# Patient Record
Sex: FEMALE | Race: WHITE | NOT HISPANIC OR LATINO | Employment: OTHER | URBAN - METROPOLITAN AREA
[De-identification: names, ages, dates, MRNs, and addresses within clinical notes are randomized per-mention and may not be internally consistent; named-entity substitution may affect disease eponyms.]

---

## 2017-05-30 ENCOUNTER — TELEPHONE (OUTPATIENT)
Dept: ORTHOPEDICS | Facility: CLINIC | Age: 37
End: 2017-05-30

## 2017-05-30 NOTE — TELEPHONE ENCOUNTER
----- Message from Fanta Bangura sent at 5/30/2017  8:27 AM CDT -----  Contact: patient  Patient calling in regards to scheduling a same day appt today. This is a [facility] ED f/u for an AC shoulder separation. No avail appt until 6/6/17 but he would like to be seen today. Please advise.  Call back   Thanks!

## 2017-05-31 DIAGNOSIS — M25.512 LEFT SHOULDER PAIN, UNSPECIFIED CHRONICITY: Primary | ICD-10-CM

## 2017-06-02 ENCOUNTER — OFFICE VISIT (OUTPATIENT)
Dept: ORTHOPEDICS | Facility: CLINIC | Age: 37
End: 2017-06-02
Payer: MEDICARE

## 2017-06-02 VITALS
HEART RATE: 77 BPM | SYSTOLIC BLOOD PRESSURE: 115 MMHG | DIASTOLIC BLOOD PRESSURE: 68 MMHG | HEIGHT: 72 IN | WEIGHT: 155 LBS | BODY MASS INDEX: 20.99 KG/M2

## 2017-06-02 DIAGNOSIS — S46.912A SHOULDER STRAIN, LEFT, INITIAL ENCOUNTER: Primary | ICD-10-CM

## 2017-06-02 PROCEDURE — 99203 OFFICE O/P NEW LOW 30 MIN: CPT | Mod: S$GLB,,, | Performed by: ORTHOPAEDIC SURGERY

## 2017-06-02 PROCEDURE — 99999 PR PBB SHADOW E&M-EST. PATIENT-LVL III: CPT | Mod: PBBFAC,,, | Performed by: ORTHOPAEDIC SURGERY
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
## 2017-09-25 ENCOUNTER — HOSPITAL ENCOUNTER (EMERGENCY)
Facility: HOSPITAL | Age: 37
Discharge: HOME | End: 2017-09-25
Attending: EMERGENCY MEDICINE
Payer: MEDICARE

## 2017-09-25 VITALS
OXYGEN SATURATION: 99 % | SYSTOLIC BLOOD PRESSURE: 131 MMHG | RESPIRATION RATE: 15 BRPM | WEIGHT: 150 LBS | HEART RATE: 72 BPM | BODY MASS INDEX: 20.34 KG/M2 | DIASTOLIC BLOOD PRESSURE: 83 MMHG | TEMPERATURE: 98 F

## 2017-09-25 DIAGNOSIS — S09.90XA CLOSED HEAD INJURY, INITIAL ENCOUNTER: Primary | ICD-10-CM

## 2017-09-25 PROCEDURE — 99284 EMERGENCY DEPT VISIT MOD MDM: CPT
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
## 2017-09-25 NOTE — DISCHARGE INSTRUCTIONS
Take your routine medications as prescribed.  See your primary care provider in one week.  For worsening symptoms, chest pain, shortness of breath, increased abdominal pain, high grade fever, stroke or stroke like symptoms, immediately go to the nearest Emergency Room or call 911 as soon as possible.

## 2017-09-25 NOTE — ED NOTES
Patient identifiers for Gordon Griffin III checked and correct.  LOC: Patient is awake, alert, and aware of environment with an appropriate affect. Patient is oriented x 3 and speaking appropriately.  APPEARANCE: Patient resting comfortably and in no acute distress. Patient is clean and well groomed, patient's clothing is properly fastened.  SKIN: The skin is warm and dry. Patient has normal skin turgor and moist mucus membrances. Skin is intact; no bruising or breakdown noted.  MUSKULOSKELETAL: Patient is moving all extremities well, no obvious deformities noted. Pulses intact.   RESPIRATORY: Airway is open and patent. Respirations are spontaneous and non-labored with normal effort and rate.  CARDIAC: Patient has a normal rate and rhythm. No peripheral edema noted. Capillary refill < 3 seconds.  ABDOMEN: No distention noted. Bowel sounds active in all 4 quadrants. Soft and non-tender upon palpation.  NEUROLOGICAL: PERRL. Facial expression is symmetrical. Hand grasps are equal bilaterally. Normal sensation in all extremities when touched with finger.  Allergies reported:   Review of patient's allergies indicates:   Allergen Reactions    Niaspan extended-release [niacin] Itching    Extendryl [qdqgcmonekvnlvyg-wl-llmcxlbkvj] Rash    V-cillin k Rash

## 2017-09-26 ENCOUNTER — HOSPITAL ENCOUNTER (EMERGENCY)
Facility: HOSPITAL | Age: 37
Discharge: HOME | End: 2017-09-26
Attending: EMERGENCY MEDICINE
Payer: MEDICARE

## 2017-09-26 VITALS
HEART RATE: 70 BPM | WEIGHT: 150 LBS | DIASTOLIC BLOOD PRESSURE: 72 MMHG | TEMPERATURE: 98 F | BODY MASS INDEX: 20.34 KG/M2 | OXYGEN SATURATION: 97 % | SYSTOLIC BLOOD PRESSURE: 121 MMHG | RESPIRATION RATE: 16 BRPM

## 2017-09-26 DIAGNOSIS — R20.2 PARESTHESIA OF LEFT ARM AND LEG: Primary | ICD-10-CM

## 2017-09-26 DIAGNOSIS — R53.1 LEFT-SIDED WEAKNESS: ICD-10-CM

## 2017-09-26 LAB
ALBUMIN SERPL BCP-MCNC: 3.7 G/DL
ALP SERPL-CCNC: 114 U/L
ALT SERPL W/O P-5'-P-CCNC: 11 U/L
ANION GAP SERPL CALC-SCNC: 8 MMOL/L
AST SERPL-CCNC: 18 U/L
BASOPHILS # BLD AUTO: 0 K/UL
BASOPHILS NFR BLD: 0.7 %
BILIRUB SERPL-MCNC: 0.4 MG/DL
BUN SERPL-MCNC: 11 MG/DL
CALCIUM SERPL-MCNC: 9 MG/DL
CHLORIDE SERPL-SCNC: 106 MMOL/L
CO2 SERPL-SCNC: 28 MMOL/L
CREAT SERPL-MCNC: 0.9 MG/DL
DIFFERENTIAL METHOD: ABNORMAL
EOSINOPHIL # BLD AUTO: 0.2 K/UL
EOSINOPHIL NFR BLD: 2.7 %
ERYTHROCYTE [DISTWIDTH] IN BLOOD BY AUTOMATED COUNT: 12.6 %
EST. GFR  (AFRICAN AMERICAN): >60 ML/MIN/1.73 M^2
EST. GFR  (NON AFRICAN AMERICAN): >60 ML/MIN/1.73 M^2
GLUCOSE SERPL-MCNC: 99 MG/DL
HCT VFR BLD AUTO: 44.5 %
HGB BLD-MCNC: 15 G/DL
INR PPP: 1
LYMPHOCYTES # BLD AUTO: 1.6 K/UL
LYMPHOCYTES NFR BLD: 25 %
MCH RBC QN AUTO: 28.9 PG
MCHC RBC AUTO-ENTMCNC: 33.7 G/DL
MCV RBC AUTO: 86 FL
MONOCYTES # BLD AUTO: 0.5 K/UL
MONOCYTES NFR BLD: 7.7 %
NEUTROPHILS # BLD AUTO: 4 K/UL
NEUTROPHILS NFR BLD: 63.9 %
PLATELET # BLD AUTO: 110 K/UL
PMV BLD AUTO: 9.4 FL
POTASSIUM SERPL-SCNC: 3.9 MMOL/L
PROT SERPL-MCNC: 6.6 G/DL
PROTHROMBIN TIME: 10.6 SEC
RBC # BLD AUTO: 5.2 M/UL
SODIUM SERPL-SCNC: 142 MMOL/L
WBC # BLD AUTO: 6.3 K/UL

## 2017-09-26 PROCEDURE — 85610 PROTHROMBIN TIME: CPT

## 2017-09-26 PROCEDURE — 36415 COLL VENOUS BLD VENIPUNCTURE: CPT

## 2017-09-26 PROCEDURE — 80053 COMPREHEN METABOLIC PANEL: CPT

## 2017-09-26 PROCEDURE — 85025 COMPLETE CBC W/AUTO DIFF WBC: CPT

## 2017-09-26 PROCEDURE — 99285 EMERGENCY DEPT VISIT HI MDM: CPT

## 2017-09-26 PROCEDURE — 93005 ELECTROCARDIOGRAM TRACING: CPT
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
## 2017-10-26 ENCOUNTER — HOSPITAL ENCOUNTER (EMERGENCY)
Facility: HOSPITAL | Age: 37
Discharge: HOME | End: 2017-10-26
Attending: EMERGENCY MEDICINE
Payer: MEDICARE

## 2017-10-26 VITALS
DIASTOLIC BLOOD PRESSURE: 84 MMHG | TEMPERATURE: 98 F | RESPIRATION RATE: 16 BRPM | OXYGEN SATURATION: 97 % | BODY MASS INDEX: 20.32 KG/M2 | SYSTOLIC BLOOD PRESSURE: 125 MMHG | HEIGHT: 72 IN | WEIGHT: 150 LBS | HEART RATE: 74 BPM

## 2017-10-26 DIAGNOSIS — T23.221A PARTIAL THICKNESS BURN OF SINGLE FINGER OF RIGHT HAND EXCLUDING THUMB, INITIAL ENCOUNTER: Primary | ICD-10-CM

## 2017-10-26 DIAGNOSIS — T22.231A PARTIAL THICKNESS BURN OF RIGHT UPPER ARM, INITIAL ENCOUNTER: ICD-10-CM

## 2017-10-26 PROCEDURE — 99283 EMERGENCY DEPT VISIT LOW MDM: CPT

## 2017-10-26 PROCEDURE — 25000003 PHARM REV CODE 250: Performed by: PHYSICIAN ASSISTANT

## 2017-10-26 RX ORDER — MUPIROCIN 20 MG/G
1 OINTMENT TOPICAL
Status: COMPLETED | OUTPATIENT
Start: 2017-10-26 | End: 2017-10-26

## 2017-10-26 RX ORDER — MUPIROCIN 20 MG/G
OINTMENT TOPICAL
Status: DISCONTINUED
Start: 2017-10-26 | End: 2017-10-26 | Stop reason: HOSPADM

## 2017-10-26 RX ORDER — MUPIROCIN 20 MG/G
OINTMENT TOPICAL 3 TIMES DAILY
Qty: 30 G | Refills: 0 | Status: SHIPPED | OUTPATIENT
Start: 2017-10-26 | End: 2017-11-05

## 2017-10-26 RX ADMIN — MUPIROCIN 22 G: 20 OINTMENT TOPICAL at 08:10
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
## 2017-11-20 ENCOUNTER — HOSPITAL ENCOUNTER (EMERGENCY)
Facility: HOSPITAL | Age: 37
Discharge: HOME | End: 2017-11-20
Attending: EMERGENCY MEDICINE
Payer: MEDICARE

## 2017-11-20 VITALS
HEIGHT: 72 IN | HEART RATE: 78 BPM | BODY MASS INDEX: 20.32 KG/M2 | DIASTOLIC BLOOD PRESSURE: 65 MMHG | SYSTOLIC BLOOD PRESSURE: 122 MMHG | TEMPERATURE: 98 F | RESPIRATION RATE: 16 BRPM | WEIGHT: 150 LBS | OXYGEN SATURATION: 98 %

## 2017-11-20 DIAGNOSIS — R51.9 ACUTE NONINTRACTABLE HEADACHE, UNSPECIFIED HEADACHE TYPE: Primary | ICD-10-CM

## 2017-11-20 PROCEDURE — 25000003 PHARM REV CODE 250: Performed by: PHYSICIAN ASSISTANT

## 2017-11-20 PROCEDURE — 96374 THER/PROPH/DIAG INJ IV PUSH: CPT

## 2017-11-20 PROCEDURE — 99284 EMERGENCY DEPT VISIT MOD MDM: CPT | Mod: 25

## 2017-11-20 PROCEDURE — 96375 TX/PRO/DX INJ NEW DRUG ADDON: CPT

## 2017-11-20 PROCEDURE — 63600175 PHARM REV CODE 636 W HCPCS: Performed by: PHYSICIAN ASSISTANT

## 2017-11-20 RX ORDER — PROCHLORPERAZINE EDISYLATE 5 MG/ML
10 INJECTION INTRAMUSCULAR; INTRAVENOUS
Status: COMPLETED | OUTPATIENT
Start: 2017-11-20 | End: 2017-11-20

## 2017-11-20 RX ORDER — DIPHENHYDRAMINE HYDROCHLORIDE 50 MG/ML
25 INJECTION INTRAMUSCULAR; INTRAVENOUS
Status: COMPLETED | OUTPATIENT
Start: 2017-11-20 | End: 2017-11-20

## 2017-11-20 RX ORDER — BUTORPHANOL TARTRATE 10 MG/ML
1 SPRAY NASAL EVERY 4 HOURS PRN
COMMUNITY
End: 2023-11-06

## 2017-11-20 RX ORDER — KETOROLAC TROMETHAMINE 30 MG/ML
10 INJECTION, SOLUTION INTRAMUSCULAR; INTRAVENOUS
Status: COMPLETED | OUTPATIENT
Start: 2017-11-20 | End: 2017-11-20

## 2017-11-20 RX ADMIN — DIPHENHYDRAMINE HYDROCHLORIDE 25 MG: 50 INJECTION, SOLUTION INTRAMUSCULAR; INTRAVENOUS at 10:11

## 2017-11-20 RX ADMIN — SODIUM CHLORIDE 1000 ML: 0.9 INJECTION, SOLUTION INTRAVENOUS at 10:11

## 2017-11-20 RX ADMIN — KETOROLAC TROMETHAMINE 10 MG: 30 INJECTION, SOLUTION INTRAMUSCULAR at 10:11

## 2017-11-20 RX ADMIN — PROCHLORPERAZINE EDISYLATE 10 MG: 5 INJECTION INTRAMUSCULAR; INTRAVENOUS at 10:11

## 2017-11-20 NOTE — ED PROVIDER NOTES
Encounter Date: 11/20/2017       History     Chief Complaint   Patient presents with    Headache     since last pm, not relieved with stadol     This is an urgent evaluation of a 37 year old male who presents with headache for one day. Patient has PMH significant for anxiety, migraine headaches and stroke. He reports intermittent headaches over the last few weeks that progressively worsened yesterday. He states he took his stadol this AM around 6AM with no improvement. He report associated blurred vision, nausea, photophobia and ponophobia all consistent with his previous migraine headaches. He denied weakness, fever, neck stiffness or head trauma.      The history is provided by the patient and the spouse.     Review of patient's allergies indicates:   Allergen Reactions    Niaspan extended-release [niacin] Itching    Extendryl [ndqwknlnymdnxjuz-fh-osuwsilmdc] Rash    V-cillin k Rash     Past Medical History:   Diagnosis Date    Anxiety     Chest pain 1/20/2016 12/30/2015: Began experinece chest pain.    Depression     Migraine headache     Stroke pt. states he had a cva at 3 months old     Past Surgical History:   Procedure Laterality Date    MANDIBLE SURGERY      reconstruction     Family History   Problem Relation Age of Onset    Heart disease Father     Heart disease Paternal Uncle      Social History   Substance Use Topics    Smoking status: Never Smoker    Smokeless tobacco: Never Used    Alcohol use No     Review of Systems   Constitutional: Negative for activity change, appetite change, chills and fever.   HENT: Negative for congestion, rhinorrhea and sore throat.    Eyes: Positive for photophobia.   Respiratory: Negative for cough, chest tightness and shortness of breath.    Cardiovascular: Negative for chest pain.   Gastrointestinal: Positive for nausea. Negative for abdominal pain, diarrhea and vomiting.   Genitourinary: Negative for dysuria and frequency.   Musculoskeletal: Negative  for back pain, neck pain and neck stiffness.   Skin: Negative for rash.   Neurological: Positive for headaches. Negative for dizziness, syncope, speech difficulty, weakness and numbness.       Physical Exam     Initial Vitals [11/20/17 0925]   BP Pulse Resp Temp SpO2   130/82 80 12 97.9 °F (36.6 °C) 98 %      MAP       98         Physical Exam    Constitutional: Vital signs are normal. He appears well-developed and well-nourished. He is cooperative.  Non-toxic appearance. He does not have a sickly appearance.   HENT:   Head: Normocephalic and atraumatic.   Right Ear: External ear normal.   Left Ear: External ear normal.   Nose: Nose normal.   Mouth/Throat: Oropharynx is clear and moist.   Eyes: Conjunctivae and lids are normal. Pupils are equal, round, and reactive to light.   Neck: Normal range of motion and full passive range of motion without pain. Neck supple.   Cardiovascular: Normal rate and regular rhythm.   Pulmonary/Chest: Breath sounds normal. He has no wheezes. He has no rales.   Abdominal: Soft. Normal appearance. There is no tenderness. There is no rigidity, no rebound and no guarding.   Neurological: He is alert and oriented to person, place, and time. He has normal strength.   Cranial nerves II-XII intact   Skin: Skin is warm, dry and intact. No rash noted.         ED Course   Procedures  Labs Reviewed - No data to display          Medical Decision Making:   History:   I obtained history from: someone other than patient.  Old Medical Records: I decided to obtain old medical records.              Attending Attestation:     Physician Attestation Statement for NP/PA:   I have conducted a face to face encounter with this patient in addition to the NP/PA, due to NP/PA Request    Other NP/PA Attestation Additions:      Medical Decision Making: Gordon Griffin III is a 37 y.o. male presenting with headache consistent with migraine.  Neck is supple.  Normal mental status.  5/5 strength and sensation.  CN III  through XII intact.  I have very low suspicion for alternative causes of headache such as intracranial hemorrhage, ischemic process, meningitis, idiopathic intracranial hypertension, or cavernous venous sinus thrombosis.  The patient has a non-focal neurological examination with history not consistent with emergent causes of headache warranting present therapeutic intervention.  I do not think further brain imaging or lumbar puncture are indicated at this time.  Follow up with headache specialist.  Return precautions reviewed.                 ED Course      Clinical Impression:   The encounter diagnosis was Acute nonintractable headache, unspecified headache type.                           Flor Varner PA-C  11/20/17 1446

## 2017-11-20 NOTE — DISCHARGE INSTRUCTIONS
Continue your medications as per your neurologist.  Call and schedule an appointment with him.  For worsening symptoms, chest pain, shortness of breath, increased abdominal pain, high grade fever, stroke or stroke like symptoms, immediately go to the nearest Emergency Room or call 911 as soon as possible.

## 2017-11-26 ENCOUNTER — HOSPITAL ENCOUNTER (EMERGENCY)
Facility: HOSPITAL | Age: 37
Discharge: HOME | End: 2017-11-26
Attending: EMERGENCY MEDICINE
Payer: MEDICARE

## 2017-11-26 VITALS
TEMPERATURE: 98 F | HEIGHT: 72 IN | SYSTOLIC BLOOD PRESSURE: 124 MMHG | OXYGEN SATURATION: 99 % | RESPIRATION RATE: 18 BRPM | BODY MASS INDEX: 21.67 KG/M2 | WEIGHT: 160 LBS | HEART RATE: 78 BPM | DIASTOLIC BLOOD PRESSURE: 72 MMHG

## 2017-11-26 DIAGNOSIS — G43.909 MIGRAINE WITHOUT STATUS MIGRAINOSUS, NOT INTRACTABLE, UNSPECIFIED MIGRAINE TYPE: Primary | ICD-10-CM

## 2017-11-26 PROCEDURE — 63600175 PHARM REV CODE 636 W HCPCS: Performed by: EMERGENCY MEDICINE

## 2017-11-26 PROCEDURE — 96375 TX/PRO/DX INJ NEW DRUG ADDON: CPT

## 2017-11-26 PROCEDURE — 96374 THER/PROPH/DIAG INJ IV PUSH: CPT

## 2017-11-26 PROCEDURE — 99284 EMERGENCY DEPT VISIT MOD MDM: CPT | Mod: 25

## 2017-11-26 RX ORDER — DEXAMETHASONE SODIUM PHOSPHATE 4 MG/ML
8 INJECTION, SOLUTION INTRA-ARTICULAR; INTRALESIONAL; INTRAMUSCULAR; INTRAVENOUS; SOFT TISSUE
Status: COMPLETED | OUTPATIENT
Start: 2017-11-26 | End: 2017-11-26

## 2017-11-26 RX ORDER — METOCLOPRAMIDE HYDROCHLORIDE 5 MG/ML
10 INJECTION INTRAMUSCULAR; INTRAVENOUS
Status: COMPLETED | OUTPATIENT
Start: 2017-11-26 | End: 2017-11-26

## 2017-11-26 RX ORDER — KETOROLAC TROMETHAMINE 30 MG/ML
30 INJECTION, SOLUTION INTRAMUSCULAR; INTRAVENOUS
Status: COMPLETED | OUTPATIENT
Start: 2017-11-26 | End: 2017-11-26

## 2017-11-26 RX ADMIN — METOCLOPRAMIDE 10 MG: 5 INJECTION, SOLUTION INTRAMUSCULAR; INTRAVENOUS at 10:11

## 2017-11-26 RX ADMIN — DEXAMETHASONE SODIUM PHOSPHATE 8 MG: 4 INJECTION, SOLUTION INTRAMUSCULAR; INTRAVENOUS at 10:11

## 2017-11-26 RX ADMIN — KETOROLAC TROMETHAMINE 30 MG: 30 INJECTION, SOLUTION INTRAMUSCULAR at 10:11

## 2017-11-27 NOTE — ED PROVIDER NOTES
Encounter Date: 11/26/2017    SCRIBE #1 NOTE: I, Ana Hoyos, am scribing for, and in the presence of, Dr. Wahl .       History     Chief Complaint   Patient presents with    Headache     x 3 hours        11/26/2017 9:54 PM     Chief complaint: JOSTIN Griffin III is a 37 y.o. male with a hx of Migraines, CVA, Anxiety, Depression and CP who presents to the ED with complaints of a worsening frontal HA associated with photophobia and x3 hours. He states the HA are similar sx. He took Stadol with temporary relief this afternoon. He denies fever, numbness and neck stiffness. Allergens include Niaspan, Extendryl, and V-cillin K.       The history is provided by the patient.     Review of patient's allergies indicates:   Allergen Reactions    Niaspan extended-release [niacin] Itching    Extendryl [agtqbhwivshnmqfa-kv-lympscayhl] Rash    V-cillin k Rash     Past Medical History:   Diagnosis Date    Anxiety     Chest pain 1/20/2016 12/30/2015: Began experinece chest pain.    Depression     Migraine headache     Stroke pt. states he had a cva at 3 months old     Past Surgical History:   Procedure Laterality Date    MANDIBLE SURGERY      reconstruction     Family History   Problem Relation Age of Onset    Heart disease Father     Heart disease Paternal Uncle      Social History   Substance Use Topics    Smoking status: Never Smoker    Smokeless tobacco: Never Used    Alcohol use No     Review of Systems   Constitutional: Negative for activity change, appetite change, chills, fatigue and fever.   HENT: Negative for congestion.    Eyes: Positive for photophobia. Negative for redness and visual disturbance.   Respiratory: Negative for apnea and shortness of breath.    Cardiovascular: Negative for chest pain and palpitations.   Gastrointestinal: Negative for abdominal distention and abdominal pain.   Genitourinary: Negative for difficulty urinating.   Musculoskeletal: Negative for neck pain.   Skin:  Negative for pallor and rash.   Neurological: Positive for headaches.   Hematological: Does not bruise/bleed easily.   Psychiatric/Behavioral: Negative for agitation.       Physical Exam     Initial Vitals [11/26/17 2134]   BP Pulse Resp Temp SpO2   127/80 79 16 98.7 °F (37.1 °C) 97 %      MAP       95.67         Physical Exam    Nursing note and vitals reviewed.  Constitutional: He appears well-developed and well-nourished. He is not diaphoretic. No distress.   HENT:   Head: Normocephalic and atraumatic.   Mouth/Throat: Oropharynx is clear and moist.   Eyes: Conjunctivae and EOM are normal. Pupils are equal, round, and reactive to light.   Neck: Neck supple.   Cardiovascular: Normal rate, regular rhythm, normal heart sounds and intact distal pulses. Exam reveals no gallop and no friction rub.    No murmur heard.  Pulmonary/Chest: Breath sounds normal. He has no wheezes. He has no rhonchi. He has no rales.   Abdominal: Soft. He exhibits no distension. There is no tenderness.   Musculoskeletal: Normal range of motion.   Neurological: He is alert and oriented to person, place, and time.   Skin: No rash noted. No erythema.   Psychiatric: He has a normal mood and affect. His behavior is normal. Judgment and thought content normal.         ED Course   Procedures  Labs Reviewed - No data to display          Medical Decision Making:   ED Management:  Gordon Griffin III is a 37 y.o. male who presents with  a frontal headache similar to prior migraine headaches.  He has no fever or neck stiffness with infectious etiology unlikely.  There are no focal neurologic deficits to mandate neuro imaging.  Headache is completely alleviated with ketorolac.       APC / Resident Notes:   I, Dr. Anand Wahl III, personally performed the services described in this documentation. All medical record entries made by the scribe were at my direction and in my presence.  I have reviewed the chart and agree that the record reflects my  personal performance and is accurate and complete. Anand Wahl III, MD.  1:39 AM 11/27/2017       Scribe Attestation:   Scribe #1: I performed the above scribed service and the documentation accurately describes the services I performed. I attest to the accuracy of the note.              ED Course      Clinical Impression:   No diagnosis found.                           Anand Wahl III, MD  11/27/17 0140

## 2018-01-19 ENCOUNTER — TELEPHONE (OUTPATIENT)
Dept: FAMILY MEDICINE | Facility: CLINIC | Age: 38
End: 2018-01-19

## 2018-01-19 NOTE — TELEPHONE ENCOUNTER
----- Message from Sada Valencia LPN sent at 1/19/2018 10:22 AM CST -----  Good Morning,  Please call patient to schedule an appt for next week for neck pain. Thanks.

## 2018-02-01 ENCOUNTER — OFFICE VISIT (OUTPATIENT)
Dept: INTERNAL MEDICINE | Facility: CLINIC | Age: 38
End: 2018-02-01
Payer: MEDICARE

## 2018-02-01 VITALS
OXYGEN SATURATION: 97 % | HEART RATE: 87 BPM | HEIGHT: 72 IN | BODY MASS INDEX: 21.72 KG/M2 | DIASTOLIC BLOOD PRESSURE: 72 MMHG | RESPIRATION RATE: 18 BRPM | SYSTOLIC BLOOD PRESSURE: 118 MMHG | TEMPERATURE: 98 F | WEIGHT: 160.38 LBS

## 2018-02-01 DIAGNOSIS — G89.29 CHRONIC MIDLINE LOW BACK PAIN WITHOUT SCIATICA: ICD-10-CM

## 2018-02-01 DIAGNOSIS — M54.2 CERVICALGIA: Primary | ICD-10-CM

## 2018-02-01 DIAGNOSIS — M54.50 CHRONIC MIDLINE LOW BACK PAIN WITHOUT SCIATICA: ICD-10-CM

## 2018-02-01 PROCEDURE — 99204 OFFICE O/P NEW MOD 45 MIN: CPT | Mod: ,,, | Performed by: PHYSICAL MEDICINE & REHABILITATION

## 2018-02-01 PROCEDURE — 3008F BODY MASS INDEX DOCD: CPT | Mod: ,,, | Performed by: PHYSICAL MEDICINE & REHABILITATION

## 2018-02-03 ENCOUNTER — HOSPITAL ENCOUNTER (EMERGENCY)
Facility: HOSPITAL | Age: 38
Discharge: HOME | End: 2018-02-03
Attending: EMERGENCY MEDICINE
Payer: MEDICARE

## 2018-02-03 ENCOUNTER — NURSE TRIAGE (OUTPATIENT)
Dept: ADMINISTRATIVE | Facility: CLINIC | Age: 38
End: 2018-02-03

## 2018-02-03 VITALS
HEIGHT: 72 IN | TEMPERATURE: 98 F | SYSTOLIC BLOOD PRESSURE: 129 MMHG | BODY MASS INDEX: 21.67 KG/M2 | WEIGHT: 160 LBS | HEART RATE: 102 BPM | OXYGEN SATURATION: 97 % | DIASTOLIC BLOOD PRESSURE: 77 MMHG | RESPIRATION RATE: 16 BRPM

## 2018-02-03 DIAGNOSIS — K64.4 EXTERNAL HEMORRHOID: Primary | ICD-10-CM

## 2018-02-03 PROCEDURE — 99283 EMERGENCY DEPT VISIT LOW MDM: CPT

## 2018-02-03 RX ORDER — HYDROCORTISONE 25 MG/G
CREAM TOPICAL 2 TIMES DAILY
Qty: 1 TUBE | Refills: 1 | Status: SHIPPED | OUTPATIENT
Start: 2018-02-03 | End: 2018-02-04 | Stop reason: ALTCHOICE

## 2018-02-03 NOTE — ED PROVIDER NOTES
Encounter Date: 2/3/2018       History     Chief Complaint   Patient presents with    Hemorrhoids     37 role male with a past medical history of anxiety, who presents to emergency department for evaluation of external hemorrhoids.  They are bleeding that is slightly painful.  He is just getting over the fluid had some mild diarrhea.  He called the on-call nurse referred him to the emergency room and I'm unsure why.  He has no fevers or abdominal pain.          Review of patient's allergies indicates:   Allergen Reactions    Niaspan extended-release [niacin] Itching    Extendryl [yplydoraegvztbnm-vn-pfgwpbfzyw] Rash    V-cillin k Rash     Past Medical History:   Diagnosis Date    Anxiety     Chest pain 1/20/2016 12/30/2015: Began experinece chest pain.    Depression     Migraine headache     Stroke pt. states he had a cva at 3 months old     Past Surgical History:   Procedure Laterality Date    MANDIBLE SURGERY      reconstruction     Family History   Problem Relation Age of Onset    Heart disease Father     Heart disease Paternal Uncle      Social History   Substance Use Topics    Smoking status: Never Smoker    Smokeless tobacco: Never Used    Alcohol use No     Review of Systems   Constitutional: Negative for fever.   Gastrointestinal: Positive for rectal pain. Negative for abdominal pain, constipation, diarrhea, nausea and vomiting.   Genitourinary: Negative for difficulty urinating, dysuria, penile pain and testicular pain.       Physical Exam     Initial Vitals [02/03/18 0604]   BP Pulse Resp Temp SpO2   129/77 102 16 98 °F (36.7 °C) 97 %      MAP       94.33         Physical Exam    Nursing note and vitals reviewed.  Constitutional: He appears well-developed and well-nourished. No distress.   Pulmonary/Chest: No respiratory distress.   Genitourinary:   Genitourinary Comments: 2 small external hemorrhoids that are not thrombosed   Neurological: He is alert and oriented to person, place, and  time.         ED Course   Procedures  Labs Reviewed - No data to display          Medical Decision Making:   Patient has external hemorrhoids that do not need to be lanced at this time.  He can try Proctofoam and Anusol HC.  He is stable for discharge.                   ED Course      Clinical Impression:   The encounter diagnosis was External hemorrhoid.                           Jose Ho MD  02/03/18 0680

## 2018-02-03 NOTE — ED NOTES
Patient identifiers for Gordon Griffin III checked and correct.  LOC:  Patient is awake, alert, and aware of environment with an appropriate affect. Patient is oriented x 3 and speaking appropriately.  APPEARANCE:  Patient resting comfortably and in no acute distress. Patient is clean and well groomed, patient's clothing is properly fastened.  SKIN:  The skin is warm and dry. Patient has normal skin turgor and moist mucus membranes. Skin is intact; no bruising or breakdown noted.  MUSCULOSKELETAL:  Patient is moving all extremities well, no obvious deformities noted. Pulses intact.   RESPIRATORY:  Airway is open and patent. Respirations are spontaneous and non-labored with normal effort and rate.  CARDIAC:  Patient has a normal rate and rhythm. No peripheral edema noted. Capillary refill < 3 seconds.  ABDOMEN:  No distention noted.  Soft and non-tender upon palpation.  NEUROLOGICAL:  PERRL. Facial expression is symmetrical. Hand grasps are equal bilaterally. Normal sensation in all extremities when touched with finger.  Allergies reported:    Review of patient's allergies indicates:   Allergen Reactions    Niaspan extended-release [niacin] Itching    Extendryl [bgvijxeoctqffiam-fm-vcriptvpcp] Rash    V-cillin k Rash     OTHER NOTES:  Pt endorses rectal pain and thinks he has hemorrhoids

## 2018-02-03 NOTE — TELEPHONE ENCOUNTER
"    Reason for Disposition   MODERATE-SEVERE rectal pain (i.e., interferes with school, work, or sleep)    Answer Assessment - Initial Assessment Questions  1. SYMPTOM:  "What's the main symptom you're concerned about?" (e.g., pain, itching, swelling, rash)      Pain   2. ONSET: "When did the ________  start?"      yesterday  3. RECTAL PAIN: "Do you have any pain around your rectum?" "How bad is the pain?"  (Scale 1-10; or mild, moderate, severe)   - MILD (1-3): doesn't interfere with normal activities    - MODERATE (4-7): interferes with normal activities or awakens from sleep, limping    - SEVERE (8-10): excruciating pain, unable to have a bowel movement       6-7  4. RECTAL ITCHING: "Do you have any itching in this area?" "How bad is the itching?"  (Scale 1-10; or mild, moderate, severe)   - MILD - doesn't interfere with normal activities    - MODERATE-SEVERE: interferes with normal activities or awakens from sleep      No itching and minor bleeding  5. CONSTIPATION: "Do you have constipation?" If so, "How bad is it?"      No BM in 3 day just getting over flu  6. CAUSE: "What do you think is causing the anus symptoms?"      hemorrhoids  7. OTHER SYMPTOMS: "Do you have any other symptoms?"  (e.g., rectal bleeding, abdominal pain, vomiting, fever)      Scant amout of bleeding, mild abdominal  8. PREGNANCY: "Is there any chance you are pregnant?" "When was your last menstrual period?"      no    Protocols used: ST RECTAL SYMPTOMS-A-AH      "

## 2018-02-04 ENCOUNTER — HOSPITAL ENCOUNTER (EMERGENCY)
Facility: HOSPITAL | Age: 38
Discharge: HOME | End: 2018-02-04
Attending: EMERGENCY MEDICINE
Payer: MEDICARE

## 2018-02-04 VITALS
OXYGEN SATURATION: 97 % | RESPIRATION RATE: 16 BRPM | DIASTOLIC BLOOD PRESSURE: 77 MMHG | BODY MASS INDEX: 21.7 KG/M2 | SYSTOLIC BLOOD PRESSURE: 131 MMHG | TEMPERATURE: 98 F | HEART RATE: 98 BPM | WEIGHT: 160 LBS

## 2018-02-04 DIAGNOSIS — K64.9 BLEEDING HEMORRHOIDS: Primary | ICD-10-CM

## 2018-02-04 PROCEDURE — 99283 EMERGENCY DEPT VISIT LOW MDM: CPT

## 2018-02-04 RX ORDER — HYDROCORTISONE 25 MG/G
CREAM TOPICAL 2 TIMES DAILY
Qty: 30 G | Refills: 0 | Status: SHIPPED | OUTPATIENT
Start: 2018-02-04 | End: 2019-03-01

## 2018-02-06 ENCOUNTER — OFFICE VISIT (OUTPATIENT)
Dept: SURGERY | Facility: CLINIC | Age: 38
End: 2018-02-06
Payer: MEDICARE

## 2018-02-06 VITALS
HEART RATE: 100 BPM | SYSTOLIC BLOOD PRESSURE: 124 MMHG | WEIGHT: 159.38 LBS | DIASTOLIC BLOOD PRESSURE: 76 MMHG | BODY MASS INDEX: 21.59 KG/M2 | HEIGHT: 72 IN

## 2018-02-06 DIAGNOSIS — K64.5 THROMBOSED EXTERNAL HEMORRHOID: Primary | ICD-10-CM

## 2018-02-06 PROCEDURE — 3008F BODY MASS INDEX DOCD: CPT | Mod: S$GLB,,, | Performed by: NURSE PRACTITIONER

## 2018-02-06 PROCEDURE — 99999 PR PBB SHADOW E&M-EST. PATIENT-LVL IV: CPT | Mod: PBBFAC,,, | Performed by: NURSE PRACTITIONER

## 2018-02-06 PROCEDURE — 99213 OFFICE O/P EST LOW 20 MIN: CPT | Mod: S$GLB,,, | Performed by: NURSE PRACTITIONER

## 2018-02-06 RX ORDER — METOPROLOL SUCCINATE 25 MG/1
TABLET, EXTENDED RELEASE ORAL
Refills: 5 | COMMUNITY
Start: 2017-12-02 | End: 2018-02-06

## 2018-02-06 RX ORDER — METOPROLOL SUCCINATE 25 MG/1
TABLET, EXTENDED RELEASE ORAL
Refills: 5 | COMMUNITY
Start: 2017-12-26 | End: 2019-04-08

## 2018-02-06 RX ORDER — VERAPAMIL HYDROCHLORIDE 40 MG/1
TABLET ORAL
Refills: 5 | COMMUNITY
Start: 2017-12-26 | End: 2018-03-31

## 2018-02-06 RX ORDER — DICLOFENAC POTASSIUM 50 MG/1
POWDER, FOR SOLUTION ORAL
COMMUNITY
Start: 2015-02-03 | End: 2019-03-01

## 2018-02-06 RX ORDER — ATORVASTATIN CALCIUM 80 MG/1
TABLET, FILM COATED ORAL
Refills: 3 | COMMUNITY
Start: 2017-12-06 | End: 2020-12-29 | Stop reason: SDUPTHER

## 2018-02-06 RX ORDER — DIPHENOXYLATE HYDROCHLORIDE AND ATROPINE SULFATE 2.5; .025 MG/1; MG/1
TABLET ORAL
Refills: 0 | COMMUNITY
Start: 2018-01-31 | End: 2019-03-01

## 2018-02-06 RX ORDER — PROMETHAZINE HYDROCHLORIDE 12.5 MG/1
TABLET ORAL
Refills: 4 | COMMUNITY
Start: 2018-01-25 | End: 2019-11-15

## 2018-02-06 RX ORDER — FLUTICASONE PROPIONATE 50 MCG
SPRAY, SUSPENSION (ML) NASAL
Refills: 5 | COMMUNITY
Start: 2018-01-25

## 2018-02-06 RX ORDER — ONDANSETRON 4 MG/1
TABLET, ORALLY DISINTEGRATING ORAL
Refills: 0 | COMMUNITY
Start: 2018-01-31 | End: 2018-02-08 | Stop reason: SDUPTHER

## 2018-02-06 RX ORDER — TIZANIDINE 4 MG/1
TABLET ORAL
Refills: 0 | COMMUNITY
Start: 2018-01-29 | End: 2019-03-01

## 2018-02-06 NOTE — PROGRESS NOTES
Subjective:       Patient ID: Gordon Griffin III is a 37 y.o. male who presents to crs with complaitns of hemorrhoids, went to ER 2/3 and 2/4 with feeling a lump around his rectum, today he says the pain is much better, has been able to sleep at night, has not seen any blood, having soft bm dialy  No hx of colon or rectal surgery  No family hx of CRC     Chief Complaint: Hemorrhoids    HPI  Review of Systems   Constitutional: Negative for activity change, appetite change, chills, fatigue and fever.   Respiratory: Negative for cough, chest tightness, shortness of breath and wheezing.    Cardiovascular: Negative for chest pain and leg swelling.   Gastrointestinal: Positive for rectal pain. Negative for abdominal distention, abdominal pain, anal bleeding, blood in stool, constipation, diarrhea, nausea and vomiting.   Genitourinary: Negative for difficulty urinating, enuresis, flank pain, frequency, genital sores and hematuria.   Musculoskeletal: Negative for back pain, gait problem and joint swelling.   Skin: Negative.  Negative for color change.   Neurological: Negative for dizziness, syncope and numbness.   Psychiatric/Behavioral: Negative for agitation, confusion, decreased concentration, dysphoric mood and hallucinations. The patient is not nervous/anxious and is not hyperactive.        Objective:      Physical Exam   Constitutional: He is oriented to person, place, and time. He appears well-developed and well-nourished.   Cardiovascular: Normal rate, regular rhythm and normal heart sounds.    Pulmonary/Chest: Effort normal and breath sounds normal. No respiratory distress. He has no wheezes. He has no rales.   Abdominal: Soft. He exhibits no distension and no mass. There is no tenderness. There is no guarding.   Genitourinary:   Genitourinary Comments: Rectal:  Soft, slightly tender partially resolved thromobsed external hemorhoid in left lateral position   Musculoskeletal: Normal range of motion.    Neurological: He is alert and oriented to person, place, and time.   Skin: Skin is warm and dry.   Psychiatric: He has a normal mood and affect. His behavior is normal. Judgment and thought content normal.   Nursing note and vitals reviewed.      Assessment:         thromobsed ext hemorrhoids  Plan:       Keep stools soft  Soak warm water 2-3 days  High fiber diet  Have take stool softeners if needed  Call office for any increase in rectal pain

## 2018-02-07 ENCOUNTER — HOSPITAL ENCOUNTER (EMERGENCY)
Facility: HOSPITAL | Age: 38
Discharge: HOME | End: 2018-02-08
Attending: EMERGENCY MEDICINE
Payer: MEDICARE

## 2018-02-07 DIAGNOSIS — M54.12 CERVICAL RADICULOPATHY: ICD-10-CM

## 2018-02-07 DIAGNOSIS — F07.81 POST CONCUSSIVE SYNDROME: ICD-10-CM

## 2018-02-07 DIAGNOSIS — S16.1XXA CERVICAL STRAIN, ACUTE, INITIAL ENCOUNTER: Primary | ICD-10-CM

## 2018-02-07 PROCEDURE — 96372 THER/PROPH/DIAG INJ SC/IM: CPT

## 2018-02-07 PROCEDURE — 99283 EMERGENCY DEPT VISIT LOW MDM: CPT | Mod: 25

## 2018-02-08 VITALS
BODY MASS INDEX: 21.67 KG/M2 | HEIGHT: 72 IN | HEART RATE: 72 BPM | WEIGHT: 160 LBS | OXYGEN SATURATION: 99 % | DIASTOLIC BLOOD PRESSURE: 77 MMHG | RESPIRATION RATE: 16 BRPM | TEMPERATURE: 99 F | SYSTOLIC BLOOD PRESSURE: 121 MMHG

## 2018-02-08 PROCEDURE — 63600175 PHARM REV CODE 636 W HCPCS: Performed by: PHYSICIAN ASSISTANT

## 2018-02-08 RX ORDER — IBUPROFEN 600 MG/1
600 TABLET ORAL EVERY 6 HOURS PRN
Qty: 20 TABLET | Refills: 0 | Status: SHIPPED | OUTPATIENT
Start: 2018-02-08 | End: 2019-11-15

## 2018-02-08 RX ORDER — ONDANSETRON 4 MG/1
4 TABLET, ORALLY DISINTEGRATING ORAL EVERY 8 HOURS PRN
Qty: 20 TABLET | Refills: 0 | Status: SHIPPED | OUTPATIENT
Start: 2018-02-08 | End: 2019-11-15

## 2018-02-08 RX ORDER — KETOROLAC TROMETHAMINE 30 MG/ML
30 INJECTION, SOLUTION INTRAMUSCULAR; INTRAVENOUS
Status: COMPLETED | OUTPATIENT
Start: 2018-02-08 | End: 2018-02-08

## 2018-02-08 RX ORDER — ORPHENADRINE CITRATE 30 MG/ML
60 INJECTION INTRAMUSCULAR; INTRAVENOUS
Status: COMPLETED | OUTPATIENT
Start: 2018-02-08 | End: 2018-02-08

## 2018-02-08 RX ORDER — CYCLOBENZAPRINE HCL 10 MG
10 TABLET ORAL 3 TIMES DAILY PRN
Qty: 12 TABLET | Refills: 0 | Status: SHIPPED | OUTPATIENT
Start: 2018-02-08 | End: 2018-02-12

## 2018-02-08 RX ADMIN — KETOROLAC TROMETHAMINE 30 MG: 30 INJECTION, SOLUTION INTRAMUSCULAR at 12:02

## 2018-02-08 RX ADMIN — ORPHENADRINE CITRATE 60 MG: 30 INJECTION INTRAMUSCULAR; INTRAVENOUS at 12:02

## 2018-02-08 NOTE — ED NOTES
Pt presents with c/o neck pain and pins and needles sensation to right arm/right leg after falling head first out of bed approximately 3 feet-no LOC.

## 2018-03-15 ENCOUNTER — OFFICE VISIT (OUTPATIENT)
Dept: INTERNAL MEDICINE | Facility: CLINIC | Age: 38
End: 2018-03-15
Payer: MEDICARE

## 2018-03-15 ENCOUNTER — OFFICE VISIT (OUTPATIENT)
Dept: CARDIOLOGY | Facility: CLINIC | Age: 38
End: 2018-03-15
Payer: MEDICARE

## 2018-03-15 VITALS
WEIGHT: 155.38 LBS | HEART RATE: 64 BPM | TEMPERATURE: 98 F | BODY MASS INDEX: 21.05 KG/M2 | DIASTOLIC BLOOD PRESSURE: 84 MMHG | SYSTOLIC BLOOD PRESSURE: 126 MMHG | OXYGEN SATURATION: 96 % | RESPIRATION RATE: 16 BRPM | HEIGHT: 72 IN

## 2018-03-15 VITALS
BODY MASS INDEX: 20.04 KG/M2 | HEART RATE: 70 BPM | SYSTOLIC BLOOD PRESSURE: 120 MMHG | WEIGHT: 147.94 LBS | OXYGEN SATURATION: 94 % | HEIGHT: 72 IN | DIASTOLIC BLOOD PRESSURE: 80 MMHG

## 2018-03-15 DIAGNOSIS — Z82.49 FAMILY HISTORY OF ISCHEMIC HEART DISEASE: Primary | ICD-10-CM

## 2018-03-15 DIAGNOSIS — M54.50 CHRONIC MIDLINE LOW BACK PAIN WITHOUT SCIATICA: ICD-10-CM

## 2018-03-15 DIAGNOSIS — M54.2 CERVICALGIA: Primary | ICD-10-CM

## 2018-03-15 DIAGNOSIS — I20.1 CORONARY VASOSPASM: ICD-10-CM

## 2018-03-15 DIAGNOSIS — Z76.89 ESTABLISHING CARE WITH NEW DOCTOR, ENCOUNTER FOR: ICD-10-CM

## 2018-03-15 DIAGNOSIS — G89.29 CHRONIC MIDLINE LOW BACK PAIN WITHOUT SCIATICA: ICD-10-CM

## 2018-03-15 PROCEDURE — 99213 OFFICE O/P EST LOW 20 MIN: CPT | Mod: ,,, | Performed by: PHYSICAL MEDICINE & REHABILITATION

## 2018-03-15 PROCEDURE — 93000 ELECTROCARDIOGRAM COMPLETE: CPT | Mod: S$GLB,,, | Performed by: INTERNAL MEDICINE

## 2018-03-15 PROCEDURE — 99204 OFFICE O/P NEW MOD 45 MIN: CPT | Mod: S$GLB,,, | Performed by: INTERNAL MEDICINE

## 2018-03-15 PROCEDURE — 99999 PR PBB SHADOW E&M-EST. PATIENT-LVL III: CPT | Mod: PBBFAC,,, | Performed by: INTERNAL MEDICINE

## 2018-03-15 RX ORDER — NITROGLYCERIN 0.4 MG/1
0.4 TABLET SUBLINGUAL EVERY 5 MIN PRN
Qty: 90 TABLET | Refills: 3 | Status: SHIPPED | OUTPATIENT
Start: 2018-03-15 | End: 2021-12-03 | Stop reason: SDUPTHER

## 2018-03-15 NOTE — PROGRESS NOTES
[facility]    CC: Establish care  Chief Complaint   Patient presents with    Establish Care       Patient ID: Gordon Griffin III is a 37 y.o. male with a past medical history of non-obstructive coronary artery disease, chest pain     HPI  Patient had an angiogram few years back with a 40% LAD disease.  He has been followed up medically since then.  He denies any exertional chest pain, shortness of breath, orthopnea, PND, palpitation, syncope or presyncope.  He is very active.  He is a retired .  Has a strong family history of coronary artery disease.  His grandparents had MI age 50s.    Past Medical History:   Diagnosis Date    Anxiety     Chest pain 1/20/2016 12/30/2015: Began experinece chest pain.    Depression     Migraine headache     Stroke pt. states he had a cva at 3 months old     Past Surgical History:   Procedure Laterality Date    MANDIBLE SURGERY      reconstruction     Social History     Social History    Marital status:      Spouse name: N/A    Number of children: N/A    Years of education: N/A     Occupational History    Not on file.     Social History Main Topics    Smoking status: Never Smoker    Smokeless tobacco: Never Used    Alcohol use No    Drug use: No    Sexual activity: Not on file     Other Topics Concern    Not on file     Social History Narrative    No narrative on file     Family History   Problem Relation Age of Onset    Heart disease Father     Heart disease Paternal Uncle        Review of patient's allergies indicates:   Allergen Reactions    Mustard Itching, Nausea And Vomiting, Shortness Of Breath and Swelling    Mushroom Itching, Nausea And Vomiting and Swelling    Niaspan extended-release [niacin] Itching    Olive extract Itching, Nausea And Vomiting and Swelling    Extendryl [ccgbcmvgvzrmmveg-lq-fttqbnyzth] Rash    V-cillin k Rash       Medication List with Changes/Refills   New Medications    NITROGLYCERIN  (NITROSTAT) 0.4 MG SL TABLET    Place 1 tablet (0.4 mg total) under the tongue every 5 (five) minutes as needed for Chest pain.   Current Medications    ASPIRIN (ECOTRIN) 81 MG EC TABLET    Take 1 tablet (81 mg total) by mouth once daily.    ATORVASTATIN (LIPITOR) 80 MG TABLET    TK 1 T PO QD    BUTALBITAL-ACETAMINOPHEN-CAFFEINE -40 MG (FIORICET, ESGIC) -40 MG PER TABLET    Take 1 tablet by mouth every 4 (four) hours as needed.      BUTORPHANOL (STADOL) 10 MG/ML NASAL SPRAY    1 spray by Nasal route every 4 (four) hours as needed for Pain.    DIAZEPAM (VALIUM ORAL)    Take 2 mg by mouth.     DICLOFENAC POTASSIUM (CAMBIA) 50 MG PWPK        DIPHENOXYLATE-ATROPINE 2.5-0.025 MG (LOMOTIL) 2.5-0.025 MG PER TABLET    TK 1 TO 2 TS PO TID PRF DIARRHEA    FLUCELVAX QUAD 6935-4041, PF, 60 MCG (15 MCG X 4)/0.5 ML SYRG VACCINE    ADM 0.5ML IM UTD    FLUTICASONE (FLONASE) 50 MCG/ACTUATION NASAL SPRAY    SPRAY TWICE IEN QD    HYDROCORTISONE (ANUSOL-HC) 2.5 % RECTAL CREAM    Place rectally 2 (two) times daily.    HYDROCORTISONE-PRAMOXINE (PROCTOFOAM-HS) RECTAL FOAM    Place 1 applicator rectally 2 (two) times daily.    IBUPROFEN (ADVIL,MOTRIN) 600 MG TABLET    Take 1 tablet (600 mg total) by mouth every 6 (six) hours as needed for Pain.    METOPROLOL SUCCINATE (TOPROL-XL) 25 MG 24 HR TABLET    TK 1 T PO D    NITROGLYCERIN (NITROSTAT) 0.4 MG SL TABLET    Place 1 tablet (0.4 mg total) under the tongue every 5 (five) minutes as needed for Chest pain.    ONDANSETRON (ZOFRAN) 4 MG TABLET    Take 1 tablet (4 mg total) by mouth every 6 (six) hours as needed for Nausea.    ONDANSETRON (ZOFRAN-ODT) 4 MG TBDL    Take 1 tablet (4 mg total) by mouth every 8 (eight) hours as needed (nausea and vomiting).    PROMETHAZINE (PHENERGAN) 12.5 MG TAB    TK 1 TO 2 TS PO Q 6 H PRF NAUSEA OR VOM    PROMETHAZINE (PHENERGAN) 25 MG TABLET    Take 1 tablet (25 mg total) by mouth every 6 (six) hours as needed for Nausea.    TENS UNIT ELECTRODES  PADS    1 packet by Misc.(Non-Drug; Combo Route) route as needed.    TENS UNITS (TENS 502) MICHAEL    Prn pain    VERAPAMIL (CALAN) 40 MG TAB    TK 1 T PO BID    VERAPAMIL (CALAN-SR) 120 MG CR TABLET    Take 120 mg by mouth once daily.     VIRTUSSIN AC  MG/5 ML SYRUP    TK 5 ML PO QID PRN       Review of Systems  Constitution: Denies chills, fever, and sweats.  HENT: Denies headaches or blurry vision.  Cardiovascular: Denies chest pain or irregular heart beat.  Respiratory: Denies cough or shortness of breath.  Gastrointestinal: Denies abdominal pain, nausea, or vomiting.  Musculoskeletal: Denies muscle cramps.  Neurological: Denies dizziness or focal weakness.  Psychiatric/Behavioral: Normal mental status.  Hematologic/Lymphatic: Denies bleeding problem or easy bruising/bleeding.  Skin: Denies rash or suspicious lesions    Physical Examination  /80 (BP Location: Left arm)   Pulse 70   Ht 6' (1.829 m)   Wt 67.1 kg (147 lb 14.9 oz)   SpO2 (!) 94%   BMI 20.06 kg/m²     Constitutional: No acute distress, conversant  HEENT: Sclera anicteric, Pupils equal, round and reactive to light, extraocular motions intact, Oropharynx clear  Neck: No JVD, no carotid bruits  Cardiovascular: regular rate and rhythm, no murmur, rubs or gallops, normal S1/S2  Pulmonary: Clear to auscultation bilaterally  Abdominal: Abdomen soft, nontender, nondistended, positive bowel sounds  Extremities: No lower extremity edema,   Pulses:  Carotid pulses are 2+ on the right side, and 2+ on the left side.  Radial pulses are 2+ on the right side, and 2+ on the left side.      Skin: No ecchymosis, erythema, or ulcers  Psych: Alert and oriented x 3, appropriate affect  Neuro: CNII-XII intact, no focal deficits    Labs:  Most Recent Data  CBC:   Lab Results   Component Value Date    WBC 6.30 09/26/2017    HGB 15.0 09/26/2017    HCT 44.5 09/26/2017     (L) 09/26/2017    MCV 86 09/26/2017    RDW 12.6 09/26/2017     BMP:   Lab Results    Component Value Date     09/26/2017    K 3.9 09/26/2017     09/26/2017    CO2 28 09/26/2017    BUN 11 09/26/2017    CREATININE 0.9 09/26/2017    GLU 99 09/26/2017    CALCIUM 9.0 09/26/2017    MG 2.5 05/18/2016     LFTS;   Lab Results   Component Value Date    PROT 6.6 09/26/2017    ALBUMIN 3.7 09/26/2017    BILITOT 0.4 09/26/2017    AST 18 09/26/2017    ALKPHOS 114 09/26/2017    ALT 11 09/26/2017     COAGS:   Lab Results   Component Value Date    INR 1.0 09/26/2017     FLP:   Lab Results   Component Value Date    CHOL 169 02/08/2016    HDL 31 (L) 02/08/2016    LDLCALC 125.4 02/08/2016    TRIG 63 02/08/2016    CHOLHDL 18.3 (L) 02/08/2016     CARDIAC:   Lab Results   Component Value Date    TROPONINI <0.006 08/28/2016    BNP <10 08/28/2016       Imaging:    EKG: Sinus rhythm.  No significant ST-T wave changes.        Stress Testing: December 2017.  No evidence of ischemia on myocardial perfusion imaging      I have personally reviewed these images and echo data    Assessment/Plan:  Gordon was seen today for establish care.    Diagnoses and all orders for this visit:    Family history of ischemic heart disease    Coronary vasospasm    Other orders  -     nitroGLYCERIN (NITROSTAT) 0.4 MG SL tablet; Place 1 tablet (0.4 mg total) under the tongue every 5 (five) minutes as needed for Chest pain.         Currently the treatment strategy would be primary prevention including aspirin, statin and a beta blocker.  Recommendations regarding healthy lifestyle, diet and exercise given to patient    Follow-up in about 7 months (around 10/15/2018).          Total duration of face to face visit time 30 minutes.  Total time spent counseling greater than fifty percent of total visit time.  Counseling included discussion regarding imaging findings, diagnosis, possibilities, treatment options, risks and benefits.  The patient had many questions regarding the options and long-term effect which were all answered to my best  ability.      Tejas Willett MD,MRCP,RPVI,FACC,FSCAI.  Interventional Cardiology   Phone [phone]

## 2018-03-15 NOTE — PROGRESS NOTES
Subjective:       Patient ID: Gordon Griffin III is a 37 y.o. male.    Chief Complaint: Neck Pain (PT has  helped - has 1 session tomorrow, several left. dry needling very beneficial)    He is here to follow-up on his neck and low back pain. Is been going to physical therapy at [facility]. He has been very satisfied with the outcome from therapy. He figures sees about 60% better in terms of neck and low back pain. He finds the dry needling especially useful. Also the use of TENS. He has no new or progressive problems. He did fall about 3 feet out of his bed recently and was evaluated in the emergency room were no acute findings were noted. He seems at his baseline right now      Review of Systems   All other systems reviewed and are negative.      Objective:      Physical Exam   Constitutional: He is oriented to person, place, and time. He appears well-developed and well-nourished.   Neurological: He is alert and oriented to person, place, and time.   Awake and in no acute distress  No point tenderness or palpable masses about the cervical or lumbar spine  Deep tendon reflexes are +1 in both upper extremities and +1 at both knees  Strength is normal in both upper and lower extremities       Assessment:       1. Cervicalgia    2. Chronic midline low back pain without sciatica        Plan:         improved with physical therapy. He should finish out the course of physical therapy and then transition into a home exercise program. I cautioned him that it is up to him to maintain the gains that he has made. I wrote a prescription for TENS. Follow up here as needed

## 2018-03-15 NOTE — PROGRESS NOTES
[facility]    CC:   Chief Complaint   Patient presents with    [facility]       Patient ID: Gordon Griffin III is a 37 y.o. male with a past medical history of ***.  Pertinent history events include:  ***    HPI  Today {MR/MRS/MS/DR/PARENTSOF:01538} presents with ***    Past Medical History:   Diagnosis Date    Anxiety     Chest pain 1/20/2016 12/30/2015: Began experinece chest pain.    Depression     Migraine headache     Stroke pt. states he had a cva at 3 months old     Past Surgical History:   Procedure Laterality Date    MANDIBLE SURGERY      reconstruction     Social History     Social History    Marital status:      Spouse name: N/A    Number of children: N/A    Years of education: N/A     Occupational History    Not on file.     Social History Main Topics    Smoking status: Never Smoker    Smokeless tobacco: Never Used    Alcohol use No    Drug use: No    Sexual activity: Not on file     Other Topics Concern    Not on file     Social History Narrative    No narrative on file     Family History   Problem Relation Age of Onset    Heart disease Father     Heart disease Paternal Uncle        Review of patient's allergies indicates:   Allergen Reactions    Mustard Itching, Nausea And Vomiting, Shortness Of Breath and Swelling    Mushroom Itching, Nausea And Vomiting and Swelling    Niaspan extended-release [niacin] Itching    Olive extract Itching, Nausea And Vomiting and Swelling    Extendryl [uolbwgveyzxhjftu-cd-meanexcxoh] Rash    V-cillin k Rash       Medication List with Changes/Refills   Current Medications    ASPIRIN (ECOTRIN) 81 MG EC TABLET    Take 1 tablet (81 mg total) by mouth once daily.    ATORVASTATIN (LIPITOR) 80 MG TABLET    TK 1 T PO QD    BUTALBITAL-ACETAMINOPHEN-CAFFEINE -40 MG (FIORICET, ESGIC) -40 MG PER TABLET    Take 1 tablet by mouth every 4 (four) hours as needed.      BUTORPHANOL (STADOL) 10 MG/ML NASAL SPRAY    1  spray by Nasal route every 4 (four) hours as needed for Pain.    DIAZEPAM (VALIUM ORAL)    Take 2 mg by mouth.     DICLOFENAC POTASSIUM (CAMBIA) 50 MG PWPK        DIPHENOXYLATE-ATROPINE 2.5-0.025 MG (LOMOTIL) 2.5-0.025 MG PER TABLET    TK 1 TO 2 TS PO TID PRF DIARRHEA    FLUCELVAX QUAD 3018-1860, PF, 60 MCG (15 MCG X 4)/0.5 ML SYRG VACCINE    ADM 0.5ML IM UTD    FLUTICASONE (FLONASE) 50 MCG/ACTUATION NASAL SPRAY    SPRAY TWICE IEN QD    HYDROCORTISONE (ANUSOL-HC) 2.5 % RECTAL CREAM    Place rectally 2 (two) times daily.    HYDROCORTISONE-PRAMOXINE (PROCTOFOAM-HS) RECTAL FOAM    Place 1 applicator rectally 2 (two) times daily.    IBUPROFEN (ADVIL,MOTRIN) 600 MG TABLET    Take 1 tablet (600 mg total) by mouth every 6 (six) hours as needed for Pain.    METOPROLOL SUCCINATE (TOPROL-XL) 25 MG 24 HR TABLET    TK 1 T PO D    NITROGLYCERIN (NITROSTAT) 0.4 MG SL TABLET    Place 1 tablet (0.4 mg total) under the tongue every 5 (five) minutes as needed for Chest pain.    ONDANSETRON (ZOFRAN) 4 MG TABLET    Take 1 tablet (4 mg total) by mouth every 6 (six) hours as needed for Nausea.    ONDANSETRON (ZOFRAN-ODT) 4 MG TBDL    Take 1 tablet (4 mg total) by mouth every 8 (eight) hours as needed (nausea and vomiting).    PROMETHAZINE (PHENERGAN) 12.5 MG TAB    TK 1 TO 2 TS PO Q 6 H PRF NAUSEA OR VOM    PROMETHAZINE (PHENERGAN) 25 MG TABLET    Take 1 tablet (25 mg total) by mouth every 6 (six) hours as needed for Nausea.    TENS UNIT ELECTRODES PADS    1 packet by Misc.(Non-Drug; Combo Route) route as needed.    TENS UNITS (TENS 502) MICHAEL    Prn pain    VERAPAMIL (CALAN) 40 MG TAB    TK 1 T PO BID    VERAPAMIL (CALAN-SR) 120 MG CR TABLET    Take 120 mg by mouth once daily.     VIRTUSSIN AC  MG/5 ML SYRUP    TK 5 ML PO QID PRN       Review of Systems  Constitution: Denies chills, fever, and sweats.  HENT: Denies headaches or blurry vision.  Cardiovascular: Denies chest pain or irregular heart beat.  Respiratory: Denies cough or  shortness of breath.  Gastrointestinal: Denies abdominal pain, nausea, or vomiting.  Musculoskeletal: Denies muscle cramps.  Neurological: Denies dizziness or focal weakness.  Psychiatric/Behavioral: Normal mental status.  Hematologic/Lymphatic: Denies bleeding problem or easy bruising/bleeding.  Skin: Denies rash or suspicious lesions    Physical Examination  /80 (BP Location: Left arm)   Pulse 70   Ht 6' (1.829 m)   Wt 67.1 kg (147 lb 14.9 oz)   SpO2 (!) 94%   BMI 20.06 kg/m²     Constitutional: No acute distress, conversant  HEENT: Sclera anicteric, Pupils equal, round and reactive to light, extraocular motions intact, Oropharynx clear  Neck: No JVD, no carotid bruits  Cardiovascular: regular rate and rhythm, no murmur, rubs or gallops, normal S1/S2  Pulmonary: Clear to auscultation bilaterally  Abdominal: Abdomen soft, nontender, nondistended, positive bowel sounds  Extremities: No lower extremity edema,   Pulses:  Carotid pulses are 2+ on the right side, and 2+ on the left side.  Radial pulses are 2+ on the right side, and 2+ on the left side.   Femoral pulses are 2+ on the right side, and 2+ on the left side.  Popliteal pulses are 2+ on the right side, and 2+ on the left side.   Dorsalis pedis pulses are 2+ on the right side, and 2+ on the left side.   Posterior tibial pulses are 2+ on the right side, and 2+ on the left side.    Skin: No ecchymosis, erythema, or ulcers  Psych: Alert and oriented x 3, appropriate affect  Neuro: CNII-XII intact, no focal deficits    Labs:  Most Recent Data  CBC:   Lab Results   Component Value Date    WBC 6.30 09/26/2017    HGB 15.0 09/26/2017    HCT 44.5 09/26/2017     (L) 09/26/2017    MCV 86 09/26/2017    RDW 12.6 09/26/2017     BMP:   Lab Results   Component Value Date     09/26/2017    K 3.9 09/26/2017     09/26/2017    CO2 28 09/26/2017    BUN 11 09/26/2017    CREATININE 0.9 09/26/2017    GLU 99 09/26/2017    CALCIUM 9.0 09/26/2017    MG 2.5  05/18/2016     LFTS;   Lab Results   Component Value Date    PROT 6.6 09/26/2017    ALBUMIN 3.7 09/26/2017    BILITOT 0.4 09/26/2017    AST 18 09/26/2017    ALKPHOS 114 09/26/2017    ALT 11 09/26/2017     COAGS:   Lab Results   Component Value Date    INR 1.0 09/26/2017     FLP:   Lab Results   Component Value Date    CHOL 169 02/08/2016    HDL 31 (L) 02/08/2016    LDLCALC 125.4 02/08/2016    TRIG 63 02/08/2016    CHOLHDL 18.3 (L) 02/08/2016     CARDIAC:   Lab Results   Component Value Date    TROPONINI <0.006 08/28/2016    BNP <10 08/28/2016       Imaging:    EKG:     Echo:    Stress Testing:    ***  I have personally reviewed these images and echo data    Assessment/Plan:  There are no diagnoses linked to this encounter.    No Follow-up on file.      ***     Total duration of face to face visit time {[facility] VISIT MINUTES:22629}.  Total time spent counseling greater than fifty percent of total visit time.  Counseling included discussion regarding imaging findings, diagnosis, possibilities, treatment options, risks and benefits.  The patient had many questions regarding the options and long-term effect which were all answered to my best ability.      Tejas Willett MD,MRCP,RPVI,FACC,FSCAI.  Interventional Cardiology   Phone [phone]

## 2018-03-16 DIAGNOSIS — Z76.89 ESTABLISHING CARE WITH NEW DOCTOR, ENCOUNTER FOR: Primary | ICD-10-CM

## 2018-03-18 ENCOUNTER — HOSPITAL ENCOUNTER (EMERGENCY)
Facility: HOSPITAL | Age: 38
Discharge: HOME | End: 2018-03-18
Payer: MEDICARE

## 2018-03-18 VITALS
HEART RATE: 87 BPM | OXYGEN SATURATION: 98 % | SYSTOLIC BLOOD PRESSURE: 129 MMHG | BODY MASS INDEX: 21.67 KG/M2 | DIASTOLIC BLOOD PRESSURE: 71 MMHG | WEIGHT: 160 LBS | RESPIRATION RATE: 18 BRPM | TEMPERATURE: 98 F | HEIGHT: 72 IN

## 2018-03-18 DIAGNOSIS — R51.9 NONINTRACTABLE HEADACHE, UNSPECIFIED CHRONICITY PATTERN, UNSPECIFIED HEADACHE TYPE: Primary | ICD-10-CM

## 2018-03-18 PROCEDURE — 96375 TX/PRO/DX INJ NEW DRUG ADDON: CPT

## 2018-03-18 PROCEDURE — 96374 THER/PROPH/DIAG INJ IV PUSH: CPT

## 2018-03-18 PROCEDURE — 25000003 PHARM REV CODE 250: Performed by: PHYSICIAN ASSISTANT

## 2018-03-18 PROCEDURE — 63600175 PHARM REV CODE 636 W HCPCS: Performed by: PHYSICIAN ASSISTANT

## 2018-03-18 PROCEDURE — 99283 EMERGENCY DEPT VISIT LOW MDM: CPT | Mod: 25

## 2018-03-18 RX ORDER — DIPHENHYDRAMINE HYDROCHLORIDE 50 MG/ML
25 INJECTION INTRAMUSCULAR; INTRAVENOUS
Status: COMPLETED | OUTPATIENT
Start: 2018-03-18 | End: 2018-03-18

## 2018-03-18 RX ORDER — METOCLOPRAMIDE HYDROCHLORIDE 5 MG/ML
10 INJECTION INTRAMUSCULAR; INTRAVENOUS
Status: COMPLETED | OUTPATIENT
Start: 2018-03-18 | End: 2018-03-18

## 2018-03-18 RX ORDER — KETOROLAC TROMETHAMINE 30 MG/ML
30 INJECTION, SOLUTION INTRAMUSCULAR; INTRAVENOUS
Status: COMPLETED | OUTPATIENT
Start: 2018-03-18 | End: 2018-03-18

## 2018-03-18 RX ADMIN — METOCLOPRAMIDE 10 MG: 5 INJECTION, SOLUTION INTRAMUSCULAR; INTRAVENOUS at 11:03

## 2018-03-18 RX ADMIN — KETOROLAC TROMETHAMINE 30 MG: 30 INJECTION, SOLUTION INTRAMUSCULAR at 11:03

## 2018-03-18 RX ADMIN — SODIUM CHLORIDE 1000 ML: 0.9 INJECTION, SOLUTION INTRAVENOUS at 11:03

## 2018-03-18 RX ADMIN — DIPHENHYDRAMINE HYDROCHLORIDE 25 MG: 50 INJECTION, SOLUTION INTRAMUSCULAR; INTRAVENOUS at 11:03

## 2018-03-18 NOTE — ED PROVIDER NOTES
Encounter Date: 3/18/2018    SCRIBE #1 NOTE: Jose ABDULLAHI, am scribing for, and in the presence of, Zainab Oliva PA-C.       History     Chief Complaint   Patient presents with    Headache     x 3 days        03/18/2018 11:08 AM     Chief complaint: Headache       Gordon Griffin III is a 37 y.o. male with a PMHx of migraines and anxiety who presents to the ED for a migraine with an onset of 1 day. Patient reports that he has chronic migraines and this is a typical acute migraine. He relays that the migraine is a sharp pain that is more to the superior skull. Patient reports that he has photophobia and misphonia. He relays that it has been constant since last night. Patient reports that he typically takes stadol, but ran out of his prescription and unable to purchase medicine secondary to insurance coverage. He does see a neurologist at [facility] for his [facility]raines. He denies any sensory and motor deficits, sinus congestion, and fever.         The history is provided by the patient.     Review of patient's allergies indicates:   Allergen Reactions    Mustard Itching, Nausea And Vomiting, Shortness Of Breath and Swelling    Mushroom Itching, Nausea And Vomiting and Swelling    Niaspan extended-release [niacin] Itching    Olive extract Itching, Nausea And Vomiting and Swelling    Extendryl [eayaoltisdgoqpgu-xk-eeytardjfj] Rash    V-cillin k Rash     Past Medical History:   Diagnosis Date    Anxiety     Chest pain 1/20/2016 12/30/2015: Began experinece chest pain.    Depression     Migraine headache     Stroke pt. states he had a cva at 3 months old     Past Surgical History:   Procedure Laterality Date    MANDIBLE SURGERY      reconstruction     Family History   Problem Relation Age of Onset    Heart disease Father     Heart disease Paternal Uncle      Social History   Substance Use Topics    Smoking status: Never Smoker    Smokeless tobacco: Never Used    Alcohol use No     Review of  Systems   Constitutional: Negative for chills and fever.   HENT: Negative for facial swelling and trouble swallowing.    Eyes: Negative for discharge.   Respiratory: Negative for cough, chest tightness, shortness of breath and wheezing.    Cardiovascular: Negative for chest pain and palpitations.   Gastrointestinal: Negative for abdominal pain, diarrhea, nausea and vomiting.   Genitourinary: Negative for dysuria and hematuria.   Musculoskeletal: Negative for arthralgias, back pain, joint swelling, myalgias, neck pain and neck stiffness.   Skin: Negative for color change, pallor, rash and wound.   Neurological: Positive for headaches. Negative for dizziness, syncope, weakness, light-headedness and numbness.        +photophobia and misphonia    Hematological: Does not bruise/bleed easily.   Psychiatric/Behavioral: The patient is not nervous/anxious.    All other systems reviewed and are negative.      Physical Exam     Initial Vitals [03/18/18 1056]   BP Pulse Resp Temp SpO2   132/86 92 20 97.9 °F (36.6 °C) 98 %      MAP       101.33         Physical Exam    Nursing note and vitals reviewed.  Constitutional: He appears well-developed and well-nourished. He is not diaphoretic. No distress.   HENT:   Head: Normocephalic and atraumatic.   Right Ear: External ear normal.   Left Ear: External ear normal.   Nose: Nose normal.   Mouth/Throat: Oropharynx is clear and moist.   Eyes: Conjunctivae and EOM are normal. Pupils are equal, round, and reactive to light.   Neck: Normal range of motion. Neck supple.   Cardiovascular: Normal rate, regular rhythm, normal heart sounds and intact distal pulses. Exam reveals no gallop and no friction rub.    No murmur heard.  Pulmonary/Chest: Breath sounds normal. No respiratory distress. He has no wheezes. He has no rhonchi. He has no rales.   Abdominal: Soft. He exhibits no distension and no mass. There is no tenderness.   Musculoskeletal: Normal range of motion. He exhibits no edema or  tenderness.   Lymphadenopathy:     He has no cervical adenopathy.   Neurological: He is alert and oriented to person, place, and time. He has normal strength. No cranial nerve deficit or sensory deficit.   No focal neurological deficits noted.  Cranial nerves III-XII grossly intact.  Equal, rapid alternating movements noted to bilateral upper and lower extremities.      Skin: Skin is warm and dry. No rash and no abscess noted. No erythema.   Psychiatric: He has a normal mood and affect.         ED Course   Procedures  Labs Reviewed - No data to display          Medical Decision Making:   History:   Old Medical Records: I decided to obtain old medical records.  Differential Diagnosis:   Migraine  Acute intracranial process  Meningitis  CVA         APC / Resident Notes:   No focal neurological deficits noted on exam.  His symptoms are likely related to his chronic migraine headaches.  He is feeling much better after IV fluids, Toradol, Reglan and Benadryl here in the ED.  We do not feel any further imaging or testing is necessary at this time.  We feel comfortable discharging him home to follow-up with his neurologist and primary care provider.  He voices understanding and is agreeable to the plan.  He is given specific return precautions.       Scribe Attestation:   Scribe #1: I performed the above scribed service and the documentation accurately describes the services I performed. I attest to the accuracy of the note.    Attending Attestation:     Physician Attestation Statement for NP/PA:   I discussed this assessment and plan of this patient with the NP/PA, but I did not personally examine the patient. The face to face encounter was performed by the NP/PA.          I, Zainab Oliva PA-C, personally performed the services described in this documentation. All medical record entries made by the scribe were at my direction and in my presence.  I have reviewed the chart and agree that the record reflects my  personal performance and is accurate and complete. Zainab Oliva PA-C.  10:51 PM 03/18/2018             Clinical Impression:   There were no encounter diagnoses.  1. Nonintractable headache, unspecified chronicity pattern, unspecified headache type        Disposition:   Disposition: Discharged  Condition: Stable                        Zainab Oliva PA-C  03/18/18 2251       Felix Mehta MD  03/21/18 0876

## 2018-03-31 ENCOUNTER — HOSPITAL ENCOUNTER (EMERGENCY)
Facility: HOSPITAL | Age: 38
Discharge: HOME | End: 2018-03-31
Attending: EMERGENCY MEDICINE
Payer: MEDICARE

## 2018-03-31 VITALS
WEIGHT: 160 LBS | HEART RATE: 78 BPM | TEMPERATURE: 97 F | RESPIRATION RATE: 18 BRPM | BODY MASS INDEX: 21.67 KG/M2 | SYSTOLIC BLOOD PRESSURE: 142 MMHG | OXYGEN SATURATION: 97 % | DIASTOLIC BLOOD PRESSURE: 81 MMHG | HEIGHT: 72 IN

## 2018-03-31 DIAGNOSIS — G43.009 NONINTRACTABLE MIGRAINE, UNSPECIFIED MIGRAINE TYPE: Primary | ICD-10-CM

## 2018-03-31 PROCEDURE — 99284 EMERGENCY DEPT VISIT MOD MDM: CPT | Mod: 25

## 2018-03-31 PROCEDURE — 25000003 PHARM REV CODE 250: Performed by: EMERGENCY MEDICINE

## 2018-03-31 PROCEDURE — 63600175 PHARM REV CODE 636 W HCPCS: Performed by: EMERGENCY MEDICINE

## 2018-03-31 PROCEDURE — 96375 TX/PRO/DX INJ NEW DRUG ADDON: CPT

## 2018-03-31 PROCEDURE — 96365 THER/PROPH/DIAG IV INF INIT: CPT

## 2018-03-31 RX ORDER — KETOROLAC TROMETHAMINE 30 MG/ML
10 INJECTION, SOLUTION INTRAMUSCULAR; INTRAVENOUS
Status: COMPLETED | OUTPATIENT
Start: 2018-03-31 | End: 2018-03-31

## 2018-03-31 RX ORDER — PROCHLORPERAZINE EDISYLATE 5 MG/ML
10 INJECTION INTRAMUSCULAR; INTRAVENOUS
Status: COMPLETED | OUTPATIENT
Start: 2018-03-31 | End: 2018-03-31

## 2018-03-31 RX ORDER — MAGNESIUM SULFATE/D5W 1 G/50 ML
1 INTRAVENOUS SOLUTION, PIGGYBACK (ML) INTRAVENOUS
Status: COMPLETED | OUTPATIENT
Start: 2018-03-31 | End: 2018-03-31

## 2018-03-31 RX ORDER — DIPHENHYDRAMINE HYDROCHLORIDE 50 MG/ML
25 INJECTION INTRAMUSCULAR; INTRAVENOUS
Status: COMPLETED | OUTPATIENT
Start: 2018-03-31 | End: 2018-03-31

## 2018-03-31 RX ADMIN — Medication 1 G: at 07:03

## 2018-03-31 RX ADMIN — PROCHLORPERAZINE EDISYLATE 10 MG: 5 INJECTION INTRAMUSCULAR; INTRAVENOUS at 07:03

## 2018-03-31 RX ADMIN — DIPHENHYDRAMINE HYDROCHLORIDE 25 MG: 50 INJECTION INTRAMUSCULAR; INTRAVENOUS at 07:03

## 2018-03-31 RX ADMIN — SODIUM CHLORIDE 1000 ML: 0.9 INJECTION, SOLUTION INTRAVENOUS at 07:03

## 2018-03-31 RX ADMIN — KETOROLAC TROMETHAMINE 10 MG: 30 INJECTION, SOLUTION INTRAMUSCULAR at 07:03

## 2018-04-01 NOTE — ED PROVIDER NOTES
"Encounter Date: 3/31/2018    SCRIBE #1 NOTE: ITrice, am scribing for, and in the presence of, Dr. Garcia.       History     Chief Complaint   Patient presents with    Headache     for three days with history of migraine headaches       03/31/2018 7:05 PM     Chief complaint: Headache      Gordon Griffin III is a 37 y.o. male with PMHx of migraine headache, chest pain, and stroke who presents to the ED with complaints of a headache that started x3 days ago. Associating symptoms includes photophobia and sensitivity to noise. He also complains of bilateral arm pain that started x1.5 hours ago. He describes pain as "stabbing". He reports taking Stadol with little to no improvements to pain. The patient endorses having intermittent headaches for the past few weeks. He denies new chest pain, abdominal pain, and N/V. The patient has no other medical concerns at this moment. SHx includes mandible surgery.       The history is provided by the patient.     Review of patient's allergies indicates:   Allergen Reactions    Mustard Itching, Nausea And Vomiting, Shortness Of Breath and Swelling    Mushroom Itching, Nausea And Vomiting and Swelling    Niaspan extended-release [niacin] Itching    Olive extract Itching, Nausea And Vomiting and Swelling    Extendryl [[id number]] Rash    V-cillin k Rash     Past Medical History:   Diagnosis Date    Anxiety     Chest pain 1/20/2016 12/30/2015: Began experinece chest pain.    Depression     Migraine headache     Stroke pt. states he had a cva at 3 months old     Past Surgical History:   Procedure Laterality Date    MANDIBLE SURGERY      reconstruction     Family History   Problem Relation Age of Onset    Heart disease Father     Heart disease Paternal Uncle      Social History   Substance Use Topics    Smoking status: Never Smoker    Smokeless tobacco: Never Used    Alcohol use No     Review of Systems   Constitutional: Negative " for activity change, chills and fever.   HENT: Negative for congestion.    Eyes: Positive for photophobia.   Respiratory: Negative for cough and shortness of breath.    Cardiovascular: Negative for chest pain.   Gastrointestinal: Negative for abdominal pain, nausea and vomiting.   Musculoskeletal: Positive for arthralgias (BUE). Negative for neck pain and neck stiffness.   Skin: Negative for color change, pallor, rash and wound.   Neurological: Positive for headaches.   Hematological: Does not bruise/bleed easily.   Psychiatric/Behavioral: Negative for agitation.       Physical Exam     Initial Vitals [03/31/18 1845]   BP Pulse Resp Temp SpO2   (!) 142/81 78 18 97.2 °F (36.2 °C) 97 %      MAP       101.33         Physical Exam    Nursing note and vitals reviewed.  Constitutional: He appears well-developed and well-nourished.   Appears uncomfortable.   HENT:   Head: Normocephalic and atraumatic.   Eyes: Conjunctivae are normal.   Neck: Normal range of motion. Neck supple.   Cardiovascular: Normal rate, regular rhythm and normal heart sounds. Exam reveals no gallop and no friction rub.    No murmur heard.  Pulmonary/Chest: Breath sounds normal. No respiratory distress. He has no wheezes. He has no rhonchi. He has no rales.   Abdominal: Soft.   Musculoskeletal: Normal range of motion.   Neurological: He is alert and oriented to person, place, and time.   Skin: Skin is warm and dry.   Psychiatric: He has a normal mood and affect.         ED Course   Procedures  Labs Reviewed - No data to display          Medical Decision Making:   History:   Old Medical Records: I decided to obtain old medical records.  Initial Assessment:   37-year-old male presented with a chief complaint of a headache.  Differential Diagnosis:   Initial differential diagnosis includes but is not limited to: migraine headache, cluster headache, and tension headache.   ED Management:  The patient was urgently evaluated in the emergency Department, his  evaluation was significant for a young male with a normal neurologic exam.  This is likely one of the patient's chronic migraine headaches.  The patient was aggressively treated with IV fluids, IV Compazine, IV Benadryl, IV Toradol, and IV magnesium.  The patient reports significant improvement in his headache after treatment.  He is stable for discharge to home.  There is no need for further workup at this time.  He is to continue his home medications as previously prescribed and to follow-up with his PCP for further care.            Scribe Attestation:   Scribe #1: I performed the above scribed service and the documentation accurately describes the services I performed. I attest to the accuracy of the note.           I, Dr. Rahul Garcia, personally performed the services described in this documentation. All medical record entries made by the scribe were at my direction and in my presence.  I have reviewed the chart and agree that the record reflects my personal performance and is accurate and complete. Rahul Garcia MD.  8:52 PM 03/31/2018       Clinical Impression:   The encounter diagnosis was Nonintractable migraine, unspecified migraine type.    Disposition:   Disposition: Discharged  Condition: Stable                        Rahul Garcia MD  03/31/18 2053

## 2018-04-04 ENCOUNTER — TELEPHONE (OUTPATIENT)
Dept: PHYSICAL MEDICINE AND REHAB | Facility: CLINIC | Age: 38
End: 2018-04-04

## 2018-04-04 NOTE — TELEPHONE ENCOUNTER
Spoke with Cindy. She is faxing PN to be signed and faxed back on this pt. I let her know I would have it done today as he will be out for the next couple weeks.

## 2018-04-04 NOTE — TELEPHONE ENCOUNTER
----- Message from Shelia Santiago sent at 4/4/2018 11:31 AM CDT -----  Contact: [facility]  [facility] concerning the pt and would like a call back....[phone] ext [phone]

## 2018-05-17 ENCOUNTER — TELEPHONE (OUTPATIENT)
Dept: PHYSICAL MEDICINE AND REHAB | Facility: CLINIC | Age: 38
End: 2018-05-17

## 2018-05-17 NOTE — TELEPHONE ENCOUNTER
----- Message from Eda Lerma sent at 5/17/2018  1:18 PM CDT -----  Contact: Self [phone]  Please call [employer] to complete the prior auth for the Tens Unit.  Also he lost the prescription for the Tens unit and supplies.  Please issue a duplicate. Please call him. Thank you!
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
## 2018-09-13 ENCOUNTER — OFFICE VISIT (OUTPATIENT)
Dept: URGENT CARE | Facility: CLINIC | Age: 38
End: 2018-09-13
Payer: MEDICARE

## 2018-09-13 VITALS
TEMPERATURE: 97 F | RESPIRATION RATE: 20 BRPM | OXYGEN SATURATION: 97 % | BODY MASS INDEX: 21.67 KG/M2 | SYSTOLIC BLOOD PRESSURE: 118 MMHG | HEIGHT: 72 IN | HEART RATE: 91 BPM | WEIGHT: 160 LBS | DIASTOLIC BLOOD PRESSURE: 77 MMHG

## 2018-09-13 DIAGNOSIS — J06.9 VIRAL URI WITH COUGH: Primary | ICD-10-CM

## 2018-09-13 LAB
CTP QC/QA: YES
CTP QC/QA: YES
FLUAV AG NPH QL: NEGATIVE
FLUBV AG NPH QL: NEGATIVE
S PYO RRNA THROAT QL PROBE: NEGATIVE

## 2018-09-13 PROCEDURE — 99214 OFFICE O/P EST MOD 30 MIN: CPT | Mod: S$GLB,,, | Performed by: FAMILY MEDICINE

## 2018-09-13 PROCEDURE — 87880 STREP A ASSAY W/OPTIC: CPT | Mod: QW,S$GLB,, | Performed by: FAMILY MEDICINE

## 2018-09-13 PROCEDURE — 87804 INFLUENZA ASSAY W/OPTIC: CPT | Mod: 59,QW,S$GLB, | Performed by: FAMILY MEDICINE

## 2018-09-13 RX ORDER — BUTALBITAL, ASPIRIN, AND CAFFEINE 325; 50; 40 MG/1; MG/1; MG/1
1 CAPSULE ORAL EVERY 4 HOURS PRN
COMMUNITY
End: 2019-03-01 | Stop reason: SDUPTHER

## 2018-09-13 RX ORDER — PROMETHAZINE HYDROCHLORIDE AND DEXTROMETHORPHAN HYDROBROMIDE 6.25; 15 MG/5ML; MG/5ML
5 SYRUP ORAL
Qty: 300 ML | Refills: 0 | Status: SHIPPED | OUTPATIENT
Start: 2018-09-13 | End: 2019-11-15

## 2018-09-13 RX ORDER — KETOROLAC TROMETHAMINE 15.75 MG/1
15.75 SPRAY, METERED NASAL EVERY 6 HOURS
COMMUNITY
End: 2019-11-15

## 2018-09-13 RX ORDER — NAPROXEN 500 MG/1
500 TABLET ORAL 2 TIMES DAILY
COMMUNITY
End: 2019-11-15

## 2018-09-13 RX ORDER — BACLOFEN 20 MG/1
1 TABLET ORAL 3 TIMES DAILY PRN
COMMUNITY

## 2018-09-13 NOTE — PROGRESS NOTES
Subjective:       Patient ID: Gordon Griffin III is a 38 y.o. male.    Vitals:  height is 6' (1.829 m) and weight is 72.6 kg (160 lb). His oral temperature is 97.4 °F (36.3 °C). His blood pressure is 118/77 and his pulse is 91. His respiration is 20 and oxygen saturation is 97%.     Chief Complaint: Sinus Problem    This is a 38 y.o. male who presents today with a chief complaint of a sinus problem and a sore throat he's had for the past two days. He's been taking flonase to help with his symptoms. No fever, cough worse at night.       Sinus Problem   This is a new problem. The current episode started in the past 7 days. The problem has been gradually worsening since onset. There has been no fever. His pain is at a severity of 3/10. The pain is mild. Associated symptoms include chills, congestion, coughing, ear pain, headaches and a sore throat. Pertinent negatives include no hoarse voice or shortness of breath. Past treatments include saline sprays. The treatment provided mild relief.     Review of Systems   Constitution: Positive for chills and malaise/fatigue. Negative for fever.   HENT: Positive for congestion, ear pain and sore throat. Negative for hoarse voice.    Eyes: Negative for discharge and redness.   Cardiovascular: Negative for chest pain, dyspnea on exertion and leg swelling.   Respiratory: Positive for cough and sputum production. Negative for shortness of breath and wheezing.    Musculoskeletal: Negative for myalgias.   Gastrointestinal: Positive for nausea. Negative for abdominal pain and vomiting.   Neurological: Positive for headaches.       Objective:      Physical Exam   Constitutional: He is oriented to person, place, and time. He appears well-developed and well-nourished. He is cooperative.  Non-toxic appearance. He does not appear ill. No distress.   HENT:   Head: Normocephalic and atraumatic.   Right Ear: Hearing, tympanic membrane, external ear and ear canal normal. Tympanic  membrane is not erythematous. No middle ear effusion.   Left Ear: Hearing, tympanic membrane, external ear and ear canal normal. Tympanic membrane is not erythematous.  No middle ear effusion.   Nose: Mucosal edema present. No rhinorrhea or nasal deformity. No epistaxis. Right sinus exhibits no maxillary sinus tenderness and no frontal sinus tenderness. Left sinus exhibits no maxillary sinus tenderness and no frontal sinus tenderness.   Mouth/Throat: Uvula is midline and mucous membranes are normal. No trismus in the jaw. Normal dentition. No uvula swelling. Posterior oropharyngeal erythema present. No oropharyngeal exudate. No tonsillar exudate.   Eyes: Conjunctivae and lids are normal. No scleral icterus.   Sclera clear bilat   Neck: Trachea normal, full passive range of motion without pain and phonation normal. Neck supple.   Cardiovascular: Normal rate, regular rhythm, normal heart sounds, intact distal pulses and normal pulses.   Pulmonary/Chest: Effort normal and breath sounds normal. No respiratory distress. He has no wheezes.   Abdominal: Soft. Normal appearance and bowel sounds are normal. He exhibits no distension. There is no tenderness.   Musculoskeletal: Normal range of motion. He exhibits no edema or deformity.   Lymphadenopathy:     He has no cervical adenopathy.   Neurological: He is alert and oriented to person, place, and time. He exhibits normal muscle tone. Coordination normal.   Skin: Skin is warm, dry and intact. He is not diaphoretic. No pallor.   Psychiatric: He has a normal mood and affect. His speech is normal and behavior is normal. Judgment and thought content normal. Cognition and memory are normal.   Nursing note and vitals reviewed.      Results for orders placed or performed in visit on 09/13/18   POCT Influenza A/B   Result Value Ref Range    Rapid Influenza A Ag Negative Negative    Rapid Influenza B Ag Negative Negative     Acceptable Yes    POCT rapid strep A    Result Value Ref Range    Rapid Strep A Screen Negative Negative     Acceptable Yes        Assessment:       1. Viral URI with cough        Plan:         Viral URI with cough  -     POCT Influenza A/B  -     POCT rapid strep A  -     promethazine-dextromethorphan (PROMETHAZINE-DM) 6.25-15 mg/5 mL Syrp; Take 5 mLs by mouth every 4 to 6 hours as needed. 5 mL every 4 to 6 hours; maximum: 30 mL in 24 hours  Dispense: 300 mL; Refill: 0          Patient Instructions   PLEASE READ YOUR DISCHARGE INSTRUCTIONS ENTIRELY AS IT CONTAINS IMPORTANT INFORMATION.      Please drink plenty of fluids.    Please get plenty of rest.    Please return here or go to the Emergency Department for any concerns or worsening of condition.    Please take an over the counter antihistamine medication (allegra/Claritin/Zyrtec) of your choice as directed.    Try an over the counter decongestant like Mucinex D or Sudafed.     If you do have Hypertension or palpitations, it is safe to take Coricidin HBP for relief of sinus symptoms.    If not allergic, please take over the counter Tylenol (Acetaminophen) and/or Motrin (Ibuprofen) as directed for control of pain and/or fever.  Please follow up with your primary care doctor or specialist as needed.    Sore throat recommendations: Warm fluids, warm salt water gargles, throat lozenges, tea, honey, soup, rest, hydration.    Use over the counter flonase: one spray each nostril twice daily OR two sprays each nostril once daily.     If you  smoke, please stop smoking.    Do not drive while taking the cough syrup - best to take it at night before going to sleep. However, you can take it during the day (every 4-6 hours) if you do not have to drive or operate machinery. This medication will make you drowsy. Try taking half a dose first to see how it affects you.       Please return or see your primary care doctor if you develop new or worsening symptoms.     You must understand that you have  received an Urgent Care treatment only and that you may be released before all of your medical problems are known or treated.        Viral Upper Respiratory Illness (Adult)  You have a viral upper respiratory illness (URI), which is another term for the common cold. This illness is contagious during the first few days. It is spread through the air by coughing and sneezing. It may also be spread by direct contact (touching the sick person and then touching your own eyes, nose, or mouth). Frequent handwashing will decrease risk of spread. Most viral illnesses go away within 7 to 10 days with rest and simple home remedies. Sometimes the illness may last for several weeks. Antibiotics will not kill a virus, and they are generally not prescribed for this condition.    Home care  · If symptoms are severe, rest at home for the first 2 to 3 days. When you resume activity, don't let yourself get too tired.  · Avoid being exposed to cigarette smoke (yours or others).  · You may use acetaminophen or ibuprofen to control pain and fever, unless another medicine was prescribed. (Note: If you have chronic liver or kidney disease, have ever had a stomach ulcer or gastrointestinal bleeding, or are taking blood-thinning medicines, talk with your healthcare provider before using these medicines.) Aspirin should never be given to anyone under 18 years of age who is ill with a viral infection or fever. It may cause severe liver or brain damage.  · Your appetite may be poor, so a light diet is fine. Avoid dehydration by drinking 6 to 8 glasses of fluids per day (water, soft drinks, juices, tea, or soup). Extra fluids will help loosen secretions in the nose and lungs.  · Over-the-counter cold medicines will not shorten the length of time youre sick, but they may be helpful for the following symptoms: cough, sore throat, and nasal and sinus congestion. (Note: Do not use decongestants if you have high blood pressure.)  Follow-up  care  Follow up with your healthcare provider, or as advised.  When to seek medical advice  Call your healthcare provider right away if any of these occur:  · Cough with lots of colored sputum (mucus)  · Severe headache; face, neck, or ear pain  · Difficulty swallowing due to throat pain  · Fever of 100.4°F (38°C)  Call 911, or get immediate medical care  Call emergency services right away if any of these occur:  · Chest pain, shortness of breath, wheezing, or difficulty breathing  · Coughing up blood  · Inability to swallow due to throat pain  Date Last Reviewed: 9/13/2015  © 8917-7833 [employer]. [address] [place] [address], [place] [address]. All rights reserved. This information is not intended as a substitute for professional medical care. Always follow your healthcare professional's instructions.

## 2018-09-13 NOTE — PATIENT INSTRUCTIONS
PLEASE READ YOUR DISCHARGE INSTRUCTIONS ENTIRELY AS IT CONTAINS IMPORTANT INFORMATION.      Please drink plenty of fluids.    Please get plenty of rest.    Please return here or go to the Emergency Department for any concerns or worsening of condition.    Please take an over the counter antihistamine medication (allegra/Claritin/Zyrtec) of your choice as directed.    Try an over the counter decongestant like Mucinex D or Sudafed.     If you do have Hypertension or palpitations, it is safe to take Coricidin HBP for relief of sinus symptoms.    If not allergic, please take over the counter Tylenol (Acetaminophen) and/or Motrin (Ibuprofen) as directed for control of pain and/or fever.  Please follow up with your primary care doctor or specialist as needed.    Sore throat recommendations: Warm fluids, warm salt water gargles, throat lozenges, tea, honey, soup, rest, hydration.    Use over the counter flonase: one spray each nostril twice daily OR two sprays each nostril once daily.     If you  smoke, please stop smoking.    Do not drive while taking the cough syrup - best to take it at night before going to sleep. However, you can take it during the day (every 4-6 hours) if you do not have to drive or operate machinery. This medication will make you drowsy. Try taking half a dose first to see how it affects you.       Please return or see your primary care doctor if you develop new or worsening symptoms.     You must understand that you have received an Urgent Care treatment only and that you may be released before all of your medical problems are known or treated.        Viral Upper Respiratory Illness (Adult)  You have a viral upper respiratory illness (URI), which is another term for the common cold. This illness is contagious during the first few days. It is spread through the air by coughing and sneezing. It may also be spread by direct contact (touching the sick person and then touching your own eyes, nose, or  mouth). Frequent handwashing will decrease risk of spread. Most viral illnesses go away within 7 to 10 days with rest and simple home remedies. Sometimes the illness may last for several weeks. Antibiotics will not kill a virus, and they are generally not prescribed for this condition.    Home care  · If symptoms are severe, rest at home for the first 2 to 3 days. When you resume activity, don't let yourself get too tired.  · Avoid being exposed to cigarette smoke (yours or others).  · You may use acetaminophen or ibuprofen to control pain and fever, unless another medicine was prescribed. (Note: If you have chronic liver or kidney disease, have ever had a stomach ulcer or gastrointestinal bleeding, or are taking blood-thinning medicines, talk with your healthcare provider before using these medicines.) Aspirin should never be given to anyone under 18 years of age who is ill with a viral infection or fever. It may cause severe liver or brain damage.  · Your appetite may be poor, so a light diet is fine. Avoid dehydration by drinking 6 to 8 glasses of fluids per day (water, soft drinks, juices, tea, or soup). Extra fluids will help loosen secretions in the nose and lungs.  · Over-the-counter cold medicines will not shorten the length of time youre sick, but they may be helpful for the following symptoms: cough, sore throat, and nasal and sinus congestion. (Note: Do not use decongestants if you have high blood pressure.)  Follow-up care  Follow up with your healthcare provider, or as advised.  When to seek medical advice  Call your healthcare provider right away if any of these occur:  · Cough with lots of colored sputum (mucus)  · Severe headache; face, neck, or ear pain  · Difficulty swallowing due to throat pain  · Fever of 100.4°F (38°C)  Call 911, or get immediate medical care  Call emergency services right away if any of these occur:  · Chest pain, shortness of breath, wheezing, or difficulty  breathing  · Coughing up blood  · Inability to swallow due to throat pain  Date Last Reviewed: 9/13/2015  © 8501-4635 [employer], "[employer]. [address] [place] [address], [place] [address]. All rights reserved. This information is not intended as a substitute for professional medical care. Always follow your healthcare professional's instructions.

## 2018-10-03 ENCOUNTER — HOSPITAL ENCOUNTER (EMERGENCY)
Facility: HOSPITAL | Age: 38
Discharge: HOME | End: 2018-10-04
Attending: EMERGENCY MEDICINE
Payer: MEDICARE

## 2018-10-03 VITALS
OXYGEN SATURATION: 97 % | RESPIRATION RATE: 18 BRPM | SYSTOLIC BLOOD PRESSURE: 108 MMHG | TEMPERATURE: 98 F | HEART RATE: 72 BPM | DIASTOLIC BLOOD PRESSURE: 69 MMHG

## 2018-10-03 DIAGNOSIS — R25.2 SPASM: Primary | ICD-10-CM

## 2018-10-03 PROCEDURE — 99284 EMERGENCY DEPT VISIT MOD MDM: CPT | Mod: ,,, | Performed by: EMERGENCY MEDICINE

## 2018-10-03 PROCEDURE — 99283 EMERGENCY DEPT VISIT LOW MDM: CPT

## 2018-10-03 PROCEDURE — 25000003 PHARM REV CODE 250: Performed by: EMERGENCY MEDICINE

## 2018-10-03 RX ORDER — BACLOFEN 10 MG/1
20 TABLET ORAL
Status: COMPLETED | OUTPATIENT
Start: 2018-10-03 | End: 2018-10-03

## 2018-10-03 RX ORDER — DIAZEPAM 5 MG/1
5 TABLET ORAL
Status: COMPLETED | OUTPATIENT
Start: 2018-10-03 | End: 2018-10-03

## 2018-10-03 RX ADMIN — BACLOFEN 20 MG: 10 TABLET ORAL at 11:10

## 2018-10-03 RX ADMIN — DIAZEPAM 5 MG: 5 TABLET ORAL at 10:10
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
## 2018-10-04 NOTE — PROVIDER PROGRESS NOTES - EMERGENCY DEPT.
Encounter Date: 10/3/2018    ED Physician Progress Notes        spasms resolved with medications given in ED.  Follow up as scheduled with neurosurgery.

## 2018-10-04 NOTE — ED NOTES
Patient identifiers verified and correct for Gordon Griffin.  C/C: Muscle Spasms/ Jerking movements  APPEARANCE: awake and alert in NAD.  SKIN: warm, dry and intact. No breakdown or bruising.  MUSCULOSKELETAL: Patient moving all extremities spontaneously, no obvious swelling or deformities noted. Ambulates independently with cane. +Muscle Spasms  RESPIRATORY: Denies shortness of breath. Respirations unlabored.   CARDIAC: Denies CP, 2+ distal pulses; no peripheral edema  ABDOMEN: S/ND/NT, Denies nausea  : voids spontaneously, denies difficulty

## 2018-10-04 NOTE — ED TRIAGE NOTES
Patient presents to the ED c/o muscle spasms x 2 months that worsened over the last few days. Patient takes Baclofen and anxiety medication to help with the pain at home. Patient denies any other symptoms.

## 2018-10-27 ENCOUNTER — HOSPITAL ENCOUNTER (EMERGENCY)
Facility: HOSPITAL | Age: 38
Discharge: HOME | End: 2018-10-27
Attending: EMERGENCY MEDICINE
Payer: MEDICARE

## 2018-10-27 VITALS
OXYGEN SATURATION: 98 % | HEIGHT: 72 IN | SYSTOLIC BLOOD PRESSURE: 124 MMHG | BODY MASS INDEX: 21.68 KG/M2 | RESPIRATION RATE: 18 BRPM | HEART RATE: 83 BPM | TEMPERATURE: 98 F | WEIGHT: 160.06 LBS | DIASTOLIC BLOOD PRESSURE: 72 MMHG

## 2018-10-27 DIAGNOSIS — M62.838 MUSCLE SPASM: Primary | ICD-10-CM

## 2018-10-27 LAB
ALBUMIN SERPL BCP-MCNC: 4.4 G/DL
ALP SERPL-CCNC: 103 U/L
ALT SERPL W/O P-5'-P-CCNC: 21 U/L
ANION GAP SERPL CALC-SCNC: 8 MMOL/L
AST SERPL-CCNC: 18 U/L
BASOPHILS # BLD AUTO: 0.06 K/UL
BASOPHILS NFR BLD: 0.7 %
BILIRUB SERPL-MCNC: 0.5 MG/DL
BUN SERPL-MCNC: 20 MG/DL
CALCIUM SERPL-MCNC: 10.2 MG/DL
CHLORIDE SERPL-SCNC: 107 MMOL/L
CK SERPL-CCNC: 61 U/L
CO2 SERPL-SCNC: 26 MMOL/L
CREAT SERPL-MCNC: 1 MG/DL
DIFFERENTIAL METHOD: ABNORMAL
EOSINOPHIL # BLD AUTO: 0.2 K/UL
EOSINOPHIL NFR BLD: 2.5 %
ERYTHROCYTE [DISTWIDTH] IN BLOOD BY AUTOMATED COUNT: 12.2 %
EST. GFR  (AFRICAN AMERICAN): >60 ML/MIN/1.73 M^2
EST. GFR  (NON AFRICAN AMERICAN): >60 ML/MIN/1.73 M^2
GLUCOSE SERPL-MCNC: 93 MG/DL
HCT VFR BLD AUTO: 43.4 %
HGB BLD-MCNC: 14.6 G/DL
IMM GRANULOCYTES # BLD AUTO: 0.06 K/UL
IMM GRANULOCYTES NFR BLD AUTO: 0.7 %
LYMPHOCYTES # BLD AUTO: 2.3 K/UL
LYMPHOCYTES NFR BLD: 28 %
MCH RBC QN AUTO: 28.6 PG
MCHC RBC AUTO-ENTMCNC: 33.6 G/DL
MCV RBC AUTO: 85 FL
MONOCYTES # BLD AUTO: 0.8 K/UL
MONOCYTES NFR BLD: 9.6 %
NEUTROPHILS # BLD AUTO: 4.8 K/UL
NEUTROPHILS NFR BLD: 58.5 %
NRBC BLD-RTO: 0 /100 WBC
PLATELET # BLD AUTO: 133 K/UL
PMV BLD AUTO: 11.4 FL
POTASSIUM SERPL-SCNC: 4.1 MMOL/L
PROT SERPL-MCNC: 7.1 G/DL
RBC # BLD AUTO: 5.1 M/UL
SODIUM SERPL-SCNC: 141 MMOL/L
WBC # BLD AUTO: 8.14 K/UL

## 2018-10-27 PROCEDURE — 96360 HYDRATION IV INFUSION INIT: CPT

## 2018-10-27 PROCEDURE — 99284 EMERGENCY DEPT VISIT MOD MDM: CPT | Mod: 25

## 2018-10-27 PROCEDURE — 85025 COMPLETE CBC W/AUTO DIFF WBC: CPT

## 2018-10-27 PROCEDURE — 82550 ASSAY OF CK (CPK): CPT

## 2018-10-27 PROCEDURE — 99284 EMERGENCY DEPT VISIT MOD MDM: CPT | Mod: ,,, | Performed by: PHYSICIAN ASSISTANT

## 2018-10-27 PROCEDURE — 25000003 PHARM REV CODE 250: Performed by: PHYSICIAN ASSISTANT

## 2018-10-27 PROCEDURE — 80053 COMPREHEN METABOLIC PANEL: CPT

## 2018-10-27 RX ORDER — DIPHENHYDRAMINE HCL 25 MG
25 CAPSULE ORAL
Status: COMPLETED | OUTPATIENT
Start: 2018-10-27 | End: 2018-10-27

## 2018-10-27 RX ADMIN — SODIUM CHLORIDE 1000 ML: 0.9 INJECTION, SOLUTION INTRAVENOUS at 09:10

## 2018-10-27 RX ADMIN — DIPHENHYDRAMINE HYDROCHLORIDE 25 MG: 25 CAPSULE ORAL at 09:10
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
## 2018-10-28 NOTE — DISCHARGE INSTRUCTIONS
Please hydrate with water and continue taking your prescribed Baclofen and Valium as directed for ongoing management of your muscle spasms. Please return to the ED for new, worsening, or concerning symptoms.     Our goal in the emergency department is to always give you outstanding care and exceptional service. You may receive a survey by mail or e-mail in the next week regarding your experience in our ED. We would greatly appreciate your completing and returning the survey. Your feedback provides us with a way to recognize our staff who give very good care and it helps us learn how to improve when your experience was below our aspiration of excellence.
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
## 2018-11-12 ENCOUNTER — TELEPHONE (OUTPATIENT)
Dept: NEUROSURGERY | Facility: CLINIC | Age: 38
End: 2018-11-12
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
## 2019-02-25 ENCOUNTER — TELEPHONE (OUTPATIENT)
Dept: SPINE | Facility: CLINIC | Age: 39
End: 2019-02-25

## 2019-02-25 NOTE — TELEPHONE ENCOUNTER
Pt claims Horace Cheung from scheduling called him directly and scheduled his appt. Dr. Pemberton,  Verified with Horace Cheung that this is not the case and he verified that he only schedules pts based on inbound calls.  Pt claims he is no longer in litigation.  Pt is scheduled to see BYRON Richards and will bring outside imaging.  Explained I would hold a slot for the pt.  V/U.
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
## 2019-03-01 ENCOUNTER — OFFICE VISIT (OUTPATIENT)
Dept: NEUROSURGERY | Facility: CLINIC | Age: 39
End: 2019-03-01
Payer: MEDICARE

## 2019-03-01 VITALS
SYSTOLIC BLOOD PRESSURE: 124 MMHG | HEART RATE: 61 BPM | WEIGHT: 152.31 LBS | BODY MASS INDEX: 20.66 KG/M2 | DIASTOLIC BLOOD PRESSURE: 80 MMHG

## 2019-03-01 DIAGNOSIS — Z87.898 HISTORY OF PROGRESSIVE WEAKNESS: ICD-10-CM

## 2019-03-01 DIAGNOSIS — G89.4 CHRONIC PAIN SYNDROME: ICD-10-CM

## 2019-03-01 DIAGNOSIS — R29.6 FREQUENT FALLS: ICD-10-CM

## 2019-03-01 DIAGNOSIS — R26.0 ATAXIC GAIT: ICD-10-CM

## 2019-03-01 PROCEDURE — 99204 PR OFFICE/OUTPT VISIT, NEW, LEVL IV, 45-59 MIN: ICD-10-PCS | Mod: S$GLB,,, | Performed by: PHYSICIAN ASSISTANT

## 2019-03-01 PROCEDURE — 3008F BODY MASS INDEX DOCD: CPT | Mod: CPTII,S$GLB,, | Performed by: PHYSICIAN ASSISTANT

## 2019-03-01 PROCEDURE — 99999 PR PBB SHADOW E&M-EST. PATIENT-LVL III: ICD-10-PCS | Mod: PBBFAC,,, | Performed by: PHYSICIAN ASSISTANT

## 2019-03-01 PROCEDURE — 99204 OFFICE O/P NEW MOD 45 MIN: CPT | Mod: S$GLB,,, | Performed by: PHYSICIAN ASSISTANT

## 2019-03-01 PROCEDURE — 3008F PR BODY MASS INDEX (BMI) DOCUMENTED: ICD-10-PCS | Mod: CPTII,S$GLB,, | Performed by: PHYSICIAN ASSISTANT

## 2019-03-01 PROCEDURE — 99999 PR PBB SHADOW E&M-EST. PATIENT-LVL III: CPT | Mod: PBBFAC,,, | Performed by: PHYSICIAN ASSISTANT

## 2019-03-01 RX ORDER — LEVETIRACETAM 1000 MG/1
TABLET ORAL
Refills: 5 | COMMUNITY
Start: 2019-02-12 | End: 2019-10-01 | Stop reason: SDUPTHER

## 2019-03-04 PROBLEM — Z87.898 HISTORY OF PROGRESSIVE WEAKNESS: Status: ACTIVE | Noted: 2019-03-04

## 2019-03-04 PROBLEM — R26.0 ATAXIC GAIT: Status: ACTIVE | Noted: 2019-03-04

## 2019-03-04 PROBLEM — G89.4 CHRONIC PAIN SYNDROME: Status: ACTIVE | Noted: 2019-03-04

## 2019-03-04 PROBLEM — R29.6 FREQUENT FALLS: Status: ACTIVE | Noted: 2019-03-04

## 2019-04-08 ENCOUNTER — HOSPITAL ENCOUNTER (EMERGENCY)
Facility: HOSPITAL | Age: 39
Discharge: HOME | End: 2019-04-08
Attending: EMERGENCY MEDICINE
Payer: MEDICARE

## 2019-04-08 VITALS
TEMPERATURE: 98 F | SYSTOLIC BLOOD PRESSURE: 125 MMHG | RESPIRATION RATE: 14 BRPM | DIASTOLIC BLOOD PRESSURE: 62 MMHG | BODY MASS INDEX: 21.67 KG/M2 | HEART RATE: 72 BPM | WEIGHT: 160 LBS | HEIGHT: 72 IN | OXYGEN SATURATION: 98 %

## 2019-04-08 DIAGNOSIS — M62.838 MUSCLE SPASM: ICD-10-CM

## 2019-04-08 DIAGNOSIS — G43.809 OTHER MIGRAINE WITHOUT STATUS MIGRAINOSUS, NOT INTRACTABLE: Primary | ICD-10-CM

## 2019-04-08 PROCEDURE — 99284 PR EMERGENCY DEPT VISIT,LEVEL IV: ICD-10-PCS | Mod: ,,, | Performed by: PHYSICIAN ASSISTANT

## 2019-04-08 PROCEDURE — 25000003 PHARM REV CODE 250: Performed by: PHYSICIAN ASSISTANT

## 2019-04-08 PROCEDURE — 99284 EMERGENCY DEPT VISIT MOD MDM: CPT | Mod: 25

## 2019-04-08 PROCEDURE — 96361 HYDRATE IV INFUSION ADD-ON: CPT

## 2019-04-08 PROCEDURE — 96374 THER/PROPH/DIAG INJ IV PUSH: CPT

## 2019-04-08 PROCEDURE — 96375 TX/PRO/DX INJ NEW DRUG ADDON: CPT

## 2019-04-08 PROCEDURE — 99284 EMERGENCY DEPT VISIT MOD MDM: CPT | Mod: ,,, | Performed by: PHYSICIAN ASSISTANT

## 2019-04-08 PROCEDURE — 63600175 PHARM REV CODE 636 W HCPCS: Performed by: PHYSICIAN ASSISTANT

## 2019-04-08 RX ORDER — PROCHLORPERAZINE EDISYLATE 5 MG/ML
10 INJECTION INTRAMUSCULAR; INTRAVENOUS
Status: COMPLETED | OUTPATIENT
Start: 2019-04-08 | End: 2019-04-08

## 2019-04-08 RX ORDER — PROCHLORPERAZINE MALEATE 10 MG
10 TABLET ORAL 3 TIMES DAILY
COMMUNITY

## 2019-04-08 RX ORDER — KETOROLAC TROMETHAMINE 30 MG/ML
15 INJECTION, SOLUTION INTRAMUSCULAR; INTRAVENOUS
Status: COMPLETED | OUTPATIENT
Start: 2019-04-08 | End: 2019-04-08

## 2019-04-08 RX ADMIN — PROCHLORPERAZINE EDISYLATE 10 MG: 5 INJECTION INTRAMUSCULAR; INTRAVENOUS at 08:04

## 2019-04-08 RX ADMIN — KETOROLAC TROMETHAMINE 15 MG: 30 INJECTION, SOLUTION INTRAMUSCULAR at 08:04

## 2019-04-08 RX ADMIN — SODIUM CHLORIDE 1000 ML: 0.9 INJECTION, SOLUTION INTRAVENOUS at 08:04

## 2019-04-09 NOTE — ED TRIAGE NOTES
Patient reports 7/10 migraine x24 hours. Patient took compazine, tordol at home with no relief. Pt is sensitive to light and noise. Reports n/v today. Denies dizziness. Reports frequent migraines. Patient states he is at the end of his botox cycle.
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
## 2019-04-09 NOTE — DISCHARGE INSTRUCTIONS
Follow-up with your Neurologist for ongoing management of your headache    If new, worsening, or concerning symptoms develop return to the Emergency Room     Our goal in the emergency department is to always give you outstanding care and exceptional service. You may receive a survey by mail or e-mail in the next week regarding your experience in our ED. We would greatly appreciate your completing and returning the survey. Your feedback provides us with a way to recognize our staff who give very good care and it helps us learn how to improve when your experience was below our aspiration of excellence.

## 2019-10-01 PROBLEM — G25.9 FUNCTIONAL MOVEMENT DISORDER: Status: ACTIVE | Noted: 2019-10-01

## 2019-11-15 PROBLEM — E78.6 LIPOPROTEIN DEFICIENCY: Status: ACTIVE | Noted: 2018-02-07

## 2019-11-15 PROBLEM — E78.00 PURE HYPERCHOLESTEROLEMIA: Status: ACTIVE | Noted: 2019-11-15

## 2019-11-15 PROBLEM — M54.2 NECK PAIN: Status: ACTIVE | Noted: 2019-11-15

## 2019-11-15 PROBLEM — G43.009 MIGRAINE WITHOUT AURA AND WITHOUT STATUS MIGRAINOSUS, NOT INTRACTABLE: Status: ACTIVE | Noted: 2018-02-07

## 2019-11-15 PROBLEM — I25.10 CORONARY ATHEROSCLEROSIS DUE TO CALCIFIED CORONARY LESION: Status: ACTIVE | Noted: 2019-11-15

## 2019-11-15 PROBLEM — I25.84 CORONARY ATHEROSCLEROSIS DUE TO CALCIFIED CORONARY LESION: Status: ACTIVE | Noted: 2019-11-15

## 2019-11-15 PROBLEM — M47.892 OTHER SPONDYLOSIS, CERVICAL REGION: Status: ACTIVE | Noted: 2019-11-15

## 2019-11-15 PROBLEM — R26.9 ABNORMALITY OF GAIT AND MOBILITY: Status: ACTIVE | Noted: 2019-03-04

## 2019-11-15 PROBLEM — Z78.9 MALE-TO-FEMALE TRANSGENDER PERSON: Status: ACTIVE | Noted: 2019-11-15

## 2019-11-15 PROBLEM — M50.30 DEGENERATIVE DISC DISEASE, CERVICAL: Status: ACTIVE | Noted: 2018-02-07

## 2020-02-12 ENCOUNTER — HOSPITAL ENCOUNTER (EMERGENCY)
Facility: HOSPITAL | Age: 40
Discharge: HOME | End: 2020-02-13
Attending: EMERGENCY MEDICINE
Payer: MEDICARE

## 2020-02-12 DIAGNOSIS — E87.6 HYPOKALEMIA: ICD-10-CM

## 2020-02-12 DIAGNOSIS — G25.2 INTENTION TREMOR: ICD-10-CM

## 2020-02-12 DIAGNOSIS — M62.838 MUSCLE SPASM: Primary | ICD-10-CM

## 2020-02-12 PROCEDURE — 93005 ELECTROCARDIOGRAM TRACING: CPT

## 2020-02-12 PROCEDURE — 99285 PR EMERGENCY DEPT VISIT,LEVEL V: ICD-10-PCS | Mod: ,,, | Performed by: EMERGENCY MEDICINE

## 2020-02-12 PROCEDURE — 93010 ELECTROCARDIOGRAM REPORT: CPT | Mod: ,,, | Performed by: INTERNAL MEDICINE

## 2020-02-12 PROCEDURE — 93010 EKG 12-LEAD: ICD-10-PCS | Mod: ,,, | Performed by: INTERNAL MEDICINE

## 2020-02-12 PROCEDURE — 99285 EMERGENCY DEPT VISIT HI MDM: CPT | Mod: ,,, | Performed by: EMERGENCY MEDICINE

## 2020-02-12 PROCEDURE — 99285 EMERGENCY DEPT VISIT HI MDM: CPT | Mod: 25

## 2020-02-12 PROCEDURE — 85025 COMPLETE CBC W/AUTO DIFF WBC: CPT

## 2020-02-12 PROCEDURE — 80053 COMPREHEN METABOLIC PANEL: CPT

## 2020-02-12 PROCEDURE — 83735 ASSAY OF MAGNESIUM: CPT

## 2020-02-13 VITALS
WEIGHT: 160 LBS | HEART RATE: 83 BPM | TEMPERATURE: 98 F | SYSTOLIC BLOOD PRESSURE: 128 MMHG | HEIGHT: 72 IN | DIASTOLIC BLOOD PRESSURE: 76 MMHG | RESPIRATION RATE: 18 BRPM | OXYGEN SATURATION: 99 % | BODY MASS INDEX: 21.67 KG/M2

## 2020-02-13 DIAGNOSIS — G25.3 MYOCLONUS: Primary | ICD-10-CM

## 2020-02-13 LAB
ALBUMIN SERPL BCP-MCNC: 4.6 G/DL (ref 3.5–5.2)
ALP SERPL-CCNC: 127 U/L (ref 55–135)
ALT SERPL W/O P-5'-P-CCNC: 28 U/L (ref 10–44)
AMPHET+METHAMPHET UR QL: NEGATIVE
ANION GAP SERPL CALC-SCNC: 12 MMOL/L (ref 8–16)
AST SERPL-CCNC: 24 U/L (ref 10–40)
BARBITURATES UR QL SCN>200 NG/ML: NORMAL
BASOPHILS # BLD AUTO: 0.06 K/UL (ref 0–0.2)
BASOPHILS NFR BLD: 0.7 % (ref 0–1.9)
BENZODIAZ UR QL SCN>200 NG/ML: NORMAL
BILIRUB SERPL-MCNC: 0.4 MG/DL (ref 0.1–1)
BILIRUB UR QL STRIP: NEGATIVE
BUN SERPL-MCNC: 17 MG/DL (ref 6–20)
BZE UR QL SCN: NEGATIVE
CALCIUM SERPL-MCNC: 9.1 MG/DL (ref 8.7–10.5)
CANNABINOIDS UR QL SCN: NEGATIVE
CHLORIDE SERPL-SCNC: 102 MMOL/L (ref 95–110)
CLARITY UR REFRACT.AUTO: CLEAR
CO2 SERPL-SCNC: 25 MMOL/L (ref 23–29)
COLOR UR AUTO: YELLOW
CREAT SERPL-MCNC: 1.1 MG/DL (ref 0.5–1.4)
CREAT UR-MCNC: 184 MG/DL (ref 23–375)
DIFFERENTIAL METHOD: ABNORMAL
EOSINOPHIL # BLD AUTO: 0.2 K/UL (ref 0–0.5)
EOSINOPHIL NFR BLD: 2.6 % (ref 0–8)
ERYTHROCYTE [DISTWIDTH] IN BLOOD BY AUTOMATED COUNT: 12.1 % (ref 11.5–14.5)
EST. GFR  (AFRICAN AMERICAN): >60 ML/MIN/1.73 M^2
EST. GFR  (NON AFRICAN AMERICAN): >60 ML/MIN/1.73 M^2
ETHANOL UR-MCNC: <10 MG/DL
GLUCOSE SERPL-MCNC: 103 MG/DL (ref 70–110)
GLUCOSE UR QL STRIP: NEGATIVE
HCT VFR BLD AUTO: 44.5 % (ref 40–54)
HGB BLD-MCNC: 15.5 G/DL (ref 14–18)
HGB UR QL STRIP: NEGATIVE
IMM GRANULOCYTES # BLD AUTO: 0.03 K/UL (ref 0–0.04)
IMM GRANULOCYTES NFR BLD AUTO: 0.3 % (ref 0–0.5)
KETONES UR QL STRIP: ABNORMAL
LEUKOCYTE ESTERASE UR QL STRIP: NEGATIVE
LYMPHOCYTES # BLD AUTO: 2.1 K/UL (ref 1–4.8)
LYMPHOCYTES NFR BLD: 24 % (ref 18–48)
MAGNESIUM SERPL-MCNC: 2 MG/DL (ref 1.6–2.6)
MCH RBC QN AUTO: 29.2 PG (ref 27–31)
MCHC RBC AUTO-ENTMCNC: 34.8 G/DL (ref 32–36)
MCV RBC AUTO: 84 FL (ref 82–98)
METHADONE UR QL SCN>300 NG/ML: NEGATIVE
MONOCYTES # BLD AUTO: 1.3 K/UL (ref 0.3–1)
MONOCYTES NFR BLD: 14.4 % (ref 4–15)
NEUTROPHILS # BLD AUTO: 5.1 K/UL (ref 1.8–7.7)
NEUTROPHILS NFR BLD: 58 % (ref 38–73)
NITRITE UR QL STRIP: NEGATIVE
NRBC BLD-RTO: 0 /100 WBC
OPIATES UR QL SCN: NEGATIVE
PCP UR QL SCN>25 NG/ML: NEGATIVE
PH UR STRIP: 5 [PH] (ref 5–8)
PLATELET # BLD AUTO: 87 K/UL (ref 150–350)
PMV BLD AUTO: 11 FL (ref 9.2–12.9)
POTASSIUM SERPL-SCNC: 3.1 MMOL/L (ref 3.5–5.1)
PROT SERPL-MCNC: 7.8 G/DL (ref 6–8.4)
PROT UR QL STRIP: NEGATIVE
RBC # BLD AUTO: 5.31 M/UL (ref 4.6–6.2)
SODIUM SERPL-SCNC: 139 MMOL/L (ref 136–145)
SP GR UR STRIP: 1.02 (ref 1–1.03)
TOXICOLOGY INFORMATION: NORMAL
URN SPEC COLLECT METH UR: ABNORMAL
WBC # BLD AUTO: 8.72 K/UL (ref 3.9–12.7)

## 2020-02-13 PROCEDURE — 25000003 PHARM REV CODE 250: Performed by: STUDENT IN AN ORGANIZED HEALTH CARE EDUCATION/TRAINING PROGRAM

## 2020-02-13 PROCEDURE — 80307 DRUG TEST PRSMV CHEM ANLYZR: CPT

## 2020-02-13 PROCEDURE — 81003 URINALYSIS AUTO W/O SCOPE: CPT | Mod: 59

## 2020-02-13 RX ORDER — POTASSIUM CHLORIDE 20 MEQ/1
40 TABLET, EXTENDED RELEASE ORAL ONCE
Status: COMPLETED | OUTPATIENT
Start: 2020-02-13 | End: 2020-02-13

## 2020-02-13 RX ADMIN — POTASSIUM CHLORIDE 40 MEQ: 1500 TABLET, EXTENDED RELEASE ORAL at 01:02
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
## 2020-02-14 ENCOUNTER — HOSPITAL ENCOUNTER (OUTPATIENT)
Dept: NEUROLOGY | Facility: HOSPITAL | Age: 40
Discharge: HOME | End: 2020-02-14
Attending: STUDENT IN AN ORGANIZED HEALTH CARE EDUCATION/TRAINING PROGRAM
Payer: MEDICARE

## 2020-02-14 DIAGNOSIS — G25.3 MYOCLONUS: ICD-10-CM

## 2020-02-14 PROCEDURE — 95819 EEG AWAKE AND ASLEEP: CPT | Mod: 26,,, | Performed by: PSYCHIATRY & NEUROLOGY

## 2020-02-14 PROCEDURE — 95819 PR EEG,W/AWAKE & ASLEEP RECORD: ICD-10-PCS | Mod: 26,,, | Performed by: PSYCHIATRY & NEUROLOGY

## 2020-02-14 PROCEDURE — 95816 EEG AWAKE AND DROWSY: CPT

## 2020-02-20 DIAGNOSIS — G25.3 MYOCLONUS: Primary | ICD-10-CM

## 2020-03-05 NOTE — PROCEDURES
ELECTROENCEPHALOGRAM REPORT    DATE OF SERVICE: 02/14/20      METHODOLOGY   Electroencephalographic (EEG) recording is with electrodes placed according to the International 10-20 placement system.  Thirty two (32) channels of digital signal (sampling rate of 512/sec) including T1 and T2 was simultaneously recorded from the scalp and may include  EKG, EMG, and/or eye monitors.  Recording band pass was 0.1 to 512 hz.  Digital video recording of the patient is simultaneously recorded with the EEG.  The patient is instructed report clinical symptoms which may occur during the recording session.  EEG and video recording is stored and archived in digital format. Activation procedures which include photic stimulation, hyperventilation and instructing patients to perform simple task are done in selected patients.    The EEG is displayed on a monitor screen and can be reviewed using different montages.  Computer assisted analysis is employed to detect spike and electrographic seizure activity.   The entire record is submitted for computer analysis.  The entire recording is visually reviewed and the times identified by computer analysis as being spikes or seizures are reviewed again.  Compresses spectral analysis (CSA) is also performed on the activity recorded from each individual channel.  This is displayed as a power display of frequencies from 0 to 30 Hz over time.   The CSA is reviewed looking for asymmetries in power between homologous areas of the scalp and then compared with the original EEG recording.     [employer] software was also utilized in the review of this study.  This software suite analyzes the EEG recording in multiple domains.  Coherence and rhythmicity is computed to identify EEG sections which may contain organized seizures.  Each channel undergoes analysis to detect presence of spike and sharp waves which have special and morphological characteristic of epileptic activity.  The routine EEG recording is  converted from spacial into frequency domain.  This is then displayed comparing homologous areas to identify areas of significant asymmetry.  Algorithm to identify non-cortically generated artifact is used to separate eye movement, EMG and other artifact from the EEG    EEG FINDINGS  Physiological states present  Awake  Posterior Dominant Rhythm   10.5 Hz symmetrical in posterior head areas   Low volt beta present diffusely   Hemispheric symmetry - Yes  Drowsy  Diffuse theta/beta mixture   Hemispheric symmetry - YES  Sleep    Sleep spindles and V-Waves and K-Complexes - present   Hemispheric symmetry - Yes    Focal findings - None  Spikes/Sharp waves - None    Activation Procedures   Hyperventilation - no change in cortical rhythms   Photic Stimulation     - occipital driving - YES    - Pathological discharges produced - NO    One muscle spasm noted during HV with only EMG artifact noted on EEG          IMPRESSION:  Normal awake and asleep EEG          Joey Kincaid MD

## 2020-05-06 ENCOUNTER — TELEPHONE (OUTPATIENT)
Dept: CARDIOLOGY | Facility: CLINIC | Age: 40
End: 2020-05-06

## 2020-05-06 ENCOUNTER — HOSPITAL ENCOUNTER (EMERGENCY)
Facility: OTHER | Age: 40
Discharge: HOME | End: 2020-05-06
Attending: EMERGENCY MEDICINE
Payer: MEDICARE

## 2020-05-06 VITALS
TEMPERATURE: 98 F | HEIGHT: 72 IN | BODY MASS INDEX: 21.67 KG/M2 | RESPIRATION RATE: 18 BRPM | OXYGEN SATURATION: 99 % | WEIGHT: 160 LBS | SYSTOLIC BLOOD PRESSURE: 135 MMHG | HEART RATE: 74 BPM | DIASTOLIC BLOOD PRESSURE: 79 MMHG

## 2020-05-06 DIAGNOSIS — G43.909 MIGRAINE WITHOUT STATUS MIGRAINOSUS, NOT INTRACTABLE, UNSPECIFIED MIGRAINE TYPE: Primary | ICD-10-CM

## 2020-05-06 PROCEDURE — 25000003 PHARM REV CODE 250: Performed by: EMERGENCY MEDICINE

## 2020-05-06 PROCEDURE — 99284 EMERGENCY DEPT VISIT MOD MDM: CPT | Mod: 25

## 2020-05-06 PROCEDURE — 96372 THER/PROPH/DIAG INJ SC/IM: CPT

## 2020-05-06 PROCEDURE — 63600175 PHARM REV CODE 636 W HCPCS: Performed by: EMERGENCY MEDICINE

## 2020-05-06 RX ORDER — PROCHLORPERAZINE EDISYLATE 5 MG/ML
10 INJECTION INTRAMUSCULAR; INTRAVENOUS
Status: COMPLETED | OUTPATIENT
Start: 2020-05-06 | End: 2020-05-06

## 2020-05-06 RX ORDER — BUTALBITAL, ACETAMINOPHEN AND CAFFEINE 50; 325; 40 MG/1; MG/1; MG/1
1 TABLET ORAL
Status: COMPLETED | OUTPATIENT
Start: 2020-05-06 | End: 2020-05-06

## 2020-05-06 RX ORDER — DIPHENHYDRAMINE HYDROCHLORIDE 50 MG/ML
25 INJECTION INTRAMUSCULAR; INTRAVENOUS
Status: COMPLETED | OUTPATIENT
Start: 2020-05-06 | End: 2020-05-06

## 2020-05-06 RX ORDER — KETOROLAC TROMETHAMINE 30 MG/ML
30 INJECTION, SOLUTION INTRAMUSCULAR; INTRAVENOUS
Status: COMPLETED | OUTPATIENT
Start: 2020-05-06 | End: 2020-05-06

## 2020-05-06 RX ORDER — KETOROLAC TROMETHAMINE 30 MG/ML
15 INJECTION, SOLUTION INTRAMUSCULAR; INTRAVENOUS
Status: DISCONTINUED | OUTPATIENT
Start: 2020-05-06 | End: 2020-05-06

## 2020-05-06 RX ADMIN — DIPHENHYDRAMINE HYDROCHLORIDE 25 MG: 50 INJECTION, SOLUTION INTRAMUSCULAR; INTRAVENOUS at 09:05

## 2020-05-06 RX ADMIN — BUTALBITAL, ACETAMINOPHEN, AND CAFFEINE 1 TABLET: 50; 325; 40 TABLET ORAL at 09:05

## 2020-05-06 RX ADMIN — KETOROLAC TROMETHAMINE 30 MG: 30 INJECTION, SOLUTION INTRAMUSCULAR at 09:05

## 2020-05-06 RX ADMIN — PROCHLORPERAZINE EDISYLATE 10 MG: 5 INJECTION INTRAMUSCULAR; INTRAVENOUS at 09:05

## 2020-05-06 NOTE — TELEPHONE ENCOUNTER
Spoke to patient in regards to refill request. Patient informed me that he has another cardiologist. Patient informed pharmacy.

## 2020-05-07 NOTE — ED PROVIDER NOTES
Encounter Date: 5/6/2020    SCRIBE #1 NOTE: I, Tuyet Magallanes, am scribing for, and in the presence of, Dr. Lino.       History     Chief Complaint   Patient presents with    Headache     Migraine times three days with neck and back musle spams.  no n/v photophobia       Time seen by provider: 8:57 PM    This is a 39 y.o. male with hx of migraine headaches who presents with complaint of migraine headaches with a 3 day gradual onset. His headaches are typical in character and quality to his usual episodes without any acute changes. Pt requests medication intramuscularly as IV triggers his myoclonus. No head trauma. Pt denies N/V/D, cough, SOB, congestion, or runny nose. He denies any sick contacts or recent travel outside the country.    The history is provided by the patient.     Review of patient's allergies indicates:   Allergen Reactions    Mustard Itching, Nausea And Vomiting, Shortness Of Breath and Swelling    Mushroom Itching, Nausea And Vomiting and Swelling    Niaspan extended-release [niacin] Itching    Olive extract Itching, Nausea And Vomiting and Swelling    Oyster extract     Extendryl [wrawqwcabmucfqqz-sh-ichgrvzovy] Rash    V-cillin k Rash     Past Medical History:   Diagnosis Date    Anxiety     Cancer     Chest pain 1/20/2016 12/30/2015: Began experinece chest pain.    Depression     Functional movement disorder 10/1/2019    Migraine headache     Movement disorder     Myoclonic jerkings, massive     Stroke pt. states he had a cva at 3 months old     Past Surgical History:   Procedure Laterality Date    ANGIOGRAM, CORONARY, WITH LEFT HEART CATHETERIZATION      MANDIBLE SURGERY      reconstruction    variceol repair       Family History   Problem Relation Age of Onset    Heart disease Father     Heart disease Paternal Uncle     Heart disease Mother     Myasthenia gravis Mother      Social History     Tobacco Use    Smoking status: Never Smoker    Smokeless tobacco:  Never Used   Substance Use Topics    Alcohol use: No    Drug use: No     Review of Systems   Constitutional: Negative for chills and fever.   HENT: Negative for congestion.    Eyes: Negative for photophobia and redness.   Respiratory: Negative for cough and shortness of breath.    Cardiovascular: Negative for chest pain.   Gastrointestinal: Negative for abdominal pain, nausea and vomiting.   Genitourinary: Negative for decreased urine volume.   Musculoskeletal: Negative for back pain.   Skin: Negative for rash.   Neurological: Positive for headaches. Negative for weakness and light-headedness.   Psychiatric/Behavioral: Negative for confusion.       Physical Exam     Initial Vitals [05/06/20 2022]   BP Pulse Resp Temp SpO2   132/88 72 16 98.3 °F (36.8 °C) 98 %      MAP       --         Physical Exam    Nursing note and vitals reviewed.  Constitutional: He appears well-developed and well-nourished. He is not diaphoretic. No distress.   HENT:   Head: Normocephalic and atraumatic.   Mouth/Throat: Oropharynx is clear and moist.   Moist mucus membranes. TMs clear and intact bilaterally.    Eyes: Conjunctivae and EOM are normal. Pupils are equal, round, and reactive to light.   Conjunctivae pink, clear, and intact.  No photophobia.   Neck: Normal range of motion. Neck supple.   No cervical lymphadenopathy. No neck stiffness.   Cardiovascular: Normal rate, regular rhythm, S1 normal, S2 normal and normal heart sounds. Exam reveals no gallop and no friction rub.    No murmur heard.  Pulmonary/Chest: Breath sounds normal. No respiratory distress. He has no wheezes. He has no rhonchi. He has no rales.   Lungs clear to auscultation bilaterally.    Abdominal: Soft. Bowel sounds are normal. There is no tenderness. There is no rebound and no guarding.   No audible bruits.    Musculoskeletal: Normal range of motion. He exhibits no edema or tenderness.   No lower extremity edema.    Lymphadenopathy:     He has no cervical  adenopathy.   Neurological: He is alert and oriented to person, place, and time.   Cranial nerves II-XII intact. Strength 5/5 throughout. Reflexes 2+ throughout. Good finger to nose task ability. Negative pronator drift. No meningeal signs. No papilledema. PERRLA, EOMI.   Skin: Skin is warm and dry. Capillary refill takes less than 2 seconds. No rash noted. No pallor.   Warm and dry. No skin tenting, rashes, or lesions.     Psychiatric: He has a normal mood and affect. His behavior is normal. Judgment and thought content normal.         ED Course   Procedures  Labs Reviewed - No data to display       Imaging Results    None          Medical Decision Making:   History:   Old Medical Records: I decided to obtain old medical records.            Scribe Attestation:   Scribe #1: I performed the above scribed service and the documentation accurately describes the services I performed. I attest to the accuracy of the note.    Attending Attestation:           Physician Attestation for Scribe:  Physician Attestation Statement for Scribe #1: I, Dr. Lino, reviewed documentation, as scribed by Tuyet Magallanes in my presence, and it is both accurate and complete.         Attending ED Notes:     Emergent evaluation a 39-year-old male with past medical history of migraine headaches presents the ED with a chief complaint of a migraine headache typical of his usual migraine headaches in both character and quality without any acute changes of his migraine headaches.  Patient is afebrile, nontoxic appearing with stable vital signs.  Patient is neurovascularly intact without any focal neurologic deficits. Cranial nerves II-XII intact. Strength 5/5 throughout. Sensation intact to light touch throughout. Good finger to nose task ability. Negative pronator drift. Two point discrimination is intact throughout. Reflexes 2+ throughout. No papilledema.  No meningeal signs. PERRLA. EOMI.    The patient is extensively counseled on his  diagnosis and treatment, discharged in good condition and directed to follow up with his PCP in the next 24-48 hours.                           Clinical Impression:     1. Migraine without status migrainosus, not intractable, unspecified migraine type                ED Disposition Condition    Discharge Good        ED Prescriptions     None        Follow-up Information     Follow up With Specialties Details Why Contact Info    Magdalena Cohn MD Pediatrics In 2 days  [address]  [facility]  [facility] [id number] [phone]                                       Aman Martins MD  05/10/20 0784

## 2020-05-07 NOTE — ED TRIAGE NOTES
Pt reports to ED with c/o intermittent HA x3 days and full body muscle spasms r/t myoclonus. Pt reports taking Fioricet, Toradol, butorphanol, compazine, caffeine pill within the last 3 days without relief. Pt reports photophobia that comes and goes. Pt denies N,V, neck stiffness.

## 2020-05-27 ENCOUNTER — HOSPITAL ENCOUNTER (EMERGENCY)
Facility: OTHER | Age: 40
Discharge: HOME | End: 2020-05-27
Attending: EMERGENCY MEDICINE
Payer: MEDICARE

## 2020-05-27 VITALS
OXYGEN SATURATION: 99 % | WEIGHT: 165 LBS | SYSTOLIC BLOOD PRESSURE: 140 MMHG | DIASTOLIC BLOOD PRESSURE: 82 MMHG | BODY MASS INDEX: 22.35 KG/M2 | TEMPERATURE: 98 F | RESPIRATION RATE: 15 BRPM | HEART RATE: 76 BPM | HEIGHT: 72 IN

## 2020-05-27 DIAGNOSIS — G43.809 OTHER MIGRAINE WITHOUT STATUS MIGRAINOSUS, NOT INTRACTABLE: Primary | ICD-10-CM

## 2020-05-27 PROCEDURE — 99284 EMERGENCY DEPT VISIT MOD MDM: CPT | Mod: 25

## 2020-05-27 PROCEDURE — 63600175 PHARM REV CODE 636 W HCPCS: Performed by: PHYSICIAN ASSISTANT

## 2020-05-27 PROCEDURE — 96372 THER/PROPH/DIAG INJ SC/IM: CPT

## 2020-05-27 RX ORDER — DIPHENHYDRAMINE HYDROCHLORIDE 50 MG/ML
25 INJECTION INTRAMUSCULAR; INTRAVENOUS
Status: COMPLETED | OUTPATIENT
Start: 2020-05-27 | End: 2020-05-27

## 2020-05-27 RX ORDER — KETOROLAC TROMETHAMINE 30 MG/ML
30 INJECTION, SOLUTION INTRAMUSCULAR; INTRAVENOUS
Status: COMPLETED | OUTPATIENT
Start: 2020-05-27 | End: 2020-05-27

## 2020-05-27 RX ORDER — PROCHLORPERAZINE EDISYLATE 5 MG/ML
10 INJECTION INTRAMUSCULAR; INTRAVENOUS
Status: COMPLETED | OUTPATIENT
Start: 2020-05-27 | End: 2020-05-27

## 2020-05-27 RX ADMIN — KETOROLAC TROMETHAMINE 30 MG: 30 INJECTION, SOLUTION INTRAMUSCULAR; INTRAVENOUS at 11:05

## 2020-05-27 RX ADMIN — DIPHENHYDRAMINE HYDROCHLORIDE 25 MG: 50 INJECTION, SOLUTION INTRAMUSCULAR; INTRAVENOUS at 11:05

## 2020-05-27 RX ADMIN — PROCHLORPERAZINE EDISYLATE 10 MG: 5 INJECTION INTRAMUSCULAR; INTRAVENOUS at 11:05

## 2020-05-27 NOTE — ED TRIAGE NOTES
Pt reports a migraine headache for the past three days. He states he has taken all of his prescribed meds with no relief.

## 2020-05-27 NOTE — ED PROVIDER NOTES
Encounter Date: 5/27/2020       History     Chief Complaint   Patient presents with    Migraine     Migraine for the past three days despite taking multiple prescribed migraine meds     A 39-year-old male migraines, history of myoclonus, and anxiety who presents to emergency department with a migraine.  Patient reports onset of a frontal migraine 3 days ago.  He states he has had migraines for approximately 10 years.  He states he is on multiple medication regimen and has not had any relief over the past 3 days.  He states the pain is located in the frontal region of his head.  She denies photophobia, sonophobia, nausea, or vomiting.  He currently denies visual disturbance, states he has had transient blurry vision at times.  This is extent of patient's complaints today.    The history is provided by the patient.     Review of patient's allergies indicates:   Allergen Reactions    Mustard Itching, Nausea And Vomiting, Shortness Of Breath and Swelling    Mushroom Itching, Nausea And Vomiting and Swelling    Niaspan extended-release [niacin] Itching    Olive extract Itching, Nausea And Vomiting and Swelling    Oyster extract     Extendryl [oaneifsimoblhkir-kb-kxvskvoetm] Rash    V-cillin k Rash     Past Medical History:   Diagnosis Date    Anxiety     Cancer     Chest pain 1/20/2016 12/30/2015: Began experinece chest pain.    Depression     Functional movement disorder 10/1/2019    Migraine headache     Movement disorder     Myoclonic jerkings, massive     Stroke pt. states he had a cva at 3 months old     Past Surgical History:   Procedure Laterality Date    ANGIOGRAM, CORONARY, WITH LEFT HEART CATHETERIZATION      MANDIBLE SURGERY      reconstruction    variceol repair       Family History   Problem Relation Age of Onset    Heart disease Father     Heart disease Paternal Uncle     Heart disease Mother     Myasthenia gravis Mother      Social History     Tobacco Use    Smoking status:  Never Smoker    Smokeless tobacco: Never Used   Substance Use Topics    Alcohol use: No    Drug use: No     Review of Systems   Constitutional: Negative for chills and fever.   HENT: Negative for congestion.    Eyes: Negative for photophobia.   Respiratory: Negative for shortness of breath.    Cardiovascular: Negative for chest pain.   Gastrointestinal: Negative for abdominal pain, diarrhea, nausea and vomiting.   Musculoskeletal: Negative for arthralgias.   Skin: Negative for rash.   Allergic/Immunologic: Negative for immunocompromised state.   Neurological: Positive for headaches. Negative for dizziness, weakness and numbness.       Physical Exam     Initial Vitals [05/27/20 1040]   BP Pulse Resp Temp SpO2   (!) 143/85 92 18 98.3 °F (36.8 °C) 98 %      MAP       --         Physical Exam    Vitals reviewed.  Constitutional: He is cooperative. No distress.   HENT:   Head: Normocephalic and atraumatic.   Eyes: Conjunctivae and EOM are normal. Pupils are equal, round, and reactive to light.   Horizontal nystagmus   Neck: Normal range of motion. Neck supple.   Cardiovascular: Normal rate, regular rhythm and intact distal pulses.   Pulmonary/Chest: Breath sounds normal. No respiratory distress. He has no wheezes. He has no rales.   Musculoskeletal: Normal range of motion. He exhibits no edema.   Neurological: GCS eye subscore is 4. GCS verbal subscore is 5. GCS motor subscore is 6.   AAOx4. CN II-XII were intact. Good finger-to-nose task ability.  Negative pronator drift. Strength 5/5 in all extremities.    Skin: Skin is warm and dry. No rash noted.   Psychiatric: He has a normal mood and affect. His behavior is normal. Judgment and thought content normal.         ED Course   Procedures  Labs Reviewed - No data to display       Imaging Results    None          Medical Decision Making:   Initial Assessment:   Urgent evaluation of a 39 y.o. male presenting to the emergency department complaining of of an intractable  migraine x3 days. Patient is afebrile, nontoxic appearing and hemodynamically stable.  There is no focal weakness, numbness, meningismus, or other focal neurologic deficit. There is no history of trauma, fevers, elevated blood pressure to suggest meningitis, subarachnoid hemorrhage, TIA, stroke, mass, or hypertensive urgency. I do not feel a CT of the brain or blood work are necessary at this time.     ED Management:  The patient's headache is similar to those in the past and symptoms improved with interventions here in the ED.  Patient has neurology to follow up with.  He had no other complaints today and was stable at discharge.                                 Clinical Impression:     1. Other migraine without status migrainosus, not intractable                               Nav Domínguez PA-C  05/27/20 1200

## 2020-07-22 ENCOUNTER — OFFICE VISIT (OUTPATIENT)
Dept: PAIN MEDICINE | Facility: CLINIC | Age: 40
End: 2020-07-22
Payer: MEDICARE

## 2020-07-22 DIAGNOSIS — R41.840 ATTENTION OR CONCENTRATION DEFICIT: ICD-10-CM

## 2020-07-22 DIAGNOSIS — G25.9 FUNCTIONAL MOVEMENT DISORDER: ICD-10-CM

## 2020-07-22 DIAGNOSIS — G89.4 CHRONIC PAIN DISORDER: Primary | ICD-10-CM

## 2020-07-22 DIAGNOSIS — G89.4 CHRONIC PAIN SYNDROME: ICD-10-CM

## 2020-07-22 PROCEDURE — 99213 PR OFFICE/OUTPT VISIT, EST, LEVL III, 20-29 MIN: ICD-10-PCS | Mod: 95,,, | Performed by: ANESTHESIOLOGY

## 2020-07-22 PROCEDURE — 99213 OFFICE O/P EST LOW 20 MIN: CPT | Mod: 95,,, | Performed by: ANESTHESIOLOGY

## 2020-07-23 ENCOUNTER — TELEPHONE (OUTPATIENT)
Dept: PAIN MEDICINE | Facility: OTHER | Age: 40
End: 2020-07-23
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
## 2020-07-30 ENCOUNTER — TELEPHONE (OUTPATIENT)
Dept: NEUROLOGY | Facility: CLINIC | Age: 40
End: 2020-07-30

## 2020-07-30 NOTE — TELEPHONE ENCOUNTER
----- Message from Sary Guerrero MA sent at 7/29/2020  5:02 PM CDT -----  Contact: [employer] ( [facility] ) @[phone]    ----- Message -----  From: Marvin Harrell  Sent: 7/29/2020   3:29 PM CDT  To: [facility] Staff    Caller calling to schedule a NP appt to consult on movement disorder, (consent shoulder jerk, feet curling, numbness and tingle of of feet and legs ) pls call

## 2020-08-03 ENCOUNTER — TELEPHONE (OUTPATIENT)
Dept: PAIN MEDICINE | Facility: OTHER | Age: 40
End: 2020-08-03

## 2020-08-18 ENCOUNTER — INITIAL CONSULT (OUTPATIENT)
Dept: PAIN MEDICINE | Facility: OTHER | Age: 40
End: 2020-08-18
Attending: ANESTHESIOLOGY
Payer: MEDICARE

## 2020-08-18 DIAGNOSIS — G89.4 CHRONIC PAIN DISORDER: ICD-10-CM

## 2020-08-18 DIAGNOSIS — Z74.09 IMPAIRED FUNCTIONAL MOBILITY, BALANCE, GAIT, AND ENDURANCE: ICD-10-CM

## 2020-08-18 DIAGNOSIS — F32.A DEPRESSION, UNSPECIFIED DEPRESSION TYPE: ICD-10-CM

## 2020-08-18 DIAGNOSIS — R29.6 FREQUENT FALLS: ICD-10-CM

## 2020-08-18 DIAGNOSIS — G25.9 FUNCTIONAL MOVEMENT DISORDER: Primary | ICD-10-CM

## 2020-08-18 DIAGNOSIS — G43.009 MIGRAINE WITHOUT AURA AND WITHOUT STATUS MIGRAINOSUS, NOT INTRACTABLE: ICD-10-CM

## 2020-08-18 DIAGNOSIS — Z78.9 IMPAIRED INSTRUMENTAL ACTIVITIES OF DAILY LIVING (IADL): ICD-10-CM

## 2020-08-18 DIAGNOSIS — G25.3 MYOCLONUS: ICD-10-CM

## 2020-08-18 DIAGNOSIS — Z86.73 HISTORY OF CVA (CEREBROVASCULAR ACCIDENT): ICD-10-CM

## 2020-08-18 DIAGNOSIS — Z78.9 DECREASED ACTIVITIES OF DAILY LIVING (ADL): ICD-10-CM

## 2020-08-18 DIAGNOSIS — R52 PAIN AGGRAVATED BY ACTIVITIES OF DAILY LIVING: ICD-10-CM

## 2020-08-18 PROCEDURE — 99215 PR OFFICE/OUTPT VISIT, EST, LEVL V, 40-54 MIN: ICD-10-PCS | Mod: ,,, | Performed by: NURSE PRACTITIONER

## 2020-08-18 PROCEDURE — 99215 OFFICE O/P EST HI 40 MIN: CPT | Mod: ,,, | Performed by: NURSE PRACTITIONER

## 2020-08-18 NOTE — LETTER
August 18, 2020      Larry Brasher MD  [address]  [address]  [facility] [address]           [id number]  [address], [address]  [facility] [address]  Phone: [phone]  Fax: [phone]          Patient: Gordon Griffin III   MR Number: [id number]   YOB: 1980   Date of Visit: 8/18/2020       Dear Dr. Larry Brasher:    Thank you for referring Gordon Griffin to me for evaluation. Attached you will find relevant portions of my assessment and plan of care.    If you have questions, please do not hesitate to call me. I look forward to following Gordon Griffin along with you.    Sincerely,    Magdalena Ruiz, CHARLES    Enclosure  CC:  No Recipients    If you would like to receive this communication electronically, please contact [email or web address] or [phone] to request more information on [employer] Link access.    For providers and/or their staff who would like to refer a patient to [facility], please contact us through our one-stop-shop provider referral line, [facility], at [phone].    If you feel you have received this communication in error or would no longer like to receive these types of communications, please e-mail [email or web address]
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
## 2020-09-04 ENCOUNTER — CLINICAL SUPPORT (OUTPATIENT)
Dept: REHABILITATION | Facility: OTHER | Age: 40
End: 2020-09-04
Payer: MEDICARE

## 2020-09-04 DIAGNOSIS — Z78.9 IMPAIRED INSTRUMENTAL ACTIVITIES OF DAILY LIVING (IADL): ICD-10-CM

## 2020-09-04 DIAGNOSIS — Z78.9 ALTERATION IN INSTRUMENTAL ACTIVITIES OF DAILY LIVING (IADL): ICD-10-CM

## 2020-09-04 DIAGNOSIS — G25.9 FUNCTIONAL MOVEMENT DISORDER: ICD-10-CM

## 2020-09-04 DIAGNOSIS — Z74.09 IMPAIRED FUNCTIONAL MOBILITY AND ACTIVITY TOLERANCE: ICD-10-CM

## 2020-09-04 DIAGNOSIS — Z78.9 DECREASED INDEPENDENCE WITH ACTIVITIES OF DAILY LIVING: ICD-10-CM

## 2020-09-04 DIAGNOSIS — G89.4 CHRONIC PAIN DISORDER: ICD-10-CM

## 2020-09-04 PROCEDURE — 97530 THERAPEUTIC ACTIVITIES: CPT

## 2020-09-04 PROCEDURE — 97166 OT EVAL MOD COMPLEX 45 MIN: CPT
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
## 2020-09-08 ENCOUNTER — HOSPITAL ENCOUNTER (EMERGENCY)
Facility: OTHER | Age: 40
Discharge: HOME | End: 2020-09-08
Attending: EMERGENCY MEDICINE
Payer: COMMERCIAL

## 2020-09-08 VITALS
SYSTOLIC BLOOD PRESSURE: 130 MMHG | HEART RATE: 84 BPM | HEIGHT: 72 IN | DIASTOLIC BLOOD PRESSURE: 82 MMHG | WEIGHT: 160 LBS | OXYGEN SATURATION: 96 % | RESPIRATION RATE: 18 BRPM | BODY MASS INDEX: 21.67 KG/M2 | TEMPERATURE: 98 F

## 2020-09-08 DIAGNOSIS — G43.809 OTHER MIGRAINE WITHOUT STATUS MIGRAINOSUS, NOT INTRACTABLE: Primary | ICD-10-CM

## 2020-09-08 PROCEDURE — 96372 THER/PROPH/DIAG INJ SC/IM: CPT | Mod: 59

## 2020-09-08 PROCEDURE — 96375 TX/PRO/DX INJ NEW DRUG ADDON: CPT

## 2020-09-08 PROCEDURE — 63600175 PHARM REV CODE 636 W HCPCS: Performed by: EMERGENCY MEDICINE

## 2020-09-08 PROCEDURE — 99284 EMERGENCY DEPT VISIT MOD MDM: CPT | Mod: 25

## 2020-09-08 PROCEDURE — 96374 THER/PROPH/DIAG INJ IV PUSH: CPT

## 2020-09-08 RX ORDER — PROCHLORPERAZINE EDISYLATE 5 MG/ML
10 INJECTION INTRAMUSCULAR; INTRAVENOUS
Status: COMPLETED | OUTPATIENT
Start: 2020-09-08 | End: 2020-09-08

## 2020-09-08 RX ORDER — DIPHENHYDRAMINE HYDROCHLORIDE 50 MG/ML
25 INJECTION INTRAMUSCULAR; INTRAVENOUS
Status: COMPLETED | OUTPATIENT
Start: 2020-09-08 | End: 2020-09-08

## 2020-09-08 RX ORDER — KETOROLAC TROMETHAMINE 30 MG/ML
30 INJECTION, SOLUTION INTRAMUSCULAR; INTRAVENOUS
Status: COMPLETED | OUTPATIENT
Start: 2020-09-08 | End: 2020-09-08

## 2020-09-08 RX ADMIN — KETOROLAC TROMETHAMINE 30 MG: 30 INJECTION, SOLUTION INTRAMUSCULAR at 11:09

## 2020-09-08 RX ADMIN — DIPHENHYDRAMINE HYDROCHLORIDE 25 MG: 50 INJECTION, SOLUTION INTRAMUSCULAR; INTRAVENOUS at 11:09

## 2020-09-08 RX ADMIN — PROCHLORPERAZINE EDISYLATE 10 MG: 5 INJECTION INTRAMUSCULAR; INTRAVENOUS at 11:09

## 2020-09-08 NOTE — ED PROVIDER NOTES
Encounter Date: 9/8/2020    SCRIBE #1 NOTE: Ilene ABDULLAHI, am scribing for, and in the presence of, Dr. Vences.       History     Chief Complaint   Patient presents with    Migraine     Reports migraine x 3 days, hx of migraines, not relieved by prescribed meds     Time seen by provider: 11:20 AM    This is a 40 y.o. patient with a history of migraine headaches and myoclonus who presents with complaint of persistent migraine with associated photophobia and nausea for the past 3 days.  The patient states that this migraine is typical of previous migraines, though reports no relief with Fioricet, Excedrin, Compazine, and Toradol which have worked in the past. Last dose of medication was approximately 11 hours ago.  The patient has found relief with IM medications in the hospital during previous episodes.  The patient recently paused hormone therapy due to myoclonus. Migraines had been occurring prior to diagnosis with myoclonus 2 years ago.  They deny additional complaints at this time.    The history is provided by the patient.     Review of patient's allergies indicates:   Allergen Reactions    Mustard Itching, Nausea And Vomiting, Shortness Of Breath and Swelling    Mushroom Itching, Nausea And Vomiting and Swelling    Niaspan extended-release [niacin] Itching    Olive extract Itching, Nausea And Vomiting and Swelling    Oyster extract     Extendryl [[id number]-swyrisbuke] Rash    V-cillin k Rash     Past Medical History:   Diagnosis Date    Anxiety     Cancer     Chest pain 1/20/2016 12/30/2015: Began experinece chest pain.    Depression     Functional movement disorder 10/1/2019    Migraine headache     Movement disorder     Myoclonic jerkings, massive     Stroke pt. states he had a cva at 3 months old     Past Surgical History:   Procedure Laterality Date    ANGIOGRAM, CORONARY, WITH LEFT HEART CATHETERIZATION      MANDIBLE SURGERY      reconstruction    variceol repair        Family History   Problem Relation Age of Onset    Heart disease Father     Heart disease Paternal Uncle     Heart disease Mother     Myasthenia gravis Mother      Social History     Tobacco Use    Smoking status: Never Smoker    Smokeless tobacco: Never Used   Substance Use Topics    Alcohol use: No    Drug use: No     Review of Systems   Constitutional: Negative for chills and fever.   HENT: Negative for congestion, sore throat and trouble swallowing.    Eyes: Positive for photophobia.   Respiratory: Negative for cough and shortness of breath.    Cardiovascular: Negative for chest pain, palpitations and leg swelling.   Gastrointestinal: Positive for nausea. Negative for abdominal pain, diarrhea and vomiting.   Neurological: Positive for headaches. Negative for speech difficulty and light-headedness.   Psychiatric/Behavioral: Negative for confusion.   All other systems reviewed and are negative.      Physical Exam     Initial Vitals [09/08/20 1051]   BP Pulse Resp Temp SpO2   122/85 80 18 98 °F (36.7 °C) 97 %      MAP       --         Physical Exam    Nursing note and vitals reviewed.  Constitutional: He appears well-developed and well-nourished. He is not diaphoretic. No distress.   HENT:   Head: Normocephalic and atraumatic.   Right Ear: External ear normal.   Left Ear: External ear normal.   Nose: Nose normal.   Eyes: Conjunctivae and EOM are normal. Pupils are equal, round, and reactive to light.   Neck: Normal range of motion. Neck supple. No tracheal deviation present. No JVD present.   Cardiovascular: Normal rate, regular rhythm, normal heart sounds and intact distal pulses. Exam reveals no gallop and no friction rub.    No murmur heard.  Pulmonary/Chest: Breath sounds normal. No respiratory distress. He has no wheezes. He has no rhonchi. He has no rales. He exhibits no tenderness.   Musculoskeletal: Normal range of motion. No tenderness or edema.   Neurological: He is alert and oriented to  person, place, and time. He has normal strength. He displays normal reflexes. No cranial nerve deficit or sensory deficit.   Skin: Skin is warm and dry. Capillary refill takes less than 2 seconds. No rash noted. No erythema.   Psychiatric: He has a normal mood and affect. Thought content normal.         ED Course   Procedures  Labs Reviewed - No data to display          Medical Decision Making:   History:   Old Medical Records: I decided to obtain old medical records.  Differential Diagnosis:   Migraine, tension headache, sinus headache, cluster headache, trigeminal neuralgia, ICH, TIA, seizure, meningitis, cerebral venous sinus thrombosis, spontaneous cerebrospinal fluid leak, Idiopathic intracranial hypertension, Giant cell arteritis, Arteriovenous malformation, Brain tumor, Chronic meningitis, Chronic subdural hematoma, Dissection of carotid or vertebral artery, Dural arteriovenous fistula, Leptomeningeal metastasis, Post-meningitis headache, Post-traumatic headache, Sphenoid sinusitis, Subarachnoid hemorrhage    ED Management:  After taking into careful account the patient's historical factors, physical exam findings, empirical and objective data obtained from ED workup, the patient appears to be low risk for an emergent medical condition. I feel it is safe and appropriate at this time for the patient to be discharged for follow up and re-evaluation as detailed in the discharge instructions. The patient improved with treatment in the ED and the patient/guardian is comfortable going home. I have discussed the specifics of the workup with the patient/guardian and the patient/guardian has verbalized understanding of the details of the workup, the diagnosis, the treatment plan, and the need for outpatient follow-up.  Although the patient has no emergent etiology today this does not preclude the development of an emergent condition after discharge.  I educated the patient/guardian on the warning signs and symptoms  for which they must seek immediate medical attention. I have advised the patient/guardian that they can return to the ED and/or activate EMS at any time with worsening of their symptoms, change of their symptoms, or with any other medical complaints.  Patient's/guardian understands the ED visit today was primarily to address immediate concerns and to rule out emergent causes of the symptoms. They also understand that these symptoms may require further workup and evaluation as an outpatient. I emphasized the importance of followup.  All questions addressed and patient/guardian were given discharge instructions and followup information.               Scribe Attestation:   Scribe #1: I performed the above scribed service and the documentation accurately describes the services I performed. I attest to the accuracy of the note.    Attending Attestation:           Physician Attestation for Scribe:  Physician Attestation Statement for Scribe #1: I, Dr. Vences, reviewed documentation, as scribed by Ilene Tracy in my presence, and it is both accurate and complete.                 ED Course as of Sep 09 0707   Tue Sep 08, 2020   1303 Headache improved, requesting discharge    [MA]      ED Course User Index  [MA] Giovani Vences MD                Clinical Impression:     1. Other migraine without status migrainosus, not intractable                ED Disposition Condition    Discharge Stable        ED Prescriptions     None        Follow-up Information     Follow up With Specialties Details Why Contact Info    Magdalena Cohn MD Pediatrics Schedule an appointment as soon as possible for a visit in 3 days For follow-up and re-evaluation [address] [place] [address]  [facility]  [facility] [id number] [phone]      [facility]  As needed, for any new or worsening symptoms [address] [facility] [address] [phone]                                      Giovani Vences MD  09/09/20 0707

## 2020-09-08 NOTE — ED NOTES
Patient presents to the ED with c/o migraine that started x 3 days ago.  Patient has a hx of migraines and takes medications to help, but nothing has helped.  Patient states that he has been having some N, denies V.  Patient has no neuro deficits at this time.  Patient is AOx4, respirations even, unlabored.  Patient's  equal bilaterally, gait steady, pupils PERRLA.

## 2020-09-08 NOTE — ED NOTES
Patient resting in bed, states that pain has decreased and is feeling better.  Patient's respirations even, unlabored.  Skin p/w/d.

## 2020-09-18 ENCOUNTER — TELEPHONE (OUTPATIENT)
Dept: NEUROLOGY | Facility: CLINIC | Age: 40
End: 2020-09-18

## 2020-09-18 NOTE — TELEPHONE ENCOUNTER
----- Message from Hao Nicholson sent at 9/18/2020  8:59 AM CDT -----  Contact: pt @ [phone]  Pt states he'll be 10-15 mins late for appt that starts at 9:20 AM

## 2020-09-23 DIAGNOSIS — G89.4 CHRONIC PAIN DISORDER: ICD-10-CM

## 2020-09-23 DIAGNOSIS — Z74.09 IMPAIRED FUNCTIONAL MOBILITY, BALANCE, GAIT, AND ENDURANCE: Primary | ICD-10-CM

## 2020-09-27 ENCOUNTER — PATIENT MESSAGE (OUTPATIENT)
Dept: RESEARCH | Facility: OTHER | Age: 40
End: 2020-09-27

## 2020-10-01 ENCOUNTER — LAB VISIT (OUTPATIENT)
Dept: LAB | Facility: HOSPITAL | Age: 40
End: 2020-10-01
Attending: PSYCHIATRY & NEUROLOGY
Payer: MEDICARE

## 2020-10-01 DIAGNOSIS — E55.9 AVITAMINOSIS D: ICD-10-CM

## 2020-10-01 DIAGNOSIS — G44.85 STABBING HEADACHE: ICD-10-CM

## 2020-10-01 DIAGNOSIS — R42 DIZZINESS AND GIDDINESS: ICD-10-CM

## 2020-10-01 DIAGNOSIS — F90.9 ATTENTION DEFICIT HYPERACTIVITY DISORDER: ICD-10-CM

## 2020-10-01 DIAGNOSIS — R40.4 TRANSIENT ALTERATION OF AWARENESS: ICD-10-CM

## 2020-10-01 DIAGNOSIS — R25.9 ABNORMAL INVOLUNTARY MOVEMENT: Primary | ICD-10-CM

## 2020-10-01 DIAGNOSIS — E53.9 VITAMIN B-COMPLEX DEFICIENCY: ICD-10-CM

## 2020-10-01 DIAGNOSIS — Z87.820 HISTORY OF TRAUMATIC BRAIN INJURY: ICD-10-CM

## 2020-10-01 DIAGNOSIS — G25.3 MYOCLONUS: ICD-10-CM

## 2020-10-01 DIAGNOSIS — G40.209 COMPLEX PARTIAL SEIZURES WITH CONSCIOUSNESS IMPAIRED: ICD-10-CM

## 2020-10-01 DIAGNOSIS — F41.8 DEPRESSION WITH ANXIETY: ICD-10-CM

## 2020-10-01 DIAGNOSIS — R41.82 ALTERED MENTAL STATUS: ICD-10-CM

## 2020-10-01 DIAGNOSIS — M54.2 CERVICALGIA: ICD-10-CM

## 2020-10-01 DIAGNOSIS — F81.9 PROBLEMS WITH LEARNING: ICD-10-CM

## 2020-10-01 DIAGNOSIS — Z86.79 PERSONAL HISTORY OF UNSPECIFIED CIRCULATORY DISEASE: ICD-10-CM

## 2020-10-01 DIAGNOSIS — Z87.898 PERSONAL HISTORY OF OTHER LYMPHATIC AND HEMATOPOIETIC NEOPLASM: ICD-10-CM

## 2020-10-01 DIAGNOSIS — F07.81 POSTCONCUSSION SYNDROME: ICD-10-CM

## 2020-10-01 DIAGNOSIS — G43.711 CHRONIC MIGRAINE WITHOUT AURA, WITH INTRACTABLE MIGRAINE, SO STATED, WITH STATUS MIGRAINOSUS: ICD-10-CM

## 2020-10-01 DIAGNOSIS — G47.9 REPETITIVE INTRUSIONS OF SLEEP: ICD-10-CM

## 2020-10-01 LAB
25(OH)D3+25(OH)D2 SERPL-MCNC: 42 NG/ML (ref 30–96)
ALBUMIN SERPL BCP-MCNC: 4.6 G/DL (ref 3.5–5.2)
ALP SERPL-CCNC: 111 U/L (ref 55–135)
ALT SERPL W/O P-5'-P-CCNC: 17 U/L (ref 10–44)
ANION GAP SERPL CALC-SCNC: 9 MMOL/L (ref 8–16)
AST SERPL-CCNC: 16 U/L (ref 10–40)
BASOPHILS # BLD AUTO: 0.08 K/UL (ref 0–0.2)
BASOPHILS NFR BLD: 1.2 % (ref 0–1.9)
BILIRUB SERPL-MCNC: 0.5 MG/DL (ref 0.1–1)
BUN SERPL-MCNC: 14 MG/DL (ref 6–20)
CALCIUM SERPL-MCNC: 9.4 MG/DL (ref 8.7–10.5)
CHLORIDE SERPL-SCNC: 102 MMOL/L (ref 95–110)
CO2 SERPL-SCNC: 28 MMOL/L (ref 23–29)
CREAT SERPL-MCNC: 1 MG/DL (ref 0.5–1.4)
DIFFERENTIAL METHOD: ABNORMAL
EOSINOPHIL # BLD AUTO: 0.2 K/UL (ref 0–0.5)
EOSINOPHIL NFR BLD: 2.9 % (ref 0–8)
ERYTHROCYTE [DISTWIDTH] IN BLOOD BY AUTOMATED COUNT: 12.8 % (ref 11.5–14.5)
EST. GFR  (AFRICAN AMERICAN): >60 ML/MIN/1.73 M^2
EST. GFR  (NON AFRICAN AMERICAN): >60 ML/MIN/1.73 M^2
FOLATE SERPL-MCNC: 11.4 NG/ML (ref 4–24)
GLUCOSE SERPL-MCNC: 101 MG/DL (ref 70–110)
HCT VFR BLD AUTO: 47.6 % (ref 40–54)
HGB BLD-MCNC: 16.2 G/DL (ref 14–18)
IMM GRANULOCYTES # BLD AUTO: 0.05 K/UL (ref 0–0.04)
IMM GRANULOCYTES NFR BLD AUTO: 0.8 % (ref 0–0.5)
LYMPHOCYTES # BLD AUTO: 1.8 K/UL (ref 1–4.8)
LYMPHOCYTES NFR BLD: 27 % (ref 18–48)
MCH RBC QN AUTO: 28.4 PG (ref 27–31)
MCHC RBC AUTO-ENTMCNC: 34 G/DL (ref 32–36)
MCV RBC AUTO: 84 FL (ref 82–98)
MONOCYTES # BLD AUTO: 0.6 K/UL (ref 0.3–1)
MONOCYTES NFR BLD: 8.8 % (ref 4–15)
NEUTROPHILS # BLD AUTO: 3.9 K/UL (ref 1.8–7.7)
NEUTROPHILS NFR BLD: 59.3 % (ref 38–73)
NRBC BLD-RTO: 0 /100 WBC
PLATELET # BLD AUTO: 102 K/UL (ref 150–350)
PMV BLD AUTO: 11.1 FL (ref 9.2–12.9)
POTASSIUM SERPL-SCNC: 3.4 MMOL/L (ref 3.5–5.1)
PROT SERPL-MCNC: 7.8 G/DL (ref 6–8.4)
RBC # BLD AUTO: 5.7 M/UL (ref 4.6–6.2)
SODIUM SERPL-SCNC: 139 MMOL/L (ref 136–145)
T4 FREE SERPL-MCNC: 1.1 NG/DL (ref 0.71–1.51)
TSH SERPL DL<=0.005 MIU/L-ACNC: 1.28 UIU/ML (ref 0.4–4)
VIT B12 SERPL-MCNC: 1301 PG/ML (ref 210–950)
WBC # BLD AUTO: 6.59 K/UL (ref 3.9–12.7)

## 2020-10-01 PROCEDURE — 86803 HEPATITIS C AB TEST: CPT

## 2020-10-01 PROCEDURE — 84443 ASSAY THYROID STIM HORMONE: CPT

## 2020-10-01 PROCEDURE — 83516 IMMUNOASSAY NONANTIBODY: CPT | Mod: 59

## 2020-10-01 PROCEDURE — 84446 ASSAY OF VITAMIN E: CPT

## 2020-10-01 PROCEDURE — 80053 COMPREHEN METABOLIC PANEL: CPT

## 2020-10-01 PROCEDURE — 86255 FLUORESCENT ANTIBODY SCREEN: CPT | Mod: 59

## 2020-10-01 PROCEDURE — 82607 VITAMIN B-12: CPT

## 2020-10-01 PROCEDURE — 84439 ASSAY OF FREE THYROXINE: CPT

## 2020-10-01 PROCEDURE — 85598 HEXAGNAL PHOSPH PLTLT NEUTRL: CPT

## 2020-10-01 PROCEDURE — 86038 ANTINUCLEAR ANTIBODIES: CPT

## 2020-10-01 PROCEDURE — 85613 RUSSELL VIPER VENOM DILUTED: CPT

## 2020-10-01 PROCEDURE — 82746 ASSAY OF FOLIC ACID SERUM: CPT

## 2020-10-01 PROCEDURE — 82306 VITAMIN D 25 HYDROXY: CPT

## 2020-10-01 PROCEDURE — 85025 COMPLETE CBC W/AUTO DIFF WBC: CPT

## 2020-10-01 PROCEDURE — 86147 CARDIOLIPIN ANTIBODY EA IG: CPT

## 2020-10-02 LAB
ANA SER QL IF: NORMAL
HCV AB SERPL QL IA: NEGATIVE

## 2020-10-05 LAB
CARDIOLIPIN IGG SER IA-ACNC: <9.4 GPL (ref 0–14.99)
CARDIOLIPIN IGM SER IA-ACNC: 15 MPL (ref 0–12.49)
GLIADIN PEPTIDE IGA SER-ACNC: 7 UNITS
GLIADIN PEPTIDE IGG SER-ACNC: 4 UNITS
IGA SERPL-MCNC: 115 MG/DL (ref 70–400)
TTG IGA SER-ACNC: 4 UNITS
TTG IGG SER-ACNC: 3 UNITS

## 2020-10-06 LAB — A-TOCOPHEROL VIT E SERPL-MCNC: 1150 UG/DL (ref 500–1800)

## 2020-10-08 LAB
AMPA-R AB CBA, SERUM: NEGATIVE
AMPHIPHYSIN AB TITR SER: NEGATIVE TITER
CASPR2-IGG CBA: NEGATIVE
CV2 IGG TITR SER: NEGATIVE TITER
DPPIS AB IFA, SERUM: NEGATIVE
GABA-B-R AB CBA, SERUM: NEGATIVE
GAD65 AB SER-SCNC: 0 NMOL/L
GLIAL NUC TYPE 1 AB TITR SER: NEGATIVE TITER
HU1 AB TITR SER: NEGATIVE TITER
HU2 AB TITR SER IF: NEGATIVE TITER
HU3 AB TITR SER: NEGATIVE TITER
IGLON5 IFA, S: NEGATIVE
IMMUNOLOGIST REVIEW: NORMAL
NACHR AB SER-SCNC: 0 NMOL/L
NIF IFA, S: NEGATIVE
NMDA-R AB CBA, SERUM: NEGATIVE
PAVAL REFLEX TEST ADDED: NORMAL
PCA-1 AB TITR SER: NEGATIVE TITER
PCA-2 AB TITR SER: NEGATIVE TITER
PCA-TR AB TITR SER: NEGATIVE TITER
VGCC-N BIND AB SER-SCNC: 0 NMOL/L
VGCC-P/Q BIND AB SER-SCNC: 0 NMOL/L

## 2020-10-09 ENCOUNTER — HOSPITAL ENCOUNTER (OUTPATIENT)
Dept: RADIOLOGY | Facility: HOSPITAL | Age: 40
Discharge: HOME | End: 2020-10-09
Attending: PHYSICIAN ASSISTANT
Payer: MEDICARE

## 2020-10-09 ENCOUNTER — OFFICE VISIT (OUTPATIENT)
Dept: ORTHOPEDICS | Facility: CLINIC | Age: 40
End: 2020-10-09
Payer: MEDICARE

## 2020-10-09 VITALS — WEIGHT: 162.06 LBS | HEIGHT: 72 IN | BODY MASS INDEX: 21.95 KG/M2

## 2020-10-09 DIAGNOSIS — M79.671 RIGHT FOOT PAIN: ICD-10-CM

## 2020-10-09 DIAGNOSIS — M79.604 RIGHT LEG PAIN: ICD-10-CM

## 2020-10-09 DIAGNOSIS — M79.604 RIGHT LEG PAIN: Primary | ICD-10-CM

## 2020-10-09 DIAGNOSIS — M25.571 PAIN IN JOINT INVOLVING RIGHT ANKLE AND FOOT: ICD-10-CM

## 2020-10-09 LAB — APTT HEX PL PPP: NEGATIVE S

## 2020-10-09 PROCEDURE — 73590 XR TIBIA FIBULA 2 VIEW RIGHT: ICD-10-PCS | Mod: 26,RT,, | Performed by: RADIOLOGY

## 2020-10-09 PROCEDURE — 73630 XR FOOT COMPLETE 3 VIEW RIGHT: ICD-10-PCS | Mod: 26,RT,, | Performed by: RADIOLOGY

## 2020-10-09 PROCEDURE — 73630 X-RAY EXAM OF FOOT: CPT | Mod: 26,RT,, | Performed by: RADIOLOGY

## 2020-10-09 PROCEDURE — 99999 PR PBB SHADOW E&M-EST. PATIENT-LVL IV: CPT | Mod: PBBFAC,,, | Performed by: PHYSICIAN ASSISTANT

## 2020-10-09 PROCEDURE — 3008F PR BODY MASS INDEX (BMI) DOCUMENTED: ICD-10-PCS | Mod: CPTII,S$GLB,, | Performed by: PHYSICIAN ASSISTANT

## 2020-10-09 PROCEDURE — 99214 PR OFFICE/OUTPT VISIT, EST, LEVL IV, 30-39 MIN: ICD-10-PCS | Mod: S$GLB,,, | Performed by: PHYSICIAN ASSISTANT

## 2020-10-09 PROCEDURE — 99214 OFFICE O/P EST MOD 30 MIN: CPT | Mod: S$GLB,,, | Performed by: PHYSICIAN ASSISTANT

## 2020-10-09 PROCEDURE — 73630 X-RAY EXAM OF FOOT: CPT | Mod: TC,RT

## 2020-10-09 PROCEDURE — 73590 X-RAY EXAM OF LOWER LEG: CPT | Mod: 26,RT,, | Performed by: RADIOLOGY

## 2020-10-09 PROCEDURE — 3008F BODY MASS INDEX DOCD: CPT | Mod: CPTII,S$GLB,, | Performed by: PHYSICIAN ASSISTANT

## 2020-10-09 PROCEDURE — 99999 PR PBB SHADOW E&M-EST. PATIENT-LVL IV: ICD-10-PCS | Mod: PBBFAC,,, | Performed by: PHYSICIAN ASSISTANT

## 2020-10-09 PROCEDURE — 73590 X-RAY EXAM OF LOWER LEG: CPT | Mod: TC,RT
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
## 2020-10-13 ENCOUNTER — HOSPITAL ENCOUNTER (OUTPATIENT)
Dept: RADIOLOGY | Facility: HOSPITAL | Age: 40
Discharge: HOME | End: 2020-10-13
Attending: PHYSICIAN ASSISTANT
Payer: MEDICARE

## 2020-10-13 DIAGNOSIS — M25.571 PAIN IN JOINT INVOLVING RIGHT ANKLE AND FOOT: ICD-10-CM

## 2020-10-13 PROCEDURE — 73721 MRI JNT OF LWR EXTRE W/O DYE: CPT | Mod: TC,RT

## 2020-10-13 PROCEDURE — 73721 MRI ANKLE WITHOUT CONTRAST RIGHT: ICD-10-PCS | Mod: 26,RT,, | Performed by: RADIOLOGY

## 2020-10-13 PROCEDURE — 73721 MRI JNT OF LWR EXTRE W/O DYE: CPT | Mod: 26,RT,, | Performed by: RADIOLOGY

## 2020-10-14 ENCOUNTER — TELEPHONE (OUTPATIENT)
Dept: ORTHOPEDICS | Facility: CLINIC | Age: 40
End: 2020-10-14

## 2020-10-14 LAB — LA PPP-IMP: NORMAL

## 2020-10-14 NOTE — TELEPHONE ENCOUNTER
Called patient to discuss MRI findings.  Will continue conservative treatment as discussed at previous clinic visit.  Recommend rehab and continued follow up with neurology.

## 2020-11-02 ENCOUNTER — TELEPHONE (OUTPATIENT)
Dept: NEUROLOGY | Facility: CLINIC | Age: 40
End: 2020-11-02

## 2020-11-02 NOTE — TELEPHONE ENCOUNTER
"Spoke with Galina Sharri, he was informed there are some available appts on 11/3/2020 Tuesday morning. Mr. Harrell states "he has a lot going on tomorrow, will not be able to make the appt, keep for scheduled day".    "

## 2020-11-03 ENCOUNTER — CLINICAL SUPPORT (OUTPATIENT)
Dept: REHABILITATION | Facility: OTHER | Age: 40
End: 2020-11-03
Payer: MEDICARE

## 2020-11-03 DIAGNOSIS — Z74.09 IMPAIRED FUNCTIONAL MOBILITY, BALANCE, GAIT, AND ENDURANCE: ICD-10-CM

## 2020-11-03 DIAGNOSIS — G89.4 CHRONIC PAIN DISORDER: ICD-10-CM

## 2020-11-03 PROCEDURE — 97110 THERAPEUTIC EXERCISES: CPT

## 2020-11-03 PROCEDURE — 97163 PT EVAL HIGH COMPLEX 45 MIN: CPT
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
## 2020-11-04 ENCOUNTER — OFFICE VISIT (OUTPATIENT)
Dept: FAMILY MEDICINE | Facility: CLINIC | Age: 40
End: 2020-11-04
Payer: MEDICARE

## 2020-11-04 VITALS
DIASTOLIC BLOOD PRESSURE: 84 MMHG | OXYGEN SATURATION: 98 % | SYSTOLIC BLOOD PRESSURE: 118 MMHG | BODY MASS INDEX: 22.52 KG/M2 | HEIGHT: 72 IN | HEART RATE: 77 BPM | TEMPERATURE: 98 F | WEIGHT: 166.25 LBS

## 2020-11-04 DIAGNOSIS — E78.00 PURE HYPERCHOLESTEROLEMIA: ICD-10-CM

## 2020-11-04 DIAGNOSIS — I25.10 CORONARY ARTERY DISEASE INVOLVING NATIVE CORONARY ARTERY OF NATIVE HEART WITHOUT ANGINA PECTORIS: ICD-10-CM

## 2020-11-04 DIAGNOSIS — G89.4 CHRONIC PAIN SYNDROME: ICD-10-CM

## 2020-11-04 DIAGNOSIS — G43.809 OTHER MIGRAINE, NOT INTRACTABLE, WITHOUT STATUS MIGRAINOSUS: ICD-10-CM

## 2020-11-04 DIAGNOSIS — Z00.00 ROUTINE HEALTH MAINTENANCE: Primary | ICD-10-CM

## 2020-11-04 PROCEDURE — 99204 OFFICE O/P NEW MOD 45 MIN: CPT | Mod: S$GLB,,, | Performed by: INTERNAL MEDICINE

## 2020-11-04 PROCEDURE — 3008F PR BODY MASS INDEX (BMI) DOCUMENTED: ICD-10-PCS | Mod: CPTII,S$GLB,, | Performed by: INTERNAL MEDICINE

## 2020-11-04 PROCEDURE — 3008F BODY MASS INDEX DOCD: CPT | Mod: CPTII,S$GLB,, | Performed by: INTERNAL MEDICINE

## 2020-11-04 PROCEDURE — 99999 PR PBB SHADOW E&M-EST. PATIENT-LVL IV: ICD-10-PCS | Mod: PBBFAC,,, | Performed by: INTERNAL MEDICINE

## 2020-11-04 PROCEDURE — 99999 PR PBB SHADOW E&M-EST. PATIENT-LVL IV: CPT | Mod: PBBFAC,,, | Performed by: INTERNAL MEDICINE

## 2020-11-04 PROCEDURE — 99204 PR OFFICE/OUTPT VISIT, NEW, LEVL IV, 45-59 MIN: ICD-10-PCS | Mod: S$GLB,,, | Performed by: INTERNAL MEDICINE

## 2020-11-04 RX ORDER — METOCLOPRAMIDE 10 MG/1
10 TABLET ORAL
COMMUNITY

## 2020-11-04 RX ORDER — HYDROXYZINE PAMOATE 50 MG/1
50-100 CAPSULE ORAL NIGHTLY PRN
COMMUNITY

## 2020-11-04 RX ORDER — KETOROLAC TROMETHAMINE 10 MG/1
TABLET, FILM COATED ORAL
COMMUNITY
Start: 2012-12-16

## 2020-11-04 RX ORDER — METHOCARBAMOL 500 MG/1
TABLET, FILM COATED ORAL
COMMUNITY
End: 2023-04-06

## 2020-11-04 RX ORDER — VERAPAMIL HYDROCHLORIDE 240 MG/1
240 CAPSULE, EXTENDED RELEASE ORAL DAILY
COMMUNITY
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
## 2020-11-06 ENCOUNTER — HOSPITAL ENCOUNTER (EMERGENCY)
Facility: OTHER | Age: 40
Discharge: HOME | End: 2020-11-06
Attending: EMERGENCY MEDICINE
Payer: MEDICARE

## 2020-11-06 VITALS
OXYGEN SATURATION: 98 % | RESPIRATION RATE: 18 BRPM | WEIGHT: 160 LBS | BODY MASS INDEX: 21.67 KG/M2 | HEIGHT: 72 IN | DIASTOLIC BLOOD PRESSURE: 77 MMHG | TEMPERATURE: 98 F | SYSTOLIC BLOOD PRESSURE: 123 MMHG | HEART RATE: 102 BPM

## 2020-11-06 DIAGNOSIS — G43.901 MIGRAINE WITH STATUS MIGRAINOSUS, NOT INTRACTABLE, UNSPECIFIED MIGRAINE TYPE: Primary | ICD-10-CM

## 2020-11-06 PROCEDURE — 99284 EMERGENCY DEPT VISIT MOD MDM: CPT | Mod: 25

## 2020-11-06 PROCEDURE — 96372 THER/PROPH/DIAG INJ SC/IM: CPT

## 2020-11-06 PROCEDURE — 63600175 PHARM REV CODE 636 W HCPCS: Performed by: EMERGENCY MEDICINE

## 2020-11-06 RX ORDER — KETOROLAC TROMETHAMINE 30 MG/ML
15 INJECTION, SOLUTION INTRAMUSCULAR; INTRAVENOUS
Status: COMPLETED | OUTPATIENT
Start: 2020-11-06 | End: 2020-11-06

## 2020-11-06 RX ORDER — PROCHLORPERAZINE EDISYLATE 5 MG/ML
10 INJECTION INTRAMUSCULAR; INTRAVENOUS
Status: COMPLETED | OUTPATIENT
Start: 2020-11-06 | End: 2020-11-06

## 2020-11-06 RX ORDER — DIPHENHYDRAMINE HYDROCHLORIDE 50 MG/ML
50 INJECTION INTRAMUSCULAR; INTRAVENOUS
Status: COMPLETED | OUTPATIENT
Start: 2020-11-06 | End: 2020-11-06

## 2020-11-06 RX ADMIN — KETOROLAC TROMETHAMINE 15 MG: 30 INJECTION, SOLUTION INTRAMUSCULAR; INTRAVENOUS at 02:11

## 2020-11-06 RX ADMIN — PROCHLORPERAZINE EDISYLATE 10 MG: 5 INJECTION INTRAMUSCULAR; INTRAVENOUS at 02:11

## 2020-11-06 RX ADMIN — DIPHENHYDRAMINE HYDROCHLORIDE 50 MG: 50 INJECTION, SOLUTION INTRAMUSCULAR; INTRAVENOUS at 02:11

## 2020-11-06 NOTE — ED PROVIDER NOTES
Encounter Date: 11/6/2020    SCRIBE #1 NOTE: I, Ilene Tracy, am scribing for, and in the presence of, Dr. Rosenthal.       History     Chief Complaint   Patient presents with    Migraine     x 3 days.  hx of migraines.  Patient denies n/v at this time     Time seen by provider: 2:07 PM    This is a 40 y.o. individual with a history of migraines and myoclonus who presents due to headache for the past 3 days. They state that this headache is characteristic of previous migraine headaches. They deny any relief with home medications including Fioricet and butorphanol. Their last migraine was several months ago. They deny additional complaints at this time.    The history is provided by the patient.     Review of patient's allergies indicates:   Allergen Reactions    Mustard Itching, Nausea And Vomiting, Shortness Of Breath and Swelling    Mushroom Itching, Nausea And Vomiting and Swelling    Niaspan extended-release [niacin] Itching    Olive extract Itching, Nausea And Vomiting and Swelling    Oyster extract     Extendryl [[id number]-qn-[id number]] Rash    V-cillin k Rash     Past Medical History:   Diagnosis Date    Anxiety     Cancer     Chest pain 1/20/2016 12/30/2015: Began experinece chest pain.    Depression     Functional movement disorder 10/1/2019    Migraine headache     Movement disorder     Myoclonic jerkings, massive     Stroke pt. states he had a cva at 4 months old     Past Surgical History:   Procedure Laterality Date    ANGIOGRAM, CORONARY, WITH LEFT HEART CATHETERIZATION      MANDIBLE SURGERY      reconstruction    variceol repair       Family History   Problem Relation Age of Onset    Heart disease Father     Hypertension Father     Hyperlipidemia Father     Heart disease Paternal Uncle     Heart disease Mother     Myasthenia gravis Mother      Social History     Tobacco Use    Smoking status: Never Smoker    Smokeless tobacco: Never Used   Substance Use Topics     Alcohol use: No    Drug use: No     Review of Systems   Constitutional: Negative for activity change, appetite change, chills, diaphoresis and fever.   HENT: Negative for congestion, sore throat and trouble swallowing.    Eyes: Negative for photophobia and visual disturbance.   Respiratory: Negative for cough, chest tightness and shortness of breath.    Cardiovascular: Negative for chest pain.   Gastrointestinal: Negative for abdominal pain, nausea and vomiting.   Musculoskeletal: Negative for back pain and neck pain.   Skin: Negative for pallor.   Neurological: Positive for headaches. Negative for weakness.   Psychiatric/Behavioral: Negative for confusion.       Physical Exam     Initial Vitals [11/06/20 1352]   BP Pulse Resp Temp SpO2   116/69 (!) 111 20 98.2 °F (36.8 °C) 97 %      MAP       --         Physical Exam    Constitutional: He appears well-developed. He is cooperative.   Non-toxic appearing.   HENT:   Head: Atraumatic.   Eyes: Conjunctivae and lids are normal.   Neck: Normal range of motion and phonation normal.   Cardiovascular: Tachycardia present.    Musculoskeletal: Normal range of motion.   Neurological: He is alert.   No meningismus.   Skin: No rash noted.   Psychiatric: He has a normal mood and affect. His speech is normal and behavior is normal.         ED Course   Procedures  Labs Reviewed - No data to display          Medical Decision Making:   History:   Old Medical Records: I decided to obtain old medical records.  Initial Assessment:   Urgent evaluation of 40-year-old gentleman here with left-sided frontal headache for the last 3 days.  Symptoms similar to prior migraines unrelieved with home medications.  Here on examination patient is nontoxic-appearing, no meningismus, no focal neuro deficits.  Do not suspect meningitis nor serious intracranial pathology at this time.  Will plan to administer Benadryl, Compazine, Toradol and reassess.  ED Management:  Patient feeling much improved  after observation, medications, desiring to go home, strict follow-up discussed with return precautions noted.            Scribe Attestation:   Scribe #1: I performed the above scribed service and the documentation accurately describes the services I performed. I attest to the accuracy of the note.    Attending Attestation:           Physician Attestation for Scribe:  Physician Attestation Statement for Scribe #1: I, Dr. Rosenthal, reviewed documentation, as scribed by Ilene Tracy in my presence, and it is both accurate and complete.                           Clinical Impression:     1. Migraine with status migrainosus, not intractable, unspecified migraine type        Disposition:   Disposition: Discharged  Condition: Fair     ED Disposition Condition    Discharge Stable        ED Prescriptions     None        Follow-up Information     Follow up With Specialties Details Why Contact Info    Magdalena Cohn MD Pediatrics Schedule an appointment as soon as possible for a visit   [address] [place] [address] [id number]  [facility]  [facility] [id number]  [phone]                                         Aline Rosenthal MD  11/06/20 1539

## 2020-11-06 NOTE — ED NOTES
Chief Complaint   Patient presents with    Migraine     x 3 days.  hx of migraines.  Patient denies n/v at this time     Patient presents to the ED with c/o migraine that patient states has been going on for 3 days.  Patient states hx of migraines.  Denies N/V, states took zofran earlier today which improved nausea.  Patient AOx4, respirations even, unlabored.  Skin p/w/d.

## 2020-11-19 ENCOUNTER — TELEPHONE (OUTPATIENT)
Dept: PAIN MEDICINE | Facility: OTHER | Age: 40
End: 2020-11-19

## 2020-11-19 DIAGNOSIS — G89.4 CHRONIC PAIN DISORDER: Primary | ICD-10-CM

## 2020-11-19 DIAGNOSIS — Z78.9 DECREASED ACTIVITIES OF DAILY LIVING (ADL): ICD-10-CM

## 2020-11-19 DIAGNOSIS — Z74.09 IMPAIRED FUNCTIONAL MOBILITY, BALANCE, GAIT, AND ENDURANCE: ICD-10-CM

## 2020-11-19 DIAGNOSIS — F32.A DEPRESSION, UNSPECIFIED DEPRESSION TYPE: ICD-10-CM

## 2020-11-19 DIAGNOSIS — G25.9 FUNCTIONAL MOVEMENT DISORDER: ICD-10-CM

## 2020-11-19 DIAGNOSIS — Z01.812 PRE-PROCEDURE LAB EXAM: Primary | ICD-10-CM

## 2020-11-25 ENCOUNTER — TELEPHONE (OUTPATIENT)
Dept: PAIN MEDICINE | Facility: OTHER | Age: 40
End: 2020-11-25

## 2020-11-27 ENCOUNTER — LAB VISIT (OUTPATIENT)
Dept: SPORTS MEDICINE | Facility: CLINIC | Age: 40
End: 2020-11-27
Payer: MEDICARE

## 2020-11-27 DIAGNOSIS — Z01.812 PRE-PROCEDURE LAB EXAM: ICD-10-CM

## 2020-11-27 PROCEDURE — U0003 INFECTIOUS AGENT DETECTION BY NUCLEIC ACID (DNA OR RNA); SEVERE ACUTE RESPIRATORY SYNDROME CORONAVIRUS 2 (SARS-COV-2) (CORONAVIRUS DISEASE [COVID-19]), AMPLIFIED PROBE TECHNIQUE, MAKING USE OF HIGH THROUGHPUT TECHNOLOGIES AS DESCRIBED BY CMS-2020-01-R: HCPCS

## 2020-11-28 LAB — SARS-COV-2 RNA RESP QL NAA+PROBE: NOT DETECTED

## 2020-11-30 ENCOUNTER — OFFICE VISIT (OUTPATIENT)
Dept: PSYCHIATRY | Facility: OTHER | Age: 40
End: 2020-11-30
Payer: MEDICARE

## 2020-11-30 ENCOUNTER — CLINICAL SUPPORT (OUTPATIENT)
Dept: REHABILITATION | Facility: OTHER | Age: 40
End: 2020-11-30
Payer: MEDICARE

## 2020-11-30 DIAGNOSIS — Z78.9 ALTERATION IN INSTRUMENTAL ACTIVITIES OF DAILY LIVING (IADL): ICD-10-CM

## 2020-11-30 DIAGNOSIS — Z78.9 DECREASED ACTIVITIES OF DAILY LIVING (ADL): ICD-10-CM

## 2020-11-30 DIAGNOSIS — G89.4 CHRONIC PAIN SYNDROME: ICD-10-CM

## 2020-11-30 DIAGNOSIS — F32.A DEPRESSION, UNSPECIFIED DEPRESSION TYPE: ICD-10-CM

## 2020-11-30 DIAGNOSIS — G89.4 CHRONIC PAIN DISORDER: ICD-10-CM

## 2020-11-30 DIAGNOSIS — Z74.09 IMPAIRED FUNCTIONAL MOBILITY AND ACTIVITY TOLERANCE: ICD-10-CM

## 2020-11-30 DIAGNOSIS — Z78.9 DECREASED INDEPENDENCE WITH ACTIVITIES OF DAILY LIVING: ICD-10-CM

## 2020-11-30 DIAGNOSIS — Z74.09 IMPAIRED FUNCTIONAL MOBILITY, BALANCE, GAIT, AND ENDURANCE: ICD-10-CM

## 2020-11-30 PROCEDURE — 90791 PSYCH DIAGNOSTIC EVALUATION: CPT | Mod: ,,, | Performed by: SOCIAL WORKER

## 2020-11-30 PROCEDURE — 90791 PR PSYCHIATRIC DIAGNOSTIC EVALUATION: ICD-10-PCS | Mod: ,,, | Performed by: SOCIAL WORKER

## 2020-11-30 PROCEDURE — 97110 THERAPEUTIC EXERCISES: CPT

## 2020-11-30 PROCEDURE — 97530 THERAPEUTIC ACTIVITIES: CPT

## 2020-11-30 NOTE — PLAN OF CARE
"Functional Restoration Program  Occupational Therapy   Daily Note  FRP Day 1     Name: Gordon Griffin III  MRN:[id number]  Date: 11/30/2020    Diagnosis:   Encounter Diagnoses   Name Primary?    Chronic pain disorder     Decreased activities of daily living (ADL)     Decreased independence with activities of daily living     Alteration in instrumental activities of daily living (IADL)     Impaired functional mobility and activity tolerance        Date of service: 11/30/2020  Start time: 1:00 PM  End time: 2:00 PM   Total time: 60 min     Subjective     Subjective: He reports "Today is just a hard day, but im here".         Pain report: 4   Mr. Griffin noted "my foot is clawing up like it does and the spasms are bad today".   Location: R side body     Personal Functional Three Month Goals: Performance and satisfaction noted below.    Objective     Mr. Griffin participated in dynamic functional therapeutic activities to improve functional performance for 60 min.     Pt completed bucket carry with increased weight to 5 # BUE x 5 mins and alternating UE x 5 mins, rated hard (5/10). Pt encouraged to slow walking rate to focus on heel to tow technique.    Pt completed BUE dynamic reaching tasks in various functional ranges, with continued focus on hip rotation/weight shifting/positioning of pelvis, x 2 #, 10 reps, rated as really hard (7/10) exertion.     Pt completed functional lifting, floor to waist, x 8# with exertion rated as hard (5/10), focused education on maintaining wide base of support, bending knees and hinging at hips, properly engaging glutes and being mindful of neutral spine.     Pt completed maximal lift x15 #, rated as hard (5/10), continued education focused on neutral spine positioning and pushing through heels for safety and activation of correct muscles.     Pt participated in functional lift waist to shoulder, x 6# with a rating of hard (5/10). Pt educated on standing posture, core engagement, " "weight shifting and stepping to place weight.    Pt completed sustained seated mindfulness activity, x10 min. Pt was educated on how to complete diaphragmatic breathing with proper technique. Pt required max cues and mirroring to complete properly. Pt rated sort of hard (4/10), with focus on weight shifting as needed and postural alignment.    Discussed established goals. Reviewed and revised goals based on patient feedback. Pt oriented/educated on functional lifting/endurance exercise program with plan for progression toward goals. Pt educated on frequent functional lifts, verbalized and demonstrated understanding. Pt demonstrated understanding of functional tasks associated on program. Pt educated on the importance of paying attention to body with functional activities, considerations for use of log and use of charo exertion scale. Pt educated on proper body mechanics and safety with max lift and carrying tasks     No environmental, cultural, spiritual, developmental or education needs expressed or noted.      Assessment     Mr. Griffin presents with increased fatigue and increased c/o R side body spasms and noting that her ankle "keeps giving out". Pt with good response to all education and feedback. Pt with good goals and with good tolerance to functional lifting tasks. Pt required increased time to complete all functional activities secondary to high distractibility and need for rest breaks. Pt with good understanding of program expectations and schedule and with good response to breathing/mindfulness activity. Pt with good tolerance to treatment this date and with good overall motivation.     Pt is unable to fully participate in community tasks, recreational, and home-based activities because of decreased functional strength, decreased, decreased endurance, limited positional tolerance, fear of re-injury, and fear of increased pain. Mr. Griffin will benefit from participating in a conditioning program designed to " increase functional strength, flexibility, and endurance in order to meet functional goals.     FRP Goals: While working towards the long-term (3 month) functional goals listed above, Mr. Griffin will accomplish the following short term goals during the 3-week intensive rehabilitation program. Mr. Griffin will:      1. Develop a viable return to work plan.   2. Demonstrate physical capacities consistent with a light-medium work demand level.   3. Demonstrate increased functional strength by lifting 20 lbs floor to waist and 20 lbs  waist to shoulder in order to meet demand of work/home routine.   4. Increase her positional tolerance to the level needed for work and home activities.   5. Implement self-care strategies during the program and at home to control pain.   6.   Pt will demonstrate use of mindfulness, stress management, and coping  strategies for improved participation in daily routine.    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 More comprehensive physical  testing integrated with clinical findings would be required to derived an accurate work capacity.       Plan   Plan of care certification 11/30/2020 to 1/29/2021    Outpatient occupational therapy, 3 x/wk, for 8 weeks to include:       1. Functional conditioning daily to increase physical capacity and allow Mr. Griffin to meet demands of vocation and home.   2. Individual counseling to develop return to work/daily routine plan and better understand the return to work process and/or daily routine restructure.   3. Daily Stretching, aerobic conditioning and walking to increase functional tolerance and allow Mr. Griffin to meet demands of work and home.   4. Functional activities to increase ability to move fluidly and quickly and tolerate unguarded movements.   5. Re-education regarding proper postural, body mechanics, and coping strategies for healthy roles and routine for managing daily stressors.    Any other treatment deemed necessary for patient to achieve personally driven goals to promote positive health and wellness and daily roles and routines    Alaina Martinez OT, AMALIA, 11/30/2020   Occupational Therapy

## 2020-11-30 NOTE — PROGRESS NOTES
Functional Restoration Program  Physical Therapy   Daily Therapy Note  FRP Day 1    Name: Gordon Griffin III  MRN:[id number]  Date: 11/30/2020    Therapy Diagnosis:   Encounter Diagnoses   Name Primary?    Chronic pain syndrome     Impaired functional mobility, balance, gait, and endurance      Physician: Magdalena Ruiz NP  Evaluation Date: 11/30/2020  Authorization Period Expiration: 5/27/2021  Plan of Care Expiration: 1/31/2021  Visit # / Visits authorized: 2/ 17    [facility] daily activities may consist of:   Biometrics   Group stretching   Individualized PT w/ resisted strengthening    Individualized OT w/ functional lifting & training   Group cognitive behavioral therapy   Informative lecture   Mindfulness    Independent Therapy  Time in: 2:00p  Time out: 3:00p     Billable minutes: 60      Subjective: Mr. Griffin reports that she often takes on a lot of responsibility at work & does not pace herself well. She expresses a lot of concerns about spasticity & difficulty w/ her ankle/foot.      Current 3/10  Location(s): low back & R ankle     Objective:   Mr. Griffin participated in the following activities:    Individualized Treatment:     Dylon received therapeutic exercises to develop strength, endurance, ROM, flexibility, posture and core stabilization for 60 minutes including:    Full body stretch w/ 3-10 sec hold technique &/or 3-5 repetition technique on all of the following:   Cervical flx/ext   Cervical sidebending   Cervical rotation   Cervical protraction/retraction   Shld rolls   Shld depression/elevation   Scapular retraction/protraction/scaption   Posterior shld stretch (cross arm)   Shld ER stretch (chicken wing)   Elbow flx/ext   Forearm supination/pronation   Wrist flx/ext   Open/close hands/fingers   Seated trunk rotations   Seated trunk/thoracic ext/flx   Seated marches & knee to chest   Seated knee flx/ext   Seated hip ER/piriformis stretch   Seated hamstring  stretch   Seated toe raise to heel raise    Seated ankle circles each way      Peripheral muscle strengthening which included 1-2 sets of 10-20 repetitions at a slow, controlled 5-7 second per rep pace focused on strengthening supporting musculature for improved body mechanics & functional mobility.  Patient & therapist focused on proper form during treatment to ensure optimal strengthening of each targeted muscle group. Patients are instructed to keep sets/reps with proper load to stay at moderate on the Deny exertion scale.       Exercise Weight (lbs) Sets Repetitions Deny Exertion Scale Rating   Leg Extension        Hamstring Curls       Chest Press 10 1 10 5   Pec Fly 12.5 1 10 5   Lat Pull Down 7.5 1 10 5   Mid Row 15 1 10 5   Bicep Dumbell Curls       Standing Tricep Extension 7.5 1 10 5   Single Leg Press 15 1 12 5       Billable units: Therapeutic Exercise 4 units       Assessment: Dylon w/ good participation in therapy tolerating exercises well, but needed nearly constant redirection to rehabilitation process. She often would speak in tangents not paying attention to proper form & pacing. PT gave moderate to heavy cuing for patient in each repetition to slow to preferred 3-1-3 pace for each rep. She also mentioned many issues w/ continued exercising & pacing at the end of FRP. She will need very heavy reinforcement in behaviors in order to make proper functional gains w/ her chronic pain.    GOALS: Pt is in agreement with the following goals:     Program goals: 8 weeks  At the end of therapy patient will:      -  Improve 2 Minute Walk Test (MWT) to 375 feet and 6 MWT to 1125 feet or greater to improve gait speed and mobility.  Progress towards goal:  Goal Initiated on 11/3/2020      -  Demonstrate independence with Home Exercise Program (HEP) to include: full body stretching, spinal stabilization & core stability to improve patient's general mobility, AROM and decrease reported pain.  Progress towards  goal: Goal Initiated on 11/3/2020      -   Perform single leg stance 15 seconds or greater bilaterally to improve safety and balance in ADLs.  Progress towards goal: Goal Initiated on 11/3/2020      -  Demonstrate proper self-correction in sitting & standing postures in order to decrease postural stress/strain to better manage pain.  Progress towards goal: Goal Initiated on 11/3/2020      -  Demonstrate Timed Up & Go (with appropriate assistive device, if necessary) of 10 seconds or less to decrease fall risk and improve functional mobility.  Progress towards goal: Goal Initiated on 11/3/2020      -  Demonstrate 5 time sit to stand test without upper extremity assist to 10 sec or less to improve general mobility and decrease fall risk.  Progress towards goal: Goal Initiated on 11/3/2020      -  Demonstrate independence and adherence to resistive training at self-selected facility to maintain and progress FRP strength grains.  Progress towards goal: Goal Initiated on 11/3/2020      -  Demonstrate proper diaphragmatic breathing in supine & seated positions to facilitate better pain control and promote decreased anxiety.  Progress towards goal: Goal Initiated on 11/3/2020      - Demonstrate proper knee flx & no hip circumduction in gait pattern on R LE to normalize gait pattern, improve gait efficiency/endurance to better tolerate work duties. Progress towards goal: Goal initiated 11/3/2020    Plan:     Continue PT per POC     Travis Reyes, PT, DPT

## 2020-11-30 NOTE — PROGRESS NOTES
"Functional Restoration Program  Occupational Therapy   Daily Note  FRP Day 1   NOTE: This is a duplicate copy utilized for reassessment purposes & continuity of care.  See pt's plan of care for co-signed copy.    Name: Gordon Griffin III  MRN:[id number]  Date: 11/30/2020    Diagnosis:   Encounter Diagnoses   Name Primary?    Chronic pain disorder     Decreased activities of daily living (ADL)     Decreased independence with activities of daily living     Alteration in instrumental activities of daily living (IADL)     Impaired functional mobility and activity tolerance        Date of service: 11/30/2020  Start time: 1:00 PM  End time: 2:00 PM   Total time: 60 min     Subjective     Subjective: He reports "Today is just a hard day, but im here".         Pain report: 4   Mr. Griffin noted "my foot is clawing up like it does and the spasms are bad today".   Location: R side body     Personal Functional Three Month Goals: Performance and satisfaction noted below.    Objective     Mr. Griffin participated in dynamic functional therapeutic activities to improve functional performance for 60 min.     Pt completed bucket carry with increased weight to 5 # BUE x 5 mins and alternating UE x 5 mins, rated hard (5/10). Pt encouraged to slow walking rate to focus on heel to tow technique.    Pt completed BUE dynamic reaching tasks in various functional ranges, with continued focus on hip rotation/weight shifting/positioning of pelvis, x 2 #, 10 reps, rated as really hard (7/10) exertion.     Pt completed functional lifting, floor to waist, x 8# with exertion rated as hard (5/10), focused education on maintaining wide base of support, bending knees and hinging at hips, properly engaging glutes and being mindful of neutral spine.     Pt completed maximal lift x15 #, rated as hard (5/10), continued education focused on neutral spine positioning and pushing through heels for safety and activation of correct muscles.     Pt " "participated in functional lift waist to shoulder, x 6# with a rating of hard (5/10). Pt educated on standing posture, core engagement, weight shifting and stepping to place weight.    Pt completed sustained seated mindfulness activity, x10 min. Pt was educated on how to complete diaphragmatic breathing with proper technique. Pt required max cues and mirroring to complete properly. Pt rated sort of hard (4/10), with focus on weight shifting as needed and postural alignment.    Discussed established goals. Reviewed and revised goals based on patient feedback. Pt oriented/educated on functional lifting/endurance exercise program with plan for progression toward goals. Pt educated on frequent functional lifts, verbalized and demonstrated understanding. Pt demonstrated understanding of functional tasks associated on program. Pt educated on the importance of paying attention to body with functional activities, considerations for use of log and use of charo exertion scale. Pt educated on proper body mechanics and safety with max lift and carrying tasks     No environmental, cultural, spiritual, developmental or education needs expressed or noted.      Assessment     Mr. Griffin presents with increased fatigue and increased c/o R side body spasms and noting that her ankle "keeps giving out". Pt with good response to all education and feedback. Pt with good goals and with good tolerance to functional lifting tasks. Pt required increased time to complete all functional activities secondary to high distractibility and need for rest breaks. Pt with good understanding of program expectations and schedule and with good response to breathing/mindfulness activity. Pt with good tolerance to treatment this date and with good overall motivation.     Pt is unable to fully participate in community tasks, recreational, and home-based activities because of decreased functional strength, decreased, decreased endurance, limited positional " tolerance, fear of re-injury, and fear of increased pain. Mr. Griffin will benefit from participating in a conditioning program designed to increase functional strength, flexibility, and endurance in order to meet functional goals.     FRP Goals: While working towards the long-term (3 month) functional goals listed above, Mr. Griffin will accomplish the following short term goals during the 3-week intensive rehabilitation program. Mr. Griffin will:      1. Develop a viable return to work plan.   2. Demonstrate physical capacities consistent with a light-medium work demand level.   3. Demonstrate increased functional strength by lifting 20 lbs floor to waist and 20 lbs  waist to shoulder in order to meet demand of work/home routine.   4. Increase her positional tolerance to the level needed for work and home activities.   5. Implement self-care strategies during the program and at home to control pain.   6.   Pt will demonstrate use of mindfulness, stress management, and coping  strategies for improved participation in daily routine.    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 More comprehensive physical  testing integrated with clinical findings would be required to derived an accurate work capacity.        Plan   Plan of care certification 11/30/2020 to 1/29/2021    Outpatient occupational therapy, 3 x/wk, for 8 weeks to include:      1. Functional conditioning daily to increase physical capacity and allow Mr. Griffin to meet demands of vocation and home.   2. Individual counseling to develop return to work/daily routine plan and better understand the return to work process and/or daily routine restructure.   3. Daily Stretching, aerobic conditioning and walking to increase functional tolerance and allow Mr. Griffin to meet demands of work and home.   4. Functional activities to increase ability to move fluidly and quickly and tolerate unguarded movements.   5. Re-education regarding proper postural, body mechanics, and coping strategies for healthy roles and routine for managing daily stressors.    Any other treatment deemed necessary for patient to achieve personally driven goals to promote positive health and wellness and daily roles and routines    Alaina Martinez OT, AMALIA, 11/30/2020   Occupational Therapy

## 2020-12-02 ENCOUNTER — CLINICAL SUPPORT (OUTPATIENT)
Dept: REHABILITATION | Facility: OTHER | Age: 40
End: 2020-12-02
Payer: MEDICARE

## 2020-12-02 ENCOUNTER — OFFICE VISIT (OUTPATIENT)
Dept: PSYCHIATRY | Facility: OTHER | Age: 40
End: 2020-12-02
Payer: MEDICARE

## 2020-12-02 DIAGNOSIS — Z78.9 DECREASED INDEPENDENCE WITH ACTIVITIES OF DAILY LIVING: ICD-10-CM

## 2020-12-02 DIAGNOSIS — Z74.09 IMPAIRED FUNCTIONAL MOBILITY, BALANCE, GAIT, AND ENDURANCE: ICD-10-CM

## 2020-12-02 DIAGNOSIS — G89.4 CHRONIC PAIN SYNDROME: Primary | ICD-10-CM

## 2020-12-02 DIAGNOSIS — Z78.9 ALTERATION IN INSTRUMENTAL ACTIVITIES OF DAILY LIVING (IADL): ICD-10-CM

## 2020-12-02 DIAGNOSIS — F33.1 MDD (MAJOR DEPRESSIVE DISORDER), RECURRENT EPISODE, MODERATE: Primary | ICD-10-CM

## 2020-12-02 PROCEDURE — 90853 PR GROUP PSYCHOTHERAPY: ICD-10-PCS | Mod: ,,, | Performed by: SOCIAL WORKER

## 2020-12-02 PROCEDURE — 97530 THERAPEUTIC ACTIVITIES: CPT

## 2020-12-02 PROCEDURE — 90853 GROUP PSYCHOTHERAPY: CPT | Mod: ,,, | Performed by: SOCIAL WORKER

## 2020-12-02 PROCEDURE — 97112 NEUROMUSCULAR REEDUCATION: CPT

## 2020-12-02 PROCEDURE — 97110 THERAPEUTIC EXERCISES: CPT
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
## 2020-12-02 NOTE — PROGRESS NOTES
"Functional Restoration Program  Occupational Therapy   Daily Therapy Note  FRP Day 2    Name: Gordon Griffin III  MRN:[id number]  Date: 12/2/2020    Diagnosis:   Encounter Diagnoses   Name Primary?    Decreased independence with activities of daily living     Alteration in instrumental activities of daily living (IADL)     Impaired functional mobility, balance, gait, and endurance        FRP daily activities may consist of:   Biometrics   Group stretching   Individualized PT w/ resisted strengthening    Individualized OT w/ functional lifting & training   Group cognitive behavioral therapy   Informative lecture   Mindfulness    Individualized treatment:  Start time: 2:40 PM  End time: 4:00 PM  Total Billable time: 75 min    Subjective: He reports "I just found out a friend passed away from Covid-19 and im really upset and having spasms".         Pain report: 6   Location: B low back    Objective: Refer to [facility] protocol for details and explanation of each activity.   Mr. Griffin participated in the following dynamic functional therapeutic activities:    Individualized Treatment: Increased resistance levels of functional conditioning exercises according to personal recovery goals. Activities include: Dynamic reaching, [id number] lifting, sustained standing activity, maximal lifting, 2-handed carry, sustained seated tasks, push and pull, waist-to-shoulder lift, box/container carry, endurance training. Daily functional conditioning progress is documented on a flow sheet which is available upon request.    Pt participated in functional lifting/endurance activities in order to increase pt's participation with vocational, selfcare ADLs, and leisure activities in order to improve quality of life.   Functional Activities:     Pt completed bucket carry with increased weight to 4 # BUE x 5 mins and alternating UE x 5 mins, rated moderate (3/10). Pt encouraged to slow walking rate to focus on heel to tow " technique.    Pt completed BUE dynamic reaching tasks in various functional ranges, with continued focus on hip rotation/weight shifting/positioning of pelvis, x 2# x 5 reps and xbodyweight x5 reps, rated as hard (5/10) exertion.    Pt completed functional lifting, floor to waist, x 8 # with exertion rated as hard (5/10), focused education on slow and controlled movement, bending knees and hinging hip, coordinating movement with breath and being mindful of neutral spine. Pt required max verbal cues to perform with proper tempo and form. Pt required increased time to complete with max redirection.      Pt completed maximal lift x15 #, rated as moderate (3/10), continued education focused on bent knees, wide base of support, neutral spine positioning and pushing through heels for safety and activation of correct muscles.     Pt participated in functional lift waist to shoulder, x 6 # with a rating of sort of hard (4/10). Pt educated on core engagement to minimize back extension, posture and keeping weight close to center of gravity.    Pt completed standing task x10 mins at table top to complete fine motor /pinch strengthening activity with green therapuddy. Pt given  HEP and demonstrated understanding of each exercise [id number] on each hand. Focused education on posture, weight shifting and knees bent, rated hard (5/10).    Pt completed sustained seated and supine mindfulness activity. Pt educated on proper technique for diaphragmatic breathing. Pt required max verbal cues and demonstration to perform from seated position and was able to perform supine with min verbal cues x10 breaths from each position. Pt rated sort of hard (4/10), with focus on weight shifting as needed and postural alignment.    Pt participated in endurance activity on stationary bike at L2 for 10min, rated as hard (5/10), continued education re: controlled pursed lip breathing and pacing.          Length of Treatment: Mr. Griffin  participated in program activities from 11:00 am through 4:00 pm today.     No environmental, cultural, spiritual, developmental or education needs expressed or noted.    Assessment:     Mr. Griffin presents with increased stress and spasms secondary to finding out bad news re: a friend passing. Pt educated on how stress can impact pain experience. She voiced understanding. Pt with good overall tolerance to therapy this date but required increased time to complete activities with continued high distractibility and requires max redirection. She had good tolerance with functional lifting tasks but requires max verbal cues for pacing and techniques. Pt with overall good response to feedback and education and continues to progress towards personal goals.     Pt is unable to fully participate in work, recreational activities, and home-based activities because of decreased functional endurance, limited positional tolerance, fear of re-injury, and fear of increased pain. She will continue to benefit from participating in a conditioning program designed to increase functional strength, flexibility, and endurance in order to meet functional goals.     FRP Goals: While working towards the long-term (3 month) functional goals listed above, Mr. Griffin will accomplish the following short term goals during the 3-week intensive rehabilitation program. Mr. Griffin will:      1. Develop a viable return to work plan.   2. Demonstrate physical capacities consistent with a light-medium work demand level.   3. Demonstrate increased functional strength by lifting 20lbs floor to waist  and 20lbs waist to shoulder in order to meet demand of work/home routine.   4. Increase her positional tolerance to the level needed for work and home activities.   5.  Implement self-care strategies during the program and at home to control pain.   6.  Pt will demonstrate use of mindfulness, stress management, and coping  strategies for improved  participation in daily routine.      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 More comprehensive physical  testing integrated with clinical findings would be required to derived an accurate work capacity.     Plan:     Continue per POC.    Alaina Martinez OT, AMALIA, 12/2/2020   Occupational Therapy         ambulate

## 2020-12-04 ENCOUNTER — CLINICAL SUPPORT (OUTPATIENT)
Dept: REHABILITATION | Facility: OTHER | Age: 40
End: 2020-12-04
Payer: MEDICARE

## 2020-12-04 ENCOUNTER — OFFICE VISIT (OUTPATIENT)
Dept: PSYCHIATRY | Facility: OTHER | Age: 40
End: 2020-12-04
Payer: MEDICARE

## 2020-12-04 DIAGNOSIS — G89.4 CHRONIC PAIN DISORDER: ICD-10-CM

## 2020-12-04 DIAGNOSIS — G89.4 CHRONIC PAIN SYNDROME: ICD-10-CM

## 2020-12-04 DIAGNOSIS — F32.A DEPRESSION, UNSPECIFIED DEPRESSION TYPE: Primary | ICD-10-CM

## 2020-12-04 DIAGNOSIS — Z78.9 DECREASED INDEPENDENCE WITH ACTIVITIES OF DAILY LIVING: ICD-10-CM

## 2020-12-04 DIAGNOSIS — Z74.09 IMPAIRED FUNCTIONAL MOBILITY, BALANCE, GAIT, AND ENDURANCE: ICD-10-CM

## 2020-12-04 DIAGNOSIS — Z78.9 ALTERATION IN INSTRUMENTAL ACTIVITIES OF DAILY LIVING (IADL): ICD-10-CM

## 2020-12-04 PROCEDURE — 90853 GROUP PSYCHOTHERAPY: CPT | Mod: ,,, | Performed by: SOCIAL WORKER

## 2020-12-04 PROCEDURE — 97110 THERAPEUTIC EXERCISES: CPT

## 2020-12-04 PROCEDURE — 97530 THERAPEUTIC ACTIVITIES: CPT

## 2020-12-04 PROCEDURE — 90853 PR GROUP PSYCHOTHERAPY: ICD-10-PCS | Mod: ,,, | Performed by: SOCIAL WORKER
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
## 2020-12-04 NOTE — PATIENT INSTRUCTIONS
CERVICAL FLEXION    Tilt your head downwards, then return back to looking straight ahead.                      CERVICAL EXTENSION    Tilt your head upwards, then return back to looking straight ahead.                  CERVICAL SIDE BEND    Tilt your head towards the side, then return back to looking straight ahead. Repeat to opposite side        CERVICAL ROTATION    Turn your head towards the side, then return back to looking straight ahead. Repeat to opposite side.            RETRACTION / CHIN TUCK    Slowly draw your head back so that your ears line up with your shoulders. Hold for  5-10 secs.                                  SHOULDER ROLLS    Roll your shoulders forward    Roll your shoulders backward  Practice doing one shoulder at a time                    SHRUGS    Raise your shoulders upward towards your ears    Practice shrugging both shoulders at same time  Practice shrugging one shoulder at a time                SCAPULAR RETRACTIONS  Draw your shoulder blades back and down  Practice doing both at same time  Practice one shoulder blade at a time              BILATERAL SCAPTION    With thumbs pointing up toward the ceiling, raise your arms up in a V angle (45 deg)  Do not shrug your shoulder upwards                                                                                     CHICKEN WING      Place both hands behind your head.     Leaving your hands behind your head, bring your elbows together.      You can hold for 30 sec for a stretch or just perform 10-20 times           INTERNAL ROTATION STRETCH    Hold a towel in both hands. Use the upper hand (by head) to pull the towel up in order to feel a stretch in the shoulder that is holding the towel behind your back    You can perform without a towel by gently reaching up behind your back       Hold for 30 sec for 1-3 reps or gently just move the towel back and forth 10-20 times      POSTERIOR SHOULDER STRETCH      Gently pull on elbow with the  other hand until a stretch is felt in the shoulder    To increase the stretch, straighten the elbow so your arm is straight    Hold 30 sec for 1-3 reps    Repeat opposite side              ELBOW FLEXION EXTENSION    Bend your elbow upwards towards your chest  Then lower your arm to a straight position                    PRONATION AND SUPINATION Turn palm up  Turn palm over  Can do with elbow bent or straight                    WRIST FLEXION and EXTENSION Bend wrist backwards like going to give   someone a high five  Bend wrist down like showing off a ring  Can do with elbow bent or straight        CLOSE / OPEN HAND    Make a tight fist     Open your hand, spreading your fingers as wide as possible                                                                                                  SEATED TRUNK ROTATION  Sit in chair with feet flat on ground.    Twist trunk to the right side. You can increase the stretch by grabbing onto side of chair and gently pulling.  Hold 10-30 secs. Repeat 1-5 each side.                THORACIC EXTENSION OVER CHAIR Sit on a chair and have your mid-back  touching the top of the chair.    Keeping your arms across your chest, extend your back over the top of the chair.    You can increase the stretch by placing your hands behind your head and stretching over chair.  Hold 10-30 sec. Repeat 1-5 times.                  SEATED THORACIC FLEXION    Take a breath in and as you blow it out, gently bend forward.    Increase the stretch by letting your neck completely relax.  Hold 10 seconds. Repeat 1- 10 times.                    SEATED MARCHING    Lift your knee towards your chest then lower down  Repeat with opposite leg                SEATED KNEE EXTENSION    Straighten one leg out and tighten front thigh muscle. Lower leg.  Repeat with opposite leg                    SEATED PIRIFORMIS STRETCH    While seated, cross one leg over the other and hold for 30 seconds    For an increased stretch  lean forward and hold  Perform 1-3 times each leg          SEATED HAMSTRING STRETCH    While seated, rest your heel on the floor with your knee straight and gently lean forward until a stretch is felt behind your knee/thigh  Hold 30 seconds  Repeat 1-3 times on each leg                                                                                                              WALKING PROGRAM  Walking is one of the easiest forms of exercise.  All you need is a good pair of shoes, comfortable clothing, and desire.   Walking on a regular basis has been shown to have many healthy benefits:    Decrease in pain   Improved mood & overall sense of well-being: decrease in depression & anxiety   Improved sleep   Decrease stress levels. Decreased cortisol levels.   Improved blood pressure and heart strength   Improved fitness and musculature strength  GETTING STARTED:    Goal is to walk for at least 10 mins every day of the week  Tips:  Watch your posture.  Walk tall.  Pretend you are being pulled up by a rope attached to the crown of your head.  Your shoulders should be down, back and relaxed. Feel the natural swing of your arms in opposition to your leg swing.  Land on your heel and roll off on your toes.   Be sure to drink plenty of water before, during, and after walking.   Incorporate a warm up and cool down into your routine.  Start your walk at a slow warm up pace, then walk desired length of time.  End your walk with slower cool down.  Any stretches that your therapist has recommended for you may be done before and after your walk to prevent injury.  PROGRESSING:  GOAL: 30 minutes, 5 days a week   1. Increase your walking time by 2-3 minutes. Do this for several days until it gets easy. Then increase your time again until you have reached your maximum desired time.     2. Once you have achieved your time goal, you can further progress your program by increasing the speed at which you walk.    Make it a Habit:    Things to help with motivation to continue your walking program:    Get a walking partner   Listen to favorite music   SET GOALS & reward yourself for achieving them   Make it fun & remember all of the health benefits of simply walking   Record your progress  WEEK MON TUES WED THUR FRI SAT SUN   Week 1  Date:______  Goal:______          Week 2  Date:______  Goal:______          Week 3  Date:______  Goal:______          Week 4  Date:______  Goal:______          Week 5  Date:______  Goal:______          Week 6  Date:______  Goal:______          Week 7  Date:______  Goal:______          Week 8  Date:______  Goal:______          Week 9  Date:______  Goal:______          Week 10  Date:______  Goal:______          Week 11  Date:______  Goal:______

## 2020-12-04 NOTE — PROGRESS NOTES
[facility]  Physical Therapy   Daily Therapy Note  FRP Day 3    Name: Gordon Griffin III  MRN:[id number]  Date: 12/4/2020    Therapy Diagnosis:   Encounter Diagnoses   Name Primary?    Impaired functional mobility, balance, gait, and endurance     Chronic pain syndrome      Physician: Magdalena Ruiz NP  Evaluation Date: 12/4/2020  Authorization Period Expiration: 5/27/2021  Plan of Care Expiration: 1/31/2021  Visit # / Visits authorized: 4/ 17    FR daily activities may consist of:   Biometrics   Group stretching   Individualized PT w/ resisted strengthening    Individualized OT w/ functional lifting & training   Group cognitive behavioral therapy   Informative lecture   Mindfulness    Independent Therapy  Time in: 9:40a  Time out: 10:55a     Billable minutes: 75      Subjective: Mr. Griffin reports no soreness after the last session, but a little more pain today. 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      Current 5/10  Location(s): low back & [facility]    Objective:   Mr. Griffin participated in the following activities:    Individualized Treatment:     Dylon received therapeutic exercises to develop strength, endurance, ROM, flexibility, posture and core stabilization for 75 minutes including:      Peripheral muscle strengthening which included 1-2 sets of 10-20 repetitions at a slow, controlled 5-7 second per rep pace focused on strengthening supporting musculature for improved body mechanics & functional mobility.  Patient & therapist focused on proper form during treatment to ensure optimal strengthening of each targeted muscle group. Patients are instructed to keep sets/reps with proper load to stay at moderate on the Deny exertion scale.       Exercise Weight (lbs) Sets Repetitions Deny Exertion Scale Rating   Leg Extension  15 1 20 4   Hamstring Curls 17.5 1 20 4   Chest Press 17.5 1 20 4   Pec Fly 17.5 1 20 4  "  Lat Pull Down 12.5 1 20 4   Mid Row 17.5 1 20 4   Bicep Dumbell Curls 5 1 20 3   Standing Tricep Extension 10 1 20 4   Single Leg Press 20 1 20 4     · Fitter board easy tilts in each direction 2x20 w/  support  · Single leg stance on foam w/ palmar support 3x20 sec B  · Seated cat-cow 2x15  · Seated alt UE/LE flx (dying bug) 2x15  · UE compression to alternating thighs 2" hold for abdominal activation 2x10 B  · Seated Tball rotations in UE w/ postural cues for core 2x20  · Alternating UE/LE abduction 2" hold 2x10 B  · HEP instruction on home stretch & walking program (see pt instructions 12/04/2020)    Billable units: Therapeutic Exercise 5 units       Assessment: Dylon w/ good participation in therapy tolerating exercises well, but still needs nearly constant redirection to rehabilitation process. Having her count to herself in each exercise helped to redirect to task at hand & improved to proper pacing. In balance work, she demonstrated great ankle stability despite reports of inversion "spasms" indicating this may be pseudo-conversion presentation. PT will continue to reinforce pt's abilities & progress when possible.    GOALS: Pt is in agreement with the following goals:     Program goals: 8 weeks  At the end of therapy patient will:      -  Improve 2 Minute Walk Test (MWT) to 375 feet and 6 MWT to 1125 feet or greater to improve gait speed and mobility.  Progress towards goal:  Goal Initiated on 11/3/2020      -  Demonstrate independence with Home Exercise Program (HEP) to include: full body stretching, spinal stabilization & core stability to improve patient's general mobility, AROM and decrease reported pain.  Progress towards goal: Goal Initiated on 11/3/2020      -   Perform single leg stance 15 seconds or greater bilaterally to improve safety and balance in ADLs.  Progress towards goal: Goal Initiated on 11/3/2020      -  Demonstrate proper self-correction in sitting & standing postures in order to " decrease postural stress/strain to better manage pain.  Progress towards goal: Goal Initiated on 11/3/2020      -  Demonstrate Timed Up & Go (with appropriate assistive device, if necessary) of 10 seconds or less to decrease fall risk and improve functional mobility.  Progress towards goal: Goal Initiated on 11/3/2020      -  Demonstrate 5 time sit to stand test without upper extremity assist to 10 sec or less to improve general mobility and decrease fall risk.  Progress towards goal: Goal Initiated on 11/3/2020      -  Demonstrate independence and adherence to resistive training at self-selected facility to maintain and progress FRP strength grains.  Progress towards goal: Goal Initiated on 11/3/2020      -  Demonstrate proper diaphragmatic breathing in supine & seated positions to facilitate better pain control and promote decreased anxiety.  Progress towards goal: Goal Initiated on 11/3/2020      - Demonstrate proper knee flx & no hip circumduction in gait pattern on R LE to normalize gait pattern, improve gait efficiency/endurance to better tolerate work duties. Progress towards goal: Goal initiated 11/3/2020    Plan:     Continue PT per POC     Travis Reyes, PT, DPT

## 2020-12-07 ENCOUNTER — CLINICAL SUPPORT (OUTPATIENT)
Dept: REHABILITATION | Facility: OTHER | Age: 40
End: 2020-12-07
Payer: MEDICARE

## 2020-12-07 ENCOUNTER — PATIENT MESSAGE (OUTPATIENT)
Dept: FAMILY MEDICINE | Facility: CLINIC | Age: 40
End: 2020-12-07

## 2020-12-07 ENCOUNTER — OFFICE VISIT (OUTPATIENT)
Dept: PSYCHIATRY | Facility: OTHER | Age: 40
End: 2020-12-07
Payer: MEDICARE

## 2020-12-07 DIAGNOSIS — Z78.9 DECREASED INDEPENDENCE WITH ACTIVITIES OF DAILY LIVING: ICD-10-CM

## 2020-12-07 DIAGNOSIS — F32.A DEPRESSION, UNSPECIFIED DEPRESSION TYPE: Primary | ICD-10-CM

## 2020-12-07 DIAGNOSIS — Z74.09 IMPAIRED FUNCTIONAL MOBILITY, BALANCE, GAIT, AND ENDURANCE: ICD-10-CM

## 2020-12-07 DIAGNOSIS — G89.4 CHRONIC PAIN SYNDROME: ICD-10-CM

## 2020-12-07 DIAGNOSIS — F33.1 MDD (MAJOR DEPRESSIVE DISORDER), RECURRENT EPISODE, MODERATE: ICD-10-CM

## 2020-12-07 DIAGNOSIS — Z78.9 ALTERATION IN INSTRUMENTAL ACTIVITIES OF DAILY LIVING (IADL): ICD-10-CM

## 2020-12-07 PROCEDURE — 90853 GROUP PSYCHOTHERAPY: CPT | Mod: ,,, | Performed by: SOCIAL WORKER

## 2020-12-07 PROCEDURE — 97110 THERAPEUTIC EXERCISES: CPT

## 2020-12-07 PROCEDURE — 90853 PR GROUP PSYCHOTHERAPY: ICD-10-PCS | Mod: ,,, | Performed by: SOCIAL WORKER

## 2020-12-07 PROCEDURE — 97530 THERAPEUTIC ACTIVITIES: CPT

## 2020-12-07 NOTE — PROGRESS NOTES
Group Psychotherapy    Site: [facility]    Clinical status of patient: Outpatient    12/2/2020    Length of service:[id number]    Referred by: [facility]     Number of patients in attendance: 4    Target symptoms: Chronic Pain Management     Patient's response to intervention:  The patient's response to intervention is active listening.    Progress toward goals and other mental status changes:  The patient's progress toward goals is Day 1.    Plan: group psychotherapy    Topics Covered: Pt attended CBT for Chronic Pain as part of her participation in Functional [facility]oration. Topics discussed today included a review of CBT, the chronic pain cycle and the stages of change. All questions answered, pt also put tabs in their binders to organize them. Will f/u in 2 days.

## 2020-12-07 NOTE — PROGRESS NOTES
[facility] Program  Physical Therapy   Daily Therapy Note  FRP Day 4    Name: Gordon Griffin III  MRN:[id number]  Date: 12/7/2020    Therapy Diagnosis:   Encounter Diagnoses   Name Primary?    Impaired functional mobility, balance, gait, and endurance     Chronic pain syndrome      Physician: Magdalena Ruiz NP  Evaluation Date: 12/7/2020  Authorization Period Expiration: 5/27/2021  Plan of Care Expiration: 1/31/2021  Visit # / Visits authorized: 5/ 17    FR daily activities may consist of:   Biometrics   Group stretching   Individualized PT w/ resisted strengthening    Individualized OT w/ functional lifting & training   Group cognitive behavioral therapy   Informative lecture   Mindfulness    Independent Therapy  Time in: 1:10p  Time out: 10:55a     Billable minutes: 75      Subjective: Dylon reports no soreness after her last session. She did her stretch routine at home & expressed that she may need to continue w/ resisted training in her home as she does not drive. So she would want to learn how to recreate these exercises w/ hand weights.      Current 4/10  Location(s): low back & [facility]    Objective:   Mr. Griffin participated in the following activities:    Individualized Treatment:     Dylon received therapeutic exercises to develop strength, endurance, ROM, flexibility, posture and core stabilization for 75 minutes including:      Peripheral muscle strengthening which included 1-2 sets of 10-20 repetitions at a slow, controlled 5-7 second per rep pace focused on strengthening supporting musculature for improved body mechanics & functional mobility.  Patient & therapist focused on proper form during treatment to ensure optimal strengthening of each targeted muscle group. Patients are instructed to keep sets/reps with proper load to stay at moderate on the Deny exertion scale.       Exercise Weight (lbs) Sets Repetitions Deny Exertion Scale Rating   Leg Extension  15 1 20 4  "  Hamstring Curls 20 1 20 3   Chest Press 17.5 1 20 4   Pec Fly 17.5 1 20 4   Lat Pull Down       Mid Row       Bicep Dumbell Curls 6 1 20 4   Standing Tricep Extension 12.5 1 20 3   Single Leg Press         · Fitter board medium tilts in each direction 2x20 w/  support  · Single leg stance on foam w/ palmar support 3x20 sec B  · Seated cat-cow 2x15  · Seated alt UE/LE flx (dying bug) 2x15  · UE compression to Tball for abdominal activation w/ pursed lip breathing 2x10 B  · Seated Tball rotations in UE w/ postural cues for core 2x20  · Alternating UE/LE abduction 2" hold 2x10 B    Billable units: Therapeutic Exercise 5 units       Assessment: Dylon still needs nearly constant redirection to rehabilitation process. She had some difficulty in seated cat-cow w/ jerking movements & some difficulty in coordinating motion. W/ PT cuing to slow down & move from the hips, she was able to improve. She progressed some exercises today w/out problems.    GOALS: Pt is in agreement with the following goals:     Program goals: 8 weeks  At the end of therapy patient will:      -  Improve 2 Minute Walk Test (MWT) to 375 feet and 6 MWT to 1125 feet or greater to improve gait speed and mobility.  Progress towards goal:  Goal Initiated on 11/3/2020      -  Demonstrate independence with Home Exercise Program (HEP) to include: full body stretching, spinal stabilization & core stability to improve patient's general mobility, AROM and decrease reported pain.  Progress towards goal: Goal Initiated on 11/3/2020      -   Perform single leg stance 15 seconds or greater bilaterally to improve safety and balance in ADLs.  Progress towards goal: Goal Initiated on 11/3/2020      -  Demonstrate proper self-correction in sitting & standing postures in order to decrease postural stress/strain to better manage pain.  Progress towards goal: Goal Initiated on 11/3/2020      -  Demonstrate Timed Up & Go (with appropriate assistive device, if necessary) " of 10 seconds or less to decrease fall risk and improve functional mobility.  Progress towards goal: Goal Initiated on 11/3/2020      -  Demonstrate 5 time sit to stand test without upper extremity assist to 10 sec or less to improve general mobility and decrease fall risk.  Progress towards goal: Goal Initiated on 11/3/2020      -  Demonstrate independence and adherence to resistive training at self-selected facility to maintain and progress FRP strength grains.  Progress towards goal: Goal Initiated on 11/3/2020      -  Demonstrate proper diaphragmatic breathing in supine & seated positions to facilitate better pain control and promote decreased anxiety.  Progress towards goal: Goal Initiated on 11/3/2020      - Demonstrate proper knee flx & no hip circumduction in gait pattern on R LE to normalize gait pattern, improve gait efficiency/endurance to better tolerate work duties. Progress towards goal: Goal initiated 11/3/2020    Plan:     Continue PT per POC     Travis Reyes, PT, DPT

## 2020-12-07 NOTE — PROGRESS NOTES
"Functional Restoration Program  Occupational Therapy   Daily Therapy Note  FRP Day 4    Name: Gordon Griffin III  MRN:[id number]  Date: 12/7/2020    Diagnosis:   Encounter Diagnoses   Name Primary?    Decreased independence with activities of daily living     Alteration in instrumental activities of daily living (IADL)     Impaired functional mobility, balance, gait, and endurance        [facility] daily activities may consist of:   Biometrics   Group stretching   Individualized PT w/ resisted strengthening    Individualized OT w/ functional lifting & training   Group cognitive behavioral therapy   Informative lecture   Mindfulness    Individualized treatment:  Start time: 2:35 PM  End time: 3:50 PM  Total Billable time: 75 min    Subjective: He reports "It has been migraine city for me today"        Pain report: 4  Location: R shoulder    Objective: Refer to [facility] protocol for details and explanation of each activity.   Mr. Griffin participated in the following dynamic functional therapeutic activities:    Individualized Treatment: Increased resistance levels of functional conditioning exercises according to personal recovery goals. Activities include: Dynamic reaching, [id number] lifting, sustained standing activity, maximal lifting, 2-handed carry, sustained seated tasks, push and pull, waist-to-shoulder lift, box/container carry, endurance training. Daily functional conditioning progress is documented on a flow sheet which is available upon request.    Pt participated in functional lifting/endurance activities in order to increase pt's participation with vocational, selfcare ADLs, and leisure activities in order to improve quality of life.     Functional Activities:     Pt brought in womens dress shoes to practice walking in for upcoming wedding that she wants to be able to tolerate them in. Pt completed bucket carry in dress shoes with increased weight to 6# BUE x 5 mins and alternating UE x 5 mins, " rated sort of hard (4/10). Pt encouraged to be mindful of posture, core activation, maintaining slight bend in elbows, and slow walking rate to focus on heel to tow technique.     Pt completed BUE dynamic reaching tasks in various functional ranges, with continued focus on hip rotation/weight shifting/positioning of pelvis, x 3# x 10 reps, with goal to independently pace to stay within 3-5 rating on charo exertion scale, rated as sort of hard (4/10) exertion.     Pt completed functional lifting, floor to waist, x 9 # with exertion rated as sort of hard (4/10), focused education on wide base of support, slow and controlled movement, bending knees and hinging hip, coordinating movement with breath and being mindful of neutral spine. Pt required min verbal cues to perform with proper tempo and form.    Pt completed maximal lift x18#, rated as sort of hard (4/10), continued education focused on wide base of support, toes tracking with knees, bent knees and pushing through heels for safety and activation of correct muscles.     Pt participated in functional lift waist to shoulder, x 7# with a rating of sort of hard (4/10). Pt educated on core engagement to minimize back extension, posture and keeping weight close to center of gravity. Pt required min verbal cues to keep weight close to body, stepping closer towards surface before lifting waist<>shoulder.     Pt completed standing task x10 mins. 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 Problem solved with pt to adjust harness for proper fit and educated pt on where to place harness in order to properly hold weight.     Pt completed seated activity at table top to complete fine motor/dexterity activity to practice in-hand manipulation, tip/pinch strengthening, and finger spreading. Pt performed each exercise [id number] on each hand. Focused education on posture, weight shifting and knees bent, rated  moderate (3/10).      Pt participated in endurance activity on treadmill  x10min at 2.0mph at 8% incline, rated as hard (5/10), continued education re: controlled pursed lip breathing and pt encouraged to pace independently using charo exertion scale to stay within 3-5 range (moderate-hard) in order to avoid boom/bust cycle.          Length of Treatment: Mr. Griffin participated in program activities from 11 am through 4 pm today.     No environmental, cultural, spiritual, developmental or education needs expressed or noted.    Assessment:     Mr. Griffin presents with good tolerance to treatment today. She continues with high distractibility and required max redirection to maintain attention to task at hand. Pt with improved tolerance to todays session but with c/o migraine and symptoms of PTSD. Pt also with continued perseveration re: work duties and and ex wife and family. She had good tolerance to load increases with functional lifting tasks but continues to require min-max verbal cues for pacing and proper body mechanics. Pt with overall good response to feedback and education and continues to progress towards personal goals.     Pt is unable to fully participate in work, recreational activities, and home-based activities because of decreased functional endurance, limited positional tolerance, fear of re-injury, and fear of increased pain. She will continue to benefit from participating in a conditioning program designed to increase functional strength, flexibility, and endurance in order to meet functional goals.     FRP Goals: While working towards the long-term (3 month) functional goals listed above, Mr. Griffin will accomplish the following short term goals during the 3-week intensive rehabilitation program. Mr. Griffin will:      1. Develop a viable return to work plan.   2. Demonstrate physical capacities consistent with a light-medium work demand level.   3. Demonstrate increased functional strength by  lifting 20lbs floor to waist  and 20lbs waist to shoulder in order to meet demand of work/home routine.   4. Increase her positional tolerance to the level needed for work and home activities.   5.  Implement self-care strategies during the program and at home to control pain.   6.  Pt will demonstrate use of mindfulness, stress management, and coping  strategies for improved participation in daily routine.      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 More comprehensive physical  testing integrated with clinical findings would be required to derived an accurate work capacity.     Plan:     Continue per POC.    Alaina Martinez, OT, IRISHR, 12/7/2020   Occupational Therapy

## 2020-12-07 NOTE — PROGRESS NOTES
Group Psychotherapy    [place] [facility] [place] [place] [facility]    Clinical status of patient: Outpatient    12/7/2020    Length of service:[id number]    Referred by: Functional Restoration Program    Number of patients in attendance: 5    Target symptoms: Chronic Pain Management     Patient's response to intervention:  The patient's response to intervention is active listening.    Progress toward goals and other mental status changes:  The patient's progress toward goals is limited.    Plan: group psychotherapy    Topics Covered: Pt attended CBT for Chronic Pain as part of her participation in Functional Restoration. Topics discussed today included a discussion on goal setting, SMART goals, and making goals sustainable. We also discussed weekend homework, will f/u in 2 days.

## 2020-12-07 NOTE — PROGRESS NOTES
Group Psychotherapy    Site: [facility]    Clinical status of patient: Outpatient    12/4/2020    Length of service:[id number]    Referred by: Functional Restoration Program     Number of patients in attendance: 4    Target symptoms: Chronic Pain Management     Patient's response to intervention:  The patient's response to intervention is active listening.    Progress toward goals and other mental status changes:  The patient's progress toward goals is limited.    Plan: group psychotherapy    Topics Covered: Pt attended CBT for Chronic Pain as part of her participation in Functional Restoration. Topics discussed today included a discussion on sleep and the importance of sleep with chronic pain. Pts discussed sleep hygiene and discussed homework for the weekend to track their sleep. Will f/u on Monday.
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
## 2020-12-09 ENCOUNTER — OFFICE VISIT (OUTPATIENT)
Dept: PSYCHIATRY | Facility: OTHER | Age: 40
End: 2020-12-09
Attending: INTERNAL MEDICINE
Payer: MEDICARE

## 2020-12-09 ENCOUNTER — CLINICAL SUPPORT (OUTPATIENT)
Dept: REHABILITATION | Facility: OTHER | Age: 40
End: 2020-12-09
Payer: MEDICARE

## 2020-12-09 ENCOUNTER — LAB VISIT (OUTPATIENT)
Dept: LAB | Facility: OTHER | Age: 40
End: 2020-12-09
Attending: INTERNAL MEDICINE
Payer: MEDICARE

## 2020-12-09 DIAGNOSIS — G89.4 CHRONIC PAIN SYNDROME: Primary | ICD-10-CM

## 2020-12-09 DIAGNOSIS — Z78.9 DECREASED INDEPENDENCE WITH ACTIVITIES OF DAILY LIVING: ICD-10-CM

## 2020-12-09 DIAGNOSIS — Z78.9 ALTERATION IN INSTRUMENTAL ACTIVITIES OF DAILY LIVING (IADL): ICD-10-CM

## 2020-12-09 DIAGNOSIS — Z74.09 IMPAIRED FUNCTIONAL MOBILITY, BALANCE, GAIT, AND ENDURANCE: ICD-10-CM

## 2020-12-09 DIAGNOSIS — F33.1 MDD (MAJOR DEPRESSIVE DISORDER), RECURRENT EPISODE, MODERATE: Primary | ICD-10-CM

## 2020-12-09 DIAGNOSIS — I25.10 CORONARY ARTERY DISEASE INVOLVING NATIVE CORONARY ARTERY OF NATIVE HEART WITHOUT ANGINA PECTORIS: ICD-10-CM

## 2020-12-09 DIAGNOSIS — E78.00 PURE HYPERCHOLESTEROLEMIA: ICD-10-CM

## 2020-12-09 LAB
CHOLEST SERPL-MCNC: 161 MG/DL (ref 120–199)
CHOLEST/HDLC SERPL: 7.7 {RATIO} (ref 2–5)
HDLC SERPL-MCNC: 21 MG/DL (ref 40–75)
HDLC SERPL: 13 % (ref 20–50)
LDLC SERPL CALC-MCNC: ABNORMAL MG/DL (ref 63–159)
NONHDLC SERPL-MCNC: 140 MG/DL
TRIGL SERPL-MCNC: 595 MG/DL (ref 30–150)

## 2020-12-09 PROCEDURE — 36415 COLL VENOUS BLD VENIPUNCTURE: CPT

## 2020-12-09 PROCEDURE — 97110 THERAPEUTIC EXERCISES: CPT

## 2020-12-09 PROCEDURE — 90853 GROUP PSYCHOTHERAPY: CPT | Mod: ,,, | Performed by: SOCIAL WORKER

## 2020-12-09 PROCEDURE — 80061 LIPID PANEL: CPT

## 2020-12-09 PROCEDURE — 90853 PR GROUP PSYCHOTHERAPY: ICD-10-PCS | Mod: ,,, | Performed by: SOCIAL WORKER

## 2020-12-09 PROCEDURE — 97530 THERAPEUTIC ACTIVITIES: CPT

## 2020-12-09 NOTE — PROGRESS NOTES
Group Psychotherapy    Site: [facility]    Clinical status of patient: Outpatient    12/9/2020    Length of service:[id number]    Referred by: Functional Restoration Program     Number of patients in attendance: 3    Target symptoms: Chronic Pain Management     Patient's response to intervention:  The patient's response to intervention is active listening.    Progress toward goals and other mental status changes:  The patient's progress toward goals is limited.    Plan: group psychotherapy    Topics Covered: Pt attended CBT for Chronic Pain as part of her participation in Functional Restoration. Topics discussed today included a discussion on boundary setting and maintaining boundaries, as pts often speak of their boundaries being dependent upon the actions of other people. We also discussed the autonomic nervous system and practiced some breathing techniques. Will f/u on Friday.

## 2020-12-09 NOTE — PROGRESS NOTES
"Functional Restoration Program  Physical Therapy   Daily Therapy Note  FRP Day 5    Name: Gordon Griffin III  MRN:[id number]  Date: 12/9/2020    Therapy Diagnosis:   Encounter Diagnoses   Name Primary?    Chronic pain syndrome Yes    Impaired functional mobility, balance, gait, and endurance      Physician: Magdalena Ruiz NP  Evaluation Date: 12/9/2020  Authorization Period Expiration: 5/27/2021  Plan of Care Expiration: 1/31/2021  Visit # / Visits authorized: 6 / 17    FR daily activities may consist of:   Biometrics   Group stretching   Individualized PT w/ resisted strengthening    Individualized OT w/ functional lifting & training   Group cognitive behavioral therapy   Informative lecture   Mindfulness    Independent Therapy  Time in:  2:40 p  Time out: 3:55 p     Billable minutes: 75 min      Subjective: Dlyon reports dec spasms and stiffness today.  Pt elect to wear tennis shoes during PT.    Pt tolerate last session well /c no c/o of soreness.    Pt reports leaving today session /c inc migraine pain but no inc LBP. However, pt leave expressing concerns that workout today may elicit inc spasms.  Pt also report need to "change costume" to Gordon when visiting her father tonight and feeling inc stress.  Pt reports fear that discussion of achievements in PT session (SLS) will be challenged at home ("prove it") and leads pt to dec motivation to complete task at home.         Current 4/10    Location(s): low back & R shld    Objective:   Mr. Griffin participated in the following activities:    Individualized Treatment:     Dylon received therapeutic exercises to develop strength, endurance, ROM, flexibility, posture and core stabilization for 75 minutes including:    Peripheral muscle strengthening which included 1-2 sets of 10-20 repetitions at a slow, controlled 5-7 second per rep pace focused on strengthening supporting musculature for improved body mechanics & functional mobility.  Patient & " therapist focused on proper form during treatment to ensure optimal strengthening of each targeted muscle group. Patients are instructed to keep sets/reps with proper load to stay at moderate on the Deny exertion scale.       Exercise Weight (lbs) Sets Repetitions Deny Exertion Scale Rating   Leg Extension  20 1 20 4   Hamstring Curls 25 1 20 4   Chest Press 20 1 20 4   Pec Fly 20 1 20 3   Lat Pull Down 15 1 20 4   Mid Row 20 1 20 4   Bicep Dumbell Curls 7 1 20 5   Standing Tricep Extension 15 1 11 5   Single Leg Press 25 1 20 4       Fitter board - middle setting  20 tilts   Fwd/Bwd     Trial 1 - R hand to step on, palmar support -> 2 finger support    Trial 2 - no hand to step on,  2 finger support, no hand to step off    Lat     Trial 1 - R hand to step on, 2 finger support    Trial 2 - no hand to step on, no hand support dec control, no hand to step off     Airex Pad:     DLS, norm DOROTHY, arms crossed, 30 sec bouts at each position   EO   EC   EC /c head nods - dec amplitude velocity (< 45 bpm), inc ankle strategy    EC /c head turns - dec amplitude velocity (< 45 bpm), inc ankle strategy   DLS, NBOS, arms crossed     EO   EC-  Inc ankle strategy, min hip strategy    EO /c head nods - dec amplitude velocity inc ankle, hip strategy    EO /c head turns - dec amplitude velocity, 1 self correct balance to R at 15 sec, completed 30 sec /s further LOB     On Ground   SLS, EO    L - 2 finger support, trial 1 8 sec, trial 2 10 sec   R - no hand support trial 1 30 sec, trial 2 /c 2 finger support 10 sec         Seated on blue Tball   Rolls  Fwd/bwd x20    Lat x20    CW x20    CCW  x20        Billable units: Therapeutic Exercise 5 units       Assessment: Dylon report of no soreness/inc LBP sx response post tx session (acute and, per last visit, delayed) indicates appropriateness of cont progressions /c exercise resistance and intensity.  This visit, pt demo inc strength /c BATCA exercises achieving inc reps and weight  tolerance.  Pt cont to demo dec pelvic and lumbar mobility, therefore, Tball exercises performed for inc mobility challenge.    Pt reports to PT today in tennis shoes indicating inc comfort out of boot style shoes and inc confidence /c R ankle stability.  To promote cont progression in R ankle functionality, extensive time spent /c ankle mobility, stabilization (and concurrently proprioception) exercises.  Pt demo inc control /c fitter board tilts progressing to no hand support for set up/step off and during ex.  Pt demo dec stability on Airex pad /c head motion, however, pt noted migraine sx during session leading to dec head motion and inc interruption to static stance.  Also, during SLS R ankle initially demo inc stability vs L ankle needing no hand support for stance. However, trial 2 performed sig dec time indicating likely dec endurance.  Pt advised to perform stance exercises at home for inc strength. However, concurrent to performing stance exercises pt subject matter change to discussing relationship /c father and pt's tone noticeably altered.  The influence of pt outside stressors affecting exercise/activity tolerance becomes further evident in pt subjective notation of effort planned during PT sessions vs effort planned at home.        GOALS: Pt is in agreement with the following goals:     Program goals: 8 weeks  At the end of therapy patient will:      -  Improve 2 Minute Walk Test (MWT) to 375 feet and 6 MWT to 1125 feet or greater to improve gait speed and mobility.  Progress towards goal:  Goal Initiated on 11/3/2020      -  Demonstrate independence with Home Exercise Program (HEP) to include: full body stretching, spinal stabilization & core stability to improve patient's general mobility, AROM and decrease reported pain.  Progress towards goal: Goal Initiated on 11/3/2020      -   Perform single leg stance 15 seconds or greater bilaterally to improve safety and balance in ADLs.  Progress towards  goal: Goal Initiated on 11/3/2020      -  Demonstrate proper self-correction in sitting & standing postures in order to decrease postural stress/strain to better manage pain.  Progress towards goal: Goal Initiated on 11/3/2020      -  Demonstrate Timed Up & Go (with appropriate assistive device, if necessary) of 10 seconds or less to decrease fall risk and improve functional mobility.  Progress towards goal: Goal Initiated on 11/3/2020      -  Demonstrate 5 time sit to stand test without upper extremity assist to 10 sec or less to improve general mobility and decrease fall risk.  Progress towards goal: Goal Initiated on 11/3/2020      -  Demonstrate independence and adherence to resistive training at self-selected facility to maintain and progress FRP strength grains.  Progress towards goal: Goal Initiated on 11/3/2020      -  Demonstrate proper diaphragmatic breathing in supine & seated positions to facilitate better pain control and promote decreased anxiety.  Progress towards goal: Goal Initiated on 11/3/2020      - Demonstrate proper knee flx & no hip circumduction in gait pattern on R LE to normalize gait pattern, improve gait efficiency/endurance to better tolerate work duties. Progress towards goal: Goal initiated 11/3/2020    Plan:     Continue PT per POC     Rj Montana, PT, DPT

## 2020-12-09 NOTE — PROGRESS NOTES
"Functional Restoration Program  Occupational Therapy   Daily Therapy Note  P Day 5    Name: Gordon Griffin III  MRN:[id number]  Date: 12/9/2020    Diagnosis:   Encounter Diagnoses   Name Primary?    Decreased independence with activities of daily living     Alteration in instrumental activities of daily living (IADL)     Impaired functional mobility, balance, gait, and endurance        [facility] daily activities may consist of:   Biometrics   Group stretching   Individualized PT w/ resisted strengthening    Individualized OT w/ functional lifting & training   Group cognitive behavioral therapy   Informative lecture   Mindfulness    Individualized treatment:  Start time: 1:10 PM  End time: 2:30 PM  Total Billable time: 75 min    Subjective: He reports "I feel pre-migraine stage right now. Seeing halos, the whole thing. But jake taken all the meds and I drank water and ate so, i'm okay"        Pain report: 5  Location: "low back and C7"    Objective: Refer to [facility] protocol for details and explanation of each activity.   Mr. Griffin participated in the following dynamic functional therapeutic activities:    Individualized Treatment: Increased resistance levels of functional conditioning exercises according to personal recovery goals. Activities include: Dynamic reaching, [id number] lifting, sustained standing activity, maximal lifting, 2-handed carry, sustained seated tasks, push and pull, waist-to-shoulder lift, box/container carry, endurance training. Daily functional conditioning progress is documented on a flow sheet which is available upon request.    Pt participated in functional lifting/endurance activities in order to increase pt's participation with vocational, selfcare ADLs, and leisure activities in order to improve quality of life.     Functional Activities:     Full body stretch w/ 3-10 sec hold technique &/or 3-5 repetition technique on all of the following:   Cervical flx/ext   Cervical " sidebending   Cervical rotation   Cervical protraction/retraction   Shld rolls   Shld depression/elevation   Scapular retraction/protraction/scaption   Posterior shld stretch (cross arm)   Shld ER stretch (chicken wing)   Elbow flx/ext   Forearm supination/pronation   Wrist flx/ext   Open/close hands/fingers   Seated trunk rotations   Seated trunk/thoracic ext/flx   Seated marches & knee to chest   Seated knee flx/ext   Seated hip ER/piriformis stretch   Seated hamstring stretch   Seated toe raise to heel raise    Seated ankle circles each way    Pt completed BUE dynamic reaching tasks in various functional ranges, with continued focus on hip rotation/weight shifting/positioning of pelvis, x 3# x 12 reps, with goal to independently pace to stay within 3-5 rating on charo exertion scale, rated as sort of hard (4/10) exertion.     Pt participated, box carry for 10 mins x10# with focused education on posture, core activation, bent elbows, heel to toe walking, rated as hard (5/10). Pt completed tasks in womens flat dress shoes.     Pt completed functional lifting, floor to waist, x 10 # with exertion rated as moderate (3/10), focused education on wide base of support, slow and controlled movement, bending knees and hinging hip, coordinating movement with breath and being mindful of neutral spine. Pt required min verbal cues to perform with proper tempo and form.    Pt completed maximal lift x20#, rated as sort of hard (4/10), continued education focused on wide base of support, toes tracking with knees, bent knees and pushing through heels for safety and activation of correct muscles.     Pt participated in functional lift waist to shoulder, x 8# with a rating of sort of hard (4/10). Pt educated on how to coordinate movement with breathe and be mindful of core engagement to minimize back extension, posture and keeping weight close to center of gravity. Pt required min verbal cues to keep weight close  to body, stepping closer towards surface before lifting waist<>shoulder.     Pt completed seated yoga/mindfulness activity. Pt completed 10 diaphragmatic breaths. Pt with continued difficulty to complete independently and continues to require max verbal cues to complete with proper technique. Pt then completed guided seated yoga flow routine. Pt given seated sun salutation to add to HEP.     Pt completed sustained standing activity at table top to complete fine motor/dexterity activity to practice in-hand manipulation, tip/pinch strengthening, and finger spreading. Pt performed each exercise [id number] on each hand and demonstrated independence with FM HEP. Focused education on standing posture, weight shifting as needed and keeping a slight bend in the knees, rated sort of hard (4/10).      Pt participated in endurance activity on stationary bike at L2 x5 min and L3 x5 min, rated as hard (5/10), continued education re: controlled pursed lip breathing and pt encouraged to pace independently using charo exertion scale to stay within 3-5 range (moderate-hard) in order to avoid boom/bust cycle. Pt carried conversation throughout activity to challenge breath control and pacing.          Length of Treatment: Mr. Griffin participated in program activities from 11 am through 4 pm today.     No environmental, cultural, spiritual, developmental or education needs expressed or noted.    Assessment:     Mr. Griffin presents with good tolerance to treatment today. She continues with high distractibility and required max redirection to maintain attention to task at hand. Pt with improved tolerance to todays session but with continued c/o migraine symptoms. She had good tolerance to load increases with functional lifting tasks and with improved independence with body mechanics only required min verbal cues. Pt with good tolerance to FM HEP and noted compliance on non-FRP days. Pt with overall good response to feedback and education and  continues to progress towards personal goals.     Pt is unable to fully participate in work, recreational activities, and home-based activities because of decreased functional endurance, limited positional tolerance, fear of re-injury, and fear of increased pain. She will continue to benefit from participating in a conditioning program designed to increase functional strength, flexibility, and endurance in order to meet functional goals.     FRP Goals: While working towards the long-term (3 month) functional goals listed above, Mr. Griffin will accomplish the following short term goals during the 3-week intensive rehabilitation program. Mr. Griffin will:      1. Develop a viable return to work plan.   2. Demonstrate physical capacities consistent with a light-medium work demand level.   3. Demonstrate increased functional strength by lifting 20lbs floor to waist  and 20lbs waist to shoulder in order to meet demand of work/home routine.   4. Increase her positional tolerance to the level needed for work and home activities.   5.  Implement self-care strategies during the program and at home to control pain.   6.  Pt will demonstrate use of mindfulness, stress management, and coping  strategies for improved participation in daily routine.      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 More comprehensive physical  testing integrated with clinical findings would be required to derived an accurate work capacity.     Plan:     Continue per POC.    Alaina Martinez OT, LOTR, 12/9/2020   Occupational Therapy

## 2020-12-11 ENCOUNTER — OFFICE VISIT (OUTPATIENT)
Dept: PSYCHIATRY | Facility: OTHER | Age: 40
End: 2020-12-11
Payer: MEDICARE

## 2020-12-11 ENCOUNTER — CLINICAL SUPPORT (OUTPATIENT)
Dept: REHABILITATION | Facility: OTHER | Age: 40
End: 2020-12-11
Payer: MEDICARE

## 2020-12-11 DIAGNOSIS — Z78.9 DECREASED INDEPENDENCE WITH ACTIVITIES OF DAILY LIVING: ICD-10-CM

## 2020-12-11 DIAGNOSIS — F33.1 MDD (MAJOR DEPRESSIVE DISORDER), RECURRENT EPISODE, MODERATE: Primary | ICD-10-CM

## 2020-12-11 DIAGNOSIS — Z78.9 ALTERATION IN INSTRUMENTAL ACTIVITIES OF DAILY LIVING (IADL): ICD-10-CM

## 2020-12-11 DIAGNOSIS — Z74.09 IMPAIRED FUNCTIONAL MOBILITY, BALANCE, GAIT, AND ENDURANCE: ICD-10-CM

## 2020-12-11 DIAGNOSIS — G89.4 CHRONIC PAIN DISORDER: ICD-10-CM

## 2020-12-11 DIAGNOSIS — G89.4 CHRONIC PAIN SYNDROME: ICD-10-CM

## 2020-12-11 PROCEDURE — 90853 PR GROUP PSYCHOTHERAPY: ICD-10-PCS | Mod: ,,, | Performed by: SOCIAL WORKER

## 2020-12-11 PROCEDURE — 97110 THERAPEUTIC EXERCISES: CPT

## 2020-12-11 PROCEDURE — 90853 GROUP PSYCHOTHERAPY: CPT | Mod: ,,, | Performed by: SOCIAL WORKER

## 2020-12-11 PROCEDURE — 97530 THERAPEUTIC ACTIVITIES: CPT

## 2020-12-11 NOTE — PROGRESS NOTES
"Functional Restoration Program  Occupational Therapy   Daily Therapy Note  P Day 6    Name: Gordon Griffin III  MRN:[id number]  Date: 12/11/2020    Diagnosis:   Encounter Diagnoses   Name Primary?    Decreased independence with activities of daily living     Alteration in instrumental activities of daily living (IADL)     Impaired functional mobility, balance, gait, and endurance        [facility] daily activities may consist of:   Biometrics   Group stretching   Individualized PT w/ resisted strengthening    Individualized OT w/ functional lifting & training   Group cognitive behavioral therapy   Informative lecture   Mindfulness    Individualized treatment:  Start time: 2:35 PM  End time: 3:50 PM  Total Billable time: 75 min    Subjective: He reports "I've been having spasms in my lower back since the past two days and nothing's helped, not the heating pad or the muscle relaxers."      Pain report: 7  Location: low back     Objective: Refer to [facility] protocol for details and explanation of each activity.   Mr. Griffin participated in the following dynamic functional therapeutic activities:    Individualized Treatment: Increased resistance levels of functional conditioning exercises according to personal recovery goals. Activities include: Dynamic reaching, [id number] lifting, sustained standing activity, maximal lifting, 2-handed carry, sustained seated tasks, push and pull, waist-to-shoulder lift, box/container carry, endurance training. Daily functional conditioning progress is documented on a flow sheet which is available upon request.    Pt participated in functional lifting/endurance activities in order to increase pt's participation with vocational, selfcare ADLs, and leisure activities in order to improve quality of life.     Functional Activities:     Full body stretch w/ 3-10 sec hold technique &/or 3-5 repetition technique on all of the following:   Cervical flx/ext   Cervical " sidebending   Cervical rotation   Cervical protraction/retraction   Shld rolls   Shld depression/elevation   Scapular retraction/protraction/scaption   Posterior shld stretch (cross arm)   Shld ER stretch (chicken wing)   Elbow flx/ext   Forearm supination/pronation   Wrist flx/ext   Open/close hands/fingers   Seated trunk rotations   Seated trunk/thoracic ext/flx   Seated marches & knee to chest   Seated knee flx/ext   Seated hip ER/piriformis stretch   Seated hamstring stretch   Seated toe raise to heel raise    Seated ankle circles each way    Pt completed BUE dynamic reaching tasks in various functional ranges, with continued focus on hip rotation/weight shifting/positioning of pelvis, x 4# x 12 reps, with goal to independently pace to stay within 3-5 rating on charo exertion scale, rated as hard (5/10) exertion.     Pt participated in box carry for 10 mins x10# with focused education on posture, core activation, bent elbows, heel to toe walking, rated as sort of hard (4/10). Pt completed tasks in women's flat dress shoes.     Pt completed bucket carry with increased weight to 7 lbs BUE x 4 mins and alternating UE x 4 mins, rated hard (5/10). Pt encouraged to slow walking rate to focus on heel to tow technique. Pt completed tasks in women's flat dress shoes.     Pt completed functional lifting, floor to waist, x 12 # with exertion rated as sort of hard (4/10), focused education on wide base of support, slow and controlled movement, bending knees and hinging hip, coordinating movement with breath and being mindful of neutral spine. Pt required mod verbal cues to perform with proper tempo and form.    Pt completed maximal lift x22#, rated as sort of hard (4/10), continued education focused on wide base of support, toes tracking with knees, bent knees and pushing through heels for safety and activation of correct muscles.     Pt participated in functional lift waist to shoulder, x 9# with a rating  "of sort of hard (4/10). Pt educated on how to coordinate movement with breathe and be mindful of core engagement to minimize back extension, posture and keeping weight close to center of gravity. Pt required min verbal cues to keep weight close to body, stepping closer towards surface before lifting waist<>shoulder.     Pt completed sustained standing activity at table top to complete /pinch strengthening bilaterally: 10 grasps x 3 second holds each hand, rolls for finger extension, and tripod pinch (3 logs). Focused education on standing posture, weight shifting as needed and keeping a slight bend in the knees, rated sort of hard (4/10).      Pt educated on benefits of mindfulness and completed a guided progressive muscle relaxation to decrease stress, muscle tension, and to improve mind/body connection. Pt rated easy (2/10) exertion. Pt noted post-activity "I feel more relaxed."    Pt participated in endurance activity on stationary bike at L2 x 10 min, rated as moderate (3/10), continued education re: controlled pursed lip breathing and pt encouraged to pace independently using charo exertion scale to stay within 3-5 range (moderate-hard) in order to avoid boom/bust cycle.          Length of Treatment: Mr. Griffin participated in program activities from 11 am through 4 pm today.     No environmental, cultural, spiritual, developmental or education needs expressed or noted.    Assessment:     Mr. Griffin presents with good tolerance to treatment today. She had improved attention to task and required less redirection, enabling her to complete 9/10 planned therapeutic activities today, much improved from previous sessions. She had good response to mindfulness body scan today and noted improved relaxation post-activity. Pt continues to require min-mod verbal cues and demonstrations to perform functional lifts with proper body mechanics. Pt's main limitation continues to be perseveration on pain symptoms in right " foot and shoulder. Overall, pt continues to progress towards personal goals.     Pt is unable to fully participate in work, recreational activities, and home-based activities because of decreased functional endurance, limited positional tolerance, fear of re-injury, and fear of increased pain. She will continue to benefit from participating in a conditioning program designed to increase functional strength, flexibility, and endurance in order to meet functional goals.     FRP Goals: While working towards the long-term (3 month) functional goals listed above, Mr. Griffin will accomplish the following short term goals during the 3-week intensive rehabilitation program. Mr. Griffin will:      1. Develop a viable return to work plan.   2. Demonstrate physical capacities consistent with a light-medium work demand level.   3. Demonstrate increased functional strength by lifting 20lbs floor to waist  and 20lbs waist to shoulder in order to meet demand of work/home routine.   4. Increase her positional tolerance to the level needed for work and home activities.   5.  Implement self-care strategies during the program and at home to control pain.   6.  Pt will demonstrate use of mindfulness, stress management, and coping  strategies for improved participation in daily routine.      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 More comprehensive physical  testing integrated with clinical findings would be required to derived an accurate work capacity.     Plan:     Continue per POC.    Raiza Esquivel OT, LOTR, 12/11/2020   Occupational Therapy

## 2020-12-11 NOTE — PROGRESS NOTES
Functional Restoration Program  Physical Therapy   Daily Therapy Note  FRP Day 6    Name: Gordon Griffin III  MRN:[id number]  Date: 12/11/2020    Therapy Diagnosis:   Encounter Diagnoses   Name Primary?    Impaired functional mobility, balance, gait, and endurance     Chronic pain syndrome      Physician: Magdalena Ruiz NP  Evaluation Date: 12/11/2020  Authorization Period Expiration: 5/27/2021  Plan of Care Expiration: 1/31/2021  Visit # / Visits authorized: 6/ 17    [facility] daily activities may consist of:   Biometrics   Group stretching   Individualized PT w/ resisted strengthening    Individualized OT w/ functional lifting & training   Group cognitive behavioral therapy   Informative lecture   Mindfulness    Independent Therapy  Time in: 1:10p  Time out: 2:25p     Billable minutes: 75      Subjective: Dylon reports she is having spasms in her low back today, but she will do her best.      Current 7/10  Location(s): low back     Objective:   Mr. Griffin participated in the following activities:    Individualized Treatment:     Dylon received therapeutic exercises to develop strength, endurance, ROM, flexibility, posture and core stabilization for 65 minutes including:    Full body stretch w/ 3-10 sec hold technique &/or 3-5 repetition technique on all of the following:   Cervical flx/ext   Cervical sidebending   Cervical rotation   Cervical protraction/retraction   Shld rolls   Shld depression/elevation   Scapular retraction/protraction/scaption   Posterior shld stretch (cross arm)   Shld ER stretch (chicken wing)   Elbow flx/ext   Forearm supination/pronation   Wrist flx/ext   Open/close hands/fingers   Seated trunk rotations   Seated trunk/thoracic ext/flx   Seated marches & knee to chest   Seated knee flx/ext   Seated hip ER/piriformis stretch   Seated hamstring stretch   Seated toe raise to heel raise    Seated ankle circles each way      Peripheral muscle strengthening which  "included 1-2 sets of 10-20 repetitions at a slow, controlled 5-7 second per rep pace focused on strengthening supporting musculature for improved body mechanics & functional mobility.  Patient & therapist focused on proper form during treatment to ensure optimal strengthening of each targeted muscle group. Patients are instructed to keep sets/reps with proper load to stay at moderate on the Deny exertion scale.       Exercise Weight (lbs) Sets Repetitions Deny Exertion Scale Rating   Leg Extension  20 1 20 4   Hamstring Curls 30 1 20 4   Chest Press       Pec Fly       Lat Pull Down 17.5 1 20 4   Mid Row 22.5 1 20 4   Bicep Dumbell Curls       Standing Tricep Extension 12.5 1 20 4   Single Leg Press 25 1 20 4     · Fitter board medium tilts in each direction 2x20 w/  support  · Single leg stance on foam w/ palmar support 3x20 sec B  · Bridge w/ glut squeeze & lat activation 2x10  · Supine LTR w/ legs on TBall 2x10  · Supine SLR w/ TBall compression into opposite LE 2x10    Patient participated in dynamic functional therapeutic activities to improve functional performance for 10  minutes, including:    Diaphragmatic breathing: (in for 4", hold 4", out 6") guided by therapist for proper abdominal excursion & decreased use of accessory muscles of breathing.  · Supine 15 reps  · Hook lying 2x10 reps  · Seated 2x15        Billable units: Therapeutic Exercise 4 units, therapeutic activities 1 unit       Assessment: Dylon w/ better attention to exercises w/ PT redirecting less often, but rather employed the use of diaphragmatic breathing to keep attention & also to help calm her system. She demonstrated good abdominal excursion in each position. She progressed many of her exercises well & did not exacerbate her symptoms.    GOALS: Pt is in agreement with the following goals:     Program goals: 8 weeks  At the end of therapy patient will:      -  Improve 2 Minute Walk Test (MWT) to 375 feet and 6 MWT to 1125 feet or " greater to improve gait speed and mobility.  Progress towards goal:  Goal Initiated on 11/3/2020      -  Demonstrate independence with Home Exercise Program (HEP) to include: full body stretching, spinal stabilization & core stability to improve patient's general mobility, AROM and decrease reported pain.  Progress towards goal: Goal Initiated on 11/3/2020      -   Perform single leg stance 15 seconds or greater bilaterally to improve safety and balance in ADLs.  Progress towards goal: Goal Initiated on 11/3/2020      -  Demonstrate proper self-correction in sitting & standing postures in order to decrease postural stress/strain to better manage pain.  Progress towards goal: Goal Initiated on 11/3/2020      -  Demonstrate Timed Up & Go (with appropriate assistive device, if necessary) of 10 seconds or less to decrease fall risk and improve functional mobility.  Progress towards goal: Goal Initiated on 11/3/2020      -  Demonstrate 5 time sit to stand test without upper extremity assist to 10 sec or less to improve general mobility and decrease fall risk.  Progress towards goal: Goal Initiated on 11/3/2020      -  Demonstrate independence and adherence to resistive training at self-selected facility to maintain and progress FRP strength grains.  Progress towards goal: Goal Initiated on 11/3/2020      -  Demonstrate proper diaphragmatic breathing in supine & seated positions to facilitate better pain control and promote decreased anxiety.  Progress towards goal: Goal Initiated on 11/3/2020      - Demonstrate proper knee flx & no hip circumduction in gait pattern on R LE to normalize gait pattern, improve gait efficiency/endurance to better tolerate work duties. Progress towards goal: Goal initiated 11/3/2020    Plan:     Continue PT per POC     Travis Reyes, PT, DPT

## 2020-12-13 ENCOUNTER — TELEPHONE (OUTPATIENT)
Dept: FAMILY MEDICINE | Facility: CLINIC | Age: 40
End: 2020-12-13

## 2020-12-13 ENCOUNTER — CLINICAL SUPPORT (OUTPATIENT)
Dept: URGENT CARE | Facility: CLINIC | Age: 40
End: 2020-12-13
Payer: MEDICARE

## 2020-12-13 DIAGNOSIS — Z03.818 ENCOUNTER FOR OBSERVATION FOR SUSPECTED EXPOSURE TO OTHER BIOLOGICAL AGENTS RULED OUT: Primary | ICD-10-CM

## 2020-12-13 DIAGNOSIS — E78.1 HYPERTRIGLYCERIDEMIA: Primary | ICD-10-CM

## 2020-12-13 LAB
CTP QC/QA: YES
SARS-COV-2 RDRP RESP QL NAA+PROBE: NEGATIVE

## 2020-12-13 PROCEDURE — U0002: ICD-10-PCS | Mod: QW,S$GLB,, | Performed by: FAMILY MEDICINE

## 2020-12-13 PROCEDURE — U0002 COVID-19 LAB TEST NON-CDC: HCPCS | Mod: QW,S$GLB,, | Performed by: FAMILY MEDICINE

## 2020-12-13 NOTE — PATIENT INSTRUCTIONS
CDC Testing and Quarantine Guidelines for patients with exposure to a known-positive COVID-19 person:    A close exposure is defined as anyone who has had an exposure (masked or unmasked) to a known COVID -19 positive person within 6 ft for longer than 15 minutes. If your exposure meets this definition you are required by CDC guidelines to quarantine for at least 7-10 days from time of exposure. The CDC states that a test can be performed for an asymptomatic patient (someone who does not have any symptoms) after a close exposure, and that a test should be done if you develop symptoms after a close exposure as described above.  Specifically, you can test at day 5 or later if asymptomatic, in order to get released from quarantine on day 7 or later.  If you develop symptoms sooner, you should test when your symptoms start.    If you meet the definition of a close exposure, it will not matter whether you are experiencing symptoms- a quarantine for at least 7-10 days after a close exposure is required by CDC guidelines.  Please note, if you decide to test as an asymptomatic during your quarantine and you are positive, you will be restarting your quarantine and moving from a possible 10 day quarantine (if you do not test), to a 11 day or greater quarantine.    The CDC also suggests people still monitor for symptoms for a full 14 days and remember that the shorter quarantine options do not replace initial CDC guidance.  The CDC continues to recommend quarantining for 14 days as the best way to reduce risk for spreading COVID-19 - however, this is only a recommendation.  If your exposure does not meet the above definition, you can return to your normal daily activities to include social distancing, wearing a mask and frequent handwashing.       NEGATIVE COVID TEST     You have tested negative for COVID-19 today.  If you did not have a close exposure (as defined below) you can return to your normal daily activities to  "include social distancing, wearing a mask and frequent handwashing.    A "close exposure" is defined as anyone who has had an exposure (masked or unmasked) to a known COVID -19 positive person within 6 ft for longer than 15 minutes. If your exposure meets this definition, you are required by CDC guidelines to quarantine for at least 7-10 days from time of exposure.    The CDC states that a test can be performed for an asymptomatic patient (someone who does not have any symptoms) after a close exposure, and that a test should be done if you develop symptoms after a close exposure as described above.    Specifically, you can test at day 5 or later if asymptomatic in order to get released from quarantine on day 7 or later.  If you develop symptoms sooner, you should test when your symptoms start.  If you developed symptoms since the exposure, and your test was negative today and less than 5 days from your exposure, you still have to quarantine for 7-10 days from the date of the exposure.  The 7-10 day quarantine begins from the day you were exposed, not the day of your test.  For example, if your exposure was on a Monday, and you waited until Friday of the same week to get tested and it was negative, your 7-10 day quarantine begins from that Monday, not the Friday you tested negative.    Please note, if you decide to test as an asymptomatic during your quarantine and you are positive, you will be restarting your quarantine and moving from a possible 10 day quarantine (if you do not test), to a 11 day or greater quarantine.                   "

## 2020-12-14 ENCOUNTER — PATIENT MESSAGE (OUTPATIENT)
Dept: FAMILY MEDICINE | Facility: CLINIC | Age: 40
End: 2020-12-14

## 2020-12-14 ENCOUNTER — CLINICAL SUPPORT (OUTPATIENT)
Dept: REHABILITATION | Facility: OTHER | Age: 40
End: 2020-12-14
Payer: MEDICARE

## 2020-12-14 ENCOUNTER — OFFICE VISIT (OUTPATIENT)
Dept: PSYCHIATRY | Facility: OTHER | Age: 40
End: 2020-12-14
Payer: MEDICARE

## 2020-12-14 DIAGNOSIS — F33.1 MDD (MAJOR DEPRESSIVE DISORDER), RECURRENT EPISODE, MODERATE: Primary | ICD-10-CM

## 2020-12-14 DIAGNOSIS — G89.4 CHRONIC PAIN SYNDROME: ICD-10-CM

## 2020-12-14 DIAGNOSIS — Z74.09 IMPAIRED FUNCTIONAL MOBILITY, BALANCE, GAIT, AND ENDURANCE: ICD-10-CM

## 2020-12-14 DIAGNOSIS — G89.4 CHRONIC PAIN DISORDER: ICD-10-CM

## 2020-12-14 DIAGNOSIS — Z78.9 ALTERATION IN INSTRUMENTAL ACTIVITIES OF DAILY LIVING (IADL): ICD-10-CM

## 2020-12-14 DIAGNOSIS — Z78.9 DECREASED INDEPENDENCE WITH ACTIVITIES OF DAILY LIVING: ICD-10-CM

## 2020-12-14 PROCEDURE — 90853 PR GROUP PSYCHOTHERAPY: ICD-10-PCS | Mod: ,,, | Performed by: SOCIAL WORKER

## 2020-12-14 PROCEDURE — 97530 THERAPEUTIC ACTIVITIES: CPT

## 2020-12-14 PROCEDURE — 97110 THERAPEUTIC EXERCISES: CPT

## 2020-12-14 PROCEDURE — 90853 GROUP PSYCHOTHERAPY: CPT | Mod: ,,, | Performed by: SOCIAL WORKER

## 2020-12-14 NOTE — PROGRESS NOTES
"Functional Restoration Program  Physical Therapy   Daily Therapy Note  FRP Day 7    Name: Gordon Griffin III  MRN:[id number]  Date: 12/14/2020    Therapy Diagnosis:   Encounter Diagnoses   Name Primary?    Impaired functional mobility, balance, gait, and endurance     Chronic pain syndrome      Physician: Magdalena Ruiz NP  Evaluation Date: 12/14/2020  Authorization Period Expiration: 5/27/2021  Plan of Care Expiration: 1/31/2021  Visit # / Visits authorized: 7/ 17    [facility] daily activities may consist of:   Biometrics   Group stretching   Individualized PT w/ resisted strengthening    Individualized OT w/ functional lifting & training   Group cognitive behavioral therapy   Informative lecture   Mindfulness    Independent Therapy  Time in: 9:40 a  Time out: 10:55a     Billable minutes: 75      Subjective: Dylon reports she had "micro spasms" in her back this weekend & did not do too much. She thinks she had an appropriate level of soreness after her last visit in her low back & feels she is improving. Due to transportation issues, she wants to learn how to workout at home w/ weights in case she cannot get to the gym.      Current 5/10  Location(s): low back     Objective:   Mr. Griffin participated in the following activities:    Individualized Treatment:     Dylon received therapeutic exercises to develop strength, endurance, ROM, flexibility, posture and core stabilization for 75 minutes including:      Peripheral muscle strengthening which included 1-2 sets of 10-20 repetitions at a slow, controlled 5-7 second per rep pace focused on strengthening supporting musculature for improved body mechanics & functional mobility.  Patient & therapist focused on proper form during treatment to ensure optimal strengthening of each targeted muscle group. Patients are instructed to keep sets/reps with proper load to stay at moderate on the Deny exertion scale.       Exercise Weight (lbs) Sets Repetitions Deny " "Exertion Scale Rating   Leg Extension        Hamstring Curls       Chest Press       Pec Fly       Lat Pull Down 17.5 1 20 4   Mid Row 22.5 1 20 4   Bicep Dumbell Curls       Standing Tricep Extension       Single Leg Press              Hammer curls 6 1 20 4   Bent over tricep kick back 4 1 20 4   Bent over row 5 1 20 4   Standing lateral shld raise 2 1 20 4   Bent over reverse fly 1 1 20 3   Air squat w/ pillow on chair for cuing n/a 1 20 5   Forward lunge partial n/a 1 10 5   Supine pec fly 3 1 20 4   Supine bridge w/ belt n/a 1 15 4   Supine chest press 6 1 20 4     NOTE: Items above in bold not part of normal FRP programming. These exercises added for patient to mimic home resisted exercises for COVID-19 precautions.    · Fitter board hard tilts in each direction 2x20 w/  support  · Supine LTR w/ legs on TBall 2x10  · Bridge on Tball 2x10 (partial)  · Supine SLR w/ TBall compression into opposite LE 2x10 1lb B LE        Billable units: Therapeutic Exercise 5 units       Assessment: Dylon castanon maira improved participation & needed less redirection today. She continues to perseverate on job duties & difficulties, but is more open to solutions suggested by therapists. She continues to progress on some resistances, but she may not fully understand the Deny exertion scale. She seems to be developing more insight into her abilities, but will need a great deal of reinforcement. In gait, she demonstrates no inversion of the ankle despite max reports of constant "spasms" pushing her into inversion. When distracted, her foot mechanics seem normal. PT to continue to assess.    GOALS: Pt is in agreement with the following goals:     Program goals: 8 weeks  At the end of therapy patient will:      -  Improve 2 Minute Walk Test (MWT) to 375 feet and 6 MWT to 1125 feet or greater to improve gait speed and mobility.  Progress towards goal:  Goal Initiated on 11/3/2020      -  Demonstrate independence with Home Exercise " Program (HEP) to include: full body stretching, spinal stabilization & core stability to improve patient's general mobility, AROM and decrease reported pain.  Progress towards goal: Goal Initiated on 11/3/2020      -   Perform single leg stance 15 seconds or greater bilaterally to improve safety and balance in ADLs.  Progress towards goal: Goal Initiated on 11/3/2020      -  Demonstrate proper self-correction in sitting & standing postures in order to decrease postural stress/strain to better manage pain.  Progress towards goal: Goal Initiated on 11/3/2020      -  Demonstrate Timed Up & Go (with appropriate assistive device, if necessary) of 10 seconds or less to decrease fall risk and improve functional mobility.  Progress towards goal: Goal Initiated on 11/3/2020      -  Demonstrate 5 time sit to stand test without upper extremity assist to 10 sec or less to improve general mobility and decrease fall risk.  Progress towards goal: Goal Initiated on 11/3/2020      -  Demonstrate independence and adherence to resistive training at self-selected facility to maintain and progress FRP strength grains.  Progress towards goal: Goal Initiated on 11/3/2020      -  Demonstrate proper diaphragmatic breathing in supine & seated positions to facilitate better pain control and promote decreased anxiety.  Progress towards goal: Goal met 12/11/2020      - Demonstrate proper knee flx & no hip circumduction in gait pattern on R LE to normalize gait pattern, improve gait efficiency/endurance to better tolerate work duties. Progress towards goal: Goal initiated 11/3/2020    Plan:     Continue PT per POC     Travis Reyes, PT, DPT

## 2020-12-14 NOTE — TELEPHONE ENCOUNTER
Labs with significantly elevated triglycerides.  Previous labs with normal values.  Office will call for repeat labs in 6 weeks after low-fat with decrease in sweets and refined carbohydrates.  Needs follow-up appointment with PCP or me if PCP not available.

## 2020-12-14 NOTE — PROGRESS NOTES
"Functional Restoration Program  Occupational Therapy   Daily Therapy Note  [facility] Day 7    Name: Gordon Griffin III  MRN:[id number]  Date: 12/14/2020    Diagnosis:   Encounter Diagnoses   Name Primary?    Decreased independence with activities of daily living     Alteration in instrumental activities of daily living (IADL)     Impaired functional mobility, balance, gait, and endurance        [facility] daily activities may consist of:   Biometrics   Group stretching   Individualized PT w/ resisted strengthening    Individualized OT w/ functional lifting & training   Group cognitive behavioral therapy   Informative lecture   Mindfulness    Individualized treatment:  Start time: 8:10 AM  End time: 9:25 AM  Total Billable time: 75 min    Subjective: He reports "I was having micro-spasms over the weekend that made me sore but i'm okay today"       Pain report: 5  Location: low back     Objective: Refer to [facility] protocol for details and explanation of each activity.   Mr. Griffin participated in the following dynamic functional therapeutic activities:    Individualized Treatment: Increased resistance levels of functional conditioning exercises according to personal recovery goals. Activities include: Dynamic reaching, [id number] lifting, sustained standing activity, maximal lifting, 2-handed carry, sustained seated tasks, push and pull, waist-to-shoulder lift, box/container carry, endurance training. Daily functional conditioning progress is documented on a flow sheet which is available upon request.    Pt participated in functional lifting/endurance activities in order to increase pt's participation with vocational, selfcare ADLs, and leisure activities in order to improve quality of life.     Functional Activities:     Full body stretch w/ 3-10 sec hold technique &/or 3-5 repetition technique on all of the following:   Cervical flx/ext   Cervical sidebending   Cervical rotation   Cervical " protraction/retraction   Shld rolls   Shld depression/elevation   Scapular retraction/protraction/scaption   Posterior shld stretch (cross arm)   Shld ER stretch (chicken wing)   Elbow flx/ext   Forearm supination/pronation   Wrist flx/ext   Open/close hands/fingers   Seated trunk rotations   Seated trunk/thoracic ext/flx   Seated marches & knee to chest   Seated knee flx/ext   Seated hip ER/piriformis stretch   Seated hamstring stretch   Seated toe raise to heel raise    Seated ankle circles each way    Pt completed BUE dynamic reaching tasks in various functional ranges, with continued focus on hip rotation/weight shifting/positioning of pelvis, x 4# x 12 reps, with goal to independently pace to stay within 3-5 rating on charo exertion scale, rated as hard (5/10) exertion.     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 Pt rated as sort of hard (4/10). Pt encouraged to slow walking rate to focus on heel to tow technique. Added focus on standing posture, core engagement, maintaining slight bend in elbows and keeping neutral neck position.     Pt completed functional lifting, floor to waist, x 13 # with exertion rated as sort of hard (4/10), focused education on wide base of support, slow and controlled movement, bending knees and hinging hip, coordinating movement with breath and being mindful of neutral spine. Added focus on strength > momentum and pushing through heels to engage glutes.     Pt completed maximal lift x23# [id number] rated as sort of hard (4/10), continued education focused on wide base of support, maintaining neutral spine and pushing through heels for safety and activation of correct muscles. Pt encouraged to hold end ranges of movement for 2 seconds each to challenge dynamic balance and muscular endurance.     Pt participated in  functional lift waist to shoulder, x 10# with a rating of sort of hard (4/10). Continued education re: how to coordinate movement with breathe and be mindful of core engagement to minimize back extension, posture and keeping weight close to center of gravity. Pt required min verbal cues for breathing.      Pt completed sustained standing activity at table top to complete /pinch strengthening bilaterally: 10 grasps x 3 second holds each hand, rolls for finger extension, MCP flexion, finger adduction with resistance, finger spreading, and tripod pinch. Focused education on standing posture, proper ergonomics for standing desk work, weight shifting as needed and keeping a slight bend in the knees, rated sort of hard (4/10). Pt educated proper ergonomics for seated and standing desk work. Pt voiced understanding.     Pt participated in endurance activity on stationary bike at L2 x 5 min and L3 x 5 min, rated as hard (5/10), to progress towards pt personal goal of increased functional endurance and community mobility. Continued education re: controlled pursed lip breathing and pt encouraged to pace independently using charo exertion scale to stay within 3-5 range (moderate-hard) in order to avoid boom/bust cycle. 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          Length of Treatment: Mr. Griffin participated in program activities from 8 am through 1 pm today.     No environmental, cultural, spiritual, developmental or education needs expressed or noted.    Assessment:     Mr. Griffin presents with good tolerance to treatment today. She had improved attention to task but required increased time to complete activities secondary to fatigue and increased need for redirection. Pt able to complete 7/10 functional activities but with improved independence with body mechanics and  pacing. Pt's main limitation continues to be perseveration on pain symptoms and muscle spasms as well as continued need for min-max redirection to task. Overall, pt continues to progress towards personal goals.     Pt is unable to fully participate in work, recreational activities, and home-based activities because of decreased functional endurance, limited positional tolerance, fear of re-injury, and fear of increased pain. She will continue to benefit from participating in a conditioning program designed to increase functional strength, flexibility, and endurance in order to meet functional goals.     FRP Goals: While working towards the long-term (3 month) functional goals listed above, Mr. Griffin will accomplish the following short term goals during the 3-week intensive rehabilitation program. Mr. Griffin will:      1. Develop a viable return to work plan.   2. Demonstrate physical capacities consistent with a light-medium work demand level.   3. Demonstrate increased functional strength by lifting 20lbs floor to waist  and 20lbs waist to shoulder in order to meet demand of work/home routine.   4. Increase her positional tolerance to the level needed for work and home activities.   5.  Implement self-care strategies during the program and at home to control pain.   6.  Pt will demonstrate use of mindfulness, stress management, and coping  strategies for improved participation in daily routine.      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 More comprehensive physical  testing integrated with clinical findings would be required to derived an accurate work capacity.     Plan:     Continue per POC.    Alaina Martinez OT, LOTR, 12/14/2020   Occupational Therapy

## 2020-12-16 ENCOUNTER — DOCUMENTATION ONLY (OUTPATIENT)
Dept: REHABILITATION | Facility: OTHER | Age: 40
End: 2020-12-16

## 2020-12-18 ENCOUNTER — CLINICAL SUPPORT (OUTPATIENT)
Dept: REHABILITATION | Facility: OTHER | Age: 40
End: 2020-12-18
Payer: MEDICARE

## 2020-12-18 ENCOUNTER — OFFICE VISIT (OUTPATIENT)
Dept: PSYCHIATRY | Facility: OTHER | Age: 40
End: 2020-12-18
Payer: MEDICARE

## 2020-12-18 DIAGNOSIS — Z74.09 IMPAIRED FUNCTIONAL MOBILITY, BALANCE, GAIT, AND ENDURANCE: ICD-10-CM

## 2020-12-18 DIAGNOSIS — Z78.9 ALTERATION IN INSTRUMENTAL ACTIVITIES OF DAILY LIVING (IADL): ICD-10-CM

## 2020-12-18 DIAGNOSIS — G89.4 CHRONIC PAIN SYNDROME: ICD-10-CM

## 2020-12-18 DIAGNOSIS — F33.1 MDD (MAJOR DEPRESSIVE DISORDER), RECURRENT EPISODE, MODERATE: Primary | ICD-10-CM

## 2020-12-18 DIAGNOSIS — Z78.9 DECREASED INDEPENDENCE WITH ACTIVITIES OF DAILY LIVING: ICD-10-CM

## 2020-12-18 DIAGNOSIS — G89.4 CHRONIC PAIN DISORDER: ICD-10-CM

## 2020-12-18 PROCEDURE — 90853 PR GROUP PSYCHOTHERAPY: ICD-10-PCS | Mod: ,,, | Performed by: SOCIAL WORKER

## 2020-12-18 PROCEDURE — 97530 THERAPEUTIC ACTIVITIES: CPT

## 2020-12-18 PROCEDURE — 90853 GROUP PSYCHOTHERAPY: CPT | Mod: ,,, | Performed by: SOCIAL WORKER

## 2020-12-18 PROCEDURE — 97110 THERAPEUTIC EXERCISES: CPT

## 2020-12-18 NOTE — PROGRESS NOTES
"Functional Restoration Program  Occupational Therapy   Daily Therapy Note  [facility] Day 9    Name: Gordon Griffin III  MRN:[id number]  Date: 12/18/2020    Diagnosis:   Encounter Diagnoses   Name Primary?    Decreased independence with activities of daily living     Alteration in instrumental activities of daily living (IADL)     Impaired functional mobility, balance, gait, and endurance        [facility] daily activities may consist of:   Biometrics   Group stretching   Individualized PT w/ resisted strengthening    Individualized OT w/ functional lifting & training   Group cognitive behavioral therapy   Informative lecture   Mindfulness    Individualized treatment:  Start time: 8:15 AM  End time: 9:30 AM  Total Billable time: 75 min    Subjective: He reports "I tripped on my cat yesterday and twisted my ankle, but I have very little pain in my ankle."       Pain report: 7  Location: low back     Post session: 5/10 low back     Objective: Refer to [facility] protocol for details and explanation of each activity.   Mr. Griffin participated in the following dynamic functional therapeutic activities:    Individualized Treatment: Increased resistance levels of functional conditioning exercises according to personal recovery goals. Activities include: Dynamic reaching, [id number] lifting, sustained standing activity, maximal lifting, 2-handed carry, sustained seated tasks, push and pull, waist-to-shoulder lift, box/container carry, endurance training. Daily functional conditioning progress is documented on a flow sheet which is available upon request.    Pt participated in functional lifting/endurance activities in order to increase pt's participation with vocational, selfcare ADLs, and leisure activities in order to improve quality of life.     Functional Activities:     Full body stretch w/ 3-10 sec hold technique &/or 3-5 repetition technique on all of the following:   Cervical flx/ext   Cervical " sidebending   Cervical rotation   Cervical protraction/retraction   Shld rolls   Shld depression/elevation   Scapular retraction/protraction/scaption   Posterior shld stretch (cross arm)   Shld ER stretch (chicken wing)   Elbow flx/ext   Forearm supination/pronation   Wrist flx/ext   Open/close hands/fingers   Seated trunk rotations   Seated trunk/thoracic ext/flx   Seated marches & knee to chest   Seated knee flx/ext   Seated hip ER/piriformis stretch   Seated hamstring stretch   Seated toe raise to heel raise    Seated ankle circles each way    Pt completed BUE dynamic reaching tasks in various functional ranges, with continued focus on hip rotation/weight shifting/positioning of pelvis, x 5# x 10 reps, with goal to independently pace to stay within 3-5 rating on charo exertion scale, rated as sort of hard (4/10) exertion.     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 Pt rated as moderate (3/10). Pt encouraged to slow walking rate to focus on heel to tow technique. Added focus on standing posture, core engagement, maintaining slight bend in elbows and keeping neutral neck position.     Pt completed functional lifting, floor to waist, x 15# with exertion rated as sort of hard (4/10), focused education on wide base of support, slow and controlled movement, bending knees and hinging hip, coordinating movement with breath and being mindful of neutral spine. Added focus on strength > momentum and pushing through heels to engage glutes.     Pt completed maximal lift x25# x 20 reps rated as hard (5/10), continued education focused on wide base of support, maintaining neutral spine and pushing through heels for safety and activation of correct muscles. Pt encouraged to hold end ranges of movement for 2 seconds each to challenge dynamic balance and muscular  "endurance. Pt required mod-max verbal cues and demonstrations for correct form, especially keeping feet parallel and pushing through heels. Pt encouraged to use mirror for visual feedback of performance with body mechanics.     Pt participated in functional lift waist to shoulder, x 11# with a rating of sort of hard (4/10). Continued education re: how to coordinate movement with breathe and be mindful of core engagement to minimize back extension, posture and keeping weight close to center of gravity. Pt required min verbal cues for breathing and stepping to place crate instead of reaching with use of arms/back.     Pt completed sustained standing activity at table top x 10 mins to complete /pinch strengthening with blue theraputty, bilaterally: 10 grasps x 3 second holds each hand, rolls for finger extension, MCP flexion, finger adduction with resistance, finger spreading, and tripod pinch. Focused education on standing posture, proper ergonomics for standing desk work, weight shifting as needed and keeping a slight bend in the knees, rated sort of hard (4/10).     Pt participated in endurance activity on stationary bike at L3 x 5 min and L2 x 5 min, rated as hard (5/10), to progress towards pt personal goal of increased functional endurance and community mobility. Continued education re: controlled pursed lip breathing and pt encouraged to pace independently using charo exertion scale to stay within 3-5 range (moderate-hard) in order to avoid boom/bust cycle.          Length of Treatment: Mr. Griffin participated in program activities from 8 am through 1 pm today.     No environmental, cultural, spiritual, developmental or education needs expressed or noted.    Assessment:     Mr. Griffin returns for FRP Day 9 after missing last session due to "migraines, vomiting, and too much zofran." She presents with fair-good tolerance to treatment today, though with high distractibility, requiring mod-max verbal cues for " redirection and to maintain attention to task. All planned therapeutic activities not achieved secondary to this. She also continues to require mod-max verbal cues for body mechanics with floor to waist lifts, but only min cues with overhead lifting. Still, pt had good tolerance to weight increases in all functional lifting/carrying tasks, maintaining good Deny RPE with all activities, and ending session with less pain than on arrival. Pt's main limitation continues to be perseveration on pain symptoms and muscle spasms as well as continued need for redirection to task. Overall, pt continues to progress towards personal goals.     Pt is unable to fully participate in work, recreational activities, and home-based activities because of decreased functional endurance, limited positional tolerance, fear of re-injury, and fear of increased pain. She will continue to benefit from participating in a conditioning program designed to increase functional strength, flexibility, and endurance in order to meet functional goals.     FRP Goals: While working towards the long-term (3 month) functional goals listed above, Mr. Griffin will accomplish the following short term goals during the 3-week intensive rehabilitation program. Mr. Griffin will:      1. Develop a viable return to work plan.   2. Demonstrate physical capacities consistent with a light-medium work demand level.   3. Demonstrate increased functional strength by lifting 20lbs floor to waist  and 20lbs waist to shoulder in order to meet demand of work/home routine.   4. Increase her positional tolerance to the level needed for work and home activities.   5.  Implement self-care strategies during the program and at home to control pain.   6.  Pt will demonstrate use of mindfulness, stress management, and coping  strategies for improved participation in daily routine.      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 More comprehensive physical  testing integrated with clinical findings would be required to derived an accurate work capacity.     Plan:     Continue per POC.    Raiza Esquivel OT, LOTR, 12/18/2020   Occupational Therapy

## 2020-12-18 NOTE — PROGRESS NOTES
Functional Restoration Program  Physical Therapy   Daily Therapy Note  FRP Day 9    Name: Gordon Griffin III  MRN:[id number]  Date: 12/18/2020    Therapy Diagnosis:   Encounter Diagnoses   Name Primary?    Impaired functional mobility, balance, gait, and endurance     Chronic pain syndrome      Physician: Magdalena Ruiz NP   Authorization Period Expiration: 5/27/2021  Plan of Care Expiration: 1/31/2021  Visit # / Visits authorized: 8/ 17    [facility] daily activities may consist of:   Biometrics   Group stretching   Individualized PT w/ resisted strengthening    Individualized OT w/ functional lifting & training   Group cognitive behavioral therapy   Informative lecture   Mindfulness    Independent Therapy  Time in: 9:40 a  Time out: 10:55a     Billable minutes: 75      Subjective: Dylon reports she missed Wednesday due to being up all night w/ migraines & vomiting. She would have come to the program, but she took Zofran & was too sleepy.      Current 7/10  Location(s): low back     Objective:   Mr. Griffin participated in the following activities:    Individualized Treatment:     Dylon received therapeutic exercises to develop strength, endurance, ROM, flexibility, posture and core stabilization for 75 minutes including:      Peripheral muscle strengthening which included 1-2 sets of 10-20 repetitions at a slow, controlled 5-7 second per rep pace focused on strengthening supporting musculature for improved body mechanics & functional mobility.  Patient & therapist focused on proper form during treatment to ensure optimal strengthening of each targeted muscle group. Patients are instructed to keep sets/reps with proper load to stay at moderate on the Deny exertion scale.       Exercise Weight (lbs) Sets Repetitions Deny Exertion Scale Rating   Leg Extension        Hamstring Curls       Chest Press 20 1 20 3   Pec Fly 20 1 20 4   Lat Pull Down 17.5 1 20 3   Mid Row 25 1 15 3   Bicep Bar Curls 10 1 15 4    Standing Tricep Extension 10 1 15 4   Single Leg Press 25 1 20 4          Hammer curls       Bent over tricep kick back       Bent over row       Standing lateral shld raise       Bent over reverse fly       Air squat w/ pillow on chair for cuing       Forward lunge partial       Supine pec fly       Supine bridge w/ belt       Supine chest press         NOTE: Items above in bold not part of normal FRP programming. These exercises added for patient to mimic home resisted exercises for COVID-19 precautions.    · Fitter board hard tilts in each direction 2x20 w/ palmar support  · Supine LTR w/ legs on TBall 2x10  · Bridge on Tball 2x10 (partial)  · Supine SLR w/ TBall compression into opposite LE 2x15 1lb B LE  · Seated cat-cow 2x15  · Seated alt UE/LE flx 2x15 (dying bug)        Billable units: Therapeutic Exercise 5 units       Assessment: Dlyon w/ some increased tangential dialogue & increased inability to focus on exercises. PT attempted to distract pt as often as possible from off-topic conversations, but she needed near constant redirection. Her reports of pain using functional scale are not commiserate w/ her activity levels in therapy sessions. This may indicate that she still is not aware of her abilities. Her reports of spasms in there foot & shld are also inconsistent, which may be some conversion-like disorder. PT will continue to reinforce the need for activity after the program & encourage pt to see her progress as motivation.    GOALS: Pt is in agreement with the following goals:     Program goals: 8 weeks  At the end of therapy patient will:      -  Improve 2 Minute Walk Test (MWT) to 375 feet and 6 MWT to 1125 feet or greater to improve gait speed and mobility.  Progress towards goal:  Goal Initiated on 11/3/2020      -  Demonstrate independence with Home Exercise Program (HEP) to include: full body stretching, spinal stabilization & core stability to improve patient's general mobility, AROM and  decrease reported pain.  Progress towards goal: Goal Initiated on 11/3/2020      -   Perform single leg stance 15 seconds or greater bilaterally to improve safety and balance in ADLs.  Progress towards goal: Goal Initiated on 11/3/2020      -  Demonstrate proper self-correction in sitting & standing postures in order to decrease postural stress/strain to better manage pain.  Progress towards goal: Goal Initiated on 11/3/2020      -  Demonstrate Timed Up & Go (with appropriate assistive device, if necessary) of 10 seconds or less to decrease fall risk and improve functional mobility.  Progress towards goal: Goal Initiated on 11/3/2020      -  Demonstrate 5 time sit to stand test without upper extremity assist to 10 sec or less to improve general mobility and decrease fall risk.  Progress towards goal: Goal Initiated on 11/3/2020      -  Demonstrate independence and adherence to resistive training at self-selected facility to maintain and progress FRP strength grains.  Progress towards goal: Goal Initiated on 11/3/2020      -  Demonstrate proper diaphragmatic breathing in supine & seated positions to facilitate better pain control and promote decreased anxiety.  Progress towards goal: Goal met 12/11/2020      - Demonstrate proper knee flx & no hip circumduction in gait pattern on R LE to normalize gait pattern, improve gait efficiency/endurance to better tolerate work duties. Progress towards goal: Goal initiated 11/3/2020    Plan:     Continue PT per POC     Travis Reyes, PT, DPT

## 2020-12-21 ENCOUNTER — CLINICAL SUPPORT (OUTPATIENT)
Dept: REHABILITATION | Facility: OTHER | Age: 40
End: 2020-12-21
Payer: MEDICARE

## 2020-12-21 ENCOUNTER — OFFICE VISIT (OUTPATIENT)
Dept: PSYCHIATRY | Facility: OTHER | Age: 40
End: 2020-12-21
Payer: MEDICARE

## 2020-12-21 DIAGNOSIS — Z78.9 DECREASED INDEPENDENCE WITH ACTIVITIES OF DAILY LIVING: ICD-10-CM

## 2020-12-21 DIAGNOSIS — G89.4 CHRONIC PAIN DISORDER: ICD-10-CM

## 2020-12-21 DIAGNOSIS — F33.1 MDD (MAJOR DEPRESSIVE DISORDER), RECURRENT EPISODE, MODERATE: Primary | ICD-10-CM

## 2020-12-21 DIAGNOSIS — Z74.09 IMPAIRED FUNCTIONAL MOBILITY, BALANCE, GAIT, AND ENDURANCE: ICD-10-CM

## 2020-12-21 DIAGNOSIS — Z78.9 ALTERATION IN INSTRUMENTAL ACTIVITIES OF DAILY LIVING (IADL): ICD-10-CM

## 2020-12-21 DIAGNOSIS — G89.4 CHRONIC PAIN SYNDROME: ICD-10-CM

## 2020-12-21 PROCEDURE — 97110 THERAPEUTIC EXERCISES: CPT

## 2020-12-21 PROCEDURE — 90853 GROUP PSYCHOTHERAPY: CPT | Mod: ,,, | Performed by: SOCIAL WORKER

## 2020-12-21 PROCEDURE — 90853 PR GROUP PSYCHOTHERAPY: ICD-10-PCS | Mod: ,,, | Performed by: SOCIAL WORKER

## 2020-12-21 PROCEDURE — 97530 THERAPEUTIC ACTIVITIES: CPT

## 2020-12-21 NOTE — PROGRESS NOTES
NOTE: This is a duplicate copy utilized for reassessment purposes & continuity of care.  See pt's plan of care for co-signed copy.    [facility]  Functional Restoration Program  Physical Therapy Progress Report    Name: Gordon Griffin [facility]  Clinic Number: [id number]    Therapy Diagnosis:   Encounter Diagnoses   Name Primary?    Impaired functional mobility, balance, gait, and endurance     Chronic pain syndrome      Physician: Magdalena Ruiz, NP    Physician Orders: PT Eval and Treat   Medical Diagnosis from Referral:   G89.4 (ICD-10-CM) - Chronic pain disorder   Z74.09 (ICD-10-CM) - Impaired functional mobility, balance, gait, and endurance     Date: 12/21/2020  Plan of Care Expiration: 2/12/2021  Time In: 2:35p  Time Out: 3:50p  Total Billable Time: 75 minutes    Precautions: Standard and Fall    Subjective   Patient reports that she can tell that she has improved in her functioning since starting the program. Her spasms are less when she is walking & her balance is better. She plans to continue w/ resisted training at a gym when FRP finishes. Although her pain still comes on pretty strong, she feels like she has more strategies to manage it. She is happy w/ her progress.    Pts FRP goals: improve work performance (better climbing, crawling & lifting as a full-fledged )    Pain:    Current 7/10, worst 7/10, best 3/10   Location(s): entire spine & neck, R shld, R foot  Description: Aching and Dull  Functional exacerbations: Walking and Lifting  Easing Factors: pain medication, lying down, hot bath and rest      Objective       Range of Motion - Cervical   AROM AROM AROM    Evaluation [place] FRP  1 Month Follow Up   Flexion 32 ° 34° °   Extension 20 ° 44° °   Side bending Right 17 ° 27° °   Side bending Left 25 ° 30° °   Rotation Right 47 ° 70° °   Rotation Left 49 ° 57° °     Range of Motion - Lumbar   ROM Loss   Flexion moderate loss   Extension within functional  limits   Side bending Right minimal loss   Side bending Left within functional limits   Rotation Right minimal loss   Rotation Left moderate loss     Strength Testing   Evaluation [place] FRP  1 Month Follow Up   Motion Tested         Lower Extremity R L R L R L   Hip Flexion 4+/5 4+/5 5/5 4+/5     Hip Extension 4-/5 4-/5 4/5 4/5     Hip Abduction 4/5 4+/5 4+/5 4+/5     Hip IR 4/5 4-/5 4/5 4/5     Hip ER 4/5 4-/5 4+/5 4/5     Knee Extension 4+/5 4/5 5/5 4+/5     Knee Flexion 4/5 4-/5 4+/5 4/5     Ankle PF 4+/5 5/5 4+/5 5/5     Ankle DF 4+/5 4+/5 4+/5 4+/5     Ankle inversion 4/5 4/5 4/5 4+/5     Ankle eversion 4+/5 4/5 4/5 4+/5       Functional Testing     Evaluation [place] FRP  1 Month Follow Up   Timed Up and Go 17.7 sec 9.4 sec sec   5 Time Sit to Stand w/out UE 14.6 sec 7.2 sec sec   Single Limb Stance R LE 5.7 sec >30 sec sec   Single Limb Stance L LE 8.1 sec > 30 sec sec   2 Minute Walk Test 266 feet 401 feet feet   6 Minute Walk Test  1285 feet feet   Self Selected Walking Speed 0.68 m/sec 1.09 m/sec m/sec     Minimum standards/norms:    TUG:  < 13.5 seconds/ Males 7.3 sec, Females 8.1 sec  SLS:  26-29 sec  Ages 20-69  Self Selected Walking Speed:  .4-.8m/sec  Limited community ambulator                                                   .8- 1.2  Community ambulator                                                    1.2 m/sec and above  Able to safely cross streets      TREATMENT     Dylon received therapeutic exercises to develop strength, ROM and flexibility for 75 minutes including:    · PT reassessment (see above)  · LTR w/ Tball 2x10  · Bridge on TBall 2x10  · Supine straight leg raise w/ opp LE TBall compression 12 B  · Single leg press 30 lb 20 B  · Bicep bar curls 10 lb 20  · Tricep push down 10 lb 20  · Seated mindfulness for 10 minutes guided w/ proper breathing & relaxation techniques    Assessment   Dylon w/ the need for a great deal of redirection in all of her PT sessions, perseverating on work  duties & stories often. She has improved w/ being more mentally present & her objective measures have improved in nearly all categories. When she has reports of neck or foot spasms, it appears to be a conscious attention towards them & can sometimes look contrived or exaggerated. With redirection or distraction techniques, the spasms appear to resolve rather quickly. In the 6 minute walk test, she wore tennis shoes (rather than her rigid boots from before) & demonstrated no ankle deviations or major gait deviations. She may not have full insight into her abilities & PT will continue to encourage her to see her successes & abilities. Overall she has made good progress towards goals. Her chronic issues & behavioral obstacles may make it difficult for the pt to continue progressing at the end of the program.    Based on the above history and physical examination an active physical therapy program within [facility] is recommended.  Pt will benefit from skilled outpatient physical therapy to address the deficits listed below in the chart, provide pt/family education and to maximize pt's level of independence in the home and community environment.     Plan of care discussed with patient: Yes  Pt's spiritual, cultural and educational needs considered and patient is agreeable to the plan of care and goals as stated below:     Pt prognosis is Fair.   Anticipated Barriers for therapy: lack of diagnoses for spasms; several concussions; chronic nature of injury    GOALS: Pt is in agreement with the following goals:    Program goals: 8 weeks  At the end of therapy patient will:      -  Improve 2 Minute Walk Test (MWT) to 375 feet and 6 MWT to 1125 feet or greater to improve gait speed and mobility.  Progress towards goal:  Goal Met on 12/21/2020      -  Demonstrate independence with Home Exercise Program (HEP) to include: full body stretching, spinal stabilization & core stability to improve patient's general mobility, AROM and  decrease reported pain.  Progress towards goal: Progressing      -   Perform single leg stance 15 seconds or greater bilaterally to improve safety and balance in ADLs.  Progress towards goal: Goal Met on 12/21/2020      -  Demonstrate proper self-correction in sitting & standing postures in order to decrease postural stress/strain to better manage pain.  Progress towards goal: Progressing      -  Demonstrate Timed Up & Go (with appropriate assistive device, if necessary) of 10 seconds or less to decrease fall risk and improve functional mobility.  Progress towards goal: Goal Met on 12/21/2020      -  Demonstrate 5 time sit to stand test without upper extremity assist to 10 sec or less to improve general mobility and decrease fall risk.  Progress towards goal: Goal Met on 12/21/2020      -  Demonstrate independence and adherence to resistive training at self-selected facility to maintain and progress FRP strength grains.  Progress towards goal: Progressing      -  Demonstrate proper diaphragmatic breathing in supine & seated positions to facilitate better pain control and promote decreased anxiety.  Progress towards goal: Progressing      - Demonstrate proper knee flx & no hip circumduction in gait pattern on R LE to normalize gait pattern, improve gait efficiency/endurance to better tolerate work duties. Progress towards goal: Goal met 12/21/2020    Plan   Plan of care Certification: 12/21/2020 to 2/12/2021.    Outpatient Physical Therapy 3 times weekly for 2 weeks to include the following interventions: Electrical Stimulation per PT, Gait Training, Manual Therapy, Moist Heat/ Ice, Neuromuscular Re-ed, Patient Education, Therapeutic Activites and Therapeutic Exercise.     Following this, pt to go on hold for minimum of 3 weeks to attempt independence w/ HEP & PT activities, then return for reassessment.      Travis Reyes, PT, DPT

## 2020-12-21 NOTE — PLAN OF CARE
"Functional Restoration Program  Occupational Therapy   Daily Therapy Note  FRP Day 10 Reassessment    Name: Gordon Griffin III  MRN:[id number]  Date: 2020    Diagnosis:   Encounter Diagnoses   Name Primary?    Decreased independence with activities of daily living     Alteration in instrumental activities of daily living (IADL)     Impaired functional mobility, balance, gait, and endurance        FRP daily activities may consist of:   Biometrics   Group stretching   Individualized PT w/ resisted strengthening    Individualized OT w/ functional lifting & training   Group cognitive behavioral therapy   Informative lecture   Mindfulness    Individualized treatment:  Start time: 1:10 PM  End time: 2:25 PM  Total Billable time: 75 min    Subjective: He reports "I am doing great. I was able to do stuff over the weekend that I haven't been able to do in 2 years - carry an airpack for 2 hours, running with weighted tool belt. I feel like my balance and ability to lift have been the biggest things for me. My team has even made comments recently about how good I'm doing".         Pain report: 6  Location: low back     Social and ADL History:  Activities of Daily Living Checklist:   Key to Score:   0: Unable to do the activity  1: Can do the activity with great difficulty  2: Can do the activity with little difficulty   3: No problem with activity  N/A: This is not an activity I do  H/T: Have not tried yet     Personal Care:  GAP/Reassess Homemaking:   GAP/Reassess   Dressing Upper Body:  2 / 2 Meal preparation: 2 / 1   Dressing Lower Body:  2 / 2 Kitchen Cleanup: 2 / 1   Bathin / 2 Childcare:  na   Hair Care:  2 / 2 Grocery shoppin / 3   Sleep:  2 / 2 Vacuuming/moppin / 2       Emptying trash/ garbage ba / 3   General Mobility:    Bed making changin / 2   Sittin / 3 Laundry  2 / 2   Bendin / 2       Getting in/out of bed:  2 / 3 Home Maintenance:     Standin / 3 Mowing, " raking:  na   Walkin / 3 Gardening:  na   Twistin / 3 Home Repairs:  na   Liftin / 2 Painting:  na             Getting around:          Getting in and out of car:  2  3       Buckling up you seat belt:  2  3       Opening heavy doors:         Climbing/descending stairs:                      Objective: Refer to FRP protocol for details and explanation of each activity.   Mr. Griffin participated in the following dynamic functional therapeutic activities:    Individualized Treatment: Increased resistance levels of functional conditioning exercises according to personal recovery goals. Activities include: Dynamic reaching, [id number] lifting, sustained standing activity, maximal lifting, 2-handed carry, sustained seated tasks, push and pull, waist-to-shoulder lift, box/container carry, endurance training. Daily functional conditioning progress is documented on a flow sheet which is available upon request.    Pt participated in functional lifting/endurance activities in order to increase pt's participation with vocational, selfcare ADLs, and leisure activities in order to improve quality of life.     Functional Activities:       Evaluation / Reassessment   5 times sit-stand  (adults 18-65 y/o) 31.54 seconds / 7.22 seconds  >12 sec= fall risk for general elderly  >16 sec= fall risk for Parkinson's disease  >10 sec= balance/vestibular dysfunction (<61 y/o)  >14.2 sec= balance/vestibular dysfunction (>61 y/o)  >12 sec= fall risk for CVA      Endurance Testing: Modified Ramp Treadmill Test    GAP Re-assessment Follow-up   Minutes Completed  3 mins 30 secs  5 min 15 sec     % Grades  10 %  11 %     Speed (mph)  1.7 mph  2.3 mph     METS 4  6     HR 85 bpm  98 bpm     Deny Rating Perceived Exertion Scale   3  4     Reason for stop point: Pt reported posture changes, fatigue and SOB     Functional Strength Test:  Pile Testing: Lifting  (Pounds/Heart rate)   First Day of   FRP (#/BP)  DAY 10  Reassessment   Follow-up   Appointment    Repetitive Floor to Waist     12#/75bpm  20#/88bpm         Repetitive Waist to Shoulder    10#/71bpm   20#/87bpm             1- Time Maximum     20#/73bpm  35#/92bpm           Carry-2 Handed (40ft)     15#/73bpm  25#/90bpm          Work demand Level     Sedentary-Light  Medium         Reason for Stop point: Increased time required for completing repetitions. During physical testing, participation level consistent    Full body stretch w/ 3-10 sec hold technique &/or 3-5 repetition technique on all of the following:   Cervical flx/ext   Cervical sidebending   Cervical rotation   Cervical protraction/retraction   Shld rolls   Shld depression/elevation   Scapular retraction/protraction/scaption   Posterior shld stretch (cross arm)   Shld ER stretch (chicken wing)   Elbow flx/ext   Forearm supination/pronation   Wrist flx/ext   Open/close hands/fingers   Seated trunk rotations   Seated trunk/thoracic ext/flx   Seated marches & knee to chest   Seated knee flx/ext   Seated hip ER/piriformis stretch   Seated hamstring stretch   Seated toe raise to heel raise    Seated ankle circles each way    Pt completed BUE dynamic reaching tasks in various functional ranges, with continued focus on hip rotation/weight shifting/positioning of pelvis, x 5# x 12 reps, with goal to independently pace to stay within 3-5 rating on charo exertion scale, rated as hard (5 /10) exertion.     Pt educated on [employer]'s lifting technique to simulate laundry and cleaning tasks with education focused on safety and stability with standing. Pt educated on the importance of cross-over method, RUE/LLE for stability and vis versa for movement within lift. Pt able to demonstrate concept of lift x 6-10 trials on each leg using 2-5# db. Pt rated as hard (5/10) exertion.     Pt completed sustained standing activity at table top x 10 mins to complete /pinch strengthening with blue theraputty,  bilaterally: 10 grasps x 3 second holds each hand, rolls for finger extension, MCP flexion, finger adduction with resistance, finger spreading, and tripod pinch. Focused education on standing posture, proper ergonomics for standing desk work, weight shifting as needed and keeping a slight bend in the knees, rated moderate (3/10).     Pt educated on and participated in seated yoga flow x10 min with focus on stability, strength, balance, sitting posture, alignment, and coordinating breath with movement. Pt educated on both physical and mental benefits of yoga.     Pt participated in seated mindfulness/breathing activity to complete x10 guided  diaphragmatic breaths and x5 reps independently. Pt with improvements in independence with technique. Pt noted that wants to continue to practice use of breathing techniques for stress management, especially to deal with work stress/demands.     Length of Treatment: Mr. Griffin participated in program activities from 11 am through 4 pm today.     No environmental, cultural, spiritual, developmental or education needs expressed or noted.    Assessment:     Mr. Griffin presents with good tolerance to treatment today, though with continued high distractibility, requiring mod-max verbal cues for redirection and to maintain attention to task. Pt with great improvements in all objective measures. Pt progressed 3 physical demand levels, and 2 MET levels. Pt with improvements in all aspects of PILE test and able to tolerance weight increases by at least 5# in each functional lift. Pt no longer in fall risk category re: 5x sit to stand. Pt with good perceived functional improvements and improved self-efficacy. Pt openly discussed that her friends and family have noticed functional gains and improved activity tolerance. Pt's main limitation continues to be perseveration on pain symptoms and muscle spasms as well as continued need for redirection to task. Overall, pt continues to progress  towards personal goals.     Pt is unable to fully participate in work, recreational activities, and home-based activities because of decreased functional endurance, limited positional tolerance, fear of re-injury, and fear of increased pain. She will continue to benefit from participating in a conditioning program designed to increase functional strength, flexibility, and endurance in order to meet functional goals.     FRP Goals: While working towards the long-term (3 month) functional goals listed above, Mr. Griffin will accomplish the following short term goals during the 3-week intensive rehabilitation program. Mr. Griffin will:      1. Develop a viable return to work plan. progressing  2. Demonstrate physical capacities consistent with a light-medium work demand level. MET 12/21/2020   3. Demonstrate increased functional strength by lifting 20lbs floor to waist  and 20lbs waist to shoulder in order to meet demand of work/home routine. MET 12/21/2020  4. Increase her positional tolerance to the level needed for work and home activities. progressing  5.  Implement self-care strategies during the program and at home to control pain. progressing  6.  Pt will demonstrate use of mindfulness, stress management, and coping  strategies for improved participation in daily routine.  progressing    UPDATED goals 12/21/2020:  1. Demonstrate physical capacities consistent with a medium-heavy work demand level  2. 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 More comprehensive physical  testing integrated with clinical findings would be required to derived an accurate work capacity.       Plan: UPDATED    Plan of care certification: 12/21/2020 to 2/22/2021     Outpatient occupational therapy, 3 x/wk for 2 additional weeks, per pt tolerance, to include the following interventions: Patient Education, Self- Care/Home-management strategies, Therapeutic Activities and Therapeutic Exercise. Pt to be placed on hold until 2 month follow up to practice independence with functional tasks.     Alaina Martinez, OT, [facility], 12/21/2020   Occupational Therapy

## 2020-12-21 NOTE — PROGRESS NOTES
"Functional Restoration Program  Occupational Therapy   Daily Therapy Note  FRP Day 10 Reassessment  NOTE: This is a duplicate copy utilized for reassessment purposes & continuity of care.  See pt's plan of care for co-signed copy.    Name: Gordon Griffin III  MRN:[id number]  Date: 2020    Diagnosis:   Encounter Diagnoses   Name Primary?    Decreased independence with activities of daily living     Alteration in instrumental activities of daily living (IADL)     Impaired functional mobility, balance, gait, and endurance        FRP daily activities may consist of:   Biometrics   Group stretching   Individualized PT w/ resisted strengthening    Individualized OT w/ functional lifting & training   Group cognitive behavioral therapy   Informative lecture   Mindfulness    Individualized treatment:  Start time: 1:10 PM  End time: 2:25 PM  Total Billable time: 75 min    Subjective: He reports "I am doing great. I was able to do stuff over the weekend that I haven't been able to do in 2 years - carry an airpack for 2 hours, running with weighted tool belt. I feel like my balance and ability to lift have been the biggest things for me. My team has even made comments recently about how good I'm doing".         Pain report: 6  Location: low back     Social and ADL History:  Activities of Daily Living Checklist:   Key to Score:   0: Unable to do the activity  1: Can do the activity with great difficulty  2: Can do the activity with little difficulty   3: No problem with activity  N/A: This is not an activity I do  H/T: Have not tried yet     Personal Care:  GAP/Reassess Homemaking:   GAP/Reassess   Dressing Upper Body:  2 / 2 Meal preparation: 2    Dressing Lower Body:  2 / 2 Kitchen Cleanup:    Bathin / 2 Childcare:  na   Hair Care:   Grocery shoppin / 3   Sleep:   Vacuuming/moppin / 2       Emptying trash/ garbage ba / 3   General Mobility:    Bed making changin / 2 "   Sittin / 3 Laundry  2 / 2   Bendin / 2       Getting in/out of bed:  2 / 3 Home Maintenance:     Standin / 3 Mowing, raking:  na   Walkin  3 Gardening:  na   Twistin / 3 Home Repairs:  na   Liftin  2 Painting:  na             Getting around:          Getting in and out of car:  2 / 3       Buckling up you seat belt:  2  3       Opening heavy doors:         Climbing/descending stairs:                      Objective: Refer to FRP protocol for details and explanation of each activity.   Mr. Griffin participated in the following dynamic functional therapeutic activities:    Individualized Treatment: Increased resistance levels of functional conditioning exercises according to personal recovery goals. Activities include: Dynamic reaching, xtzqn-[id number] lifting, sustained standing activity, maximal lifting, 2-handed carry, sustained seated tasks, push and pull, waist-to-shoulder lift, box/container carry, endurance training. Daily functional conditioning progress is documented on a flow sheet which is available upon request.    Pt participated in functional lifting/endurance activities in order to increase pt's participation with vocational, selfcare ADLs, and leisure activities in order to improve quality of life.     Functional Activities:       Evaluation / Reassessment   5 times sit-stand  (adults 18-63 y/o) 31.54 seconds / 7.22 seconds  >12 sec= fall risk for general elderly  >16 sec= fall risk for Parkinson's disease  >10 sec= balance/vestibular dysfunction (<61 y/o)  >14.2 sec= balance/vestibular dysfunction (>61 y/o)  >12 sec= fall risk for CVA      Endurance Testing: Modified Ramp Treadmill Test    GAP Re-assessment Follow-up   Minutes Completed  3 mins 30 secs  5 min 15 sec     % Grades  10 %  11 %     Speed (mph)  1.7 mph  2.3 mph     METS 4  6     HR 85 bpm  98 bpm     Deny Rating Perceived Exertion Scale   3  4     Reason for stop point: Pt reported posture  changes, fatigue and SOB     Functional Strength Test:  Pile Testing: Lifting  (Pounds/Heart rate)   First Day of   FRP (#/BP)  DAY 10 Reassessment   Follow-up   Appointment    Repetitive Floor to Waist     12#/75bpm  20#/88bpm         Repetitive Waist to Shoulder    10#/71bpm   20#/87bpm             1- Time Maximum     20#/73bpm  35#/92bpm           Carry-2 Handed (40ft)     15#/73bpm  25#/90bpm          Work demand Level     Sedentary-Light  Medium         Reason for Stop point: Increased time required for completing repetitions. During physical testing, participation level consistent    Full body stretch w/ 3-10 sec hold technique &/or 3-5 repetition technique on all of the following:   Cervical flx/ext   Cervical sidebending   Cervical rotation   Cervical protraction/retraction   Shld rolls   Shld depression/elevation   Scapular retraction/protraction/scaption   Posterior shld stretch (cross arm)   Shld ER stretch (chicken wing)   Elbow flx/ext   Forearm supination/pronation   Wrist flx/ext   Open/close hands/fingers   Seated trunk rotations   Seated trunk/thoracic ext/flx   Seated marches & knee to chest   Seated knee flx/ext   Seated hip ER/piriformis stretch   Seated hamstring stretch   Seated toe raise to heel raise    Seated ankle circles each way    Pt completed BUE dynamic reaching tasks in various functional ranges, with continued focus on hip rotation/weight shifting/positioning of pelvis, x 5# x 12 reps, with goal to independently pace to stay within 3-5 rating on charo exertion scale, rated as hard (5 /10) exertion.     Pt educated on [employer]'s lifting technique to simulate laundry and cleaning tasks with education focused on safety and stability with standing. Pt educated on the importance of cross-over method, RUE/LLE for stability and vis versa for movement within lift. Pt able to demonstrate concept of lift x 6-10 trials on each leg using 2-5# db. Pt rated as hard (5/10)  exertion.     Pt completed sustained standing activity at table top x 10 mins to complete /pinch strengthening with blue theraputty, bilaterally: 10 grasps x 3 second holds each hand, rolls for finger extension, MCP flexion, finger adduction with resistance, finger spreading, and tripod pinch. Focused education on standing posture, proper ergonomics for standing desk work, weight shifting as needed and keeping a slight bend in the knees, rated moderate (3/10).     Pt educated on and participated in seated yoga flow x10 min with focus on stability, strength, balance, sitting posture, alignment, and coordinating breath with movement. Pt educated on both physical and mental benefits of yoga.     Pt participated in seated mindfulness/breathing activity to complete x10 guided  diaphragmatic breaths and x5 reps independently. Pt with improvements in independence with technique. Pt noted that wants to continue to practice use of breathing techniques for stress management, especially to deal with work stress/demands.     Length of Treatment: Mr. Griffin participated in program activities from 11 am through 4 pm today.     No environmental, cultural, spiritual, developmental or education needs expressed or noted.    Assessment:     Mr. Griffin presents with good tolerance to treatment today, though with continued high distractibility, requiring mod-max verbal cues for redirection and to maintain attention to task. Pt with great improvements in all objective measures. Pt progressed 3 physical demand levels, and 2 MET levels. Pt with improvements in all aspects of PILE test and able to tolerance weight increases by at least 5# in each functional lift. Pt no longer in fall risk category re: 5x sit to stand. Pt with good perceived functional improvements and improved self-efficacy. Pt openly discussed that her friends and family have noticed functional gains and improved activity tolerance. Pt's main limitation continues to be  perseveration on pain symptoms and muscle spasms as well as continued need for redirection to task. Overall, pt continues to progress towards personal goals.     Pt is unable to fully participate in work, recreational activities, and home-based activities because of decreased functional endurance, limited positional tolerance, fear of re-injury, and fear of increased pain. She will continue to benefit from participating in a conditioning program designed to increase functional strength, flexibility, and endurance in order to meet functional goals.     FRP Goals: While working towards the long-term (3 month) functional goals listed above, Mr. Griffin will accomplish the following short term goals during the 3-week intensive rehabilitation program. Mr. Griffin will:      1. Develop a viable return to work plan. progressing  2. Demonstrate physical capacities consistent with a light-medium work demand level. MET 12/21/2020   3. Demonstrate increased functional strength by lifting 20lbs floor to waist  and 20lbs waist to shoulder in order to meet demand of work/home routine. MET 12/21/2020  4. Increase her positional tolerance to the level needed for work and home activities. progressing  5.  Implement self-care strategies during the program and at home to control pain. progressing  6.  Pt will demonstrate use of mindfulness, stress management, and coping  strategies for improved participation in daily routine.  progressing    UPDATED goals 12/21/2020:  1. Demonstrate physical capacities consistent with a medium-heavy work demand level  2. 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 More comprehensive physical  testing integrated with clinical findings would be required to derived an accurate work capacity.       Plan: UPDATED    Plan of care certification: 12/21/2020 to 2/22/2021     Outpatient occupational therapy, 3 x/wk for 2 additional weeks, per pt tolerance, to include the following interventions: Patient Education, Self- Care/Home-management strategies, Therapeutic Activities and Therapeutic Exercise. Pt to be placed on hold until 2 month follow up to practice independence with functional tasks.     Alaina Martinez, OT, [facility], 12/21/2020   Occupational Therapy

## 2020-12-22 ENCOUNTER — CLINICAL SUPPORT (OUTPATIENT)
Dept: REHABILITATION | Facility: OTHER | Age: 40
End: 2020-12-22
Payer: MEDICARE

## 2020-12-22 DIAGNOSIS — Z78.9 ALTERATION IN INSTRUMENTAL ACTIVITIES OF DAILY LIVING (IADL): ICD-10-CM

## 2020-12-22 DIAGNOSIS — G89.4 CHRONIC PAIN SYNDROME: ICD-10-CM

## 2020-12-22 DIAGNOSIS — Z74.09 IMPAIRED FUNCTIONAL MOBILITY, BALANCE, GAIT, AND ENDURANCE: ICD-10-CM

## 2020-12-22 DIAGNOSIS — Z78.9 DECREASED INDEPENDENCE WITH ACTIVITIES OF DAILY LIVING: ICD-10-CM

## 2020-12-22 PROCEDURE — 97530 THERAPEUTIC ACTIVITIES: CPT

## 2020-12-22 PROCEDURE — 97110 THERAPEUTIC EXERCISES: CPT

## 2020-12-22 NOTE — PROGRESS NOTES
Functional Restoration Program  Physical Therapy   Daily Therapy Note  P Day 11    Name: Gordon Griffin III  MRN:[id number]  Date: 12/22/2020    Therapy Diagnosis:   Encounter Diagnoses   Name Primary?    Impaired functional mobility, balance, gait, and endurance     Chronic pain syndrome      Physician: Magdalena Ruiz NP   Authorization Period Expiration: 5/27/2021  Plan of Care Expiration: 2/12/2021  Visit # / Visits authorized: 11/ 17    [facility] daily activities may consist of:   Biometrics   Group stretching   Individualized PT w/ resisted strengthening    Individualized OT w/ functional lifting & training   Group cognitive behavioral therapy   Informative lecture   Mindfulness    Independent Therapy  Time in: 1:10p  Time out: 1:25p     Billable minutes: 75      Subjective: Dylon reports her back is still causing a lot of spasms, but she will do her best. She is sore from the reassessment, but her pain level is close to the same as yesterday.      Current 6/10  Location(s): low back     Objective:   Mr. Griffin participated in the following activities:    Individualized Treatment:     Dylon received therapeutic exercises to develop strength, endurance, ROM, flexibility, posture and core stabilization for 75 minutes including:    Full body stretch w/ 3-10 sec hold technique &/or 3-5 repetition technique on all of the following:   Cervical flx/ext   Cervical sidebending   Cervical rotation   Cervical protraction/retraction   Shld rolls   Shld depression/elevation   Scapular retraction/protraction/scaption   Posterior shld stretch (cross arm)   Shld ER stretch (chicken wing)   Elbow flx/ext   Forearm supination/pronation   Wrist flx/ext   Open/close hands/fingers   Seated trunk rotations   Seated trunk/thoracic ext/flx   Seated marches & knee to chest   Seated knee flx/ext   Seated hip ER/piriformis stretch   Seated hamstring stretch   Seated toe raise to heel raise    Seated ankle  circles each way      Peripheral muscle strengthening which included 1-2 sets of 10-20 repetitions at a slow, controlled 5-7 second per rep pace focused on strengthening supporting musculature for improved body mechanics & functional mobility.  Patient & therapist focused on proper form during treatment to ensure optimal strengthening of each targeted muscle group. Patients are instructed to keep sets/reps with proper load to stay at moderate on the Deny exertion scale.       Exercise Weight (lbs) Sets Repetitions Deny Exertion Scale Rating   Leg Extension  15 1 20 5   Hamstring Curls 25 1 20 4   Chest Press 22.5 1 20 5   Pec Fly       Lat Pull Down 20 1 20 4   Mid Row 25 1 20 3   Bicep Bar Curls       Standing Tricep Extension       Single Leg Press 35 1 20 3          Hammer curls       Bent over tricep kick back       Bent over row       Standing lateral shld raise       Bent over reverse fly       Air squat w/ pillow on chair for cuing       Forward lunge partial       Supine pec fly       Supine bridge w/ belt       Supine chest press         NOTE: Items above in bold not part of normal FRP programming. These exercises added for patient to mimic home resisted exercises for COVID-19 precautions.    · Fitter board easy tilts in each direction 2x20 w/out support  · Supine LTR w/ legs on TBall 2x10  · Bridge on Tball 2x10 (partial)  · Supine SLR w/ TBall compression into opposite LE 2x12 B LE  · Seated cat-cow 2x15  · Seated alt UE/LE flx 2x15 (dying bug)        Billable units: Therapeutic Exercise 5 units       Assessment: Dylon w/ reports of high pain, but was able to progress in some exercises today. She will need reinforcement on allowing herself to decrease the resistance in her exercises, rather than avoid activity all together. Despite daily instruction, she also still has trouble identifying exercises on he machines indicating PT will need to educate in a different manner. Pt instructed to use the pictures on  the poster & reported that was helpful. She still has trouble w/ spinal mobility in a fluid motion.    GOALS: Pt is in agreement with the following goals:     Program goals: 8 weeks  At the end of therapy patient will:      -  Improve 2 Minute Walk Test (MWT) to 375 feet and 6 MWT to 1125 feet or greater to improve gait speed and mobility.  Progress towards goal:  Goal Met on 12/21/2020      -  Demonstrate independence with Home Exercise Program (HEP) to include: full body stretching, spinal stabilization & core stability to improve patient's general mobility, AROM and decrease reported pain.  Progress towards goal: Progressing      -   Perform single leg stance 15 seconds or greater bilaterally to improve safety and balance in ADLs.  Progress towards goal: Goal Met on 12/21/2020      -  Demonstrate proper self-correction in sitting & standing postures in order to decrease postural stress/strain to better manage pain.  Progress towards goal: Progressing      -  Demonstrate Timed Up & Go (with appropriate assistive device, if necessary) of 10 seconds or less to decrease fall risk and improve functional mobility.  Progress towards goal: Goal Met on 12/21/2020      -  Demonstrate 5 time sit to stand test without upper extremity assist to 10 sec or less to improve general mobility and decrease fall risk.  Progress towards goal: Goal Met on 12/21/2020      -  Demonstrate independence and adherence to resistive training at self-selected facility to maintain and progress FRP strength grains.  Progress towards goal: Progressing      -  Demonstrate proper diaphragmatic breathing in supine & seated positions to facilitate better pain control and promote decreased anxiety.  Progress towards goal: Progressing      - Demonstrate proper knee flx & no hip circumduction in gait pattern on R LE to normalize gait pattern, improve gait efficiency/endurance to better tolerate work duties. Progress towards goal: Goal met  12/21/2020  Plan:     Continue PT per POC     Travis Reyes, PT, DPT

## 2020-12-22 NOTE — PLAN OF CARE
NOTE: This is a duplicate copy utilized for reassessment purposes & continuity of care.  See pt's plan of care for co-signed copy.    [facility]  Functional Restoration Program  Physical Therapy Progress Report    Name: Gordon Griffin [facility]  Clinic Number: [id number]    Therapy Diagnosis:   Encounter Diagnoses   Name Primary?    Impaired functional mobility, balance, gait, and endurance     Chronic pain syndrome      Physician: Magdalena Ruiz, NP    Physician Orders: PT Eval and Treat   Medical Diagnosis from Referral:   G89.4 (ICD-10-CM) - Chronic pain disorder   Z74.09 (ICD-10-CM) - Impaired functional mobility, balance, gait, and endurance     Date: 12/21/2020  Plan of Care Expiration: 2/12/2021  Time In: 2:35p  Time Out: 3:50p  Total Billable Time: 75 minutes    Precautions: Standard and Fall    Subjective   Patient reports that she can tell that she has improved in her functioning since starting the program. Her spasms are less when she is walking & her balance is better. She plans to continue w/ resisted training at a gym when FRP finishes. Although her pain still comes on pretty strong, she feels like she has more strategies to manage it. She is happy w/ her progress.    Pts FRP goals: improve work performance (better climbing, crawling & lifting as a full-fledged )    Pain:    Current 7/10, worst 7/10, best 3/10   Location(s): entire spine & neck, R shld, R foot  Description: Aching and Dull  Functional exacerbations: Walking and Lifting  Easing Factors: pain medication, lying down, hot bath and rest      Objective       Range of Motion - Cervical   AROM AROM AROM    Evaluation [facility] FRP  1 Month Follow Up   Flexion 32 ° 34° °   Extension 20 ° 44° °   Side bending Right 17 ° 27° °   Side bending Left 25 ° 30° °   Rotation Right 47 ° 70° °   Rotation Left 49 ° 57° °     Range of Motion - Lumbar   ROM Loss   Flexion moderate loss   Extension within functional  limits   Side bending Right minimal loss   Side bending Left within functional limits   Rotation Right minimal loss   Rotation Left moderate loss     Strength Testing   Evaluation [facility]  1 Month Follow Up   Motion Tested         Lower Extremity R L R L R L   Hip Flexion 4+/5 4+/5 5/5 4+/5     Hip Extension 4-/5 4-/5 4/5 4/5     Hip Abduction 4/5 4+/5 4+/5 4+/5     Hip IR 4/5 4-/5 4/5 4/5     Hip ER 4/5 4-/5 4+/5 4/5     Knee Extension 4+/5 4/5 5/5 4+/5     Knee Flexion 4/5 4-/5 4+/5 4/5     Ankle PF 4+/5 5/5 4+/5 5/5     Ankle DF 4+/5 4+/5 4+/5 4+/5     Ankle inversion 4/5 4/5 4/5 4+/5     Ankle eversion 4+/5 4/5 4/5 4+/5       Functional Testing     Evaluation [facility] FRP  1 Month Follow Up   Timed Up and Go 17.7 sec 9.4 sec sec   5 Time Sit to Stand w/out UE 14.6 sec 7.2 sec sec   Single Limb Stance R LE 5.7 sec >30 sec sec   Single Limb Stance L LE 8.1 sec > 30 sec sec   2 Minute Walk Test 266 feet 401 feet feet   6 Minute Walk Test  1285 feet feet   Self Selected Walking Speed 0.68 m/sec 1.09 m/sec m/sec     Minimum standards/norms:    TUG:  < 13.5 seconds/ Males 7.3 sec, Females 8.1 sec  SLS:  26-29 sec  Ages 20-69  Self Selected Walking Speed:  .4-.8m/sec  Limited community ambulator                                                   .8- 1.2  Community ambulator                                                    1.2 m/sec and above  Able to safely cross streets      TREATMENT     Dylon received therapeutic exercises to develop strength, ROM and flexibility for 75 minutes including:    · PT reassessment (see above)  · LTR w/ Tball 2x10  · Bridge on TBall 2x10  · Supine straight leg raise w/ opp LE TBall compression 12 B  · Single leg press 30 lb 20 B  · Bicep bar curls 10 lb 20  · Tricep push down 10 lb 20  · Seated mindfulness for 10 minutes guided w/ proper breathing & relaxation techniques    Assessment   Dylon w/ the need for a great deal of redirection in all of her PT sessions, perseverating on work  duties & stories often. She has improved w/ being more mentally present & her objective measures have improved in nearly all categories. When she has reports of neck or foot spasms, it appears to be a conscious attention towards them & can sometimes look contrived or exaggerated. With redirection or distraction techniques, the spasms appear to resolve rather quickly. In the 6 minute walk test, she wore tennis shoes (rather than her rigid boots from before) & demonstrated no ankle deviations or major gait deviations. She may not have full insight into her abilities & PT will continue to encourage her to see her successes & abilities. Overall she has made good progress towards goals. Her chronic issues & behavioral obstacles may make it difficult for the pt to continue progressing at the end of the program.    Based on the above history and physical examination an active physical therapy program within [facility] is recommended.  Pt will benefit from skilled outpatient physical therapy to address the deficits listed below in the chart, provide pt/family education and to maximize pt's level of independence in the home and community environment.     Plan of care discussed with patient: Yes  Pt's spiritual, cultural and educational needs considered and patient is agreeable to the plan of care and goals as stated below:     Pt prognosis is Fair.   Anticipated Barriers for therapy: lack of diagnoses for spasms; several concussions; chronic nature of injury    GOALS: Pt is in agreement with the following goals:    Program goals: 8 weeks  At the end of therapy patient will:      -  Improve 2 Minute Walk Test (MWT) to 375 feet and 6 MWT to 1125 feet or greater to improve gait speed and mobility.  Progress towards goal:  Goal Met on 12/21/2020      -  Demonstrate independence with Home Exercise Program (HEP) to include: full body stretching, spinal stabilization & core stability to improve patient's general mobility, AROM and  decrease reported pain.  Progress towards goal: Progressing      -   Perform single leg stance 15 seconds or greater bilaterally to improve safety and balance in ADLs.  Progress towards goal: Goal Met on 12/21/2020      -  Demonstrate proper self-correction in sitting & standing postures in order to decrease postural stress/strain to better manage pain.  Progress towards goal: Progressing      -  Demonstrate Timed Up & Go (with appropriate assistive device, if necessary) of 10 seconds or less to decrease fall risk and improve functional mobility.  Progress towards goal: Goal Met on 12/21/2020      -  Demonstrate 5 time sit to stand test without upper extremity assist to 10 sec or less to improve general mobility and decrease fall risk.  Progress towards goal: Goal Met on 12/21/2020      -  Demonstrate independence and adherence to resistive training at self-selected facility to maintain and progress FRP strength grains.  Progress towards goal: Progressing      -  Demonstrate proper diaphragmatic breathing in supine & seated positions to facilitate better pain control and promote decreased anxiety.  Progress towards goal: Progressing      - Demonstrate proper knee flx & no hip circumduction in gait pattern on R LE to normalize gait pattern, improve gait efficiency/endurance to better tolerate work duties. Progress towards goal: Goal met 12/21/2020    Plan   Plan of care Certification: 12/21/2020 to 2/12/2021.    Outpatient Physical Therapy 3 times weekly for 2 weeks to include the following interventions: Electrical Stimulation per PT, Gait Training, Manual Therapy, Moist Heat/ Ice, Neuromuscular Re-ed, Patient Education, Therapeutic Activites and Therapeutic Exercise.     Following this, pt to go on hold for minimum of 3 weeks to attempt independence w/ HEP & PT activities, then return for reassessment.      Travis Reyes, PT, DPT         stated

## 2020-12-23 ENCOUNTER — OFFICE VISIT (OUTPATIENT)
Dept: PSYCHIATRY | Facility: OTHER | Age: 40
End: 2020-12-23
Payer: MEDICARE

## 2020-12-23 ENCOUNTER — HOSPITAL ENCOUNTER (EMERGENCY)
Facility: OTHER | Age: 40
Discharge: HOME | End: 2020-12-23
Attending: EMERGENCY MEDICINE
Payer: MEDICARE

## 2020-12-23 ENCOUNTER — CLINICAL SUPPORT (OUTPATIENT)
Dept: REHABILITATION | Facility: OTHER | Age: 40
End: 2020-12-23
Payer: MEDICARE

## 2020-12-23 VITALS
WEIGHT: 160 LBS | TEMPERATURE: 98 F | OXYGEN SATURATION: 98 % | HEART RATE: 71 BPM | RESPIRATION RATE: 18 BRPM | HEIGHT: 72 IN | DIASTOLIC BLOOD PRESSURE: 66 MMHG | SYSTOLIC BLOOD PRESSURE: 112 MMHG | BODY MASS INDEX: 21.67 KG/M2

## 2020-12-23 DIAGNOSIS — G89.4 CHRONIC PAIN DISORDER: ICD-10-CM

## 2020-12-23 DIAGNOSIS — Z78.9 DECREASED INDEPENDENCE WITH ACTIVITIES OF DAILY LIVING: ICD-10-CM

## 2020-12-23 DIAGNOSIS — F33.1 MDD (MAJOR DEPRESSIVE DISORDER), RECURRENT EPISODE, MODERATE: Primary | ICD-10-CM

## 2020-12-23 DIAGNOSIS — Z78.9 ALTERATION IN INSTRUMENTAL ACTIVITIES OF DAILY LIVING (IADL): ICD-10-CM

## 2020-12-23 DIAGNOSIS — G89.4 CHRONIC PAIN SYNDROME: Primary | ICD-10-CM

## 2020-12-23 DIAGNOSIS — Z74.09 IMPAIRED FUNCTIONAL MOBILITY, BALANCE, GAIT, AND ENDURANCE: ICD-10-CM

## 2020-12-23 DIAGNOSIS — R51.9 FRONTAL HEADACHE: Primary | ICD-10-CM

## 2020-12-23 PROCEDURE — 96372 THER/PROPH/DIAG INJ SC/IM: CPT

## 2020-12-23 PROCEDURE — 97110 THERAPEUTIC EXERCISES: CPT

## 2020-12-23 PROCEDURE — 63600175 PHARM REV CODE 636 W HCPCS: Performed by: PHYSICIAN ASSISTANT

## 2020-12-23 PROCEDURE — 90853 PR GROUP PSYCHOTHERAPY: ICD-10-PCS | Mod: ,,, | Performed by: SOCIAL WORKER

## 2020-12-23 PROCEDURE — 97530 THERAPEUTIC ACTIVITIES: CPT

## 2020-12-23 PROCEDURE — 97112 NEUROMUSCULAR REEDUCATION: CPT

## 2020-12-23 PROCEDURE — 99284 EMERGENCY DEPT VISIT MOD MDM: CPT | Mod: 25

## 2020-12-23 PROCEDURE — 90853 GROUP PSYCHOTHERAPY: CPT | Mod: ,,, | Performed by: SOCIAL WORKER

## 2020-12-23 RX ORDER — KETOROLAC TROMETHAMINE 30 MG/ML
15 INJECTION, SOLUTION INTRAMUSCULAR; INTRAVENOUS
Status: COMPLETED | OUTPATIENT
Start: 2020-12-23 | End: 2020-12-23

## 2020-12-23 RX ORDER — DIPHENHYDRAMINE HYDROCHLORIDE 50 MG/ML
25 INJECTION INTRAMUSCULAR; INTRAVENOUS
Status: COMPLETED | OUTPATIENT
Start: 2020-12-23 | End: 2020-12-23

## 2020-12-23 RX ORDER — PROCHLORPERAZINE EDISYLATE 5 MG/ML
10 INJECTION INTRAMUSCULAR; INTRAVENOUS
Status: COMPLETED | OUTPATIENT
Start: 2020-12-23 | End: 2020-12-23

## 2020-12-23 RX ADMIN — DIPHENHYDRAMINE HYDROCHLORIDE 25 MG: 50 INJECTION, SOLUTION INTRAMUSCULAR; INTRAVENOUS at 11:12

## 2020-12-23 RX ADMIN — KETOROLAC TROMETHAMINE 15 MG: 30 INJECTION, SOLUTION INTRAMUSCULAR at 11:12

## 2020-12-23 RX ADMIN — PROCHLORPERAZINE EDISYLATE 10 MG: 5 INJECTION INTRAMUSCULAR; INTRAVENOUS at 11:12

## 2020-12-23 NOTE — ED PROVIDER NOTES
Encounter Date: 12/23/2020       History     Chief Complaint   Patient presents with    Migraine     Pt reports constant frontal migraine x 4 days. Has tried fiuorcet, stadol, compazine, toradol at home with no relief. denies any other symptoms.      Patient is a 40-year-old male with migraines and mild clonus who presents to the emergency department with a headache.  Patient reports a frontal headache for the past 4 days.  He states he has taken all of his home meds including Compazine, Fioricet, Toradol.  He states he ran out of sotalol and will be able to refill at this weekend.  He states he usually has relief with intramuscular medications here in the ED. He states he had photophobia but this has improved.  He denies nausea or vomiting.  She denies radiation of pain into his neck or neck stiffness.  He denies fever.  This feels similar to his prior migraines.    The history is provided by the patient.     Review of patient's allergies indicates:   Allergen Reactions    Mustard Itching, Nausea And Vomiting, Shortness Of Breath and Swelling    Mushroom Itching, Nausea And Vomiting and Swelling    Niaspan extended-release [niacin] Itching    Olive extract Itching, Nausea And Vomiting and Swelling    Oyster extract     Extendryl [[id number]] Rash    V-cillin k Rash     Past Medical History:   Diagnosis Date    Anxiety     Cancer     Chest pain 1/20/2016 12/30/2015: Began experinece chest pain.    Depression     Functional movement disorder 10/1/2019    Migraine headache     Movement disorder     Myoclonic jerkings, massive     Stroke pt. states he had a cva at 3 months old     Past Surgical History:   Procedure Laterality Date    ANGIOGRAM, CORONARY, WITH LEFT HEART CATHETERIZATION      MANDIBLE SURGERY      reconstruction    variceol repair       Family History   Problem Relation Age of Onset    Heart disease Father     Hypertension Father     Hyperlipidemia Father      Heart disease Paternal Uncle     Heart disease Mother     Myasthenia gravis Mother      Social History     Tobacco Use    Smoking status: Never Smoker    Smokeless tobacco: Never Used   Substance Use Topics    Alcohol use: No    Drug use: No     Review of Systems   Constitutional: Negative for fever.   HENT: Negative for congestion.    Eyes: Negative for photophobia and visual disturbance.   Respiratory: Negative for shortness of breath.    Cardiovascular: Negative for chest pain.   Gastrointestinal: Negative for abdominal pain, diarrhea, nausea and vomiting.   Musculoskeletal: Negative for neck pain and neck stiffness.   Skin: Negative for rash.   Allergic/Immunologic: Negative for immunocompromised state.   Neurological: Positive for headaches. Negative for dizziness, weakness and numbness.       Physical Exam     Initial Vitals [12/23/20 1049]   BP Pulse Resp Temp SpO2   112/71 77 16 98.1 °F (36.7 °C) 98 %      MAP       --         Physical Exam    Constitutional: Vital signs are normal. He is cooperative.  Non-toxic appearance. He does not have a sickly appearance. No distress.   HENT:   Head: Normocephalic and atraumatic.   Eyes: Conjunctivae and EOM are normal. Pupils are equal, round, and reactive to light.   Neck: Normal range of motion. Neck supple.   Cardiovascular: Normal rate, regular rhythm and intact distal pulses.   Pulmonary/Chest: Breath sounds normal. No respiratory distress. He has no wheezes. He has no rales.   Abdominal: Soft. Bowel sounds are normal. There is no abdominal tenderness.   Musculoskeletal: Normal range of motion.   Neurological: He is alert and oriented to person, place, and time. He has normal strength. No cranial nerve deficit. GCS eye subscore is 4. GCS verbal subscore is 5. GCS motor subscore is 6.   No meningismus   Skin: Skin is warm and dry. No rash noted.         ED Course   Procedures  Labs Reviewed - No data to display       Imaging Results    None         "  Medical Decision Making:   Initial Assessment:   Urgent evaluation of a 40 y.o. male with presenting to the emergency department complaining of a frontal "migraine". Patient is afebrile, nontoxic appearing and hemodynamically stable.  There is no focal weakness, numbness, meningismus, or other focal neurologic deficit. There is no history of trauma, fevers, elevated blood pressure to suggest meningitis, subarachnoid hemorrhage, TIA, stroke, mass, or hypertensive urgency. I do not feel a CT of the brain or blood work are necessary at this time.  Patient states intramuscular medicine works best for him given his history of myoclonus.  Will provide patient with IM Benadryl, Compazine, and Toradol.  He is encouraged follow-up with his regular doctor return to the ER for new worsening symptoms.  Patient had no other complaints today and was stable at discharge.                             Clinical Impression:       ICD-10-CM ICD-9-CM   1. Frontal headache  R51.9 784.0                          ED Disposition Condition    Discharge Stable        ED Prescriptions     None        Follow-up Information     Follow up With Specialties Details Why Contact Info    [facility]  If symptoms worsen [address]  [facility] [id number] [phone]                                       Nav Domínguez PA-C  12/23/20 1228    "

## 2020-12-23 NOTE — PROGRESS NOTES
"Functional Restoration Program  Occupational Therapy   Daily Therapy Note  [facility] Day 12     Name: Gordon Griffin III  MRN:[id number]  Date: 12/23/2020    Diagnosis:   Encounter Diagnoses   Name Primary?    Decreased independence with activities of daily living     Alteration in instrumental activities of daily living (IADL)     Impaired functional mobility, balance, gait, and endurance        [facility] daily activities may consist of:   Biometrics   Group stretching   Individualized PT w/ resisted strengthening    Individualized OT w/ functional lifting & training   Group cognitive behavioral therapy   Informative lecture   Mindfulness    Individualized treatment:  Start time: 1:15 PM  End time: 2:30 PM  Total Billable time: 75 min    Subjective: He reports "I feel a lot better after that shot for my migraine".         Pain report: 6  Location: low back     Objective: Refer to [facility] protocol for details and explanation of each activity.   Mr. Griffin participated in the following dynamic functional therapeutic activities:    Individualized Treatment: Increased resistance levels of functional conditioning exercises according to personal recovery goals. Activities include: Dynamic reaching, cxtle-ix-hdoud lifting, sustained standing activity, maximal lifting, 2-handed carry, sustained seated tasks, push and pull, waist-to-shoulder lift, box/container carry, endurance training. Daily functional conditioning progress is documented on a flow sheet which is available upon request.    Pt participated in functional lifting/endurance activities in order to increase pt's participation with vocational, selfcare ADLs, and leisure activities in order to improve quality of life.     Functional Activities:     Pt completed BUE dynamic reaching tasks in various functional ranges, with continued focus on hip rotation/weight shifting/positioning of pelvis, x 5# x 15 reps, with goal to independently pace to stay within 3-5 rating on " charo exertion scale, rated as hard (5 /10) exertion.      Pt completed functional lifting, floor to waist, x 18# with exertion rated as sort of hard (4/10), focused education on slow and controlled movement and using strength > momentum. Pt with improved independence with body mechanics.     Pt completed maximal lift x28#, [id number] rated as sort of hard (4/10), continued education focused on neutral spine positioning and pushing through heels for safety and activation of correct muscles.     Pt participated in functional lift waist to shoulder, x 13 # with a rating of moderate (3/10). Pt educated on slow and controlled movements, coordinating movement with breath, core engagement and maintaining neutral spine position in standing.    Pt completed sustained standing activity at table top x 10 mins to complete /pinch strengthening with blue theraputty, bilaterally: 10 grasps x 3 second holds each hand, rolls for finger extension, MCP flexion, finger adduction with resistance, finger spreading, and tripod pinch. Focused education on standing posture, proper ergonomics for standing desk work, weight shifting as needed and keeping a slight bend in the knees, rated really hard (6/10). Pt discussed continued fear of using knives for cooking. Discussed ways to make cooking tasks more safe (using rubbery cutting boards, slow and controlled movements, and deep breaths to stay relaxed).     Pt completed bucket carry with increased weight to 10 # BUE x 5 mins and alternating UE x 5 mins, rated hard (5/10). Pt encouraged to slow walking rate to focus on heel to tow technique, keeping a slight bend in the elbows and maintaining good standing posture throughout activity.     Pt participated in seated mindfulness/breathing activity to complete x10 diaphragmatic breaths independently. Pt with improvements in independence with technique.     Pt participated in endurance activity on treadmill at 8% incline and 1.8-2.0mph with goal  of being mindful of heel to toe walking and pacing as needed. Pt rated as sort of hard (4/10), continued education re: controlled pursed lip breathing and pacing. Pt held conversation throughout activity to challenge breath control and overall endurance.     Length of Treatment: Mr. Griffin participated in program activities from 11 am through 4 pm today.     No environmental, cultural, spiritual, developmental or education needs expressed or noted.    Assessment:     Mr. Griffin presents with good tolerance to treatment today, though with continued high distractibility, requiring mod-max verbal cues for redirection and to maintain attention to task. Pt with good tolerance to all weight increases in functional lifting tasks and with improved independence with proper mechanics. Pt with good independence with /pinch HEP and good tolerance to blue TP. She discussed self perceived improvements with fine motor control in ADL tasks but continued fear of using knives 2/2 safety concerns. Pt continues to require increased time to complete functional activities with continued fatigue and need for extended rest breaks but overall continues to progress towards personal goals.     Pt is unable to fully participate in work, recreational activities, and home-based activities because of decreased functional endurance, limited positional tolerance, fear of re-injury, and fear of increased pain. She will continue to benefit from participating in a conditioning program designed to increase functional strength, flexibility, and endurance in order to meet functional goals.     FRP Goals: While working towards the long-term (3 month) functional goals listed above, Mr. Griffin will accomplish the following short term goals during the 3-week intensive rehabilitation program. Mr. Griffin will:      1. Develop a viable return to work plan. progressing  2. Demonstrate physical capacities consistent with a light-medium work demand level. MET  12/21/2020   3. Demonstrate increased functional strength by lifting 20lbs floor to waist  and 20lbs waist to shoulder in order to meet demand of work/home routine. MET 12/21/2020  4. Increase her positional tolerance to the level needed for work and home activities. progressing  5.  Implement self-care strategies during the program and at home to control pain. progressing  6.  Pt will demonstrate use of mindfulness, stress management, and coping  strategies for improved participation in daily routine.  progressing    UPDATED goals 12/21/2020:  1. Demonstrate physical capacities consistent with a medium-heavy work demand level  2. 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 More comprehensive physical  testing integrated with clinical findings would be required to derived an accurate work capacity.       Plan:     Continue per POC    Alaina Martinez OT, AMALIA, 12/23/2020   Occupational Therapy

## 2020-12-23 NOTE — PROGRESS NOTES
Functional Restoration Program  Physical Therapy   Daily Therapy Note  P Day 12    Name: Gordon Griffin III  MRN:[id number]  Date: 12/23/2020    Therapy Diagnosis:   Encounter Diagnoses   Name Primary?    Chronic pain syndrome Yes    Impaired functional mobility, balance, gait, and endurance      Physician: Magdalena Ruiz NP   Authorization Period Expiration: 5/27/2021  Plan of Care Expiration: 2/12/2021  Visit # / Visits authorized: 12 / 17    [facility] daily activities may consist of:   Biometrics   Group stretching   Individualized PT w/ resisted strengthening    Individualized OT w/ functional lifting & training   Group cognitive behavioral therapy   Informative lecture   Mindfulness    Independent Therapy  Time in: 2:35 pm  Time out: 3:50 pm     Billable minutes: 75      Subjective: Dylon reports disturbance over night including needing to help  his step-mother from floor and leading to inc back pain this AM.  Pt notes visiting ER this afternoon for injection related to migraine sx.   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   Pt expects she will have to rely on hot packs and muscle relaxers to address LBP in evenings during class.     Pt reports tolerating last visit well including reporting no inc soreness or spasms and leaving /c dec subjective pain report.           Current 6/10  Location(s): low back, thoracic region     Objective:   Mr. Griffin participated in the following activities:    Individualized Treatment:     Dylon received therapeutic exercises to develop strength, endurance, ROM, flexibility, posture and core stabilization for 60 minutes including:        Peripheral muscle strengthening which included 1-2 sets of 10-20 repetitions at a slow, controlled 5-7 second per rep pace focused on strengthening supporting musculature for improved body mechanics & functional mobility.  Patient & therapist focused on proper form during  "treatment to ensure optimal strengthening of each targeted muscle group. Patients are instructed to keep sets/reps with proper load to stay at moderate on the Deny exertion scale.       Exercise Weight (lbs) Sets Repetitions Deny Exertion Scale Rating   Leg Extension  20 1 20 4   Hamstring Curls 27.5 1 20 4   Chest Press 25 1 20 4   Pec Fly 25 1 20 4   Lat Pull Down 22.5 1 20 4   Mid Row 27.5 1 20 4   Bicep Bar Curls       Standing Tricep Extension       Single Leg Press 40 1 20 4          Hammer curls       Bent over tricep kick back       Bent over row       Standing lateral shld raise       Bent over reverse fly       Air squat w/ pillow on chair for cuing       Forward lunge partial       Supine pec fly       Supine bridge w/ belt       Supine chest press         NOTE: Items above in bold not part of normal FRP programming. These exercises added for patient to mimic home resisted exercises for COVID-19 precautions.    Supine:    LTR x 10    HS stretch /c strap x3 20 sec hold     HS length deficit (degrees)   L= -45 R = -35   Bridge 10x 2  Prone:   On elbows 2 min   Press ups x 20 cue for hand placement    (/p Neuro re-ed) for sx relief press ups x 10   Quad:    Cat/cow x8 d/c tactile cue for post pelvic tilt, cue for coordinated head and spine motion    2/2 pt reported "muscle failure" in R forearm,   Seated:   Cat/cow x 15      Dylon received neuromuscular reeducation exercises to develop balance, proprioception, functional strength and activity endurance during SLS, stair climbing for 15 minutes including:    Blue Airex pad   DLS  EO 30 s    EO /c min perturbations random -> max perturbations random 2 min progression   SLS  EO 30 s     Free foot hanging off pad 30 s x 2 each foot    Free foot rolling blue bolster fwd/bwd 30 s x 2 each foot     Free foot rolling blue bolster lat 30 s x 2 each foot  Stairs    Ascend/descend 10 steps x 6 mod pace  No hands on rail, no SPC, step through pattern         Billable " units: Therapeutic Exercise 4 units  Neuromuscular Re-ed 1 unit        Assessment:   Pt cont to inc strength measures on resisted exercise.  Today pt also demo sig inc R ankle proprioception, balance and activity tolerance when performing SLS and stair climb in tennis shoes and no RLE spasms.  In supine, pt demo HS length deficit possibly limiting hip/pelvis and lumbar mobility and leading to PT advise pt include HS stretches in HEP.  Pt verbalize understanding and report she already does so and owns an appropriate strap for stretching assistance.  Although pt show dec coordination /c cat/cow in quadruped, pt able to complete multiple reps and demo inc lumbar ROM especially into extension.    [facility], pt assured PT that the level of physical activity required has been met by the training class standards and that she is comfortable participating in the class.  Pt verbalize functionality and activity tolerance gained in FRP serves to inc level of comfort attending training.  However, pt stated reliance on muscle relaxers and cont reliance on migraine meds being administered on a regular hourly schedule casts doubt on possibility of safe pt participation in training, but, without full knowledge of HAZMAT training policies, PT chose to encourage pt participation in outside physical activity as long as pt report she is properly prepared.            GOALS: Pt is in agreement with the following goals:     Program goals: 8 weeks  At the end of therapy patient will:      -  Improve 2 Minute Walk Test (MWT) to 375 feet and 6 MWT to 1125 feet or greater to improve gait speed and mobility.  Progress towards goal:  Goal Met on 12/21/2020      -  Demonstrate independence with Home Exercise Program (HEP) to include: full body stretching, spinal stabilization & core stability to improve patient's general mobility, AROM and decrease reported pain.  Progress towards goal: Progressing      -    Perform single leg stance 15 seconds or greater bilaterally to improve safety and balance in ADLs.  Progress towards goal: Goal Met on 12/21/2020      -  Demonstrate proper self-correction in sitting & standing postures in order to decrease postural stress/strain to better manage pain.  Progress towards goal: Progressing      -  Demonstrate Timed Up & Go (with appropriate assistive device, if necessary) of 10 seconds or less to decrease fall risk and improve functional mobility.  Progress towards goal: Goal Met on 12/21/2020      -  Demonstrate 5 time sit to stand test without upper extremity assist to 10 sec or less to improve general mobility and decrease fall risk.  Progress towards goal: Goal Met on 12/21/2020      -  Demonstrate independence and adherence to resistive training at self-selected facility to maintain and progress FRP strength grains.  Progress towards goal: Progressing      -  Demonstrate proper diaphragmatic breathing in supine & seated positions to facilitate better pain control and promote decreased anxiety.  Progress towards goal: Progressing      - Demonstrate proper knee flx & no hip circumduction in gait pattern on R LE to normalize gait pattern, improve gait efficiency/endurance to better tolerate work duties. Progress towards goal: Goal met 12/21/2020  Plan:     Continue PT per RACQUEL [place], PT, DPT

## 2020-12-23 NOTE — ED TRIAGE NOTES
Pt presents to the ED for c/o a migraine. PMH includes severe migraines and is on many maintenance  medications for them. Pt reports they are currently out of stadol. Pt states they have monoclonus and anything IV works against them. Pt reports r/t this they prefer IM medications. NADN. AAOx4.

## 2020-12-28 ENCOUNTER — CLINICAL SUPPORT (OUTPATIENT)
Dept: REHABILITATION | Facility: OTHER | Age: 40
End: 2020-12-28
Payer: MEDICARE

## 2020-12-28 ENCOUNTER — OFFICE VISIT (OUTPATIENT)
Dept: PSYCHIATRY | Facility: OTHER | Age: 40
End: 2020-12-28
Payer: MEDICARE

## 2020-12-28 DIAGNOSIS — Z78.9 DECREASED INDEPENDENCE WITH ACTIVITIES OF DAILY LIVING: ICD-10-CM

## 2020-12-28 DIAGNOSIS — Z74.09 IMPAIRED FUNCTIONAL MOBILITY, BALANCE, GAIT, AND ENDURANCE: ICD-10-CM

## 2020-12-28 DIAGNOSIS — G89.4 CHRONIC PAIN SYNDROME: ICD-10-CM

## 2020-12-28 DIAGNOSIS — G89.4 CHRONIC PAIN DISORDER: ICD-10-CM

## 2020-12-28 DIAGNOSIS — F33.1 MDD (MAJOR DEPRESSIVE DISORDER), RECURRENT EPISODE, MODERATE: Primary | ICD-10-CM

## 2020-12-28 DIAGNOSIS — Z78.9 ALTERATION IN INSTRUMENTAL ACTIVITIES OF DAILY LIVING (IADL): ICD-10-CM

## 2020-12-28 PROCEDURE — 90853 PR GROUP PSYCHOTHERAPY: ICD-10-PCS | Mod: ,,, | Performed by: SOCIAL WORKER

## 2020-12-28 PROCEDURE — 90853 GROUP PSYCHOTHERAPY: CPT | Mod: ,,, | Performed by: SOCIAL WORKER

## 2020-12-28 PROCEDURE — 97530 THERAPEUTIC ACTIVITIES: CPT

## 2020-12-28 PROCEDURE — 97110 THERAPEUTIC EXERCISES: CPT

## 2020-12-28 NOTE — PROGRESS NOTES
"Functional Restoration Program  Occupational Therapy   Daily Therapy Note  [facility] Day 13     Name: Gordon Griffin III  MRN:[id number]  Date: 12/28/2020    Diagnosis:   Encounter Diagnoses   Name Primary?    Decreased independence with activities of daily living     Alteration in instrumental activities of daily living (IADL)     Impaired functional mobility, balance, gait, and endurance        [facility] daily activities may consist of:   Biometrics   Group stretching   Individualized PT w/ resisted strengthening    Individualized OT w/ functional lifting & training   Group cognitive behavioral therapy   Informative lecture   Mindfulness    Individualized treatment:  Start time: 9:35 AM  End time: 10:55 AM  Total Billable time: 75 min    Subjective: He reports "im spasming bad today. Its both toes even and that rarely happens".         Pain report: 7  Location: low back     Objective: Refer to [facility] protocol for details and explanation of each activity.   Mr. Griffin participated in the following dynamic functional therapeutic activities:    Individualized Treatment: Increased resistance levels of functional conditioning exercises according to personal recovery goals. Activities include: Dynamic reaching, [id number] lifting, sustained standing activity, maximal lifting, 2-handed carry, sustained seated tasks, push and pull, waist-to-shoulder lift, box/container carry, endurance training. Daily functional conditioning progress is documented on a flow sheet which is available upon request.    Pt participated in functional lifting/endurance activities in order to increase pt's participation with vocational, selfcare ADLs, and leisure activities in order to improve quality of life.     Functional Activities:     Pt completed BUE dynamic reaching tasks in various functional ranges, with continued focus on hip rotation/weight shifting/positioning of pelvis, x 5# x 3x5 reps, with goal to independently pace to stay " within 3-5 rating on charo exertion scale, rated as hard (5 /10) exertion.      Pt completed functional lifting, floor to waist, x 20# with exertion rated as sort of hard (4/10), focused education on slow and controlled movement and using strength > momentum. Pt with improved independence with body mechanics.     Pt completed maximal lift x30#, [id number] rated as really hard (6/10), continued education focused on neutral spine positioning and pushing through heels for safety and activation of correct muscles.     Pt participated in functional lift waist to shoulder, x 15 # with a rating of hard (5/10). Pt educated on slow and controlled movements, coordinating movement with breath, core engagement and maintaining neutral spine position in standing.    Pt completed sustained seated activity at table top x 10 mins to complete /pinch strengthening with blue theraputty, bilaterally: 10 grasps x 3 second holds each hand, rolls for finger extension, MCP flexion, finger adduction with resistance, finger spreading, and tripod pinch. Focused education on seated posture, proper ergonomics for desk work, and weight shifting as needed, rated moderate (3/10). Pt discussed continued fear of using knives for cooking. Discussed ways to make cooking tasks more safe (using rubbery cutting boards, slow and controlled movements, and deep breaths to stay relaxed).     Pt participated in seated mindfulness/breathing activity to complete 2x10 diaphragmatic breaths independently. Pt required mod verbal cues to complete with proper mechanics but with improvements in independence with technique.     Pt participated in endurance activity on stationary bike x10min xL4 with goal of being mindful to push into pedals and pull knees to chest. Pt rated as sort of hard (4/10), continued education re: controlled pursed lip breathing and pacing. Pt held conversation throughout activity to challenge breath control and overall endurance.     Pt  "participated in mindfulness activity with focus on seated posture, improved body awareness, stress reduction/management and diaphragmatic breathing. Pt encouraged to maintain neutral cervical spine position.     Length of Treatment: Mr. Griffin participated in program activities from 8 am through 1 pm today.     No environmental, cultural, spiritual, developmental or education needs expressed or noted.    Assessment:     Mr. Griffin presents with increased c/o soreness, pain and "spasms". Pt with fair-good tolerance to treatment session today. Pt required increased time to complete functional activities secondary to muscle "spasms" and increased fatigue and frustration. Pt with good tolerance to all weight increases in functional lifting techniques. Pt voiced understanding of benefit of using breathing techniques to decrease stress in body when experiencing muscle "spasms". Pt required mod-max verbal cues to use breathing techniques each time spasms occurred but with good mechanics once reminded. Pt with good continued independence with /pinch HEP and good tolerance to blue TP. Pt continues to require max verbal cues to stay on task but overall continues to progress towards personal goals.     Pt is unable to fully participate in work, recreational activities, and home-based activities because of decreased functional endurance, limited positional tolerance, fear of re-injury, and fear of increased pain. She will continue to benefit from participating in a conditioning program designed to increase functional strength, flexibility, and endurance in order to meet functional goals.     FRP Goals: While working towards the long-term (3 month) functional goals listed above, Mr. Griffin will accomplish the following short term goals during the 3-week intensive rehabilitation program. Mr. Griffin will:      1. Develop a viable return to work plan. progressing  2. Demonstrate physical capacities consistent with a " light-medium work demand level. MET 12/21/2020   3. Demonstrate increased functional strength by lifting 20lbs floor to waist  and 20lbs waist to shoulder in order to meet demand of work/home routine. MET 12/21/2020  4. Increase her positional tolerance to the level needed for work and home activities. progressing  5.  Implement self-care strategies during the program and at home to control pain. progressing  6.  Pt will demonstrate use of mindfulness, stress management, and coping  strategies for improved participation in daily routine.  progressing    UPDATED goals 12/21/2020:  1. Demonstrate physical capacities consistent with a medium-heavy work demand level  2. 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 More comprehensive physical  testing integrated with clinical findings would be required to derived an accurate work capacity.       Plan:     Continue per POC    Alaina Martinez OT, AMALIA, 12/28/2020   Occupational Therapy

## 2020-12-28 NOTE — PROGRESS NOTES
"Functional Restoration Program  Physical Therapy   Daily Therapy Note  [facility] Day 13    Name: Gordon Griffin III  MRN:[id number]  Date: 12/28/2020    Therapy Diagnosis:   Encounter Diagnoses   Name Primary?    Impaired functional mobility, balance, gait, and endurance     Chronic pain syndrome      Physician: Magdalena Ruiz NP   Authorization Period Expiration: 5/27/2021  Plan of Care Expiration: 2/12/2021  Visit # / Visits authorized: 12/ 17    [facility] daily activities may consist of:   Biometrics   Group stretching   Individualized PT w/ resisted strengthening    Individualized OT w/ functional lifting & training   Group cognitive behavioral therapy   Informative lecture   Mindfulness    Independent Therapy  Time in: 8:10a  Time out: 9:25a     Billable minutes: 75      Subjective: Dylon reports she had a fall this weekend & her "spasms" are really acting up a bit. She also reports a lot of difficulty in making a schedule to workout, but says that she will try to perform strength training & cardio a total of 4 days a week. "I have already had a 6 pack of Dr Pepper this morning."      Current 7/10  Location(s): low back     Objective:   Mr. Griffin participated in the following activities:    Individualized Treatment:     Dylon received therapeutic exercises to develop strength, endurance, ROM, flexibility, posture and core stabilization for 75 minutes including:    Full body stretch w/ 3-10 sec hold technique &/or 3-5 repetition technique on all of the following:   Cervical flx/ext   Cervical sidebending   Cervical rotation   Cervical protraction/retraction   Shld rolls   Shld depression/elevation   Scapular retraction/protraction/scaption   Posterior shld stretch (cross arm)   Shld ER stretch (chicken wing)   Elbow flx/ext   Forearm supination/pronation   Wrist flx/ext   Open/close hands/fingers   Seated trunk rotations   Seated trunk/thoracic ext/flx   Seated marches & knee to " chest   Seated knee flx/ext   Seated hip ER/piriformis stretch   Seated hamstring stretch   Seated toe raise to heel raise    Seated ankle circles each way      Peripheral muscle strengthening which included 1-2 sets of 10-20 repetitions at a slow, controlled 5-7 second per rep pace focused on strengthening supporting musculature for improved body mechanics & functional mobility.  Patient & therapist focused on proper form during treatment to ensure optimal strengthening of each targeted muscle group. Patients are instructed to keep sets/reps with proper load to stay at moderate on the Deny exertion scale.       Exercise Weight (lbs) Sets Repetitions Deny Exertion Scale Rating   Leg Extension        Hamstring Curls       Chest Press       Pec Fly       Lat Pull Down       Mid Row       Bicep Bar Curls       Standing Tricep Extension       Single Leg Press              Hammer curls 6 1 10 7   Bent over tricep kick back (seated) 3 1 10 6   Bent over row       Standing lateral shld raise 2 1 10 6   Bent over reverse fly       Air squat w/ pillow on chair for cuing n/a 2 12 5   Forward lunge partial       Supine pec fly 3 2 10 3   Supine bridge w/ belt       Supine chest press 4 1 10 5     NOTE: Items above in bold not part of normal FRP programming. These exercises added for patient to mimic home resisted exercises for COVID-19 precautions.    · Fitter board hard tilts in each direction 2x20 w/ minimal fingertip support support  · Supine LTR w/ legs on TBall 2x10  · Bridge on Tball 2x10 (partial)        Billable units: Therapeutic Exercise 5 units     Assessment: Dylon w/ some difficulty to redirect to PT interventions today w/ her perseverating on work attire & structure, despite its irrelevance to exercise. PT attempted to have to schedule times for workouts to assist in modifying behavioral issues of activity/exercise avoidance. She was resistant to exact schedules & times citing multiple reasons & despite max  encouragement, could only give a verbal agreement that it will be done. PT attempted to educate pt on importance of scheduling to create health habits. She also continues to display a diet that is high in inflammatory factors.     GOALS: Pt is in agreement with the following goals:     Program goals: 8 weeks  At the end of therapy patient will:      -  Improve 2 Minute Walk Test (MWT) to 375 feet and 6 MWT to 1125 feet or greater to improve gait speed and mobility.  Progress towards goal:  Goal Met on 12/21/2020      -  Demonstrate independence with Home Exercise Program (HEP) to include: full body stretching, spinal stabilization & core stability to improve patient's general mobility, AROM and decrease reported pain.  Progress towards goal: Progressing      -   Perform single leg stance 15 seconds or greater bilaterally to improve safety and balance in ADLs.  Progress towards goal: Goal Met on 12/21/2020      -  Demonstrate proper self-correction in sitting & standing postures in order to decrease postural stress/strain to better manage pain.  Progress towards goal: Progressing      -  Demonstrate Timed Up & Go (with appropriate assistive device, if necessary) of 10 seconds or less to decrease fall risk and improve functional mobility.  Progress towards goal: Goal Met on 12/21/2020      -  Demonstrate 5 time sit to stand test without upper extremity assist to 10 sec or less to improve general mobility and decrease fall risk.  Progress towards goal: Goal Met on 12/21/2020      -  Demonstrate independence and adherence to resistive training at self-selected facility to maintain and progress FRP strength grains.  Progress towards goal: Progressing      -  Demonstrate proper diaphragmatic breathing in supine & seated positions to facilitate better pain control and promote decreased anxiety.  Progress towards goal: Progressing      - Demonstrate proper knee flx & no hip circumduction in gait pattern on R LE to normalize  gait pattern, improve gait efficiency/endurance to better tolerate work duties. Progress towards goal: Goal met 12/21/2020  Plan:     Continue PT per POC     Travis Reyes, PT, DPT

## 2020-12-29 ENCOUNTER — OFFICE VISIT (OUTPATIENT)
Dept: PSYCHIATRY | Facility: OTHER | Age: 40
End: 2020-12-29
Payer: MEDICARE

## 2020-12-29 ENCOUNTER — PATIENT MESSAGE (OUTPATIENT)
Dept: FAMILY MEDICINE | Facility: CLINIC | Age: 40
End: 2020-12-29

## 2020-12-29 ENCOUNTER — CLINICAL SUPPORT (OUTPATIENT)
Dept: REHABILITATION | Facility: OTHER | Age: 40
End: 2020-12-29
Payer: MEDICARE

## 2020-12-29 ENCOUNTER — PATIENT MESSAGE (OUTPATIENT)
Dept: PAIN MEDICINE | Facility: OTHER | Age: 40
End: 2020-12-29

## 2020-12-29 DIAGNOSIS — G89.4 CHRONIC PAIN DISORDER: ICD-10-CM

## 2020-12-29 DIAGNOSIS — F33.1 MDD (MAJOR DEPRESSIVE DISORDER), RECURRENT EPISODE, MODERATE: Primary | ICD-10-CM

## 2020-12-29 DIAGNOSIS — G89.4 CHRONIC PAIN SYNDROME: ICD-10-CM

## 2020-12-29 DIAGNOSIS — Z78.9 ALTERATION IN INSTRUMENTAL ACTIVITIES OF DAILY LIVING (IADL): ICD-10-CM

## 2020-12-29 DIAGNOSIS — Z74.09 IMPAIRED FUNCTIONAL MOBILITY, BALANCE, GAIT, AND ENDURANCE: ICD-10-CM

## 2020-12-29 DIAGNOSIS — Z78.9 DECREASED INDEPENDENCE WITH ACTIVITIES OF DAILY LIVING: ICD-10-CM

## 2020-12-29 PROCEDURE — 97530 THERAPEUTIC ACTIVITIES: CPT

## 2020-12-29 PROCEDURE — 90791 PSYCH DIAGNOSTIC EVALUATION: CPT | Mod: ,,, | Performed by: SOCIAL WORKER

## 2020-12-29 PROCEDURE — 97110 THERAPEUTIC EXERCISES: CPT

## 2020-12-29 PROCEDURE — 90791 PR PSYCHIATRIC DIAGNOSTIC EVALUATION: ICD-10-PCS | Mod: ,,, | Performed by: SOCIAL WORKER

## 2020-12-29 NOTE — PROGRESS NOTES
Group Psychotherapy    Site: [facility]    Clinical status of patient: Outpatient    12/28/2020    Length of service:[id number]    Referred by: Functional Restoration Program     Number of patients in attendance: 3    Target symptoms: Chronic Pain Management     Patient's response to intervention:  The patient's response to intervention is active listening.    Progress toward goals and other mental status changes:  The patient's progress toward goals is limited.    Plan: group psychotherapy    Topics Covered: Pt attended CBT for Chronic Pain as part of her participation in Functional Restoration. Topics discussed today included a review of program concepts and whether they are cognitive or behavioral skills. Pt discussed struggling with sugar intake, discussed ways to manage that, pt set a goal of cutting down on sugar by the end of January.

## 2020-12-29 NOTE — PATIENT INSTRUCTIONS
Biceps:      Triceps:        Chest:        Back/Rhomboids/Trapezius:          Shoulders:      Legs/hips:

## 2020-12-29 NOTE — PROGRESS NOTES
Group Psychotherapy     Site[place] [facility] [place]     Clinical status of patient: Outpatient     12/18/2020     Length of service:[id number]     Referred by: Functional Restoration Program      Number of patients in attendance: 4     Target symptoms: Chronic Pain Management      Patient's response to intervention:  The patient's response to intervention is self-disclosure.     Progress toward goals and other mental status changes:  The patient's progress toward goals is limited.     Plan: group psychotherapy     Topics Covered: Pt attended CBT for Chronic Pain as part of her participation in Functional Restoration. Topics discussed today included a review of Dr. Yannick Torrez's 2nd TREY talk and a discussion on vulnerability and shame as a way to manage emotions, specifically her depression. Pts also attended a 1 hour nutrition lecture on Nutrition 101 and eating for chronic pain. Will f/u on Monday.

## 2020-12-29 NOTE — PROGRESS NOTES
"Functional Restoration Program  Occupational Therapy   Daily Therapy Note  [facility] Day 14     Name: Gordon Griffin III  MRN:[id number]  Date: 12/29/2020    Diagnosis:   Encounter Diagnoses   Name Primary?    Decreased independence with activities of daily living     Alteration in instrumental activities of daily living (IADL)     Impaired functional mobility, balance, gait, and endurance        [facility] daily activities may consist of:   Biometrics   Group stretching   Individualized PT w/ resisted strengthening    Individualized OT w/ functional lifting & training   Group cognitive behavioral therapy   Informative lecture   Mindfulness    Individualized treatment:  Start time: 2:35 PM  End time: 3:50 PM  Total Billable time: 75 min    Subjective: He reports "I feel like the lights are on but no ones home".         Pain report: 7  Location: low back     Objective: Refer to [facility] protocol for details and explanation of each activity.   Mr. Griffin participated in the following dynamic functional therapeutic activities:    Individualized Treatment: Increased resistance levels of functional conditioning exercises according to personal recovery goals. Activities include: Dynamic reaching, [id number] lifting, sustained standing activity, maximal lifting, 2-handed carry, sustained seated tasks, push and pull, waist-to-shoulder lift, box/container carry, endurance training. Daily functional conditioning progress is documented on a flow sheet which is available upon request.    Pt participated in functional lifting/endurance activities in order to increase pt's participation with vocational, selfcare ADLs, and leisure activities in order to improve quality of life.     Functional Activities:     Pt completed BUE dynamic reaching tasks in various functional ranges, with continued focus on hip rotation/weight shifting/positioning of pelvis, x 5# x10 reps, with goal to independently pace to stay within 3-5 rating on charo " "exertion scale, rated as hard (5 /10) exertion.      Pt completed functional lifting, floor to waist, x 22# with exertion rated as hard (5/10), focused education on slow and controlled movement and using strength > momentum. Pt with improved independence with body mechanics.     Pt completed maximal lift x30#, [id number] rated as really hard (6/10), continued education focused on neutral spine positioning and pushing through heels for safety and activation of correct muscles.     Pt participated in functional lift waist to shoulder, x 15# with a rating of hard (5/10). Pt educated on slow and controlled movements, coordinating movement with breath, core engagement and maintaining neutral spine position in standing.    Pt completed sustained standing activity at table top x 15 mins to complete /pinch strengthening with blue theraputty, bilaterally: 10 grasps x 3 second holds each hand, rolls for finger extension, MCP flexion, finger adduction with resistance, finger spreading, and tripod pinch. Focused education on standing posture, proper ergonomics for desk work, and weight shifting as needed, rated sort of hard (4/10).     Pt participated in seated mindfulness/breathing activity to complete 2x10 diaphragmatic breaths independently. Pt required mod verbal cues to complete with proper mechanics but with improvements in independence with technique. Pt required max verbal cues to stay on task.     Pt educated on and participated in seated yoga flow x10 min with focus on stability, strength, balance, sitting posture, alignment, and coordinating breath with movement. Continued education on importance of coordinating movement with breath and overall breath control to regain control of muscles during muscle "spasms". Pt with increased muscle "spasms" today and noted "I just feel funny overall, I feel flighty inside and heavy outside". Pt rated activity as moderate (3/10) exertion.     Pt participated in endurance " "activity on stationary bike x5min xL4 and x5min at L5 with goal of being mindful to push into pedals and pull knees to chest. Pt rated as really really hard (7/10), continued education re: controlled pursed lip breathing and pacing. Pt held conversation throughout activity to challenge breath control and overall endurance. Pt re-educated on charo exertion scale and how to properly pace and pull back before surpassing 5 (hard) on the charo exertion scale. Pt re-educated that the goal is to stay within 3-5 on scale. Pt voiced understanding.     Length of Treatment: Mr. Griffin participated in program activities from 11 am through 4 pm today.     No environmental, cultural, spiritual, developmental or education needs expressed or noted.    Assessment:     Mr. Griffin presents with increased c/o "feeling out of it". She presents with increased "spasms" today but with improved understanding of how intentional diaphragmatic breathing can aid in relaxing muscle spasms as well as symptoms of stress when experiencing spasms. Pt with fair-good tolerance to treatment session today. Pt continues to require increased time to complete functional activities secondary to muscle "spasms" and increased fatigue. Pt with good response to yoga/mindfulness activity. Pt with good continued independence with /pinch HEP and good tolerance to blue TP. Pt continues to require max verbal cues to stay on task but overall continues to progress towards personal goals.     Pt is unable to fully participate in work, recreational activities, and home-based activities because of decreased functional endurance, limited positional tolerance, fear of re-injury, and fear of increased pain. She will continue to benefit from participating in a conditioning program designed to increase functional strength, flexibility, and endurance in order to meet functional goals.     FRP Goals: While working towards the long-term (3 month) functional goals listed above, " Mr. Griffin will accomplish the following short term goals during the 3-week intensive rehabilitation program. Mr. Griffin will:      1. Develop a viable return to work plan. progressing  2. Demonstrate physical capacities consistent with a light-medium work demand level. MET 12/21/2020   3. Demonstrate increased functional strength by lifting 20lbs floor to waist  and 20lbs waist to shoulder in order to meet demand of work/home routine. MET 12/21/2020  4. Increase her positional tolerance to the level needed for work and home activities. progressing  5.  Implement self-care strategies during the program and at home to control pain. progressing  6.  Pt will demonstrate use of mindfulness, stress management, and coping  strategies for improved participation in daily routine.  progressing    UPDATED goals 12/21/2020:  1. Demonstrate physical capacities consistent with a medium-heavy work demand level  2. 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 More comprehensive  physical  testing integrated with clinical findings would be required to derived an accurate work capacity.       Plan:     Continue per POC    Alaina Martinez OT, AMALIA, 12/29/2020   Occupational Therapy

## 2020-12-29 NOTE — PROGRESS NOTES
Group Psychotherapy    Site: [facility]    Clinical status of patient: Outpatient    12/11/2020    Length of service:[id number]    Referred by: Functional Restoration Program     Number of patients in attendance: 4    Target symptoms: Chronic Pain Management     Patient's response to intervention:  The patient's response to intervention is frequent questions.    Progress toward goals and other mental status changes:  The patient's progress toward goals is limited.    Plan: group psychotherapy    Topics Covered: Pt attended CBT for Chronic Pain as part of her participation in Functional Restoration. Topics discussed today included the concept of self care, pts filled out self care wheel and discussed self care tips in the context of chronic pain as a way to manage depression. Will f/u in 2 days.

## 2020-12-29 NOTE — PROGRESS NOTES
Group Psychotherapy    Site: [facility]    Clinical status of patient: Outpatient    12/29/2020    Length of service:[id number]    Referred by: Functional Restoration Program     Number of patients in attendance: 2    Target symptoms: Chronic Pain Management     Patient's response to intervention:  The patient's response to intervention is self-disclosure.    Progress toward goals and other mental status changes:  The patient's progress toward goals is limited.    Plan: group psychotherapy    Topics Covered: Pt attended CBT for Chronic Pain as part of her participation in Functional Restoration. Topics discussed today included a review of what they have learned, pts also filled out day 16 measures. Pt with frequent disclosures. Will f/u in 1 day for graduation.  Measures below:  Dylon Griffin Cohort 42   Day 1 Day 16 % change   ELLIE Depression-15  Anxiety-9  Stress-18 Depression- 7  Anxiety- 13  Stress- 14 54% improvement  Slight increase  23% improvement   VAS  Pain today-6  Pain in the past week-6 Pain today- 4  Pain in the past week- 8 25% improvement  Slight increase   Pain Catastrophizing Scale 41 26 37% decrease    Sleep Quality Instrument  16  % improvement    Pain Disability Index 35 27 23% decrease   Fear Avoidance Beliefs Fear of Physical pain -16  Fear of Pain caused by work/chores- 35  Total fear of pain- 68 Fear of Physical pain- 21  Fear of Pain caused by work/chores- 29  Total fear of pain- 62 Slight increase  18% decrease  9% decrease overall   Trell Pain questionnaire 70 48 31% decrease   Low Back Disability  44% 25% % decrease   ACE score 5 N/A

## 2020-12-29 NOTE — PROGRESS NOTES
"Functional Restoration Program  Physical Therapy   Daily Therapy Note  FRP Day 14    Name: Gordon Griffin III  MRN:[id number]  Date: 12/29/2020    Therapy Diagnosis:   Encounter Diagnoses   Name Primary?    Impaired functional mobility, balance, gait, and endurance     Chronic pain syndrome      Physician: Magdalena Ruiz NP   Authorization Period Expiration: 5/27/2021  Plan of Care Expiration: 2/12/2021  Visit # / Visits authorized: 14/ 17    [facility] daily activities may consist of:   Biometrics   Group stretching   Individualized PT w/ resisted strengthening    Individualized OT w/ functional lifting & training   Group cognitive behavioral therapy   Informative lecture   Mindfulness    Independent Therapy  Time in: 1:15p  Time out: 2:30p     Billable minutes: 75      Subjective: Dylon reports that today is another bad day w/ lots of "spasms" & fatigue. She plans to still workout 4x/week (mix of 4 days cardio & 3 days strength) even on her bad days.       Current 6/10  Location(s): low back     Objective:   Mr. Griffin participated in the following activities:    Individualized Treatment:     Dylon received therapeutic exercises to develop strength, endurance, ROM, flexibility, posture and core stabilization for 75 minutes including:    Full body stretch w/ 3-10 sec hold technique &/or 3-5 repetition technique on all of the following:   Cervical flx/ext   Cervical sidebending   Cervical rotation   Cervical protraction/retraction   Shld rolls   Shld depression/elevation   Scapular retraction/protraction/scaption   Posterior shld stretch (cross arm)   Shld ER stretch (chicken wing)   Elbow flx/ext   Forearm supination/pronation   Wrist flx/ext   Open/close hands/fingers   Seated trunk rotations   Seated trunk/thoracic ext/flx   Seated marches & knee to chest   Seated knee flx/ext   Seated hip ER/piriformis stretch   Seated hamstring stretch   Seated toe raise to heel raise    Seated " ankle circles each way      Peripheral muscle strengthening which included 1-2 sets of 10-20 repetitions at a slow, controlled 5-7 second per rep pace focused on strengthening supporting musculature for improved body mechanics & functional mobility.  Patient & therapist focused on proper form during treatment to ensure optimal strengthening of each targeted muscle group. Patients are instructed to keep sets/reps with proper load to stay at moderate on the Deny exertion scale.       Exercise Weight (lbs) Sets Repetitions Deny Exertion Scale Rating   Leg Extension        Hamstring Curls       Chest Press 25 1 12 6   Pec Fly 25 1 10 6   Lat Pull Down       Mid Row       Bicep Bar Curls       Standing Tricep Extension       Single Leg Press 35 1 20 6          Bicep curls 5 1 20 6   Supine tricep extensions 3 1 10 5   Bent over row       Standing lateral shld raise       Bent over reverse fly 0 1 15 6   Sit to stand n/a 1 10 4   Supine pec fly       Supine bridge w/ belt       Supine chest press         NOTE: Items above in bold not part of normal FRP programming. These exercises added for patient to mimic home resisted exercises for COVID-19 precautions.    · Fitter board hard tilts in each direction 2x20 w/ minimal fingertip support support  · Standing lumbar extension stretch 3x5 sec  · Standing quad stretch 2x10 sec B  · HEP review (see pt instructions 12/29/2020)    Billable units: Therapeutic Exercise 5 units     Assessment: Dylon w/ continued difficulty to redirect to PT interventions today w/ her perseverating on various topics. She also reported high Deny exertion ratings for exercise that may not have corresponded to the actual difficulty. While she is present for each day of therapy, she does not seem to grasp her own abilities. Harping often on negative feelings, she can have some trouble in finding the ability to be in the present moment to rate her activities. She also continues to display a diet nearly  solely based on extremely high sugar content. Her progress will be limited by her resistance to behavioral changes, but PT to continue to encourage in hopes she can develop exercise habits to avoid the physical vicious [place] of the pain cycle. She verbalized good understanding of her HEP.    GOALS: Pt is in agreement with the following goals:     Program goals: 8 weeks  At the end of therapy patient will:      -  Improve 2 Minute Walk Test (MWT) to 375 feet and 6 MWT to 1125 feet or greater to improve gait speed and mobility.  Progress towards goal:  Goal Met on 12/21/2020      -  Demonstrate independence with Home Exercise Program (HEP) to include: full body stretching, spinal stabilization & core stability to improve patient's general mobility, AROM and decrease reported pain.  Progress towards goal: Progressing      -   Perform single leg stance 15 seconds or greater bilaterally to improve safety and balance in ADLs.  Progress towards goal: Goal Met on 12/21/2020      -  Demonstrate proper self-correction in sitting & standing postures in order to decrease postural stress/strain to better manage pain.  Progress towards goal: Progressing      -  Demonstrate Timed Up & Go (with appropriate assistive device, if necessary) of 10 seconds or less to decrease fall risk and improve functional mobility.  Progress towards goal: Goal Met on 12/21/2020      -  Demonstrate 5 time sit to stand test without upper extremity assist to 10 sec or less to improve general mobility and decrease fall risk.  Progress towards goal: Goal Met on 12/21/2020      -  Demonstrate independence and adherence to resistive training at self-selected facility to maintain and progress FRP strength grains.  Progress towards goal: Progressing      -  Demonstrate proper diaphragmatic breathing in supine & seated positions to facilitate better pain control and promote decreased anxiety.  Progress towards goal: Progressing      - Demonstrate proper knee  flx & no hip circumduction in gait pattern on R LE to normalize gait pattern, improve gait efficiency/endurance to better tolerate work duties. Progress towards goal: Goal met 12/21/2020  Plan:     Continue PT per POC     Travis Reyes, PT, DPT

## 2020-12-29 NOTE — PROGRESS NOTES
Group Psychotherapy    Site: [facility]    Clinical status of patient: Outpatient    12/23/2020    Length of service:[id number]    Referred by: Functional [facility]oration Program     Number of patients in attendance: 3    Target symptoms: Chronic Pain Management     Patient's response to intervention:  The patient's response to intervention is active listening.    Progress toward goals and other mental status changes:  The patient's progress toward goals is limited.    Plan: group psychotherapy    Topics Covered: Pt attended CBT for Chronic Pain as part of her participation in Functional Restoration. Topics discussed today included the topic of scheduling, figuring out when pts will be able to work out/go to the gym, cook for themselves, schedule sleep, etc. Will f/u next week.

## 2020-12-29 NOTE — PROGRESS NOTES
Group Psychotherapy    Site: [facility]    Clinical status of patient: Outpatient    12/14/2020    Length of service:[id number]    Referred by: Functional Restoration Program     Number of patients in attendance: 4    Target symptoms: Chronic Pain Management     Patient's response to intervention:  The patient's response to intervention is self-disclosure.    Progress toward goals and other mental status changes:  The patient's progress toward goals is limited.    Plan: group psychotherapy    Topics Covered: Pt attended CBT for Chronic Pain as part of her participation in Functional Restoration. Topics discussed today included a review of the weekend and what techniques from the program pts were able to use to modify their behaviors and activities. Pts discussed the role that chronic pain plays in them doing what they want to do, their depression. Discussed vulnerability of having to change the way we do things in response to pain, but that this is adaptive. Will f/u in 2 days.

## 2020-12-29 NOTE — PROGRESS NOTES
Group Psychotherapy    Site: [facility]    Clinical status of patient: Outpatient    12/21/2020    Length of service:[id number]    Referred by: Functional Restoration Program     Number of patients in attendance: 3    Target symptoms: Chronic Pain Management     Patient's response to intervention:  The patient's response to intervention is active listening.    Progress toward goals and other mental status changes:  The patient's progress toward goals is limited.    Plan: group psychotherapy    Topics Covered: Pt attended CBT for Chronic Pain as part of her participation in Functional Restoration. Topics discussed today included a review of self care and the self care wheel. Pts also attended a nutrition lecture on inflammation and added sugars. Will f/u.

## 2020-12-30 ENCOUNTER — OFFICE VISIT (OUTPATIENT)
Dept: PAIN MEDICINE | Facility: OTHER | Age: 40
End: 2020-12-30
Payer: MEDICARE

## 2020-12-30 ENCOUNTER — CLINICAL SUPPORT (OUTPATIENT)
Dept: REHABILITATION | Facility: OTHER | Age: 40
End: 2020-12-30
Payer: MEDICARE

## 2020-12-30 ENCOUNTER — PATIENT MESSAGE (OUTPATIENT)
Dept: FAMILY MEDICINE | Facility: CLINIC | Age: 40
End: 2020-12-30

## 2020-12-30 DIAGNOSIS — Z74.09 IMPAIRED FUNCTIONAL MOBILITY, BALANCE, GAIT, AND ENDURANCE: ICD-10-CM

## 2020-12-30 DIAGNOSIS — Z78.9 DECREASED INDEPENDENCE WITH ACTIVITIES OF DAILY LIVING: ICD-10-CM

## 2020-12-30 DIAGNOSIS — Z78.9 ALTERATION IN INSTRUMENTAL ACTIVITIES OF DAILY LIVING (IADL): ICD-10-CM

## 2020-12-30 DIAGNOSIS — G89.4 CHRONIC PAIN DISORDER: ICD-10-CM

## 2020-12-30 DIAGNOSIS — G89.4 CHRONIC PAIN DISORDER: Primary | ICD-10-CM

## 2020-12-30 DIAGNOSIS — G89.4 CHRONIC PAIN SYNDROME: Primary | ICD-10-CM

## 2020-12-30 PROCEDURE — 97530 THERAPEUTIC ACTIVITIES: CPT

## 2020-12-30 PROCEDURE — 97110 THERAPEUTIC EXERCISES: CPT

## 2020-12-30 PROCEDURE — 99214 OFFICE O/P EST MOD 30 MIN: CPT | Mod: 95,,, | Performed by: ANESTHESIOLOGY

## 2020-12-30 PROCEDURE — 99214 PR OFFICE/OUTPT VISIT, EST, LEVL IV, 30-39 MIN: ICD-10-PCS | Mod: 95,,, | Performed by: ANESTHESIOLOGY

## 2020-12-30 RX ORDER — ATORVASTATIN CALCIUM 80 MG/1
TABLET, FILM COATED ORAL
Qty: 30 TABLET | Refills: 0 | Status: SHIPPED | OUTPATIENT
Start: 2020-12-30 | End: 2021-01-29

## 2020-12-30 NOTE — PROGRESS NOTES
"Functional Restoration Program  Occupational Therapy   Daily Therapy Note  [facility] Day 15     Name: Gordon Griffin III  MRN:[id number]  Date: 12/30/2020    Diagnosis:   Encounter Diagnoses   Name Primary?    Decreased independence with activities of daily living     Alteration in instrumental activities of daily living (IADL)     Impaired functional mobility, balance, gait, and endurance        [facility] daily activities may consist of:   Biometrics   Group stretching   Individualized PT w/ resisted strengthening    Individualized OT w/ functional lifting & training   Group cognitive behavioral therapy   Informative lecture   Mindfulness    Individualized treatment:  Start time: 1:45 PM  End time: 2:30 PM  Total Billable time: 45 min    Subjective: He reports "Im having a down day today and I feel a migraine coming on, but I got up and dressed because it was the last day of the program".         Pain report: 7  Location: low back     Objective: Refer to [facility] protocol for details and explanation of each activity.   Mr. Griffin participated in the following dynamic functional therapeutic activities:    Individualized Treatment: Increased resistance levels of functional conditioning exercises according to personal recovery goals. Activities include: Dynamic reaching, [id number] lifting, sustained standing activity, maximal lifting, 2-handed carry, sustained seated tasks, push and pull, waist-to-shoulder lift, box/container carry, endurance training. Daily functional conditioning progress is documented on a flow sheet which is available upon request.    Pt participated in functional lifting/endurance activities in order to increase pt's participation with vocational, selfcare ADLs, and leisure activities in order to improve quality of life.     Functional Activities:     Pt completed sustained standing activity at table top x 20 mins to complete  FM/dexterity and scheduling activity to create hand written schedule " "that incorporates aspects of FRP that she wants to sustain, maintain and create habits around at home. Pt with a lot of resistance to creating a written specific schedule and had various reasons why scheduling time to stretch, walk, exercise and meditate is difficult for her to accomplish, so instead of creating a set schedule she wrote down a list of goals and habits that she wanted to implement for the new year. Goals included things like exercising 3x/week, stretching daily, decreasing caffinated drinks throughout the day, creating better sleep hygiene routine/habits. Pt noted improvements in FM control and  strength as seen in ADL tasks around the house. Throughout activity pt required max verbal cues to stay on task and verbal reminders for posture, weight shifting as needed, and proper ergonomics. Pt rated activity as sort of hard (4/10).     Pt participated in seated mindfulness/breathing activity to complete 2x10 diaphragmatic breaths independently. Pt required mod verbal cues to complete with proper mechanics but with improvements in independence with technique. Pt required max verbal cues to stay on task.     Pt participated in endurance activity on stationary bike x 10 min at L4 with goal of being mindful of pacing appropriately. Pt rated as hard (5/10), continued education re: controlled pursed lip breathing and charo exertion scale. Pt held conversation throughout activity to challenge breath control and overall endurance. Pt with improved use of charo exertion scale and how to properly pace and pull back before surpassing 5 (hard) on the charo exertion scale.     No environmental, cultural, spiritual, developmental or education needs expressed or noted.    Assessment:     Mr. Griffin presents with increased c/o symptoms of depression secondary to losing a friend to COVID-19. Pt with increased c/o fatigue and c/o "a migraine coming on". Pt still willing to participate in session but required max verbal " cues to stay on task. Pt with a lot of resistance to creating a plan to maintain/sustain physical progress made while in FRP. Discussed with pt that it is appropriate to be causiously optimistic but to not quit before she tries. Pt voiced understanding. Pt consumed a coke and a powerade throughout session after discussion about how sugar intake impacts inflammation. Pt required increased to complete functional activities secondary to high distractibility, increased c/o fatigue and resistance to future planning. Overall continues to progress towards personal goals.     Pt is unable to fully participate in work, recreational activities, and home-based activities because of decreased functional endurance, limited positional tolerance, fear of re-injury, and fear of increased pain. She will continue to benefit from participating in a conditioning program designed to increase functional strength, flexibility, and endurance in order to meet functional goals.     FR Goals: While working towards the long-term (3 month) functional goals listed above, Mr. Griffin will accomplish the following short term goals during the 3-week intensive rehabilitation program. Mr. Griffin will:      1. Develop a viable return to work plan. progressing  2. Demonstrate physical capacities consistent with a light-medium work demand level. MET 12/21/2020   3. Demonstrate increased functional strength by lifting 20lbs floor to waist  and 20lbs waist to shoulder in order to meet demand of work/home routine. MET 12/21/2020  4. Increase her positional tolerance to the level needed for work and home activities. progressing  5.  Implement self-care strategies during the program and at home to control pain. progressing  6.  Pt will demonstrate use of mindfulness, stress management, and coping  strategies for improved participation in daily routine.  progressing    UPDATED goals 12/21/2020:  1. Demonstrate physical capacities consistent with a  medium-heavy work demand level  2. 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 More comprehensive physical  testing integrated with clinical findings would be required to derived an accurate work capacity.       Plan:     Continue per POC    Alaina Martinez OT, AMALIA, 12/30/2020   Occupational Therapy

## 2020-12-30 NOTE — PROGRESS NOTES
"Functional Restoration Program  Physical Therapy   Daily Therapy Note  FRP Day 15    Name: Gordon Griffin III  MRN:[id number]  Date: 12/30/2020    Therapy Diagnosis:   Encounter Diagnoses   Name Primary?    Chronic pain disorder     Impaired functional mobility, balance, gait, and endurance     Chronic pain syndrome Yes     Physician: Magdalena Ruiz NP   Authorization Period Expiration: 5/27/2021  Plan of Care Expiration: 2/12/2021  Visit # / Visits authorized: 15 / 17    [facility] daily activities may consist of:   Biometrics   Group stretching   Individualized PT w/ resisted strengthening    Individualized OT w/ functional lifting & training   Group cognitive behavioral therapy   Informative lecture   Mindfulness    Independent Therapy  Time in: 1:00 p  Time out: 1:45 p     Billable minutes: 45      Subjective: Dylon reports sig migraine HA today and plans to go home after tx, take meds and go straight to bed.  She reports "her brain is shut off" and "trying not to do anything that moves my head".    Pt reports inc comfort /c inc wearing time of tennis shoes vs boots including walking 1 mile.  Pt reports plan to return to wearing boots on a daily basis.      Pt reports dec muscle relaxer intake since starting FRP.  However, pt report inc migraine med use and believes this is due to temperature/barometric pressure changes.   Pt reports 2 weeks since having spasms but since fall over weekend the spasms have come back.  Pt reports not using cane for past 2 weeks.          Current 6/10  Location(s): low back   Migraine     Objective:   Mr. Griffin participated in the following activities:    Individualized Treatment:     Dylon received therapeutic exercises to develop strength, endurance, ROM, flexibility, posture and core stabilization for 45 minutes including:    Full body stretch w/ 3-10 sec hold technique &/or 3-5 repetition technique on all of the following:     Shld rolls   Shld " depression/elevation   Scapular retraction/protraction/scaption   Posterior shld stretch (cross arm)   Shld ER stretch (chicken wing)   Seated trunk rotations   Seated trunk/thoracic ext/flx   Seated marches & knee to chest   Seated knee flx/ext   Seated hip ER/piriformis stretch   Seated hamstring stretch   Seated toe raise to heel raise    Seated ankle circles each way    Not Performed (2/2 to migraine):   Cervical flx/ext   Cervical sidebending   Cervical rotation   Cervical protraction/retraction   Elbow flx/ext   Forearm supination/pronation   Wrist flx/ext   Open/close hands/fingers      Peripheral muscle strengthening which included 1-2 sets of 10-20 repetitions at a slow, controlled 5-7 second per rep pace focused on strengthening supporting musculature for improved body mechanics & functional mobility.  Patient & therapist focused on proper form during treatment to ensure optimal strengthening of each targeted muscle group. Patients are instructed to keep sets/reps with proper load to stay at moderate on the Deny exertion scale.       Exercise Weight (lbs) Sets Repetitions Deny Exertion Scale Rating   Leg Extension        Hamstring Curls       Chest Press       Pec Fly       Lat Pull Down       Mid Row       Bicep Bar Curls       Standing Tricep Extension       Single Leg Press              Bicep curls       Supine tricep extensions       Bent over row       Standing lateral shld raise       Bent over reverse fly       Sit to stand       Supine pec fly       Supine bridge w/ belt       Supine chest press         NOTE: Items above in bold not part of normal FRP programming. These exercises added for patient to mimic home resisted exercises for COVID-19 precautions.    Seated:   SOC x 10 dec smooth transitions  Supine:    LTR x10    DKTC /c orange Tball x 10    HS stretch /c strap x3 20 sec hold each side   Butterfly groin stretch x3 20 sec hold   Piriformis stretch x3 20 sec hold each side  "      Billable units: Therapeutic Exercise 3 units     Assessment: Considering pt report inc functional mobility and strength as evidenced by return to amb for distance and inc amb /c shoes (vs boots), PT performed today for general motor control and lumbar mobility. Pt able to perform techniques /c inc ROM.  However, Dylon cont to need cue for exercise pacing and redirection when often stopping exercises to talk about upcoming training indicating dec ability to "be present" in the exercise leading to dec ex efficiency and effectiveness.  Also, at one point, pt states she is physically able to complete her upcoming training indicating inc confidence in abilities and plans to exercise up to 4x/wk.  However, at next moment, pt returns to self limiting verbiage especially highlighting her "spasms".  Pt to draw up workout schedule /c OT.     Pt to return in 1 month for reassessment.  Pt progress likely limited by her resistance to behavioral changes including diet, but PT to continue encouragement of inc independent exercise in hopes she can develop exercise habits to avoid the physical vicious [place] of the pain cycle.       GOALS: Pt is in agreement with the following goals:     Program goals: 8 weeks  At the end of therapy patient will:      -  Improve 2 Minute Walk Test (MWT) to 375 feet and 6 MWT to 1125 feet or greater to improve gait speed and mobility.  Progress towards goal:  Goal Met on 12/21/2020      -  Demonstrate independence with Home Exercise Program (HEP) to include: full body stretching, spinal stabilization & core stability to improve patient's general mobility, AROM and decrease reported pain.  Progress towards goal: Progressing      -   Perform single leg stance 15 seconds or greater bilaterally to improve safety and balance in ADLs.  Progress towards goal: Goal Met on 12/21/2020      -  Demonstrate proper self-correction in sitting & standing postures in order to decrease postural stress/strain to " better manage pain.  Progress towards goal: Progressing      -  Demonstrate Timed Up & Go (with appropriate assistive device, if necessary) of 10 seconds or less to decrease fall risk and improve functional mobility.  Progress towards goal: Goal Met on 12/21/2020      -  Demonstrate 5 time sit to stand test without upper extremity assist to 10 sec or less to improve general mobility and decrease fall risk.  Progress towards goal: Goal Met on 12/21/2020      -  Demonstrate independence and adherence to resistive training at self-selected facility to maintain and progress FRP strength grains.  Progress towards goal: Progressing      -  Demonstrate proper diaphragmatic breathing in supine & seated positions to facilitate better pain control and promote decreased anxiety.  Progress towards goal: Progressing      - Demonstrate proper knee flx & no hip circumduction in gait pattern on R LE to normalize gait pattern, improve gait efficiency/endurance to better tolerate work duties. Progress towards goal: Goal met 12/21/2020    Plan:     Continue PT per RACQUEL [place], PT, DPT

## 2021-01-06 ENCOUNTER — HOSPITAL ENCOUNTER (EMERGENCY)
Facility: OTHER | Age: 41
Discharge: HOME | End: 2021-01-06
Attending: EMERGENCY MEDICINE
Payer: MEDICARE

## 2021-01-06 VITALS
DIASTOLIC BLOOD PRESSURE: 75 MMHG | RESPIRATION RATE: 19 BRPM | HEART RATE: 85 BPM | OXYGEN SATURATION: 97 % | SYSTOLIC BLOOD PRESSURE: 130 MMHG | TEMPERATURE: 98 F | HEIGHT: 72 IN | BODY MASS INDEX: 21.67 KG/M2 | WEIGHT: 160 LBS

## 2021-01-06 DIAGNOSIS — R51.9 ACUTE NONINTRACTABLE HEADACHE, UNSPECIFIED HEADACHE TYPE: Primary | ICD-10-CM

## 2021-01-06 PROCEDURE — 63600175 PHARM REV CODE 636 W HCPCS: Performed by: PHYSICIAN ASSISTANT

## 2021-01-06 PROCEDURE — 96372 THER/PROPH/DIAG INJ SC/IM: CPT

## 2021-01-06 PROCEDURE — 99284 EMERGENCY DEPT VISIT MOD MDM: CPT | Mod: 25

## 2021-01-06 RX ORDER — DIPHENHYDRAMINE HYDROCHLORIDE 50 MG/ML
25 INJECTION INTRAMUSCULAR; INTRAVENOUS
Status: COMPLETED | OUTPATIENT
Start: 2021-01-06 | End: 2021-01-06

## 2021-01-06 RX ORDER — PROCHLORPERAZINE EDISYLATE 5 MG/ML
10 INJECTION INTRAMUSCULAR; INTRAVENOUS
Status: COMPLETED | OUTPATIENT
Start: 2021-01-06 | End: 2021-01-06

## 2021-01-06 RX ORDER — KETOROLAC TROMETHAMINE 30 MG/ML
15 INJECTION, SOLUTION INTRAMUSCULAR; INTRAVENOUS
Status: COMPLETED | OUTPATIENT
Start: 2021-01-06 | End: 2021-01-06

## 2021-01-06 RX ADMIN — PROCHLORPERAZINE EDISYLATE 10 MG: 5 INJECTION INTRAMUSCULAR; INTRAVENOUS at 12:01

## 2021-01-06 RX ADMIN — KETOROLAC TROMETHAMINE 15 MG: 30 INJECTION, SOLUTION INTRAMUSCULAR at 12:01

## 2021-01-06 RX ADMIN — DIPHENHYDRAMINE HYDROCHLORIDE 25 MG: 50 INJECTION, SOLUTION INTRAMUSCULAR; INTRAVENOUS at 12:01

## 2021-02-02 ENCOUNTER — CLINICAL SUPPORT (OUTPATIENT)
Dept: REHABILITATION | Facility: OTHER | Age: 41
End: 2021-02-02
Payer: MEDICARE

## 2021-02-02 DIAGNOSIS — G89.4 CHRONIC PAIN SYNDROME: ICD-10-CM

## 2021-02-02 DIAGNOSIS — Z74.09 IMPAIRED FUNCTIONAL MOBILITY, BALANCE, GAIT, AND ENDURANCE: ICD-10-CM

## 2021-02-02 PROCEDURE — 97110 THERAPEUTIC EXERCISES: CPT

## 2021-02-10 ENCOUNTER — OFFICE VISIT (OUTPATIENT)
Dept: SPINE | Facility: CLINIC | Age: 41
End: 2021-02-10
Attending: ANESTHESIOLOGY
Payer: MEDICARE

## 2021-02-10 ENCOUNTER — HOSPITAL ENCOUNTER (OUTPATIENT)
Dept: RADIOLOGY | Facility: OTHER | Age: 41
Discharge: HOME | End: 2021-02-10
Attending: ANESTHESIOLOGY
Payer: MEDICARE

## 2021-02-10 VITALS
WEIGHT: 160.06 LBS | SYSTOLIC BLOOD PRESSURE: 107 MMHG | TEMPERATURE: 97 F | DIASTOLIC BLOOD PRESSURE: 63 MMHG | HEIGHT: 72 IN | HEART RATE: 70 BPM | BODY MASS INDEX: 21.68 KG/M2

## 2021-02-10 DIAGNOSIS — G89.4 CHRONIC PAIN DISORDER: Primary | ICD-10-CM

## 2021-02-10 DIAGNOSIS — M51.36 DDD (DEGENERATIVE DISC DISEASE), LUMBAR: ICD-10-CM

## 2021-02-10 DIAGNOSIS — M54.9 DORSALGIA, UNSPECIFIED: ICD-10-CM

## 2021-02-10 PROCEDURE — 99999 PR PBB SHADOW E&M-EST. PATIENT-LVL V: ICD-10-PCS | Mod: PBBFAC,,, | Performed by: ANESTHESIOLOGY

## 2021-02-10 PROCEDURE — 3008F PR BODY MASS INDEX (BMI) DOCUMENTED: ICD-10-PCS | Mod: CPTII,S$GLB,, | Performed by: ANESTHESIOLOGY

## 2021-02-10 PROCEDURE — 1125F AMNT PAIN NOTED PAIN PRSNT: CPT | Mod: S$GLB,,, | Performed by: ANESTHESIOLOGY

## 2021-02-10 PROCEDURE — 99999 PR PBB SHADOW E&M-EST. PATIENT-LVL V: CPT | Mod: PBBFAC,,, | Performed by: ANESTHESIOLOGY

## 2021-02-10 PROCEDURE — 72110 X-RAY EXAM L-2 SPINE 4/>VWS: CPT | Mod: TC,FY

## 2021-02-10 PROCEDURE — 99214 OFFICE O/P EST MOD 30 MIN: CPT | Mod: S$GLB,,, | Performed by: ANESTHESIOLOGY

## 2021-02-10 PROCEDURE — 99214 PR OFFICE/OUTPT VISIT, EST, LEVL IV, 30-39 MIN: ICD-10-PCS | Mod: S$GLB,,, | Performed by: ANESTHESIOLOGY

## 2021-02-10 PROCEDURE — 72110 XR LUMBAR SPINE 5 VIEW WITH FLEX AND EXT: ICD-10-PCS | Mod: 26,,, | Performed by: RADIOLOGY

## 2021-02-10 PROCEDURE — 72110 X-RAY EXAM L-2 SPINE 4/>VWS: CPT | Mod: 26,,, | Performed by: RADIOLOGY

## 2021-02-10 PROCEDURE — 1125F PR PAIN SEVERITY QUANTIFIED, PAIN PRESENT: ICD-10-PCS | Mod: S$GLB,,, | Performed by: ANESTHESIOLOGY

## 2021-02-10 PROCEDURE — 3008F BODY MASS INDEX DOCD: CPT | Mod: CPTII,S$GLB,, | Performed by: ANESTHESIOLOGY

## 2021-02-10 RX ORDER — LEVETIRACETAM 1000 MG/1
1000 TABLET ORAL 2 TIMES DAILY
COMMUNITY
Start: 2021-01-13

## 2021-02-10 RX ORDER — ESCITALOPRAM OXALATE 20 MG/1
20 TABLET ORAL DAILY
COMMUNITY
Start: 2021-01-11 | End: 2023-11-06

## 2021-02-10 RX ORDER — GABAPENTIN 300 MG/1
300 CAPSULE ORAL 3 TIMES DAILY
Qty: 90 CAPSULE | Refills: 2 | Status: SHIPPED | OUTPATIENT
Start: 2021-02-10 | End: 2021-05-10 | Stop reason: SDUPTHER

## 2021-02-10 RX ORDER — ESTRADIOL 0.1 MG/D
PATCH TRANSDERMAL
COMMUNITY
Start: 2021-02-01 | End: 2022-08-04

## 2021-02-10 RX ORDER — CYCLOBENZAPRINE HCL 10 MG
TABLET ORAL
COMMUNITY
Start: 2020-12-03

## 2021-02-10 RX ORDER — PROPRANOLOL HYDROCHLORIDE 60 MG/1
60 CAPSULE, EXTENDED RELEASE ORAL NIGHTLY
COMMUNITY
Start: 2021-02-05

## 2021-02-10 RX ORDER — DIAZEPAM 2 MG/1
2 TABLET ORAL 2 TIMES DAILY PRN
COMMUNITY
Start: 2021-01-11

## 2021-02-19 ENCOUNTER — LAB VISIT (OUTPATIENT)
Dept: LAB | Facility: HOSPITAL | Age: 41
End: 2021-02-19
Attending: INTERNAL MEDICINE
Payer: MEDICARE

## 2021-02-19 DIAGNOSIS — E78.1 HYPERTRIGLYCERIDEMIA: ICD-10-CM

## 2021-02-19 LAB
CHOLEST SERPL-MCNC: 279 MG/DL (ref 120–199)
CHOLEST/HDLC SERPL: 8 {RATIO} (ref 2–5)
HDLC SERPL-MCNC: 35 MG/DL (ref 40–75)
HDLC SERPL: 12.5 % (ref 20–50)
LDLC SERPL CALC-MCNC: 218.6 MG/DL (ref 63–159)
NONHDLC SERPL-MCNC: 244 MG/DL
TRIGL SERPL-MCNC: 127 MG/DL (ref 30–150)

## 2021-02-19 PROCEDURE — 36415 COLL VENOUS BLD VENIPUNCTURE: CPT

## 2021-02-19 PROCEDURE — 80061 LIPID PANEL: CPT

## 2021-02-26 ENCOUNTER — OFFICE VISIT (OUTPATIENT)
Dept: FAMILY MEDICINE | Facility: CLINIC | Age: 41
End: 2021-02-26
Payer: MEDICARE

## 2021-02-26 VITALS
SYSTOLIC BLOOD PRESSURE: 118 MMHG | HEART RATE: 91 BPM | HEIGHT: 72 IN | BODY MASS INDEX: 22.52 KG/M2 | OXYGEN SATURATION: 98 % | WEIGHT: 166.25 LBS | DIASTOLIC BLOOD PRESSURE: 72 MMHG | TEMPERATURE: 98 F

## 2021-02-26 DIAGNOSIS — G89.4 CHRONIC PAIN SYNDROME: ICD-10-CM

## 2021-02-26 DIAGNOSIS — E78.00 PURE HYPERCHOLESTEROLEMIA: Primary | ICD-10-CM

## 2021-02-26 DIAGNOSIS — J01.10 SUBACUTE FRONTAL SINUSITIS: ICD-10-CM

## 2021-02-26 DIAGNOSIS — I25.10 CORONARY ARTERY DISEASE INVOLVING NATIVE CORONARY ARTERY OF NATIVE HEART WITHOUT ANGINA PECTORIS: ICD-10-CM

## 2021-02-26 PROCEDURE — 99214 PR OFFICE/OUTPT VISIT, EST, LEVL IV, 30-39 MIN: ICD-10-PCS | Mod: S$GLB,,, | Performed by: INTERNAL MEDICINE

## 2021-02-26 PROCEDURE — 3008F BODY MASS INDEX DOCD: CPT | Mod: CPTII,S$GLB,, | Performed by: INTERNAL MEDICINE

## 2021-02-26 PROCEDURE — 1126F PR PAIN SEVERITY QUANTIFIED, NO PAIN PRESENT: ICD-10-PCS | Mod: S$GLB,,, | Performed by: INTERNAL MEDICINE

## 2021-02-26 PROCEDURE — 99999 PR PBB SHADOW E&M-EST. PATIENT-LVL V: CPT | Mod: PBBFAC,,, | Performed by: INTERNAL MEDICINE

## 2021-02-26 PROCEDURE — 99214 OFFICE O/P EST MOD 30 MIN: CPT | Mod: S$GLB,,, | Performed by: INTERNAL MEDICINE

## 2021-02-26 PROCEDURE — 3008F PR BODY MASS INDEX (BMI) DOCUMENTED: ICD-10-PCS | Mod: CPTII,S$GLB,, | Performed by: INTERNAL MEDICINE

## 2021-02-26 PROCEDURE — 99999 PR PBB SHADOW E&M-EST. PATIENT-LVL V: ICD-10-PCS | Mod: PBBFAC,,, | Performed by: INTERNAL MEDICINE

## 2021-02-26 PROCEDURE — 1126F AMNT PAIN NOTED NONE PRSNT: CPT | Mod: S$GLB,,, | Performed by: INTERNAL MEDICINE

## 2021-02-26 RX ORDER — NAPROXEN SODIUM 220 MG/1
81 TABLET, FILM COATED ORAL DAILY
COMMUNITY

## 2021-02-26 RX ORDER — AZELASTINE 1 MG/ML
SPRAY, METERED NASAL
COMMUNITY
Start: 2021-02-14

## 2021-02-26 RX ORDER — ROSUVASTATIN CALCIUM 40 MG/1
40 TABLET, COATED ORAL NIGHTLY
Qty: 90 TABLET | Refills: 3 | Status: SHIPPED | OUTPATIENT
Start: 2021-02-26 | End: 2022-02-26

## 2021-02-26 RX ORDER — PREDNISONE 20 MG/1
TABLET ORAL
COMMUNITY
Start: 2021-02-22 | End: 2021-12-05 | Stop reason: ALTCHOICE

## 2021-03-03 ENCOUNTER — TELEPHONE (OUTPATIENT)
Dept: PAIN MEDICINE | Facility: CLINIC | Age: 41
End: 2021-03-03

## 2021-03-04 ENCOUNTER — OFFICE VISIT (OUTPATIENT)
Dept: PAIN MEDICINE | Facility: CLINIC | Age: 41
End: 2021-03-04
Payer: MEDICARE

## 2021-03-04 VITALS
SYSTOLIC BLOOD PRESSURE: 140 MMHG | DIASTOLIC BLOOD PRESSURE: 87 MMHG | RESPIRATION RATE: 18 BRPM | BODY MASS INDEX: 22.81 KG/M2 | TEMPERATURE: 98 F | WEIGHT: 168.44 LBS | HEIGHT: 72 IN | HEART RATE: 76 BPM | OXYGEN SATURATION: 100 %

## 2021-03-04 DIAGNOSIS — M47.816 LUMBAR SPONDYLOSIS: ICD-10-CM

## 2021-03-04 DIAGNOSIS — M51.36 DDD (DEGENERATIVE DISC DISEASE), LUMBAR: ICD-10-CM

## 2021-03-04 DIAGNOSIS — M62.838 MUSCLE SPASM: ICD-10-CM

## 2021-03-04 DIAGNOSIS — M54.16 LUMBAR RADICULOPATHY: ICD-10-CM

## 2021-03-04 DIAGNOSIS — Z86.73 HISTORY OF CVA (CEREBROVASCULAR ACCIDENT): ICD-10-CM

## 2021-03-04 DIAGNOSIS — G89.4 CHRONIC PAIN DISORDER: Primary | ICD-10-CM

## 2021-03-04 PROCEDURE — 99213 OFFICE O/P EST LOW 20 MIN: CPT | Mod: S$GLB,,, | Performed by: NURSE PRACTITIONER

## 2021-03-04 PROCEDURE — 1125F AMNT PAIN NOTED PAIN PRSNT: CPT | Mod: S$GLB,,, | Performed by: NURSE PRACTITIONER

## 2021-03-04 PROCEDURE — 3008F BODY MASS INDEX DOCD: CPT | Mod: CPTII,S$GLB,, | Performed by: NURSE PRACTITIONER

## 2021-03-04 PROCEDURE — 99999 PR PBB SHADOW E&M-EST. PATIENT-LVL IV: CPT | Mod: PBBFAC,,, | Performed by: NURSE PRACTITIONER

## 2021-03-04 PROCEDURE — 99999 PR PBB SHADOW E&M-EST. PATIENT-LVL IV: ICD-10-PCS | Mod: PBBFAC,,, | Performed by: NURSE PRACTITIONER

## 2021-03-04 PROCEDURE — 3008F PR BODY MASS INDEX (BMI) DOCUMENTED: ICD-10-PCS | Mod: CPTII,S$GLB,, | Performed by: NURSE PRACTITIONER

## 2021-03-04 PROCEDURE — 99213 PR OFFICE/OUTPT VISIT, EST, LEVL III, 20-29 MIN: ICD-10-PCS | Mod: S$GLB,,, | Performed by: NURSE PRACTITIONER

## 2021-03-04 PROCEDURE — 1125F PR PAIN SEVERITY QUANTIFIED, PAIN PRESENT: ICD-10-PCS | Mod: S$GLB,,, | Performed by: NURSE PRACTITIONER

## 2021-03-04 RX ORDER — ONDANSETRON 8 MG/1
8 TABLET, ORALLY DISINTEGRATING ORAL 2 TIMES DAILY
COMMUNITY

## 2021-03-04 RX ORDER — METHYLPREDNISOLONE 4 MG/1
TABLET ORAL
Qty: 1 PACKAGE | Refills: 0 | Status: SHIPPED | OUTPATIENT
Start: 2021-03-04 | End: 2021-03-25

## 2021-03-05 ENCOUNTER — CLINICAL SUPPORT (OUTPATIENT)
Dept: REHABILITATION | Facility: OTHER | Age: 41
End: 2021-03-05
Attending: UROLOGY
Payer: MEDICARE

## 2021-03-05 DIAGNOSIS — Z74.09 IMPAIRED FUNCTIONAL MOBILITY, BALANCE, GAIT, AND ENDURANCE: ICD-10-CM

## 2021-03-05 DIAGNOSIS — Z78.9 ALTERATION IN INSTRUMENTAL ACTIVITIES OF DAILY LIVING (IADL): ICD-10-CM

## 2021-03-05 DIAGNOSIS — Z78.9 DECREASED INDEPENDENCE WITH ACTIVITIES OF DAILY LIVING: ICD-10-CM

## 2021-03-05 PROCEDURE — 97530 THERAPEUTIC ACTIVITIES: CPT

## 2021-03-09 ENCOUNTER — HOSPITAL ENCOUNTER (OUTPATIENT)
Dept: RADIOLOGY | Facility: OTHER | Age: 41
Discharge: HOME | End: 2021-03-09
Attending: NURSE PRACTITIONER
Payer: MEDICARE

## 2021-03-09 DIAGNOSIS — M54.16 LUMBAR RADICULOPATHY: ICD-10-CM

## 2021-03-09 PROCEDURE — 72148 MRI LUMBAR SPINE W/O DYE: CPT | Mod: 26,,, | Performed by: RADIOLOGY

## 2021-03-09 PROCEDURE — 72148 MRI LUMBAR SPINE WITHOUT CONTRAST: ICD-10-PCS | Mod: 26,,, | Performed by: RADIOLOGY

## 2021-03-09 PROCEDURE — 72148 MRI LUMBAR SPINE W/O DYE: CPT | Mod: TC

## 2021-03-11 ENCOUNTER — TELEPHONE (OUTPATIENT)
Dept: PAIN MEDICINE | Facility: CLINIC | Age: 41
End: 2021-03-11

## 2021-03-12 ENCOUNTER — OFFICE VISIT (OUTPATIENT)
Dept: PAIN MEDICINE | Facility: CLINIC | Age: 41
End: 2021-03-12
Payer: MEDICARE

## 2021-03-12 VITALS
TEMPERATURE: 98 F | RESPIRATION RATE: 18 BRPM | WEIGHT: 168 LBS | BODY MASS INDEX: 22.75 KG/M2 | HEART RATE: 125 BPM | SYSTOLIC BLOOD PRESSURE: 137 MMHG | DIASTOLIC BLOOD PRESSURE: 86 MMHG | HEIGHT: 72 IN

## 2021-03-12 DIAGNOSIS — M62.838 MUSCLE SPASM: ICD-10-CM

## 2021-03-12 DIAGNOSIS — M51.36 DDD (DEGENERATIVE DISC DISEASE), LUMBAR: ICD-10-CM

## 2021-03-12 DIAGNOSIS — M47.816 LUMBAR SPONDYLOSIS: ICD-10-CM

## 2021-03-12 DIAGNOSIS — G89.4 CHRONIC PAIN DISORDER: ICD-10-CM

## 2021-03-12 DIAGNOSIS — Z86.73 HISTORY OF CVA (CEREBROVASCULAR ACCIDENT): ICD-10-CM

## 2021-03-12 DIAGNOSIS — M54.16 LUMBAR RADICULOPATHY: Primary | ICD-10-CM

## 2021-03-12 PROCEDURE — 99213 OFFICE O/P EST LOW 20 MIN: CPT | Mod: S$GLB,,, | Performed by: NURSE PRACTITIONER

## 2021-03-12 PROCEDURE — 1125F AMNT PAIN NOTED PAIN PRSNT: CPT | Mod: S$GLB,,, | Performed by: NURSE PRACTITIONER

## 2021-03-12 PROCEDURE — 99999 PR PBB SHADOW E&M-EST. PATIENT-LVL III: ICD-10-PCS | Mod: PBBFAC,,, | Performed by: NURSE PRACTITIONER

## 2021-03-12 PROCEDURE — 1125F PR PAIN SEVERITY QUANTIFIED, PAIN PRESENT: ICD-10-PCS | Mod: S$GLB,,, | Performed by: NURSE PRACTITIONER

## 2021-03-12 PROCEDURE — 3008F PR BODY MASS INDEX (BMI) DOCUMENTED: ICD-10-PCS | Mod: CPTII,S$GLB,, | Performed by: NURSE PRACTITIONER

## 2021-03-12 PROCEDURE — 99213 PR OFFICE/OUTPT VISIT, EST, LEVL III, 20-29 MIN: ICD-10-PCS | Mod: S$GLB,,, | Performed by: NURSE PRACTITIONER

## 2021-03-12 PROCEDURE — 99999 PR PBB SHADOW E&M-EST. PATIENT-LVL III: CPT | Mod: PBBFAC,,, | Performed by: NURSE PRACTITIONER

## 2021-03-12 PROCEDURE — 3008F BODY MASS INDEX DOCD: CPT | Mod: CPTII,S$GLB,, | Performed by: NURSE PRACTITIONER

## 2021-03-19 ENCOUNTER — TELEPHONE (OUTPATIENT)
Dept: PAIN MEDICINE | Facility: CLINIC | Age: 41
End: 2021-03-19

## 2021-03-19 ENCOUNTER — PATIENT MESSAGE (OUTPATIENT)
Dept: PAIN MEDICINE | Facility: CLINIC | Age: 41
End: 2021-03-19

## 2021-03-26 ENCOUNTER — HOSPITAL ENCOUNTER (OUTPATIENT)
Facility: OTHER | Age: 41
Discharge: HOME | End: 2021-03-26
Attending: ANESTHESIOLOGY | Admitting: ANESTHESIOLOGY
Payer: MEDICARE

## 2021-03-26 VITALS
HEIGHT: 72 IN | OXYGEN SATURATION: 94 % | SYSTOLIC BLOOD PRESSURE: 115 MMHG | WEIGHT: 170 LBS | HEART RATE: 79 BPM | RESPIRATION RATE: 16 BRPM | TEMPERATURE: 98 F | BODY MASS INDEX: 23.03 KG/M2 | DIASTOLIC BLOOD PRESSURE: 68 MMHG

## 2021-03-26 DIAGNOSIS — M54.16 LUMBAR RADICULOPATHY: Primary | ICD-10-CM

## 2021-03-26 DIAGNOSIS — G89.29 CHRONIC PAIN: ICD-10-CM

## 2021-03-26 PROCEDURE — 25000003 PHARM REV CODE 250: Performed by: ANESTHESIOLOGY

## 2021-03-26 PROCEDURE — 62323 PR INJ LUMBAR/SACRAL, W/IMAGING GUIDANCE: ICD-10-PCS | Mod: ,,, | Performed by: ANESTHESIOLOGY

## 2021-03-26 PROCEDURE — 62323 NJX INTERLAMINAR LMBR/SAC: CPT | Mod: ,,, | Performed by: ANESTHESIOLOGY

## 2021-03-26 PROCEDURE — 25500020 PHARM REV CODE 255: Performed by: ANESTHESIOLOGY

## 2021-03-26 PROCEDURE — 62323 NJX INTERLAMINAR LMBR/SAC: CPT | Performed by: ANESTHESIOLOGY

## 2021-03-26 PROCEDURE — 63600175 PHARM REV CODE 636 W HCPCS: Performed by: ANESTHESIOLOGY

## 2021-03-26 RX ORDER — MIDAZOLAM HYDROCHLORIDE 1 MG/ML
INJECTION INTRAMUSCULAR; INTRAVENOUS
Status: DISCONTINUED | OUTPATIENT
Start: 2021-03-26 | End: 2021-03-26 | Stop reason: HOSPADM

## 2021-03-26 RX ORDER — LIDOCAINE HYDROCHLORIDE 10 MG/ML
INJECTION INFILTRATION; PERINEURAL
Status: DISCONTINUED | OUTPATIENT
Start: 2021-03-26 | End: 2021-03-26 | Stop reason: HOSPADM

## 2021-03-26 RX ORDER — FENTANYL CITRATE 50 UG/ML
INJECTION, SOLUTION INTRAMUSCULAR; INTRAVENOUS
Status: DISCONTINUED | OUTPATIENT
Start: 2021-03-26 | End: 2021-03-26 | Stop reason: HOSPADM

## 2021-03-26 RX ORDER — SODIUM CHLORIDE 9 MG/ML
INJECTION, SOLUTION INTRAVENOUS CONTINUOUS
Status: DISCONTINUED | OUTPATIENT
Start: 2021-03-26 | End: 2021-03-26 | Stop reason: HOSPADM

## 2021-03-26 RX ORDER — LIDOCAINE HYDROCHLORIDE 5 MG/ML
INJECTION, SOLUTION INFILTRATION; INTRAVENOUS
Status: DISCONTINUED | OUTPATIENT
Start: 2021-03-26 | End: 2021-03-26 | Stop reason: HOSPADM

## 2021-03-26 RX ORDER — DEXAMETHASONE SODIUM PHOSPHATE 4 MG/ML
INJECTION, SOLUTION INTRA-ARTICULAR; INTRALESIONAL; INTRAMUSCULAR; INTRAVENOUS; SOFT TISSUE
Status: DISCONTINUED | OUTPATIENT
Start: 2021-03-26 | End: 2021-03-26 | Stop reason: HOSPADM

## 2021-03-26 RX ADMIN — SODIUM CHLORIDE: 0.9 INJECTION, SOLUTION INTRAVENOUS at 08:03

## 2021-04-01 ENCOUNTER — PATIENT MESSAGE (OUTPATIENT)
Dept: PAIN MEDICINE | Facility: CLINIC | Age: 41
End: 2021-04-01

## 2021-04-06 ENCOUNTER — TELEPHONE (OUTPATIENT)
Dept: UROLOGY | Facility: CLINIC | Age: 41
End: 2021-04-06

## 2021-04-07 ENCOUNTER — OFFICE VISIT (OUTPATIENT)
Dept: UROGYNECOLOGY | Facility: CLINIC | Age: 41
End: 2021-04-07
Payer: MEDICARE

## 2021-04-07 VITALS
SYSTOLIC BLOOD PRESSURE: 118 MMHG | HEIGHT: 72 IN | WEIGHT: 154.31 LBS | DIASTOLIC BLOOD PRESSURE: 67 MMHG | HEART RATE: 72 BPM | BODY MASS INDEX: 20.9 KG/M2

## 2021-04-07 DIAGNOSIS — R39.13 INTERMITTENT URINARY STREAM: ICD-10-CM

## 2021-04-07 DIAGNOSIS — R35.0 INCREASED FREQUENCY OF URINATION: Primary | ICD-10-CM

## 2021-04-07 PROCEDURE — 1125F PR PAIN SEVERITY QUANTIFIED, PAIN PRESENT: ICD-10-PCS | Mod: S$GLB,,, | Performed by: OBSTETRICS & GYNECOLOGY

## 2021-04-07 PROCEDURE — 99204 PR OFFICE/OUTPT VISIT, NEW, LEVL IV, 45-59 MIN: ICD-10-PCS | Mod: S$GLB,,, | Performed by: OBSTETRICS & GYNECOLOGY

## 2021-04-07 PROCEDURE — 87086 URINE CULTURE/COLONY COUNT: CPT | Performed by: OBSTETRICS & GYNECOLOGY

## 2021-04-07 PROCEDURE — 3008F BODY MASS INDEX DOCD: CPT | Mod: CPTII,S$GLB,, | Performed by: OBSTETRICS & GYNECOLOGY

## 2021-04-07 PROCEDURE — 99999 PR PBB SHADOW E&M-EST. PATIENT-LVL III: CPT | Mod: PBBFAC,,, | Performed by: OBSTETRICS & GYNECOLOGY

## 2021-04-07 PROCEDURE — 3008F PR BODY MASS INDEX (BMI) DOCUMENTED: ICD-10-PCS | Mod: CPTII,S$GLB,, | Performed by: OBSTETRICS & GYNECOLOGY

## 2021-04-07 PROCEDURE — 51798 PR MEAS,POST-VOID RES,US,NON-IMAGING: ICD-10-PCS | Mod: S$GLB,,, | Performed by: OBSTETRICS & GYNECOLOGY

## 2021-04-07 PROCEDURE — 1125F AMNT PAIN NOTED PAIN PRSNT: CPT | Mod: S$GLB,,, | Performed by: OBSTETRICS & GYNECOLOGY

## 2021-04-07 PROCEDURE — 99204 OFFICE O/P NEW MOD 45 MIN: CPT | Mod: S$GLB,,, | Performed by: OBSTETRICS & GYNECOLOGY

## 2021-04-07 PROCEDURE — 99999 PR PBB SHADOW E&M-EST. PATIENT-LVL III: ICD-10-PCS | Mod: PBBFAC,,, | Performed by: OBSTETRICS & GYNECOLOGY

## 2021-04-07 PROCEDURE — 51798 US URINE CAPACITY MEASURE: CPT | Mod: S$GLB,,, | Performed by: OBSTETRICS & GYNECOLOGY

## 2021-04-08 LAB — BACTERIA UR CULT: NO GROWTH

## 2021-04-09 ENCOUNTER — TELEPHONE (OUTPATIENT)
Dept: UROGYNECOLOGY | Facility: CLINIC | Age: 41
End: 2021-04-09

## 2021-04-12 ENCOUNTER — TELEPHONE (OUTPATIENT)
Dept: PAIN MEDICINE | Facility: CLINIC | Age: 41
End: 2021-04-12

## 2021-04-13 ENCOUNTER — OFFICE VISIT (OUTPATIENT)
Dept: PAIN MEDICINE | Facility: CLINIC | Age: 41
End: 2021-04-13
Payer: MEDICARE

## 2021-04-13 VITALS
WEIGHT: 154.31 LBS | SYSTOLIC BLOOD PRESSURE: 117 MMHG | HEIGHT: 72 IN | BODY MASS INDEX: 20.9 KG/M2 | HEART RATE: 77 BPM | DIASTOLIC BLOOD PRESSURE: 72 MMHG

## 2021-04-13 DIAGNOSIS — Z86.73 HISTORY OF CVA (CEREBROVASCULAR ACCIDENT): ICD-10-CM

## 2021-04-13 DIAGNOSIS — G89.4 CHRONIC PAIN DISORDER: ICD-10-CM

## 2021-04-13 DIAGNOSIS — M51.36 DDD (DEGENERATIVE DISC DISEASE), LUMBAR: ICD-10-CM

## 2021-04-13 DIAGNOSIS — M47.816 LUMBAR SPONDYLOSIS: ICD-10-CM

## 2021-04-13 DIAGNOSIS — M54.16 LUMBAR RADICULOPATHY: Primary | ICD-10-CM

## 2021-04-13 DIAGNOSIS — M62.838 MUSCLE SPASM: ICD-10-CM

## 2021-04-13 PROCEDURE — 1125F AMNT PAIN NOTED PAIN PRSNT: CPT | Mod: S$GLB,,, | Performed by: NURSE PRACTITIONER

## 2021-04-13 PROCEDURE — 3008F BODY MASS INDEX DOCD: CPT | Mod: CPTII,S$GLB,, | Performed by: NURSE PRACTITIONER

## 2021-04-13 PROCEDURE — 99999 PR PBB SHADOW E&M-EST. PATIENT-LVL III: ICD-10-PCS | Mod: PBBFAC,,, | Performed by: NURSE PRACTITIONER

## 2021-04-13 PROCEDURE — 99999 PR PBB SHADOW E&M-EST. PATIENT-LVL III: CPT | Mod: PBBFAC,,, | Performed by: NURSE PRACTITIONER

## 2021-04-13 PROCEDURE — 99213 PR OFFICE/OUTPT VISIT, EST, LEVL III, 20-29 MIN: ICD-10-PCS | Mod: S$GLB,,, | Performed by: NURSE PRACTITIONER

## 2021-04-13 PROCEDURE — 99213 OFFICE O/P EST LOW 20 MIN: CPT | Mod: S$GLB,,, | Performed by: NURSE PRACTITIONER

## 2021-04-13 PROCEDURE — 3008F PR BODY MASS INDEX (BMI) DOCUMENTED: ICD-10-PCS | Mod: CPTII,S$GLB,, | Performed by: NURSE PRACTITIONER

## 2021-04-13 PROCEDURE — 1125F PR PAIN SEVERITY QUANTIFIED, PAIN PRESENT: ICD-10-PCS | Mod: S$GLB,,, | Performed by: NURSE PRACTITIONER

## 2021-05-06 ENCOUNTER — PATIENT MESSAGE (OUTPATIENT)
Dept: PAIN MEDICINE | Facility: CLINIC | Age: 41
End: 2021-05-06

## 2021-05-07 ENCOUNTER — TELEPHONE (OUTPATIENT)
Dept: ADMINISTRATIVE | Facility: OTHER | Age: 41
End: 2021-05-07

## 2021-05-10 ENCOUNTER — PATIENT MESSAGE (OUTPATIENT)
Dept: PAIN MEDICINE | Facility: CLINIC | Age: 41
End: 2021-05-10

## 2021-05-10 ENCOUNTER — TELEPHONE (OUTPATIENT)
Dept: ADMINISTRATIVE | Facility: OTHER | Age: 41
End: 2021-05-10

## 2021-05-10 DIAGNOSIS — M54.16 LUMBAR RADICULOPATHY: Primary | ICD-10-CM

## 2021-05-10 RX ORDER — GABAPENTIN 300 MG/1
300 CAPSULE ORAL 3 TIMES DAILY
Qty: 90 CAPSULE | Refills: 2 | Status: SHIPPED | OUTPATIENT
Start: 2021-05-10 | End: 2021-06-18 | Stop reason: SDUPTHER

## 2021-05-27 ENCOUNTER — OFFICE VISIT (OUTPATIENT)
Dept: NEUROLOGY | Facility: CLINIC | Age: 41
End: 2021-05-27
Payer: MEDICARE

## 2021-05-27 DIAGNOSIS — F95.9 TIC: Primary | ICD-10-CM

## 2021-05-27 PROCEDURE — 99204 PR OFFICE/OUTPT VISIT, NEW, LEVL IV, 45-59 MIN: ICD-10-PCS | Mod: 95,,, | Performed by: PSYCHIATRY & NEUROLOGY

## 2021-05-27 PROCEDURE — 99204 OFFICE O/P NEW MOD 45 MIN: CPT | Mod: 95,,, | Performed by: PSYCHIATRY & NEUROLOGY

## 2021-05-27 RX ORDER — CLONIDINE HYDROCHLORIDE 0.1 MG/1
0.1 TABLET ORAL 2 TIMES DAILY
Qty: 60 TABLET | Refills: 3 | Status: SHIPPED | OUTPATIENT
Start: 2021-05-27 | End: 2021-10-05

## 2021-06-03 ENCOUNTER — PATIENT MESSAGE (OUTPATIENT)
Dept: PAIN MEDICINE | Facility: OTHER | Age: 41
End: 2021-06-03

## 2021-06-04 ENCOUNTER — HOSPITAL ENCOUNTER (OUTPATIENT)
Facility: OTHER | Age: 41
Discharge: HOME | End: 2021-06-04
Attending: ANESTHESIOLOGY | Admitting: ANESTHESIOLOGY
Payer: MEDICARE

## 2021-06-04 VITALS
HEART RATE: 70 BPM | BODY MASS INDEX: 21.67 KG/M2 | SYSTOLIC BLOOD PRESSURE: 115 MMHG | RESPIRATION RATE: 14 BRPM | WEIGHT: 160 LBS | TEMPERATURE: 98 F | HEIGHT: 72 IN | OXYGEN SATURATION: 100 % | DIASTOLIC BLOOD PRESSURE: 62 MMHG

## 2021-06-04 DIAGNOSIS — M54.16 LUMBAR RADICULOPATHY: Primary | ICD-10-CM

## 2021-06-04 DIAGNOSIS — G89.29 CHRONIC PAIN: ICD-10-CM

## 2021-06-04 PROCEDURE — 25000003 PHARM REV CODE 250: Performed by: ANESTHESIOLOGY

## 2021-06-04 PROCEDURE — 62323 NJX INTERLAMINAR LMBR/SAC: CPT | Performed by: ANESTHESIOLOGY

## 2021-06-04 PROCEDURE — 62323 PR INJ LUMBAR/SACRAL, W/IMAGING GUIDANCE: ICD-10-PCS | Mod: ,,, | Performed by: ANESTHESIOLOGY

## 2021-06-04 PROCEDURE — 63600175 PHARM REV CODE 636 W HCPCS: Performed by: ANESTHESIOLOGY

## 2021-06-04 PROCEDURE — 25500020 PHARM REV CODE 255: Performed by: ANESTHESIOLOGY

## 2021-06-04 PROCEDURE — 62323 NJX INTERLAMINAR LMBR/SAC: CPT | Mod: ,,, | Performed by: ANESTHESIOLOGY

## 2021-06-04 PROCEDURE — A4216 STERILE WATER/SALINE, 10 ML: HCPCS | Performed by: ANESTHESIOLOGY

## 2021-06-04 RX ORDER — SODIUM CHLORIDE 9 MG/ML
INJECTION, SOLUTION INTRAMUSCULAR; INTRAVENOUS; SUBCUTANEOUS
Status: DISCONTINUED | OUTPATIENT
Start: 2021-06-04 | End: 2021-06-04 | Stop reason: HOSPADM

## 2021-06-04 RX ORDER — SODIUM CHLORIDE 9 MG/ML
INJECTION, SOLUTION INTRAVENOUS CONTINUOUS
Status: DISCONTINUED | OUTPATIENT
Start: 2021-06-04 | End: 2021-06-04 | Stop reason: HOSPADM

## 2021-06-04 RX ORDER — DEXAMETHASONE SODIUM PHOSPHATE 4 MG/ML
INJECTION, SOLUTION INTRA-ARTICULAR; INTRALESIONAL; INTRAMUSCULAR; INTRAVENOUS; SOFT TISSUE
Status: DISCONTINUED | OUTPATIENT
Start: 2021-06-04 | End: 2021-06-04 | Stop reason: HOSPADM

## 2021-06-04 RX ORDER — LIDOCAINE HYDROCHLORIDE 5 MG/ML
INJECTION, SOLUTION INFILTRATION; INTRAVENOUS
Status: DISCONTINUED | OUTPATIENT
Start: 2021-06-04 | End: 2021-06-04 | Stop reason: HOSPADM

## 2021-06-04 RX ORDER — MIDAZOLAM HYDROCHLORIDE 1 MG/ML
INJECTION INTRAMUSCULAR; INTRAVENOUS
Status: DISCONTINUED | OUTPATIENT
Start: 2021-06-04 | End: 2021-06-04 | Stop reason: HOSPADM

## 2021-06-04 RX ORDER — FENTANYL CITRATE 50 UG/ML
INJECTION, SOLUTION INTRAMUSCULAR; INTRAVENOUS
Status: DISCONTINUED | OUTPATIENT
Start: 2021-06-04 | End: 2021-06-04 | Stop reason: HOSPADM

## 2021-06-04 RX ORDER — LIDOCAINE HYDROCHLORIDE 10 MG/ML
INJECTION INFILTRATION; PERINEURAL
Status: DISCONTINUED | OUTPATIENT
Start: 2021-06-04 | End: 2021-06-04 | Stop reason: HOSPADM

## 2021-06-04 RX ADMIN — SODIUM CHLORIDE: 0.9 INJECTION, SOLUTION INTRAVENOUS at 09:06

## 2021-06-17 ENCOUNTER — PATIENT MESSAGE (OUTPATIENT)
Dept: PAIN MEDICINE | Facility: CLINIC | Age: 41
End: 2021-06-17

## 2021-06-18 ENCOUNTER — OFFICE VISIT (OUTPATIENT)
Dept: PAIN MEDICINE | Facility: CLINIC | Age: 41
End: 2021-06-18
Payer: MEDICARE

## 2021-06-18 DIAGNOSIS — G89.4 CHRONIC PAIN DISORDER: Primary | ICD-10-CM

## 2021-06-18 DIAGNOSIS — M47.816 LUMBAR SPONDYLOSIS: ICD-10-CM

## 2021-06-18 DIAGNOSIS — M54.16 LUMBAR RADICULOPATHY: ICD-10-CM

## 2021-06-18 DIAGNOSIS — M51.36 DDD (DEGENERATIVE DISC DISEASE), LUMBAR: ICD-10-CM

## 2021-06-18 PROCEDURE — 99213 PR OFFICE/OUTPT VISIT, EST, LEVL III, 20-29 MIN: ICD-10-PCS | Mod: 95,,, | Performed by: NURSE PRACTITIONER

## 2021-06-18 PROCEDURE — 99213 OFFICE O/P EST LOW 20 MIN: CPT | Mod: 95,,, | Performed by: NURSE PRACTITIONER

## 2021-06-18 RX ORDER — GABAPENTIN 300 MG/1
300 CAPSULE ORAL 3 TIMES DAILY
Qty: 90 CAPSULE | Refills: 2 | Status: SHIPPED | OUTPATIENT
Start: 2021-06-18 | End: 2021-11-12

## 2021-07-22 ENCOUNTER — PATIENT MESSAGE (OUTPATIENT)
Dept: PAIN MEDICINE | Facility: OTHER | Age: 41
End: 2021-07-22

## 2021-07-22 ENCOUNTER — OFFICE VISIT (OUTPATIENT)
Dept: PAIN MEDICINE | Facility: OTHER | Age: 41
End: 2021-07-22
Payer: MEDICARE

## 2021-07-22 DIAGNOSIS — G89.4 CHRONIC PAIN DISORDER: Primary | ICD-10-CM

## 2021-07-22 PROCEDURE — 99215 PR OFFICE/OUTPT VISIT, EST, LEVL V, 40-54 MIN: ICD-10-PCS | Mod: ,,, | Performed by: NURSE PRACTITIONER

## 2021-07-22 PROCEDURE — 99215 OFFICE O/P EST HI 40 MIN: CPT | Mod: ,,, | Performed by: NURSE PRACTITIONER

## 2021-09-13 ENCOUNTER — CLINICAL SUPPORT (OUTPATIENT)
Dept: URGENT CARE | Facility: CLINIC | Age: 41
End: 2021-09-13
Payer: MEDICARE

## 2021-09-13 DIAGNOSIS — U07.1 COVID-19: Primary | ICD-10-CM

## 2021-09-13 LAB
CTP QC/QA: YES
SARS-COV-2 RDRP RESP QL NAA+PROBE: NEGATIVE

## 2021-09-13 PROCEDURE — 99211 PR OFFICE/OUTPT VISIT, EST, LEVL I: ICD-10-PCS | Mod: S$GLB,,, | Performed by: FAMILY MEDICINE

## 2021-09-13 PROCEDURE — U0002: ICD-10-PCS | Mod: QW,S$GLB,, | Performed by: FAMILY MEDICINE

## 2021-09-13 PROCEDURE — U0002 COVID-19 LAB TEST NON-CDC: HCPCS | Mod: QW,S$GLB,, | Performed by: FAMILY MEDICINE

## 2021-09-13 PROCEDURE — 99211 OFF/OP EST MAY X REQ PHY/QHP: CPT | Mod: S$GLB,,, | Performed by: FAMILY MEDICINE

## 2021-09-22 PROBLEM — Z74.09 IMPAIRED FUNCTIONAL MOBILITY, BALANCE, GAIT, AND ENDURANCE: Status: RESOLVED | Noted: 2020-09-04 | Resolved: 2021-09-22

## 2021-09-22 PROBLEM — Z78.9 ALTERATION IN INSTRUMENTAL ACTIVITIES OF DAILY LIVING (IADL): Status: RESOLVED | Noted: 2020-09-04 | Resolved: 2021-09-22

## 2021-09-22 PROBLEM — Z78.9 DECREASED INDEPENDENCE WITH ACTIVITIES OF DAILY LIVING: Status: RESOLVED | Noted: 2020-09-04 | Resolved: 2021-09-22

## 2021-10-07 ENCOUNTER — PATIENT MESSAGE (OUTPATIENT)
Dept: BEHAVIORAL HEALTH | Facility: CLINIC | Age: 41
End: 2021-10-07

## 2021-10-20 ENCOUNTER — TELEPHONE (OUTPATIENT)
Dept: NEUROLOGY | Facility: CLINIC | Age: 41
End: 2021-10-20

## 2021-10-20 ENCOUNTER — OFFICE VISIT (OUTPATIENT)
Dept: PAIN MEDICINE | Facility: CLINIC | Age: 41
End: 2021-10-20
Payer: MEDICARE

## 2021-10-20 VITALS
TEMPERATURE: 98 F | WEIGHT: 164.88 LBS | RESPIRATION RATE: 19 BRPM | HEIGHT: 72 IN | SYSTOLIC BLOOD PRESSURE: 110 MMHG | DIASTOLIC BLOOD PRESSURE: 67 MMHG | HEART RATE: 69 BPM | BODY MASS INDEX: 22.33 KG/M2

## 2021-10-20 DIAGNOSIS — M51.36 DDD (DEGENERATIVE DISC DISEASE), LUMBAR: ICD-10-CM

## 2021-10-20 DIAGNOSIS — G89.4 CHRONIC PAIN DISORDER: Primary | ICD-10-CM

## 2021-10-20 DIAGNOSIS — Z86.73 HISTORY OF CVA (CEREBROVASCULAR ACCIDENT): ICD-10-CM

## 2021-10-20 DIAGNOSIS — M47.816 LUMBAR SPONDYLOSIS: ICD-10-CM

## 2021-10-20 DIAGNOSIS — M54.16 LUMBAR RADICULOPATHY: ICD-10-CM

## 2021-10-20 PROCEDURE — 3074F PR MOST RECENT SYSTOLIC BLOOD PRESSURE < 130 MM HG: ICD-10-PCS | Mod: CPTII,S$GLB,, | Performed by: NURSE PRACTITIONER

## 2021-10-20 PROCEDURE — 3078F DIAST BP <80 MM HG: CPT | Mod: CPTII,S$GLB,, | Performed by: NURSE PRACTITIONER

## 2021-10-20 PROCEDURE — 3008F BODY MASS INDEX DOCD: CPT | Mod: CPTII,S$GLB,, | Performed by: NURSE PRACTITIONER

## 2021-10-20 PROCEDURE — 3074F SYST BP LT 130 MM HG: CPT | Mod: CPTII,S$GLB,, | Performed by: NURSE PRACTITIONER

## 2021-10-20 PROCEDURE — 99999 PR PBB SHADOW E&M-EST. PATIENT-LVL V: CPT | Mod: PBBFAC,,, | Performed by: NURSE PRACTITIONER

## 2021-10-20 PROCEDURE — 1159F PR MEDICATION LIST DOCUMENTED IN MEDICAL RECORD: ICD-10-PCS | Mod: CPTII,S$GLB,, | Performed by: NURSE PRACTITIONER

## 2021-10-20 PROCEDURE — 99213 PR OFFICE/OUTPT VISIT, EST, LEVL III, 20-29 MIN: ICD-10-PCS | Mod: S$GLB,,, | Performed by: NURSE PRACTITIONER

## 2021-10-20 PROCEDURE — 99999 PR PBB SHADOW E&M-EST. PATIENT-LVL V: ICD-10-PCS | Mod: PBBFAC,,, | Performed by: NURSE PRACTITIONER

## 2021-10-20 PROCEDURE — 1160F PR REVIEW ALL MEDS BY PRESCRIBER/CLIN PHARMACIST DOCUMENTED: ICD-10-PCS | Mod: CPTII,S$GLB,, | Performed by: NURSE PRACTITIONER

## 2021-10-20 PROCEDURE — 3078F PR MOST RECENT DIASTOLIC BLOOD PRESSURE < 80 MM HG: ICD-10-PCS | Mod: CPTII,S$GLB,, | Performed by: NURSE PRACTITIONER

## 2021-10-20 PROCEDURE — 3008F PR BODY MASS INDEX (BMI) DOCUMENTED: ICD-10-PCS | Mod: CPTII,S$GLB,, | Performed by: NURSE PRACTITIONER

## 2021-10-20 PROCEDURE — 99213 OFFICE O/P EST LOW 20 MIN: CPT | Mod: S$GLB,,, | Performed by: NURSE PRACTITIONER

## 2021-10-20 PROCEDURE — 1160F RVW MEDS BY RX/DR IN RCRD: CPT | Mod: CPTII,S$GLB,, | Performed by: NURSE PRACTITIONER

## 2021-10-20 PROCEDURE — 1159F MED LIST DOCD IN RCRD: CPT | Mod: CPTII,S$GLB,, | Performed by: NURSE PRACTITIONER

## 2021-10-20 RX ORDER — NALOXONE HYDROCHLORIDE 4 MG/.1ML
SPRAY NASAL
Qty: 1 EACH | Refills: 3 | Status: SHIPPED | OUTPATIENT
Start: 2021-10-20 | End: 2022-03-02 | Stop reason: SDUPTHER

## 2021-10-20 RX ORDER — TRAZODONE HYDROCHLORIDE 100 MG/1
100 TABLET ORAL NIGHTLY
COMMUNITY
Start: 2021-09-08 | End: 2023-11-06

## 2021-10-20 RX ORDER — ESTRADIOL 2 MG/1
TABLET ORAL
COMMUNITY
Start: 2021-09-10 | End: 2022-08-04

## 2021-10-20 RX ORDER — MOXIFLOXACIN HYDROCHLORIDE 400 MG/1
TABLET ORAL
COMMUNITY
Start: 2021-08-24 | End: 2022-08-04

## 2021-10-20 RX ORDER — TRAZODONE HYDROCHLORIDE 50 MG/1
50 TABLET ORAL NIGHTLY
COMMUNITY
Start: 2021-09-07 | End: 2023-11-06

## 2021-10-22 ENCOUNTER — TELEPHONE (OUTPATIENT)
Dept: PAIN MEDICINE | Facility: OTHER | Age: 41
End: 2021-10-22

## 2021-10-25 ENCOUNTER — PATIENT MESSAGE (OUTPATIENT)
Dept: PAIN MEDICINE | Facility: OTHER | Age: 41
End: 2021-10-25
Payer: MEDICARE

## 2021-10-29 ENCOUNTER — HOSPITAL ENCOUNTER (OUTPATIENT)
Facility: OTHER | Age: 41
Discharge: HOME | End: 2021-10-29
Attending: ANESTHESIOLOGY | Admitting: ANESTHESIOLOGY
Payer: MEDICARE

## 2021-10-29 VITALS
BODY MASS INDEX: 22.62 KG/M2 | SYSTOLIC BLOOD PRESSURE: 124 MMHG | OXYGEN SATURATION: 98 % | RESPIRATION RATE: 16 BRPM | HEART RATE: 80 BPM | DIASTOLIC BLOOD PRESSURE: 80 MMHG | HEIGHT: 72 IN | TEMPERATURE: 98 F | WEIGHT: 167 LBS

## 2021-10-29 DIAGNOSIS — M54.16 LUMBAR RADICULOPATHY: ICD-10-CM

## 2021-10-29 DIAGNOSIS — G89.29 CHRONIC PAIN: ICD-10-CM

## 2021-10-29 DIAGNOSIS — M51.36 DDD (DEGENERATIVE DISC DISEASE), LUMBAR: Primary | ICD-10-CM

## 2021-10-29 PROCEDURE — 62323 NJX INTERLAMINAR LMBR/SAC: CPT | Performed by: ANESTHESIOLOGY

## 2021-10-29 PROCEDURE — 62323 NJX INTERLAMINAR LMBR/SAC: CPT | Mod: ,,, | Performed by: ANESTHESIOLOGY

## 2021-10-29 PROCEDURE — A4216 STERILE WATER/SALINE, 10 ML: HCPCS | Performed by: ANESTHESIOLOGY

## 2021-10-29 PROCEDURE — 63600175 PHARM REV CODE 636 W HCPCS: Performed by: ANESTHESIOLOGY

## 2021-10-29 PROCEDURE — 25500020 PHARM REV CODE 255: Performed by: ANESTHESIOLOGY

## 2021-10-29 PROCEDURE — 62323 PR INJ LUMBAR/SACRAL, W/IMAGING GUIDANCE: ICD-10-PCS | Mod: ,,, | Performed by: ANESTHESIOLOGY

## 2021-10-29 PROCEDURE — 25000003 PHARM REV CODE 250: Performed by: ANESTHESIOLOGY

## 2021-10-29 RX ORDER — MIDAZOLAM HYDROCHLORIDE 1 MG/ML
INJECTION INTRAMUSCULAR; INTRAVENOUS
Status: DISCONTINUED | OUTPATIENT
Start: 2021-10-29 | End: 2021-10-29 | Stop reason: HOSPADM

## 2021-10-29 RX ORDER — SODIUM CHLORIDE 9 MG/ML
INJECTION, SOLUTION INTRAMUSCULAR; INTRAVENOUS; SUBCUTANEOUS
Status: DISCONTINUED | OUTPATIENT
Start: 2021-10-29 | End: 2021-10-29 | Stop reason: HOSPADM

## 2021-10-29 RX ORDER — LIDOCAINE HYDROCHLORIDE 10 MG/ML
INJECTION, SOLUTION EPIDURAL; INFILTRATION; INTRACAUDAL; PERINEURAL
Status: DISCONTINUED | OUTPATIENT
Start: 2021-10-29 | End: 2021-10-29 | Stop reason: HOSPADM

## 2021-10-29 RX ORDER — SODIUM CHLORIDE 9 MG/ML
INJECTION, SOLUTION INTRAVENOUS CONTINUOUS
Status: DISCONTINUED | OUTPATIENT
Start: 2021-10-29 | End: 2021-10-29 | Stop reason: HOSPADM

## 2021-10-29 RX ORDER — LIDOCAINE HYDROCHLORIDE 20 MG/ML
INJECTION, SOLUTION INFILTRATION; PERINEURAL
Status: DISCONTINUED | OUTPATIENT
Start: 2021-10-29 | End: 2021-10-29 | Stop reason: HOSPADM

## 2021-10-29 RX ORDER — FENTANYL CITRATE 50 UG/ML
INJECTION, SOLUTION INTRAMUSCULAR; INTRAVENOUS
Status: DISCONTINUED | OUTPATIENT
Start: 2021-10-29 | End: 2021-10-29 | Stop reason: HOSPADM

## 2021-11-08 ENCOUNTER — OFFICE VISIT (OUTPATIENT)
Dept: NEUROLOGY | Facility: CLINIC | Age: 41
End: 2021-11-08
Payer: MEDICARE

## 2021-11-08 VITALS
HEART RATE: 77 BPM | WEIGHT: 165 LBS | BODY MASS INDEX: 22.35 KG/M2 | DIASTOLIC BLOOD PRESSURE: 68 MMHG | HEIGHT: 72 IN | SYSTOLIC BLOOD PRESSURE: 115 MMHG

## 2021-11-08 DIAGNOSIS — G25.9 FUNCTIONAL MOVEMENT DISORDER: ICD-10-CM

## 2021-11-08 DIAGNOSIS — F95.9 TIC DISORDER: Chronic | ICD-10-CM

## 2021-11-08 DIAGNOSIS — G24.9 DYSTONIA: Primary | ICD-10-CM

## 2021-11-08 PROCEDURE — 3008F BODY MASS INDEX DOCD: CPT | Mod: CPTII,S$GLB,, | Performed by: PSYCHIATRY & NEUROLOGY

## 2021-11-08 PROCEDURE — 3074F SYST BP LT 130 MM HG: CPT | Mod: CPTII,S$GLB,, | Performed by: PSYCHIATRY & NEUROLOGY

## 2021-11-08 PROCEDURE — 3078F PR MOST RECENT DIASTOLIC BLOOD PRESSURE < 80 MM HG: ICD-10-PCS | Mod: CPTII,S$GLB,, | Performed by: PSYCHIATRY & NEUROLOGY

## 2021-11-08 PROCEDURE — 3008F PR BODY MASS INDEX (BMI) DOCUMENTED: ICD-10-PCS | Mod: CPTII,S$GLB,, | Performed by: PSYCHIATRY & NEUROLOGY

## 2021-11-08 PROCEDURE — 99214 OFFICE O/P EST MOD 30 MIN: CPT | Mod: S$GLB,,, | Performed by: PSYCHIATRY & NEUROLOGY

## 2021-11-08 PROCEDURE — 99214 PR OFFICE/OUTPT VISIT, EST, LEVL IV, 30-39 MIN: ICD-10-PCS | Mod: S$GLB,,, | Performed by: PSYCHIATRY & NEUROLOGY

## 2021-11-08 PROCEDURE — 99999 PR PBB SHADOW E&M-EST. PATIENT-LVL III: CPT | Mod: PBBFAC,,, | Performed by: PSYCHIATRY & NEUROLOGY

## 2021-11-08 PROCEDURE — 99999 PR PBB SHADOW E&M-EST. PATIENT-LVL III: ICD-10-PCS | Mod: PBBFAC,,, | Performed by: PSYCHIATRY & NEUROLOGY

## 2021-11-08 PROCEDURE — 3078F DIAST BP <80 MM HG: CPT | Mod: CPTII,S$GLB,, | Performed by: PSYCHIATRY & NEUROLOGY

## 2021-11-08 PROCEDURE — 3074F PR MOST RECENT SYSTOLIC BLOOD PRESSURE < 130 MM HG: ICD-10-PCS | Mod: CPTII,S$GLB,, | Performed by: PSYCHIATRY & NEUROLOGY

## 2021-11-08 RX ORDER — RIMEGEPANT SULFATE 75 MG/75MG
75 TABLET, ORALLY DISINTEGRATING ORAL
COMMUNITY
Start: 2021-10-27

## 2021-11-12 ENCOUNTER — PATIENT MESSAGE (OUTPATIENT)
Dept: PAIN MEDICINE | Facility: CLINIC | Age: 41
End: 2021-11-12
Payer: MEDICARE

## 2021-12-03 ENCOUNTER — TELEPHONE (OUTPATIENT)
Dept: SLEEP MEDICINE | Facility: CLINIC | Age: 41
End: 2021-12-03
Payer: MEDICARE

## 2021-12-03 ENCOUNTER — OFFICE VISIT (OUTPATIENT)
Dept: CARDIOLOGY | Facility: CLINIC | Age: 41
End: 2021-12-03
Payer: MEDICARE

## 2021-12-03 VITALS
HEART RATE: 78 BPM | BODY MASS INDEX: 21.38 KG/M2 | SYSTOLIC BLOOD PRESSURE: 108 MMHG | WEIGHT: 157.88 LBS | HEIGHT: 72 IN | DIASTOLIC BLOOD PRESSURE: 69 MMHG

## 2021-12-03 DIAGNOSIS — I25.110 ATHEROSCLEROSIS OF NATIVE CORONARY ARTERY OF NATIVE HEART WITH UNSTABLE ANGINA PECTORIS: Primary | ICD-10-CM

## 2021-12-03 PROCEDURE — 99205 OFFICE O/P NEW HI 60 MIN: CPT | Mod: GC,S$GLB,, | Performed by: STUDENT IN AN ORGANIZED HEALTH CARE EDUCATION/TRAINING PROGRAM

## 2021-12-03 PROCEDURE — 99999 PR PBB SHADOW E&M-EST. PATIENT-LVL V: ICD-10-PCS | Mod: PBBFAC,GC,, | Performed by: STUDENT IN AN ORGANIZED HEALTH CARE EDUCATION/TRAINING PROGRAM

## 2021-12-03 PROCEDURE — 99205 PR OFFICE/OUTPT VISIT, NEW, LEVL V, 60-74 MIN: ICD-10-PCS | Mod: GC,S$GLB,, | Performed by: STUDENT IN AN ORGANIZED HEALTH CARE EDUCATION/TRAINING PROGRAM

## 2021-12-03 PROCEDURE — 99999 PR PBB SHADOW E&M-EST. PATIENT-LVL V: CPT | Mod: PBBFAC,GC,, | Performed by: STUDENT IN AN ORGANIZED HEALTH CARE EDUCATION/TRAINING PROGRAM

## 2021-12-03 RX ORDER — EZETIMIBE 10 MG/1
10 TABLET ORAL DAILY
Qty: 90 TABLET | Refills: 3 | Status: SHIPPED | OUTPATIENT
Start: 2021-12-03 | End: 2022-04-04

## 2021-12-03 RX ORDER — NITROGLYCERIN 0.4 MG/1
0.4 TABLET SUBLINGUAL EVERY 5 MIN PRN
Qty: 90 TABLET | Refills: 3 | Status: SHIPPED | OUTPATIENT
Start: 2021-12-03 | End: 2022-05-27 | Stop reason: SDUPTHER

## 2021-12-05 ENCOUNTER — OFFICE VISIT (OUTPATIENT)
Dept: URGENT CARE | Facility: CLINIC | Age: 41
End: 2021-12-05
Payer: MEDICARE

## 2021-12-05 VITALS
TEMPERATURE: 98 F | BODY MASS INDEX: 21.67 KG/M2 | SYSTOLIC BLOOD PRESSURE: 119 MMHG | HEART RATE: 91 BPM | HEIGHT: 72 IN | OXYGEN SATURATION: 98 % | RESPIRATION RATE: 16 BRPM | WEIGHT: 160 LBS | DIASTOLIC BLOOD PRESSURE: 77 MMHG

## 2021-12-05 DIAGNOSIS — J01.90 ACUTE BACTERIAL SINUSITIS: Primary | ICD-10-CM

## 2021-12-05 DIAGNOSIS — B96.89 ACUTE BACTERIAL SINUSITIS: Primary | ICD-10-CM

## 2021-12-05 DIAGNOSIS — J34.9 SINUS PROBLEM: ICD-10-CM

## 2021-12-05 LAB
CTP QC/QA: YES
CTP QC/QA: YES
POC MOLECULAR INFLUENZA A AGN: NEGATIVE
POC MOLECULAR INFLUENZA B AGN: NEGATIVE
SARS-COV-2 RDRP RESP QL NAA+PROBE: NEGATIVE

## 2021-12-05 PROCEDURE — 99213 PR OFFICE/OUTPT VISIT, EST, LEVL III, 20-29 MIN: ICD-10-PCS | Mod: S$GLB,,, | Performed by: NURSE PRACTITIONER

## 2021-12-05 PROCEDURE — U0002: ICD-10-PCS | Mod: QW,S$GLB,, | Performed by: NURSE PRACTITIONER

## 2021-12-05 PROCEDURE — 87502 INFLUENZA DNA AMP PROBE: CPT | Mod: QW,S$GLB,, | Performed by: NURSE PRACTITIONER

## 2021-12-05 PROCEDURE — 99213 OFFICE O/P EST LOW 20 MIN: CPT | Mod: S$GLB,,, | Performed by: NURSE PRACTITIONER

## 2021-12-05 PROCEDURE — U0002 COVID-19 LAB TEST NON-CDC: HCPCS | Mod: QW,S$GLB,, | Performed by: NURSE PRACTITIONER

## 2021-12-05 PROCEDURE — 87502 POCT INFLUENZA A/B MOLECULAR: ICD-10-PCS | Mod: QW,S$GLB,, | Performed by: NURSE PRACTITIONER

## 2021-12-05 RX ORDER — PREDNISONE 20 MG/1
20 TABLET ORAL DAILY
Qty: 3 TABLET | Refills: 0 | Status: SHIPPED | OUTPATIENT
Start: 2021-12-05 | End: 2021-12-08

## 2021-12-05 RX ORDER — NITROGLYCERIN 20 MG/1
PATCH TRANSDERMAL
Status: ON HOLD | COMMUNITY
End: 2022-07-07 | Stop reason: HOSPADM

## 2021-12-05 RX ORDER — DOXYCYCLINE 100 MG/1
100 CAPSULE ORAL 2 TIMES DAILY
Qty: 10 CAPSULE | Refills: 0 | Status: SHIPPED | OUTPATIENT
Start: 2021-12-05 | End: 2021-12-10

## 2021-12-05 RX ORDER — ROSUVASTATIN CALCIUM 20 MG/1
TABLET, COATED ORAL
COMMUNITY
Start: 2021-08-11 | End: 2022-05-27 | Stop reason: SDUPTHER

## 2021-12-17 ENCOUNTER — TELEPHONE (OUTPATIENT)
Dept: PAIN MEDICINE | Facility: CLINIC | Age: 41
End: 2021-12-17
Payer: MEDICARE

## 2021-12-20 ENCOUNTER — OFFICE VISIT (OUTPATIENT)
Dept: PAIN MEDICINE | Facility: CLINIC | Age: 41
End: 2021-12-20
Payer: MEDICARE

## 2021-12-20 VITALS
OXYGEN SATURATION: 100 % | HEIGHT: 72 IN | HEART RATE: 74 BPM | SYSTOLIC BLOOD PRESSURE: 119 MMHG | DIASTOLIC BLOOD PRESSURE: 91 MMHG | TEMPERATURE: 98 F | BODY MASS INDEX: 21.83 KG/M2 | WEIGHT: 161.19 LBS

## 2021-12-20 DIAGNOSIS — M54.12 CERVICAL RADICULOPATHY: Primary | ICD-10-CM

## 2021-12-20 DIAGNOSIS — M47.812 CERVICAL SPONDYLOSIS: ICD-10-CM

## 2021-12-20 DIAGNOSIS — M50.30 DDD (DEGENERATIVE DISC DISEASE), CERVICAL: ICD-10-CM

## 2021-12-20 DIAGNOSIS — M47.816 LUMBAR SPONDYLOSIS: ICD-10-CM

## 2021-12-20 DIAGNOSIS — G89.4 CHRONIC PAIN DISORDER: ICD-10-CM

## 2021-12-20 DIAGNOSIS — M54.16 LUMBAR RADICULOPATHY: ICD-10-CM

## 2021-12-20 DIAGNOSIS — M51.36 DDD (DEGENERATIVE DISC DISEASE), LUMBAR: ICD-10-CM

## 2021-12-20 PROCEDURE — 99999 PR PBB SHADOW E&M-EST. PATIENT-LVL V: ICD-10-PCS | Mod: PBBFAC,,, | Performed by: NURSE PRACTITIONER

## 2021-12-20 PROCEDURE — 99999 PR PBB SHADOW E&M-EST. PATIENT-LVL V: CPT | Mod: PBBFAC,,, | Performed by: NURSE PRACTITIONER

## 2021-12-20 PROCEDURE — 99213 OFFICE O/P EST LOW 20 MIN: CPT | Mod: S$GLB,,, | Performed by: NURSE PRACTITIONER

## 2021-12-20 PROCEDURE — 99213 PR OFFICE/OUTPT VISIT, EST, LEVL III, 20-29 MIN: ICD-10-PCS | Mod: S$GLB,,, | Performed by: NURSE PRACTITIONER

## 2021-12-25 ENCOUNTER — HOSPITAL ENCOUNTER (EMERGENCY)
Facility: HOSPITAL | Age: 41
Discharge: HOME | End: 2021-12-25
Attending: EMERGENCY MEDICINE
Payer: MEDICARE

## 2021-12-25 VITALS
SYSTOLIC BLOOD PRESSURE: 108 MMHG | WEIGHT: 167 LBS | OXYGEN SATURATION: 98 % | BODY MASS INDEX: 22.65 KG/M2 | DIASTOLIC BLOOD PRESSURE: 58 MMHG | RESPIRATION RATE: 18 BRPM | HEART RATE: 71 BPM | TEMPERATURE: 98 F

## 2021-12-25 DIAGNOSIS — G25.3 MYOCLONUS: Primary | ICD-10-CM

## 2021-12-25 LAB
BUN SERPL-MCNC: 24 MG/DL (ref 6–30)
CHLORIDE SERPL-SCNC: 106 MMOL/L (ref 95–110)
CREAT SERPL-MCNC: 0.9 MG/DL (ref 0.5–1.4)
GLUCOSE SERPL-MCNC: 92 MG/DL (ref 70–110)
HCT VFR BLD CALC: 44 %PCV (ref 36–54)
POC IONIZED CALCIUM: 1.22 MMOL/L (ref 1.06–1.42)
POC TCO2 (MEASURED): 28 MMOL/L (ref 23–29)
POTASSIUM BLD-SCNC: 5.3 MMOL/L (ref 3.5–5.1)
SAMPLE: ABNORMAL
SODIUM BLD-SCNC: 141 MMOL/L (ref 136–145)

## 2021-12-25 PROCEDURE — 99284 EMERGENCY DEPT VISIT MOD MDM: CPT | Mod: 25

## 2021-12-25 PROCEDURE — 63600175 PHARM REV CODE 636 W HCPCS: Performed by: EMERGENCY MEDICINE

## 2021-12-25 PROCEDURE — 96374 THER/PROPH/DIAG INJ IV PUSH: CPT

## 2021-12-25 PROCEDURE — 99284 EMERGENCY DEPT VISIT MOD MDM: CPT | Mod: ,,, | Performed by: EMERGENCY MEDICINE

## 2021-12-25 PROCEDURE — 99284 PR EMERGENCY DEPT VISIT,LEVEL IV: ICD-10-PCS | Mod: ,,, | Performed by: EMERGENCY MEDICINE

## 2021-12-25 PROCEDURE — 80047 BASIC METABLC PNL IONIZED CA: CPT

## 2021-12-25 PROCEDURE — 25000003 PHARM REV CODE 250: Performed by: EMERGENCY MEDICINE

## 2021-12-25 PROCEDURE — 96361 HYDRATE IV INFUSION ADD-ON: CPT

## 2021-12-25 RX ORDER — LORAZEPAM 2 MG/ML
2 INJECTION INTRAMUSCULAR
Status: COMPLETED | OUTPATIENT
Start: 2021-12-25 | End: 2021-12-25

## 2021-12-25 RX ADMIN — LORAZEPAM 2 MG: 2 INJECTION INTRAMUSCULAR; INTRAVENOUS at 01:12

## 2021-12-25 RX ADMIN — SODIUM CHLORIDE 500 ML: 0.9 INJECTION, SOLUTION INTRAVENOUS at 01:12

## 2021-12-25 NOTE — DISCHARGE INSTRUCTIONS
Take her medications as prescribed.  Return to the emergency department for any worsening symptoms.

## 2021-12-25 NOTE — ED TRIAGE NOTES
"The patient is 40 yo who presents to the ED for multiple complaints. Pt states "I have a hx of myoclonus and noticed that my right eye has been closing off and on since yesterday and my feet are curling". Patient endorses "6 falls" since yesterday. Denies hitting their head or passing out. Mom is at bedside. Has hx of migraine headaches.  "

## 2021-12-25 NOTE — ED NOTES
I-STAT Chem-8+ Results:   Value Reference Range   Sodium 141 136-145 mmol/L   Potassium  5.3 3.5-5.1 mmol/L   Chloride 106  mmol/L   Ionized Calcium 1.22 1.06-1.42 mmol/L   CO2 (measured) 28 23-29 mmol/L   Glucose 92  mg/dL   BUN 24 6-30 mg/dL   Creatinine 0.9 0.5-1.4 mg/dL   Hematocrit 44 36-54%

## 2021-12-25 NOTE — ED PROVIDER NOTES
Encounter Date: 12/25/2021       History     Chief Complaint   Patient presents with    Weakness     Started having pain in neck since last night, also complains of right sided weakness that caused him to fall earlier. Mother states patients eye randomly closes, did not observe this in triage      42 yo with pmhx migraines, depression, anxiety, ?CVA as infant, myoclonus, functional movement disorder, CAD presents with a chief complaint of spasm episodes.  Patient reports that since yesterday evening, he has had episodes of more severe myoclonus.  They are intermittent and not more frequent, but more severe.  He is uncertain what his triggers are for myoclonus.  He reports spasms in his bilateral feet that require him to walk on his ankles with his feet inverted.  He has had 5-6 tripping episodes since walking on his ankles.  He has not ever hit his head or passed out.  He has only fallen completely over once and it was onto his forearms this morning in the grass.  Patient has tried aggressive home medication regimen for the symptoms which has not helped.  That includes Reglan, Benadryl, clonidine, methocarbamol, baclofen, fioricet, stadol, and multiple other medications.  Do severity came to the emergency department.  He also has episodes of his right eye lid closing, but no visual complaints.  Patient follows with Maribel Beasley at [facility] but is working to be seen by Ashley at [facility].  No fevers, chills, recent illness.  No recent infection symptoms other than chronic sinusitis.        Review of patient's allergies indicates:   Allergen Reactions    Mustard Itching, Nausea And Vomiting, Shortness Of Breath and Swelling    Mushroom Itching, Nausea And Vomiting and Swelling    Niaspan extended-release [niacin] Itching    Nystatin Hives     Other reaction(s): hives    Olive extract Itching, Nausea And Vomiting and Swelling    Oyster extract     Extendryl [[id number]] Rash     DO-jenelle Lockett     Past Medical History:   Diagnosis Date    Anxiety     Cancer     Chest pain 1/20/2016 12/30/2015: Began experinece chest pain.    Depression     Functional movement disorder 10/1/2019    Migraine headache     Movement disorder     Myoclonic jerkings, massive     Stroke pt. states he had a cva at 3 months old     Past Surgical History:   Procedure Laterality Date    ANGIOGRAM, CORONARY, WITH LEFT HEART CATHETERIZATION      EPIDURAL STEROID INJECTION N/A 3/26/2021    Procedure: INJECTION, STEROID, EPIDURAL L4/5;  Surgeon: Larry Brasher MD;  Location: [facility];  Service: Pain Management;  Laterality: N/A;    EPIDURAL STEROID INJECTION N/A 6/4/2021    Procedure: INJECTION, STEROID, EPIDURAL, L4-L5 IL need consent;  Surgeon: Larry Brasher MD;  Location: [facility];  Service: Pain Management;  Laterality: N/A;    EPIDURAL STEROID INJECTION N/A 10/29/2021    Procedure: INJECTION, STEROID, EPIDURAL, L4-L5IL NEED CONSENT;  Surgeon: Larry Brasher MD;  Location: [facility];  Service: Pain Management;  Laterality: N/A;    MANDIBLE SURGERY      reconstruction    variceol repair       Family History   Problem Relation Age of Onset    Heart disease Father     Hypertension Father     Hyperlipidemia Father     Heart disease Paternal Uncle     Heart disease Mother     Myasthenia gravis Mother      Social History     Tobacco Use    Smoking status: Never Smoker    Smokeless tobacco: Never Used   Substance Use Topics    Alcohol use: No    Drug use: No     Review of Systems   Constitutional: Negative for fever.   HENT: Positive for congestion. Negative for sore throat.    Respiratory: Negative for shortness of breath.    Cardiovascular: Negative for chest pain.   Gastrointestinal: Negative for nausea.   Genitourinary: Negative for dysuria.   Musculoskeletal: Negative for back pain.   Skin: Negative for rash.   Neurological: Negative for seizures, weakness, numbness and  headaches.        + spasms   Hematological: Does not bruise/bleed easily.   Psychiatric/Behavioral: Negative for confusion.       Physical Exam     Initial Vitals [12/25/21 1252]   BP Pulse Resp Temp SpO2   120/67 73 18 98 °F (36.7 °C) 97 %      MAP       --         Physical Exam    Nursing note and vitals reviewed.  Constitutional: He appears well-developed and well-nourished. He is not diaphoretic. No distress.   HENT:   Head: Normocephalic and atraumatic.   Eyes: EOM are normal. Right eye exhibits no discharge. Left eye exhibits no discharge. No scleral icterus.   Intermittently right eyelid is closed but then able to open it, no ptosis   Neck: Neck supple.   Cardiovascular: Normal rate, regular rhythm and normal heart sounds.   Pulmonary/Chest: Breath sounds normal. No respiratory distress. He has no wheezes.   Abdominal: Abdomen is soft. He exhibits no distension. There is no abdominal tenderness.   Musculoskeletal:      Cervical back: Neck supple.      Comments: Bilateral feet rigid in extension but able to move knees     Neurological: He is alert and oriented to person, place, and time.   R trapezium in intermittent spasm, but in between good ROM   Skin: Skin is warm.         ED Course   Procedures  Labs Reviewed   ISTAT PROCEDURE - Abnormal; Notable for the following components:       Result Value    POC Potassium 5.3 (*)     All other components within normal limits          Imaging Results    None          Medications   lorazepam injection 2 mg (2 mg Intravenous Given 12/25/21 1342)   sodium chloride 0.9% bolus 500 mL (0 mLs Intravenous Stopped 12/25/21 1457)     Medical Decision Making:   History:   Old Medical Records: I decided to obtain old medical records.  Initial Assessment:   40 yo with pmhx migraines, depression, anxiety, ?CVA as infant, myoclonus, functional movement disorder, CAD presents with a chief complaint of spasm episodes.  Differential Diagnosis:   Myoclonus, electrolyte  abnormalities  Clinical Tests:   Lab Tests: Ordered  ED Management:  Will administer 1 L IV fluids and 2 mg of lorazepam.  Will obtain Chem 8.    Reassessment:  Chem 8 with mild hyperkalemia 5.3 which should improve with IV fluids.  On repeat evaluation, they feel improved under comfortable discharge.  Advised follow-up with Neurology as planned.  Return precautions provided.                      Clinical Impression:   Final diagnoses:  [G25.3] Myoclonus (Primary)          ED Disposition Condition    Discharge Stable        ED Prescriptions     None        Follow-up Information     Follow up With Specialties Details Why Contact Info Additional Information    [facility]re[facility] - Neurology [facility] Schedule an appointment as soon as possible for a visit   [address] [facility] [phone] [facility] [place] - Main Building, 7th Floor Please park in [facility] and take Clinic elevator    [facility] - Emergency Dept Emergency Medicine  As needed, If symptoms worsen [address] [facility] [phone]            Gian Lazcano MD  12/25/21 3182

## 2022-01-03 ENCOUNTER — HOSPITAL ENCOUNTER (OUTPATIENT)
Dept: RADIOLOGY | Facility: OTHER | Age: 42
Discharge: HOME | End: 2022-01-03
Attending: NURSE PRACTITIONER
Payer: MEDICARE

## 2022-01-03 DIAGNOSIS — M54.12 CERVICAL RADICULOPATHY: ICD-10-CM

## 2022-01-03 PROCEDURE — 72141 MRI NECK SPINE W/O DYE: CPT | Mod: TC

## 2022-01-03 PROCEDURE — 72141 MRI NECK SPINE W/O DYE: CPT | Mod: 26,,, | Performed by: INTERNAL MEDICINE

## 2022-01-03 PROCEDURE — 72052 X-RAY EXAM NECK SPINE 6/>VWS: CPT | Mod: TC,FY

## 2022-01-03 PROCEDURE — 72052 X-RAY EXAM NECK SPINE 6/>VWS: CPT | Mod: 26,,, | Performed by: RADIOLOGY

## 2022-01-03 PROCEDURE — 72141 MRI CERVICAL SPINE WITHOUT CONTRAST: ICD-10-PCS | Mod: 26,,, | Performed by: INTERNAL MEDICINE

## 2022-01-03 PROCEDURE — 72052 XR CERVICAL SPINE 5 VIEW WITH FLEX AND EXT: ICD-10-PCS | Mod: 26,,, | Performed by: RADIOLOGY

## 2022-01-04 ENCOUNTER — TELEPHONE (OUTPATIENT)
Dept: PAIN MEDICINE | Facility: CLINIC | Age: 42
End: 2022-01-04
Payer: MEDICARE

## 2022-01-04 DIAGNOSIS — K11.9 LESION OF PAROTID GLAND: ICD-10-CM

## 2022-01-04 DIAGNOSIS — M50.30 DDD (DEGENERATIVE DISC DISEASE), CERVICAL: ICD-10-CM

## 2022-01-04 DIAGNOSIS — G89.4 CHRONIC PAIN DISORDER: Primary | ICD-10-CM

## 2022-01-04 DIAGNOSIS — M54.12 CERVICAL RADICULOPATHY: ICD-10-CM

## 2022-01-04 DIAGNOSIS — M47.812 CERVICAL SPONDYLOSIS: ICD-10-CM

## 2022-01-04 NOTE — TELEPHONE ENCOUNTER
Spoke to patient. Patient sees a private ENT. Patient has already made an appointment with that ENT. Also sent message to Endocrine office to schedule appointment with patient for Lesion of Parotid Gland.

## 2022-01-04 NOTE — TELEPHONE ENCOUNTER
Phoned patient and discussed MRI results. Regarding parotid lesion, will schedule the patient for endocrine consult. She verbalized understanding. She contnues to report neck pain with radicular symptoms. Will schedule for cervical KYUNG. Orders placed today.

## 2022-01-05 ENCOUNTER — TELEPHONE (OUTPATIENT)
Dept: PAIN MEDICINE | Facility: OTHER | Age: 42
End: 2022-01-05
Payer: MEDICARE

## 2022-01-05 ENCOUNTER — PATIENT MESSAGE (OUTPATIENT)
Dept: ORTHOPEDICS | Facility: CLINIC | Age: 42
End: 2022-01-05
Payer: MEDICARE

## 2022-01-05 ENCOUNTER — TELEPHONE (OUTPATIENT)
Dept: ORTHOPEDICS | Facility: CLINIC | Age: 42
End: 2022-01-05
Payer: MEDICARE

## 2022-01-06 ENCOUNTER — PATIENT MESSAGE (OUTPATIENT)
Dept: PAIN MEDICINE | Facility: OTHER | Age: 42
End: 2022-01-06
Payer: MEDICARE

## 2022-01-06 DIAGNOSIS — M54.12 CERVICAL RADICULOPATHY: Primary | ICD-10-CM

## 2022-01-07 ENCOUNTER — TELEPHONE (OUTPATIENT)
Dept: PAIN MEDICINE | Facility: OTHER | Age: 42
End: 2022-01-07
Payer: MEDICARE

## 2022-01-10 ENCOUNTER — OFFICE VISIT (OUTPATIENT)
Dept: PAIN MEDICINE | Facility: OTHER | Age: 42
End: 2022-01-10
Payer: MEDICARE

## 2022-01-10 ENCOUNTER — PATIENT MESSAGE (OUTPATIENT)
Dept: PAIN MEDICINE | Facility: OTHER | Age: 42
End: 2022-01-10
Payer: MEDICARE

## 2022-01-10 DIAGNOSIS — G89.4 CHRONIC PAIN DISORDER: Primary | ICD-10-CM

## 2022-01-10 PROCEDURE — 1160F RVW MEDS BY RX/DR IN RCRD: CPT | Mod: CPTII,,, | Performed by: NURSE PRACTITIONER

## 2022-01-10 PROCEDURE — 99215 PR OFFICE/OUTPT VISIT, EST, LEVL V, 40-54 MIN: ICD-10-PCS | Mod: ,,, | Performed by: NURSE PRACTITIONER

## 2022-01-10 PROCEDURE — 1159F MED LIST DOCD IN RCRD: CPT | Mod: CPTII,,, | Performed by: NURSE PRACTITIONER

## 2022-01-10 PROCEDURE — 99215 OFFICE O/P EST HI 40 MIN: CPT | Mod: ,,, | Performed by: NURSE PRACTITIONER

## 2022-01-10 PROCEDURE — 1159F PR MEDICATION LIST DOCUMENTED IN MEDICAL RECORD: ICD-10-PCS | Mod: CPTII,,, | Performed by: NURSE PRACTITIONER

## 2022-01-10 PROCEDURE — 1160F PR REVIEW ALL MEDS BY PRESCRIBER/CLIN PHARMACIST DOCUMENTED: ICD-10-PCS | Mod: CPTII,,, | Performed by: NURSE PRACTITIONER
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
## 2022-01-16 ENCOUNTER — OFFICE VISIT (OUTPATIENT)
Dept: URGENT CARE | Facility: CLINIC | Age: 42
End: 2022-01-16
Payer: MEDICARE

## 2022-01-16 VITALS
OXYGEN SATURATION: 96 % | TEMPERATURE: 98 F | RESPIRATION RATE: 16 BRPM | HEART RATE: 64 BPM | DIASTOLIC BLOOD PRESSURE: 70 MMHG | WEIGHT: 167 LBS | HEIGHT: 72 IN | SYSTOLIC BLOOD PRESSURE: 109 MMHG | BODY MASS INDEX: 22.62 KG/M2

## 2022-01-16 DIAGNOSIS — B34.9 VIRAL SYNDROME: Primary | ICD-10-CM

## 2022-01-16 DIAGNOSIS — J02.9 SORE THROAT: ICD-10-CM

## 2022-01-16 LAB
CTP QC/QA: YES
SARS-COV-2 RDRP RESP QL NAA+PROBE: NEGATIVE

## 2022-01-16 PROCEDURE — 99213 PR OFFICE/OUTPT VISIT, EST, LEVL III, 20-29 MIN: ICD-10-PCS | Mod: S$GLB,CS,, | Performed by: PHYSICIAN ASSISTANT

## 2022-01-16 PROCEDURE — 3074F PR MOST RECENT SYSTOLIC BLOOD PRESSURE < 130 MM HG: ICD-10-PCS | Mod: CPTII,S$GLB,, | Performed by: PHYSICIAN ASSISTANT

## 2022-01-16 PROCEDURE — 3078F DIAST BP <80 MM HG: CPT | Mod: CPTII,S$GLB,, | Performed by: PHYSICIAN ASSISTANT

## 2022-01-16 PROCEDURE — 99213 OFFICE O/P EST LOW 20 MIN: CPT | Mod: S$GLB,CS,, | Performed by: PHYSICIAN ASSISTANT

## 2022-01-16 PROCEDURE — 3008F PR BODY MASS INDEX (BMI) DOCUMENTED: ICD-10-PCS | Mod: CPTII,S$GLB,, | Performed by: PHYSICIAN ASSISTANT

## 2022-01-16 PROCEDURE — U0002 COVID-19 LAB TEST NON-CDC: HCPCS | Mod: QW,S$GLB,, | Performed by: PHYSICIAN ASSISTANT

## 2022-01-16 PROCEDURE — 3078F PR MOST RECENT DIASTOLIC BLOOD PRESSURE < 80 MM HG: ICD-10-PCS | Mod: CPTII,S$GLB,, | Performed by: PHYSICIAN ASSISTANT

## 2022-01-16 PROCEDURE — U0002: ICD-10-PCS | Mod: QW,S$GLB,, | Performed by: PHYSICIAN ASSISTANT

## 2022-01-16 PROCEDURE — 1160F PR REVIEW ALL MEDS BY PRESCRIBER/CLIN PHARMACIST DOCUMENTED: ICD-10-PCS | Mod: CPTII,S$GLB,, | Performed by: PHYSICIAN ASSISTANT

## 2022-01-16 PROCEDURE — 3008F BODY MASS INDEX DOCD: CPT | Mod: CPTII,S$GLB,, | Performed by: PHYSICIAN ASSISTANT

## 2022-01-16 PROCEDURE — 1160F RVW MEDS BY RX/DR IN RCRD: CPT | Mod: CPTII,S$GLB,, | Performed by: PHYSICIAN ASSISTANT

## 2022-01-16 PROCEDURE — 1159F PR MEDICATION LIST DOCUMENTED IN MEDICAL RECORD: ICD-10-PCS | Mod: CPTII,S$GLB,, | Performed by: PHYSICIAN ASSISTANT

## 2022-01-16 PROCEDURE — 3074F SYST BP LT 130 MM HG: CPT | Mod: CPTII,S$GLB,, | Performed by: PHYSICIAN ASSISTANT

## 2022-01-16 PROCEDURE — 1159F MED LIST DOCD IN RCRD: CPT | Mod: CPTII,S$GLB,, | Performed by: PHYSICIAN ASSISTANT

## 2022-01-16 NOTE — PROGRESS NOTES
Subjective:       Patient ID: Gordon Griffin III is a 41 y.o. male.    Vitals:  height is 6' (1.829 m) and weight is 75.8 kg (167 lb). His temperature is 98.4 °F (36.9 °C). His blood pressure is 109/70 and his pulse is 64. His respiration is 16 and oxygen saturation is 96%.     Chief Complaint: URI    COVID exposure.    Patient provider note starts here:  Patient presents with complaints of URI symptoms for the past 2-3 days and known close exposure to COVID 19. Reports rhinorrhea, post nasal drip, sore throat, loss of taste and smell. He has been vaccinated against COVID 19. No relief with OTC medications. Denies chest pain or SOB. Had a fever a few days ago but nothing since.     URI   This is a new problem. The current episode started in the past 7 days. The problem has been unchanged. There has been no fever. Associated symptoms include congestion, coughing, ear pain, nausea, rhinorrhea and a sore throat. Pertinent negatives include no abdominal pain, chest pain, diarrhea, headaches, neck pain, rash, sneezing, vomiting or wheezing.       Constitution: Positive for chills and sweating. Negative for fatigue and fever.   HENT: Positive for ear pain, congestion, postnasal drip and sore throat. Negative for ear discharge.    Neck: Negative for neck pain and neck stiffness.   Cardiovascular: Negative for chest pain.   Respiratory: Positive for cough. Negative for chest tightness, sputum production, shortness of breath and wheezing.    Gastrointestinal: Positive for nausea. Negative for abdominal pain, vomiting and diarrhea.   Musculoskeletal: Negative for pain and muscle ache.   Skin: Negative for rash and wound.   Allergic/Immunologic: Negative for itching and sneezing.   Neurological: Negative for headaches, numbness and tingling.       Objective:      Physical Exam   Constitutional: He is oriented to person, place, and time. He appears well-developed and well-nourished. He is cooperative.  Non-toxic  appearance. He does not have a sickly appearance. He does not appear ill. No distress.   HENT:   Head: Normocephalic and atraumatic.   Ears:   Right Ear: Hearing, tympanic membrane, external ear and ear canal normal.   Left Ear: Hearing, tympanic membrane, external ear and ear canal normal.   Nose: Congestion present. No mucosal edema, rhinorrhea or nasal deformity. No epistaxis. Right sinus exhibits no maxillary sinus tenderness and no frontal sinus tenderness. Left sinus exhibits no maxillary sinus tenderness and no frontal sinus tenderness.   Mouth/Throat: Uvula is midline, oropharynx is clear and moist and mucous membranes are normal. No trismus in the jaw. Normal dentition. No uvula swelling. No oropharyngeal exudate, posterior oropharyngeal edema or posterior oropharyngeal erythema.   Eyes: Conjunctivae and lids are normal. No scleral icterus.   Neck: Trachea normal and phonation normal. Neck supple. No edema present. No erythema present. No neck rigidity present.   Cardiovascular: Normal rate, regular rhythm, normal heart sounds, intact distal pulses and normal pulses.   Pulmonary/Chest: Effort normal and breath sounds normal. No respiratory distress. He has no decreased breath sounds. He has no wheezes. He has no rhonchi.   Abdominal: Normal appearance.   Musculoskeletal: Normal range of motion.         General: No deformity or edema. Normal range of motion.   Neurological: He is alert and oriented to person, place, and time. He exhibits normal muscle tone. Coordination normal.   Skin: Skin is warm, dry, intact, not diaphoretic and not pale.   Psychiatric: He has a normal mood and affect. His speech is normal and behavior is normal. Judgment and thought content normal. Cognition and memory  Nursing note and vitals reviewed.        Assessment:       1. Viral syndrome    2. Sore throat        Results for orders placed or performed in visit on 01/16/22   POCT COVID-19 Rapid Screening   Result Value Ref Range     POC Rapid COVID Negative Negative     Acceptable Yes        Plan:         Viral syndrome    Sore throat  -     POCT COVID-19 Rapid Screening           Medical Decision Making:   History:   Old Medical Records: I decided to obtain old medical records.  Old Records Summarized: records from clinic visits.  Differential Diagnosis:   Differential diagnosis includes but is not limited to: viral vs bacterial URI, pharyngitis, otitis, COVID 19, influenza, pneumonia.    Clinical Tests:   Lab Tests: Ordered and Reviewed       <> Summary of Lab: COVID-  Urgent Care Management:  Patient's COVID risk score: 2  Patient presents with complaints of URI like symptoms for the past 2-3 days. Close contact with known COVID+ person. On exam, he is afebrile and nontoxic appearing. Lungs CTAB, vital signs stable. COVID negative. He was advised symptomatic treatment and to follow-up with PCP. He verbalized understanding and agreed with plan.

## 2022-01-16 NOTE — PATIENT INSTRUCTIONS
"Please follow up with your Primary care provider within 2-5 days if your signs and symptoms have not resolved or worsen.  The usual course of cold symptoms are 10-14 days.      If your condition worsens or fails to improve we recommend that you receive another evaluation at the emergency room immediately or contact your primary medical clinic to discuss your concerns.      You must understand that you have received an Urgent Care treatment only and that you may be released before all of your medical problems are known or treated.   You, the patient, will arrange for follow up care as instructed.      Tylenol or Ibuprofen can also be used as directed for pain/fever unless you have an allergy to them or medical condition such as stomach ulcers, kidney or liver disease or blood thinners etc for which you should not be taking these type of medications.      Take over the counter cough medication as directed as needed for cough.  You should avoid medications with pseudoephedrine or phenylephrine (any medication with "D") if you have high blood pressure as this can cause an elevation in your blood pressure. Instead consider Corcidin HBP as needed to prevent an elevated blood pressure.      Natural remedies of symptoms (as needed) include humidification, saline nasal sprays, and/or steamy showers.  Increase fluids, warm tea with honey, cough drops as needed.  You may also use salt water gargles for sore throat.     IF you received a oral steroid today - As discussed, this can elevate your blood pressure, elevate your blood sugar, water weight gain, nervous energy, redness to the face and dimpling of the skin at the injection site.          Patient Education       Viral Syndrome Discharge Instructions   About this topic   Viral syndrome is an illness with signs like you would get with a cold or the flu. A tiny germ called a virus causes this infection. Most people get better in 1 to 2 weeks without treatment. This illness " spreads easily from person to person.     What care is needed at home?   · Ask your doctor what you need to do when you go home. Make sure you ask questions if you do not understand what the doctor says. This way you will know what you need to do.  · Drink lots of water, juice, or broth to replace fluids lost in runny nose and fever. Suck on ice chips or popsicles to ease throat pain.  · Get lots of rest and sleep. Sleep helps your body get back the energy it needs.  · Stay away from others until you are feeling better.  What follow-up care is needed?   Your doctor may ask you to make visits to the office to check on your progress. Be sure to keep these visits.  What drugs may be needed?   The doctor may order drugs to:  · Help with pain  · Lower fever  · Help with pain from a sore throat  · Help a runny or stuffy nose  · Ease or stop coughing  Will physical activity be limited?   You need to rest while you are getting better. This means you may need to limit your activity until you feel well.  What changes to diet are needed?   Eat foods that will not upset your stomach like chicken soup, bananas, rice, apples, or toast.  What problems could happen?   · Lung problems like pneumonia and bronchitis  · Too much fluid loss  · Infection  What can be done to prevent this health problem?   · If you are sick, cover your mouth and nose with tissue when you cough or sneeze. You can also cough into your elbow. Throw away tissues in the trash and wash your hands after touching used tissues.  · Wash your hands often with soap and water for at least 20 seconds, especially after coughing or sneezing. Alcohol-based hand sanitizers also work to kill the virus.  · Do not get too close (kissing, hugging) to people who are sick.  · Stay away from crowded places.  · Do not share towels or hankies with anyone who is sick. Clean commonly handled things like door handles, remotes, toys, and phones. Wipe them with a disinfectant.  · Take  vitamin C to help build up your body's ability to fight disease.  · Get a flu shot each year.  When do I need to call the doctor?   · Signs of infection. These include a fever of 100.4°F (38°C) or higher, chills, very bad sore throat, ear or sinus pain, cough, more sputum or change in color of sputum, and mouth sores.  · Trouble breathing  · Very bad throwing up or throwing up that does not stop  · Health problem is not better or you are feeling worse  Teach Back: Helping You Understand   The Teach Back Method helps you understand the information we are giving you. After you talk with the staff, tell them in your own words what you learned. This helps to make sure the staff has described each thing clearly. It also helps to explain things that may have been confusing. Before going home, make sure you can do these:  · I can tell you about my condition.  · I can tell you what may help ease my sore throat.  · I can tell you what I can do to help avoid passing the infection to others.  · I can tell you what I will do if I have trouble breathing, very bad throwing up, or throwing up that does not stop.  Last Reviewed Date   2020-08-24  Consumer Information Use and Disclaimer   This information is not specific medical advice and does not replace information you receive from your health care provider. This is only a brief summary of general information. It does NOT include all information about conditions, illnesses, injuries, tests, procedures, treatments, therapies, discharge instructions or life-style choices that may apply to you. You must talk with your health care provider for complete information about your health and treatment options. This information should not be used to decide whether or not to accept your health care providers advice, instructions or recommendations. Only your health care provider has the knowledge and training to provide advice that is right for you.  Copyright   Copyright © 2021 [employer] and its affiliates and/or licensors. All rights reserved.

## 2022-01-24 ENCOUNTER — OFFICE VISIT (OUTPATIENT)
Dept: SURGERY | Facility: CLINIC | Age: 42
End: 2022-01-24
Payer: MEDICARE

## 2022-01-24 DIAGNOSIS — K11.9 LESION OF PAROTID GLAND: ICD-10-CM

## 2022-01-24 PROCEDURE — 1159F PR MEDICATION LIST DOCUMENTED IN MEDICAL RECORD: ICD-10-PCS | Mod: CPTII,,, | Performed by: STUDENT IN AN ORGANIZED HEALTH CARE EDUCATION/TRAINING PROGRAM

## 2022-01-24 PROCEDURE — 99024 PR POST-OP FOLLOW-UP VISIT: ICD-10-PCS | Mod: ,,, | Performed by: STUDENT IN AN ORGANIZED HEALTH CARE EDUCATION/TRAINING PROGRAM

## 2022-01-24 PROCEDURE — 1160F RVW MEDS BY RX/DR IN RCRD: CPT | Mod: CPTII,,, | Performed by: STUDENT IN AN ORGANIZED HEALTH CARE EDUCATION/TRAINING PROGRAM

## 2022-01-24 PROCEDURE — 1160F PR REVIEW ALL MEDS BY PRESCRIBER/CLIN PHARMACIST DOCUMENTED: ICD-10-PCS | Mod: CPTII,,, | Performed by: STUDENT IN AN ORGANIZED HEALTH CARE EDUCATION/TRAINING PROGRAM

## 2022-01-24 PROCEDURE — 1159F MED LIST DOCD IN RCRD: CPT | Mod: CPTII,,, | Performed by: STUDENT IN AN ORGANIZED HEALTH CARE EDUCATION/TRAINING PROGRAM

## 2022-01-24 PROCEDURE — 99024 POSTOP FOLLOW-UP VISIT: CPT | Mod: ,,, | Performed by: STUDENT IN AN ORGANIZED HEALTH CARE EDUCATION/TRAINING PROGRAM

## 2022-01-27 ENCOUNTER — HOSPITAL ENCOUNTER (OUTPATIENT)
Facility: OTHER | Age: 42
Discharge: HOME | End: 2022-01-27
Attending: ANESTHESIOLOGY | Admitting: ANESTHESIOLOGY
Payer: MEDICARE

## 2022-01-27 VITALS
TEMPERATURE: 99 F | RESPIRATION RATE: 16 BRPM | HEART RATE: 68 BPM | SYSTOLIC BLOOD PRESSURE: 112 MMHG | OXYGEN SATURATION: 98 % | DIASTOLIC BLOOD PRESSURE: 68 MMHG

## 2022-01-27 DIAGNOSIS — M50.30 DEGENERATIVE DISC DISEASE, CERVICAL: Primary | ICD-10-CM

## 2022-01-27 DIAGNOSIS — G89.29 CHRONIC PAIN: ICD-10-CM

## 2022-01-27 PROCEDURE — 62321 PR INJ CERV/THORAC, W/GUIDANCE: ICD-10-PCS | Mod: ,,, | Performed by: ANESTHESIOLOGY

## 2022-01-27 PROCEDURE — 62321 NJX INTERLAMINAR CRV/THRC: CPT | Performed by: ANESTHESIOLOGY

## 2022-01-27 PROCEDURE — 25000003 PHARM REV CODE 250: Performed by: ANESTHESIOLOGY

## 2022-01-27 PROCEDURE — 63600175 PHARM REV CODE 636 W HCPCS: Performed by: ANESTHESIOLOGY

## 2022-01-27 PROCEDURE — A4216 STERILE WATER/SALINE, 10 ML: HCPCS | Performed by: ANESTHESIOLOGY

## 2022-01-27 PROCEDURE — 62321 NJX INTERLAMINAR CRV/THRC: CPT | Mod: ,,, | Performed by: ANESTHESIOLOGY

## 2022-01-27 PROCEDURE — 25500020 PHARM REV CODE 255: Performed by: ANESTHESIOLOGY

## 2022-01-27 RX ORDER — LIDOCAINE HYDROCHLORIDE 10 MG/ML
INJECTION, SOLUTION EPIDURAL; INFILTRATION; INTRACAUDAL; PERINEURAL
Status: DISCONTINUED | OUTPATIENT
Start: 2022-01-27 | End: 2022-01-27 | Stop reason: HOSPADM

## 2022-01-27 RX ORDER — LIDOCAINE HYDROCHLORIDE 20 MG/ML
INJECTION, SOLUTION INFILTRATION; PERINEURAL
Status: DISCONTINUED | OUTPATIENT
Start: 2022-01-27 | End: 2022-01-27 | Stop reason: HOSPADM

## 2022-01-27 RX ORDER — SODIUM CHLORIDE 9 MG/ML
INJECTION, SOLUTION INTRAMUSCULAR; INTRAVENOUS; SUBCUTANEOUS
Status: DISCONTINUED | OUTPATIENT
Start: 2022-01-27 | End: 2022-01-27 | Stop reason: HOSPADM

## 2022-01-27 RX ORDER — MIDAZOLAM HYDROCHLORIDE 1 MG/ML
INJECTION INTRAMUSCULAR; INTRAVENOUS
Status: DISCONTINUED | OUTPATIENT
Start: 2022-01-27 | End: 2022-01-27 | Stop reason: HOSPADM

## 2022-01-27 RX ORDER — ALPRAZOLAM 0.5 MG/1
0.5 TABLET ORAL
Status: DISCONTINUED | OUTPATIENT
Start: 2022-01-27 | End: 2022-01-27 | Stop reason: HOSPADM

## 2022-01-27 RX ORDER — DEXAMETHASONE SODIUM PHOSPHATE 10 MG/ML
INJECTION INTRAMUSCULAR; INTRAVENOUS
Status: DISCONTINUED | OUTPATIENT
Start: 2022-01-27 | End: 2022-01-27 | Stop reason: HOSPADM

## 2022-01-27 NOTE — DISCHARGE INSTRUCTIONS
Thank you for allowing us to care for you today. You may receive a survey about the care we provided. Your feedback is valuable and helps us provide excellent care throughout the community.     Home Care Instructions for Pain Management:    1. DIET:   You may resume your normal diet today.   2. BATHING:   You may shower with luke warm water. No tub baths or anything that will soak injection sites under water for the next 24 hours.  3. DRESSING:   You may remove your bandage today.   4. ACTIVITY LEVEL:   You may resume your normal activities 24 hrs after your procedure. Nothing strenuous today.  5. MEDICATIONS:   You may resume your normal medications today. To restart blood thinners, ask your doctor.  6. DRIVING    If you have received any sedatives by mouth today, you may not drive for 12 hours.    If you have received any sedation through your IV, you may not drive for 24 hrs.   7. SPECIAL INSTRUCTIONS:   No heat to the injection site for 24 hrs including, hot bath or shower, heating pad, moist heat, or hot tubs.    Use ice pack to injection site for any pain or discomfort.  Apply ice packs for 20 minute intervals as needed.    IF you have diabetes, be sure to monitor your blood sugar more closely. IF your injection contained steroids your blood sugar levels may become higher than normal.    If you are still having pain upon discharge:  Your pain may improve over the next 48 hours. The anesthetic (numbing medication) works immediately to 48 hours. IF your injection contained a steroid (anti-inflammatory medication), it takes approximately 3 days to start feeling relief and 7-10 days to see your greatest results from the medication. It is possible you may need subsequent injections. This would be discussed at your follow up appointment with pain management or your referring doctor.    Please call the PAIN MANAGEMENT office at [phone] or ON CALL pager at [phone] if you experienced any:   -Weakness or  loss of sensation  -Fever > 101.5  -Pain uncontrolled with oral medications   -Persistent nausea, vomiting, or diarrhea  -Redness or drainage from the injection sites, or any other worrisome concerns.   If physician on call was not reached or could not communicate with our office for any reason please go to the nearest emergency department.  Patient Education       Moderate Sedation in Adults Discharge Instructions   About this topic   Moderate sedation is also known as conscious sedation. It changes your state of being awake or consciousness. With this sedation, you may feel slight pain or pressure during a procedure. The drugs help you to relax and may even allow you to sleep. It will be easy to wake you and you may talk and answer questions while under sedation. Most likely, you will not remember what happens while under this sedation.  What care is needed at home?   · Ask your doctor what you need to do when you go home. Make sure you understand everything the doctor says. This way you will know what you need to do.  · You will not be allowed to drive right away after the procedure. Ask a family member or a friend to drive you home.  · Do not operate heavy or dangerous machinery for at least 24 hours.  · Do not make major decisions or sign important papers for at least 24 hours. You may not be thinking clearly.  · Avoid beer, wine, or mixed drinks (alcohol) for at least 24 hours.  · You are at a higher risk of falling for at least 24 hours after moderate sedation.  ? Take extra care when you get up.  ? Do not change positions quickly.  ? Do not rush when you need to go to the bathroom or to answer the phone.  ? Ask for help if you feel unsteady when you try to walk.  ? Wear shoes with non-slip soles and low heels.  What follow-up care is needed?   Your doctor may ask you to make visits to the office to check on your progress. Be sure to keep these visits. Your doctor may also refer you to other doctors or tell  you that you need more tests or care.  What drugs may be needed?   The doctor may order drugs to:  · Help with pain  · Treat an upset stomach or throwing up  Will physical activity be limited?   Rest for the day of the procedure. Avoid strenuous activities like heavy lifting and hard exercise. Talk to your doctor about whether you need to limit lifting or exercise after your procedure.  What changes to diet are needed?   Start with a light diet when you are fully awake. This includes things that are easy to swallow like soups, pudding, jello, toast, and eggs. Slowly progress to your normal diet.  What problems could happen?   · Low blood pressure  · Breathing problems  · Upset stomach or throwing up  · Dizziness  When do I need to call the doctor?   · Feel dizzy, weak, or tired  · Faint  · Very bad headache  · Upset stomach or throwing up  · To follow up for more tests or care  Teach Back: Helping You Understand   The Teach Back Method helps you understand the information we are giving you. After you talk with the staff, tell them in your own words what you learned. This helps to make sure the staff has described each thing clearly. It also helps to explain things that may have been confusing. Before going home, make sure you can do these:  · I can tell you about my procedure.  · I can tell you if I need more tests or care.  · I can tell you what is good for me to eat and drink the next day.  · I can tell you what I would do if I feel dizzy, weak, or tired.  Last Reviewed Date   2020-03-02  Consumer Information Use and Disclaimer   This information is not specific medical advice and does not replace information you receive from your health care provider. This is only a brief summary of general information. It does NOT include all information about conditions, illnesses, injuries, tests, procedures, treatments, therapies, discharge instructions or life-style choices that may apply to you. You must talk with your  health care provider for complete information about your health and treatment options. This information should not be used to decide whether or not to accept your health care providers advice, instructions or recommendations. Only your health care provider has the knowledge and training to provide advice that is right for you.  Copyright   Copyright © 2021 [employer] and its affiliates and/or licensors. All rights reserved.

## 2022-01-27 NOTE — OP NOTE
Cervical Interlaminar Epidural Steroid Injection under Fluoroscopic Guidance    The procedure, risks, benefits, and options were discussed with the patient. There are no contraindications to the procedure. The patent expressed understanding and agreed to the procedure. Informed written consent was obtained prior to the start of the procedure and can be found in the patient's chart.     PATIENT NAME: Gordon Griffin III   MRN: [id number]     DATE OF PROCEDURE: 01/27/2022    PROCEDURE: Cervical Interlaminar Epidural Steroid Injection C7/T1 under Fluoroscopic Guidance    PRE-OP DIAGNOSIS: Cervical radiculopathy [M54.12]    POST-OP DIAGNOSIS: Same    PHYSICIAN: Larry Brasher MD     ASSISTANTS: None    MEDICATIONS INJECTED: Preservative-free Decadron 10mg with 1cc of Lidocaine 1% MPF and preservative free normal saline    LOCAL ANESTHETIC INJECTED: Xylocaine 2%     SEDATION: None    ESTIMATED BLOOD LOSS: None    COMPLICATIONS: None    TECHNIQUE: Time-out was performed to identify the patient and procedure to be performed. With the patient laying in a prone position, the surgical area was prepped and draped in the usual sterile fashion using ChloraPrep and a fenestrated drape. The level was determined under fluoroscopy guidance. Skin anesthesia was achieved by injecting Lidocaine 2% over the injection site.  The interlaminar space was then approached with a 20 gauge, 3.5 inch Tuohy needle that was introduced under fluoroscopic guidance with AP, lateral and/or contralateral oblique imaging. Once the Ligamentum flavum was encountered loss of resistance to saline was used to enter the epidural space. With positive loss of resistance and negative aspiration for CSF or Blood, contrast dye Omnipaque (300mg/ml) was injected to confirm placement and there was no vascular runoff. Then 3 mL of the medication mixture listed above was then injected slowly. Displacement of the radio opaque contrast after injection of the medication  confirmed that the medication went into the area of the epidural space. The needles were removed, and bleeding was nil. A sterile dressing was applied. No specimens collected. The patient tolerated the procedure well.       The patient was monitored after the procedure in the recovery area. They were given post-procedure and discharge instructions to follow at home. The patient was discharged in a stable condition.    Larry Brasher MD

## 2022-01-27 NOTE — H&P
HPI  Patient presents for KYUNG C7/T1. Denies blood thinners.         Past Medical History:   Diagnosis Date    Anxiety     Cancer     Chest pain 1/20/2016 12/30/2015: Began experinece chest pain.    Depression     Functional movement disorder 10/1/2019    Migraine headache     Movement disorder     Myoclonic jerkings, massive     Stroke pt. states he had a cva at 3 months old     Past Surgical History:   Procedure Laterality Date    ANGIOGRAM, CORONARY, WITH LEFT HEART CATHETERIZATION      EPIDURAL STEROID INJECTION N/A 3/26/2021    Procedure: INJECTION, STEROID, EPIDURAL L4/5;  Surgeon: Larry Brasher MD;  Location: [facility];  Service: Pain Management;  Laterality: N/A;    EPIDURAL STEROID INJECTION N/A 6/4/2021    Procedure: INJECTION, STEROID, EPIDURAL, L4-L5 IL need consent;  Surgeon: Larry Brasher MD;  Location: [facility] PAIN [facility]GT;  Service: Pain Management;  Laterality: N/A;    EPIDURAL STEROID INJECTION N/A 10/29/2021    Procedure: INJECTION, STEROID, EPIDURAL, L4-L5IL NEED CONSENT;  Surgeon: Larry Brasher MD;  Location: [facility] PAIN [facility]GT;  Service: Pain Management;  Laterality: N/A;    MANDIBLE SURGERY      reconstruction    variceol repair       Review of patient's allergies indicates:   Allergen Reactions    Mustard Itching, Nausea And Vomiting, Shortness Of Breath and Swelling    Mushroom Itching, Nausea And Vomiting and Swelling    Niaspan extended-release [niacin] Itching    Nystatin Hives     Other reaction(s): hives    Olive extract Itching, Nausea And Vomiting and Swelling    Oyster extract     Extendryl [[id number]] Rash    V-cillin k Rash        PMHx, PSHx, Allergies, Medications reviewed in epic      ROS negative except pain complaints in HPI    OBJECTIVE:    Temp 98.5 °F (36.9 °C)     PHYSICAL EXAMINATION:    GENERAL: Well appearing, in no acute distress, alert and oriented x3.  PSYCH:  Mood and affect appropriate.  SKIN: Skin color, texture,  turgor normal, no rashes or lesions.  CV: RRR with palpation of the radial artery.  PULM: No evidence of respiratory difficulty, symmetric chest rise. Clear to auscultation.  NEURO: Cranial nerves grossly intact.    Plan:    Proceed with procedure as planned    Matt Mast  01/27/2022

## 2022-01-27 NOTE — DISCHARGE SUMMARY
Discharge Note  Short Stay      SUMMARY     Admit Date: 1/27/2022    Attending Physician: Larry Brasher      Discharge Physician: Larry Brasher      Discharge Date: 1/27/2022 1:29 PM    Procedure(s) (LRB):  Injection, Steroid, Epidural C7/T1 (N/A)    Final Diagnosis: Cervical radiculopathy [M54.12]    Disposition: Home or self care    Patient Instructions:   Current Discharge Medication List      CONTINUE these medications which have NOT CHANGED    Details   aspirin 81 MG Chew Take 81 mg by mouth once daily.      azelastine (ASTELIN) 137 mcg (0.1 %) nasal spray USE 1 TO 2 SPRAYS IN EACH NOSTRIL TWICE DAILY FOR CONGESTION      baclofen (LIORESAL) 20 MG tablet Take 1 tablet by mouth 3 (three) times daily as needed.       butalbital-acetaminophen-caffeine -40 mg (FIORICET, ESGIC) -40 mg per tablet Take 1 tablet by mouth every 4 (four) hours as needed.      butorphanol (STADOL) 10 mg/mL nasal spray 1 spray by Nasal route every 4 (four) hours as needed for Pain.      cloNIDine (CATAPRES) 0.1 MG tablet TAKE 1 TABLET(0.1 MG) BY MOUTH TWICE DAILY  Qty: 60 tablet, Refills: 3    Comments: ZERO refills remain on this prescription. Your patient is requesting advance approval of refills for this medication to PREVENT ANY MISSED DOSES  Associated Diagnoses: Tic      cyclobenzaprine (FLEXERIL) 10 MG tablet TK 1 T PO Q 8 H PRF PAIN      diazePAM (VALIUM) 2 MG tablet Take 2 mg by mouth 2 (two) times daily as needed.      erenumab-aooe (AIMOVIG AUTOINJECTOR SUBQ) Inject into the skin.      EScitalopram oxalate (LEXAPRO) 20 MG tablet Take 20 mg by mouth once daily.      estradioL (ESTRACE) 2 MG tablet       estradiol 0.1 mg/24 hr td ptwk 0.1 mg/24 hr PTWK       ezetimibe (ZETIA) 10 mg tablet Take 1 tablet (10 mg total) by mouth once daily.  Qty: 90 tablet, Refills: 3      FLUCELVAX QUAD [id number], PF, 60 mcg (15 mcg x 4)/0.5 mL Syrg vaccine ADM 0.5ML IM UTD  Refills: 0      fluticasone (FLONASE) 50 mcg/actuation nasal  spray SPRAY TWICE IEN QD  Refills: 5      gabapentin (NEURONTIN) 300 MG capsule TAKE 1 CAPSULE BY MOUTH THREE TIMES DAILY  Qty: 90 capsule, Refills: 2    Associated Diagnoses: Lumbar radiculopathy      hydrOXYzine pamoate (VISTARIL) 50 MG Cap hydroxyzine pamoate 50 mg capsule   TK 1 TO 2 CS PO HS PRN      ketorolac (TORADOL) 10 mg tablet ketorolac 10 mg tablet      levETIRAcetam (KEPPRA) 1000 MG tablet Take 1,000 mg by mouth 2 (two) times daily.      methocarbamoL (ROBAXIN) 500 MG Tab methocarbamol 500 mg tablet      metoclopramide HCl (REGLAN) 10 MG tablet metoclopramide 10 mg tablet      moxifloxacin (AVELOX) 400 mg tablet       NARCAN 4 mg/actuation Spry SPRAY 0.1ML IN 1 NOSTRIL MAY REPEAT DOSE EVERY 2-3 MINUTES AS NEEDED ALTERNATING NOSTRILS EACH DOSE  Qty: 1 each, Refills: 3      nitroGLYCERIN (NITROSTAT) 0.4 MG SL tablet Place 1 tablet (0.4 mg total) under the tongue every 5 (five) minutes as needed for Chest pain.  Qty: 90 tablet, Refills: 3      nitroGLYCERIN 0.1 mg/hr TD PT24 (NITRODUR) 0.1 mg/hr PT24 Nitroglycerin 0.4 mg Sublingual Tab      NURTEC 75 mg odt Take by mouth.      ondansetron (ZOFRAN) 4 MG tablet Take 1 tablet (4 mg total) by mouth every 6 (six) hours as needed for Nausea.  Qty: 12 tablet, Refills: 0      ondansetron (ZOFRAN-ODT) 8 MG TbDL Take 8 mg by mouth 2 (two) times daily.      prochlorperazine (COMPAZINE) 10 MG tablet Take 10 mg by mouth 3 (three) times daily.      propranoloL (INDERAL LA) 60 MG 24 hr capsule Take 60 mg by mouth every evening.      !! rosuvastatin (CRESTOR) 20 MG tablet 1 capsule      !! rosuvastatin (CRESTOR) 40 MG Tab Take 1 tablet (40 mg total) by mouth every evening.  Qty: 90 tablet, Refills: 3    Associated Diagnoses: Pure hypercholesterolemia      spironolactone (ALDACTONE) 25 MG tablet Take 1 tablet (25 mg total) by mouth once daily.  Qty: 30 tablet, Refills: 3      !! traZODone (DESYREL) 100 MG tablet       !! traZODone (DESYREL) 50 MG tablet       verapamiL  (VERELAN) 240 MG C24P verapamil  mg 24 hr capsule,extended release   TK ONE C PO  ONCE D       !! - Potential duplicate medications found. Please discuss with provider.              Discharge Diagnosis: Cervical radiculopathy [M54.12]  Condition on Discharge: Stable with no complications to procedure   Diet on Discharge: Same as before.  Activity: as per instruction sheet.  Discharge to: Home with a responsible adult.  Follow up: 2-4 weeks

## 2022-02-07 DIAGNOSIS — M54.16 LUMBAR RADICULOPATHY: ICD-10-CM

## 2022-02-07 RX ORDER — GABAPENTIN 300 MG/1
CAPSULE ORAL
Qty: 90 CAPSULE | Refills: 2 | Status: SHIPPED | OUTPATIENT
Start: 2022-02-07 | End: 2022-03-02 | Stop reason: SDUPTHER

## 2022-02-07 NOTE — TELEPHONE ENCOUNTER
This message is for patient in regards to his/her appointment 02/08/22 at 10:40a       [facility] Policy: For the safety of all patients and staff members.     Patient Visitor policy: Due to social distancing and limited seating staff are requesting patient to arrive to their schedule appointments on time. During this visit we're asking all patients to only have one visitor over the age of 18yrs old to accompany to be seen by Maribel Bright np. If patient do not required assistance with their visit, we're asking all visitors to remain outside the waiting area.    Upon arriving to your schedule appointment; patients are required to wear a face mask, if patient do not have a face mask one will be provided entering the facility. We ask patients to contact clinical staff at this number [phone] to notify staff that they have arrived or they may do so by utilizing the Mobile checked in [place](if patient have patient portal; clinical staff will send a message through there letting them know it's okay to proceed to their visit). If you have any questions or concerns please contact [phone]

## 2022-02-08 ENCOUNTER — TELEPHONE (OUTPATIENT)
Dept: PAIN MEDICINE | Facility: OTHER | Age: 42
End: 2022-02-08
Payer: MEDICARE

## 2022-02-08 ENCOUNTER — OFFICE VISIT (OUTPATIENT)
Dept: PAIN MEDICINE | Facility: CLINIC | Age: 42
End: 2022-02-08
Payer: MEDICARE

## 2022-02-08 DIAGNOSIS — M50.30 DDD (DEGENERATIVE DISC DISEASE), CERVICAL: ICD-10-CM

## 2022-02-08 DIAGNOSIS — M54.16 LUMBAR RADICULOPATHY: Primary | ICD-10-CM

## 2022-02-08 DIAGNOSIS — M54.12 CERVICAL RADICULOPATHY: ICD-10-CM

## 2022-02-08 DIAGNOSIS — Z86.73 HISTORY OF CVA (CEREBROVASCULAR ACCIDENT): ICD-10-CM

## 2022-02-08 DIAGNOSIS — M47.816 LUMBAR SPONDYLOSIS: ICD-10-CM

## 2022-02-08 DIAGNOSIS — M47.812 CERVICAL SPONDYLOSIS: ICD-10-CM

## 2022-02-08 DIAGNOSIS — G89.4 CHRONIC PAIN DISORDER: ICD-10-CM

## 2022-02-08 DIAGNOSIS — M51.36 DDD (DEGENERATIVE DISC DISEASE), LUMBAR: ICD-10-CM

## 2022-02-08 PROCEDURE — 99213 OFFICE O/P EST LOW 20 MIN: CPT | Mod: 95,,, | Performed by: NURSE PRACTITIONER

## 2022-02-08 PROCEDURE — 1160F PR REVIEW ALL MEDS BY PRESCRIBER/CLIN PHARMACIST DOCUMENTED: ICD-10-PCS | Mod: CPTII,95,, | Performed by: NURSE PRACTITIONER

## 2022-02-08 PROCEDURE — 99213 PR OFFICE/OUTPT VISIT, EST, LEVL III, 20-29 MIN: ICD-10-PCS | Mod: 95,,, | Performed by: NURSE PRACTITIONER

## 2022-02-08 PROCEDURE — 1160F RVW MEDS BY RX/DR IN RCRD: CPT | Mod: CPTII,95,, | Performed by: NURSE PRACTITIONER

## 2022-02-08 PROCEDURE — 1159F PR MEDICATION LIST DOCUMENTED IN MEDICAL RECORD: ICD-10-PCS | Mod: CPTII,95,, | Performed by: NURSE PRACTITIONER

## 2022-02-08 PROCEDURE — 1159F MED LIST DOCD IN RCRD: CPT | Mod: CPTII,95,, | Performed by: NURSE PRACTITIONER
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
## 2022-02-10 ENCOUNTER — HOSPITAL ENCOUNTER (OUTPATIENT)
Facility: OTHER | Age: 42
Discharge: HOME | End: 2022-02-10
Attending: ANESTHESIOLOGY | Admitting: ANESTHESIOLOGY
Payer: MEDICARE

## 2022-02-10 VITALS
SYSTOLIC BLOOD PRESSURE: 102 MMHG | HEART RATE: 61 BPM | OXYGEN SATURATION: 95 % | HEIGHT: 72 IN | BODY MASS INDEX: 22.62 KG/M2 | DIASTOLIC BLOOD PRESSURE: 68 MMHG | WEIGHT: 167 LBS | RESPIRATION RATE: 14 BRPM | TEMPERATURE: 97 F

## 2022-02-10 DIAGNOSIS — G89.29 CHRONIC PAIN: ICD-10-CM

## 2022-02-10 DIAGNOSIS — M51.36 DDD (DEGENERATIVE DISC DISEASE), LUMBAR: Primary | ICD-10-CM

## 2022-02-10 DIAGNOSIS — M54.16 LUMBAR RADICULOPATHY: ICD-10-CM

## 2022-02-10 PROCEDURE — 25000003 PHARM REV CODE 250: Performed by: STUDENT IN AN ORGANIZED HEALTH CARE EDUCATION/TRAINING PROGRAM

## 2022-02-10 PROCEDURE — 62323 PR INJ LUMBAR/SACRAL, W/IMAGING GUIDANCE: ICD-10-PCS | Mod: ,,, | Performed by: ANESTHESIOLOGY

## 2022-02-10 PROCEDURE — 25000003 PHARM REV CODE 250: Performed by: ANESTHESIOLOGY

## 2022-02-10 PROCEDURE — 62323 NJX INTERLAMINAR LMBR/SAC: CPT | Mod: ,,, | Performed by: ANESTHESIOLOGY

## 2022-02-10 PROCEDURE — 63600175 PHARM REV CODE 636 W HCPCS: Performed by: ANESTHESIOLOGY

## 2022-02-10 PROCEDURE — 25500020 PHARM REV CODE 255: Performed by: ANESTHESIOLOGY

## 2022-02-10 PROCEDURE — 62323 NJX INTERLAMINAR LMBR/SAC: CPT | Performed by: ANESTHESIOLOGY

## 2022-02-10 RX ORDER — DEXAMETHASONE SODIUM PHOSPHATE 10 MG/ML
INJECTION INTRAMUSCULAR; INTRAVENOUS
Status: DISCONTINUED | OUTPATIENT
Start: 2022-02-10 | End: 2022-02-10 | Stop reason: HOSPADM

## 2022-02-10 RX ORDER — SODIUM CHLORIDE 9 MG/ML
INJECTION, SOLUTION INTRAVENOUS CONTINUOUS
Status: DISCONTINUED | OUTPATIENT
Start: 2022-02-10 | End: 2022-02-10 | Stop reason: HOSPADM

## 2022-02-10 RX ORDER — FENTANYL CITRATE 50 UG/ML
INJECTION, SOLUTION INTRAMUSCULAR; INTRAVENOUS
Status: DISCONTINUED | OUTPATIENT
Start: 2022-02-10 | End: 2022-02-10 | Stop reason: HOSPADM

## 2022-02-10 RX ORDER — LIDOCAINE HYDROCHLORIDE 20 MG/ML
INJECTION, SOLUTION INFILTRATION; PERINEURAL
Status: DISCONTINUED | OUTPATIENT
Start: 2022-02-10 | End: 2022-02-10 | Stop reason: HOSPADM

## 2022-02-10 RX ORDER — LIDOCAINE HYDROCHLORIDE 10 MG/ML
INJECTION, SOLUTION EPIDURAL; INFILTRATION; INTRACAUDAL; PERINEURAL
Status: DISCONTINUED | OUTPATIENT
Start: 2022-02-10 | End: 2022-02-10 | Stop reason: HOSPADM

## 2022-02-10 RX ORDER — MIDAZOLAM HYDROCHLORIDE 1 MG/ML
INJECTION INTRAMUSCULAR; INTRAVENOUS
Status: DISCONTINUED | OUTPATIENT
Start: 2022-02-10 | End: 2022-02-10 | Stop reason: HOSPADM

## 2022-02-10 NOTE — DISCHARGE SUMMARY
Discharge Note  Short Stay      SUMMARY     Admit Date: 2/10/2022    Attending Physician: Larry Brasher      Discharge Physician: Larry Brasher      Discharge Date: 2/10/2022 1:40 PM    Procedure(s) (LRB):  Injection, Steroid, Epidural L4/5 (N/A)    Final Diagnosis: Lumbar radiculopathy [M54.16]    Disposition: Home or self care    Patient Instructions:   Current Discharge Medication List      CONTINUE these medications which have NOT CHANGED    Details   aspirin 81 MG Chew Take 81 mg by mouth once daily.      azelastine (ASTELIN) 137 mcg (0.1 %) nasal spray USE 1 TO 2 SPRAYS IN EACH NOSTRIL TWICE DAILY FOR CONGESTION      baclofen (LIORESAL) 20 MG tablet Take 1 tablet by mouth 3 (three) times daily as needed.       butalbital-acetaminophen-caffeine -40 mg (FIORICET, ESGIC) -40 mg per tablet Take 1 tablet by mouth every 4 (four) hours as needed.      butorphanol (STADOL) 10 mg/mL nasal spray 1 spray by Nasal route every 4 (four) hours as needed for Pain.      cloNIDine (CATAPRES) 0.1 MG tablet TAKE 1 TABLET(0.1 MG) BY MOUTH TWICE DAILY  Qty: 60 tablet, Refills: 3    Comments: ZERO refills remain on this prescription. Your patient is requesting advance approval of refills for this medication to PREVENT ANY MISSED DOSES  Associated Diagnoses: Tic      cyclobenzaprine (FLEXERIL) 10 MG tablet TK 1 T PO Q 8 H PRF PAIN      diazePAM (VALIUM) 2 MG tablet Take 2 mg by mouth 2 (two) times daily as needed.      erenumab-aooe (AIMOVIG AUTOINJECTOR SUBQ) Inject into the skin.      EScitalopram oxalate (LEXAPRO) 20 MG tablet Take 20 mg by mouth once daily.      estradioL (ESTRACE) 2 MG tablet       estradiol 0.1 mg/24 hr td ptwk 0.1 mg/24 hr PTWK       ezetimibe (ZETIA) 10 mg tablet Take 1 tablet (10 mg total) by mouth once daily.  Qty: 90 tablet, Refills: 3      FLUCELVAX QUAD 4500-4929, PF, 60 mcg (15 mcg x 4)/0.5 mL Syrg vaccine ADM 0.5ML IM UTD  Refills: 0      fluticasone (FLONASE) 50 mcg/actuation nasal  spray SPRAY TWICE IEN QD  Refills: 5      gabapentin (NEURONTIN) 300 MG capsule TAKE 1 CAPSULE BY MOUTH THREE TIMES DAILY  Qty: 90 capsule, Refills: 2    Associated Diagnoses: Lumbar radiculopathy      hydrOXYzine pamoate (VISTARIL) 50 MG Cap hydroxyzine pamoate 50 mg capsule   TK 1 TO 2 CS PO HS PRN      ketorolac (TORADOL) 10 mg tablet ketorolac 10 mg tablet      levETIRAcetam (KEPPRA) 1000 MG tablet Take 1,000 mg by mouth 2 (two) times daily.      methocarbamoL (ROBAXIN) 500 MG Tab methocarbamol 500 mg tablet      metoclopramide HCl (REGLAN) 10 MG tablet metoclopramide 10 mg tablet      moxifloxacin (AVELOX) 400 mg tablet       NARCAN 4 mg/actuation Spry SPRAY 0.1ML IN 1 NOSTRIL MAY REPEAT DOSE EVERY 2-3 MINUTES AS NEEDED ALTERNATING NOSTRILS EACH DOSE  Qty: 1 each, Refills: 3      nitroGLYCERIN (NITROSTAT) 0.4 MG SL tablet Place 1 tablet (0.4 mg total) under the tongue every 5 (five) minutes as needed for Chest pain.  Qty: 90 tablet, Refills: 3      nitroGLYCERIN 0.1 mg/hr TD PT24 (NITRODUR) 0.1 mg/hr PT24 Nitroglycerin 0.4 mg Sublingual Tab      NURTEC 75 mg odt Take by mouth.      ondansetron (ZOFRAN) 4 MG tablet Take 1 tablet (4 mg total) by mouth every 6 (six) hours as needed for Nausea.  Qty: 12 tablet, Refills: 0      ondansetron (ZOFRAN-ODT) 8 MG TbDL Take 8 mg by mouth 2 (two) times daily.      prochlorperazine (COMPAZINE) 10 MG tablet Take 10 mg by mouth 3 (three) times daily.      propranoloL (INDERAL LA) 60 MG 24 hr capsule Take 60 mg by mouth every evening.      !! rosuvastatin (CRESTOR) 20 MG tablet 1 capsule      !! rosuvastatin (CRESTOR) 40 MG Tab Take 1 tablet (40 mg total) by mouth every evening.  Qty: 90 tablet, Refills: 3    Associated Diagnoses: Pure hypercholesterolemia      spironolactone (ALDACTONE) 25 MG tablet Take 1 tablet (25 mg total) by mouth once daily.  Qty: 30 tablet, Refills: 3      !! traZODone (DESYREL) 100 MG tablet       !! traZODone (DESYREL) 50 MG tablet       verapamiL  (VERELAN) 240 MG C24P verapamil  mg 24 hr capsule,extended release   TK ONE C PO  ONCE D       !! - Potential duplicate medications found. Please discuss with provider.              Discharge Diagnosis: Lumbar radiculopathy [M54.16]  Condition on Discharge: Stable with no complications to procedure   Diet on Discharge: Same as before.  Activity: as per instruction sheet.  Discharge to: Home with a responsible adult.  Follow up: 2-4 weeks

## 2022-02-10 NOTE — OP NOTE
Lumbar Interlaminar Epidural Steroid Injection under Fluoroscopic Guidance    The procedure, risks, benefits, and options were discussed with the patient. There are no contraindications to the procedure. The patent expressed understanding and agreed to the procedure. Informed written consent was obtained prior to the start of the procedure and can be found in the patient's chart.    PATIENT NAME: Ms. Dylon Griffin   MRN: [id number]     DATE OF PROCEDURE: 02/10/2022    PROCEDURE: Lumbar Interlaminar Epidural Steroid Injection L4/L5 under Fluoroscopic Guidance    PRE-OP DIAGNOSIS: Lumbar radiculopathy [M54.16]    POST-OP DIAGNOSIS: Same    PHYSICIAN: Larry Brasher MD    ASSISTANTS: Dr. Robles     MEDICATIONS INJECTED: Preservative-free Decadron 10mg with 4cc of Lidocaine 1% MPF and preservative free normal saline    LOCAL ANESTHETIC INJECTED: Xylocaine 2%     SEDATION:   Versed 1mg and Fentanyl 25mcg                                                                                                                                                                                     Conscious sedation ordered by M.D. Patient re-evaluation prior to administration of conscious sedation. No changes noted in patient's status from initial evaluation. The patient's vital signs were monitored by RN and patient remained hemodynamically stable throughout the procedure.    Event Time In   Sedation Start 1334   Sedation End 1339       ESTIMATED BLOOD LOSS: None    COMPLICATIONS: None    TECHNIQUE: Time-out was performed to identify the patient and procedure to be performed. With the patient laying in a prone position, the surgical area was prepped and draped in the usual sterile fashion using ChloraPrep and a fenestrated drape. The level was determined under fluoroscopy guidance. Skin anesthesia was achieved by injecting Lidocaine 2% over the injection site. The interlaminar space was then approached with a 20 gauge,  3.5 inch Tuohy  needle that was introduced under fluoroscopic guidance in the AP, lateral and/or contralateral oblique imaging. Once the Ligamentum flavum was encountered loss of resistance to saline was used to enter the epidural space. With positive loss of resistance and negative aspiration for CSF or Blood, contrast dye Omnipaque (300mg/ml) was injected to confirm placement and there was no vascular runoff. 5 mL of the medication mixture listed above was injected slowly. Displacement of the radio opaque contrast after injection of the medication confirmed that the medication went into the area of the epidural space. The needles were removed and bleeding was nil. A sterile dressing was applied. No specimens collected. The patient tolerated the procedure well.       The patient was monitored after the procedure in the recovery area. They were given post-procedure and discharge instructions to follow at home. The patient was discharged in a stable condition.    I reviewed and edited the fellow's note. I conducted my own interview and physical examination. I agree with the findings. I was present and supervising all critical portions of the procedure.    Larry Brasher MD

## 2022-02-10 NOTE — DISCHARGE INSTRUCTIONS
Patient Education       Moderate and Deep Sedation in Adults   About this topic   Sedation changes a persons level of consciousness or being awake. Doctors give moderate or deep sedation to help you become more comfortable when they need to do a procedure. The staff watch you closely when you are given sedation. With sedation, you may not remember the procedure when it is over. The level of your sedation may be moderate or deep.  With moderate or conscious sedation, you:  · Will be relaxed and calm.  · May seem to sleep.  · May feel only slight pain or no pain at all.  · May talk and answer questions.  · Breathe on your own.  With deep sedation, you:  · Will be relaxed and calm.  · May seem to sleep.  · May feel only slight pain or no pain at all.  · Are not able talk or answer questions.  · May need help keeping your airway open or breathing.  Sedation is given by someone with special training. This is someone like a:  · Certified registered nurse anesthetist (CRNA)  · Anesthesiologist  · Doctor  · Dentist  · Oral surgeon  Why is this procedure done?   Sedation is most often given for procedures such as:  · Minor surgeries or procedures, like taking a tissue sample or fixing a broken bone  · Colonoscopy  · Vasectomy  · Dental surgery, like an implant or taking out an impacted tooth  · Endoscopy  · Bronchoscopy  · Plastic cosmetic surgery  · Endotracheal procedure  What happens before the procedure?   · Your doctor will take your history and do an exam. Tell the doctor if you have any drug allergy. Talk to the doctor about:  ? All the drugs you are taking. Be sure to include all prescription, over-the-counter, vitamins, and herbal supplements. Bring a list of drugs you take with you.  ? Any bleeding problems. Be sure to tell your doctor if you are taking any drugs that may cause bleeding. Some of these are warfarin, rivaroxaban, apixaban, ticagrelor, clopidogrel, ibuprofen, naproxen, or aspirin. Certain vitamins  and herbs, such as garlic and fish oil, may also add to the risk for bleeding. You may need to stop these drugs as well. Talk to your doctor about them.  ? Any previous problems with sedation or anesthesia.  · Talk with the doctor about when you last ate or drank anything.  · You will not be allowed to drive after the procedure. Plan another way to get home.  What happens after the procedure?   You may feel these side effects:  · Headache  · Upset stomach or throwing up  · Hangover feeling  · Short period of no memory or cloudy memory  · Bad memories  · Confusion or hallucinations  What care is needed at home?   · Do not make major decisions or sign important papers for at least 24 hours. You may not be thinking clearly.  · Do not drive or operate machinery for 24 hours or until OK'd by your doctor.  What problems could happen?   · Low blood pressure  · Breathing problems  · Heart problems  · Allergic reactions  Last Reviewed Date   2021-05-06  Consumer Information Use and Disclaimer   This information is not specific medical advice and does not replace information you receive from your health care provider. This is only a brief summary of general information. It does NOT include all information about conditions, illnesses, injuries, tests, procedures, treatments, therapies, discharge instructions or life-style choices that may apply to you. You must talk with your health care provider for complete information about your health and treatment options. This information should not be used to decide whether or not to accept your health care providers advice, instructions or recommendations. Only your health care provider has the knowledge and training to provide advice that is right for you.  Copyright   Copyright © 2021 [employer] and its affiliates and/or licensors. All rights reserved.     Thank you for allowing us to care for you today. You may receive a survey about the care we provided. Your feedback is  valuable and helps us provide excellent care throughout the community.     Home Care Instructions for Pain Management:    1. DIET:   You may resume your normal diet today.   2. BATHING:   You may shower with luke warm water. No tub baths or anything that will soak injection sites under water for the next 24 hours.  3. DRESSING:   You may remove your bandage today.   4. ACTIVITY LEVEL:   You may resume your normal activities 24 hrs after your procedure. Nothing strenuous today.  5. MEDICATIONS:   You may resume your normal medications today. To restart blood thinners, ask your doctor.  6. DRIVING    If you have received any sedatives by mouth today, you may not drive for 12 hours.    If you have received any sedation through your IV, you may not drive for 24 hrs.   7. SPECIAL INSTRUCTIONS:   No heat to the injection site for 24 hrs including, hot bath or shower, heating pad, moist heat, or hot tubs.    Use ice pack to injection site for any pain or discomfort.  Apply ice packs for 20 minute intervals as needed.    IF you have diabetes, be sure to monitor your blood sugar more closely. IF your injection contained steroids your blood sugar levels may become higher than normal.    If you are still having pain upon discharge:  Your pain may improve over the next 48 hours. The anesthetic (numbing medication) works immediately to 48 hours. IF your injection contained a steroid (anti-inflammatory medication), it takes approximately 3 days to start feeling relief and 7-10 days to see your greatest results from the medication. It is possible you may need subsequent injections. This would be discussed at your follow up appointment with pain management or your referring doctor.    Please call the PAIN MANAGEMENT office at [phone] or ON CALL pager at [phone] if you experienced any:   -Weakness or loss of sensation  -Fever > 101.5  -Pain uncontrolled with oral medications   -Persistent nausea, vomiting, or  diarrhea  -Redness or drainage from the injection sites, or any other worrisome concerns.   If physician on call was not reached or could not communicate with our office for any reason please go to the nearest emergency department.

## 2022-02-16 ENCOUNTER — OFFICE VISIT (OUTPATIENT)
Dept: UROLOGY | Facility: CLINIC | Age: 42
End: 2022-02-16
Payer: MEDICARE

## 2022-02-16 VITALS
BODY MASS INDEX: 21.77 KG/M2 | HEART RATE: 75 BPM | DIASTOLIC BLOOD PRESSURE: 84 MMHG | HEIGHT: 72 IN | WEIGHT: 160.69 LBS | SYSTOLIC BLOOD PRESSURE: 129 MMHG

## 2022-02-16 DIAGNOSIS — N32.81 OAB (OVERACTIVE BLADDER): Primary | ICD-10-CM

## 2022-02-16 PROCEDURE — 99999 PR PBB SHADOW E&M-EST. PATIENT-LVL IV: ICD-10-PCS | Mod: PBBFAC,,, | Performed by: UROLOGY

## 2022-02-16 PROCEDURE — 3074F SYST BP LT 130 MM HG: CPT | Mod: CPTII,S$GLB,, | Performed by: UROLOGY

## 2022-02-16 PROCEDURE — 3079F DIAST BP 80-89 MM HG: CPT | Mod: CPTII,S$GLB,, | Performed by: UROLOGY

## 2022-02-16 PROCEDURE — 3079F PR MOST RECENT DIASTOLIC BLOOD PRESSURE 80-89 MM HG: ICD-10-PCS | Mod: CPTII,S$GLB,, | Performed by: UROLOGY

## 2022-02-16 PROCEDURE — 3008F PR BODY MASS INDEX (BMI) DOCUMENTED: ICD-10-PCS | Mod: CPTII,S$GLB,, | Performed by: UROLOGY

## 2022-02-16 PROCEDURE — 99214 PR OFFICE/OUTPT VISIT, EST, LEVL IV, 30-39 MIN: ICD-10-PCS | Mod: S$GLB,,, | Performed by: UROLOGY

## 2022-02-16 PROCEDURE — 1159F MED LIST DOCD IN RCRD: CPT | Mod: CPTII,S$GLB,, | Performed by: UROLOGY

## 2022-02-16 PROCEDURE — 1159F PR MEDICATION LIST DOCUMENTED IN MEDICAL RECORD: ICD-10-PCS | Mod: CPTII,S$GLB,, | Performed by: UROLOGY

## 2022-02-16 PROCEDURE — 99214 OFFICE O/P EST MOD 30 MIN: CPT | Mod: S$GLB,,, | Performed by: UROLOGY

## 2022-02-16 PROCEDURE — 3008F BODY MASS INDEX DOCD: CPT | Mod: CPTII,S$GLB,, | Performed by: UROLOGY

## 2022-02-16 PROCEDURE — 3074F PR MOST RECENT SYSTOLIC BLOOD PRESSURE < 130 MM HG: ICD-10-PCS | Mod: CPTII,S$GLB,, | Performed by: UROLOGY

## 2022-02-16 PROCEDURE — 99999 PR PBB SHADOW E&M-EST. PATIENT-LVL IV: CPT | Mod: PBBFAC,,, | Performed by: UROLOGY

## 2022-02-16 RX ORDER — OXYBUTYNIN CHLORIDE 10 MG/1
10 TABLET, EXTENDED RELEASE ORAL DAILY
Qty: 30 TABLET | Refills: 11 | Status: SHIPPED | OUTPATIENT
Start: 2022-02-16 | End: 2022-08-03 | Stop reason: SDUPTHER

## 2022-02-16 RX ORDER — TAMSULOSIN HYDROCHLORIDE 0.4 MG/1
0.4 CAPSULE ORAL DAILY
Qty: 30 CAPSULE | Refills: 11 | Status: SHIPPED | OUTPATIENT
Start: 2022-02-16 | End: 2022-08-03 | Stop reason: SDUPTHER

## 2022-02-16 NOTE — PROGRESS NOTES
[facility]      New Overactive Bladder (OAB) Note    2/16/2022    Referred by:  Self, Aaareferral    HPI: Dylon Griffin is a very pleasant 41 y.o. female who is a new patient to our department referred for evaluation of urinary frequency of 8-10 months duration. She reports bother is associated with urinary daytime frequency (10-12x daily), with nocturia (3-4x per night) and with urgency that does not result in urinary incontinence. She reports no stress urinary incontinence associated with exertion. She does not require daily pad use.  She has not made changes to fluid intake.  She reports urinary frequency is day and night but more predominant during the day.     She denies symptoms of irritative voiding including dysuria. She denies symptoms of obstructive voiding including decreased stream, hesitancy, intermittency, post void dribbling and sense of incomplete emptying. Bladder scan PVR was 31 mL. Her history includes no notation of urolithiasis, hematuria, prior pelvic surgery, previous prolapse or incontinence procedures. She does have epidural injections, including L4-L5 for lumbar radiculopathy. She has not noted much correlation between these symptoms and the bladder symptoms. Back pain had improved with the injections. She denies all other prior pelvic surgeries or neurologic diagnoses.  She denies a history of constipation. She denies a history suggestive of glaucoma.  She denies a history of hypertension.     Previous incontinence therapies:   No Previous Therapies    A review of 10+ systems was conducted with pertinent positive and negative findings documented in HPI with all other systems reviewed and negative.    Past medical, family, surgical and social history reviewed as documented in chart with pertinent positive medical, family, surgical and social history detailed in HPI.    Exam Findings:    Const: no acute distress, conversant and alert  Eyes: anicteric, extraocular muscles  intact  ENMT: normocephalic, Nl oral membranes  Cardio: no cyanosis, nl cap refill  Pulm: no tachypnea; no resp distress  Abd: soft, no tenderness  Musc: no laceration, no tenderness  Neuro: alert; oriented x 3  Skin: warm, dry; no petichiae  Psych: no anxiety; normal speech     Assessment/Plan:    Overactive Bladder with Urgency Incontinence (new, addt'l workup): Her history is suggestive of Overactive Bladder (OAB) without Urgency Urinary Incontinence (UUI). There is some possibility of outlet obstruction, though with androgen deprivation, my suspicion for the prostate is low. I will concomitantly place her on tamsulosin to see if there is an effect. But, have also started Ditropan XL 10 mg.     She will also maintain a 3-day voiding diary and patient education information was provided about OAB and OAB medications.

## 2022-02-22 ENCOUNTER — OFFICE VISIT (OUTPATIENT)
Dept: NEUROLOGY | Facility: CLINIC | Age: 42
End: 2022-02-22
Payer: MEDICARE

## 2022-02-22 VITALS
HEART RATE: 76 BPM | SYSTOLIC BLOOD PRESSURE: 141 MMHG | DIASTOLIC BLOOD PRESSURE: 95 MMHG | HEIGHT: 72 IN | BODY MASS INDEX: 21.8 KG/M2

## 2022-02-22 DIAGNOSIS — G25.9 FUNCTIONAL MOVEMENT DISORDER: ICD-10-CM

## 2022-02-22 DIAGNOSIS — G24.9 DYSTONIA: Primary | ICD-10-CM

## 2022-02-22 DIAGNOSIS — Z71.89 COUNSELING REGARDING GOALS OF CARE: ICD-10-CM

## 2022-02-22 PROCEDURE — 99215 PR OFFICE/OUTPT VISIT, EST, LEVL V, 40-54 MIN: ICD-10-PCS | Mod: S$GLB,,, | Performed by: PSYCHIATRY & NEUROLOGY

## 2022-02-22 PROCEDURE — 99999 PR PBB SHADOW E&M-EST. PATIENT-LVL V: CPT | Mod: PBBFAC,,, | Performed by: PSYCHIATRY & NEUROLOGY

## 2022-02-22 PROCEDURE — 3008F PR BODY MASS INDEX (BMI) DOCUMENTED: ICD-10-PCS | Mod: CPTII,S$GLB,, | Performed by: PSYCHIATRY & NEUROLOGY

## 2022-02-22 PROCEDURE — 1159F MED LIST DOCD IN RCRD: CPT | Mod: CPTII,S$GLB,, | Performed by: PSYCHIATRY & NEUROLOGY

## 2022-02-22 PROCEDURE — 3008F BODY MASS INDEX DOCD: CPT | Mod: CPTII,S$GLB,, | Performed by: PSYCHIATRY & NEUROLOGY

## 2022-02-22 PROCEDURE — 1160F RVW MEDS BY RX/DR IN RCRD: CPT | Mod: CPTII,S$GLB,, | Performed by: PSYCHIATRY & NEUROLOGY

## 2022-02-22 PROCEDURE — 99215 OFFICE O/P EST HI 40 MIN: CPT | Mod: S$GLB,,, | Performed by: PSYCHIATRY & NEUROLOGY

## 2022-02-22 PROCEDURE — 3077F PR MOST RECENT SYSTOLIC BLOOD PRESSURE >= 140 MM HG: ICD-10-PCS | Mod: CPTII,S$GLB,, | Performed by: PSYCHIATRY & NEUROLOGY

## 2022-02-22 PROCEDURE — 3077F SYST BP >= 140 MM HG: CPT | Mod: CPTII,S$GLB,, | Performed by: PSYCHIATRY & NEUROLOGY

## 2022-02-22 PROCEDURE — 3080F PR MOST RECENT DIASTOLIC BLOOD PRESSURE >= 90 MM HG: ICD-10-PCS | Mod: CPTII,S$GLB,, | Performed by: PSYCHIATRY & NEUROLOGY

## 2022-02-22 PROCEDURE — 99215 OFFICE O/P EST HI 40 MIN: CPT | Mod: PBBFAC | Performed by: PSYCHIATRY & NEUROLOGY

## 2022-02-22 PROCEDURE — 99999 PR PBB SHADOW E&M-EST. PATIENT-LVL V: ICD-10-PCS | Mod: PBBFAC,,, | Performed by: PSYCHIATRY & NEUROLOGY

## 2022-02-22 PROCEDURE — 1160F PR REVIEW ALL MEDS BY PRESCRIBER/CLIN PHARMACIST DOCUMENTED: ICD-10-PCS | Mod: CPTII,S$GLB,, | Performed by: PSYCHIATRY & NEUROLOGY

## 2022-02-22 PROCEDURE — 3080F DIAST BP >= 90 MM HG: CPT | Mod: CPTII,S$GLB,, | Performed by: PSYCHIATRY & NEUROLOGY

## 2022-02-22 PROCEDURE — 1159F PR MEDICATION LIST DOCUMENTED IN MEDICAL RECORD: ICD-10-PCS | Mod: CPTII,S$GLB,, | Performed by: PSYCHIATRY & NEUROLOGY
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
## 2022-02-24 ENCOUNTER — PATIENT MESSAGE (OUTPATIENT)
Dept: PAIN MEDICINE | Facility: CLINIC | Age: 42
End: 2022-02-24
Payer: MEDICARE

## 2022-03-02 ENCOUNTER — OFFICE VISIT (OUTPATIENT)
Dept: SPINE | Facility: CLINIC | Age: 42
End: 2022-03-02
Payer: MEDICARE

## 2022-03-02 DIAGNOSIS — M54.12 CERVICAL RADICULOPATHY: ICD-10-CM

## 2022-03-02 DIAGNOSIS — M47.816 LUMBAR SPONDYLOSIS: ICD-10-CM

## 2022-03-02 DIAGNOSIS — M50.30 DDD (DEGENERATIVE DISC DISEASE), CERVICAL: ICD-10-CM

## 2022-03-02 DIAGNOSIS — M47.812 CERVICAL SPONDYLOSIS: ICD-10-CM

## 2022-03-02 DIAGNOSIS — G89.4 CHRONIC PAIN DISORDER: Primary | ICD-10-CM

## 2022-03-02 DIAGNOSIS — M51.36 DDD (DEGENERATIVE DISC DISEASE), LUMBAR: ICD-10-CM

## 2022-03-02 DIAGNOSIS — M54.16 LUMBAR RADICULOPATHY: ICD-10-CM

## 2022-03-02 PROCEDURE — 1160F PR REVIEW ALL MEDS BY PRESCRIBER/CLIN PHARMACIST DOCUMENTED: ICD-10-PCS | Mod: CPTII,95,, | Performed by: NURSE PRACTITIONER

## 2022-03-02 PROCEDURE — 1159F MED LIST DOCD IN RCRD: CPT | Mod: CPTII,95,, | Performed by: NURSE PRACTITIONER

## 2022-03-02 PROCEDURE — 1159F PR MEDICATION LIST DOCUMENTED IN MEDICAL RECORD: ICD-10-PCS | Mod: CPTII,95,, | Performed by: NURSE PRACTITIONER

## 2022-03-02 PROCEDURE — 99213 OFFICE O/P EST LOW 20 MIN: CPT | Mod: 95,,, | Performed by: NURSE PRACTITIONER

## 2022-03-02 PROCEDURE — 1160F RVW MEDS BY RX/DR IN RCRD: CPT | Mod: CPTII,95,, | Performed by: NURSE PRACTITIONER

## 2022-03-02 PROCEDURE — 99213 PR OFFICE/OUTPT VISIT, EST, LEVL III, 20-29 MIN: ICD-10-PCS | Mod: 95,,, | Performed by: NURSE PRACTITIONER

## 2022-03-02 RX ORDER — NALOXONE HYDROCHLORIDE 4 MG/.1ML
SPRAY NASAL
Qty: 1 EACH | Refills: 3 | Status: SHIPPED | OUTPATIENT
Start: 2022-03-02 | End: 2024-02-21 | Stop reason: SDUPTHER

## 2022-03-02 RX ORDER — GABAPENTIN 300 MG/1
300 CAPSULE ORAL 3 TIMES DAILY
Qty: 90 CAPSULE | Refills: 2 | Status: SHIPPED | OUTPATIENT
Start: 2022-03-02 | End: 2023-02-03 | Stop reason: SDUPTHER
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
## 2022-04-01 ENCOUNTER — TELEPHONE (OUTPATIENT)
Dept: PAIN MEDICINE | Facility: CLINIC | Age: 42
End: 2022-04-01
Payer: MEDICARE

## 2022-04-01 NOTE — TELEPHONE ENCOUNTER
This message is for patient in regards to his/her appointment 04/04/22 at 2:20 p      [facility] Policy: For the safety of all patients and staff members.     Patient Visitor policy: During this visit we're asking all patients to only have one visitor over the age of 18yrs old to accompany to be seen by Maribel Bright NP. If patient do not required assistance with their visit, we're asking all visitors to remain outside the waiting area.    Upon arriving to your schedule appointment; patients are required to wear a face mask, if patient do not have a face mask one will be provided entering the facility. If you have any questions or concerns please contact [phone]    Staff called alta CASTELLANOS

## 2022-04-04 ENCOUNTER — OFFICE VISIT (OUTPATIENT)
Dept: PAIN MEDICINE | Facility: CLINIC | Age: 42
End: 2022-04-04
Payer: MEDICARE

## 2022-04-04 VITALS
SYSTOLIC BLOOD PRESSURE: 122 MMHG | RESPIRATION RATE: 18 BRPM | OXYGEN SATURATION: 98 % | HEIGHT: 72 IN | TEMPERATURE: 97 F | WEIGHT: 163 LBS | HEART RATE: 70 BPM | BODY MASS INDEX: 22.08 KG/M2 | DIASTOLIC BLOOD PRESSURE: 79 MMHG

## 2022-04-04 DIAGNOSIS — M54.12 CERVICAL RADICULOPATHY: ICD-10-CM

## 2022-04-04 DIAGNOSIS — G89.4 CHRONIC PAIN DISORDER: ICD-10-CM

## 2022-04-04 DIAGNOSIS — M51.36 DDD (DEGENERATIVE DISC DISEASE), LUMBAR: ICD-10-CM

## 2022-04-04 DIAGNOSIS — M50.30 DDD (DEGENERATIVE DISC DISEASE), CERVICAL: ICD-10-CM

## 2022-04-04 DIAGNOSIS — M54.16 LUMBAR RADICULOPATHY: ICD-10-CM

## 2022-04-04 DIAGNOSIS — M47.816 LUMBAR SPONDYLOSIS: Primary | ICD-10-CM

## 2022-04-04 DIAGNOSIS — M47.812 CERVICAL SPONDYLOSIS: ICD-10-CM

## 2022-04-04 PROCEDURE — 3078F PR MOST RECENT DIASTOLIC BLOOD PRESSURE < 80 MM HG: ICD-10-PCS | Mod: CPTII,S$GLB,, | Performed by: NURSE PRACTITIONER

## 2022-04-04 PROCEDURE — 99999 PR PBB SHADOW E&M-EST. PATIENT-LVL V: CPT | Mod: PBBFAC,,, | Performed by: NURSE PRACTITIONER

## 2022-04-04 PROCEDURE — 3008F PR BODY MASS INDEX (BMI) DOCUMENTED: ICD-10-PCS | Mod: CPTII,S$GLB,, | Performed by: NURSE PRACTITIONER

## 2022-04-04 PROCEDURE — 1160F RVW MEDS BY RX/DR IN RCRD: CPT | Mod: CPTII,S$GLB,, | Performed by: NURSE PRACTITIONER

## 2022-04-04 PROCEDURE — 1159F MED LIST DOCD IN RCRD: CPT | Mod: CPTII,S$GLB,, | Performed by: NURSE PRACTITIONER

## 2022-04-04 PROCEDURE — 3074F SYST BP LT 130 MM HG: CPT | Mod: CPTII,S$GLB,, | Performed by: NURSE PRACTITIONER

## 2022-04-04 PROCEDURE — 99213 OFFICE O/P EST LOW 20 MIN: CPT | Mod: S$GLB,,, | Performed by: NURSE PRACTITIONER

## 2022-04-04 PROCEDURE — 99284 EMERGENCY DEPT VISIT MOD MDM: CPT | Mod: ,,, | Performed by: EMERGENCY MEDICINE

## 2022-04-04 PROCEDURE — 3008F BODY MASS INDEX DOCD: CPT | Mod: CPTII,S$GLB,, | Performed by: NURSE PRACTITIONER

## 2022-04-04 PROCEDURE — 3078F DIAST BP <80 MM HG: CPT | Mod: CPTII,S$GLB,, | Performed by: NURSE PRACTITIONER

## 2022-04-04 PROCEDURE — 3074F PR MOST RECENT SYSTOLIC BLOOD PRESSURE < 130 MM HG: ICD-10-PCS | Mod: CPTII,S$GLB,, | Performed by: NURSE PRACTITIONER

## 2022-04-04 PROCEDURE — 99213 PR OFFICE/OUTPT VISIT, EST, LEVL III, 20-29 MIN: ICD-10-PCS | Mod: S$GLB,,, | Performed by: NURSE PRACTITIONER

## 2022-04-04 PROCEDURE — 99999 PR PBB SHADOW E&M-EST. PATIENT-LVL V: ICD-10-PCS | Mod: PBBFAC,,, | Performed by: NURSE PRACTITIONER

## 2022-04-04 PROCEDURE — 1160F PR REVIEW ALL MEDS BY PRESCRIBER/CLIN PHARMACIST DOCUMENTED: ICD-10-PCS | Mod: CPTII,S$GLB,, | Performed by: NURSE PRACTITIONER

## 2022-04-04 PROCEDURE — 99284 PR EMERGENCY DEPT VISIT,LEVEL IV: ICD-10-PCS | Mod: ,,, | Performed by: EMERGENCY MEDICINE

## 2022-04-04 PROCEDURE — 99284 EMERGENCY DEPT VISIT MOD MDM: CPT | Mod: 25

## 2022-04-04 PROCEDURE — 1159F PR MEDICATION LIST DOCUMENTED IN MEDICAL RECORD: ICD-10-PCS | Mod: CPTII,S$GLB,, | Performed by: NURSE PRACTITIONER
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
## 2022-04-05 ENCOUNTER — HOSPITAL ENCOUNTER (EMERGENCY)
Facility: HOSPITAL | Age: 42
Discharge: HOME | End: 2022-04-05
Attending: EMERGENCY MEDICINE
Payer: MEDICARE

## 2022-04-05 VITALS
BODY MASS INDEX: 23.03 KG/M2 | DIASTOLIC BLOOD PRESSURE: 80 MMHG | HEART RATE: 76 BPM | RESPIRATION RATE: 18 BRPM | OXYGEN SATURATION: 97 % | SYSTOLIC BLOOD PRESSURE: 131 MMHG | TEMPERATURE: 98 F | WEIGHT: 170 LBS | HEIGHT: 72 IN

## 2022-04-05 DIAGNOSIS — N50.811 RIGHT TESTICULAR PAIN: ICD-10-CM

## 2022-04-05 LAB
BILIRUB UR QL STRIP: NEGATIVE
CLARITY UR REFRACT.AUTO: CLEAR
COLOR UR AUTO: YELLOW
GLUCOSE UR QL STRIP: NEGATIVE
HGB UR QL STRIP: NEGATIVE
KETONES UR QL STRIP: NEGATIVE
LEUKOCYTE ESTERASE UR QL STRIP: NEGATIVE
NITRITE UR QL STRIP: NEGATIVE
PH UR STRIP: 5 [PH] (ref 5–8)
PROT UR QL STRIP: NEGATIVE
SP GR UR STRIP: 1.01 (ref 1–1.03)
URN SPEC COLLECT METH UR: NORMAL

## 2022-04-05 PROCEDURE — 81003 URINALYSIS AUTO W/O SCOPE: CPT | Performed by: EMERGENCY MEDICINE

## 2022-04-05 RX ORDER — DOXYCYCLINE 100 MG/1
100 CAPSULE ORAL 2 TIMES DAILY
Qty: 20 CAPSULE | Refills: 0 | Status: SHIPPED | OUTPATIENT
Start: 2022-04-05 | End: 2022-04-15

## 2022-04-05 NOTE — DISCHARGE INSTRUCTIONS
You presented to the ER with pain in your testicle. Your ultrasound and urine studies were normal. This could be caused by a bacterial infection, so take the full course of antibiotics. It could also be inflammatory in nature, so taking anti-inflammatory medications like ibuprofen or naproxen may be helpful, as well as ice, and tighter fitting underwear.

## 2022-04-05 NOTE — ED PROVIDER NOTES
"Encounter Date: 4/4/2022       History     Chief Complaint   Patient presents with    Abdominal Pain     C/o lower abd pain and testicular pain beginning "a few hours" PTA. Pt denies changes to urination, denies discharge.     HPI     This is a 41y trans woman who presents with R testicular pain, that began a few hours prior to arrival, associated with lower abd pain, no flank pain. Feels like prior epididymitis, which she had about 20 years ago. Did do some yard work today, with some heavy lifting, but no noted bulging or acute onset. Slight dysuria. No new sexual partners or h/o STI. No urethral discharge.   Review of patient's allergies indicates:   Allergen Reactions    Mustard Itching, Nausea And Vomiting, Shortness Of Breath and Swelling    Lipitor [atorvastatin] Itching    Mushroom Itching, Nausea And Vomiting and Swelling    Niaspan extended-release [niacin] Itching    Nystatin Hives     Other reaction(s): hives    Olive extract Itching, Nausea And Vomiting and Swelling    Oyster extract     Extendryl [uzendmvngvsfshms-zh-mfhjgzjjgd] Rash    V-cillin k Rash     Past Medical History:   Diagnosis Date    Anxiety     Cancer     Chest pain 1/20/2016 12/30/2015: Began experinece chest pain.    Depression     Functional movement disorder 10/1/2019    Migraine headache     Movement disorder     Myoclonic jerkings, massive     Stroke pt. states he had a cva at 3 months old     Past Surgical History:   Procedure Laterality Date    ANGIOGRAM, CORONARY, WITH LEFT HEART CATHETERIZATION      EPIDURAL STEROID INJECTION N/A 3/26/2021    Procedure: INJECTION, STEROID, EPIDURAL L4/5;  Surgeon: Larry Brasher MD;  Location: [facility] PAIN [facility]GT;  Service: Pain Management;  Laterality: N/A;    EPIDURAL STEROID INJECTION N/A 6/4/2021    Procedure: INJECTION, STEROID, EPIDURAL, L4-L5 IL need consent;  Surgeon: Larry Brasher MD;  Location: [facility] PAIN [facility]GT;  Service: Pain Management;  Laterality: N/A;    " EPIDURAL STEROID INJECTION N/A 10/29/2021    Procedure: INJECTION, STEROID, EPIDURAL, L4-L5IL NEED CONSENT;  Surgeon: Larry Brasher MD;  Location: [facility] PAIN [facility]GT;  Service: Pain Management;  Laterality: N/A;    EPIDURAL STEROID INJECTION N/A 1/27/2022    Procedure: Injection, Steroid, Epidural C7/T1;  Surgeon: Larry Brasher MD;  Location: [facility]IN [facility];  Service: Pain Management;  Laterality: N/A;    EPIDURAL STEROID INJECTION N/A 2/10/2022    Procedure: Injection, Steroid, Epidural L4/5;  Surgeon: Larry Brasher MD;  Location: [facility] PAIN [facility]GT;  Service: Pain Management;  Laterality: N/A;    MANDIBLE SURGERY      reconstruction    variceol repair       Family History   Problem Relation Age of Onset    Heart disease Father     Hypertension Father     Hyperlipidemia Father     Heart disease Paternal Uncle     Heart disease Mother     Myasthenia gravis Mother      Social History     Tobacco Use    Smoking status: Never Smoker    Smokeless tobacco: Never Used   Substance Use Topics    Alcohol use: No    Drug use: No     Review of Systems   Constitutional: Negative for chills, diaphoresis, fatigue and fever.   HENT: Negative for congestion, rhinorrhea and sore throat.    Eyes: Negative for visual disturbance.   Respiratory: Negative for cough, chest tightness and shortness of breath.    Cardiovascular: Negative for chest pain.   Gastrointestinal: Negative for abdominal pain, blood in stool, constipation, diarrhea and vomiting.   Genitourinary: Negative for dysuria, hematuria and urgency.        Testicular pain     Musculoskeletal: Negative for back pain.   Skin: Negative for rash.   Neurological: Negative for seizures and syncope.   Hematological: Does not bruise/bleed easily.   Psychiatric/Behavioral: Negative for agitation and hallucinations.       Physical Exam     Initial Vitals [04/04/22 2338]   BP Pulse Resp Temp SpO2   130/72 96 18 98 °F (36.7 °C) 95 %      MAP       --         Physical  Exam  Gen: AxOx4, NAD, appears stated age, well appearing  Eye: EOMI, no scleral icterus, no periorbital edema or ecchymosis  Head: Normocephalic, atraumatic, no lesions, scalp grossly normal  ENT: neck supple, no stridor, no masses, no abnormal phonation or drooling  CVS: warm and well perfused, cap refill <2 seconds, no extremity pallor  PULM: normal work of breathing, no stridor, equal chest rise, no peripheral cyanosis  ABD: scaphoid, nondistended  : normal penis and shaft, no lesions or drainage, scrotum nonswollen with no skin changes. No inguinal lymphadenopathy, there is right-sided posterior testicular tenderness to palpation, along the epididymis, as well as diffuse testicular tenderness to palpation without enlargement.  No abnormal lie.  There is no inguinal lymphadenopathy.  No hernias palpated.  Ext: no rash, no deformities, moving all joints with normal ROM  Neuro: NUNO, gait intact, face grossly symmetric  Psych: well groomed, mood and affect congruent, makes good eye contact, cooperative    ED Course   Procedures  Labs Reviewed   URINALYSIS, REFLEX TO URINE CULTURE    Narrative:     Specimen Source->Urine          Imaging Results          US Scrotum And Testicles (Final result)  Result time 04/05/22 02:34:21    Final result by Cong Reyes MD (04/05/22 02:34:21)                 Impression:      No acute sonographic abnormality identified in either testicle.    Bilateral epididymal head cysts, left side larger than right.      Electronically signed by: Cong Reyes MD  Date:    04/05/2022  Time:    02:34             Narrative:    EXAMINATION:  US SCROTUM AND TESTICLES    CLINICAL HISTORY:  Right testicular pain.    TECHNIQUE:  Sonography of the scrotum and testes.    COMPARISON:  Ultrasound, 09/06/2009.    FINDINGS:  Right Testicle:    *Size: 5.0 x 2.2 x 2.5 cm  *Appearance: Normal.  *Flow: Normal arterial and venous flow  *Epididymis: Epididymal head cyst measuring 0.5 cm in size.   Otherwise, unremarkable.  *Hydrocele: None.  *Varicocele: None.  Left Testicle:    *Size: 4.4 x 2.7 x 2.9 cm  *Appearance: Normal.  *Flow: Normal arterial and venous flow  *Epididymis: Anechoic cyst in the epididymal head region measures 1.4 x 1.2 x 1.1 cm.  No intralesional vascularity.  Epididymis is otherwise unremarkable.  *Hydrocele: None.  *Varicocele: None.  Other findings: Hhip-py-oqjr color Doppler imaging through both testicles demonstrates normal and symmetric vascularity.                                 Medications - No data to display  Medical Decision Making:   History:   Old Medical Records: I decided to obtain old medical records.  Old Records Summarized: records from clinic visits.  Initial Assessment:   This is a 41-year-old woman who presents with acute onset right-sided testicular pain, with tenderness on exam over the epididymis and right testicle.  I have overall low suspicion for torsion, given normal lie, but we will obtain an ultrasound to evaluate for this versus epididymitis.  Plan for UA to evaluate for infectious process.  Clinical Tests:   Lab Tests: Ordered and Reviewed  Radiological Study: Ordered and Reviewed  ED Management:  UA reassuring.  Ultrasound of the right testicle shows bilateral cysts but otherwise no acute findings.  I think likely this will improve with elevation and anti-inflammatories, but I have also prescribed doxycycline to cover for bacterial process.  Patient is aware of treatment plan and will return if situation worsens.                      Clinical Impression:   Final diagnoses:  [N50.811] Right testicular pain          ED Disposition Condition    Discharge Stable        ED Prescriptions     Medication Sig Dispense Start Date End Date Auth. Provider    doxycycline (VIBRAMYCIN) 100 MG Cap Take 1 capsule (100 mg total) by mouth 2 (two) times daily. for 10 days 20 capsule 4/5/2022 4/15/2022 Tameka Davenport MD        Follow-up Information     Follow up With  Specialties Details Why Contact Info    Magdalena Cohn MD Pediatrics Schedule an appointment as soon as possible for a visit   [address] [facility]  [address]  [facility]  [facility] [address] [phone]      [facility] - Emergency Dept Emergency Medicine  If symptoms worsen [address]  [facility] [address] [phone]           Tameka Davenport MD  04/05/22 0557

## 2022-04-05 NOTE — ED NOTES
Bed: INT 02  Expected date: 4/5/22  Expected time: 12:30 AM  Means of arrival:   Comments:  housekeeping

## 2022-04-06 ENCOUNTER — TELEPHONE (OUTPATIENT)
Dept: NEUROLOGY | Facility: CLINIC | Age: 42
End: 2022-04-06
Payer: MEDICARE

## 2022-04-06 NOTE — TELEPHONE ENCOUNTER
Called and spoke with Dylon and scheduled her for a follow up appointment. Appointment scheduled and placed on the wait list. Appointment letter placed in the mail.

## 2022-04-06 NOTE — TELEPHONE ENCOUNTER
----- Message from Miryam Torrez sent at 4/6/2022 10:23 AM CDT -----  Patient Call Back    Who Called: PT     What is the request in detail: Pt calling to speak with someone regarding the scheduling of an appointment. The pt stated that they only want to see Dr. Urias and would like the first available.pls advise    Can the clinic reply by [facility]?    Best Call Back Number: [phone]

## 2022-04-07 ENCOUNTER — PATIENT MESSAGE (OUTPATIENT)
Dept: UROLOGY | Facility: CLINIC | Age: 42
End: 2022-04-07
Payer: MEDICARE

## 2022-04-07 ENCOUNTER — TELEPHONE (OUTPATIENT)
Dept: UROLOGY | Facility: CLINIC | Age: 42
End: 2022-04-07
Payer: MEDICARE

## 2022-04-07 NOTE — TELEPHONE ENCOUNTER
----- Message from Naomi Ludwig sent at 4/7/2022  1:13 PM CDT -----  Contact: pt  Type: Needs Medical Advice    Who Called:  PT  Best Call Back Number: [phone]    Additional Information: Requesting a call back regarding wanted to schedule an appt with doctor to discuss Orchiectomy and if this doctor sees for that or where would pt need to go   Please Advise ---Thank you

## 2022-04-07 NOTE — TELEPHONE ENCOUNTER
[facility] along with [facility] psyche eval and will fax it to  to schedule a consult for orchiectomy.

## 2022-04-10 ENCOUNTER — HOSPITAL ENCOUNTER (EMERGENCY)
Facility: HOSPITAL | Age: 42
Discharge: HOME | End: 2022-04-10
Attending: EMERGENCY MEDICINE
Payer: MEDICARE

## 2022-04-10 VITALS
DIASTOLIC BLOOD PRESSURE: 68 MMHG | OXYGEN SATURATION: 98 % | WEIGHT: 170 LBS | BODY MASS INDEX: 23.03 KG/M2 | SYSTOLIC BLOOD PRESSURE: 114 MMHG | RESPIRATION RATE: 16 BRPM | HEIGHT: 72 IN | TEMPERATURE: 98 F | HEART RATE: 74 BPM

## 2022-04-10 DIAGNOSIS — G25.3 MYOCLONIC JERKING: ICD-10-CM

## 2022-04-10 DIAGNOSIS — G43.809 OTHER MIGRAINE WITHOUT STATUS MIGRAINOSUS, NOT INTRACTABLE: Primary | ICD-10-CM

## 2022-04-10 PROCEDURE — 25000003 PHARM REV CODE 250: Performed by: EMERGENCY MEDICINE

## 2022-04-10 PROCEDURE — 99284 PR EMERGENCY DEPT VISIT,LEVEL IV: ICD-10-PCS | Mod: ,,, | Performed by: EMERGENCY MEDICINE

## 2022-04-10 PROCEDURE — 99283 EMERGENCY DEPT VISIT LOW MDM: CPT

## 2022-04-10 PROCEDURE — 99284 EMERGENCY DEPT VISIT MOD MDM: CPT | Mod: ,,, | Performed by: EMERGENCY MEDICINE

## 2022-04-10 RX ORDER — ONDANSETRON 4 MG/1
4 TABLET, ORALLY DISINTEGRATING ORAL
Status: COMPLETED | OUTPATIENT
Start: 2022-04-10 | End: 2022-04-10

## 2022-04-10 RX ORDER — KETOROLAC TROMETHAMINE 10 MG/1
10 TABLET, FILM COATED ORAL
Status: COMPLETED | OUTPATIENT
Start: 2022-04-10 | End: 2022-04-10

## 2022-04-10 RX ORDER — LORAZEPAM 1 MG/1
1 TABLET ORAL
Status: COMPLETED | OUTPATIENT
Start: 2022-04-10 | End: 2022-04-10

## 2022-04-10 RX ADMIN — KETOROLAC TROMETHAMINE 10 MG: 10 TABLET, FILM COATED ORAL at 03:04

## 2022-04-10 RX ADMIN — ONDANSETRON 4 MG: 4 TABLET, ORALLY DISINTEGRATING ORAL at 03:04

## 2022-04-10 RX ADMIN — LORAZEPAM 1 MG: 1 TABLET ORAL at 03:04

## 2022-04-10 NOTE — ED PROVIDER NOTES
Encounter Date: 4/10/2022    SCRIBE #1 NOTE: I, Elena Sutton, am scribing for, and in the presence of,  Giovani Vences MD. I have scribed the following portions of the note -       History     Chief Complaint   Patient presents with    Migraine     Started yest     Gordon Griffin is a 41 y.o. male with migraine onset one day ago. He notes he gets them often. He took compazine 3 hours ago and  fluorescent 6 hours ago. He has a functional movement disorder in which he is complaint with Robaxin and Flexeril. Patient reports no other identifying, alleviating, or exacerbating factors and denies any other associated symptoms at this time.            The history is provided by the patient and medical records. No  was used.     Review of patient's allergies indicates:   Allergen Reactions    Mustard Itching, Nausea And Vomiting, Shortness Of Breath and Swelling    Lipitor [atorvastatin] Itching    Mushroom Itching, Nausea And Vomiting and Swelling    Niaspan extended-release [niacin] Itching    Nystatin Hives     Other reaction(s): hives    Olive extract Itching, Nausea And Vomiting and Swelling    Oyster extract     Extendryl [alowtrihksvgferh-sd-bqxhjnebqo] Rash    V-cillin k Rash     Past Medical History:   Diagnosis Date    Anxiety     Cancer     Chest pain 1/20/2016 12/30/2015: Began experinece chest pain.    Depression     Functional movement disorder 10/1/2019    Migraine headache     Movement disorder     Myoclonic jerkings, massive     Stroke pt. states he had a cva at 3 months old     Past Surgical History:   Procedure Laterality Date    ANGIOGRAM, CORONARY, WITH LEFT HEART CATHETERIZATION      EPIDURAL STEROID INJECTION N/A 3/26/2021    Procedure: INJECTION, STEROID, EPIDURAL L4/5;  Surgeon: Larry Brasher MD;  Location: [facility]IN [facility]GT;  Service: Pain Management;  Laterality: N/A;    EPIDURAL STEROID INJECTION N/A 6/4/2021    Procedure: INJECTION, STEROID,  EPIDURAL, L4-L5 IL need consent;  Surgeon: Larry Brasher MD;  Location: [facility] PAIN MGT;  Service: Pain Management;  Laterality: N/A;    EPIDURAL STEROID INJECTION N/A 10/29/2021    Procedure: INJECTION, STEROID, EPIDURAL, L4-L5IL NEED CONSENT;  Surgeon: Larry Brasher MD;  Location: [facility] PAIN MGT;  Service: Pain Management;  Laterality: N/A;    EPIDURAL STEROID INJECTION N/A 1/27/2022    Procedure: Injection, Steroid, Epidural C7/T1;  Surgeon: Larry Brasher MD;  Location: [facility] PAIN [facility]GT;  Service: Pain Management;  Laterality: N/A;    EPIDURAL STEROID INJECTION N/A 2/10/2022    Procedure: Injection, Steroid, Epidural L4/5;  Surgeon: Larry Brasher MD;  Location: [facility] PAIN [facility]GT;  Service: Pain Management;  Laterality: N/A;    MANDIBLE SURGERY      reconstruction    variceol repair       Family History   Problem Relation Age of Onset    Heart disease Father     Hypertension Father     Hyperlipidemia Father     Heart disease Paternal Uncle     Heart disease Mother     Myasthenia gravis Mother      Social History     Tobacco Use    Smoking status: Never Smoker    Smokeless tobacco: Never Used   Substance Use Topics    Alcohol use: No    Drug use: No     Review of Systems   Constitutional: Negative for chills and fever.   HENT: Negative for sore throat.    Respiratory: Negative for shortness of breath.    Cardiovascular: Negative for chest pain.   Gastrointestinal: Negative for abdominal pain.   Genitourinary: Negative for dysuria.   Musculoskeletal: Negative for back pain.   Skin: Negative for rash.   Neurological: Positive for headaches. Negative for weakness.   Hematological: Does not bruise/bleed easily.       Physical Exam     Initial Vitals   BP Pulse Resp Temp SpO2   04/10/22 1451 04/10/22 1451 04/10/22 1450 04/10/22 1451 04/10/22 1451   119/75 73 18 98.1 °F (36.7 °C) 97 %      MAP       --                Physical Exam    Nursing note and vitals reviewed.  Constitutional: He appears  well-developed and well-nourished. He is not diaphoretic. No distress.   HENT:   Head: Normocephalic and atraumatic.   Right Ear: External ear normal.   Left Ear: External ear normal.   Eyes: Conjunctivae and EOM are normal. Pupils are equal, round, and reactive to light.   Neck: No tracheal deviation present.   Normal range of motion.  Cardiovascular: Normal rate, regular rhythm, normal heart sounds and intact distal pulses. Exam reveals no gallop and no friction rub.    No murmur heard.  Pulmonary/Chest: Breath sounds normal. No respiratory distress. He has no wheezes. He has no rhonchi. He has no rales. He exhibits no tenderness.   Musculoskeletal:         General: Normal range of motion.      Cervical back: Normal range of motion.     Neurological: He is alert and oriented to person, place, and time. He has normal strength and normal reflexes. He displays normal reflexes. No cranial nerve deficit or sensory deficit. GCS score is 15. GCS eye subscore is 4. GCS verbal subscore is 5. GCS motor subscore is 6.   Intermittent, myoclonic jerking   Skin: Skin is warm and dry. Capillary refill takes less than 2 seconds.   Psychiatric: He has a normal mood and affect. Thought content normal.         ED Course   Procedures  Labs Reviewed - No data to display       Imaging Results    None          Medications   LORazepam tablet 1 mg (1 mg Oral Given 4/10/22 1539)   ketorolac tablet 10 mg (10 mg Oral Given 4/10/22 1539)   ondansetron disintegrating tablet 4 mg (4 mg Oral Given 4/10/22 1539)     Medical Decision Making:   History:   Old Medical Records: I decided to obtain old medical records.  Differential Diagnosis:   Migraine, tension headache, sinus headache, cluster headache, trigeminal neuralgia  ED Management:  GCS is 15, neurologically intact with improvement of headache so will discharged home.  Patient discharged home in stable condition. Diagnosis and treatment plan explained to patient. No further workup indicated  based on their complaints or examination today. Discussed results with the patient. I educated the patient/guardian on the warning signs and symptoms for which they must seek immediate medical attention. All questions addressed and patient/guardian were given discharge instructions and followup information.           Scribe Attestation:   Scribe #1: I performed the above scribed service and the documentation accurately describes the services I performed. I attest to the accuracy of the note.        ED Course as of 04/10/22 1630   Sun Apr 10, 2022   1528 Patient requested that we avoid inserting an IV to give him medicines in order to prevent further myoclonis.  Patient reports that he is able to tolerate p.o.. [MA]   1628 Patient reports that headache is significantly improved.  Down to 3/10.  My clonus is also improved. [MA]      ED Course User Index  [MA] Giovani Vences MD             Clinical Impression:   Final diagnoses:  [G43.809] Other migraine without status migrainosus, not intractable (Primary)  [G25.3] Myoclonic jerking          ED Disposition Condition    Discharge Stable        ED Prescriptions     None        Follow-up Information     Follow up With Specialties Details Why Contact Info    Magdalena Cohn MD Pediatrics Schedule an appointment as soon as possible for a visit in 3 days For follow-up and re-evaluation [address]  [address]  [facility]  [facility] [address] [phone]      [facility] - Emergency Dept Emergency Medicine  As needed, for any new or worsening symptoms [facility] [facility] [address] [phone]    Your Neurologist  Schedule an appointment as soon as possible for a visit in 3 days For follow-up and re-evaluation of Migraines            Giovani Vences MD  04/10/22 1630

## 2022-04-10 NOTE — Clinical Note
"Gordon Davies" Elias was seen and treated in our emergency department on 4/10/2022.  He may return to work on 04/12/2022.       If you have any questions or concerns, please don't hesitate to call.      Giovani Vences MD"

## 2022-04-10 NOTE — ED NOTES
Pt identifiers Gordon Griffin were checked and are correct  LOC: The patient is awake, alert, aware of environment with an appropriate affect. Oriented x4, speaking appropriately  APPEARANCE: Pt rates his headache an 8/10 , in no acute distress, pt is clean and well groomed, clothing properly fastened  SKIN: Skin warm, dry and intact, normal skin turgor, moist mucus membranes  RESPIRATORY: Airway is open and patent, respirations are spontaneous, even and unlabored, normal effort and rate  CARDIAC: Normal rate and rhythm, no peripheral edema noted, capillary refill < 3 seconds, bilateral radial pulses 2+  ABDOMEN: Soft, nontender, nondistended  NEUROLOGIC: PERRL, facial expression is symmetrical, patient moving all extremities spontaneously, normal sensation in all extremities when touched with a finger.  Follows all commands appropriately  MUSCULOSKELETAL: No obvious deformities.

## 2022-04-20 ENCOUNTER — PATIENT MESSAGE (OUTPATIENT)
Dept: PAIN MEDICINE | Facility: CLINIC | Age: 42
End: 2022-04-20
Payer: MEDICARE

## 2022-04-20 ENCOUNTER — PATIENT MESSAGE (OUTPATIENT)
Dept: PAIN MEDICINE | Facility: OTHER | Age: 42
End: 2022-04-20
Payer: MEDICARE

## 2022-05-04 ENCOUNTER — PATIENT MESSAGE (OUTPATIENT)
Dept: PAIN MEDICINE | Facility: OTHER | Age: 42
End: 2022-05-04
Payer: MEDICARE

## 2022-05-04 ENCOUNTER — TELEPHONE (OUTPATIENT)
Dept: PAIN MEDICINE | Facility: OTHER | Age: 42
End: 2022-05-04
Payer: MEDICARE

## 2022-05-04 NOTE — TELEPHONE ENCOUNTER
----- Message from Larry Brasher MD sent at 5/4/2022 12:32 PM CDT -----  Regarding: RE: +clearance to take med  We will give the medication at the procedure area. Don't take any medication yourself      ----- Message -----  From: Kena Rob LPN  Sent: 5/4/2022  11:45 AM CDT  To: Larry Brasher MD  Subject: +clearance to take med                           Good Afternoon,        Patient listed above is having procedure on  05/06/2022. Patient wants to know if he can take Vallum before procedure and if so how many hours before? Please Advise           Thank You,  Kena Rob LPN

## 2022-05-05 ENCOUNTER — OFFICE VISIT (OUTPATIENT)
Dept: NEUROLOGY | Facility: CLINIC | Age: 42
End: 2022-05-05
Payer: COMMERCIAL

## 2022-05-05 ENCOUNTER — PATIENT MESSAGE (OUTPATIENT)
Dept: NEUROLOGY | Facility: CLINIC | Age: 42
End: 2022-05-05

## 2022-05-05 ENCOUNTER — LAB VISIT (OUTPATIENT)
Dept: LAB | Facility: HOSPITAL | Age: 42
End: 2022-05-05
Attending: PSYCHIATRY & NEUROLOGY
Payer: MEDICARE

## 2022-05-05 VITALS
BODY MASS INDEX: 20.38 KG/M2 | WEIGHT: 150.25 LBS | SYSTOLIC BLOOD PRESSURE: 128 MMHG | DIASTOLIC BLOOD PRESSURE: 90 MMHG | HEART RATE: 69 BPM

## 2022-05-05 DIAGNOSIS — R26.9 ABNORMALITY OF GAIT AND MOBILITY: ICD-10-CM

## 2022-05-05 DIAGNOSIS — R25.3 JERKING: Primary | ICD-10-CM

## 2022-05-05 DIAGNOSIS — R25.3 JERKING: ICD-10-CM

## 2022-05-05 PROCEDURE — 86255 FLUORESCENT ANTIBODY SCREEN: CPT | Mod: 59 | Performed by: PSYCHIATRY & NEUROLOGY

## 2022-05-05 PROCEDURE — 99999 PR PBB SHADOW E&M-EST. PATIENT-LVL IV: CPT | Mod: PBBFAC,,, | Performed by: PSYCHIATRY & NEUROLOGY

## 2022-05-05 PROCEDURE — 3008F BODY MASS INDEX DOCD: CPT | Mod: CPTII,S$GLB,, | Performed by: PSYCHIATRY & NEUROLOGY

## 2022-05-05 PROCEDURE — 36415 COLL VENOUS BLD VENIPUNCTURE: CPT | Performed by: PSYCHIATRY & NEUROLOGY

## 2022-05-05 PROCEDURE — 3074F SYST BP LT 130 MM HG: CPT | Mod: CPTII,S$GLB,, | Performed by: PSYCHIATRY & NEUROLOGY

## 2022-05-05 PROCEDURE — 3080F PR MOST RECENT DIASTOLIC BLOOD PRESSURE >= 90 MM HG: ICD-10-PCS | Mod: CPTII,S$GLB,, | Performed by: PSYCHIATRY & NEUROLOGY

## 2022-05-05 PROCEDURE — 3008F PR BODY MASS INDEX (BMI) DOCUMENTED: ICD-10-PCS | Mod: CPTII,S$GLB,, | Performed by: PSYCHIATRY & NEUROLOGY

## 2022-05-05 PROCEDURE — 3080F DIAST BP >= 90 MM HG: CPT | Mod: CPTII,S$GLB,, | Performed by: PSYCHIATRY & NEUROLOGY

## 2022-05-05 PROCEDURE — 99214 PR OFFICE/OUTPT VISIT, EST, LEVL IV, 30-39 MIN: ICD-10-PCS | Mod: S$GLB,,, | Performed by: PSYCHIATRY & NEUROLOGY

## 2022-05-05 PROCEDURE — 99214 OFFICE O/P EST MOD 30 MIN: CPT | Mod: S$GLB,,, | Performed by: PSYCHIATRY & NEUROLOGY

## 2022-05-05 PROCEDURE — 99999 PR PBB SHADOW E&M-EST. PATIENT-LVL IV: ICD-10-PCS | Mod: PBBFAC,,, | Performed by: PSYCHIATRY & NEUROLOGY

## 2022-05-05 PROCEDURE — 3074F PR MOST RECENT SYSTOLIC BLOOD PRESSURE < 130 MM HG: ICD-10-PCS | Mod: CPTII,S$GLB,, | Performed by: PSYCHIATRY & NEUROLOGY

## 2022-05-05 RX ORDER — BENZTROPINE MESYLATE 0.5 MG/1
0.5 TABLET ORAL DAILY
Qty: 60 TABLET | Refills: 12 | Status: SHIPPED | OUTPATIENT
Start: 2022-05-05 | End: 2022-08-04
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
## 2022-05-06 ENCOUNTER — HOSPITAL ENCOUNTER (OUTPATIENT)
Facility: OTHER | Age: 42
Discharge: HOME | End: 2022-05-06
Attending: ANESTHESIOLOGY | Admitting: ANESTHESIOLOGY
Payer: MEDICARE

## 2022-05-06 VITALS
BODY MASS INDEX: 23.03 KG/M2 | TEMPERATURE: 98 F | RESPIRATION RATE: 14 BRPM | HEART RATE: 67 BPM | HEIGHT: 72 IN | WEIGHT: 170 LBS | DIASTOLIC BLOOD PRESSURE: 69 MMHG | SYSTOLIC BLOOD PRESSURE: 113 MMHG | OXYGEN SATURATION: 99 %

## 2022-05-06 DIAGNOSIS — M47.816 SPONDYLOSIS WITHOUT MYELOPATHY OR RADICULOPATHY, LUMBAR REGION: Primary | ICD-10-CM

## 2022-05-06 PROCEDURE — 64494 INJ PARAVERT F JNT L/S 2 LEV: CPT | Mod: 50,KX | Performed by: ANESTHESIOLOGY

## 2022-05-06 PROCEDURE — 64493 INJ PARAVERT F JNT L/S 1 LEV: CPT | Mod: 50,KX | Performed by: ANESTHESIOLOGY

## 2022-05-06 PROCEDURE — 25500020 PHARM REV CODE 255: Performed by: ANESTHESIOLOGY

## 2022-05-06 PROCEDURE — 64493 INJ PARAVERT F JNT L/S 1 LEV: CPT | Mod: 50,KX,, | Performed by: ANESTHESIOLOGY

## 2022-05-06 PROCEDURE — 64494 PR INJ DX/THER AGNT PARAVERT FACET JOINT,IMG GUIDE,LUMBAR/SAC, 2ND LEVEL: ICD-10-PCS | Mod: 50,KX,, | Performed by: ANESTHESIOLOGY

## 2022-05-06 PROCEDURE — 64494 INJ PARAVERT F JNT L/S 2 LEV: CPT | Mod: 50,KX,, | Performed by: ANESTHESIOLOGY

## 2022-05-06 PROCEDURE — 64493 PR INJ DX/THER AGNT PARAVERT FACET JOINT,IMG GUIDE,LUMBAR/SAC,1ST LVL: ICD-10-PCS | Mod: 50,KX,, | Performed by: ANESTHESIOLOGY

## 2022-05-06 PROCEDURE — 25000003 PHARM REV CODE 250: Performed by: ANESTHESIOLOGY

## 2022-05-06 RX ORDER — LIDOCAINE HYDROCHLORIDE 20 MG/ML
INJECTION, SOLUTION INFILTRATION; PERINEURAL
Status: DISCONTINUED | OUTPATIENT
Start: 2022-05-06 | End: 2022-05-06 | Stop reason: HOSPADM

## 2022-05-06 RX ORDER — SODIUM CHLORIDE 9 MG/ML
500 INJECTION, SOLUTION INTRAVENOUS CONTINUOUS
Status: DISCONTINUED | OUTPATIENT
Start: 2022-05-06 | End: 2022-05-06 | Stop reason: HOSPADM

## 2022-05-06 RX ORDER — ALPRAZOLAM 0.5 MG/1
1 TABLET ORAL ONCE
Status: COMPLETED | OUTPATIENT
Start: 2022-05-06 | End: 2022-05-06

## 2022-05-06 RX ORDER — BUPIVACAINE HYDROCHLORIDE 2.5 MG/ML
INJECTION, SOLUTION EPIDURAL; INFILTRATION; INTRACAUDAL
Status: DISCONTINUED | OUTPATIENT
Start: 2022-05-06 | End: 2022-05-06 | Stop reason: HOSPADM

## 2022-05-06 RX ADMIN — ALPRAZOLAM 1 MG: 0.5 TABLET ORAL at 10:05

## 2022-05-06 NOTE — H&P
HPI  Patient presenting for Procedure(s) (LRB):  BLOCK, NERVE, BILATERAL L3-L4-*L5 MEDIAL BRANCH (Bilateral)     Patient on Anti-coagulation No    No health changes since previous encounter    Past Medical History:   Diagnosis Date    Anxiety     Cancer     Chest pain 1/20/2016 12/30/2015: Began experinece chest pain.    Depression     Functional movement disorder 10/1/2019    Migraine headache     Movement disorder     Myoclonic jerkings, massive     Stroke pt. states he had a cva at 3 months old     Past Surgical History:   Procedure Laterality Date    ANGIOGRAM, CORONARY, WITH LEFT HEART CATHETERIZATION      EPIDURAL STEROID INJECTION N/A 3/26/2021    Procedure: INJECTION, STEROID, EPIDURAL L4/5;  Surgeon: Larry Brasher MD;  Location: [facility];  Service: Pain Management;  Laterality: N/A;    EPIDURAL STEROID INJECTION N/A 6/4/2021    Procedure: INJECTION, STEROID, EPIDURAL, L4-L5 IL need consent;  Surgeon: Larry Brasher MD;  Location: [facility];  Service: Pain Management;  Laterality: N/A;    EPIDURAL STEROID INJECTION N/A 10/29/2021    Procedure: INJECTION, STEROID, EPIDURAL, L4-L5IL NEED CONSENT;  Surgeon: Larry Brasher MD;  Location: [facility];  Service: Pain Management;  Laterality: N/A;    EPIDURAL STEROID INJECTION N/A 1/27/2022    Procedure: Injection, Steroid, Epidural C7/T1;  Surgeon: Larry Brasher MD;  Location: [facility];  Service: Pain Management;  Laterality: N/A;    EPIDURAL STEROID INJECTION N/A 2/10/2022    Procedure: Injection, Steroid, Epidural L4/5;  Surgeon: Larry Brasher MD;  Location: [facility];  Service: Pain Management;  Laterality: N/A;    MANDIBLE SURGERY      reconstruction    variceol repair       Review of patient's allergies indicates:   Allergen Reactions    Mustard Itching, Nausea And Vomiting, Shortness Of Breath and Swelling    Lipitor [atorvastatin] Itching    Mushroom Itching, Nausea And Vomiting and Swelling     Niaspan extended-release [niacin] Itching    Nystatin Hives     Other reaction(s): hives    Olive extract Itching, Nausea And Vomiting and Swelling    Oyster extract     Extendryl [[id number]] Rash    V-cillin k Rash      No current facility-administered medications for this encounter.       PMHx, PSHx, Allergies, Medications reviewed in epic    ROS negative except pain complaints in HPI    OBJECTIVE:    There were no vitals taken for this visit.    PHYSICAL EXAMINATION:    GENERAL: Well appearing, in no acute distress, alert and oriented x3.  PSYCH:  Mood and affect appropriate.  SKIN: Skin color, texture, turgor normal, no rashes or lesions which will impact the procedure.  CV: RRR with palpation of the radial artery.  PULM: No evidence of respiratory difficulty, symmetric chest rise. Clear to auscultation.  NEURO: Cranial nerves grossly intact.    Plan:    Proceed with procedure as planned Procedure(s) (LRB):  BLOCK, NERVE, BILATERAL L3-L4-*L5 MEDIAL BRANCH (Bilateral)    Lei Crain  05/06/2022

## 2022-05-06 NOTE — DISCHARGE SUMMARY
Discharge Note  Short Stay      SUMMARY     Admit Date: 5/6/2022    Attending Physician: Larry Brasher      Discharge Physician: Larry Brasher      Discharge Date: 5/6/2022 10:31 AM    Procedure(s) (LRB):  BLOCK, NERVE, BILATERAL L3-L4-*L5 MEDIAL BRANCH (Bilateral)    Final Diagnosis: Lumbar spondylosis [M47.816]    Disposition: Home or self care    Patient Instructions:   Current Discharge Medication List      CONTINUE these medications which have NOT CHANGED    Details   aspirin 81 MG Chew Take 81 mg by mouth once daily.      azelastine (ASTELIN) 137 mcg (0.1 %) nasal spray USE 1 TO 2 SPRAYS IN EACH NOSTRIL TWICE DAILY FOR CONGESTION      baclofen (LIORESAL) 20 MG tablet Take 1 tablet by mouth 3 (three) times daily as needed.       benztropine (COGENTIN) 0.5 MG tablet Take 1 tablet (0.5 mg total) by mouth once daily.  Qty: 60 tablet, Refills: 12      butalbital-acetaminophen-caffeine -40 mg (FIORICET, ESGIC) -40 mg per tablet Take 1 tablet by mouth every 4 (four) hours as needed.      butorphanol (STADOL) 10 mg/mL nasal spray 1 spray by Nasal route every 4 (four) hours as needed for Pain.      cloNIDine (CATAPRES) 0.1 MG tablet TAKE 1 TABLET(0.1 MG) BY MOUTH TWICE DAILY  Qty: 60 tablet, Refills: 3    Comments: ZERO refills remain on this prescription. Your patient is requesting advance approval of refills for this medication to PREVENT ANY MISSED DOSES  Associated Diagnoses: Tic      cyclobenzaprine (FLEXERIL) 10 MG tablet TK 1 T PO Q 8 H PRF PAIN      diazePAM (VALIUM) 2 MG tablet Take 2 mg by mouth 2 (two) times daily as needed.      erenumab-aooe (AIMOVIG AUTOINJECTOR SUBQ) Inject into the skin.      EScitalopram oxalate (LEXAPRO) 20 MG tablet Take 20 mg by mouth once daily.      estradioL (ESTRACE) 2 MG tablet       estradiol 0.1 mg/24 hr td ptwk 0.1 mg/24 hr PTWK       FLUCELVAX QUAD [id number], PF, 60 mcg (15 mcg x 4)/0.5 mL Syrg vaccine ADM 0.5ML IM UTD  Refills: 0      fluticasone (FLONASE)  50 mcg/actuation nasal spray SPRAY TWICE IEN QD  Refills: 5      gabapentin (NEURONTIN) 300 MG capsule Take 1 capsule (300 mg total) by mouth 3 (three) times daily.  Qty: 90 capsule, Refills: 2    Associated Diagnoses: Lumbar radiculopathy      hydrOXYzine pamoate (VISTARIL) 50 MG Cap hydroxyzine pamoate 50 mg capsule   TK 1 TO 2 CS PO HS PRN      ketorolac (TORADOL) 10 mg tablet ketorolac 10 mg tablet      levETIRAcetam (KEPPRA) 1000 MG tablet Take 1,000 mg by mouth 2 (two) times daily.      methocarbamoL (ROBAXIN) 500 MG Tab methocarbamol 500 mg tablet      metoclopramide HCl (REGLAN) 10 MG tablet 10 mg.      moxifloxacin (AVELOX) 400 mg tablet       NARCAN 4 mg/actuation Spry SPRAY 0.1ML IN 1 NOSTRIL MAY REPEAT DOSE EVERY 2-3 MINUTES AS NEEDED ALTERNATING NOSTRILS EACH DOSE  Qty: 1 each, Refills: 3    Associated Diagnoses: Chronic pain disorder; Lumbar radiculopathy      nitroGLYCERIN (NITROSTAT) 0.4 MG SL tablet Place 1 tablet (0.4 mg total) under the tongue every 5 (five) minutes as needed for Chest pain.  Qty: 90 tablet, Refills: 3      nitroGLYCERIN 0.1 mg/hr TD PT24 (NITRODUR) 0.1 mg/hr PT24 Nitroglycerin 0.4 mg Sublingual Tab      NURTEC 75 mg odt Take by mouth.      ondansetron (ZOFRAN) 4 MG tablet Take 1 tablet (4 mg total) by mouth every 6 (six) hours as needed for Nausea.  Qty: 12 tablet, Refills: 0      ondansetron (ZOFRAN-ODT) 8 MG TbDL Take 8 mg by mouth 2 (two) times daily.      oxybutynin (DITROPAN-XL) 10 MG 24 hr tablet Take 1 tablet (10 mg total) by mouth once daily.  Qty: 30 tablet, Refills: 11      prochlorperazine (COMPAZINE) 10 MG tablet Take 10 mg by mouth 3 (three) times daily.      propranoloL (INDERAL LA) 60 MG 24 hr capsule Take 60 mg by mouth every evening.      rosuvastatin (CRESTOR) 20 MG tablet 1 capsule      spironolactone (ALDACTONE) 25 MG tablet Take 1 tablet (25 mg total) by mouth once daily.  Qty: 30 tablet, Refills: 3      tamsulosin (FLOMAX) 0.4 mg Cap Take 1 capsule (0.4 mg  total) by mouth once daily.  Qty: 30 capsule, Refills: 11      !! traZODone (DESYREL) 100 MG tablet       !! traZODone (DESYREL) 50 MG tablet       verapamiL (VERELAN) 240 MG C24P verapamil  mg 24 hr capsule,extended release   TK ONE C PO  ONCE D       !! - Potential duplicate medications found. Please discuss with provider.              Discharge Diagnosis: Lumbar spondylosis [M47.816]  Condition on Discharge: Stable with no complications to procedure   Diet on Discharge: Same as before.  Activity: as per instruction sheet.  Discharge to: Home with a responsible adult.  Follow up: 2-4 weeks

## 2022-05-06 NOTE — OP NOTE
Diagnostic Lumbar Medial Branch Block Under Fluoroscopy    The procedure, risks, benefits, and options were discussed with the patient. There are no contraindications to the procedure. The patent expressed understanding and agreed to the procedure. Informed written consent was obtained prior to the start of the procedure and can be found in the patient's chart.    PATIENT NAME: Mr. Gordon Griffin   MRN: [id number]     DATE OF PROCEDURE: 05/06/2022                                           PROCEDURE:  Diagnostic Bilateral L3, L4 and L5 Lumbar Medial Branch Block under Fluoroscopy    PRE-OP DIAGNOSIS: Lumbar spondylosis [M47.816] Lumbar spondylosis [M47.816]    POST-OP DIAGNOSIS: Same    PHYSICIAN: Larry Brasher MD    ASSISTANTS: Dr. Crain    MEDICATIONS INJECTED:  Bupivicaine 0.25%    LOCAL ANESTHETIC INJECTED:   Xylocaine 2%    SEDATION: None    ESTIMATED BLOOD LOSS:  None    COMPLICATIONS:  None.    INTERVAL HISTORY: Patient has clinical and imaging findings suggestive of facet mediated pain.    TECHNIQUE: Time-out was performed to identify the patient and procedure to be performed. With the patient laying in a prone position, the surgical area was prepped and draped in the usual sterile fashion using ChloraPrep and fenestrated drape. The levels were determined under fluoroscopic guidance. Skin anesthesia was achieved by injecting Lidocaine 2% over the injection sites. A 25 gauge, 3.5 inch needle was introduced into the medial branch nerves at the junctions of the superior articular process and the transverse processes of the targeted sites using AP, lateral and/or contralateral oblique fluoroscopic imaging. After negative aspiration for blood or CSF was confirmed, 1 mL of the anesthetic listed above was then slowly injected at each site. The needles were removed and bleeding was nil. A sterile dressing was applied. No specimens collected. The patient tolerated the procedure well.     The patient was monitored after  the procedure in the recovery area. They were given post-procedure and discharge instructions to follow at home. The patient was discharged in a stable condition.    I reviewed and edited the fellow's note. I conducted my own interview and physical examination. I agree with the findings. I was present and supervising all critical portions of the procedure.    Larry Brasher MD

## 2022-05-06 NOTE — DISCHARGE INSTRUCTIONS
Thank you for allowing us to care for you today. You may receive a survey about the care we provided. Your feedback is valuable and helps us provide excellent care throughout the community.     Home Care Instructions for Pain Management:    1. DIET:   You may resume your normal diet today.   2. BATHING:   You may shower with luke warm water. No tub baths or anything that will soak injection sites under water for the next 24 hours.  3. DRESSING:   You may remove your bandage today.   4. ACTIVITY LEVEL:   You may resume your normal activities 24 hrs after your procedure. Nothing strenuous today.  5. MEDICATIONS:   You may resume your normal medications today. To restart blood thinners, ask your doctor.  6. DRIVING    If you have received any sedatives by mouth today, you may not drive for 12 hours.    If you have received any sedation through your IV, you may not drive for 24 hrs.   7. SPECIAL INSTRUCTIONS:   No heat to the injection site for 24 hrs including, hot bath or shower, heating pad, moist heat, or hot tubs.    Use ice pack to injection site for any pain or discomfort.  Apply ice packs for 20 minute intervals as needed.    IF you have diabetes, be sure to monitor your blood sugar more closely. IF your injection contained steroids your blood sugar levels may become higher than normal.    If you are still having pain upon discharge:  Your pain may improve over the next 48 hours. The anesthetic (numbing medication) works immediately to 48 hours. IF your injection contained a steroid (anti-inflammatory medication), it takes approximately 3 days to start feeling relief and 7-10 days to see your greatest results from the medication. It is possible you may need subsequent injections. This would be discussed at your follow up appointment with pain management or your referring doctor.    Please call the PAIN MANAGEMENT office at [phone] or ON CALL pager at [phone] if you experienced any:   -Weakness or  loss of sensation  -Fever > 101.5  -Pain uncontrolled with oral medications   -Persistent nausea, vomiting, or diarrhea  -Redness or drainage from the injection sites, or any other worrisome concerns.   If physician on call was not reached or could not communicate with our office for any reason please go to the nearest emergency department.   Lumbar/Cervical/Joint Medial Branch Block Pain Diary    Patient Name:  :    Pre-procedure Pain Score: _____/10    Time of injection:     Please fill out the chart below to the best of your ability. We need to know how much percentage of relief in your pain that you have while the numbing medication is active. Please be mindful that this is a diagnostic test and may not last for a long time. If you are asleep during one of the times listed below, you do not have to record that time.     Time After the   Procedure % of pain relief   achieved Pain Score (0-10)   1 hour post-procedure     2 hours post-procedure     3 hours post-procedure     4 hours post-procedure     5 hours post-procedure     6 hours post-procedure     12 hours post-procedure     24 hours post-procedure       Please make sure to return this form or information to the clinic. This information is needed to proceed with your plan of care. There are several options that you can use to send this back to us.    Form can be faxed to us at [phone]  Call us to provide the information at [phone]  Send the information to us through your [facility]    If you have any questions about completing this diary, please call us at [phone].

## 2022-05-10 ENCOUNTER — TELEPHONE (OUTPATIENT)
Dept: PAIN MEDICINE | Facility: CLINIC | Age: 42
End: 2022-05-10
Payer: MEDICARE

## 2022-05-10 NOTE — TELEPHONE ENCOUNTER
----- Message from Rafael Mast sent at 5/10/2022  9:12 AM CDT -----  Name of Who is Calling: BEL YORK          What is the request in detail: The patient is calling to report his pain diary. Please advise          Can the clinic reply by [facility]: Yes         [facility]: [phone]

## 2022-05-10 NOTE — TELEPHONE ENCOUNTER
Staff contact patient in regards of message    Patient wanted to give pain diary score.     Patient was informed that upon , arrival next week. His scores will be present to MD.     boogie will contact patient in regards of next procedure.    Verbalized understanding

## 2022-05-12 NOTE — ASSESSMENT & PLAN NOTE
Distractible Jerking movements with periodic sustained R leg dystonic contractions.  Concerned for FND which we described in details today  Will f/u MRI brain  Try cogentin for dystonic foot curling events  F/u  neuropsych

## 2022-05-16 ENCOUNTER — TELEPHONE (OUTPATIENT)
Dept: NEUROLOGY | Facility: CLINIC | Age: 42
End: 2022-05-16
Payer: MEDICARE

## 2022-05-16 NOTE — TELEPHONE ENCOUNTER
Called Pt for scheduled 10AM phone intake appt. No answer, left VM. Pt called back shortly after and rescheduled due to migraine.

## 2022-05-18 ENCOUNTER — TELEPHONE (OUTPATIENT)
Dept: PAIN MEDICINE | Facility: CLINIC | Age: 42
End: 2022-05-18
Payer: MEDICARE

## 2022-05-18 DIAGNOSIS — M47.816 LUMBAR SPONDYLOSIS: Primary | ICD-10-CM

## 2022-05-18 NOTE — TELEPHONE ENCOUNTER
----- Message from Lorie Suggs MA sent at 5/18/2022  9:16 AM CDT -----  Regarding: procedure  Good morning Doctor Anshu,    Patient had an procedure with you on 05/06/22 BLOCK, NERVE, BILATERAL L3-L4-#L5 MEDIAL BRANCH    Patient has informed us that his overall relief out 0-100% was 90% and would like to when can he have has next procedure done.    Please assist.    Thank you!

## 2022-05-18 NOTE — TELEPHONE ENCOUNTER
----- Message from Lorie Suggs MA sent at 5/18/2022 10:55 AM CDT -----  Regarding: FW: procedure  Please assist.    Thanks,      ----- Message -----  From: Larry Brasher MD  Sent: 5/18/2022   9:29 AM CDT  To: Lorie Suggs MA  Subject: RE: procedure                                    Please schedule for 2nd Bilateral L3, L4 and L5 Lumbar Medial Branch Block under Fluoroscopy       ----- Message -----  From: Lorie Suggs MA  Sent: 5/18/2022   9:19 AM CDT  To: Larry Barsher MD  Subject: procedure                                        Good morning Doctor Anshu,    Patient had an procedure with you on 05/06/22 BLOCK, NERVE, BILATERAL L3-L4-#L5 MEDIAL BRANCH    Patient has informed us that his overall relief out 0-100% was 90% and would like to when can he have has next procedure done.    Please assist.    Thank you!

## 2022-05-23 ENCOUNTER — OFFICE VISIT (OUTPATIENT)
Dept: NEUROLOGY | Facility: CLINIC | Age: 42
End: 2022-05-23
Payer: MEDICARE

## 2022-05-23 DIAGNOSIS — F44.4 FUNCTIONAL NEUROLOGICAL SYMPTOM DISORDER WITH ABNORMAL MOVEMENT: Primary | ICD-10-CM

## 2022-05-23 PROCEDURE — 99499 UNLISTED E&M SERVICE: CPT | Mod: 95,,, | Performed by: PSYCHIATRY & NEUROLOGY

## 2022-05-23 PROCEDURE — 99499 NO LOS: ICD-10-PCS | Mod: 95,,, | Performed by: PSYCHIATRY & NEUROLOGY

## 2022-05-23 PROCEDURE — 90791 PSYCH DIAGNOSTIC EVALUATION: CPT | Mod: 95,FQ,, | Performed by: PSYCHIATRY & NEUROLOGY

## 2022-05-23 PROCEDURE — 90791 PR PSYCHIATRIC DIAGNOSTIC EVALUATION: ICD-10-PCS | Mod: 95,FQ,, | Performed by: PSYCHIATRY & NEUROLOGY
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
## 2022-05-23 NOTE — PROGRESS NOTES
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  ondansetron (ZOFRAN-ODT) 8 MG TbDL, Take 8 mg by mouth 2 (two) times daily., Disp: , Rfl:     oxybutynin (DITROPAN-XL) 10 MG 24 hr tablet, Take 1 tablet (10 mg total) by mouth once daily., Disp: 30 tablet, Rfl: 11    prochlorperazine (COMPAZINE) 10 MG tablet, Take 10 mg by mouth 3 (three) times daily., Disp: , Rfl:     propranoloL (INDERAL LA) 60 MG 24 hr capsule, Take 60 mg by mouth every evening., Disp: , Rfl:     rosuvastatin (CRESTOR) 20 MG tablet, 1 capsule, Disp: , Rfl:     spironolactone (ALDACTONE) 25 MG tablet, Take 1 tablet (25 mg total) by mouth once daily., Disp: 30 tablet, Rfl: 3    tamsulosin (FLOMAX) 0.4 mg Cap, Take 1 capsule (0.4 mg total) by mouth once daily., Disp: 30 capsule, Rfl: 11    traZODone (DESYREL) 100 MG tablet, , Disp: , Rfl:     traZODone (DESYREL) 50 MG tablet, , Disp: , Rfl:     verapamiL (VERELAN) 240 MG C24P, verapamil  mg 24 hr capsule,extended release  TK ONE C PO  ONCE D, Disp: , Rfl:       PATIENT ACTIVE PROBLEM LIST   Patient Active Problem List   Diagnosis    Dysthymia    Anxiety    Coronary vasospasm    Family history of ischemic heart disease    History of CVA (cerebrovascular accident)    History of progressive weakness     Chronic pain syndrome    Abnormality of gait and mobility    Frequent falls    Functional movement disorder    Attention or concentration deficit    Coronary atherosclerosis due to calcified coronary lesion    Degenerative disc disease, cervical    Lipoprotein deficiency    Thrombocytopenia, unspecified    Migraine without aura and without status migrainosus, not intractable    Neck pain    Other migraine, not intractable, without status migrainosus    Other spondylosis, cervical region    Pure hypercholesterolemia    Male-to-female transgender person    Myoclonus    Hypokalemia    Intention tremor    Muscle spasm    Coronary artery disease involving native coronary artery of native heart without angina pectoris    Chronic pain    Increased frequency of urination    Endocrine disorder in female-to-male transgender person    Intermittent urinary stream    Dystonia    Tic disorder    Functional neurological symptom disorder with abnormal movement        PAST MEDICAL HISTORY   Past Medical History:   Diagnosis Date    Anxiety     Cancer     Chest pain 1/20/2016 12/30/2015: Began experinece chest pain.    Depression     Functional movement disorder 10/1/2019    Migraine headache     Movement disorder     Myoclonic jerkings, massive     Stroke pt. states he had a cva at 3 months old        PAST SURGICAL HISTORY   Past Surgical History:   Procedure Laterality Date    ANGIOGRAM, CORONARY, WITH LEFT HEART CATHETERIZATION      EPIDURAL STEROID INJECTION N/A 3/26/2021    Procedure: INJECTION, STEROID, EPIDURAL L4/5;  Surgeon: Larry Brasher MD;  Location: [facility];  Service: Pain Management;  Laterality: N/A;    EPIDURAL STEROID INJECTION N/A 6/4/2021    Procedure: INJECTION, STEROID, EPIDURAL, L4-L5 IL need consent;  Surgeon: Larry Brasher MD;  Location: [facility]IN [facility]GT;  Service: Pain Management;  Laterality: N/A;    EPIDURAL STEROID INJECTION N/A 10/29/2021    Procedure:  "INJECTION, STEROID, EPIDURAL, L4-L5IL NEED CONSENT;  Surgeon: Larry Brasher MD;  Location: [facility] PAIN MGT;  Service: Pain Management;  Laterality: N/A;    EPIDURAL STEROID INJECTION N/A 1/27/2022    Procedure: Injection, Steroid, Epidural C7/T1;  Surgeon: Larry Brasher MD;  Location: [facility] PAIN [facility]GT;  Service: Pain Management;  Laterality: N/A;    EPIDURAL STEROID INJECTION N/A 2/10/2022    Procedure: Injection, Steroid, Epidural L4/5;  Surgeon: Larry Brasher MD;  Location: [facility] PAIN MGT;  Service: Pain Management;  Laterality: N/A;    INJECTION OF ANESTHETIC AGENT AROUND NERVE Bilateral 5/6/2022    Procedure: BLOCK, NERVE, BILATERAL L3-L4-*L5 MEDIAL BRANCH;  Surgeon: Larry Brasher MD;  Location: [facility] PAIN MGT;  Service: Pain Management;  Laterality: Bilateral;    MANDIBLE SURGERY      reconstruction    variceol repair          MENTAL STATUS AND OBSERVATIONS     Appearance  Not assessed   Alertness/Orientation  Attentive and alert.    Gait  Not assessed   Sensory  Hearing adequate for phone intake   Speech/Language  Normal in rate, rhythm, tone, and volume. No significant word finding difficulty noted. Expressive and receptive language was normal.   Memory  Grossly intact   Mood/Affect  The patient's stated mood was "pretty good." Affect was seemingly congruent with stated mood.    Thought Processes  Thoughts seemed logical and goal-directed.      BILLING     Service Description CPT Code Minutes Units   Psychiatric diagnostic evaluation by physician [phone]   Neurobehavioral status exam by physician [id number]  0   Each additional hour by physician [id number]  0                               "

## 2022-05-27 ENCOUNTER — OFFICE VISIT (OUTPATIENT)
Dept: CARDIOLOGY | Facility: CLINIC | Age: 42
End: 2022-05-27
Payer: MEDICARE

## 2022-05-27 VITALS
WEIGHT: 153 LBS | BODY MASS INDEX: 20.72 KG/M2 | HEIGHT: 72 IN | SYSTOLIC BLOOD PRESSURE: 111 MMHG | DIASTOLIC BLOOD PRESSURE: 73 MMHG | HEART RATE: 70 BPM

## 2022-05-27 DIAGNOSIS — Z82.49 FAMILY HISTORY OF ISCHEMIC HEART DISEASE: ICD-10-CM

## 2022-05-27 DIAGNOSIS — R07.9 CHEST PAIN, UNSPECIFIED TYPE: ICD-10-CM

## 2022-05-27 DIAGNOSIS — I25.10 CORONARY ARTERY DISEASE INVOLVING NATIVE CORONARY ARTERY OF NATIVE HEART WITHOUT ANGINA PECTORIS: Primary | ICD-10-CM

## 2022-05-27 DIAGNOSIS — E78.5 HYPERLIPIDEMIA, UNSPECIFIED HYPERLIPIDEMIA TYPE: ICD-10-CM

## 2022-05-27 PROCEDURE — 99215 OFFICE O/P EST HI 40 MIN: CPT | Mod: S$GLB,,, | Performed by: STUDENT IN AN ORGANIZED HEALTH CARE EDUCATION/TRAINING PROGRAM

## 2022-05-27 PROCEDURE — 99215 PR OFFICE/OUTPT VISIT, EST, LEVL V, 40-54 MIN: ICD-10-PCS | Mod: S$GLB,,, | Performed by: STUDENT IN AN ORGANIZED HEALTH CARE EDUCATION/TRAINING PROGRAM

## 2022-05-27 PROCEDURE — 99999 PR PBB SHADOW E&M-EST. PATIENT-LVL V: ICD-10-PCS | Mod: PBBFAC,,, | Performed by: STUDENT IN AN ORGANIZED HEALTH CARE EDUCATION/TRAINING PROGRAM

## 2022-05-27 PROCEDURE — 99999 PR PBB SHADOW E&M-EST. PATIENT-LVL V: CPT | Mod: PBBFAC,,, | Performed by: STUDENT IN AN ORGANIZED HEALTH CARE EDUCATION/TRAINING PROGRAM

## 2022-05-27 RX ORDER — OMEPRAZOLE 20 MG/1
20 CAPSULE, DELAYED RELEASE ORAL DAILY
COMMUNITY
End: 2023-09-05

## 2022-05-27 RX ORDER — NITROGLYCERIN 0.4 MG/1
0.4 TABLET SUBLINGUAL EVERY 5 MIN PRN
Qty: 90 TABLET | Refills: 3 | Status: SHIPPED | OUTPATIENT
Start: 2022-05-27 | End: 2023-07-17 | Stop reason: SDUPTHER

## 2022-05-27 RX ORDER — EZETIMIBE 10 MG/1
10 TABLET ORAL DAILY
Qty: 90 TABLET | Refills: 3 | Status: SHIPPED | OUTPATIENT
Start: 2022-05-27 | End: 2022-08-04

## 2022-05-27 RX ORDER — ROSUVASTATIN CALCIUM 20 MG/1
20 TABLET, COATED ORAL DAILY
Qty: 90 TABLET | Refills: 9 | Status: SHIPPED | OUTPATIENT
Start: 2022-05-27 | End: 2023-08-28 | Stop reason: SDUPTHER
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
## 2022-05-31 ENCOUNTER — HOSPITAL ENCOUNTER (OUTPATIENT)
Dept: CARDIOLOGY | Facility: HOSPITAL | Age: 42
Discharge: HOME | End: 2022-05-31
Attending: STUDENT IN AN ORGANIZED HEALTH CARE EDUCATION/TRAINING PROGRAM
Payer: MEDICARE

## 2022-05-31 VITALS
HEART RATE: 56 BPM | DIASTOLIC BLOOD PRESSURE: 62 MMHG | WEIGHT: 153 LBS | HEIGHT: 72 IN | BODY MASS INDEX: 20.72 KG/M2 | SYSTOLIC BLOOD PRESSURE: 100 MMHG | RESPIRATION RATE: 18 BRPM

## 2022-05-31 DIAGNOSIS — R07.9 CHEST PAIN, UNSPECIFIED TYPE: ICD-10-CM

## 2022-05-31 DIAGNOSIS — I25.10 CORONARY ARTERY DISEASE INVOLVING NATIVE CORONARY ARTERY OF NATIVE HEART WITHOUT ANGINA PECTORIS: ICD-10-CM

## 2022-05-31 DIAGNOSIS — E78.5 HYPERLIPIDEMIA, UNSPECIFIED HYPERLIPIDEMIA TYPE: ICD-10-CM

## 2022-05-31 LAB
ASCENDING AORTA: 2.74 CM
BSA FOR ECHO PROCEDURE: 1.88 M2
CV ECHO LV RWT: 0.31 CM
CV STRESS BASE HR: 59 BPM
DIASTOLIC BLOOD PRESSURE: 60 MMHG
DOP CALC LVOT AREA: 3.7 CM2
DOP CALC LVOT DIAMETER: 2.17 CM
DOP CALC LVOT PEAK VEL: 1.45 M/S
DOP CALC LVOT STROKE VOLUME: 114.33 CM3
DOP CALCLVOT PEAK VEL VTI: 30.93 CM
E WAVE DECELERATION TIME: 230.92 MSEC
E/A RATIO: 1.85
E/E' RATIO: 9.8 M/S
ECHO LV POSTERIOR WALL: 0.74 CM (ref 0.6–1.1)
EJECTION FRACTION: 65 %
FRACTIONAL SHORTENING: 45 % (ref 28–44)
INTERVENTRICULAR SEPTUM: 0.63 CM (ref 0.6–1.1)
LA MAJOR: 4 CM
LA MINOR: 4.36 CM
LA WIDTH: 3.6 CM
LEFT ATRIUM SIZE: 3.44 CM
LEFT ATRIUM VOLUME INDEX MOD: 25.4 ML/M2
LEFT ATRIUM VOLUME INDEX: 23.1 ML/M2
LEFT ATRIUM VOLUME MOD: 48.24 CM3
LEFT ATRIUM VOLUME: 43.92 CM3
LEFT INTERNAL DIMENSION IN SYSTOLE: 2.68 CM (ref 2.1–4)
LEFT VENTRICLE DIASTOLIC VOLUME INDEX: 57.83 ML/M2
LEFT VENTRICLE DIASTOLIC VOLUME: 109.88 ML
LEFT VENTRICLE MASS INDEX: 56 G/M2
LEFT VENTRICLE SYSTOLIC VOLUME INDEX: 14 ML/M2
LEFT VENTRICLE SYSTOLIC VOLUME: 26.59 ML
LEFT VENTRICULAR INTERNAL DIMENSION IN DIASTOLE: 4.84 CM (ref 3.5–6)
LEFT VENTRICULAR MASS: 105.52 G
LV LATERAL E/E' RATIO: 8.91 M/S
LV SEPTAL E/E' RATIO: 10.89 M/S
MV A" WAVE DURATION": 8.56 MSEC
MV PEAK A VEL: 0.53 M/S
MV PEAK E VEL: 0.98 M/S
MV STENOSIS PRESSURE HALF TIME: 66.97 MS
MV VALVE AREA P 1/2 METHOD: 3.29 CM2
OHS CV CPX 1 MINUTE RECOVERY HEART RATE: 148 BPM
OHS CV CPX 85 PERCENT MAX PREDICTED HEART RATE MALE: 153
OHS CV CPX MAX PREDICTED HEART RATE: 180
OHS CV CPX PATIENT IS FEMALE: 0
OHS CV CPX PATIENT IS MALE: 1
OHS CV CPX PEAK DIASTOLIC BLOOD PRESSURE: 61 MMHG
OHS CV CPX PEAK HEAR RATE: 153 BPM
OHS CV CPX PEAK RATE PRESSURE PRODUCT: NORMAL
OHS CV CPX PEAK SYSTOLIC BLOOD PRESSURE: 154 MMHG
OHS CV CPX PERCENT MAX PREDICTED HEART RATE ACHIEVED: 85
OHS CV CPX RATE PRESSURE PRODUCT PRESENTING: 5900
PISA TR MAX VEL: 2.1 M/S
PULM VEIN S/D RATIO: 0.75
PV PEAK D VEL: 0.65 M/S
PV PEAK S VEL: 0.49 M/S
RA MAJOR: 3.85 CM
RA WIDTH: 3.44 CM
RIGHT VENTRICULAR END-DIASTOLIC DIMENSION: 3.14 CM
RV TISSUE DOPPLER FREE WALL SYSTOLIC VELOCITY 1 (APICAL 4 CHAMBER VIEW): 12.88 CM/S
SINUS: 3.2 CM
STJ: 2.32 CM
STRESS ST DEPRESSION: 1 MM
SYSTOLIC BLOOD PRESSURE: 100 MMHG
TDI LATERAL: 0.11 M/S
TDI SEPTAL: 0.09 M/S
TDI: 0.1 M/S
TR MAX PG: 18 MMHG
TRICUSPID ANNULAR PLANE SYSTOLIC EXCURSION: 2.62 CM

## 2022-05-31 PROCEDURE — 93351 STRESS ECHO (CUPID ONLY): ICD-10-PCS | Mod: 26,,, | Performed by: INTERNAL MEDICINE

## 2022-05-31 PROCEDURE — 63600150 PHARM REV CODE 636: Performed by: STUDENT IN AN ORGANIZED HEALTH CARE EDUCATION/TRAINING PROGRAM

## 2022-05-31 PROCEDURE — 63600175 PHARM REV CODE 636 W HCPCS: Performed by: STUDENT IN AN ORGANIZED HEALTH CARE EDUCATION/TRAINING PROGRAM

## 2022-05-31 PROCEDURE — 93351 STRESS TTE COMPLETE: CPT

## 2022-05-31 PROCEDURE — 93351 STRESS TTE COMPLETE: CPT | Mod: 26,,, | Performed by: INTERNAL MEDICINE

## 2022-05-31 RX ORDER — ATROPINE SULFATE 0.1 MG/ML
0.4 INJECTION INTRAVENOUS
Status: COMPLETED | OUTPATIENT
Start: 2022-05-31 | End: 2022-05-31

## 2022-05-31 RX ORDER — DOBUTAMINE HYDROCHLORIDE 100 MG/100ML
30 INJECTION INTRAVENOUS
Status: COMPLETED | OUTPATIENT
Start: 2022-05-31 | End: 2022-05-31

## 2022-05-31 RX ADMIN — ATROPINE SULFATE 0.4 MG: 0.1 INJECTION PARENTERAL at 09:05

## 2022-05-31 RX ADMIN — DOBUTAMINE IN DEXTROSE 30 MCG/KG/MIN: 100 INJECTION, SOLUTION INTRAVENOUS at 09:05

## 2022-05-31 NOTE — NURSING NOTE
Patient verified by 2 identifiers and allergies reviewed.  22g IV placed to Lt FA.  DSE explained to patient, consent obtained & testing completed.  Pt tolerated testing well. IV removed post testing.  Post study discharge instructions reviewed with patient, patient verbalized understanding.  Patient discharged to home in stable condition.

## 2022-06-01 ENCOUNTER — TELEPHONE (OUTPATIENT)
Dept: NEUROSURGERY | Facility: CLINIC | Age: 42
End: 2022-06-01
Payer: MEDICARE

## 2022-06-01 ENCOUNTER — PATIENT MESSAGE (OUTPATIENT)
Dept: PAIN MEDICINE | Facility: OTHER | Age: 42
End: 2022-06-01
Payer: MEDICARE

## 2022-06-02 ENCOUNTER — HOSPITAL ENCOUNTER (OUTPATIENT)
Facility: OTHER | Age: 42
Discharge: HOME | End: 2022-06-02
Attending: ANESTHESIOLOGY | Admitting: ANESTHESIOLOGY
Payer: MEDICARE

## 2022-06-02 VITALS
DIASTOLIC BLOOD PRESSURE: 77 MMHG | HEART RATE: 78 BPM | RESPIRATION RATE: 16 BRPM | OXYGEN SATURATION: 97 % | BODY MASS INDEX: 20.72 KG/M2 | HEIGHT: 72 IN | SYSTOLIC BLOOD PRESSURE: 135 MMHG | WEIGHT: 153 LBS | TEMPERATURE: 98 F

## 2022-06-02 DIAGNOSIS — M47.816 SPONDYLOSIS WITHOUT MYELOPATHY OR RADICULOPATHY, LUMBAR REGION: Primary | ICD-10-CM

## 2022-06-02 PROCEDURE — 64493 INJ PARAVERT F JNT L/S 1 LEV: CPT | Mod: 50,KX,, | Performed by: ANESTHESIOLOGY

## 2022-06-02 PROCEDURE — 64493 PR INJ DX/THER AGNT PARAVERT FACET JOINT,IMG GUIDE,LUMBAR/SAC,1ST LVL: ICD-10-PCS | Mod: 50,KX,, | Performed by: ANESTHESIOLOGY

## 2022-06-02 PROCEDURE — 64494 INJ PARAVERT F JNT L/S 2 LEV: CPT | Mod: 50,KX | Performed by: ANESTHESIOLOGY

## 2022-06-02 PROCEDURE — 25000003 PHARM REV CODE 250: Performed by: ANESTHESIOLOGY

## 2022-06-02 PROCEDURE — 64493 INJ PARAVERT F JNT L/S 1 LEV: CPT | Mod: 50,KX | Performed by: ANESTHESIOLOGY

## 2022-06-02 PROCEDURE — 64494 PR INJ DX/THER AGNT PARAVERT FACET JOINT,IMG GUIDE,LUMBAR/SAC, 2ND LEVEL: ICD-10-PCS | Mod: 50,KX,, | Performed by: ANESTHESIOLOGY

## 2022-06-02 PROCEDURE — 64494 INJ PARAVERT F JNT L/S 2 LEV: CPT | Mod: 50,KX,, | Performed by: ANESTHESIOLOGY

## 2022-06-02 RX ORDER — LIDOCAINE HYDROCHLORIDE 20 MG/ML
INJECTION, SOLUTION INFILTRATION; PERINEURAL
Status: DISCONTINUED | OUTPATIENT
Start: 2022-06-02 | End: 2022-06-02 | Stop reason: HOSPADM

## 2022-06-02 RX ORDER — SODIUM CHLORIDE 9 MG/ML
500 INJECTION, SOLUTION INTRAVENOUS CONTINUOUS
Status: DISCONTINUED | OUTPATIENT
Start: 2022-06-02 | End: 2022-06-02 | Stop reason: HOSPADM

## 2022-06-02 RX ORDER — BUPIVACAINE HYDROCHLORIDE 2.5 MG/ML
INJECTION, SOLUTION EPIDURAL; INFILTRATION; INTRACAUDAL
Status: DISCONTINUED | OUTPATIENT
Start: 2022-06-02 | End: 2022-06-02 | Stop reason: HOSPADM

## 2022-06-02 RX ORDER — ALPRAZOLAM 0.5 MG/1
1 TABLET ORAL ONCE
Status: COMPLETED | OUTPATIENT
Start: 2022-06-02 | End: 2022-06-02

## 2022-06-02 RX ADMIN — ALPRAZOLAM 1 MG: 0.5 TABLET ORAL at 01:06

## 2022-06-02 NOTE — OP NOTE
Diagnostic Lumbar Medial Branch Block Under Fluoroscopy    The procedure, risks, benefits, and options were discussed with the patient. There are no contraindications to the procedure. The patent expressed understanding and agreed to the procedure. Informed written consent was obtained prior to the start of the procedure and can be found in the patient's chart.    PATIENT NAME: Mr. Gordon Griffin   MRN: [id number]     DATE OF PROCEDURE: 06/02/2022                                           PROCEDURE:  Diagnostic Bilateral L3, L4 and L5 Lumbar Medial Branch Block under Fluoroscopy    PRE-OP DIAGNOSIS: Lumbar spondylosis [M47.816] Lumbar spondylosis [M47.816]    POST-OP DIAGNOSIS: Same    PHYSICIAN: Larry Brasher MD    ASSISTANTS: Dr. Crain    MEDICATIONS INJECTED:  Bupivicaine 0.25%    LOCAL ANESTHETIC INJECTED:   Xylocaine 2%    SEDATION: None    ESTIMATED BLOOD LOSS:  None    COMPLICATIONS:  None.    INTERVAL HISTORY: Patient has clinical and imaging findings suggestive of facet mediated pain. Patient had a previous diagnostic block performed with at least 80% relief in pain and/or at least 50% improvement in the ability to perform previously painful movements and ADLs for the expected duration of the local anesthetic utilized.    TECHNIQUE: Time-out was performed to identify the patient and procedure to be performed. With the patient laying in a prone position, the surgical area was prepped and draped in the usual sterile fashion using ChloraPrep and fenestrated drape. The levels were determined under fluoroscopic guidance. Skin anesthesia was achieved by injecting Lidocaine 2% over the injection sites. A 25 gauge, 3.5 inch needle was introduced into the medial branch nerves at the junctions of the superior articular process and the transverse processes of the targeted sites using AP, lateral and/or contralateral oblique fluoroscopic imaging. After negative aspiration for blood or CSF was confirmed, 1 mL of the  anesthetic listed above was then slowly injected at each site. The needles were removed and bleeding was nil. A sterile dressing was applied. No specimens collected. The patient tolerated the procedure well.     The patient was monitored after the procedure in the recovery area. They were given post-procedure and discharge instructions to follow at home. The patient was discharged in a stable condition.    I reviewed and edited the fellow's note. I conducted my own interview and physical examination. I agree with the findings. I was present and supervising all critical portions of the procedure.    Larry Brasher MD

## 2022-06-02 NOTE — DISCHARGE INSTRUCTIONS
Lumbar/Cervical/Joint Medial Branch Block Pain Diary    Patient Name:  :    Pre-procedure Pain Score: _____/10    Time of injection:     Please fill out the chart below to the best of your ability. We need to know how much percentage of relief in your pain that you have while the numbing medication is active. Please be mindful that this is a diagnostic test and may not last for a long time. If you are asleep during one of the times listed below, you do not have to record that time.     Time After the   Procedure % of pain relief   achieved Pain Score (0-10)   1 hour post-procedure     2 hours post-procedure     3 hours post-procedure     4 hours post-procedure     5 hours post-procedure     6 hours post-procedure     12 hours post-procedure     24 hours post-procedure       Please make sure to return this form or information to the clinic. This information is needed to proceed with your plan of care. There are several options that you can use to send this back to us.    Form can be faxed to us at [phone]  Call us to provide the information at [phone]  Send the information to us through your [facility] Chart    If you have any questions about completing this diary, please call us at [phone].    Thank you for allowing us to care for you today. You may receive a survey about the care we provided. Your feedback is valuable and helps us provide excellent care throughout the community.     Home Care Instructions for Pain Management:    1. DIET:   You may resume your normal diet today.   2. BATHING:   You may shower with luke warm water. No tub baths or anything that will soak injection sites under water for the next 24 hours.  3. DRESSING:   You may remove your bandage today.   4. ACTIVITY LEVEL:   You may resume your normal activities 24 hrs after your procedure. Nothing strenuous today.  5. MEDICATIONS:   You may resume your normal medications today. To restart blood thinners, ask your doctor.  6.  DRIVING    If you have received any sedatives by mouth today, you may not drive for 12 hours.    If you have received any sedation through your IV, you may not drive for 24 hrs.   7. SPECIAL INSTRUCTIONS:   No heat to the injection site for 24 hrs including, hot bath or shower, heating pad, moist heat, or hot tubs.    Use ice pack to injection site for any pain or discomfort.  Apply ice packs for 20 minute intervals as needed.    IF you have diabetes, be sure to monitor your blood sugar more closely. IF your injection contained steroids your blood sugar levels may become higher than normal.    If you are still having pain upon discharge:  Your pain may improve over the next 48 hours. The anesthetic (numbing medication) works immediately to 48 hours. IF your injection contained a steroid (anti-inflammatory medication), it takes approximately 3 days to start feeling relief and 7-10 days to see your greatest results from the medication. It is possible you may need subsequent injections. This would be discussed at your follow up appointment with pain management or your referring doctor.    Please call the PAIN MANAGEMENT office at [phone] or ON CALL pager at [phone] if you experienced any:   -Weakness or loss of sensation  -Fever > 101.5  -Pain uncontrolled with oral medications   -Persistent nausea, vomiting, or diarrhea  -Redness or drainage from the injection sites, or any other worrisome concerns.   If physician on call was not reached or could not communicate with our office for any reason please go to the nearest emergency department.

## 2022-06-02 NOTE — DISCHARGE SUMMARY
Discharge Note  Short Stay      SUMMARY     Admit Date: 6/2/2022    Attending Physician: Larry Brasher      Discharge Physician: Larry Brasher      Discharge Date: 6/2/2022 1:42 PM    Procedure(s) (LRB):  BLOCK, NERVE BILATERAL L3-L4-L5 MEDIAL BRANCH 2nd, needs consent (Bilateral)    Final Diagnosis: Lumbar spondylosis [M47.816]    Disposition: Home or self care    Patient Instructions:   Current Discharge Medication List      CONTINUE these medications which have NOT CHANGED    Details   aspirin 81 MG Chew Take 81 mg by mouth once daily.      azelastine (ASTELIN) 137 mcg (0.1 %) nasal spray USE 1 TO 2 SPRAYS IN EACH NOSTRIL TWICE DAILY FOR CONGESTION      baclofen (LIORESAL) 20 MG tablet Take 1 tablet by mouth 3 (three) times daily as needed.       benztropine (COGENTIN) 0.5 MG tablet Take 1 tablet (0.5 mg total) by mouth once daily.  Qty: 60 tablet, Refills: 12      butalbital-acetaminophen-caffeine -40 mg (FIORICET, ESGIC) -40 mg per tablet Take 1 tablet by mouth every 4 (four) hours as needed.      butorphanol (STADOL) 10 mg/mL nasal spray 1 spray by Nasal route every 4 (four) hours as needed for Pain.      cloNIDine (CATAPRES) 0.1 MG tablet TAKE 1 TABLET(0.1 MG) BY MOUTH TWICE DAILY  Qty: 60 tablet, Refills: 3    Comments: ZERO refills remain on this prescription. Your patient is requesting advance approval of refills for this medication to PREVENT ANY MISSED DOSES  Associated Diagnoses: Tic      cyclobenzaprine (FLEXERIL) 10 MG tablet TK 1 T PO Q 8 H PRF PAIN      diazePAM (VALIUM) 2 MG tablet Take 2 mg by mouth 2 (two) times daily as needed.      erenumab-aooe (AIMOVIG AUTOINJECTOR SUBQ) Inject into the skin.      EScitalopram oxalate (LEXAPRO) 20 MG tablet Take 20 mg by mouth once daily.      estradioL (ESTRACE) 2 MG tablet       estradiol 0.1 mg/24 hr td ptwk 0.1 mg/24 hr PTWK       ezetimibe (ZETIA) 10 mg tablet Take 1 tablet (10 mg total) by mouth once daily.  Qty: 90 tablet, Refills: 3       FLUCELVAX QUAD 0164-0324, PF, 60 mcg (15 mcg x 4)/0.5 mL Syrg vaccine ADM 0.5ML IM UTD  Refills: 0      fluticasone (FLONASE) 50 mcg/actuation nasal spray SPRAY TWICE IEN QD  Refills: 5      gabapentin (NEURONTIN) 300 MG capsule Take 1 capsule (300 mg total) by mouth 3 (three) times daily.  Qty: 90 capsule, Refills: 2    Associated Diagnoses: Lumbar radiculopathy      hydrOXYzine pamoate (VISTARIL) 50 MG Cap hydroxyzine pamoate 50 mg capsule   TK 1 TO 2 CS PO HS PRN      ketorolac (TORADOL) 10 mg tablet ketorolac 10 mg tablet      levETIRAcetam (KEPPRA) 1000 MG tablet Take 1,000 mg by mouth 2 (two) times daily.      methocarbamoL (ROBAXIN) 500 MG Tab methocarbamol 500 mg tablet      metoclopramide HCl (REGLAN) 10 MG tablet 10 mg.      moxifloxacin (AVELOX) 400 mg tablet       NARCAN 4 mg/actuation Spry SPRAY 0.1ML IN 1 NOSTRIL MAY REPEAT DOSE EVERY 2-3 MINUTES AS NEEDED ALTERNATING NOSTRILS EACH DOSE  Qty: 1 each, Refills: 3    Associated Diagnoses: Chronic pain disorder; Lumbar radiculopathy      nitroGLYCERIN (NITROSTAT) 0.4 MG SL tablet Place 1 tablet (0.4 mg total) under the tongue every 5 (five) minutes as needed for Chest pain.  Qty: 90 tablet, Refills: 3      nitroGLYCERIN 0.1 mg/hr TD PT24 (NITRODUR) 0.1 mg/hr PT24 Nitroglycerin 0.4 mg Sublingual Tab      NURTEC 75 mg odt Take by mouth.      omeprazole (PRILOSEC) 20 MG capsule Take 20 mg by mouth once daily.      ondansetron (ZOFRAN) 4 MG tablet Take 1 tablet (4 mg total) by mouth every 6 (six) hours as needed for Nausea.  Qty: 12 tablet, Refills: 0      ondansetron (ZOFRAN-ODT) 8 MG TbDL Take 8 mg by mouth 2 (two) times daily.      oxybutynin (DITROPAN-XL) 10 MG 24 hr tablet Take 1 tablet (10 mg total) by mouth once daily.  Qty: 30 tablet, Refills: 11      prochlorperazine (COMPAZINE) 10 MG tablet Take 10 mg by mouth 3 (three) times daily.      propranoloL (INDERAL LA) 60 MG 24 hr capsule Take 60 mg by mouth every evening.      rosuvastatin (CRESTOR) 20  MG tablet Take 1 tablet (20 mg total) by mouth once daily.  Qty: 90 tablet, Refills: 9      spironolactone (ALDACTONE) 25 MG tablet Take 1 tablet (25 mg total) by mouth once daily.  Qty: 30 tablet, Refills: 3      tamsulosin (FLOMAX) 0.4 mg Cap Take 1 capsule (0.4 mg total) by mouth once daily.  Qty: 30 capsule, Refills: 11      !! traZODone (DESYREL) 100 MG tablet       !! traZODone (DESYREL) 50 MG tablet       verapamiL (VERELAN) 240 MG C24P verapamil  mg 24 hr capsule,extended release   TK ONE C PO  ONCE D       !! - Potential duplicate medications found. Please discuss with provider.              Discharge Diagnosis: Lumbar spondylosis [M47.816]  Condition on Discharge: Stable with no complications to procedure   Diet on Discharge: Same as before.  Activity: as per instruction sheet.  Discharge to: Home with a responsible adult.  Follow up: 2-4 weeks

## 2022-06-06 ENCOUNTER — PATIENT MESSAGE (OUTPATIENT)
Dept: SPINE | Facility: CLINIC | Age: 42
End: 2022-06-06
Payer: MEDICARE

## 2022-06-06 DIAGNOSIS — M47.816 LUMBAR SPONDYLOSIS: Primary | ICD-10-CM

## 2022-06-07 ENCOUNTER — TELEPHONE (OUTPATIENT)
Dept: PAIN MEDICINE | Facility: CLINIC | Age: 42
End: 2022-06-07
Payer: MEDICARE

## 2022-06-07 DIAGNOSIS — M47.816 LUMBAR SPONDYLOSIS: Primary | ICD-10-CM

## 2022-06-07 NOTE — TELEPHONE ENCOUNTER
----- Message from Sindy Ramos MA sent at 6/7/2022  2:14 PM CDT -----  Regarding: FW: order needed  Good Afternoon ,     Can you place orders in the system for patient to have RFA?     Sindy   ----- Message -----  From: Nely Diaz  Sent: 6/7/2022   2:09 PM CDT  To: Anshu Juarez Staff  Subject: order needed                                     May I please have the order for the patient's RFAs?    Respectfully,  Nely

## 2022-06-11 ENCOUNTER — HOSPITAL ENCOUNTER (EMERGENCY)
Facility: HOSPITAL | Age: 42
Discharge: HOME | End: 2022-06-11
Attending: EMERGENCY MEDICINE
Payer: MEDICARE

## 2022-06-11 ENCOUNTER — HOSPITAL ENCOUNTER (EMERGENCY)
Facility: HOSPITAL | Age: 42
Discharge: HOME | End: 2022-06-12
Attending: STUDENT IN AN ORGANIZED HEALTH CARE EDUCATION/TRAINING PROGRAM
Payer: MEDICARE

## 2022-06-11 VITALS
OXYGEN SATURATION: 97 % | DIASTOLIC BLOOD PRESSURE: 88 MMHG | SYSTOLIC BLOOD PRESSURE: 155 MMHG | TEMPERATURE: 99 F | RESPIRATION RATE: 20 BRPM | HEART RATE: 84 BPM

## 2022-06-11 DIAGNOSIS — Z00.8 MEDICAL CLEARANCE FOR PSYCHIATRIC ADMISSION: ICD-10-CM

## 2022-06-11 DIAGNOSIS — S00.93XA CONTUSION OF HEAD, UNSPECIFIED PART OF HEAD, INITIAL ENCOUNTER: ICD-10-CM

## 2022-06-11 DIAGNOSIS — R52 PAIN: ICD-10-CM
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
## 2022-06-11 DIAGNOSIS — S09.90XA CLOSED HEAD INJURY, INITIAL ENCOUNTER: ICD-10-CM

## 2022-06-11 DIAGNOSIS — Z76.89 ENCOUNTER FOR PSYCHIATRIC ASSESSMENT: Primary | ICD-10-CM

## 2022-06-11 DIAGNOSIS — R07.9 CHEST PAIN: ICD-10-CM

## 2022-06-11 DIAGNOSIS — R06.02 SHORTNESS OF BREATH: ICD-10-CM

## 2022-06-11 DIAGNOSIS — S93.401A SPRAIN OF RIGHT ANKLE, UNSPECIFIED LIGAMENT, INITIAL ENCOUNTER: ICD-10-CM

## 2022-06-11 LAB
ALBUMIN SERPL BCP-MCNC: 4.6 G/DL (ref 3.5–5.2)
ALP SERPL-CCNC: 98 U/L (ref 55–135)
ALT SERPL W/O P-5'-P-CCNC: 11 U/L (ref 10–44)
ANION GAP SERPL CALC-SCNC: 14 MMOL/L (ref 8–16)
APAP SERPL-MCNC: <3 UG/ML (ref 10–20)
AST SERPL-CCNC: 13 U/L (ref 10–40)
BACTERIA #/AREA URNS AUTO: NORMAL /HPF
BILIRUB SERPL-MCNC: 0.4 MG/DL (ref 0.1–1)
BILIRUB UR QL STRIP: NEGATIVE
BUN SERPL-MCNC: 14 MG/DL (ref 6–20)
CALCIUM SERPL-MCNC: 9.6 MG/DL (ref 8.7–10.5)
CHLORIDE SERPL-SCNC: 103 MMOL/L (ref 95–110)
CLARITY UR REFRACT.AUTO: CLEAR
CO2 SERPL-SCNC: 21 MMOL/L (ref 23–29)
COLOR UR AUTO: YELLOW
CREAT SERPL-MCNC: 0.8 MG/DL (ref 0.5–1.4)
CTP QC/QA: YES
EST. GFR  (AFRICAN AMERICAN): >60 ML/MIN/1.73 M^2
EST. GFR  (NON AFRICAN AMERICAN): >60 ML/MIN/1.73 M^2
ETHANOL SERPL-MCNC: <10 MG/DL
GLUCOSE SERPL-MCNC: 184 MG/DL (ref 70–110)
GLUCOSE UR QL STRIP: ABNORMAL
HGB UR QL STRIP: NEGATIVE
HYALINE CASTS UR QL AUTO: 0 /LPF
KETONES UR QL STRIP: ABNORMAL
LEUKOCYTE ESTERASE UR QL STRIP: NEGATIVE
MICROSCOPIC COMMENT: NORMAL
NITRITE UR QL STRIP: NEGATIVE
PH UR STRIP: 5 [PH] (ref 5–8)
POTASSIUM SERPL-SCNC: 3.1 MMOL/L (ref 3.5–5.1)
PROT SERPL-MCNC: 7.9 G/DL (ref 6–8.4)
PROT UR QL STRIP: ABNORMAL
RBC #/AREA URNS AUTO: 1 /HPF (ref 0–4)
SARS-COV-2 RDRP RESP QL NAA+PROBE: NEGATIVE
SODIUM SERPL-SCNC: 138 MMOL/L (ref 136–145)
SP GR UR STRIP: >=1.03 (ref 1–1.03)
SQUAMOUS #/AREA URNS AUTO: 2 /HPF
T4 FREE SERPL-MCNC: 1.04 NG/DL (ref 0.71–1.51)
TSH SERPL DL<=0.005 MIU/L-ACNC: 0.35 UIU/ML (ref 0.4–4)
URN SPEC COLLECT METH UR: ABNORMAL
WBC #/AREA URNS AUTO: 3 /HPF (ref 0–5)

## 2022-06-11 PROCEDURE — 93010 EKG 12-LEAD: ICD-10-PCS | Mod: ,,, | Performed by: INTERNAL MEDICINE

## 2022-06-11 PROCEDURE — 93010 ELECTROCARDIOGRAM REPORT: CPT | Mod: ,,, | Performed by: INTERNAL MEDICINE

## 2022-06-11 PROCEDURE — 99285 PR EMERGENCY DEPT VISIT,LEVEL V: ICD-10-PCS | Mod: CS,,, | Performed by: STUDENT IN AN ORGANIZED HEALTH CARE EDUCATION/TRAINING PROGRAM

## 2022-06-11 PROCEDURE — 93005 ELECTROCARDIOGRAM TRACING: CPT

## 2022-06-11 PROCEDURE — 80053 COMPREHEN METABOLIC PANEL: CPT | Performed by: STUDENT IN AN ORGANIZED HEALTH CARE EDUCATION/TRAINING PROGRAM

## 2022-06-11 PROCEDURE — 99285 EMERGENCY DEPT VISIT HI MDM: CPT | Mod: CS,,, | Performed by: STUDENT IN AN ORGANIZED HEALTH CARE EDUCATION/TRAINING PROGRAM

## 2022-06-11 PROCEDURE — 84439 ASSAY OF FREE THYROXINE: CPT | Performed by: STUDENT IN AN ORGANIZED HEALTH CARE EDUCATION/TRAINING PROGRAM

## 2022-06-11 PROCEDURE — 25000003 PHARM REV CODE 250: Performed by: STUDENT IN AN ORGANIZED HEALTH CARE EDUCATION/TRAINING PROGRAM

## 2022-06-11 PROCEDURE — 25000003 PHARM REV CODE 250: Performed by: EMERGENCY MEDICINE

## 2022-06-11 PROCEDURE — 80143 DRUG ASSAY ACETAMINOPHEN: CPT | Performed by: STUDENT IN AN ORGANIZED HEALTH CARE EDUCATION/TRAINING PROGRAM

## 2022-06-11 PROCEDURE — 99285 EMERGENCY DEPT VISIT HI MDM: CPT | Mod: 25

## 2022-06-11 PROCEDURE — 81001 URINALYSIS AUTO W/SCOPE: CPT | Performed by: STUDENT IN AN ORGANIZED HEALTH CARE EDUCATION/TRAINING PROGRAM

## 2022-06-11 PROCEDURE — 96374 THER/PROPH/DIAG INJ IV PUSH: CPT

## 2022-06-11 PROCEDURE — 82077 ASSAY SPEC XCP UR&BREATH IA: CPT | Performed by: STUDENT IN AN ORGANIZED HEALTH CARE EDUCATION/TRAINING PROGRAM

## 2022-06-11 PROCEDURE — 84443 ASSAY THYROID STIM HORMONE: CPT | Performed by: STUDENT IN AN ORGANIZED HEALTH CARE EDUCATION/TRAINING PROGRAM

## 2022-06-11 PROCEDURE — 80307 DRUG TEST PRSMV CHEM ANLYZR: CPT | Performed by: STUDENT IN AN ORGANIZED HEALTH CARE EDUCATION/TRAINING PROGRAM

## 2022-06-11 PROCEDURE — 85025 COMPLETE CBC W/AUTO DIFF WBC: CPT | Performed by: STUDENT IN AN ORGANIZED HEALTH CARE EDUCATION/TRAINING PROGRAM

## 2022-06-11 PROCEDURE — U0002 COVID-19 LAB TEST NON-CDC: HCPCS | Performed by: STUDENT IN AN ORGANIZED HEALTH CARE EDUCATION/TRAINING PROGRAM

## 2022-06-11 PROCEDURE — 99284 EMERGENCY DEPT VISIT MOD MDM: CPT | Mod: 25,27

## 2022-06-11 PROCEDURE — 63600175 PHARM REV CODE 636 W HCPCS: Performed by: STUDENT IN AN ORGANIZED HEALTH CARE EDUCATION/TRAINING PROGRAM

## 2022-06-11 PROCEDURE — 96375 TX/PRO/DX INJ NEW DRUG ADDON: CPT | Mod: 59

## 2022-06-11 RX ORDER — LANOLIN ALCOHOL/MO/W.PET/CERES
400 CREAM (GRAM) TOPICAL ONCE
Status: COMPLETED | OUTPATIENT
Start: 2022-06-12 | End: 2022-06-12

## 2022-06-11 RX ORDER — ACETAMINOPHEN 500 MG
1000 TABLET ORAL
Status: COMPLETED | OUTPATIENT
Start: 2022-06-11 | End: 2022-06-11

## 2022-06-11 RX ORDER — MAGNESIUM SULFATE HEPTAHYDRATE 40 MG/ML
2 INJECTION, SOLUTION INTRAVENOUS ONCE
Status: DISCONTINUED | OUTPATIENT
Start: 2022-06-11 | End: 2022-06-11

## 2022-06-11 RX ORDER — KETOROLAC TROMETHAMINE 30 MG/ML
15 INJECTION, SOLUTION INTRAMUSCULAR; INTRAVENOUS
Status: COMPLETED | OUTPATIENT
Start: 2022-06-11 | End: 2022-06-11

## 2022-06-11 RX ADMIN — ACETAMINOPHEN 1000 MG: 500 TABLET ORAL at 10:06

## 2022-06-11 RX ADMIN — ACETAMINOPHEN 1000 MG: 500 TABLET ORAL at 01:06

## 2022-06-11 RX ADMIN — KETOROLAC TROMETHAMINE 15 MG: 30 INJECTION, SOLUTION INTRAMUSCULAR at 10:06

## 2022-06-11 NOTE — ED NOTES
Pt sitting up in bed, respirations even/unlabored. NAD noted. Updated pt on poc. Pt denies any needs. Side rails up x 2, call bell within reach. Will continue to monitor.
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
## 2022-06-11 NOTE — DISCHARGE INSTRUCTIONS
Your Xrays do not show any acute injuries. If you have weakness of one side of your face or body, pass out, or other concerning symptoms that develop, go to your nearest ER.

## 2022-06-12 VITALS
RESPIRATION RATE: 18 BRPM | TEMPERATURE: 97 F | SYSTOLIC BLOOD PRESSURE: 128 MMHG | OXYGEN SATURATION: 97 % | DIASTOLIC BLOOD PRESSURE: 76 MMHG | HEART RATE: 81 BPM

## 2022-06-12 PROCEDURE — 25000003 PHARM REV CODE 250: Performed by: STUDENT IN AN ORGANIZED HEALTH CARE EDUCATION/TRAINING PROGRAM

## 2022-06-12 PROCEDURE — 63600175 PHARM REV CODE 636 W HCPCS: Performed by: EMERGENCY MEDICINE

## 2022-06-12 RX ORDER — ONDANSETRON 2 MG/ML
4 INJECTION INTRAMUSCULAR; INTRAVENOUS ONCE
Status: COMPLETED | OUTPATIENT
Start: 2022-06-12 | End: 2022-06-12

## 2022-06-12 RX ORDER — DIAZEPAM 10 MG/2ML
5 INJECTION INTRAMUSCULAR
Status: COMPLETED | OUTPATIENT
Start: 2022-06-12 | End: 2022-06-12

## 2022-06-12 RX ADMIN — ONDANSETRON 4 MG: 2 INJECTION INTRAMUSCULAR; INTRAVENOUS at 09:06

## 2022-06-12 RX ADMIN — DIAZEPAM 5 MG: 5 INJECTION, SOLUTION INTRAMUSCULAR; INTRAVENOUS at 09:06

## 2022-06-12 RX ADMIN — POTASSIUM BICARBONATE 40 MEQ: 391 TABLET, EFFERVESCENT ORAL at 12:06

## 2022-06-12 RX ADMIN — Medication 400 MG: at 12:06

## 2022-06-12 NOTE — ED NOTES
Pt requests mother be removed from pts contact information> Please notifiy larisa on pts chart of admission.

## 2022-06-12 NOTE — ED NOTES
Pt sleeping comfortably, rise and fall of chest noted. No needs expressed at this time. Awaiting Social Work in the am.

## 2022-06-12 NOTE — PROVIDER PROGRESS NOTES - EMERGENCY DEPT.
Encounter Date: 6/11/2022    ED Physician Progress Notes           I took over the care of this patient at approximately 7:00 a.m..  We were awaiting the aid of social work to help us with the patient's disposition.  We have been in contact with them we now have a safe plan for the patient.  I have gone over all of this with the patient and she feels comfortable with discharge.  She has a large support group and has a plan that she feels is safe.

## 2022-06-12 NOTE — ED NOTES
Pt remains in paper scrubs, comfortable in stretcher. NAD. Pt aware of plan of care. PEC complete and in patient's chart. Pt belongings are removed from room, labeled, and locked away. No unnecessary equipment, cords, or wires left in room. Sitter at bedside recording Q 15 minute checks. Sitter belongings are out of room.

## 2022-06-12 NOTE — ED NOTES
Pt belongings collected:  -1 navy shirt  -black shorts  -car keys on key chain  -black cell phone

## 2022-06-12 NOTE — ED NOTES
Pt awake and hungry. Given turkey sandwich, apple juice, orange juice, and vanilla pudding. Pt denies additional needs at this time. Still awaiting Social Work. MD confirms Social Work should be in around 8:30am.
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
## 2022-06-12 NOTE — PROVIDER PROGRESS NOTES - EMERGENCY DEPT.
Encounter Date: 6/11/2022    ED Physician Progress Notes        Physician Note:   Assumed care of patient at 11:00 p.m. from Dr. Goncalves pending CT head and final psychiatry recommendations.  CTH:    Impression:     No acute abnormality.    Psychiatry recommending to rescind PEC.  No Social Work currently available in the department to assist with safe disposition planning.  Patient is amenable to spending the night for Social Work evaluation in the morning.  Signed out to oncoming team with Social Work evaluation pending.
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
## 2022-06-23 ENCOUNTER — PATIENT MESSAGE (OUTPATIENT)
Dept: PAIN MEDICINE | Facility: CLINIC | Age: 42
End: 2022-06-23

## 2022-06-23 ENCOUNTER — HOSPITAL ENCOUNTER (OUTPATIENT)
Facility: OTHER | Age: 42
Discharge: HOME | End: 2022-06-23
Attending: ANESTHESIOLOGY | Admitting: ANESTHESIOLOGY
Payer: MEDICARE

## 2022-06-23 VITALS
OXYGEN SATURATION: 97 % | HEART RATE: 65 BPM | RESPIRATION RATE: 14 BRPM | TEMPERATURE: 97 F | SYSTOLIC BLOOD PRESSURE: 131 MMHG | DIASTOLIC BLOOD PRESSURE: 79 MMHG

## 2022-06-23 DIAGNOSIS — M47.9 SPONDYLOSIS: Primary | ICD-10-CM

## 2022-06-23 DIAGNOSIS — M47.9 OSTEOARTHRITIS OF SPINE, UNSPECIFIED SPINAL OSTEOARTHRITIS COMPLICATION STATUS, UNSPECIFIED SPINAL REGION: ICD-10-CM

## 2022-06-23 DIAGNOSIS — G89.29 CHRONIC PAIN: ICD-10-CM

## 2022-06-23 PROCEDURE — 64636 DESTROY L/S FACET JNT ADDL: CPT | Mod: RT,,, | Performed by: ANESTHESIOLOGY

## 2022-06-23 PROCEDURE — 63600175 PHARM REV CODE 636 W HCPCS: Performed by: ANESTHESIOLOGY

## 2022-06-23 PROCEDURE — 25000003 PHARM REV CODE 250: Performed by: ANESTHESIOLOGY

## 2022-06-23 PROCEDURE — 64635 DESTROY LUMB/SAC FACET JNT: CPT | Mod: RT,,, | Performed by: ANESTHESIOLOGY

## 2022-06-23 PROCEDURE — 64635 DESTROY LUMB/SAC FACET JNT: CPT | Mod: RT | Performed by: ANESTHESIOLOGY

## 2022-06-23 PROCEDURE — 64636 PR DESTROY L/S FACET JNT ADDL: ICD-10-PCS | Mod: RT,,, | Performed by: ANESTHESIOLOGY

## 2022-06-23 PROCEDURE — 64636 DESTROY L/S FACET JNT ADDL: CPT | Mod: RT | Performed by: ANESTHESIOLOGY

## 2022-06-23 PROCEDURE — 64635 PR DESTROY LUMB/SAC FACET JNT: ICD-10-PCS | Mod: RT,,, | Performed by: ANESTHESIOLOGY

## 2022-06-23 RX ORDER — FENTANYL CITRATE 50 UG/ML
INJECTION, SOLUTION INTRAMUSCULAR; INTRAVENOUS
Status: DISCONTINUED | OUTPATIENT
Start: 2022-06-23 | End: 2022-06-23 | Stop reason: HOSPADM

## 2022-06-23 RX ORDER — MIDAZOLAM HYDROCHLORIDE 1 MG/ML
INJECTION INTRAMUSCULAR; INTRAVENOUS
Status: DISCONTINUED | OUTPATIENT
Start: 2022-06-23 | End: 2022-06-23 | Stop reason: HOSPADM

## 2022-06-23 RX ORDER — LIDOCAINE HYDROCHLORIDE 20 MG/ML
INJECTION, SOLUTION INFILTRATION; PERINEURAL
Status: DISCONTINUED | OUTPATIENT
Start: 2022-06-23 | End: 2022-06-23 | Stop reason: HOSPADM

## 2022-06-23 RX ORDER — BUPIVACAINE HYDROCHLORIDE 2.5 MG/ML
INJECTION, SOLUTION EPIDURAL; INFILTRATION; INTRACAUDAL
Status: DISCONTINUED | OUTPATIENT
Start: 2022-06-23 | End: 2022-06-23 | Stop reason: HOSPADM

## 2022-06-23 RX ORDER — DEXAMETHASONE SODIUM PHOSPHATE 10 MG/ML
INJECTION INTRAMUSCULAR; INTRAVENOUS
Status: DISCONTINUED | OUTPATIENT
Start: 2022-06-23 | End: 2022-06-23 | Stop reason: HOSPADM

## 2022-06-23 RX ORDER — SODIUM CHLORIDE 9 MG/ML
500 INJECTION, SOLUTION INTRAVENOUS CONTINUOUS
Status: DISCONTINUED | OUTPATIENT
Start: 2022-06-23 | End: 2022-06-23 | Stop reason: HOSPADM

## 2022-06-23 NOTE — H&P (VIEW-ONLY)
HPI  Patient presenting for Procedure(s) (LRB):  RADIOFREQUENCY ABLATION RIGHT L3,L4,L5 1 of 2, consent needed (Right)     Patient on Anti-coagulation No    No health changes since previous encounter    Past Medical History:   Diagnosis Date    Anxiety     Cancer     Chest pain 1/20/2016 12/30/2015: Began experinece chest pain.    Depression     Functional movement disorder 10/1/2019    Migraine headache     Movement disorder     Myoclonic jerkings, massive     Stroke pt. states he had a cva at 3 months old     Past Surgical History:   Procedure Laterality Date    ANGIOGRAM, CORONARY, WITH LEFT HEART CATHETERIZATION      EPIDURAL STEROID INJECTION N/A 3/26/2021    Procedure: INJECTION, STEROID, EPIDURAL L4/5;  Surgeon: Larry Brasher MD;  Location: [facility];  Service: Pain Management;  Laterality: N/A;    EPIDURAL STEROID INJECTION N/A 6/4/2021    Procedure: INJECTION, STEROID, EPIDURAL, L4-L5 IL need consent;  Surgeon: Larry Brasher MD;  Location: [facility];  Service: Pain Management;  Laterality: N/A;    EPIDURAL STEROID INJECTION N/A 10/29/2021    Procedure: INJECTION, STEROID, EPIDURAL, L4-L5IL NEED CONSENT;  Surgeon: Larry Brasher MD;  Location: [facility];  Service: Pain Management;  Laterality: N/A;    EPIDURAL STEROID INJECTION N/A 1/27/2022    Procedure: Injection, Steroid, Epidural C7/T1;  Surgeon: Larry Brasher MD;  Location: [facility];  Service: Pain Management;  Laterality: N/A;    EPIDURAL STEROID INJECTION N/A 2/10/2022    Procedure: Injection, Steroid, Epidural L4/5;  Surgeon: Larry Brasher MD;  Location: [facility];  Service: Pain Management;  Laterality: N/A;    INJECTION OF ANESTHETIC AGENT AROUND NERVE Bilateral 5/6/2022    Procedure: BLOCK, NERVE, BILATERAL L3-L4-*L5 MEDIAL BRANCH;  Surgeon: Larry Brasher MD;  Location: [facility];  Service: Pain Management;  Laterality: Bilateral;    INJECTION OF ANESTHETIC AGENT AROUND NERVE Bilateral  6/2/2022    Procedure: BLOCK, NERVE BILATERAL L3-L4-L5 MEDIAL BRANCH 2nd, needs consent;  Surgeon: Larry Brasher MD;  Location: [facility];  Service: Pain Management;  Laterality: Bilateral;    MANDIBLE SURGERY      reconstruction    variceol repair       Review of patient's allergies indicates:   Allergen Reactions    Mustard Itching, Nausea And Vomiting, Shortness Of Breath and Swelling    Lipitor [atorvastatin] Itching    Mushroom Itching, Nausea And Vomiting and Swelling    Niaspan extended-release [niacin] Itching    Nystatin Hives     Other reaction(s): hives    Olive extract Itching, Nausea And Vomiting and Swelling    Oyster extract     Extendryl [[id number]] Rash    V-cillin k Rash      No current facility-administered medications for this encounter.       PMHx, PSHx, Allergies, Medications reviewed in epic    ROS negative except pain complaints in HPI    OBJECTIVE:    There were no vitals taken for this visit.    PHYSICAL EXAMINATION:    GENERAL: Well appearing, in no acute distress, alert and oriented x3.  PSYCH:  Mood and affect appropriate.  SKIN: Skin color, texture, turgor normal, no rashes or lesions which will impact the procedure.  CV: RRR with palpation of the radial artery.  PULM: No evidence of respiratory difficulty, symmetric chest rise. Clear to auscultation.  NEURO: Cranial nerves grossly intact.    Plan:    Proceed with procedure as planned Procedure(s) (LRB):  RADIOFREQUENCY ABLATION RIGHT L3,L4,L5 1 of 2, consent needed (Right)    Winston Craft  06/23/2022

## 2022-06-23 NOTE — DISCHARGE SUMMARY
Discharge Note  Short Stay      SUMMARY     Admit Date: 6/23/2022    Attending Physician: Larry Brasher      Discharge Physician: Larry Brasher      Discharge Date: 6/23/2022 3:18 PM    Procedure(s) (LRB):  RADIOFREQUENCY ABLATION RIGHT L3,L4,L5 1 of 2, consent needed (Right)    Final Diagnosis: Lumbar spondylosis [M47.816]    Disposition: Home or self care    Patient Instructions:   Current Discharge Medication List      CONTINUE these medications which have NOT CHANGED    Details   aspirin 81 MG Chew Take 81 mg by mouth once daily.      azelastine (ASTELIN) 137 mcg (0.1 %) nasal spray USE 1 TO 2 SPRAYS IN EACH NOSTRIL TWICE DAILY FOR CONGESTION      baclofen (LIORESAL) 20 MG tablet Take 1 tablet by mouth 3 (three) times daily as needed.       benztropine (COGENTIN) 0.5 MG tablet Take 1 tablet (0.5 mg total) by mouth once daily.  Qty: 60 tablet, Refills: 12      butalbital-acetaminophen-caffeine -40 mg (FIORICET, ESGIC) -40 mg per tablet Take 1 tablet by mouth every 4 (four) hours as needed.      butorphanol (STADOL) 10 mg/mL nasal spray 1 spray by Nasal route every 4 (four) hours as needed for Pain.      cloNIDine (CATAPRES) 0.1 MG tablet TAKE 1 TABLET(0.1 MG) BY MOUTH TWICE DAILY  Qty: 60 tablet, Refills: 3    Comments: ZERO refills remain on this prescription. Your patient is requesting advance approval of refills for this medication to PREVENT ANY MISSED DOSES  Associated Diagnoses: Tic      cyclobenzaprine (FLEXERIL) 10 MG tablet TK 1 T PO Q 8 H PRF PAIN      diazePAM (VALIUM) 2 MG tablet Take 2 mg by mouth 2 (two) times daily as needed.      erenumab-aooe (AIMOVIG AUTOINJECTOR SUBQ) Inject into the skin.      EScitalopram oxalate (LEXAPRO) 20 MG tablet Take 20 mg by mouth once daily.      estradioL (ESTRACE) 2 MG tablet       estradiol 0.1 mg/24 hr td ptwk 0.1 mg/24 hr PTWK       ezetimibe (ZETIA) 10 mg tablet Take 1 tablet (10 mg total) by mouth once daily.  Qty: 90 tablet, Refills: 3       FLUCELVAX QUAD 2550-9162, PF, 60 mcg (15 mcg x 4)/0.5 mL Syrg vaccine ADM 0.5ML IM UTD  Refills: 0      fluticasone (FLONASE) 50 mcg/actuation nasal spray SPRAY TWICE IEN QD  Refills: 5      gabapentin (NEURONTIN) 300 MG capsule Take 1 capsule (300 mg total) by mouth 3 (three) times daily.  Qty: 90 capsule, Refills: 2    Associated Diagnoses: Lumbar radiculopathy      hydrOXYzine pamoate (VISTARIL) 50 MG Cap hydroxyzine pamoate 50 mg capsule   TK 1 TO 2 CS PO HS PRN      ketorolac (TORADOL) 10 mg tablet ketorolac 10 mg tablet      levETIRAcetam (KEPPRA) 1000 MG tablet Take 1,000 mg by mouth 2 (two) times daily.      methocarbamoL (ROBAXIN) 500 MG Tab methocarbamol 500 mg tablet      metoclopramide HCl (REGLAN) 10 MG tablet 10 mg.      moxifloxacin (AVELOX) 400 mg tablet       NARCAN 4 mg/actuation Spry SPRAY 0.1ML IN 1 NOSTRIL MAY REPEAT DOSE EVERY 2-3 MINUTES AS NEEDED ALTERNATING NOSTRILS EACH DOSE  Qty: 1 each, Refills: 3    Associated Diagnoses: Chronic pain disorder; Lumbar radiculopathy      nitroGLYCERIN (NITROSTAT) 0.4 MG SL tablet Place 1 tablet (0.4 mg total) under the tongue every 5 (five) minutes as needed for Chest pain.  Qty: 90 tablet, Refills: 3      nitroGLYCERIN 0.1 mg/hr TD PT24 (NITRODUR) 0.1 mg/hr PT24 Nitroglycerin 0.4 mg Sublingual Tab      NURTEC 75 mg odt Take by mouth.      omeprazole (PRILOSEC) 20 MG capsule Take 20 mg by mouth once daily.      ondansetron (ZOFRAN) 4 MG tablet Take 1 tablet (4 mg total) by mouth every 6 (six) hours as needed for Nausea.  Qty: 12 tablet, Refills: 0      ondansetron (ZOFRAN-ODT) 8 MG TbDL Take 8 mg by mouth 2 (two) times daily.      oxybutynin (DITROPAN-XL) 10 MG 24 hr tablet Take 1 tablet (10 mg total) by mouth once daily.  Qty: 30 tablet, Refills: 11      prochlorperazine (COMPAZINE) 10 MG tablet Take 10 mg by mouth 3 (three) times daily.      propranoloL (INDERAL LA) 60 MG 24 hr capsule Take 60 mg by mouth every evening.      rosuvastatin (CRESTOR) 20  MG tablet Take 1 tablet (20 mg total) by mouth once daily.  Qty: 90 tablet, Refills: 9      spironolactone (ALDACTONE) 25 MG tablet Take 1 tablet (25 mg total) by mouth once daily.  Qty: 30 tablet, Refills: 3      tamsulosin (FLOMAX) 0.4 mg Cap Take 1 capsule (0.4 mg total) by mouth once daily.  Qty: 30 capsule, Refills: 11      !! traZODone (DESYREL) 100 MG tablet       !! traZODone (DESYREL) 50 MG tablet       verapamiL (VERELAN) 240 MG C24P verapamil  mg 24 hr capsule,extended release   TK ONE C PO  ONCE D       !! - Potential duplicate medications found. Please discuss with provider.              Discharge Diagnosis: Lumbar spondylosis [M47.816]  Condition on Discharge: Stable with no complications to procedure   Diet on Discharge: Same as before.  Activity: as per instruction sheet.  Discharge to: Home with a responsible adult.  Follow up: 2-4 weeks

## 2022-06-23 NOTE — H&P
HPI  Patient presenting for Procedure(s) (LRB):  RADIOFREQUENCY ABLATION RIGHT L3,L4,L5 1 of 2, consent needed (Right)     Patient on Anti-coagulation No    No health changes since previous encounter    Past Medical History:   Diagnosis Date    Anxiety     Cancer     Chest pain 1/20/2016 12/30/2015: Began experinece chest pain.    Depression     Functional movement disorder 10/1/2019    Migraine headache     Movement disorder     Myoclonic jerkings, massive     Stroke pt. states he had a cva at 3 months old     Past Surgical History:   Procedure Laterality Date    ANGIOGRAM, CORONARY, WITH LEFT HEART CATHETERIZATION      EPIDURAL STEROID INJECTION N/A 3/26/2021    Procedure: INJECTION, STEROID, EPIDURAL L4/5;  Surgeon: Larry Brasher MD;  Location: [facility];  Service: Pain Management;  Laterality: N/A;    EPIDURAL STEROID INJECTION N/A 6/4/2021    Procedure: INJECTION, STEROID, EPIDURAL, L4-L5 IL need consent;  Surgeon: Larry Brasher MD;  Location: [facility];  Service: Pain Management;  Laterality: N/A;    EPIDURAL STEROID INJECTION N/A 10/29/2021    Procedure: INJECTION, STEROID, EPIDURAL, L4-L5IL NEED CONSENT;  Surgeon: Larry Brasher MD;  Location: [facility];  Service: Pain Management;  Laterality: N/A;    EPIDURAL STEROID INJECTION N/A 1/27/2022    Procedure: Injection, Steroid, Epidural C7/T1;  Surgeon: Larry Brasher MD;  Location: [facility];  Service: Pain Management;  Laterality: N/A;    EPIDURAL STEROID INJECTION N/A 2/10/2022    Procedure: Injection, Steroid, Epidural L4/5;  Surgeon: Larry Brasher MD;  Location: [facility];  Service: Pain Management;  Laterality: N/A;    INJECTION OF ANESTHETIC AGENT AROUND NERVE Bilateral 5/6/2022    Procedure: BLOCK, NERVE, BILATERAL L3-L4-*L5 MEDIAL BRANCH;  Surgeon: Larry Brasher MD;  Location: [facility];  Service: Pain Management;  Laterality: Bilateral;    INJECTION OF ANESTHETIC AGENT AROUND NERVE Bilateral  6/2/2022    Procedure: BLOCK, NERVE BILATERAL L3-L4-L5 MEDIAL BRANCH 2nd, needs consent;  Surgeon: Larry Brasher MD;  Location[facility];  Service: Pain Management;  Laterality: Bilateral;    MANDIBLE SURGERY      reconstruction    variceol repair       Review of patient's allergies indicates:   Allergen Reactions    Mustard Itching, Nausea And Vomiting, Shortness Of Breath and Swelling    Lipitor [atorvastatin] Itching    Mushroom Itching, Nausea And Vomiting and Swelling    Niaspan extended-release [niacin] Itching    Nystatin Hives     Other reaction(s): hives    Olive extract Itching, Nausea And Vomiting and Swelling    Oyster extract     Extendryl [[id number]] Rash    V-cillin k Rash      No current facility-administered medications for this encounter.       PMHx, PSHx, Allergies, Medications reviewed in epic    ROS negative except pain complaints in HPI    OBJECTIVE:    There were no vitals taken for this visit.    PHYSICAL EXAMINATION:    GENERAL: Well appearing, in no acute distress, alert and oriented x3.  PSYCH:  Mood and affect appropriate.  SKIN: Skin color, texture, turgor normal, no rashes or lesions which will impact the procedure.  CV: RRR with palpation of the radial artery.  PULM: No evidence of respiratory difficulty, symmetric chest rise. Clear to auscultation.  NEURO: Cranial nerves grossly intact.    Plan:    Proceed with procedure as planned Procedure(s) (LRB):  RADIOFREQUENCY ABLATION RIGHT L3,L4,L5 1 of 2, consent needed (Right)    Winston Craft  06/23/2022

## 2022-06-23 NOTE — OP NOTE
Therapeutic Lumbar Medial Branch Radiofrequency Ablation under Fluoroscopy     The procedure, risks, benefits, and options were discussed with the patient. There are no contraindications to the procedure. The patent expressed understanding and agreed to the procedure. Informed written consent was obtained prior to the start of the procedure and can be found in the patient's chart.        PATIENT NAME: Mr. Gordon Griffin   MRN: [id number]     DATE OF PROCEDURE: 06/23/2022     PROCEDURE:  Right L3, L4 and L5 Lumbar Radiofrequency Ablation under Fluoroscopy    PRE-OP DIAGNOSIS: Lumbar spondylosis [M47.816] Lumbar spondylosis [M47.816]    POST-OP DIAGNOSIS: Same    PHYSICIAN: Larry Brasher MD    ASSISTANTS: Dr. Tran     MEDICATIONS INJECTED:  Preservative-free Decadron 10mg with 3cc of Bupivicaine 0.25%    LOCAL ANESTHETIC INJECTED:   Xylocaine 2%    SEDATION:   Versed 3mg and Fentanyl 100mcg                                                                                                                                                                                     Conscious sedation ordered by M.D. Patient re-evaluation prior to administration of conscious sedation. No changes noted in patient's status from initial evaluation. The patient's vital signs were monitored by RN and patient remained hemodynamically stable throughout the procedure.    Event Time In   Sedation Start 1504       ESTIMATED BLOOD LOSS:  None    COMPLICATIONS:  None     INTERVAL HISTORY: Patient has clinical and imaging findings suggestive of facet mediated pain. Patients has completed 2 previous diagnostic medial branch blocks at specified levels with at least 80% relief for the expected duration of the local anesthetic utilized.    TECHNIQUE: Time-out was performed to identify the patient and procedure to be performed. With the patient laying in a prone position, the surgical area was prepped and draped in the usual sterile fashion using  ChloraPrep and fenestrated drape. The levels were determined under fluoroscopic guidance. Skin anesthesia was achieved by injecting Lidocaine 2% over the injection sites. A 20 gauge 10mm curved active tip needle was introduced to the anatomic local of the medial branch at each of the above levels using AP, lateral and/or contralateral oblique fluoroscopic imaging. Then sensory and motor testing was performed to confirm that the needle tips were in the correct location. After negative aspiration for blood or CSF was confirmed, 1 mL of the lidocaine 2% listed above was injected slowly at each site. This was followed by thermal lesioning at 80 degrees celsius for 90 seconds. That was followed by slowly injecting 1 mL of the medication mixture listed above at each site. The needles were removed and bleeding was nil. A sterile dressing was applied. No specimens collected. The patient tolerated the procedure well and did not have any procedure related motor deficit at the conclusion of the procedure.    The patient was monitored after the procedure in the recovery area. They were given post-procedure and discharge instructions to follow at home. The patient was discharged in a stable condition.    I reviewed and edited the fellow's note. I conducted my own interview and physical examination. I agree with the findings. I was present and supervising all critical portions of the procedure.    Larry Brasher MD

## 2022-06-23 NOTE — DISCHARGE INSTRUCTIONS

## 2022-06-30 ENCOUNTER — TELEPHONE (OUTPATIENT)
Dept: PAIN MEDICINE | Facility: CLINIC | Age: 42
End: 2022-06-30
Payer: MEDICARE

## 2022-06-30 NOTE — TELEPHONE ENCOUNTER
Staff contact patient in regards of message. Patient wanted to know her arrival time on 07/07/22 for procedure.    Number was provided to the procedure area to assist.    Verbalized understanding

## 2022-06-30 NOTE — TELEPHONE ENCOUNTER
----- Message from [facility] sent at 6/30/2022  8:37 AM CDT -----  Name of Who is Calling:BEL [place] [[id number]]              What is the request in detail:Patient is requesting a call back to receive arrival time for procedure on 7/7/2022              Can the clinic reply by [facility]:no              What Number to [facility] Back [facility]:[phone]

## 2022-07-07 ENCOUNTER — HOSPITAL ENCOUNTER (OUTPATIENT)
Facility: OTHER | Age: 42
Discharge: HOME | End: 2022-07-07
Attending: ANESTHESIOLOGY | Admitting: ANESTHESIOLOGY
Payer: MEDICARE

## 2022-07-07 VITALS
WEIGHT: 153 LBS | BODY MASS INDEX: 20.72 KG/M2 | SYSTOLIC BLOOD PRESSURE: 134 MMHG | DIASTOLIC BLOOD PRESSURE: 82 MMHG | OXYGEN SATURATION: 98 % | RESPIRATION RATE: 16 BRPM | HEART RATE: 87 BPM | HEIGHT: 72 IN | TEMPERATURE: 98 F

## 2022-07-07 DIAGNOSIS — G89.29 CHRONIC PAIN: ICD-10-CM

## 2022-07-07 DIAGNOSIS — M47.816 LUMBAR SPONDYLOSIS: Primary | ICD-10-CM

## 2022-07-07 LAB
AMPHET+METHAMPHET UR QL: NEGATIVE
BARBITURATES UR QL SCN>200 NG/ML: ABNORMAL
BASOPHILS # BLD AUTO: 0.04 K/UL (ref 0–0.2)
BASOPHILS NFR BLD: 0.2 % (ref 0–1.9)
BENZODIAZ UR QL SCN>200 NG/ML: NEGATIVE
BZE UR QL SCN: NEGATIVE
CANNABINOIDS UR QL SCN: NEGATIVE
CREAT UR-MCNC: 291 MG/DL (ref 15–325)
DIFFERENTIAL METHOD: ABNORMAL
EOSINOPHIL # BLD AUTO: 0 K/UL (ref 0–0.5)
EOSINOPHIL NFR BLD: 0 % (ref 0–8)
ERYTHROCYTE [DISTWIDTH] IN BLOOD BY AUTOMATED COUNT: 11.9 % (ref 11.5–14.5)
HCT VFR BLD AUTO: 39 % (ref 37–48.5)
HGB BLD-MCNC: 14.2 G/DL (ref 12–16)
IMM GRANULOCYTES # BLD AUTO: 0.12 K/UL (ref 0–0.04)
IMM GRANULOCYTES NFR BLD AUTO: 0.6 % (ref 0–0.5)
LYMPHOCYTES # BLD AUTO: 0.7 K/UL (ref 1–4.8)
LYMPHOCYTES NFR BLD: 3.7 % (ref 18–48)
MCH RBC QN AUTO: 29.9 PG (ref 27–31)
MCHC RBC AUTO-ENTMCNC: 36.4 G/DL (ref 32–36)
MCV RBC AUTO: 82 FL (ref 82–98)
METHADONE UR QL SCN>300 NG/ML: NEGATIVE
MONOCYTES # BLD AUTO: 0.7 K/UL (ref 0.3–1)
MONOCYTES NFR BLD: 3.7 % (ref 4–15)
NEUTROPHILS # BLD AUTO: 18 K/UL (ref 1.8–7.7)
NEUTROPHILS NFR BLD: 91.8 % (ref 38–73)
NRBC BLD-RTO: 0 /100 WBC
OPIATES UR QL SCN: NEGATIVE
PCP UR QL SCN>25 NG/ML: NEGATIVE
PLATELET # BLD AUTO: 165 K/UL (ref 150–450)
PMV BLD AUTO: 11 FL (ref 9.2–12.9)
RBC # BLD AUTO: 4.75 M/UL (ref 4–5.4)
TOXICOLOGY INFORMATION: ABNORMAL
WBC # BLD AUTO: 19.58 K/UL (ref 3.9–12.7)

## 2022-07-07 PROCEDURE — 25000003 PHARM REV CODE 250: Performed by: ANESTHESIOLOGY

## 2022-07-07 PROCEDURE — 25000003 PHARM REV CODE 250: Performed by: STUDENT IN AN ORGANIZED HEALTH CARE EDUCATION/TRAINING PROGRAM

## 2022-07-07 PROCEDURE — 64635 DESTROY LUMB/SAC FACET JNT: CPT | Mod: LT | Performed by: ANESTHESIOLOGY

## 2022-07-07 PROCEDURE — 99152 MOD SED SAME PHYS/QHP 5/>YRS: CPT | Performed by: ANESTHESIOLOGY

## 2022-07-07 PROCEDURE — 63600175 PHARM REV CODE 636 W HCPCS: Performed by: ANESTHESIOLOGY

## 2022-07-07 PROCEDURE — 99152 PR MOD CONSCIOUS SEDATION, SAME PHYS, 5+ YRS, FIRST 15 MIN: ICD-10-PCS | Mod: ,,, | Performed by: ANESTHESIOLOGY

## 2022-07-07 PROCEDURE — 64635 PR DESTROY LUMB/SAC FACET JNT: ICD-10-PCS | Mod: LT,,, | Performed by: ANESTHESIOLOGY

## 2022-07-07 PROCEDURE — 64636 DESTROY L/S FACET JNT ADDL: CPT | Mod: LT | Performed by: ANESTHESIOLOGY

## 2022-07-07 PROCEDURE — 64636 PR DESTROY L/S FACET JNT ADDL: ICD-10-PCS | Mod: LT,,, | Performed by: ANESTHESIOLOGY

## 2022-07-07 PROCEDURE — 64635 DESTROY LUMB/SAC FACET JNT: CPT | Mod: LT,,, | Performed by: ANESTHESIOLOGY

## 2022-07-07 PROCEDURE — 99152 MOD SED SAME PHYS/QHP 5/>YRS: CPT | Mod: ,,, | Performed by: ANESTHESIOLOGY

## 2022-07-07 PROCEDURE — 64636 DESTROY L/S FACET JNT ADDL: CPT | Mod: LT,,, | Performed by: ANESTHESIOLOGY

## 2022-07-07 RX ORDER — LIDOCAINE HYDROCHLORIDE 20 MG/ML
INJECTION, SOLUTION INFILTRATION; PERINEURAL
Status: DISCONTINUED | OUTPATIENT
Start: 2022-07-07 | End: 2022-07-07 | Stop reason: HOSPADM

## 2022-07-07 RX ORDER — MIDAZOLAM HYDROCHLORIDE 1 MG/ML
INJECTION INTRAMUSCULAR; INTRAVENOUS
Status: DISCONTINUED | OUTPATIENT
Start: 2022-07-07 | End: 2022-07-07 | Stop reason: HOSPADM

## 2022-07-07 RX ORDER — SODIUM CHLORIDE 9 MG/ML
500 INJECTION, SOLUTION INTRAVENOUS CONTINUOUS
Status: DISCONTINUED | OUTPATIENT
Start: 2022-07-07 | End: 2022-07-07 | Stop reason: HOSPADM

## 2022-07-07 RX ORDER — FENTANYL CITRATE 50 UG/ML
INJECTION, SOLUTION INTRAMUSCULAR; INTRAVENOUS
Status: DISCONTINUED | OUTPATIENT
Start: 2022-07-07 | End: 2022-07-07 | Stop reason: HOSPADM

## 2022-07-07 RX ORDER — DEXAMETHASONE SODIUM PHOSPHATE 10 MG/ML
INJECTION INTRAMUSCULAR; INTRAVENOUS
Status: DISCONTINUED | OUTPATIENT
Start: 2022-07-07 | End: 2022-07-07 | Stop reason: HOSPADM

## 2022-07-07 RX ORDER — BUPIVACAINE HYDROCHLORIDE 2.5 MG/ML
INJECTION, SOLUTION EPIDURAL; INFILTRATION; INTRACAUDAL
Status: DISCONTINUED | OUTPATIENT
Start: 2022-07-07 | End: 2022-07-07 | Stop reason: HOSPADM

## 2022-07-07 NOTE — DISCHARGE SUMMARY
Discharge Note  Short Stay      SUMMARY     Admit Date: 7/7/2022    Attending Physician: Nicol Garcia      Discharge Physician: Nicol Garcia      Discharge Date: 7/7/2022 1:05 PM    Procedure(s) (LRB):  RADIOFREQUENCY ABLATION LEFT L3,L4,L5 2 of 2, needs consent (Left)    Final Diagnosis: Lumbar spondylosis [M47.816]    Disposition: Home or self care    Patient Instructions:   Current Discharge Medication List      CONTINUE these medications which have NOT CHANGED    Details   aspirin 81 MG Chew Take 81 mg by mouth once daily.      azelastine (ASTELIN) 137 mcg (0.1 %) nasal spray USE 1 TO 2 SPRAYS IN EACH NOSTRIL TWICE DAILY FOR CONGESTION      baclofen (LIORESAL) 20 MG tablet Take 1 tablet by mouth 3 (three) times daily as needed.       benztropine (COGENTIN) 0.5 MG tablet Take 1 tablet (0.5 mg total) by mouth once daily.  Qty: 60 tablet, Refills: 12      butalbital-acetaminophen-caffeine -40 mg (FIORICET, ESGIC) -40 mg per tablet Take 1 tablet by mouth every 4 (four) hours as needed.      butorphanol (STADOL) 10 mg/mL nasal spray 1 spray by Nasal route every 4 (four) hours as needed for Pain.      cloNIDine (CATAPRES) 0.1 MG tablet TAKE 1 TABLET(0.1 MG) BY MOUTH TWICE DAILY  Qty: 60 tablet, Refills: 3    Comments: ZERO refills remain on this prescription. Your patient is requesting advance approval of refills for this medication to PREVENT ANY MISSED DOSES  Associated Diagnoses: Tic      cyclobenzaprine (FLEXERIL) 10 MG tablet TK 1 T PO Q 8 H PRF PAIN      diazePAM (VALIUM) 2 MG tablet Take 2 mg by mouth 2 (two) times daily as needed.      erenumab-aooe (AIMOVIG AUTOINJECTOR SUBQ) Inject into the skin.      EScitalopram oxalate (LEXAPRO) 20 MG tablet Take 20 mg by mouth once daily.      estradioL (ESTRACE) 2 MG tablet       estradiol 0.1 mg/24 hr td ptwk 0.1 mg/24 hr PTWK       ezetimibe (ZETIA) 10 mg tablet Take 1 tablet (10 mg total) by mouth once daily.  Qty: 90 tablet, Refills: 3      FLUCELVAX QUAD  5203-0738, PF, 60 mcg (15 mcg x 4)/0.5 mL Syrg vaccine ADM 0.5ML IM UTD  Refills: 0      fluticasone (FLONASE) 50 mcg/actuation nasal spray SPRAY TWICE IEN QD  Refills: 5      gabapentin (NEURONTIN) 300 MG capsule Take 1 capsule (300 mg total) by mouth 3 (three) times daily.  Qty: 90 capsule, Refills: 2    Associated Diagnoses: Lumbar radiculopathy      hydrOXYzine pamoate (VISTARIL) 50 MG Cap hydroxyzine pamoate 50 mg capsule   TK 1 TO 2 CS PO HS PRN      ketorolac (TORADOL) 10 mg tablet ketorolac 10 mg tablet      levETIRAcetam (KEPPRA) 1000 MG tablet Take 1,000 mg by mouth 2 (two) times daily.      methocarbamoL (ROBAXIN) 500 MG Tab methocarbamol 500 mg tablet      metoclopramide HCl (REGLAN) 10 MG tablet 10 mg.      moxifloxacin (AVELOX) 400 mg tablet       NARCAN 4 mg/actuation Spry SPRAY 0.1ML IN 1 NOSTRIL MAY REPEAT DOSE EVERY 2-3 MINUTES AS NEEDED ALTERNATING NOSTRILS EACH DOSE  Qty: 1 each, Refills: 3    Associated Diagnoses: Chronic pain disorder; Lumbar radiculopathy      nitroGLYCERIN (NITROSTAT) 0.4 MG SL tablet Place 1 tablet (0.4 mg total) under the tongue every 5 (five) minutes as needed for Chest pain.  Qty: 90 tablet, Refills: 3      nitroGLYCERIN 0.1 mg/hr TD PT24 (NITRODUR) 0.1 mg/hr PT24 Nitroglycerin 0.4 mg Sublingual Tab      NURTEC 75 mg odt Take by mouth.      omeprazole (PRILOSEC) 20 MG capsule Take 20 mg by mouth once daily.      ondansetron (ZOFRAN) 4 MG tablet Take 1 tablet (4 mg total) by mouth every 6 (six) hours as needed for Nausea.  Qty: 12 tablet, Refills: 0      ondansetron (ZOFRAN-ODT) 8 MG TbDL Take 8 mg by mouth 2 (two) times daily.      oxybutynin (DITROPAN-XL) 10 MG 24 hr tablet Take 1 tablet (10 mg total) by mouth once daily.  Qty: 30 tablet, Refills: 11      prochlorperazine (COMPAZINE) 10 MG tablet Take 10 mg by mouth 3 (three) times daily.      propranoloL (INDERAL LA) 60 MG 24 hr capsule Take 60 mg by mouth every evening.      rosuvastatin (CRESTOR) 20 MG tablet Take 1  tablet (20 mg total) by mouth once daily.  Qty: 90 tablet, Refills: 9      spironolactone (ALDACTONE) 25 MG tablet Take 1 tablet (25 mg total) by mouth once daily.  Qty: 30 tablet, Refills: 3      tamsulosin (FLOMAX) 0.4 mg Cap Take 1 capsule (0.4 mg total) by mouth once daily.  Qty: 30 capsule, Refills: 11      !! traZODone (DESYREL) 100 MG tablet       !! traZODone (DESYREL) 50 MG tablet       verapamiL (VERELAN) 240 MG C24P verapamil  mg 24 hr capsule,extended release   TK ONE C PO  ONCE D       !! - Potential duplicate medications found. Please discuss with provider.              Discharge Diagnosis: Lumbar spondylosis [M47.816]  Condition on Discharge: Stable with no complications to procedure   Diet on Discharge: Same as before.  Activity: as per instruction sheet.  Discharge to: Home with a responsible adult.  Follow up: 2-4 weeks       Please call my office or pager at [phone] if experienced any weakness or loss of sensation, fever > 101.5, pain uncontrolled with oral medications, persistent nausea/vomiting/or diarrhea, redness or drainage from the incisions, or any other worrisome concerns. If physician on call was not reached or could not communicate with our office for any reason please go to the nearest emergency department        Nicol Garcia MD

## 2022-07-07 NOTE — DISCHARGE INSTRUCTIONS
Thank you for allowing us to care for you today. You may receive a survey about the care we provided. Your feedback is valuable and helps us provide excellent care throughout the community.     Home Care Instructions for Pain Management:    1. DIET:   You may resume your normal diet today.   2. BATHING:   You may shower with luke warm water. No tub baths or anything that will soak injection sites under water for the next 24 hours.  3. DRESSING:   You may remove your bandage today.   4. ACTIVITY LEVEL:   You may resume your normal activities 24 hrs after your procedure. Nothing strenuous today.  5. MEDICATIONS:   You may resume your normal medications today. To restart blood thinners, ask your doctor.  6. DRIVING    If you have received any sedatives by mouth today, you may not drive for 12 hours.    If you have received any sedation through your IV, you may not drive for 24 hrs.   7. SPECIAL INSTRUCTIONS:   No heat to the injection site for 24 hrs including, hot bath or shower, heating pad, moist heat, or hot tubs.    Use ice pack to injection site for any pain or discomfort.  Apply ice packs for 20 minute intervals as needed.    IF you have diabetes, be sure to monitor your blood sugar more closely. IF your injection contained steroids your blood sugar levels may become higher than normal.    If you are still having pain upon discharge:  Your pain may improve over the next 48 hours. The anesthetic (numbing medication) works immediately to 48 hours. IF your injection contained a steroid (anti-inflammatory medication), it takes approximately 3 days to start feeling relief and 7-10 days to see your greatest results from the medication. It is possible you may need subsequent injections. This would be discussed at your follow up appointment with pain management or your referring doctor.    Please call the PAIN MANAGEMENT office at [phone] or ON CALL pager at [phone] if you experienced any:   -Weakness or  loss of sensation  -Fever > 101.5  -Pain uncontrolled with oral medications   -Persistent nausea, vomiting, or diarrhea  -Redness or drainage from the injection sites, or any other worrisome concerns.   If physician on call was not reached or could not communicate with our office for any reason please go to the nearest emergency department. Adult Procedural Sedation Instructions    Recovery After Procedural Sedation (Adult)  You have been given medicine by vein to make you sleep during your surgery. This may have included both a pain medicine and sleeping medicine. Most of the effects have worn off. But you may still have some drowsiness for the next 6 to 8 hours.  Home care  Follow these guidelines when you get home:  For the next 8 hours, you should be watched by a responsible adult. This person should make sure your condition is not getting worse.  Don't drink any alcohol for the next 24 hours.  Don't drive, operate dangerous machinery, or make important business or personal decisions during the next 24 hours.  Note: Your healthcare provider may tell you not to take any medicine by mouth for pain or sleep in the next 4 hours. These medicines may react with the medicines you were given in the hospital. This could cause a much stronger response than usual.  Follow-up care  Follow up with your healthcare provider if you are not alert and back to your usual level of activity within 12 hours.  When to seek medical advice  Call your healthcare provider right away if any of these occur:  Drowsiness gets worse  Weakness or dizziness gets worse  Repeated vomiting  You can't be awakened   Date Last Reviewed: 10/18/2016  © 5137-1401 [employer] [address] [place] [address], [place] [address]. All rights reserved. This information is not intended as a substitute for professional medical care. Always follow your healthcare professional's instructions.

## 2022-07-07 NOTE — OP NOTE
Therapeutic Lumbar Medial Branch Radiofrequency Ablation under Fluoroscopy     The procedure, risks, benefits, and options were discussed with the patient. There are no contraindications to the procedure. The patent expressed understanding and agreed to the procedure. Informed written consent was obtained prior to the start of the procedure and can be found in the patient's chart.        PATIENT NAME: Mr. Gordon Griffin   MRN: [id number]     DATE OF PROCEDURE: 07/07/2022     PROCEDURE:  Left L3, L4 and L5 Lumbar Radiofrequency Ablation under Fluoroscopy    PRE-OP DIAGNOSIS: Lumbar spondylosis [M47.816] Lumbar spondylosis [M47.816]    POST-OP DIAGNOSIS: Same    PHYSICIAN: Larry Brasher MD    ASSISTANTS: Dr. Thompson. DO     MEDICATIONS INJECTED:  Preservative-free Decadron 10mg with 9cc of Bupivicaine 0.25%    LOCAL ANESTHETIC INJECTED:   Xylocaine 2%    SEDATION:   Versed 3mg and Fentanyl 100mcg                                                                                                                                                                                     Conscious sedation ordered by M.D. Patient re-evaluation prior to administration of conscious sedation. No changes noted in patient's status from initial evaluation. The patient's vital signs were monitored by RN and patient remained hemodynamically stable throughout the procedure.    Event Time In   Sedation Start 1353   Sedation End 1407       ESTIMATED BLOOD LOSS:  None    COMPLICATIONS:  None     INTERVAL HISTORY: Patient has clinical and imaging findings suggestive of facet mediated pain. Patients has completed 2 previous diagnostic medial branch blocks at specified levels with at least 80% relief for the expected duration of the local anesthetic utilized.    TECHNIQUE: Time-out was performed to identify the patient and procedure to be performed. With the patient laying in a prone position, the surgical area was prepped and draped in the  usual sterile fashion using ChloraPrep and fenestrated drape. The levels were determined under fluoroscopic guidance. Skin anesthesia was achieved by injecting Lidocaine 2% over the injection sites. A 20 gauge 10mm curved active tip needle was introduced to the anatomic local of the medial branch at each of the above levels using AP, lateral and/or contralateral oblique fluoroscopic imaging. Then sensory and motor testing was performed to confirm that the needle tips were in the correct location. After negative aspiration for blood or CSF was confirmed, 1 mL of the lidocaine 2% listed above was injected slowly at each site. This was followed by thermal lesioning at 80 degrees celsius for 90 seconds. That was followed by slowly injecting 1 mL of the medication mixture listed above at each site. The needles were removed and bleeding was nil. A sterile dressing was applied. No specimens collected. The patient tolerated the procedure well and did not have any procedure related motor deficit at the conclusion of the procedure.    The patient was monitored after the procedure in the recovery area. They were given post-procedure and discharge instructions to follow at home. The patient was discharged in a stable condition.    I reviewed and edited the fellow's note. I conducted my own interview and physical examination. I agree with the findings. I was present and supervising all critical portions of the procedure.    Larry Brasher MD

## 2022-07-07 NOTE — INTERVAL H&P NOTE
The patient has been examined and the H&P has been reviewed:    I concur with the findings and no changes have occurred since H&P was written.  No recent fevers/infections.  No recent blood thinners    Procedure risks, benefits and alternative options discussed and understood by patient/family.      There are no hospital problems to display for this patient.

## 2022-07-08 ENCOUNTER — PATIENT MESSAGE (OUTPATIENT)
Dept: PAIN MEDICINE | Facility: OTHER | Age: 42
End: 2022-07-08
Payer: MEDICARE

## 2022-07-12 ENCOUNTER — IMMUNIZATION (OUTPATIENT)
Dept: PRIMARY CARE CLINIC | Facility: CLINIC | Age: 42
End: 2022-07-12
Payer: MEDICARE

## 2022-07-12 DIAGNOSIS — Z23 NEED FOR VACCINATION: Primary | ICD-10-CM

## 2022-07-12 PROCEDURE — 91305 COVID-19, MRNA, LNP-S, PF, 30 MCG/0.3 ML DOSE VACCINE (PFIZER): CPT | Mod: PBBFAC | Performed by: INTERNAL MEDICINE

## 2022-07-13 NOTE — DISCHARGE SUMMARY
Discharge Note  Short Stay      SUMMARY     Admit Date: 7/7/2022    Attending Physician: Larry Brasher      Discharge Physician: Larry Brasher      Discharge Date: 7/13/2022 1:20 PM    Procedure(s) (LRB):  RADIOFREQUENCY ABLATION LEFT L3,L4,L5 2 of 2, needs consent (Left)    Final Diagnosis: Lumbar spondylosis [M47.816]    Disposition: Home or self care    Patient Instructions:   Discharge Medication List as of 7/7/2022  2:33 PM      CONTINUE these medications which have NOT CHANGED    Details   aspirin 81 MG Chew Take 81 mg by mouth once daily., Historical Med      azelastine (ASTELIN) 137 mcg (0.1 %) nasal spray USE 1 TO 2 SPRAYS IN EACH NOSTRIL TWICE DAILY FOR CONGESTION, Historical Med      baclofen (LIORESAL) 20 MG tablet Take 1 tablet by mouth 3 (three) times daily as needed. , Historical Med      benztropine (COGENTIN) 0.5 MG tablet Take 1 tablet (0.5 mg total) by mouth once daily., Starting Thu 5/5/2022, Until Sat 6/4/2022, Normal      butalbital-acetaminophen-caffeine -40 mg (FIORICET, ESGIC) -40 mg per tablet Take 1 tablet by mouth every 4 (four) hours as needed., Historical Med      butorphanol (STADOL) 10 mg/mL nasal spray 1 spray by Nasal route every 4 (four) hours as needed for Pain., Historical Med      cloNIDine (CATAPRES) 0.1 MG tablet TAKE 1 TABLET(0.1 MG) BY MOUTH TWICE DAILY, Normal      cyclobenzaprine (FLEXERIL) 10 MG tablet TK 1 T PO Q 8 H PRF PAIN, Historical Med      diazePAM (VALIUM) 2 MG tablet Take 2 mg by mouth 2 (two) times daily as needed., Starting Mon 1/11/2021, Historical Med      erenumab-aooe (AIMOVIG AUTOINJECTOR SUBQ) Inject into the skin., Historical Med      EScitalopram oxalate (LEXAPRO) 20 MG tablet Take 20 mg by mouth once daily., Starting Mon 1/11/2021, Historical Med      estradioL (ESTRACE) 2 MG tablet Starting Fri 9/10/2021, Historical Med      estradiol 0.1 mg/24 hr td ptwk 0.1 mg/24 hr PTWK Starting Mon 2/1/2021, Historical Med      ezetimibe (ZETIA)  10 mg tablet Take 1 tablet (10 mg total) by mouth once daily., Starting Fri 5/27/2022, Until Sat 5/27/2023, Normal      FLUCELVAX QUAD 7481-2451, PF, 60 mcg (15 mcg x 4)/0.5 mL Syrg vaccine ADM 0.5ML IM UTD, Historical Med      fluticasone (FLONASE) 50 mcg/actuation nasal spray SPRAY TWICE IEN QD, Historical Med      gabapentin (NEURONTIN) 300 MG capsule Take 1 capsule (300 mg total) by mouth 3 (three) times daily., Starting Wed 3/2/2022, Normal      hydrOXYzine pamoate (VISTARIL) 50 MG Cap hydroxyzine pamoate 50 mg capsule   TK 1 TO 2 CS PO HS PRN, Historical Med      ketorolac (TORADOL) 10 mg tablet ketorolac 10 mg tablet, Historical Med      levETIRAcetam (KEPPRA) 1000 MG tablet Take 1,000 mg by mouth 2 (two) times daily., Starting Wed 1/13/2021, Historical Med      methocarbamoL (ROBAXIN) 500 MG Tab methocarbamol 500 mg tablet, Historical Med      metoclopramide HCl (REGLAN) 10 MG tablet 10 mg., Historical Med      moxifloxacin (AVELOX) 400 mg tablet Starting Tue 8/24/2021, Historical Med      NARCAN 4 mg/actuation Spry SPRAY 0.1ML IN 1 NOSTRIL MAY REPEAT DOSE EVERY 2-3 MINUTES AS NEEDED ALTERNATING NOSTRILS EACH DOSE, Normal      nitroGLYCERIN (NITROSTAT) 0.4 MG SL tablet Place 1 tablet (0.4 mg total) under the tongue every 5 (five) minutes as needed for Chest pain., Starting Fri 5/27/2022, Until Sat 5/27/2023 at 2359, Normal      NURTEC 75 mg odt Take by mouth., Starting Wed 10/27/2021, Historical Med      omeprazole (PRILOSEC) 20 MG capsule Take 20 mg by mouth once daily., Historical Med      ondansetron (ZOFRAN) 4 MG tablet Take 1 tablet (4 mg total) by mouth every 6 (six) hours as needed for Nausea., Starting 11/26/2012, Until Discontinued, Normal      ondansetron (ZOFRAN-ODT) 8 MG TbDL Take 8 mg by mouth 2 (two) times daily., Historical Med      oxybutynin (DITROPAN-XL) 10 MG 24 hr tablet Take 1 tablet (10 mg total) by mouth once daily., Starting Wed 2/16/2022, Until Thu 2/16/2023, Normal       prochlorperazine (COMPAZINE) 10 MG tablet Take 10 mg by mouth 3 (three) times daily., Historical Med      propranoloL (INDERAL LA) 60 MG 24 hr capsule Take 60 mg by mouth every evening., Starting Fri 2/5/2021, Historical Med      rosuvastatin (CRESTOR) 20 MG tablet Take 1 tablet (20 mg total) by mouth once daily., Starting Fri 5/27/2022, Normal      spironolactone (ALDACTONE) 25 MG tablet Take 1 tablet (25 mg total) by mouth once daily., Starting Fri 11/15/2019, Until Tue 5/31/2022, Normal      tamsulosin (FLOMAX) 0.4 mg Cap Take 1 capsule (0.4 mg total) by mouth once daily., Starting Wed 2/16/2022, Until Thu 2/16/2023, Normal      !! traZODone (DESYREL) 100 MG tablet Starting Wed 9/8/2021, Historical Med      !! traZODone (DESYREL) 50 MG tablet Starting Tue 9/7/2021, Historical Med      verapamiL (VERELAN) 240 MG C24P verapamil  mg 24 hr capsule,extended release   TK ONE C PO  ONCE D, Historical Med       !! - Potential duplicate medications found. Please discuss with provider.      STOP taking these medications       nitroGLYCERIN 0.1 mg/hr TD PT24 (NITRODUR) 0.1 mg/hr PT24 Comments:   Reason for Stopping:                   Discharge Diagnosis: Lumbar spondylosis [M47.816]  Condition on Discharge: Stable with no complications to procedure   Diet on Discharge: Same as before.  Activity: as per instruction sheet.  Discharge to: Home with a responsible adult.  Follow up: 2-4 weeks

## 2022-07-18 LAB
AMPHIPHYSIN AB TITR SER: NEGATIVE TITER
CASPR2-IGG CBA: NEGATIVE
CV2 IGG SER QL IB: NEGATIVE
CV2 IGG TITR SER: NEGATIVE TITER
DPPX IGG SERPL QL IF: NEGATIVE
GAD65 AB SER-SCNC: 0.04 NMOL/L
GLIAL NUC TYPE 1 AB TITR SER: NEGATIVE TITER
GRAF1 IFA,S: NEGATIVE
HU1 AB TITR SER: NEGATIVE TITER
HU2 AB TITR SER IF: NEGATIVE TITER
HU3 AB TITR SER: NEGATIVE TITER
IGLON5 IFA, S: NEGATIVE
IMMUNOLOGIST REVIEW: ABNORMAL
ITPR1 IFA, S: NEGATIVE
KELCH-LIKE PROTEIN ANTIBODY, SERUM: ABNORMAL
LGI1-IGG CBA: NEGATIVE
MGLUR1 AB IFA, SERUM: NEGATIVE
NIF IFA, S: NEGATIVE
NMDA-R AB CBA, SERUM: NEGATIVE
PAVAL REFLEX TEST ADDED: ABNORMAL
PCA-1 AB TITR SER: NEGATIVE TITER
PCA-2 AB TITR SER: NEGATIVE TITER
PCA-TR AB TITR SER: NEGATIVE TITER
VGCC-P/Q BIND AB SER-SCNC: 0 NMOL/L

## 2022-08-03 ENCOUNTER — OFFICE VISIT (OUTPATIENT)
Dept: UROLOGY | Facility: CLINIC | Age: 42
End: 2022-08-03
Payer: MEDICARE

## 2022-08-03 VITALS
WEIGHT: 144.81 LBS | HEIGHT: 72 IN | DIASTOLIC BLOOD PRESSURE: 80 MMHG | BODY MASS INDEX: 19.61 KG/M2 | SYSTOLIC BLOOD PRESSURE: 128 MMHG | HEART RATE: 69 BPM

## 2022-08-03 DIAGNOSIS — Z31.41 FERTILITY TESTING: Primary | ICD-10-CM

## 2022-08-03 PROCEDURE — 3008F PR BODY MASS INDEX (BMI) DOCUMENTED: ICD-10-PCS | Mod: CPTII,S$GLB,, | Performed by: UROLOGY

## 2022-08-03 PROCEDURE — 99999 PR PBB SHADOW E&M-EST. PATIENT-LVL IV: CPT | Mod: PBBFAC,,, | Performed by: UROLOGY

## 2022-08-03 PROCEDURE — 3079F PR MOST RECENT DIASTOLIC BLOOD PRESSURE 80-89 MM HG: ICD-10-PCS | Mod: CPTII,S$GLB,, | Performed by: UROLOGY

## 2022-08-03 PROCEDURE — 3074F SYST BP LT 130 MM HG: CPT | Mod: CPTII,S$GLB,, | Performed by: UROLOGY

## 2022-08-03 PROCEDURE — 3008F BODY MASS INDEX DOCD: CPT | Mod: CPTII,S$GLB,, | Performed by: UROLOGY

## 2022-08-03 PROCEDURE — 1159F PR MEDICATION LIST DOCUMENTED IN MEDICAL RECORD: ICD-10-PCS | Mod: CPTII,S$GLB,, | Performed by: UROLOGY

## 2022-08-03 PROCEDURE — 1159F MED LIST DOCD IN RCRD: CPT | Mod: CPTII,S$GLB,, | Performed by: UROLOGY

## 2022-08-03 PROCEDURE — 99214 PR OFFICE/OUTPT VISIT, EST, LEVL IV, 30-39 MIN: ICD-10-PCS | Mod: S$GLB,,, | Performed by: UROLOGY

## 2022-08-03 PROCEDURE — 99214 OFFICE O/P EST MOD 30 MIN: CPT | Mod: S$GLB,,, | Performed by: UROLOGY

## 2022-08-03 PROCEDURE — 99999 PR PBB SHADOW E&M-EST. PATIENT-LVL IV: ICD-10-PCS | Mod: PBBFAC,,, | Performed by: UROLOGY

## 2022-08-03 PROCEDURE — 3074F PR MOST RECENT SYSTOLIC BLOOD PRESSURE < 130 MM HG: ICD-10-PCS | Mod: CPTII,S$GLB,, | Performed by: UROLOGY

## 2022-08-03 PROCEDURE — 3079F DIAST BP 80-89 MM HG: CPT | Mod: CPTII,S$GLB,, | Performed by: UROLOGY

## 2022-08-03 RX ORDER — TAMSULOSIN HYDROCHLORIDE 0.4 MG/1
0.4 CAPSULE ORAL DAILY
Qty: 30 CAPSULE | Refills: 11 | Status: SHIPPED | OUTPATIENT
Start: 2022-08-03 | End: 2023-05-16

## 2022-08-03 RX ORDER — OXYBUTYNIN CHLORIDE 10 MG/1
10 TABLET, EXTENDED RELEASE ORAL DAILY
Qty: 30 TABLET | Refills: 11 | Status: SHIPPED | OUTPATIENT
Start: 2022-08-03 | End: 2023-05-16

## 2022-08-03 NOTE — PROGRESS NOTES
[facility]        New Overactive Bladder (OAB) Note     8/3/2022     Referred by:  Self, Aaareferral     HPI: Dylon Griffin is a very pleasant 42 y.o. female who is a new patient to our department referred for evaluation of urinary frequency of 8-10 months duration. She reports bother is associated with urinary daytime frequency (10-12x daily), with nocturia (3-4x per night) and with urgency that does not result in urinary incontinence. She reports no stress urinary incontinence associated with exertion. She does not require daily pad use.  She has not made changes to fluid intake.  She reports urinary frequency is day and night but more predominant during the day.      She denies symptoms of irritative voiding including dysuria. She denies symptoms of obstructive voiding including decreased stream, hesitancy, intermittency, post void dribbling and sense of incomplete emptying. Bladder scan PVR was 31 mL. Her history includes no notation of urolithiasis, hematuria, prior pelvic surgery, previous prolapse or incontinence procedures. She does have epidural injections, including L4-L5 for lumbar radiculopathy. She has not noted much correlation between these symptoms and the bladder symptoms. Back pain had improved with the injections. She denies all other prior pelvic surgeries or neurologic diagnoses.  She denies a history of constipation. She denies a history suggestive of glaucoma.  She denies a history of hypertension.     Much better on combination of tamsulosin and ditropan XL 10 mg. Likely was the ditropan based on timing of improvement.     Previous incontinence therapies:  · No Previous Therapies     A review of 10+ systems was conducted with pertinent positive and negative findings documented in HPI with all other systems reviewed and negative.     Past medical, family, surgical and social history reviewed as documented in chart with pertinent positive medical, family, surgical and social  history detailed in HPI.     Exam Findings:     Const: no acute distress, conversant and alert  Eyes: anicteric, extraocular muscles intact  ENMT: normocephalic, Nl oral membranes  Cardio: no cyanosis, nl cap refill  Pulm: no tachypnea; no resp distress  Abd: soft, no tenderness  Musc: no laceration, no tenderness  Neuro: alert; oriented x 3  Skin: warm, dry; no petichiae  Psych: no anxiety; normal speech      Assessment/Plan:     Overactive Bladder with Urgency Incontinence (established,improved): Much better on combination of tamsulosin and ditropan XL 10 mg. Likely was the ditropan based on timing of improvement. She is going to try stopping the tamsulosin to see if there is an adverse change.

## 2022-08-09 ENCOUNTER — TELEPHONE (OUTPATIENT)
Dept: SPINE | Facility: CLINIC | Age: 42
End: 2022-08-09
Payer: MEDICARE

## 2022-08-09 NOTE — TELEPHONE ENCOUNTER
This message is for patient in regards to his/her appointment 08/11/22 at 8:30 a   With Dr.Yashar Anshu M.D.      [facility] Policy: For the safety of all patients and staff members.     Patient Visitor policy: During this visit we're informing all patients that face mask are required.     If you have any questions or concerns please contact [phone]

## 2022-08-11 ENCOUNTER — OFFICE VISIT (OUTPATIENT)
Dept: SPINE | Facility: CLINIC | Age: 42
End: 2022-08-11
Payer: MEDICARE

## 2022-08-11 DIAGNOSIS — G89.4 CHRONIC PAIN SYNDROME: ICD-10-CM

## 2022-08-11 DIAGNOSIS — M47.816 LUMBAR SPONDYLOSIS: ICD-10-CM

## 2022-08-11 DIAGNOSIS — M54.12 CERVICAL RADICULOPATHY: Primary | ICD-10-CM

## 2022-08-11 DIAGNOSIS — M50.30 DEGENERATIVE DISC DISEASE, CERVICAL: ICD-10-CM

## 2022-08-11 PROCEDURE — 1159F MED LIST DOCD IN RCRD: CPT | Mod: CPTII,95,, | Performed by: ANESTHESIOLOGY

## 2022-08-11 PROCEDURE — 99213 PR OFFICE/OUTPT VISIT, EST, LEVL III, 20-29 MIN: ICD-10-PCS | Mod: 95,,, | Performed by: ANESTHESIOLOGY

## 2022-08-11 PROCEDURE — 99213 OFFICE O/P EST LOW 20 MIN: CPT | Mod: 95,,, | Performed by: ANESTHESIOLOGY

## 2022-08-11 PROCEDURE — 1159F PR MEDICATION LIST DOCUMENTED IN MEDICAL RECORD: ICD-10-PCS | Mod: CPTII,95,, | Performed by: ANESTHESIOLOGY

## 2022-08-11 PROCEDURE — 1160F PR REVIEW ALL MEDS BY PRESCRIBER/CLIN PHARMACIST DOCUMENTED: ICD-10-PCS | Mod: CPTII,95,, | Performed by: ANESTHESIOLOGY

## 2022-08-11 PROCEDURE — 1160F RVW MEDS BY RX/DR IN RCRD: CPT | Mod: CPTII,95,, | Performed by: ANESTHESIOLOGY
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
## 2022-08-12 DIAGNOSIS — M54.12 CERVICAL RADICULITIS: Primary | ICD-10-CM

## 2022-08-25 ENCOUNTER — HOSPITAL ENCOUNTER (OUTPATIENT)
Facility: OTHER | Age: 42
Discharge: HOME | End: 2022-08-25
Attending: ANESTHESIOLOGY | Admitting: ANESTHESIOLOGY
Payer: MEDICARE

## 2022-08-25 VITALS
HEART RATE: 80 BPM | BODY MASS INDEX: 18.83 KG/M2 | WEIGHT: 139 LBS | RESPIRATION RATE: 16 BRPM | SYSTOLIC BLOOD PRESSURE: 132 MMHG | TEMPERATURE: 98 F | HEIGHT: 72 IN | OXYGEN SATURATION: 99 % | DIASTOLIC BLOOD PRESSURE: 68 MMHG

## 2022-08-25 DIAGNOSIS — M50.30 DDD (DEGENERATIVE DISC DISEASE), CERVICAL: Primary | ICD-10-CM

## 2022-08-25 DIAGNOSIS — G89.29 CHRONIC PAIN: ICD-10-CM

## 2022-08-25 DIAGNOSIS — M47.22 SPONDYLOSIS OF CERVICAL SPINE WITH RADICULOPATHY: ICD-10-CM

## 2022-08-25 PROCEDURE — 63600175 PHARM REV CODE 636 W HCPCS: Performed by: ANESTHESIOLOGY

## 2022-08-25 PROCEDURE — 25500020 PHARM REV CODE 255: Performed by: ANESTHESIOLOGY

## 2022-08-25 PROCEDURE — 62321 NJX INTERLAMINAR CRV/THRC: CPT | Mod: ,,, | Performed by: ANESTHESIOLOGY

## 2022-08-25 PROCEDURE — 25000003 PHARM REV CODE 250: Performed by: STUDENT IN AN ORGANIZED HEALTH CARE EDUCATION/TRAINING PROGRAM

## 2022-08-25 PROCEDURE — 62321 PR INJ CERV/THORAC, W/GUIDANCE: ICD-10-PCS | Mod: ,,, | Performed by: ANESTHESIOLOGY

## 2022-08-25 PROCEDURE — 25000003 PHARM REV CODE 250: Performed by: ANESTHESIOLOGY

## 2022-08-25 PROCEDURE — 62321 NJX INTERLAMINAR CRV/THRC: CPT | Performed by: ANESTHESIOLOGY

## 2022-08-25 RX ORDER — LIDOCAINE HYDROCHLORIDE 10 MG/ML
INJECTION, SOLUTION EPIDURAL; INFILTRATION; INTRACAUDAL; PERINEURAL
Status: DISCONTINUED | OUTPATIENT
Start: 2022-08-25 | End: 2022-08-25 | Stop reason: HOSPADM

## 2022-08-25 RX ORDER — DEXAMETHASONE SODIUM PHOSPHATE 10 MG/ML
INJECTION INTRAMUSCULAR; INTRAVENOUS
Status: DISCONTINUED | OUTPATIENT
Start: 2022-08-25 | End: 2022-08-25 | Stop reason: HOSPADM

## 2022-08-25 RX ORDER — SODIUM CHLORIDE 9 MG/ML
500 INJECTION, SOLUTION INTRAVENOUS CONTINUOUS
Status: DISCONTINUED | OUTPATIENT
Start: 2022-08-25 | End: 2022-08-25 | Stop reason: HOSPADM

## 2022-08-25 RX ORDER — LIDOCAINE HYDROCHLORIDE 20 MG/ML
INJECTION, SOLUTION INFILTRATION; PERINEURAL
Status: DISCONTINUED | OUTPATIENT
Start: 2022-08-25 | End: 2022-08-25 | Stop reason: HOSPADM

## 2022-08-25 RX ORDER — MIDAZOLAM HYDROCHLORIDE 1 MG/ML
INJECTION INTRAMUSCULAR; INTRAVENOUS
Status: DISCONTINUED | OUTPATIENT
Start: 2022-08-25 | End: 2022-08-25 | Stop reason: HOSPADM

## 2022-08-25 NOTE — OP NOTE
Cervical Interlaminar Epidural Steroid Injection under Fluoroscopic Guidance    The procedure, risks, benefits, and options were discussed with the patient. There are no contraindications to the procedure. The patent expressed understanding and agreed to the procedure. Informed written consent was obtained prior to the start of the procedure and can be found in the patient's chart.     PATIENT NAME: Gordon Griffin   MRN: [id number]     DATE OF PROCEDURE: 08/25/2022    PROCEDURE: Cervical Interlaminar Epidural Steroid Injection C7/T1 under Fluoroscopic Guidance    PRE-OP DIAGNOSIS: Cervical radiculitis [M54.12] Cervical radiculopathy [M54.12]    POST-OP DIAGNOSIS: Same    PHYSICIAN: Larry Brasher MD     ASSISTANTS: Dr. Camarena    MEDICATIONS INJECTED: Preservative-free Decadron 10mg with 1cc of Lidocaine 1% MPF and preservative free normal saline    LOCAL ANESTHETIC INJECTED: Xylocaine 2%     SEDATION:   Versed 2mg and Fentanyl 0mcg                                                                                                                                                                                     Conscious sedation ordered by M.D. Patient re-evaluation prior to administration of conscious sedation. No changes noted in patient's status from initial evaluation. The patient's vital signs were monitored by RN and patient remained hemodynamically stable throughout the procedure.    Event Time In   Sedation Start 1439   Sedation End 1447        ESTIMATED BLOOD LOSS: None    COMPLICATIONS: None    TECHNIQUE: Time-out was performed to identify the patient and procedure to be performed. With the patient laying in a prone position, the surgical area was prepped and draped in the usual sterile fashion using ChloraPrep and a fenestrated drape. The level was determined under fluoroscopy guidance. Skin anesthesia was achieved by injecting Lidocaine 2% over the injection site.  The interlaminar space was then approached  with a 20 gauge, 3.5 inch Tuohy needle that was introduced under fluoroscopic guidance with AP, lateral and/or contralateral oblique imaging. Once the Ligamentum flavum was encountered loss of resistance to saline was used to enter the epidural space. With positive loss of resistance and negative aspiration for CSF or Blood, contrast dye  Omnipaque (240mg/mL) was injected to confirm placement and there was no vascular runoff. Then 3 mL of the medication mixture listed above was then injected slowly. Displacement of the radio opaque contrast after injection of the medication confirmed that the medication went into the area of the epidural space. The needles were removed, and bleeding was nil. A sterile dressing was applied. No specimens collected. The patient tolerated the procedure well.       The patient was monitored after the procedure in the recovery area. They were given post-procedure and discharge instructions to follow at home. The patient was discharged in a stable condition.    I reviewed and edited the fellow's note. I conducted my own interview and physical examination. I agree with the findings. I was present and supervising all critical portions of the procedure.    Larry Brasher MD

## 2022-08-25 NOTE — DISCHARGE INSTRUCTIONS

## 2022-08-25 NOTE — DISCHARGE SUMMARY
Discharge Note  Short Stay      SUMMARY     Admit Date: 8/25/2022    Attending Physician: Larry Brasher      Discharge Physician: Larry Brasher      Discharge Date: 8/25/2022 2:48 PM    Procedure(s) (LRB):  Injection, Steroid, Epidural C7/T1 CONTRAST (N/A)    Final Diagnosis: Cervical radiculitis [M54.12]    Disposition: Home or self care    Patient Instructions:   Current Discharge Medication List      CONTINUE these medications which have NOT CHANGED    Details   aspirin 81 MG Chew Take 81 mg by mouth once daily.      azelastine (ASTELIN) 137 mcg (0.1 %) nasal spray USE 1 TO 2 SPRAYS IN EACH NOSTRIL TWICE DAILY FOR CONGESTION      baclofen (LIORESAL) 20 MG tablet Take 1 tablet by mouth 3 (three) times daily as needed.       benztropine (COGENTIN) 0.5 MG tablet Take 0.5 mg by mouth once daily.      butalbital-acetaminophen-caffeine -40 mg (FIORICET, ESGIC) -40 mg per tablet Take 1 tablet by mouth every 4 (four) hours as needed.      butorphanol (STADOL) 10 mg/mL nasal spray 1 spray by Nasal route every 4 (four) hours as needed for Pain.      cloNIDine (CATAPRES) 0.1 MG tablet TAKE 1 TABLET(0.1 MG) BY MOUTH TWICE DAILY  Qty: 60 tablet, Refills: 3    Comments: ZERO refills remain on this prescription. Your patient is requesting advance approval of refills for this medication to PREVENT ANY MISSED DOSES  Associated Diagnoses: Tic      cyclobenzaprine (FLEXERIL) 10 MG tablet TK 1 T PO Q 8 H PRF PAIN      diazePAM (VALIUM) 2 MG tablet Take 2 mg by mouth 2 (two) times daily as needed.      erenumab-aooe (AIMOVIG AUTOINJECTOR SUBQ) Inject into the skin.      EScitalopram oxalate (LEXAPRO) 20 MG tablet Take 20 mg by mouth once daily.      FLUCELVAX QUAD 5005-0475, PF, 60 mcg (15 mcg x 4)/0.5 mL Syrg vaccine ADM 0.5ML IM UTD  Refills: 0      fluticasone (FLONASE) 50 mcg/actuation nasal spray SPRAY TWICE IEN QD  Refills: 5      gabapentin (NEURONTIN) 300 MG capsule Take 1 capsule (300 mg total) by mouth 3  (three) times daily.  Qty: 90 capsule, Refills: 2    Associated Diagnoses: Lumbar radiculopathy      hydrOXYzine pamoate (VISTARIL) 50 MG Cap Take  mg by mouth nightly as needed.      ketorolac (TORADOL) 10 mg tablet ketorolac 10 mg tablet      levETIRAcetam (KEPPRA) 1000 MG tablet Take 1,000 mg by mouth 2 (two) times daily.      methocarbamoL (ROBAXIN) 500 MG Tab methocarbamol 500 mg tablet      metoclopramide HCl (REGLAN) 10 MG tablet 10 mg.      NARCAN 4 mg/actuation Spry SPRAY 0.1ML IN 1 NOSTRIL MAY REPEAT DOSE EVERY 2-3 MINUTES AS NEEDED ALTERNATING NOSTRILS EACH DOSE  Qty: 1 each, Refills: 3    Associated Diagnoses: Chronic pain disorder; Lumbar radiculopathy      nitroGLYCERIN (NITROSTAT) 0.4 MG SL tablet Place 1 tablet (0.4 mg total) under the tongue every 5 (five) minutes as needed for Chest pain.  Qty: 90 tablet, Refills: 3      NURTEC 75 mg odt Take 75 mg by mouth as needed for Migraine.      omeprazole (PRILOSEC) 20 MG capsule Take 20 mg by mouth once daily.      ondansetron (ZOFRAN) 4 MG tablet Take 1 tablet (4 mg total) by mouth every 6 (six) hours as needed for Nausea.  Qty: 12 tablet, Refills: 0      ondansetron (ZOFRAN-ODT) 8 MG TbDL Take 8 mg by mouth 2 (two) times daily.      oxybutynin (DITROPAN-XL) 10 MG 24 hr tablet Take 1 tablet (10 mg total) by mouth once daily.  Qty: 30 tablet, Refills: 11      prochlorperazine (COMPAZINE) 10 MG tablet Take 10 mg by mouth 3 (three) times daily.      propranoloL (INDERAL LA) 60 MG 24 hr capsule Take 60 mg by mouth every evening.      rosuvastatin (CRESTOR) 20 MG tablet Take 1 tablet (20 mg total) by mouth once daily.  Qty: 90 tablet, Refills: 9      spironolactone (ALDACTONE) 25 MG tablet Take 25 mg by mouth once daily.      tamsulosin (FLOMAX) 0.4 mg Cap Take 1 capsule (0.4 mg total) by mouth once daily.  Qty: 30 capsule, Refills: 11      !! traZODone (DESYREL) 100 MG tablet Take 100 mg by mouth every evening.      !! traZODone (DESYREL) 50 MG tablet  Take 50 mg by mouth every evening.      verapamiL (VERELAN) 240 MG C24P Take 240 mg by mouth Daily.       !! - Potential duplicate medications found. Please discuss with provider.              Discharge Diagnosis: Cervical radiculitis [M54.12]  Condition on Discharge: Stable with no complications to procedure   Diet on Discharge: Same as before.  Activity: as per instruction sheet.  Discharge to: Home with a responsible adult.  Follow up: 2-4 weeks

## 2022-08-26 ENCOUNTER — PATIENT MESSAGE (OUTPATIENT)
Dept: PAIN MEDICINE | Facility: OTHER | Age: 42
End: 2022-08-26
Payer: MEDICARE

## 2022-09-10 ENCOUNTER — PATIENT MESSAGE (OUTPATIENT)
Dept: PAIN MEDICINE | Facility: OTHER | Age: 42
End: 2022-09-10
Payer: MEDICARE

## 2022-09-26 ENCOUNTER — OFFICE VISIT (OUTPATIENT)
Dept: PAIN MEDICINE | Facility: CLINIC | Age: 42
End: 2022-09-26
Payer: MEDICARE

## 2022-09-26 DIAGNOSIS — M50.30 DEGENERATIVE DISC DISEASE, CERVICAL: ICD-10-CM

## 2022-09-26 DIAGNOSIS — G89.4 CHRONIC PAIN SYNDROME: Primary | ICD-10-CM

## 2022-09-26 DIAGNOSIS — M54.12 CERVICAL RADICULOPATHY: ICD-10-CM

## 2022-09-26 DIAGNOSIS — M47.892 OTHER SPONDYLOSIS, CERVICAL REGION: ICD-10-CM

## 2022-09-26 PROCEDURE — 1159F PR MEDICATION LIST DOCUMENTED IN MEDICAL RECORD: ICD-10-PCS | Mod: CPTII,95,, | Performed by: ANESTHESIOLOGY

## 2022-09-26 PROCEDURE — 1160F RVW MEDS BY RX/DR IN RCRD: CPT | Mod: CPTII,95,, | Performed by: ANESTHESIOLOGY

## 2022-09-26 PROCEDURE — 99213 OFFICE O/P EST LOW 20 MIN: CPT | Mod: 95,GC,, | Performed by: ANESTHESIOLOGY

## 2022-09-26 PROCEDURE — 1159F MED LIST DOCD IN RCRD: CPT | Mod: CPTII,95,, | Performed by: ANESTHESIOLOGY

## 2022-09-26 PROCEDURE — 99213 PR OFFICE/OUTPT VISIT, EST, LEVL III, 20-29 MIN: ICD-10-PCS | Mod: 95,GC,, | Performed by: ANESTHESIOLOGY

## 2022-09-26 PROCEDURE — 1160F PR REVIEW ALL MEDS BY PRESCRIBER/CLIN PHARMACIST DOCUMENTED: ICD-10-PCS | Mod: CPTII,95,, | Performed by: ANESTHESIOLOGY
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
## 2022-10-16 ENCOUNTER — HOSPITAL ENCOUNTER (EMERGENCY)
Facility: HOSPITAL | Age: 42
Discharge: HOME | End: 2022-10-16
Attending: STUDENT IN AN ORGANIZED HEALTH CARE EDUCATION/TRAINING PROGRAM
Payer: MEDICARE

## 2022-10-16 VITALS
HEART RATE: 74 BPM | OXYGEN SATURATION: 98 % | BODY MASS INDEX: 18.83 KG/M2 | DIASTOLIC BLOOD PRESSURE: 77 MMHG | TEMPERATURE: 98 F | RESPIRATION RATE: 16 BRPM | WEIGHT: 139 LBS | HEIGHT: 72 IN | SYSTOLIC BLOOD PRESSURE: 135 MMHG

## 2022-10-16 DIAGNOSIS — T14.8XXA MUSCLE STRAIN: Primary | ICD-10-CM
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
## 2022-10-16 PROCEDURE — 99283 EMERGENCY DEPT VISIT LOW MDM: CPT
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
## 2022-12-02 ENCOUNTER — TELEPHONE (OUTPATIENT)
Dept: NEUROLOGY | Facility: CLINIC | Age: 42
End: 2022-12-02
Payer: MEDICARE

## 2022-12-02 NOTE — TELEPHONE ENCOUNTER
----- Message from Robyn Steward sent at 12/2/2022 10:04 AM CST -----  Regarding: Sooner Appt  Contact: Mom @ [phone]  Pt's mom is calling because pt need to be seen soon on a Mon, Tuesday or Wednesday. Mom is asking for an urgent call back      Left message on voicemail, I added patient to the waitlist for an earlier appointment

## 2023-01-12 ENCOUNTER — OFFICE VISIT (OUTPATIENT)
Dept: CARDIOLOGY | Facility: CLINIC | Age: 43
End: 2023-01-12
Payer: MEDICARE

## 2023-01-12 VITALS
HEIGHT: 72 IN | SYSTOLIC BLOOD PRESSURE: 123 MMHG | HEART RATE: 98 BPM | WEIGHT: 147.69 LBS | BODY MASS INDEX: 20 KG/M2 | DIASTOLIC BLOOD PRESSURE: 71 MMHG

## 2023-01-12 DIAGNOSIS — E78.5 HYPERLIPIDEMIA, UNSPECIFIED HYPERLIPIDEMIA TYPE: ICD-10-CM

## 2023-01-12 DIAGNOSIS — I25.84 CORONARY ATHEROSCLEROSIS DUE TO CALCIFIED CORONARY LESION: ICD-10-CM

## 2023-01-12 DIAGNOSIS — Z86.73 HISTORY OF CVA (CEREBROVASCULAR ACCIDENT): ICD-10-CM

## 2023-01-12 DIAGNOSIS — I25.110 ATHEROSCLEROSIS OF NATIVE CORONARY ARTERY OF NATIVE HEART WITH UNSTABLE ANGINA PECTORIS: Primary | ICD-10-CM

## 2023-01-12 DIAGNOSIS — I25.10 CORONARY ATHEROSCLEROSIS DUE TO CALCIFIED CORONARY LESION: ICD-10-CM

## 2023-01-12 DIAGNOSIS — G45.9 TRANSIENT ISCHEMIC ATTACK (TIA): ICD-10-CM

## 2023-01-12 PROCEDURE — 3078F DIAST BP <80 MM HG: CPT | Mod: CPTII,GC,S$GLB, | Performed by: STUDENT IN AN ORGANIZED HEALTH CARE EDUCATION/TRAINING PROGRAM

## 2023-01-12 PROCEDURE — 3008F BODY MASS INDEX DOCD: CPT | Mod: CPTII,GC,S$GLB, | Performed by: STUDENT IN AN ORGANIZED HEALTH CARE EDUCATION/TRAINING PROGRAM

## 2023-01-12 PROCEDURE — 1160F RVW MEDS BY RX/DR IN RCRD: CPT | Mod: CPTII,GC,S$GLB, | Performed by: STUDENT IN AN ORGANIZED HEALTH CARE EDUCATION/TRAINING PROGRAM

## 2023-01-12 PROCEDURE — 1159F MED LIST DOCD IN RCRD: CPT | Mod: CPTII,GC,S$GLB, | Performed by: STUDENT IN AN ORGANIZED HEALTH CARE EDUCATION/TRAINING PROGRAM

## 2023-01-12 PROCEDURE — 3074F PR MOST RECENT SYSTOLIC BLOOD PRESSURE < 130 MM HG: ICD-10-PCS | Mod: CPTII,GC,S$GLB, | Performed by: STUDENT IN AN ORGANIZED HEALTH CARE EDUCATION/TRAINING PROGRAM

## 2023-01-12 PROCEDURE — 3074F SYST BP LT 130 MM HG: CPT | Mod: CPTII,GC,S$GLB, | Performed by: STUDENT IN AN ORGANIZED HEALTH CARE EDUCATION/TRAINING PROGRAM

## 2023-01-12 PROCEDURE — 99999 PR PBB SHADOW E&M-EST. PATIENT-LVL V: CPT | Mod: PBBFAC,GC,, | Performed by: STUDENT IN AN ORGANIZED HEALTH CARE EDUCATION/TRAINING PROGRAM

## 2023-01-12 PROCEDURE — 3008F PR BODY MASS INDEX (BMI) DOCUMENTED: ICD-10-PCS | Mod: CPTII,GC,S$GLB, | Performed by: STUDENT IN AN ORGANIZED HEALTH CARE EDUCATION/TRAINING PROGRAM

## 2023-01-12 PROCEDURE — 1159F PR MEDICATION LIST DOCUMENTED IN MEDICAL RECORD: ICD-10-PCS | Mod: CPTII,GC,S$GLB, | Performed by: STUDENT IN AN ORGANIZED HEALTH CARE EDUCATION/TRAINING PROGRAM

## 2023-01-12 PROCEDURE — 99999 PR PBB SHADOW E&M-EST. PATIENT-LVL V: ICD-10-PCS | Mod: PBBFAC,GC,, | Performed by: STUDENT IN AN ORGANIZED HEALTH CARE EDUCATION/TRAINING PROGRAM

## 2023-01-12 PROCEDURE — 99214 PR OFFICE/OUTPT VISIT, EST, LEVL IV, 30-39 MIN: ICD-10-PCS | Mod: GC,S$GLB,, | Performed by: STUDENT IN AN ORGANIZED HEALTH CARE EDUCATION/TRAINING PROGRAM

## 2023-01-12 PROCEDURE — 99214 OFFICE O/P EST MOD 30 MIN: CPT | Mod: GC,S$GLB,, | Performed by: STUDENT IN AN ORGANIZED HEALTH CARE EDUCATION/TRAINING PROGRAM

## 2023-01-12 PROCEDURE — 3078F PR MOST RECENT DIASTOLIC BLOOD PRESSURE < 80 MM HG: ICD-10-PCS | Mod: CPTII,GC,S$GLB, | Performed by: STUDENT IN AN ORGANIZED HEALTH CARE EDUCATION/TRAINING PROGRAM

## 2023-01-12 PROCEDURE — 1160F PR REVIEW ALL MEDS BY PRESCRIBER/CLIN PHARMACIST DOCUMENTED: ICD-10-PCS | Mod: CPTII,GC,S$GLB, | Performed by: STUDENT IN AN ORGANIZED HEALTH CARE EDUCATION/TRAINING PROGRAM
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
## 2023-01-26 ENCOUNTER — OFFICE VISIT (OUTPATIENT)
Dept: URGENT CARE | Facility: CLINIC | Age: 43
End: 2023-01-26
Payer: MEDICARE

## 2023-01-26 VITALS
BODY MASS INDEX: 19.91 KG/M2 | DIASTOLIC BLOOD PRESSURE: 81 MMHG | TEMPERATURE: 98 F | SYSTOLIC BLOOD PRESSURE: 118 MMHG | RESPIRATION RATE: 18 BRPM | WEIGHT: 147 LBS | HEART RATE: 94 BPM | OXYGEN SATURATION: 97 % | HEIGHT: 72 IN

## 2023-01-26 DIAGNOSIS — U07.1 COVID-19 VIRUS INFECTION: Primary | ICD-10-CM

## 2023-01-26 LAB
CTP QC/QA: YES
SARS-COV-2 AG RESP QL IA.RAPID: POSITIVE

## 2023-01-26 PROCEDURE — 3079F DIAST BP 80-89 MM HG: CPT | Mod: CPTII,S$GLB,, | Performed by: FAMILY MEDICINE

## 2023-01-26 PROCEDURE — 87811 SARS CORONAVIRUS 2 ANTIGEN POCT, MANUAL READ: ICD-10-PCS | Mod: QW,S$GLB,, | Performed by: FAMILY MEDICINE

## 2023-01-26 PROCEDURE — 3074F SYST BP LT 130 MM HG: CPT | Mod: CPTII,S$GLB,, | Performed by: FAMILY MEDICINE

## 2023-01-26 PROCEDURE — 99214 PR OFFICE/OUTPT VISIT, EST, LEVL IV, 30-39 MIN: ICD-10-PCS | Mod: S$GLB,CS,, | Performed by: FAMILY MEDICINE

## 2023-01-26 PROCEDURE — 3008F BODY MASS INDEX DOCD: CPT | Mod: CPTII,S$GLB,, | Performed by: FAMILY MEDICINE

## 2023-01-26 PROCEDURE — 1159F PR MEDICATION LIST DOCUMENTED IN MEDICAL RECORD: ICD-10-PCS | Mod: CPTII,S$GLB,, | Performed by: FAMILY MEDICINE

## 2023-01-26 PROCEDURE — 3079F PR MOST RECENT DIASTOLIC BLOOD PRESSURE 80-89 MM HG: ICD-10-PCS | Mod: CPTII,S$GLB,, | Performed by: FAMILY MEDICINE

## 2023-01-26 PROCEDURE — 3074F PR MOST RECENT SYSTOLIC BLOOD PRESSURE < 130 MM HG: ICD-10-PCS | Mod: CPTII,S$GLB,, | Performed by: FAMILY MEDICINE

## 2023-01-26 PROCEDURE — 1159F MED LIST DOCD IN RCRD: CPT | Mod: CPTII,S$GLB,, | Performed by: FAMILY MEDICINE

## 2023-01-26 PROCEDURE — 87811 SARS-COV-2 COVID19 W/OPTIC: CPT | Mod: QW,S$GLB,, | Performed by: FAMILY MEDICINE

## 2023-01-26 PROCEDURE — 99214 OFFICE O/P EST MOD 30 MIN: CPT | Mod: S$GLB,CS,, | Performed by: FAMILY MEDICINE

## 2023-01-26 PROCEDURE — 3008F PR BODY MASS INDEX (BMI) DOCUMENTED: ICD-10-PCS | Mod: CPTII,S$GLB,, | Performed by: FAMILY MEDICINE

## 2023-01-26 NOTE — PROGRESS NOTES
Subjective:       Patient ID: Gordon Griffin is a 42 y.o. female.    Vitals:  height is 6' (1.829 m) and weight is 66.7 kg (147 lb). Her oral temperature is 97.9 °F (36.6 °C). Her blood pressure is 118/81 and her pulse is 94. Her respiration is 18 and oxygen saturation is 97%.     Chief Complaint: Sore Throat    This is a 42 y.o. female who presents today with a chief complaint of sore throat, lost of taste, cough, vomiting, sweats, headache  -Sx started yesterday   Pt. Had 4 at home Covid test positive result   Home Tx: Dayquil     Sore Throat   The current episode started yesterday. Associated symptoms include coughing, diarrhea, ear pain, headaches and vomiting. The treatment provided no relief.     HENT:  Positive for ear pain and sore throat.    Respiratory:  Positive for cough.    Gastrointestinal:  Positive for vomiting and diarrhea.   Neurological:  Positive for headaches.     Objective:      Physical Exam   Constitutional: She does not appear ill. No distress. normal  HENT:   Head: Normocephalic and atraumatic.   Nose: Rhinorrhea and congestion present.   Mouth/Throat: Mucous membranes are moist. Posterior oropharyngeal erythema present.   Neck: Neck supple.   Cardiovascular: Normal rate, regular rhythm, normal heart sounds and normal pulses.   Pulmonary/Chest: Effort normal and breath sounds normal.   Abdominal: Normal appearance. Soft.   Neurological: She is alert.   Nursing note and vitals reviewed.      Results for orders placed or performed in visit on 01/26/23   SARS Coronavirus 2 Antigen, POCT Manual Read   Result Value Ref Range    SARS Coronavirus 2 Antigen Positive (A) Negative     Acceptable Yes       Assessment:       1. COVID-19 virus infection          Plan:         COVID-19 virus infection  -     SARS Coronavirus 2 Antigen, POCT Manual Read  -     molnupiravir 200 mg capsule (EUA); Take 4 capsules (800 mg total) by mouth every 12 (twelve) hours. for 5 days  Dispense: 40  capsule; Refill: 0    Discussed symptom monitoring, contact notification and isolation period X 5 days. Discussed potential benefits of aspirin, vitamin C, D and Zinc. ER precautions reviewed.

## 2023-02-02 ENCOUNTER — TELEPHONE (OUTPATIENT)
Dept: PAIN MEDICINE | Facility: CLINIC | Age: 43
End: 2023-02-02
Payer: MEDICARE

## 2023-02-03 ENCOUNTER — PATIENT MESSAGE (OUTPATIENT)
Dept: PAIN MEDICINE | Facility: OTHER | Age: 43
End: 2023-02-03
Payer: MEDICARE

## 2023-02-03 ENCOUNTER — OFFICE VISIT (OUTPATIENT)
Dept: PAIN MEDICINE | Facility: CLINIC | Age: 43
End: 2023-02-03
Payer: MEDICARE

## 2023-02-03 VITALS
SYSTOLIC BLOOD PRESSURE: 109 MMHG | BODY MASS INDEX: 20.96 KG/M2 | RESPIRATION RATE: 19 BRPM | WEIGHT: 154.75 LBS | DIASTOLIC BLOOD PRESSURE: 68 MMHG | HEART RATE: 69 BPM | TEMPERATURE: 97 F | HEIGHT: 72 IN

## 2023-02-03 DIAGNOSIS — M53.3 SACROILIAC JOINT PAIN: ICD-10-CM

## 2023-02-03 DIAGNOSIS — M47.816 SPONDYLOSIS WITHOUT MYELOPATHY OR RADICULOPATHY, LUMBAR REGION: Primary | ICD-10-CM

## 2023-02-03 DIAGNOSIS — M51.36 DDD (DEGENERATIVE DISC DISEASE), LUMBAR: ICD-10-CM

## 2023-02-03 DIAGNOSIS — M54.16 LUMBAR RADICULOPATHY: ICD-10-CM

## 2023-02-03 PROCEDURE — 1159F MED LIST DOCD IN RCRD: CPT | Mod: CPTII,S$GLB,, | Performed by: NURSE PRACTITIONER

## 2023-02-03 PROCEDURE — 3008F PR BODY MASS INDEX (BMI) DOCUMENTED: ICD-10-PCS | Mod: CPTII,S$GLB,, | Performed by: NURSE PRACTITIONER

## 2023-02-03 PROCEDURE — 1160F PR REVIEW ALL MEDS BY PRESCRIBER/CLIN PHARMACIST DOCUMENTED: ICD-10-PCS | Mod: CPTII,S$GLB,, | Performed by: NURSE PRACTITIONER

## 2023-02-03 PROCEDURE — 99213 PR OFFICE/OUTPT VISIT, EST, LEVL III, 20-29 MIN: ICD-10-PCS | Mod: S$GLB,,, | Performed by: NURSE PRACTITIONER

## 2023-02-03 PROCEDURE — 99999 PR PBB SHADOW E&M-EST. PATIENT-LVL V: CPT | Mod: PBBFAC,,, | Performed by: NURSE PRACTITIONER

## 2023-02-03 PROCEDURE — 3078F DIAST BP <80 MM HG: CPT | Mod: CPTII,S$GLB,, | Performed by: NURSE PRACTITIONER

## 2023-02-03 PROCEDURE — 3008F BODY MASS INDEX DOCD: CPT | Mod: CPTII,S$GLB,, | Performed by: NURSE PRACTITIONER

## 2023-02-03 PROCEDURE — 99213 OFFICE O/P EST LOW 20 MIN: CPT | Mod: S$GLB,,, | Performed by: NURSE PRACTITIONER

## 2023-02-03 PROCEDURE — 3074F SYST BP LT 130 MM HG: CPT | Mod: CPTII,S$GLB,, | Performed by: NURSE PRACTITIONER

## 2023-02-03 PROCEDURE — 1160F RVW MEDS BY RX/DR IN RCRD: CPT | Mod: CPTII,S$GLB,, | Performed by: NURSE PRACTITIONER

## 2023-02-03 PROCEDURE — 3074F PR MOST RECENT SYSTOLIC BLOOD PRESSURE < 130 MM HG: ICD-10-PCS | Mod: CPTII,S$GLB,, | Performed by: NURSE PRACTITIONER

## 2023-02-03 PROCEDURE — 99999 PR PBB SHADOW E&M-EST. PATIENT-LVL V: ICD-10-PCS | Mod: PBBFAC,,, | Performed by: NURSE PRACTITIONER

## 2023-02-03 PROCEDURE — 3078F PR MOST RECENT DIASTOLIC BLOOD PRESSURE < 80 MM HG: ICD-10-PCS | Mod: CPTII,S$GLB,, | Performed by: NURSE PRACTITIONER

## 2023-02-03 PROCEDURE — 1159F PR MEDICATION LIST DOCUMENTED IN MEDICAL RECORD: ICD-10-PCS | Mod: CPTII,S$GLB,, | Performed by: NURSE PRACTITIONER

## 2023-02-03 RX ORDER — GABAPENTIN 300 MG/1
300 CAPSULE ORAL 3 TIMES DAILY
Qty: 90 CAPSULE | Refills: 3 | Status: SHIPPED | OUTPATIENT
Start: 2023-02-03 | End: 2023-04-25 | Stop reason: SDUPTHER
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
## 2023-02-14 ENCOUNTER — HOSPITAL ENCOUNTER (OUTPATIENT)
Dept: RADIOLOGY | Facility: HOSPITAL | Age: 43
Discharge: HOME | End: 2023-02-14
Attending: REGISTERED NURSE
Payer: MEDICARE

## 2023-02-14 DIAGNOSIS — Z86.73 HISTORY OF CVA (CEREBROVASCULAR ACCIDENT): ICD-10-CM

## 2023-02-14 DIAGNOSIS — F44.4 FUNCTIONAL NEUROLOGICAL SYMPTOM DISORDER WITH ABNORMAL MOVEMENT: ICD-10-CM

## 2023-02-14 DIAGNOSIS — R29.6 FREQUENT FALLS: ICD-10-CM

## 2023-02-14 DIAGNOSIS — G45.9 TRANSIENT CEREBRAL ISCHEMIA, UNSPECIFIED TYPE: ICD-10-CM

## 2023-02-14 PROCEDURE — 25500020 PHARM REV CODE 255: Performed by: REGISTERED NURSE

## 2023-02-14 PROCEDURE — 70553 MRI BRAIN STEM W/O & W/DYE: CPT | Mod: 26,,, | Performed by: RADIOLOGY

## 2023-02-14 PROCEDURE — 70553 MRI BRAIN STEM W/O & W/DYE: CPT | Mod: TC

## 2023-02-14 PROCEDURE — 70553 MRI BRAIN W WO CONTRAST: ICD-10-PCS | Mod: 26,,, | Performed by: RADIOLOGY

## 2023-02-14 PROCEDURE — A9585 GADOBUTROL INJECTION: HCPCS | Performed by: REGISTERED NURSE

## 2023-02-14 RX ORDER — GADOBUTROL 604.72 MG/ML
7.5 INJECTION INTRAVENOUS
Status: COMPLETED | OUTPATIENT
Start: 2023-02-14 | End: 2023-02-14

## 2023-02-14 RX ADMIN — GADOBUTROL 7.5 ML: 604.72 INJECTION INTRAVENOUS at 07:02

## 2023-02-22 ENCOUNTER — TELEPHONE (OUTPATIENT)
Dept: CARDIOLOGY | Facility: HOSPITAL | Age: 43
End: 2023-02-22
Payer: MEDICARE

## 2023-02-24 ENCOUNTER — HOSPITAL ENCOUNTER (OUTPATIENT)
Dept: CARDIOLOGY | Facility: HOSPITAL | Age: 43
Discharge: HOME | End: 2023-02-24
Attending: STUDENT IN AN ORGANIZED HEALTH CARE EDUCATION/TRAINING PROGRAM
Payer: MEDICARE

## 2023-02-24 ENCOUNTER — HOSPITAL ENCOUNTER (OUTPATIENT)
Dept: RADIOLOGY | Facility: HOSPITAL | Age: 43
Discharge: HOME | End: 2023-02-24
Attending: STUDENT IN AN ORGANIZED HEALTH CARE EDUCATION/TRAINING PROGRAM
Payer: MEDICARE

## 2023-02-24 ENCOUNTER — TELEPHONE (OUTPATIENT)
Dept: CARDIOLOGY | Facility: CLINIC | Age: 43
End: 2023-02-24

## 2023-02-24 VITALS
SYSTOLIC BLOOD PRESSURE: 118 MMHG | BODY MASS INDEX: 20.99 KG/M2 | HEART RATE: 72 BPM | RESPIRATION RATE: 16 BRPM | HEIGHT: 72 IN | DIASTOLIC BLOOD PRESSURE: 58 MMHG | WEIGHT: 155 LBS

## 2023-02-24 DIAGNOSIS — I25.110 ATHEROSCLEROSIS OF NATIVE CORONARY ARTERY OF NATIVE HEART WITH UNSTABLE ANGINA PECTORIS: ICD-10-CM

## 2023-02-24 DIAGNOSIS — Z86.73 HISTORY OF CVA (CEREBROVASCULAR ACCIDENT): ICD-10-CM

## 2023-02-24 LAB
CFR FLOW - ANTERIOR: 2.55
CFR FLOW - INFERIOR: 2.75
CFR FLOW - LATERAL: 2.5
CFR FLOW - MAX: 3.36
CFR FLOW - MIN: 1.97
CFR FLOW - SEPTAL: 2.82
CFR FLOW - WHOLE HEART: 2.66
CV STRESS BASE HR: 60 BPM
DIASTOLIC BLOOD PRESSURE: 63 MMHG
EJECTION FRACTION- HIGH: 59 %
END DIASTOLIC INDEX-HIGH: 155 ML/M2
END DIASTOLIC INDEX-LOW: 91 ML/M2
END SYSTOLIC INDEX-HIGH: 78 ML/M2
END SYSTOLIC INDEX-LOW: 40 ML/M2
NUC REST DIASTOLIC VOLUME INDEX: 94
NUC REST EJECTION FRACTION: 71
NUC REST SYSTOLIC VOLUME INDEX: 24
NUC STRESS DIASTOLIC VOLUME INDEX: 105
NUC STRESS EJECTION FRACTION: 78 %
NUC STRESS SYSTOLIC VOLUME INDEX: 23
OHS CV CPX 1 MINUTE RECOVERY HEART RATE: 98 BPM
OHS CV CPX 85 PERCENT MAX PREDICTED HEART RATE MALE: 144
OHS CV CPX MAX PREDICTED HEART RATE: 170
OHS CV CPX PATIENT IS FEMALE: 1
OHS CV CPX PATIENT IS MALE: 0
OHS CV CPX PEAK DIASTOLIC BLOOD PRESSURE: 78 MMHG
OHS CV CPX PEAK HEAR RATE: 74 BPM
OHS CV CPX PEAK RATE PRESSURE PRODUCT: 6216
OHS CV CPX PEAK SYSTOLIC BLOOD PRESSURE: 84 MMHG
OHS CV CPX PERCENT MAX PREDICTED HEART RATE ACHIEVED: 44
OHS CV CPX RATE PRESSURE PRODUCT PRESENTING: 6600
PERFUSION DEFECT 1 SIZE IN %: 5 %
REST FLOW - ANTERIOR: 0.57 CC/MIN/G
REST FLOW - INFERIOR: 0.49 CC/MIN/G
REST FLOW - LATERAL: 0.61 CC/MIN/G
REST FLOW - MAX: 0.93 CC/MIN/G
REST FLOW - MIN: 0.36 CC/MIN/G
REST FLOW - SEPTAL: 0.46 CC/MIN/G
REST FLOW - WHOLE HEART: 0.53 CC/MIN/G
RETIRED EF AND QEF - SEE NOTES: 47 %
STRESS FLOW - ANTERIOR: 1.44 CC/MIN/G
STRESS FLOW - INFERIOR: 1.35 CC/MIN/G
STRESS FLOW - LATERAL: 1.5 CC/MIN/G
STRESS FLOW - MAX: 2.09 CC/MIN/G
STRESS FLOW - MIN: 0.9 CC/MIN/G
STRESS FLOW - SEPTAL: 1.31 CC/MIN/G
STRESS FLOW - WHOLE HEART: 1.4 CC/MIN/G
STRESS ST DEPRESSION: 0.9 MM
SYSTOLIC BLOOD PRESSURE: 110 MMHG

## 2023-02-24 PROCEDURE — 93016 CV STRESS TEST SUPVJ ONLY: CPT | Mod: ,,, | Performed by: INTERNAL MEDICINE

## 2023-02-24 PROCEDURE — 78434 CARDIAC PET SCAN STRESS (CUPID ONLY): ICD-10-PCS | Mod: 26,,, | Performed by: INTERNAL MEDICINE

## 2023-02-24 PROCEDURE — 93016 CARDIAC PET SCAN STRESS (CUPID ONLY): ICD-10-PCS | Mod: ,,, | Performed by: INTERNAL MEDICINE

## 2023-02-24 PROCEDURE — 93880 EXTRACRANIAL BILAT STUDY: CPT | Mod: TC

## 2023-02-24 PROCEDURE — 93018 CARDIAC PET SCAN STRESS (CUPID ONLY): ICD-10-PCS | Mod: ,,, | Performed by: INTERNAL MEDICINE

## 2023-02-24 PROCEDURE — 93018 CV STRESS TEST I&R ONLY: CPT | Mod: ,,, | Performed by: INTERNAL MEDICINE

## 2023-02-24 PROCEDURE — 78431 MYOCRD IMG PET RST&STRS CT: CPT

## 2023-02-24 PROCEDURE — 93880 US CAROTID BILATERAL: ICD-10-PCS | Mod: 26,,, | Performed by: RADIOLOGY

## 2023-02-24 PROCEDURE — 63600175 PHARM REV CODE 636 W HCPCS: Performed by: STUDENT IN AN ORGANIZED HEALTH CARE EDUCATION/TRAINING PROGRAM

## 2023-02-24 PROCEDURE — 93880 EXTRACRANIAL BILAT STUDY: CPT | Mod: 26,,, | Performed by: RADIOLOGY

## 2023-02-24 PROCEDURE — 78431 CARDIAC PET SCAN STRESS (CUPID ONLY): ICD-10-PCS | Mod: 26,,, | Performed by: INTERNAL MEDICINE

## 2023-02-24 PROCEDURE — 78434 AQMBF PET REST & RX STRESS: CPT

## 2023-02-24 PROCEDURE — 78434 AQMBF PET REST & RX STRESS: CPT | Mod: 26,,, | Performed by: INTERNAL MEDICINE

## 2023-02-24 PROCEDURE — 78431 MYOCRD IMG PET RST&STRS CT: CPT | Mod: 26,,, | Performed by: INTERNAL MEDICINE

## 2023-02-24 RX ORDER — REGADENOSON 0.08 MG/ML
0.4 INJECTION, SOLUTION INTRAVENOUS ONCE
Status: COMPLETED | OUTPATIENT
Start: 2023-02-24 | End: 2023-02-24

## 2023-02-24 RX ORDER — CAFFEINE CITRATE 20 MG/ML
60 SOLUTION INTRAVENOUS ONCE
Status: COMPLETED | OUTPATIENT
Start: 2023-02-24 | End: 2023-02-24

## 2023-02-24 RX ADMIN — CAFFEINE CITRATE 60 MG: 20 INJECTION INTRAVENOUS at 08:02

## 2023-02-24 RX ADMIN — REGADENOSON 0.4 MG: 0.08 INJECTION, SOLUTION INTRAVENOUS at 08:02

## 2023-03-03 ENCOUNTER — HOSPITAL ENCOUNTER (OUTPATIENT)
Facility: OTHER | Age: 43
Discharge: HOME | End: 2023-03-03
Attending: ANESTHESIOLOGY | Admitting: ANESTHESIOLOGY
Payer: MEDICARE

## 2023-03-03 VITALS
RESPIRATION RATE: 16 BRPM | BODY MASS INDEX: 21.67 KG/M2 | DIASTOLIC BLOOD PRESSURE: 68 MMHG | WEIGHT: 160 LBS | TEMPERATURE: 98 F | HEART RATE: 68 BPM | SYSTOLIC BLOOD PRESSURE: 121 MMHG | HEIGHT: 72 IN | OXYGEN SATURATION: 99 %

## 2023-03-03 DIAGNOSIS — G89.29 CHRONIC PAIN: ICD-10-CM

## 2023-03-03 DIAGNOSIS — M47.817 LUMBAR AND SACRAL OSTEOARTHRITIS: ICD-10-CM

## 2023-03-03 DIAGNOSIS — M47.816 LUMBAR SPONDYLOSIS: Primary | ICD-10-CM

## 2023-03-03 PROCEDURE — 64636 DESTROY L/S FACET JNT ADDL: CPT | Mod: RT,,, | Performed by: ANESTHESIOLOGY

## 2023-03-03 PROCEDURE — 64636 PR DESTROY L/S FACET JNT ADDL: ICD-10-PCS | Mod: RT,,, | Performed by: ANESTHESIOLOGY

## 2023-03-03 PROCEDURE — 99152 MOD SED SAME PHYS/QHP 5/>YRS: CPT | Mod: ,,, | Performed by: ANESTHESIOLOGY

## 2023-03-03 PROCEDURE — 63600175 PHARM REV CODE 636 W HCPCS: Performed by: ANESTHESIOLOGY

## 2023-03-03 PROCEDURE — 64636 DESTROY L/S FACET JNT ADDL: CPT | Mod: RT | Performed by: ANESTHESIOLOGY

## 2023-03-03 PROCEDURE — 99152 PR MOD CONSCIOUS SEDATION, SAME PHYS, 5+ YRS, FIRST 15 MIN: ICD-10-PCS | Mod: ,,, | Performed by: ANESTHESIOLOGY

## 2023-03-03 PROCEDURE — 64635 DESTROY LUMB/SAC FACET JNT: CPT | Mod: RT | Performed by: ANESTHESIOLOGY

## 2023-03-03 PROCEDURE — 99152 MOD SED SAME PHYS/QHP 5/>YRS: CPT | Performed by: ANESTHESIOLOGY

## 2023-03-03 PROCEDURE — 64635 DESTROY LUMB/SAC FACET JNT: CPT | Mod: RT,,, | Performed by: ANESTHESIOLOGY

## 2023-03-03 PROCEDURE — 25000003 PHARM REV CODE 250: Performed by: ANESTHESIOLOGY

## 2023-03-03 PROCEDURE — 64635 PR DESTROY LUMB/SAC FACET JNT: ICD-10-PCS | Mod: RT,,, | Performed by: ANESTHESIOLOGY

## 2023-03-03 RX ORDER — LIDOCAINE HYDROCHLORIDE 20 MG/ML
INJECTION, SOLUTION INFILTRATION; PERINEURAL
Status: DISCONTINUED | OUTPATIENT
Start: 2023-03-03 | End: 2023-03-03 | Stop reason: HOSPADM

## 2023-03-03 RX ORDER — SODIUM CHLORIDE 9 MG/ML
500 INJECTION, SOLUTION INTRAVENOUS CONTINUOUS
Status: DISCONTINUED | OUTPATIENT
Start: 2023-03-03 | End: 2023-03-03 | Stop reason: HOSPADM

## 2023-03-03 RX ORDER — BUPIVACAINE HYDROCHLORIDE 2.5 MG/ML
INJECTION, SOLUTION EPIDURAL; INFILTRATION; INTRACAUDAL
Status: DISCONTINUED | OUTPATIENT
Start: 2023-03-03 | End: 2023-03-03 | Stop reason: HOSPADM

## 2023-03-03 RX ORDER — DEXAMETHASONE SODIUM PHOSPHATE 10 MG/ML
INJECTION INTRAMUSCULAR; INTRAVENOUS
Status: DISCONTINUED | OUTPATIENT
Start: 2023-03-03 | End: 2023-03-03 | Stop reason: HOSPADM

## 2023-03-03 RX ORDER — FENTANYL CITRATE 50 UG/ML
INJECTION, SOLUTION INTRAMUSCULAR; INTRAVENOUS
Status: DISCONTINUED | OUTPATIENT
Start: 2023-03-03 | End: 2023-03-03 | Stop reason: HOSPADM

## 2023-03-03 RX ORDER — MIDAZOLAM HYDROCHLORIDE 1 MG/ML
INJECTION INTRAMUSCULAR; INTRAVENOUS
Status: DISCONTINUED | OUTPATIENT
Start: 2023-03-03 | End: 2023-03-03 | Stop reason: HOSPADM

## 2023-03-03 NOTE — DISCHARGE SUMMARY
Discharge Note  Short Stay      SUMMARY     Admit Date: 3/3/2023    Attending Physician: Larry Brasher      Discharge Physician: Larry Brasher      Discharge Date: 3/3/2023 10:56 AM    Procedure(s) (LRB):  RADIOFREQUENCY ABLATION RIGHT L3,L4,L5 (Right)    Final Diagnosis: Spondylosis without myelopathy or radiculopathy, lumbar region [M47.816]    Disposition: Home or self care    Patient Instructions:   Current Discharge Medication List        CONTINUE these medications which have NOT CHANGED    Details   aspirin 81 MG Chew Take 81 mg by mouth once daily.      azelastine (ASTELIN) 137 mcg (0.1 %) nasal spray USE 1 TO 2 SPRAYS IN EACH NOSTRIL TWICE DAILY FOR CONGESTION      baclofen (LIORESAL) 20 MG tablet Take 1 tablet by mouth 3 (three) times daily as needed.       benztropine (COGENTIN) 0.5 MG tablet Take 0.5 mg by mouth once daily.      butalbital-acetaminophen-caffeine -40 mg (FIORICET, ESGIC) -40 mg per tablet Take 1 tablet by mouth every 4 (four) hours as needed.      butorphanol (STADOL) 10 mg/mL nasal spray 1 spray by Nasal route every 4 (four) hours as needed for Pain.      cloNIDine (CATAPRES) 0.1 MG tablet TAKE 1 TABLET(0.1 MG) BY MOUTH TWICE DAILY  Qty: 60 tablet, Refills: 3    Comments: ZERO refills remain on this prescription. Your patient is requesting advance approval of refills for this medication to PREVENT ANY MISSED DOSES  Associated Diagnoses: Tic      cyclobenzaprine (FLEXERIL) 10 MG tablet TK 1 T PO Q 8 H PRF PAIN      diazePAM (VALIUM) 2 MG tablet Take 2 mg by mouth 2 (two) times daily as needed.      erenumab-aooe (AIMOVIG AUTOINJECTOR SUBQ) Inject into the skin.      EScitalopram oxalate (LEXAPRO) 20 MG tablet Take 20 mg by mouth once daily.      FLUCELVAX QUAD 5611-9908, PF, 60 mcg (15 mcg x 4)/0.5 mL Syrg vaccine ADM 0.5ML IM UTD  Refills: 0      fluticasone (FLONASE) 50 mcg/actuation nasal spray SPRAY TWICE IEN QD  Refills: 5      gabapentin (NEURONTIN) 300 MG capsule  Take 1 capsule (300 mg total) by mouth 3 (three) times daily.  Qty: 90 capsule, Refills: 3    Associated Diagnoses: Lumbar radiculopathy      hydrOXYzine pamoate (VISTARIL) 50 MG Cap Take  mg by mouth nightly as needed.      ketorolac (TORADOL) 10 mg tablet ketorolac 10 mg tablet      levETIRAcetam (KEPPRA) 1000 MG tablet Take 1,000 mg by mouth 2 (two) times daily.      methocarbamoL (ROBAXIN) 500 MG Tab methocarbamol 500 mg tablet      metoclopramide HCl (REGLAN) 10 MG tablet 10 mg.      NARCAN 4 mg/actuation Spry SPRAY 0.1ML IN 1 NOSTRIL MAY REPEAT DOSE EVERY 2-3 MINUTES AS NEEDED ALTERNATING NOSTRILS EACH DOSE  Qty: 1 each, Refills: 3    Associated Diagnoses: Chronic pain disorder; Lumbar radiculopathy      nitroGLYCERIN (NITROSTAT) 0.4 MG SL tablet Place 1 tablet (0.4 mg total) under the tongue every 5 (five) minutes as needed for Chest pain.  Qty: 90 tablet, Refills: 3      NURTEC 75 mg odt Take 75 mg by mouth as needed for Migraine.      omeprazole (PRILOSEC) 20 MG capsule Take 20 mg by mouth once daily.      ondansetron (ZOFRAN) 4 MG tablet Take 1 tablet (4 mg total) by mouth every 6 (six) hours as needed for Nausea.  Qty: 12 tablet, Refills: 0      ondansetron (ZOFRAN-ODT) 8 MG TbDL Take 8 mg by mouth 2 (two) times daily.      oxybutynin (DITROPAN-XL) 10 MG 24 hr tablet Take 1 tablet (10 mg total) by mouth once daily.  Qty: 30 tablet, Refills: 11      prochlorperazine (COMPAZINE) 10 MG tablet Take 10 mg by mouth 3 (three) times daily.      propranoloL (INDERAL LA) 60 MG 24 hr capsule Take 60 mg by mouth every evening.      rosuvastatin (CRESTOR) 20 MG tablet Take 1 tablet (20 mg total) by mouth once daily.  Qty: 90 tablet, Refills: 9      spironolactone (ALDACTONE) 25 MG tablet Take 25 mg by mouth once daily.      tamsulosin (FLOMAX) 0.4 mg Cap Take 1 capsule (0.4 mg total) by mouth once daily.  Qty: 30 capsule, Refills: 11      !! traZODone (DESYREL) 100 MG tablet Take 100 mg by mouth every evening.       !! traZODone (DESYREL) 50 MG tablet Take 50 mg by mouth every evening.      verapamiL (VERELAN) 240 MG C24P Take 240 mg by mouth Daily.       !! - Potential duplicate medications found. Please discuss with provider.              Discharge Diagnosis: Spondylosis without myelopathy or radiculopathy, lumbar region [M47.816]  Condition on Discharge: Stable with no complications to procedure   Diet on Discharge: Same as before.  Activity: as per instruction sheet.  Discharge to: Home with a responsible adult.  Follow up: 2-4 weeks

## 2023-03-03 NOTE — OP NOTE
Therapeutic Lumbar Medial Branch Radiofrequency Ablation under Fluoroscopy     The procedure, risks, benefits, and options were discussed with the patient. There are no contraindications to the procedure. The patent expressed understanding and agreed to the procedure. Informed written consent was obtained prior to the start of the procedure and can be found in the patient's chart.        PATIENT NAME: Gordon Griffin   MRN: [id number]     DATE OF PROCEDURE: 03/03/2023     PROCEDURE:  Right L3, L4, and L5 Lumbar Radiofrequency Ablation under Fluoroscopy    PRE-OP DIAGNOSIS: Spondylosis without myelopathy or radiculopathy, lumbar region [M47.816] Lumbar spondylosis [M47.816]    POST-OP DIAGNOSIS: Same    PHYSICIAN: Larry Brasher MD    ASSISTANTS: Angel Dooley MD     MEDICATIONS INJECTED:  Preservative-free Decadron 10mg with 3cc of Bupivicaine 0.25%    LOCAL ANESTHETIC INJECTED:   Xylocaine 2%    SEDATION: Versed 2mg and Fentanyl 100mcg                                                                                                                                                                                     Conscious sedation ordered by M.D. Patient re-evaluation prior to administration of conscious sedation. No changes noted in patient's status from initial evaluation. The patient's vital signs were monitored by RN and patient remained hemodynamically stable throughout the procedure.    Event Time In   Sedation Start 1040   Sedation End 1056       ESTIMATED BLOOD LOSS:  None    COMPLICATIONS:  None     INTERVAL HISTORY: Patient has clinical and imaging findings suggestive of recurrent facet mediated pain. Patient has undergone a previous RFA at specified levels with at least 50% relief for at least 6 months. Successful diagnostic medial branch blocks have been completed within the past 2 years.    TECHNIQUE: Time-out was performed to identify the patient and procedure to be performed. With the patient  laying in a prone position, the surgical area was prepped and draped in the usual sterile fashion using ChloraPrep and fenestrated drape. The levels were determined under fluoroscopic guidance. Skin anesthesia was achieved by injecting Lidocaine 2% over the injection sites. A 18 gauge 10mm curved active tip needle was introduced to the anatomic local of the medial branch at each of the above levels using AP, lateral and/or contralateral oblique fluoroscopic imaging. Then sensory and motor testing was performed to confirm that the needle tips were in the correct location. After negative aspiration for blood or CSF was confirmed, 1 mL of the lidocaine 2% listed above was injected slowly at each site. This was followed by thermal lesioning at 80 degrees celsius for 90 seconds. That was followed by slowly injecting 2 mL of the medication mixture listed above at each site. The needles were removed and bleeding was nil. A sterile dressing was applied. No specimens collected. The patient tolerated the procedure well and did not have any procedure related motor deficit at the conclusion of the procedure.    The patient was monitored after the procedure in the recovery area. They were given post-procedure and discharge instructions to follow at home. The patient was discharged in a stable condition.        Larry Brasher MD

## 2023-03-09 ENCOUNTER — TELEPHONE (OUTPATIENT)
Dept: ENDOCRINOLOGY | Facility: CLINIC | Age: 43
End: 2023-03-09
Payer: MEDICARE

## 2023-03-09 NOTE — TELEPHONE ENCOUNTER
----- Message from Genesis Price MA sent at 3/9/2023  1:11 PM CST -----  Regarding: FW: New Patient  Contact: Patient  Hi,   Can you please assist pt with an appt to see Dr Rodriguez so that she in turn can refer Dylon to Dr Mcgrath?  Pt's number [phone]    Thank you.   ----- Message -----  From: Chelsea Richmond MA  Sent: 3/9/2023  11:02 AM CST  To: Genesis Price MA  Subject: FW: New Patient                                  Can you please contact pt and have him get established with Dr Rodriguez first. thanks  ----- Message -----  From: Bre Mendoza  Sent: 3/9/2023  10:43 AM CST  To: [facility]  Subject: New Patient                                      Patient is requesting a call back to schedule a new patient appt   Patient is transgender and would like to schedule a consult to have a procedure completed   Please assist     Patient can be reached at [phone]

## 2023-03-16 ENCOUNTER — TELEPHONE (OUTPATIENT)
Dept: PAIN MEDICINE | Facility: CLINIC | Age: 43
End: 2023-03-16
Payer: MEDICARE

## 2023-03-16 ENCOUNTER — HOSPITAL ENCOUNTER (EMERGENCY)
Facility: HOSPITAL | Age: 43
Discharge: HOME | End: 2023-03-16
Attending: EMERGENCY MEDICINE
Payer: MEDICARE

## 2023-03-16 ENCOUNTER — OFFICE VISIT (OUTPATIENT)
Dept: PAIN MEDICINE | Facility: CLINIC | Age: 43
End: 2023-03-16
Payer: MEDICARE

## 2023-03-16 ENCOUNTER — PATIENT MESSAGE (OUTPATIENT)
Dept: PAIN MEDICINE | Facility: OTHER | Age: 43
End: 2023-03-16
Payer: MEDICARE

## 2023-03-16 ENCOUNTER — OFFICE VISIT (OUTPATIENT)
Dept: NEUROLOGY | Facility: CLINIC | Age: 43
End: 2023-03-16
Payer: MEDICARE

## 2023-03-16 VITALS
DIASTOLIC BLOOD PRESSURE: 65 MMHG | WEIGHT: 160 LBS | HEART RATE: 81 BPM | RESPIRATION RATE: 14 BRPM | TEMPERATURE: 98 F | OXYGEN SATURATION: 97 % | SYSTOLIC BLOOD PRESSURE: 121 MMHG | BODY MASS INDEX: 21.7 KG/M2

## 2023-03-16 VITALS
HEART RATE: 80 BPM | HEIGHT: 72 IN | SYSTOLIC BLOOD PRESSURE: 133 MMHG | DIASTOLIC BLOOD PRESSURE: 86 MMHG | BODY MASS INDEX: 20.37 KG/M2 | WEIGHT: 150.38 LBS

## 2023-03-16 VITALS
RESPIRATION RATE: 19 BRPM | DIASTOLIC BLOOD PRESSURE: 76 MMHG | WEIGHT: 149.25 LBS | BODY MASS INDEX: 20.22 KG/M2 | HEIGHT: 72 IN | HEART RATE: 72 BPM | TEMPERATURE: 98 F | SYSTOLIC BLOOD PRESSURE: 125 MMHG

## 2023-03-16 DIAGNOSIS — R26.9 ABNORMALITY OF GAIT AND MOBILITY: ICD-10-CM

## 2023-03-16 DIAGNOSIS — M47.817 LUMBAR AND SACRAL OSTEOARTHRITIS: ICD-10-CM

## 2023-03-16 DIAGNOSIS — M51.36 DDD (DEGENERATIVE DISC DISEASE), LUMBAR: ICD-10-CM

## 2023-03-16 DIAGNOSIS — R07.9 CHEST PAIN: ICD-10-CM

## 2023-03-16 DIAGNOSIS — G24.9 DYSTONIA: ICD-10-CM

## 2023-03-16 DIAGNOSIS — G45.9 TRANSIENT CEREBRAL ISCHEMIA, UNSPECIFIED TYPE: Primary | ICD-10-CM

## 2023-03-16 DIAGNOSIS — M47.816 SPONDYLOSIS WITHOUT MYELOPATHY OR RADICULOPATHY, LUMBAR REGION: ICD-10-CM

## 2023-03-16 DIAGNOSIS — M54.16 LUMBAR RADICULOPATHY: Primary | ICD-10-CM

## 2023-03-16 DIAGNOSIS — R25.1 MUSCLE TREMOR: Primary | ICD-10-CM

## 2023-03-16 LAB
ALBUMIN SERPL BCP-MCNC: 4 G/DL (ref 3.5–5.2)
ALP SERPL-CCNC: 89 U/L (ref 55–135)
ALT SERPL W/O P-5'-P-CCNC: 6 U/L (ref 10–44)
ANION GAP SERPL CALC-SCNC: 8 MMOL/L (ref 8–16)
AST SERPL-CCNC: 10 U/L (ref 10–40)
BASOPHILS # BLD AUTO: 0.08 K/UL (ref 0–0.2)
BASOPHILS NFR BLD: 0.4 % (ref 0–1.9)
BILIRUB SERPL-MCNC: 0.4 MG/DL (ref 0.1–1)
BUN SERPL-MCNC: 9 MG/DL (ref 6–20)
CALCIUM SERPL-MCNC: 9.2 MG/DL (ref 8.7–10.5)
CHLORIDE SERPL-SCNC: 102 MMOL/L (ref 95–110)
CK SERPL-CCNC: 74 U/L (ref 20–180)
CO2 SERPL-SCNC: 28 MMOL/L (ref 23–29)
CREAT SERPL-MCNC: 1 MG/DL (ref 0.5–1.4)
DIFFERENTIAL METHOD: ABNORMAL
EOSINOPHIL # BLD AUTO: 0.1 K/UL (ref 0–0.5)
EOSINOPHIL NFR BLD: 0.4 % (ref 0–8)
ERYTHROCYTE [DISTWIDTH] IN BLOOD BY AUTOMATED COUNT: 12.1 % (ref 11.5–14.5)
EST. GFR  (NO RACE VARIABLE): >60 ML/MIN/1.73 M^2
GLUCOSE SERPL-MCNC: 97 MG/DL (ref 70–110)
HCT VFR BLD AUTO: 40 % (ref 37–48.5)
HGB BLD-MCNC: 13.6 G/DL (ref 12–16)
IMM GRANULOCYTES # BLD AUTO: 0.11 K/UL (ref 0–0.04)
IMM GRANULOCYTES NFR BLD AUTO: 0.6 % (ref 0–0.5)
LYMPHOCYTES # BLD AUTO: 1.1 K/UL (ref 1–4.8)
LYMPHOCYTES NFR BLD: 5.6 % (ref 18–48)
MCH RBC QN AUTO: 28.8 PG (ref 27–31)
MCHC RBC AUTO-ENTMCNC: 34 G/DL (ref 32–36)
MCV RBC AUTO: 85 FL (ref 82–98)
MONOCYTES # BLD AUTO: 1.2 K/UL (ref 0.3–1)
MONOCYTES NFR BLD: 6.5 % (ref 4–15)
NEUTROPHILS # BLD AUTO: 16.2 K/UL (ref 1.8–7.7)
NEUTROPHILS NFR BLD: 86.5 % (ref 38–73)
NRBC BLD-RTO: 0 /100 WBC
PLATELET # BLD AUTO: 147 K/UL (ref 150–450)
PMV BLD AUTO: 10.4 FL (ref 9.2–12.9)
POTASSIUM SERPL-SCNC: 3.4 MMOL/L (ref 3.5–5.1)
PROT SERPL-MCNC: 7.2 G/DL (ref 6–8.4)
RBC # BLD AUTO: 4.72 M/UL (ref 4–5.4)
SODIUM SERPL-SCNC: 138 MMOL/L (ref 136–145)
TROPONIN I SERPL DL<=0.01 NG/ML-MCNC: <0.006 NG/ML (ref 0–0.03)
WBC # BLD AUTO: 18.68 K/UL (ref 3.9–12.7)

## 2023-03-16 PROCEDURE — 99285 PR EMERGENCY DEPT VISIT,LEVEL V: ICD-10-PCS | Mod: ,,, | Performed by: EMERGENCY MEDICINE

## 2023-03-16 PROCEDURE — 82550 ASSAY OF CK (CPK): CPT

## 2023-03-16 PROCEDURE — 3079F DIAST BP 80-89 MM HG: CPT | Mod: CPTII,S$GLB,, | Performed by: STUDENT IN AN ORGANIZED HEALTH CARE EDUCATION/TRAINING PROGRAM

## 2023-03-16 PROCEDURE — 63600175 PHARM REV CODE 636 W HCPCS

## 2023-03-16 PROCEDURE — 3074F SYST BP LT 130 MM HG: CPT | Mod: CPTII,S$GLB,, | Performed by: NURSE PRACTITIONER

## 2023-03-16 PROCEDURE — 96361 HYDRATE IV INFUSION ADD-ON: CPT

## 2023-03-16 PROCEDURE — 3075F PR MOST RECENT SYSTOLIC BLOOD PRESS GE 130-139MM HG: ICD-10-PCS | Mod: CPTII,S$GLB,, | Performed by: STUDENT IN AN ORGANIZED HEALTH CARE EDUCATION/TRAINING PROGRAM

## 2023-03-16 PROCEDURE — 99214 PR OFFICE/OUTPT VISIT, EST, LEVL IV, 30-39 MIN: ICD-10-PCS | Mod: S$GLB,,, | Performed by: STUDENT IN AN ORGANIZED HEALTH CARE EDUCATION/TRAINING PROGRAM

## 2023-03-16 PROCEDURE — 3074F PR MOST RECENT SYSTOLIC BLOOD PRESSURE < 130 MM HG: ICD-10-PCS | Mod: CPTII,S$GLB,, | Performed by: NURSE PRACTITIONER

## 2023-03-16 PROCEDURE — 99214 OFFICE O/P EST MOD 30 MIN: CPT | Mod: S$GLB,,, | Performed by: NURSE PRACTITIONER

## 2023-03-16 PROCEDURE — 99285 EMERGENCY DEPT VISIT HI MDM: CPT | Mod: ,,, | Performed by: EMERGENCY MEDICINE

## 2023-03-16 PROCEDURE — 3008F PR BODY MASS INDEX (BMI) DOCUMENTED: ICD-10-PCS | Mod: CPTII,S$GLB,, | Performed by: STUDENT IN AN ORGANIZED HEALTH CARE EDUCATION/TRAINING PROGRAM

## 2023-03-16 PROCEDURE — 99214 OFFICE O/P EST MOD 30 MIN: CPT | Mod: S$GLB,,, | Performed by: STUDENT IN AN ORGANIZED HEALTH CARE EDUCATION/TRAINING PROGRAM

## 2023-03-16 PROCEDURE — 3008F BODY MASS INDEX DOCD: CPT | Mod: CPTII,S$GLB,, | Performed by: NURSE PRACTITIONER

## 2023-03-16 PROCEDURE — 80053 COMPREHEN METABOLIC PANEL: CPT

## 2023-03-16 PROCEDURE — 99999 PR PBB SHADOW E&M-EST. PATIENT-LVL V: ICD-10-PCS | Mod: PBBFAC,,, | Performed by: STUDENT IN AN ORGANIZED HEALTH CARE EDUCATION/TRAINING PROGRAM

## 2023-03-16 PROCEDURE — 3078F DIAST BP <80 MM HG: CPT | Mod: CPTII,S$GLB,, | Performed by: NURSE PRACTITIONER

## 2023-03-16 PROCEDURE — 1159F MED LIST DOCD IN RCRD: CPT | Mod: CPTII,S$GLB,, | Performed by: STUDENT IN AN ORGANIZED HEALTH CARE EDUCATION/TRAINING PROGRAM

## 2023-03-16 PROCEDURE — 99285 EMERGENCY DEPT VISIT HI MDM: CPT | Mod: 25

## 2023-03-16 PROCEDURE — 99999 PR PBB SHADOW E&M-EST. PATIENT-LVL V: CPT | Mod: PBBFAC,,, | Performed by: NURSE PRACTITIONER

## 2023-03-16 PROCEDURE — 93010 ELECTROCARDIOGRAM REPORT: CPT | Mod: ,,, | Performed by: INTERNAL MEDICINE

## 2023-03-16 PROCEDURE — 25000003 PHARM REV CODE 250

## 2023-03-16 PROCEDURE — 3079F PR MOST RECENT DIASTOLIC BLOOD PRESSURE 80-89 MM HG: ICD-10-PCS | Mod: CPTII,S$GLB,, | Performed by: STUDENT IN AN ORGANIZED HEALTH CARE EDUCATION/TRAINING PROGRAM

## 2023-03-16 PROCEDURE — 3075F SYST BP GE 130 - 139MM HG: CPT | Mod: CPTII,S$GLB,, | Performed by: STUDENT IN AN ORGANIZED HEALTH CARE EDUCATION/TRAINING PROGRAM

## 2023-03-16 PROCEDURE — 93005 ELECTROCARDIOGRAM TRACING: CPT

## 2023-03-16 PROCEDURE — 3008F BODY MASS INDEX DOCD: CPT | Mod: CPTII,S$GLB,, | Performed by: STUDENT IN AN ORGANIZED HEALTH CARE EDUCATION/TRAINING PROGRAM

## 2023-03-16 PROCEDURE — 99214 PR OFFICE/OUTPT VISIT, EST, LEVL IV, 30-39 MIN: ICD-10-PCS | Mod: S$GLB,,, | Performed by: NURSE PRACTITIONER

## 2023-03-16 PROCEDURE — 1159F PR MEDICATION LIST DOCUMENTED IN MEDICAL RECORD: ICD-10-PCS | Mod: CPTII,S$GLB,, | Performed by: STUDENT IN AN ORGANIZED HEALTH CARE EDUCATION/TRAINING PROGRAM

## 2023-03-16 PROCEDURE — 85025 COMPLETE CBC W/AUTO DIFF WBC: CPT

## 2023-03-16 PROCEDURE — 96374 THER/PROPH/DIAG INJ IV PUSH: CPT

## 2023-03-16 PROCEDURE — 3078F PR MOST RECENT DIASTOLIC BLOOD PRESSURE < 80 MM HG: ICD-10-PCS | Mod: CPTII,S$GLB,, | Performed by: NURSE PRACTITIONER

## 2023-03-16 PROCEDURE — 93010 EKG 12-LEAD: ICD-10-PCS | Mod: ,,, | Performed by: INTERNAL MEDICINE

## 2023-03-16 PROCEDURE — 3008F PR BODY MASS INDEX (BMI) DOCUMENTED: ICD-10-PCS | Mod: CPTII,S$GLB,, | Performed by: NURSE PRACTITIONER

## 2023-03-16 PROCEDURE — 99999 PR PBB SHADOW E&M-EST. PATIENT-LVL V: CPT | Mod: PBBFAC,,, | Performed by: STUDENT IN AN ORGANIZED HEALTH CARE EDUCATION/TRAINING PROGRAM

## 2023-03-16 PROCEDURE — 99999 PR PBB SHADOW E&M-EST. PATIENT-LVL V: ICD-10-PCS | Mod: PBBFAC,,, | Performed by: NURSE PRACTITIONER

## 2023-03-16 PROCEDURE — 84484 ASSAY OF TROPONIN QUANT: CPT

## 2023-03-16 RX ORDER — DIAZEPAM 10 MG/2ML
5 INJECTION INTRAMUSCULAR
Status: COMPLETED | OUTPATIENT
Start: 2023-03-16 | End: 2023-03-16

## 2023-03-16 RX ADMIN — SODIUM CHLORIDE 1000 ML: 0.9 INJECTION, SOLUTION INTRAVENOUS at 03:03

## 2023-03-16 RX ADMIN — SODIUM CHLORIDE 1000 ML: 9 INJECTION, SOLUTION INTRAVENOUS at 01:03

## 2023-03-16 RX ADMIN — DIAZEPAM 5 MG: 10 INJECTION, SOLUTION INTRAMUSCULAR; INTRAVENOUS at 01:03

## 2023-03-16 NOTE — ED PROVIDER NOTES
"Encounter Date: 3/16/2023       History     Chief Complaint   Patient presents with    Dizziness     Pt presents with c/o migraine and dizziness that started a few hrs ago. Reports hx of "several strokes" and myoclonus. Reports having multiple TIA while en route to ED with RUE weakness and slurred speech.      42-year-old transgender female with past medical history of abnormal involuntary movements, chronic pain, chronic anxiety/depression, and functional neurological symptom disorder with movement disorder.  Patient now presents with new onset 1 day history of acute muscle spasms, self-reported slurred speech and "multiple TIAs", associated with headaches, chest pain, dizziness, body pains.  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  Patient denies any loss of consciousness, vision changes, fevers, or chills.    Review of patient's allergies indicates:   Allergen Reactions    Mustard Itching, Nausea And Vomiting, Shortness Of Breath and Swelling    Lipitor [atorvastatin] Itching    Mushroom Itching, Nausea And Vomiting and Swelling    Niaspan extended-release [niacin] Itching    Nystatin Hives     Other reaction(s): hives    Olive extract Itching, Nausea And Vomiting and Swelling    Oyster extract     Extendryl [[id number]] Rash    V-cillin k Rash     Past Medical History:   Diagnosis Date    Anxiety     Cancer     Chest pain 1/20/2016 12/30/2015: Began experinece chest pain.    Depression     Functional movement disorder 10/1/2019    Migraine headache     Movement disorder     Myoclonic jerkings, massive     Stroke pt. states he had a cva at 3 months old     Past Surgical History:   Procedure Laterality Date    ANGIOGRAM, CORONARY, WITH LEFT HEART CATHETERIZATION      EPIDURAL STEROID INJECTION N/A 3/26/2021    Procedure: INJECTION, STEROID, " EPIDURAL L4/5;  Surgeon: Larry Brasher MD;  Location: [facility];  Service: Pain Management;  Laterality: N/A;    EPIDURAL STEROID INJECTION N/A 6/4/2021    Procedure: INJECTION, STEROID, EPIDURAL, L4-L5 IL need consent;  Surgeon: Larry Brasher MD;  Location: [facility];  Service: Pain Management;  Laterality: N/A;    EPIDURAL STEROID INJECTION N/A 10/29/2021    Procedure: INJECTION, STEROID, EPIDURAL, L4-L5IL NEED CONSENT;  Surgeon: Larry Brasher MD;  Location: [facility];  Service: Pain Management;  Laterality: N/A;    EPIDURAL STEROID INJECTION N/A 1/27/2022    Procedure: Injection, Steroid, Epidural C7/T1;  Surgeon: Larry Brasher MD;  Location: [facility];  Service: Pain Management;  Laterality: N/A;    EPIDURAL STEROID INJECTION N/A 2/10/2022    Procedure: Injection, Steroid, Epidural L4/5;  Surgeon: Larry Brasher MD;  Location: [facility];  Service: Pain Management;  Laterality: N/A;    EPIDURAL STEROID INJECTION N/A 8/25/2022    Procedure: Injection, Steroid, Epidural C7/T1 CONTRAST;  Surgeon: Larry Brasher MD;  Location: [facility];  Service: Pain Management;  Laterality: N/A;    INJECTION OF ANESTHETIC AGENT AROUND NERVE Bilateral 5/6/2022    Procedure: BLOCK, NERVE, BILATERAL L3-L4-*L5 MEDIAL BRANCH;  Surgeon: Larry Brasher MD;  Location: [facility];  Service: Pain Management;  Laterality: Bilateral;    INJECTION OF ANESTHETIC AGENT AROUND NERVE Bilateral 6/2/2022    Procedure: BLOCK, NERVE BILATERAL L3-L4-L5 MEDIAL BRANCH 2nd, needs consent;  Surgeon: Larry Brasher MD;  Location: [facility];  Service: Pain Management;  Laterality: Bilateral;    MANDIBLE SURGERY      reconstruction    RADIOFREQUENCY ABLATION Right 6/23/2022    Procedure: RADIOFREQUENCY ABLATION RIGHT L3,L4,L5 1 of 2, consent needed;  Surgeon: Larry Brasher MD;  Location: [facility];  Service: Pain Management;  Laterality: Right;    RADIOFREQUENCY ABLATION Left 7/7/2022    Procedure:  RADIOFREQUENCY ABLATION LEFT L3,L4,L5 2 of 2, needs consent;  Surgeon: Larry Brasher MD;  Location: [facility];  Service: Pain Management;  Laterality: Left;    RADIOFREQUENCY ABLATION Right 3/3/2023    Procedure: RADIOFREQUENCY ABLATION RIGHT L3,L4,L5;  Surgeon: Larry Brasher MD;  Location: [facility];  Service: Pain Management;  Laterality: Right;    variceol repair       Family History   Problem Relation Age of Onset    Heart disease Father     Hypertension Father     Hyperlipidemia Father     Heart disease Paternal Uncle     Heart disease Mother     Myasthenia gravis Mother      Social History     Tobacco Use    Smoking status: Never    Smokeless tobacco: Never   Substance Use Topics    Alcohol use: No    Drug use: No     Review of Systems   Constitutional:  Negative for chills and fever.   HENT:  Negative for rhinorrhea and sneezing.    Eyes:  Negative for photophobia and visual disturbance.   Respiratory:  Negative for cough, chest tightness, shortness of breath and wheezing.    Cardiovascular:  Positive for chest pain. Negative for palpitations and leg swelling.   Gastrointestinal:  Positive for nausea. Negative for abdominal pain, diarrhea and vomiting.   Genitourinary:  Negative for decreased urine volume, dysuria, flank pain and urgency.   Musculoskeletal:  Positive for myalgias. Negative for back pain, gait problem, joint swelling and neck pain.   Skin:  Negative for color change, pallor and rash.   Neurological:  Positive for dizziness, tremors, speech difficulty, weakness and headaches. Negative for facial asymmetry and numbness.   Hematological:  Negative for adenopathy. Does not bruise/bleed easily.   Psychiatric/Behavioral:  Negative for agitation, confusion and decreased concentration.      Physical Exam     Initial Vitals [03/16/23 0033]   BP Pulse Resp Temp SpO2   120/81 100 18 97.8 °F (36.6 °C) 99 %      MAP       --         Physical Exam    Nursing note and vitals reviewed.    Gen:  AxOx3, appears poorly nourished, appears stated age, no pallor, no jaundice, appears well hydrated.  While speaking the patient patient has multiple episodes of spasms which are fatigable upon distraction, and intermittent waxing and waning slurred speech, with global tremors and shaking and kicking of the right leg.  Eye: EOMI, no scleral icterus, no periorbital edema or ecchymosis  Head: normocephalic, atraumatic, no lesions, scalp appears normal  ENT: neck supple, no stridor, no masses, no drooling or voice changes  CVS: All distal pulses intact with normal rate and rhythm, no JVD, normal S1/S2, no murmur  Pulm: Normal breath sounds, no wheezes, rales or rhonchi, no increased work of breathing  Abd: soft, nontender, nondistended, no organomegaly, no CVAT  Ext: no edema, no lesions, rashes, or deformity  Neuro: GCS15, pupil equal and reactive to light, moving all extremities, cranial nerves intact, increased tone of right upper limb and right lower limb, no drift, power intact globally in bilaterally, distal tendon reflexes 2+, no myoclonus  Psych:  Auto speech, thought content normal, mood and affect normal psychomotor agitation with kicking of legs, patient endorses anxiety.    ED Course   Procedures  Labs Reviewed   CBC W/ AUTO DIFFERENTIAL - Abnormal; Notable for the following components:       Result Value    WBC 18.68 (*)     Platelets 147 (*)     Immature Granulocytes 0.6 (*)     Gran # (ANC) 16.2 (*)     Immature Grans (Abs) 0.11 (*)     Mono # 1.2 (*)     Gran % 86.5 (*)     Lymph % 5.6 (*)     All other components within normal limits   COMPREHENSIVE METABOLIC PANEL - Abnormal; Notable for the following components:    Potassium 3.4 (*)     ALT 6 (*)     All other components within normal limits   CK   TROPONIN I   URINALYSIS, REFLEX TO URINE CULTURE     EKG Readings: (Independently Interpreted)   Initial Reading: No STEMI.   Ninety-six, rhythm regular, axis normal, intervals normal, ST depressions in  "inferior leads but consistent with previous EKGs,  no ST elevations.  NSR     Imaging Results              X-Ray Chest AP Portable (Final result)  Result time 03/16/23 01:45:14      Final result by Mili Mendez MD (03/16/23 01:45:14)                   Impression:      No acute intrathoracic abnormality identified on this single radiographic view of the chest.      Electronically signed by: Mili Mendez MD  Date:    03/16/2023  Time:    01:45               Narrative:    EXAMINATION:  XR CHEST AP PORTABLE    CLINICAL HISTORY:  Chest pain, unspecified    TECHNIQUE:  Single frontal view of the chest was performed.    COMPARISON:  08/04/2022    FINDINGS:  Cardiac monitoring leads overlie the chest.  The cardiomediastinal silhouette is within normal limits of size and configuration.  Mediastinal structures are midline.  The lungs are symmetrically expanded without evidence of confluent airspace consolidation.  No substantial volume of pleural fluid or pneumothorax identified.  Osseous structures are intact.                                       Medications   sodium chloride 0.9% bolus 1,000 mL 1,000 mL (1,000 mLs Intravenous New Bag 3/16/23 0315)   sodium chloride 0.9% bolus 1,000 mL 1,000 mL (0 mLs Intravenous Stopped 3/16/23 0234)   diazePAM injection 5 mg (5 mg Intravenous Given 3/16/23 0127)     Medical Decision Making:   History:   Old Records Summarized: records from previous admission(s).       <> Summary of Records: Patient has been seen multiple times with previous episodes of muscle spasms which resolve with diazepam.  Initial Assessment:   42-year-old transgender female with past medical history of abnormal involuntary movements, chronic pain, chronic anxiety/depression, and functional neurological symptom disorder with movement disorder.  Patient now presents with new onset 1 day history of acute muscle spasms, self-reported slurred speech and "multiple TIAs", associated with headaches, chest pain, " "dizziness, body pains. Patient is able to speak, breathing spontaneously, hemodynamically stable, oriented, moving all 4 limbs spontaneously.  Examination is consistent with muscle spasms which resolved upon distraction.    Differential Diagnosis:   Initial impression is concerning for fictitious illness disorder, anxiety with somatic symptom disorder, serotonin syndrome, also considered but doubt intracranial bleed, CVA, aortic dissection and ACS given history and physical examination.  Clinical Tests:   Lab Tests: Ordered and Reviewed  Radiological Study: Ordered and Reviewed  Medical Tests: Ordered and Reviewed  ED Management:  Patient presented to emergency department with history of "TIAs" and myoclonus.  Initial concern was for CVA versus serotonin syndrome, however after examination with normal neurological evaluation myoclonus, weakness, reflexes concern for muscle spasm due to functional neurologic condition is high.  Workup including chest x-ray and EKG were normal.  CBC was consistent with nonspecific leukocytosis of 18 possibly setting of stress.  Troponin, CMP, and CK normal.  Patient was given diazepam and 1 L of fluids.  Re-evaluation patient's symptoms were much improved and much spasms had resolved and patient was more relaxed and reports feeling "much better".  I suspect functional neurologic cause with abnormal movement motor function.  Will give 1 more Liter of fluids and discharge home.  Patient understands this plan.           ED Course as of 03/16/23 0344   Thu Mar 16, 2023   0139 WBC(!): 18.68 [PM]   0139 Gran # (ANC)(!): 16.2  Shift consistent with possible infection [PM]   0139 X-Ray Chest AP Portable  As per my interpretation, the chest x-ray is grossly normal with no acute findings concerning for new infiltrates, new edema, new effusions, changes in cardiac silhouette.  [PM]   0237 Sodium: 138 [PM]   0237 Potassium(!): 3.4 [PM]   0237 Creatinine: 1.0 [PM]   0237 BUN: 9 [PM]   0237 " Comprehensive metabolic panel(!)  CMP grossly benign [PM]   0237 CPK: 74 [PM]   0237 Troponin I: <0.006 [PM]      ED Course User Index  [PM] Shree Alcala MD                 Clinical Impression:   Final diagnoses:  [R07.9] Chest pain  [R25.1] Muscle tremor (Primary)        ED Disposition Condition    Discharge Stable          ED Prescriptions    None       Follow-up Information       Follow up With Specialties Details Why Contact Info    Magdalena Cohn MD Pediatrics Schedule an appointment as soon as possible for a visit   [address] [place] [address]  [address]  [facility]  [facility] [id number] [phone]      [facility] - Emergency Dept Emergency Medicine  As needed, If symptoms worsen [address] [facility] [address] [phone]    Psychiatrist  Schedule an appointment as soon as possible for a visit                    Shree Alcala MD  Resident  03/16/23 2043
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
## 2023-03-16 NOTE — PROGRESS NOTES
Vascular Neurology Clinic  Initial Consult    Patient Name: Gordon Griffin  MRN: [id number]  Date: 3/16/23  Referring Provider: No ref. provider found    CC: Transient neurologic symptoms    SUBJECTIVE:      History of Present Illness: Gordon Griffin is a 42 y.o. transgender female (on estrogen and spironolactone) with a past medical history significant for migraine headaches, abnormal involuntary movements, chronic pain, anxiety/depression who presents as a referral for evaluation of transient neurologic symptoms.    She was evaluated in the ED earlier today for a one day history of episodes of slurred speech and right-sided weakness and numbness. Last night, she was getting out of an EMS truck, when she experienced these symptoms. The symptoms would resolve and recur and she had multiple episodes lasting 30s to a few minutes. Overall, the entire event lasted 1 hour. Reports a history of slurred speech, but never had right-sided symptoms before. Reports a history of stroke when she was 3 months old.     She was evaluated in movement disorders clinic for myoclonic movements (right shoulder jerking and right head jerking towards the shoulder) as well as dystonic foot cramping. Some improvement with keppra. Overall concern for functional neurologic disorder.    Work-up:    Imaging:  - CTA head and neck: pending  - MRI brain without contrast (2/14/2023): No acute intracranial abnormality. Right maxillary sinus disease.  - Bilateral CUS (2/24/2023): No evidence of a hemodynamically significant carotid bifurcation stenosis.    Cardiac Evaluation:  - Cardiac PET Stress Test (02/24/2023):    There is a small sized, mild intensity distal to apical anteroseptal and inferoapical fixed perfusion abnormality involving 5% of LV myocardium in the distribution of the LAD. After pharmacologic stress, this defect improves, indicative of non-transmural scar.    There are no other significant perfusion abnormalities.    The whole  heart absolute myocardial perfusion values averaged 0.53 cc/min/g at rest, which is reduced; 1.40 cc/min/g at stress, which is mildly reduced; and CFR is 2.66 , which is low normal.    CT attenuation images demonstrate no coronary calcifications and no aortic calcifications.    The gated perfusion images showed an ejection fraction of 71% at rest and 78% during stress. A normal ejection fraction is greater than 47%.    The wall motion is normal at rest and during stress.    The LV cavity size is normal at rest and stress.    The ECG portion of the study is abnormal but not diagnostic.    There were no arrhythmias during stress.    The patient reported no chest pain during the stress test.    There are no prior studies for comparison.     - Cardiac monitor: pending    Labs:  Recent Labs   Lab 02/24/23  0726   LDL Cholesterol 134.0   HDL 30 L   Triglycerides 160 H   Cholesterol 196         Review of Systems: The following systems were reviewed with pertinent positives and negatives documented in the HPI: constitutional, eyes, CV, respiratory, GI, , musculoskeletal, skin, neurological, psychiatric    Past Medical History:  Past Medical History:   Diagnosis Date    Anxiety     Cancer     Chest pain 1/20/2016 12/30/2015: Began experinece chest pain.    Depression     Functional movement disorder 10/1/2019    Migraine headache     Movement disorder     Myoclonic jerkings, massive     Stroke pt. states he had a cva at 3 months old       Medications:    Current Outpatient Medications:     aspirin 81 MG Chew, Take 81 mg by mouth once daily., Disp: , Rfl:     azelastine (ASTELIN) 137 mcg (0.1 %) nasal spray, USE 1 TO 2 SPRAYS IN EACH NOSTRIL TWICE DAILY FOR CONGESTION, Disp: , Rfl:     baclofen (LIORESAL) 20 MG tablet, Take 1 tablet by mouth 3 (three) times daily as needed. , Disp: , Rfl:     benztropine (COGENTIN) 0.5 MG tablet, Take 0.5 mg by mouth once daily., Disp: , Rfl:     butalbital-acetaminophen-caffeine  -40 mg (FIORICET, ESGIC) -40 mg per tablet, Take 1 tablet by mouth every 4 (four) hours as needed., Disp: , Rfl:     butorphanol (STADOL) 10 mg/mL nasal spray, 1 spray by Nasal route every 4 (four) hours as needed for Pain., Disp: , Rfl:     butorphanol (STADOL) 10 mg/mL nasal spray, Instill 2 sprays by Nasal route every 4 (four) hours as needed for pain., Disp: 30 mL, Rfl: 0    cloNIDine (CATAPRES) 0.1 MG tablet, TAKE 1 TABLET(0.1 MG) BY MOUTH TWICE DAILY, Disp: 60 tablet, Rfl: 3    cyclobenzaprine (FLEXERIL) 10 MG tablet, TK 1 T PO Q 8 H PRF PAIN, Disp: , Rfl:     diazePAM (VALIUM) 2 MG tablet, Take 2 mg by mouth 2 (two) times daily as needed., Disp: , Rfl:     erenumab-aooe (AIMOVIG AUTOINJECTOR SUBQ), Inject into the skin., Disp: , Rfl:     EScitalopram oxalate (LEXAPRO) 20 MG tablet, Take 20 mg by mouth once daily., Disp: , Rfl:     FLUCELVAX QUAD 8097-0034, PF, 60 mcg (15 mcg x 4)/0.5 mL Syrg vaccine, ADM 0.5ML IM UTD, Disp: , Rfl: 0    fluticasone (FLONASE) 50 mcg/actuation nasal spray, SPRAY TWICE IEN QD, Disp: , Rfl: 5    gabapentin (NEURONTIN) 300 MG capsule, Take 1 capsule (300 mg total) by mouth 3 (three) times daily., Disp: 90 capsule, Rfl: 3    hydrOXYzine pamoate (VISTARIL) 50 MG Cap, Take  mg by mouth nightly as needed., Disp: , Rfl:     ketorolac (TORADOL) 10 mg tablet, ketorolac 10 mg tablet, Disp: , Rfl:     levETIRAcetam (KEPPRA) 1000 MG tablet, Take 1,000 mg by mouth 2 (two) times daily., Disp: , Rfl:     methocarbamoL (ROBAXIN) 500 MG Tab, methocarbamol 500 mg tablet, Disp: , Rfl:     metoclopramide HCl (REGLAN) 10 MG tablet, 10 mg., Disp: , Rfl:     NARCAN 4 mg/actuation Spry, SPRAY 0.1ML IN 1 NOSTRIL MAY REPEAT DOSE EVERY 2-3 MINUTES AS NEEDED ALTERNATING NOSTRILS EACH DOSE, Disp: 1 each, Rfl: 3    nitroGLYCERIN (NITROSTAT) 0.4 MG SL tablet, Place 1 tablet (0.4 mg total) under the tongue every 5 (five) minutes as needed for Chest pain., Disp: 90 tablet, Rfl: 3    NURTEC 75  mg odt, Take 75 mg by mouth as needed for Migraine., Disp: , Rfl:     omeprazole (PRILOSEC) 20 MG capsule, Take 20 mg by mouth once daily., Disp: , Rfl:     ondansetron (ZOFRAN) 4 MG tablet, Take 1 tablet (4 mg total) by mouth every 6 (six) hours as needed for Nausea., Disp: 12 tablet, Rfl: 0    ondansetron (ZOFRAN-ODT) 8 MG TbDL, Take 8 mg by mouth 2 (two) times daily., Disp: , Rfl:     oxybutynin (DITROPAN-XL) 10 MG 24 hr tablet, Take 1 tablet (10 mg total) by mouth once daily., Disp: 30 tablet, Rfl: 11    prochlorperazine (COMPAZINE) 10 MG tablet, Take 10 mg by mouth 3 (three) times daily., Disp: , Rfl:     propranoloL (INDERAL LA) 60 MG 24 hr capsule, Take 60 mg by mouth every evening., Disp: , Rfl:     rosuvastatin (CRESTOR) 20 MG tablet, Take 1 tablet (20 mg total) by mouth once daily., Disp: 90 tablet, Rfl: 9    spironolactone (ALDACTONE) 25 MG tablet, Take 25 mg by mouth once daily., Disp: , Rfl:     tamsulosin (FLOMAX) 0.4 mg Cap, Take 1 capsule (0.4 mg total) by mouth once daily., Disp: 30 capsule, Rfl: 11    traZODone (DESYREL) 100 MG tablet, Take 100 mg by mouth every evening., Disp: , Rfl:     traZODone (DESYREL) 50 MG tablet, Take 50 mg by mouth every evening., Disp: , Rfl:     verapamiL (VERELAN) 240 MG C24P, Take 240 mg by mouth Daily., Disp: , Rfl:   No current facility-administered medications for this visit.  Any other notable medications as documented in HPI.    Allergies:  Review of patient's allergies indicates:   Allergen Reactions    Mustard Itching, Nausea And Vomiting, Shortness Of Breath and Swelling    Lipitor [atorvastatin] Itching    Mushroom Itching, Nausea And Vomiting and Swelling    Niaspan extended-release [niacin] Itching    Nystatin Hives     Other reaction(s): hives    Olive extract Itching, Nausea And Vomiting and Swelling    Oyster extract     Extendryl [yilhpmywtxieepqq-qj-hlpljknitc] Rash    V-cillin k Rash       Social History:  Social History     Socioeconomic History     Marital status:    Tobacco Use    Smoking status: Never    Smokeless tobacco: Never   Substance and Sexual Activity    Alcohol use: No    Drug use: No    Sexual activity: Yes     Partners: Female     Any other notable Social History as documented in HPI.    Family History:  Family History   Problem Relation Age of Onset    Heart disease Father     Hypertension Father     Hyperlipidemia Father     Heart disease Paternal Uncle     Heart disease Mother     Myasthenia gravis Mother      Any other notable FMH as documented in HPI.      OBJECTIVE:     Physical Exam:   Vitals:    03/16/23 1435   BP: 133/86   Pulse: 80     GEN: NAD, pleasant, cooperative  CV: RRR  PULM: No signs of resp distress, on room air  EXT: No cyanosis, clubbing, or edema.    NEURO:  Mental status: The patient is alert, attentive, and oriented to self, age, month, year  Speech: Fluent speech with intact repetition, comprehension, and naming. Phonation is normal.    Cranial nerves:  CN II: Visual fields are full to confrontation. Pupils are 3mm and reactive to light OU.  CN III, IV, VI: EOMI, no nystagmus, no ptosis  CN V: Facial sensation is intact in all 3 divisions bilaterally.  CN VII: Face is symmetric with normal eye closure and smile.  CN VIII: Hearing is normal bilaterally.  CN IX, X: Palate elevates symmetrically.   CN XI: Head turning and shoulder shrug are intact.  CN XII: Tongue is midline with normal movements and no atrophy.    Motor: Muscle bulk and tone are normal. No pronator drift. 5/5 strength throughout. Transient cramping of the right foot.    Reflexes: Reflexes are 2+ and symmetric at the biceps, triceps, knees, and ankles.     Sensory: Light touch intact in bilateral upper and lower extremities.     Coordination: Rapid alternating movements and fine finger movements are intact. There is no dysmetria on finger-to-nose and heel-knee-shin. There are no abnormal or extraneous movements.    Gait/Stance: Posture is normal.  Gait is steady with normal steps, base, arm swing, and turning.       ASSESSMENT/PLAN:     Diagnosis/Etiology: Transient neurologic symptoms, ddx includes TIA vs migraine with atypical features vs functional neurologic disorder, plan for CTA head/neck and cardiac monitor to complete vascular work-up and to assess her overall risk. We discussed use of estrogen in stroke risk, which she will consider and discuss with her endocrinologist.  Stroke Risk Factors: Estrogen use, HLD  Effects of Stroke: None    Recommendations:   - Additional studies: CTA head/neck, cardiac monitor  - Antiplatelet/Anticoagulation: Continue ASA 81mg daily.   - Lipid Management: Target is LDL < 70mg/dL. Continue high-intensity statin.   - Diabetes: Target hemoglobin A1c <7%, measured 2X/year or quarterly if not meeting goals  - Hypertension: Target systolic BP <130mmHg. At goal today.   - Therapy: Consider PT/OT as needed.   - Follow-up: RTC in 3 months. Recommend close follow-up with their PCP for monitoring and management of the above vascular risk factors as needed to meet goals.       Problem List Items Addressed This Visit          Neuro    Dystonia    Relevant Orders    Ambulatory referral/consult to Neurology       Other    Abnormality of gait and mobility    Relevant Orders    Ambulatory referral/consult to Neurology     Other Visit Diagnoses       Transient cerebral ischemia, unspecified type    -  Primary    Relevant Orders    CTA Head and Neck (xpd) (Completed)    Cardiac event monitor              Elizabeth Rowan MD, PhD  [facility]  Department of Vascular Neurology

## 2023-03-16 NOTE — TELEPHONE ENCOUNTER
----- Message from Candis Koch sent at 3/16/2023  3:39 PM CDT -----  .Type: Patient Call Back    Who called: Self    What is the request in detail: Stated RFA said ok he can have it     Can the clinic reply by [facility]? No    Would the patient rather a call back or a response via [facility]? Call    Best call back number:.[phone] (home)       Additional Information:

## 2023-03-16 NOTE — Clinical Note
"Gordon Davies"Elias was seen and treated in our emergency department on 3/16/2023.  She may return to work on 03/17/2023.       If you have any questions or concerns, please don't hesitate to call.      Shree Alcala MD"

## 2023-03-16 NOTE — DISCHARGE INSTRUCTIONS
Thank you for coming to our Emergency Department today. It is important to remember that some problems or medical conditions are difficult to diagnose and may not be found or addressed during your Emergency Department visit.     Be sure to follow up with your primary care doctor and review all labs/imaging/tests that were performed during your ER visit with them. Some labs/imaging/tests may be outside of the normal range, and require non-emergent follow-up and/or further investigation/treatment/procedures/testing to help diagnose/exclude/prevent complications or other potentially serious medical conditions that were not discussed or addressed during your ER visit.    If you do not have a primary care doctor, you may contact the one listed on your discharge paperwork or you may also call the [facility] Appointment Desk at [phone] to schedule an appointment and establish care with one. It is important to your health that you have a primary care doctor.    Please take all medications as directed. All medications may potentially have side-effects and it is impossible to predict which medications may give you side-effects or what side-effects (if any) they will give you.. If you feel that you are having a negative effect or side-effect of any medication you should immediately stop taking them and seek medical attention. If you feel that you are having a life-threatening reaction call 911.    Return to the ER with any questions/concerns, new/concerning symptoms, worsening or failure to improve.     Do not drive, swim, climb to height, take a bath, operate heavy machinery, drink alcohol or take potentially sedating medications, sign any legal documents or make any important decisions for 24 hours if you have received any pain medications, sedatives or mood altering drugs during your ER visit or within 24 hours of taking them if they have been prescribed to you.     You can find additional resources for Dentists,  hearing aids, durable medical equipment, low cost pharmacies and other resources at [email or web address]    BELOW THIS LINE ONLY APPLIES IF YOU HAVE A COVID TEST PENDING OR IF YOU HAVE BEEN DIAGNOSED WITH COVID:  Please access [employer] to review the results of your test. Until the results of your COVID test return, you should isolate yourself so as not to potentially spread illness to others.   If your COVID test returns positive, you should isolate yourself so as not to spread illness to others. After five full days, if you are feeling better and you have not had fever for 24 hours, you can return to your typical daily activities, but you must wear a mask around others for an additional 5 days.   If your COVID test returns negative and you are either unvaccinated or more than six months out from your two-dose vaccine and are not yet boosted, you should still quarantine for 5 full days followed by strict mask use for an additional 5 full days.   If your COVID test returns negative and you have received your 2-dose initial vaccine as well as a booster, you should continue strict mask use for 10 full days after the exposure.  For all those exposed, best practice includes a test at day 5 after the exposure. This can be a home test or a test through one of the many testing centers throughout our community.   Masking is always advised to limit the spread of COVID. Cdc.gov is an excellent site to obtain the latest up to date recommendations regarding COVID and COVID testing.     CDC Testing and Quarantine Guidelines for patients with exposure to a known-positive COVID-19 person:  A close exposure is defined as anyone who has had an exposure (masked or unmasked) to a known COVID -19 positive person within 6 feet of someone for a cumulative total of 15 minutes or more over a 24-hour period.   Vaccinated and/or if you recently had a positive covid test within 90 days do NOT need to quarantine after contact with someone  who had COVID-19 unless you develop symptoms.   Fully vaccinated people who have not had a positive test within 90 days, should get tested 3-5 days after their exposure, even if they don't have symptoms and wear a mask indoors in public for 14 days following exposure or until their test result is negative.      Unvaccinated and/or NOT had a positive test within 90 days and meet close exposure  You are required by CDC guidelines to quarantine for at least 5 days from time of exposure followed by 5 days of strict masking. It is recommended, but not required to test after 5 days, unless you develop symptoms, in which case you should test at that time.  If you get tested after 5 days and your test is positive, your 5 day period of isolation starts the day of the positive test.    If your exposure does not meet the above definition, you can return to your normal daily activities to include social distancing, wearing a mask and frequent handwashing.      Here is a link to guidance from the CDC:  https://www.cdc.gov/media/releases/2021/s1227-isolation-quarantine-guidance.html      [facility] Testing Sites:  https://ldh.la.gov/page/3934      [facility] website with testing locations and guidance:  [email or web address]

## 2023-03-16 NOTE — ED TRIAGE NOTES
"Gordon Griffin, a 42 y.o. female presents to the ED w/ complaint of dizziness. Presents c/o migraine and dizziness that started a couple hours ago. Reports "several strokes" and myoclonus. Reports multiple TIA en route to the ED with weakness and slurred speech     Adult Physical Assessment  LOC: Gordon Griffin, 42 y.o. female verified via two identifiers.  The patient is awake, alert, oriented and speaking appropriately at this time. Patients speech become slightly slurred in episodes. Patient reports headache and migraines.   APPEARANCE: Patient resting comfortably and appears to be in no acute distress at this time. Patient is clean and well groomed, patient's clothing is properly fastened.  SKIN:The skin is warm and dry, color consistent with ethnicity, patient has normal skin turgor and moist mucus membranes, skin intact, no breakdown or brusing noted.  MUSCULOSKELETAL: Patient moving all extremities well, no obvious swelling or deformities noted.  RESPIRATORY: Airway is open and patent, respirations are spontaneous, patient has a normal effort and rate, no accessory muscle use noted.  CARDIAC: Patient has a normal rate and rhythm, no periphreal edema noted in any extremity, capillary refill < 3 seconds in all extremities  ABDOMEN: Soft and non tender to palpation, no abdominal distention noted. Bowel sounds present in all four quadrants.  NEUROLOGIC: Eyes open spontaneously, behavior appropriate to situation, follows commands, facial expression symmetrical, bilateral hand grasp equal and even, purposeful motor response noted, normal sensation in all extremities when touched with a finger.      Triage note:  Chief Complaint   Patient presents with    Dizziness     Pt presents with c/o migraine and dizziness that started a few hrs ago. Reports hx of "several strokes" and myoclonus. Reports having multiple TIA while en route to ED with RUE weakness and slurred speech.      Review of patient's allergies " indicates:   Allergen Reactions    Mustard Itching, Nausea And Vomiting, Shortness Of Breath and Swelling    Lipitor [atorvastatin] Itching    Mushroom Itching, Nausea And Vomiting and Swelling    Niaspan extended-release [niacin] Itching    Nystatin Hives     Other reaction(s): hives    Olive extract Itching, Nausea And Vomiting and Swelling    Oyster extract     Extendryl [vkiiejepisxggpvu-ut-ldqjkxvcsr] Rash    V-cillin k Rash     Past Medical History:   Diagnosis Date    Anxiety     Cancer     Chest pain 1/20/2016 12/30/2015: Began experinece chest pain.    Depression     Functional movement disorder 10/1/2019    Migraine headache     Movement disorder     Myoclonic jerkings, massive     Stroke pt. states he had a cva at 3 months old

## 2023-03-17 ENCOUNTER — PATIENT MESSAGE (OUTPATIENT)
Dept: PAIN MEDICINE | Facility: OTHER | Age: 43
End: 2023-03-17

## 2023-03-17 DIAGNOSIS — M47.816 LUMBAR SPONDYLOSIS: Primary | ICD-10-CM

## 2023-03-20 ENCOUNTER — HOSPITAL ENCOUNTER (OUTPATIENT)
Dept: RADIOLOGY | Facility: HOSPITAL | Age: 43
Discharge: HOME | End: 2023-03-20
Attending: STUDENT IN AN ORGANIZED HEALTH CARE EDUCATION/TRAINING PROGRAM
Payer: MEDICARE

## 2023-03-20 DIAGNOSIS — G45.9 TRANSIENT CEREBRAL ISCHEMIA, UNSPECIFIED TYPE: ICD-10-CM

## 2023-03-20 PROCEDURE — 70498 CTA HEAD AND NECK (XPD): ICD-10-PCS | Mod: 26,,, | Performed by: RADIOLOGY

## 2023-03-20 PROCEDURE — 70496 CTA HEAD AND NECK (XPD): ICD-10-PCS | Mod: 26,,, | Performed by: RADIOLOGY

## 2023-03-20 PROCEDURE — 70498 CT ANGIOGRAPHY NECK: CPT | Mod: 26,,, | Performed by: RADIOLOGY

## 2023-03-20 PROCEDURE — 25500020 PHARM REV CODE 255: Performed by: STUDENT IN AN ORGANIZED HEALTH CARE EDUCATION/TRAINING PROGRAM

## 2023-03-20 PROCEDURE — 70496 CT ANGIOGRAPHY HEAD: CPT | Mod: TC

## 2023-03-20 PROCEDURE — 70496 CT ANGIOGRAPHY HEAD: CPT | Mod: 26,,, | Performed by: RADIOLOGY

## 2023-03-20 RX ADMIN — IOHEXOL 75 ML: 350 INJECTION, SOLUTION INTRAVENOUS at 06:03

## 2023-03-21 ENCOUNTER — TELEPHONE (OUTPATIENT)
Dept: PAIN MEDICINE | Facility: CLINIC | Age: 43
End: 2023-03-21
Payer: MEDICARE

## 2023-03-21 NOTE — TELEPHONE ENCOUNTER
----- Message from Joanna Hatch sent at 3/21/2023  2:39 PM CDT -----  Regarding: Patient call back  Who called:BEL YORK [[phone]]    What is the request in detail: Patient is requesting a call back. He states his white blood cell count is back to normal (he was advised he needed to get it fixed before his procedure). He would like the staff to request his most recent blood work from [facility]    Please advise.    Can the clinic reply by [facility]? No    Best call back number: [phone]    Additional Information: N/A

## 2023-03-29 ENCOUNTER — LAB VISIT (OUTPATIENT)
Dept: LAB | Facility: HOSPITAL | Age: 43
End: 2023-03-29
Payer: MEDICARE

## 2023-03-29 DIAGNOSIS — D72.829 LEUKOCYTOSIS, UNSPECIFIED TYPE: ICD-10-CM

## 2023-03-29 LAB
BASOPHILS # BLD AUTO: 0.07 K/UL (ref 0–0.2)
BASOPHILS NFR BLD: 0.8 % (ref 0–1.9)
DIFFERENTIAL METHOD: ABNORMAL
EOSINOPHIL # BLD AUTO: 0.1 K/UL (ref 0–0.5)
EOSINOPHIL NFR BLD: 1 % (ref 0–8)
ERYTHROCYTE [DISTWIDTH] IN BLOOD BY AUTOMATED COUNT: 12.1 % (ref 11.5–14.5)
HCT VFR BLD AUTO: 38.7 % (ref 37–48.5)
HGB BLD-MCNC: 13.1 G/DL (ref 12–16)
IMM GRANULOCYTES # BLD AUTO: 0.05 K/UL (ref 0–0.04)
IMM GRANULOCYTES NFR BLD AUTO: 0.6 % (ref 0–0.5)
LYMPHOCYTES # BLD AUTO: 1.6 K/UL (ref 1–4.8)
LYMPHOCYTES NFR BLD: 18.2 % (ref 18–48)
MCH RBC QN AUTO: 28.9 PG (ref 27–31)
MCHC RBC AUTO-ENTMCNC: 33.9 G/DL (ref 32–36)
MCV RBC AUTO: 85 FL (ref 82–98)
MONOCYTES # BLD AUTO: 0.6 K/UL (ref 0.3–1)
MONOCYTES NFR BLD: 7.2 % (ref 4–15)
NEUTROPHILS # BLD AUTO: 6.3 K/UL (ref 1.8–7.7)
NEUTROPHILS NFR BLD: 72.2 % (ref 38–73)
NRBC BLD-RTO: 0 /100 WBC
PLATELET # BLD AUTO: 191 K/UL (ref 150–450)
PMV BLD AUTO: 9.9 FL (ref 9.2–12.9)
RBC # BLD AUTO: 4.54 M/UL (ref 4–5.4)
WBC # BLD AUTO: 8.78 K/UL (ref 3.9–12.7)

## 2023-03-29 PROCEDURE — 36415 COLL VENOUS BLD VENIPUNCTURE: CPT | Performed by: EMERGENCY MEDICINE

## 2023-03-29 PROCEDURE — 85025 COMPLETE CBC W/AUTO DIFF WBC: CPT | Performed by: EMERGENCY MEDICINE

## 2023-03-31 ENCOUNTER — HOSPITAL ENCOUNTER (OUTPATIENT)
Facility: OTHER | Age: 43
Discharge: HOME | End: 2023-03-31
Attending: ANESTHESIOLOGY | Admitting: ANESTHESIOLOGY
Payer: MEDICARE

## 2023-03-31 VITALS
DIASTOLIC BLOOD PRESSURE: 67 MMHG | BODY MASS INDEX: 21.67 KG/M2 | HEIGHT: 72 IN | SYSTOLIC BLOOD PRESSURE: 138 MMHG | HEART RATE: 72 BPM | TEMPERATURE: 99 F | OXYGEN SATURATION: 98 % | RESPIRATION RATE: 16 BRPM | WEIGHT: 160 LBS

## 2023-03-31 DIAGNOSIS — G89.29 CHRONIC BILATERAL LOW BACK PAIN, UNSPECIFIED WHETHER SCIATICA PRESENT: ICD-10-CM

## 2023-03-31 DIAGNOSIS — M54.50 CHRONIC BILATERAL LOW BACK PAIN, UNSPECIFIED WHETHER SCIATICA PRESENT: ICD-10-CM

## 2023-03-31 DIAGNOSIS — G89.29 CHRONIC PAIN: ICD-10-CM

## 2023-03-31 DIAGNOSIS — M47.816 LUMBAR SPONDYLOSIS: Primary | ICD-10-CM

## 2023-03-31 PROCEDURE — 63600175 PHARM REV CODE 636 W HCPCS: Performed by: ANESTHESIOLOGY

## 2023-03-31 PROCEDURE — 64635 DESTROY LUMB/SAC FACET JNT: CPT | Mod: LT | Performed by: ANESTHESIOLOGY

## 2023-03-31 PROCEDURE — 64636 DESTROY L/S FACET JNT ADDL: CPT | Mod: LT,,, | Performed by: ANESTHESIOLOGY

## 2023-03-31 PROCEDURE — 64635 PR DESTROY LUMB/SAC FACET JNT: ICD-10-PCS | Mod: LT,,, | Performed by: ANESTHESIOLOGY

## 2023-03-31 PROCEDURE — 99152 PR MOD CONSCIOUS SEDATION, SAME PHYS, 5+ YRS, FIRST 15 MIN: ICD-10-PCS | Mod: ,,, | Performed by: ANESTHESIOLOGY

## 2023-03-31 PROCEDURE — 64636 PR DESTROY L/S FACET JNT ADDL: ICD-10-PCS | Mod: LT,,, | Performed by: ANESTHESIOLOGY

## 2023-03-31 PROCEDURE — 64635 DESTROY LUMB/SAC FACET JNT: CPT | Mod: LT,,, | Performed by: ANESTHESIOLOGY

## 2023-03-31 PROCEDURE — 64636 DESTROY L/S FACET JNT ADDL: CPT | Mod: LT | Performed by: ANESTHESIOLOGY

## 2023-03-31 PROCEDURE — 25000003 PHARM REV CODE 250: Performed by: ANESTHESIOLOGY

## 2023-03-31 PROCEDURE — 99152 MOD SED SAME PHYS/QHP 5/>YRS: CPT | Mod: ,,, | Performed by: ANESTHESIOLOGY

## 2023-03-31 PROCEDURE — 99152 MOD SED SAME PHYS/QHP 5/>YRS: CPT | Performed by: ANESTHESIOLOGY

## 2023-03-31 PROCEDURE — 25000003 PHARM REV CODE 250: Performed by: STUDENT IN AN ORGANIZED HEALTH CARE EDUCATION/TRAINING PROGRAM

## 2023-03-31 RX ORDER — DEXAMETHASONE SODIUM PHOSPHATE 10 MG/ML
INJECTION INTRAMUSCULAR; INTRAVENOUS
Status: DISCONTINUED | OUTPATIENT
Start: 2023-03-31 | End: 2023-03-31 | Stop reason: HOSPADM

## 2023-03-31 RX ORDER — SODIUM CHLORIDE 9 MG/ML
500 INJECTION, SOLUTION INTRAVENOUS CONTINUOUS
Status: ACTIVE | OUTPATIENT
Start: 2023-03-31 | End: 2023-04-01

## 2023-03-31 RX ORDER — LIDOCAINE HYDROCHLORIDE 20 MG/ML
INJECTION, SOLUTION INFILTRATION; PERINEURAL
Status: DISCONTINUED | OUTPATIENT
Start: 2023-03-31 | End: 2023-03-31 | Stop reason: HOSPADM

## 2023-03-31 RX ORDER — FENTANYL CITRATE 50 UG/ML
INJECTION, SOLUTION INTRAMUSCULAR; INTRAVENOUS
Status: DISCONTINUED | OUTPATIENT
Start: 2023-03-31 | End: 2023-03-31 | Stop reason: HOSPADM

## 2023-03-31 RX ORDER — MIDAZOLAM HYDROCHLORIDE 1 MG/ML
INJECTION INTRAMUSCULAR; INTRAVENOUS
Status: DISCONTINUED | OUTPATIENT
Start: 2023-03-31 | End: 2023-03-31 | Stop reason: HOSPADM

## 2023-03-31 RX ORDER — BUPIVACAINE HYDROCHLORIDE 2.5 MG/ML
INJECTION, SOLUTION EPIDURAL; INFILTRATION; INTRACAUDAL
Status: DISCONTINUED | OUTPATIENT
Start: 2023-03-31 | End: 2023-03-31 | Stop reason: HOSPADM

## 2023-03-31 NOTE — DISCHARGE INSTRUCTIONS

## 2023-03-31 NOTE — OP NOTE
Therapeutic Lumbar Medial Branch Radiofrequency Ablation under Fluoroscopy     The procedure, risks, benefits, and options were discussed with the patient. There are no contraindications to the procedure. The patent expressed understanding and agreed to the procedure. Informed written consent was obtained prior to the start of the procedure and can be found in the patient's chart.        PATIENT NAME: Gordon Griffin   MRN: [id number]     DATE OF PROCEDURE: 03/31/2023     PROCEDURE:  Left L3, L4, and L5 Lumbar Radiofrequency Ablation under Fluoroscopy    PRE-OP DIAGNOSIS: Lumbar spondylosis [M47.816] Lumbar spondylosis [M47.816]    POST-OP DIAGNOSIS: Same    PHYSICIAN: Diana Lira MD    ASSISTANTS: Dr. Peck     MEDICATIONS INJECTED:  Preservative-free Decadron 10mg with 9cc of Bupivicaine 0.25%    LOCAL ANESTHETIC INJECTED:   Xylocaine 2%    SEDATION: Versed 2mg and Fentanyl 50mcg                                                                                                                                                                                     Conscious sedation ordered by M.D. Patient re-evaluation prior to administration of conscious sedation. No changes noted in patient's status from initial evaluation. The patient's vital signs were monitored by RN and patient remained hemodynamically stable throughout the procedure.    Event Time In   Sedation Start 1327   Sedation End 1341       ESTIMATED BLOOD LOSS:  None    COMPLICATIONS:  None     INTERVAL HISTORY: Patient has clinical and imaging findings suggestive of facet mediated pain. Patients has completed 2 previous diagnostic medial branch blocks at specified levels with at least 80% relief for the expected duration of the local anesthetic utilized.    TECHNIQUE: Time-out was performed to identify the patient and procedure to be performed. With the patient laying in a prone position, the surgical area was prepped and draped in the usual sterile  fashion using ChloraPrep and fenestrated drape. The levels were determined under fluoroscopic guidance. Skin anesthesia was achieved by injecting Lidocaine 2% over the injection sites. A 18 gauge 10mm curved active tip needle was introduced to the anatomic local of the medial branch at each of the above levels using AP, lateral and/or contralateral oblique fluoroscopic imaging. Then sensory and motor testing was performed to confirm that the needle tips were in the correct location. After negative aspiration for blood or CSF was confirmed, 1 mL of the lidocaine 2% listed above was injected slowly at each site. This was followed by thermal lesioning at 80 degrees celsius for 90 seconds. Needles were then rotated, depth was checked with lateral fluoroscopy, and an additional ablation was performed without pain. That was followed by slowly injecting 1 mL of the medication mixture listed above at each site. The needles were removed and bleeding was nil. A sterile dressing was applied. No specimens collected. The patient tolerated the procedure well and did not have any procedure related motor deficit at the conclusion of the procedure.    The patient was monitored after the procedure in the recovery area. They were given post-procedure and discharge instructions to follow at home. The patient was discharged in a stable condition.      Diana Lira MD

## 2023-03-31 NOTE — H&P
HPI  Patient presenting for Procedure(s) (LRB):  Radiofrequency Ablation Left L3, L4, & L5: CBC results: WBC are normal.     Patient on Anti-coagulation No    No health changes since previous encounter    Past Medical History:   Diagnosis Date    Anxiety     Cancer     Chest pain 1/20/2016 12/30/2015: Began experinece chest pain.    Depression     Functional movement disorder 10/1/2019    Migraine headache     Movement disorder     Myoclonic jerkings, massive     Stroke pt. states he had a cva at 3 months old     Past Surgical History:   Procedure Laterality Date    ANGIOGRAM, CORONARY, WITH LEFT HEART CATHETERIZATION      EPIDURAL STEROID INJECTION N/A 3/26/2021    Procedure: INJECTION, STEROID, EPIDURAL L4/5;  Surgeon: Larry Brasher MD;  Location: [facility];  Service: Pain Management;  Laterality: N/A;    EPIDURAL STEROID INJECTION N/A 6/4/2021    Procedure: INJECTION, STEROID, EPIDURAL, L4-L5 IL need consent;  Surgeon: Larry Brasher MD;  Location: [facility];  Service: Pain Management;  Laterality: N/A;    EPIDURAL STEROID INJECTION N/A 10/29/2021    Procedure: INJECTION, STEROID, EPIDURAL, L4-L5IL NEED CONSENT;  Surgeon: Larry Brasher MD;  Location: [facility];  Service: Pain Management;  Laterality: N/A;    EPIDURAL STEROID INJECTION N/A 1/27/2022    Procedure: Injection, Steroid, Epidural C7/T1;  Surgeon: Larry Brasher MD;  Location: [facility];  Service: Pain Management;  Laterality: N/A;    EPIDURAL STEROID INJECTION N/A 2/10/2022    Procedure: Injection, Steroid, Epidural L4/5;  Surgeon: Larry Brasher MD;  Location: [facility];  Service: Pain Management;  Laterality: N/A;    EPIDURAL STEROID INJECTION N/A 8/25/2022    Procedure: Injection, Steroid, Epidural C7/T1 CONTRAST;  Surgeon: Larry Brasher MD;  Location: [facility];  Service: Pain Management;  Laterality: N/A;    INJECTION OF ANESTHETIC AGENT AROUND NERVE Bilateral 5/6/2022    Procedure: BLOCK, NERVE, BILATERAL  L3-L4-*L5 MEDIAL BRANCH;  Surgeon: Larry Brasher MD;  Location: [facility];  Service: Pain Management;  Laterality: Bilateral;    INJECTION OF ANESTHETIC AGENT AROUND NERVE Bilateral 6/2/2022    Procedure: BLOCK, NERVE BILATERAL L3-L4-L5 MEDIAL BRANCH 2nd, needs consent;  Surgeon: Larry Brasher MD;  Location: [facility];  Service: Pain Management;  Laterality: Bilateral;    MANDIBLE SURGERY      reconstruction    RADIOFREQUENCY ABLATION Right 6/23/2022    Procedure: RADIOFREQUENCY ABLATION RIGHT L3,L4,L5 1 of 2, consent needed;  Surgeon: Larry Brasher MD;  Location: [facility];  Service: Pain Management;  Laterality: Right;    RADIOFREQUENCY ABLATION Left 7/7/2022    Procedure: RADIOFREQUENCY ABLATION LEFT L3,L4,L5 2 of 2, needs consent;  Surgeon: Larry Brasher MD;  Location: [facility];  Service: Pain Management;  Laterality: Left;    RADIOFREQUENCY ABLATION Right 3/3/2023    Procedure: RADIOFREQUENCY ABLATION RIGHT L3,L4,L5;  Surgeon: Larry Brasher MD;  Location: [facility];  Service: Pain Management;  Laterality: Right;    variceol repair       Review of patient's allergies indicates:   Allergen Reactions    Mustard Itching, Nausea And Vomiting, Shortness Of Breath and Swelling    Lipitor [atorvastatin] Itching    Mushroom Itching, Nausea And Vomiting and Swelling    Niaspan extended-release [niacin] Itching    Nystatin Hives     Other reaction(s): hives    Olive extract Itching, Nausea And Vomiting and Swelling    Oyster extract     Extendryl [[id number]-ea-hydtrrxgfa] Rash    V-cillin k Rash      No current facility-administered medications for this encounter.       PMHx, PSHx, Allergies, Medications reviewed in epic    ROS negative except pain complaints in HPI    OBJECTIVE:    There were no vitals taken for this visit.    PHYSICAL EXAMINATION:    GENERAL: Well appearing, in no acute distress, alert and oriented x3.  PSYCH:  Mood and affect appropriate.  SKIN: Skin color,  texture, turgor normal, no rashes or lesions which will impact the procedure.  CV: RRR with palpation of the radial artery.  PULM: No evidence of respiratory difficulty, symmetric chest rise. Clear to auscultation.  NEURO: Cranial nerves grossly intact.    Plan:    Proceed with procedure as planned Procedure(s) (LRB):  Radiofrequency Ablation Left L3, L4, & L5 Pending CBC results (Left)    Asa Corea  03/31/2023

## 2023-03-31 NOTE — DISCHARGE SUMMARY
Discharge Note  Short Stay      SUMMARY     Admit Date: 3/31/2023    Attending Physician: Diana Lira      Discharge Physician: Diana Lira      Discharge Date: 3/31/2023 1:42 PM    Procedure(s) (LRB):  Radiofrequency Ablation Left L3, L4, & L5 Pending CBC results (Left)    Final Diagnosis: Lumbar spondylosis [M47.816]    Disposition: Home or self care    Patient Instructions:   Current Discharge Medication List        CONTINUE these medications which have NOT CHANGED    Details   aspirin 81 MG Chew Take 81 mg by mouth once daily.      azelastine (ASTELIN) 137 mcg (0.1 %) nasal spray USE 1 TO 2 SPRAYS IN EACH NOSTRIL TWICE DAILY FOR CONGESTION      baclofen (LIORESAL) 20 MG tablet Take 1 tablet by mouth 3 (three) times daily as needed.       benztropine (COGENTIN) 0.5 MG tablet Take 0.5 mg by mouth once daily.      butalbital-acetaminophen-caffeine -40 mg (FIORICET, ESGIC) -40 mg per tablet Take 1 tablet by mouth every 4 (four) hours as needed.      !! butorphanol (STADOL) 10 mg/mL nasal spray 1 spray by Nasal route every 4 (four) hours as needed for Pain.      !! butorphanol (STADOL) 10 mg/mL nasal spray Instill 2 sprays by Nasal route every 4 (four) hours as needed for pain.  Qty: 30 mL, Refills: 0    Comments: Quantity prescribed more than 7 day supply? Press F2 and select one:[id number]        !! butorphanol (STADOL) 10 mg/mL nasal spray 2 sprays every 4 hours as needed by nasal route for pain  Qty: 250 mL, Refills: 5    Comments: Quantity prescribed more than 7 day supply? Press F2 and select one:[id number]        cloNIDine (CATAPRES) 0.1 MG tablet TAKE 1 TABLET(0.1 MG) BY MOUTH TWICE DAILY  Qty: 60 tablet, Refills: 3    Comments: ZERO refills remain on this prescription. Your patient is requesting advance approval of refills for this medication to PREVENT ANY MISSED DOSES  Associated Diagnoses: Tic      cyclobenzaprine (FLEXERIL) 10 MG tablet TK 1 T PO Q 8 H PRF PAIN      diazePAM (VALIUM) 2 MG  tablet Take 2 mg by mouth 2 (two) times daily as needed.      erenumab-aooe (AIMOVIG AUTOINJECTOR SUBQ) Inject into the skin.      EScitalopram oxalate (LEXAPRO) 20 MG tablet Take 20 mg by mouth once daily.      FLUCELVAX QUAD [id number], PF, 60 mcg (15 mcg x 4)/0.5 mL Syrg vaccine ADM 0.5ML IM UTD  Refills: 0      fluticasone (FLONASE) 50 mcg/actuation nasal spray SPRAY TWICE IEN QD  Refills: 5      gabapentin (NEURONTIN) 300 MG capsule Take 1 capsule (300 mg total) by mouth 3 (three) times daily.  Qty: 90 capsule, Refills: 3    Associated Diagnoses: Lumbar radiculopathy      hydrOXYzine pamoate (VISTARIL) 50 MG Cap Take  mg by mouth nightly as needed.      ketorolac (TORADOL) 10 mg tablet ketorolac 10 mg tablet      levETIRAcetam (KEPPRA) 1000 MG tablet Take 1,000 mg by mouth 2 (two) times daily.      methocarbamoL (ROBAXIN) 500 MG Tab methocarbamol 500 mg tablet      metoclopramide HCl (REGLAN) 10 MG tablet 10 mg.      NARCAN 4 mg/actuation Spry SPRAY 0.1ML IN 1 NOSTRIL MAY REPEAT DOSE EVERY 2-3 MINUTES AS NEEDED ALTERNATING NOSTRILS EACH DOSE  Qty: 1 each, Refills: 3    Associated Diagnoses: Chronic pain disorder; Lumbar radiculopathy      nitroGLYCERIN (NITROSTAT) 0.4 MG SL tablet Place 1 tablet (0.4 mg total) under the tongue every 5 (five) minutes as needed for Chest pain.  Qty: 90 tablet, Refills: 3      NURTEC 75 mg odt Take 75 mg by mouth as needed for Migraine.      omeprazole (PRILOSEC) 20 MG capsule Take 20 mg by mouth once daily.      ondansetron (ZOFRAN) 4 MG tablet Take 1 tablet (4 mg total) by mouth every 6 (six) hours as needed for Nausea.  Qty: 12 tablet, Refills: 0      ondansetron (ZOFRAN-ODT) 8 MG TbDL Take 8 mg by mouth 2 (two) times daily.      oxybutynin (DITROPAN-XL) 10 MG 24 hr tablet Take 1 tablet (10 mg total) by mouth once daily.  Qty: 30 tablet, Refills: 11      prochlorperazine (COMPAZINE) 10 MG tablet Take 10 mg by mouth 3 (three) times daily.      propranoloL (INDERAL LA) 60 MG  24 hr capsule Take 60 mg by mouth every evening.      rosuvastatin (CRESTOR) 20 MG tablet Take 1 tablet (20 mg total) by mouth once daily.  Qty: 90 tablet, Refills: 9      spironolactone (ALDACTONE) 25 MG tablet Take 25 mg by mouth once daily.      tamsulosin (FLOMAX) 0.4 mg Cap Take 1 capsule (0.4 mg total) by mouth once daily.  Qty: 30 capsule, Refills: 11      !! traZODone (DESYREL) 100 MG tablet Take 100 mg by mouth every evening.      !! traZODone (DESYREL) 50 MG tablet Take 50 mg by mouth every evening.      verapamiL (VERELAN) 240 MG C24P Take 240 mg by mouth Daily.       !! - Potential duplicate medications found. Please discuss with provider.              Discharge Diagnosis: Lumbar spondylosis [M47.816]  Condition on Discharge: Stable with no complications to procedure   Diet on Discharge: Same as before.  Activity: as per instruction sheet.  Discharge to: Home with a responsible adult.  Follow up: 2-4 weeks       Please call my office or pager at [phone] if experienced any weakness or loss of sensation, fever > 101.5, pain uncontrolled with oral medications, persistent nausea/vomiting/or diarrhea, redness or drainage from the incisions, or any other worrisome concerns. If physician on call was not reached or could not communicate with our office for any reason please go to the nearest emergency department      Diana Lira  03/31/2023

## 2023-04-06 ENCOUNTER — PATIENT MESSAGE (OUTPATIENT)
Dept: UROLOGY | Facility: CLINIC | Age: 43
End: 2023-04-06
Payer: MEDICARE

## 2023-04-06 ENCOUNTER — OFFICE VISIT (OUTPATIENT)
Dept: ENDOCRINOLOGY | Facility: CLINIC | Age: 43
End: 2023-04-06
Attending: INTERNAL MEDICINE
Payer: MEDICARE

## 2023-04-06 ENCOUNTER — CLINICAL SUPPORT (OUTPATIENT)
Dept: CARDIOLOGY | Facility: HOSPITAL | Age: 43
End: 2023-04-06
Attending: STUDENT IN AN ORGANIZED HEALTH CARE EDUCATION/TRAINING PROGRAM
Payer: MEDICARE

## 2023-04-06 DIAGNOSIS — Z79.899 TRANSGENDER WOMAN ON HORMONE THERAPY: ICD-10-CM

## 2023-04-06 DIAGNOSIS — G45.9 TRANSIENT CEREBRAL ISCHEMIA, UNSPECIFIED TYPE: ICD-10-CM

## 2023-04-06 DIAGNOSIS — F64.0 TRANSGENDER WOMAN ON HORMONE THERAPY: ICD-10-CM

## 2023-04-06 PROCEDURE — 93272 ECG/REVIEW INTERPRET ONLY: CPT | Mod: ,,, | Performed by: INTERNAL MEDICINE

## 2023-04-06 PROCEDURE — 1160F PR REVIEW ALL MEDS BY PRESCRIBER/CLIN PHARMACIST DOCUMENTED: ICD-10-PCS | Mod: CPTII,95,, | Performed by: INTERNAL MEDICINE

## 2023-04-06 PROCEDURE — 93270 REMOTE 30 DAY ECG REV/REPORT: CPT

## 2023-04-06 PROCEDURE — 1159F PR MEDICATION LIST DOCUMENTED IN MEDICAL RECORD: ICD-10-PCS | Mod: CPTII,95,, | Performed by: INTERNAL MEDICINE

## 2023-04-06 PROCEDURE — 93272 CARDIAC EVENT MONITOR (CUPID ONLY): ICD-10-PCS | Mod: ,,, | Performed by: INTERNAL MEDICINE

## 2023-04-06 PROCEDURE — 99204 OFFICE O/P NEW MOD 45 MIN: CPT | Mod: 95,,, | Performed by: INTERNAL MEDICINE

## 2023-04-06 PROCEDURE — 1159F MED LIST DOCD IN RCRD: CPT | Mod: CPTII,95,, | Performed by: INTERNAL MEDICINE

## 2023-04-06 PROCEDURE — 99204 PR OFFICE/OUTPT VISIT, NEW, LEVL IV, 45-59 MIN: ICD-10-PCS | Mod: 95,,, | Performed by: INTERNAL MEDICINE

## 2023-04-06 PROCEDURE — 1160F RVW MEDS BY RX/DR IN RCRD: CPT | Mod: CPTII,95,, | Performed by: INTERNAL MEDICINE

## 2023-04-06 RX ORDER — MOXIFLOXACIN HYDROCHLORIDE 400 MG/1
400 TABLET ORAL
COMMUNITY
Start: 2023-01-13 | End: 2023-11-06

## 2023-04-06 RX ORDER — ONABOTULINUMTOXINA 200 [USP'U]/1
200 INJECTION, POWDER, LYOPHILIZED, FOR SOLUTION INTRADERMAL; INTRAMUSCULAR
COMMUNITY
Start: 2023-01-30

## 2023-04-06 RX ORDER — EZETIMIBE 10 MG/1
10 TABLET ORAL
COMMUNITY
Start: 2023-02-22 | End: 2023-05-22 | Stop reason: SDUPTHER

## 2023-04-06 RX ORDER — PANTOPRAZOLE SODIUM 20 MG/1
20 TABLET, DELAYED RELEASE ORAL
COMMUNITY
Start: 2023-03-25

## 2023-04-06 RX ORDER — PREDNISONE 20 MG/1
TABLET ORAL
COMMUNITY
Start: 2023-01-13 | End: 2023-11-06

## 2023-04-06 RX ORDER — ESTRADIOL VALERATE 20 MG/ML
INJECTION INTRAMUSCULAR
COMMUNITY
Start: 2023-03-01

## 2023-04-06 RX ORDER — METHOCARBAMOL 750 MG/1
750 TABLET, FILM COATED ORAL 3 TIMES DAILY
COMMUNITY
Start: 2023-02-28

## 2023-04-06 NOTE — ASSESSMENT & PLAN NOTE
Reviewed current regimen.  She tells me her hormone levels were in the expected range.  She very well understands the risk of estrogen therapy especially based on her complex complex past medical history and possibility of TIAs.  She is willing to accept the risk and wants to continue.      She also wants to continue her care at [facility].      Will put in a referral to Dr. Mcgrath for an orchiectomy as I am fully supportive of her decision.  She also has a letter from a qualified mental health provider in support

## 2023-04-06 NOTE — PATIENT INSTRUCTIONS
Thank you for completing a virtual visit with me!     Per our conversation, I will put in a referral to our urologist Dr. Mcgrath for consideration of orchiectomy.  I will let him know that I am fully supportive of this surgery.  Please continue to get your care at [facility] and let me know if I can help you in any other way.     Thank you,  Graciela Rodriguez MD

## 2023-04-06 NOTE — PROGRESS NOTES
Subjective:      Patient ID: Gordon Griffin is a 42 y.o.    Chief Complaint:  Gender Identity Disorder      History of Present Illness  With regards to their  gender incongruence care:    Gender identity - female     Pronouns: she/her    Seeing Willa at [facility]   Seeing a MHP at [facility] - and they were supportive     She was told that she needed to see me in order to get a referral to Dr. Mcgrath for an orchiectomy     Gender Surgeries - none, but interested in orchectomy        Fertility concerns - not  interested    Started cross hormone therapy late 30s       Aldactone 25 mg bid     Dates women -erections are not important to her     Of note she has significant comorbidities and is being followed by Neurology for TIA symptoms,  abnormality of gait and mobility    ROS:   As above    Objective:     There were no vitals taken for this visit.    There is no height or weight on file to calculate BMI.      Physical Exam           Lab Review:   Lab Results   Component Value Date    HGBA1C 5.9 06/26/2012     Lab Results   Component Value Date    CHOL 196 02/24/2023    HDL 30 (L) 02/24/2023    LDLCALC 134.0 02/24/2023    TRIG 160 (H) 02/24/2023    CHOLHDL 15.3 (L) 02/24/2023     Lab Results   Component Value Date     03/16/2023    K 3.4 (L) 03/16/2023     03/16/2023    CO2 28 03/16/2023    GLU 97 03/16/2023    BUN 9 03/16/2023    CREATININE 1.0 03/16/2023    CALCIUM 9.2 03/16/2023    PROT 7.2 03/16/2023    ALBUMIN 4.0 03/16/2023    BILITOT 0.4 03/16/2023    ALKPHOS 89 03/16/2023    AST 10 03/16/2023    ALT 6 (L) 03/16/2023    ANIONGAP 8 03/16/2023    ESTGFRAFRICA >60.0 06/11/2022    EGFRNONAA >60.0 06/11/2022    TSH 0.346 (L) 06/11/2022     Vit D, 25-Hydroxy   Date Value Ref Range Status   10/01/2020 42 30 - 96 ng/mL Final     Comment:     Vitamin D deficiency.........<10 ng/mL                              Vitamin D insufficiency......10-29 ng/mL       Vitamin D sufficiency........> or equal to 30  ng/mL  Vitamin D toxicity............>100 ng/mL         Assessment and Plan     Transgender woman on hormone therapy  Reviewed current regimen.  She tells me her hormone levels were in the expected range.  She very well understands the risk of estrogen therapy especially based on her complex complex past medical history and possibility of TIAs.  She is willing to accept the risk and wants to continue.      She also wants to continue her care at [facility].      Will put in a referral to Dr. Mcgrath for an orchiectomy as I am fully supportive of her decision.  She also has a letter from a qualified mental health provider in support      The patient location is: [place]  The chief complaint leading to consultation is: gender     Visit type: audiovisual    Face to Face time with patient: 20 minutes of total time spent on the encounter, which includes face to face time and non-face to face time preparing to see the patient (eg, review of tests), Obtaining and/or reviewing separately obtained history, Documenting clinical information in the electronic or other health record, Independently interpreting results (not separately reported) and communicating results to the patient/family/caregiver, or Care coordination (not separately reported).         Each patient to whom he or she provides medical services by telemedicine is:  (1) informed of the relationship between the physician and patient and the respective role of any other health care provider with respect to management of the patient; and (2) notified that he or she may decline to receive medical services by telemedicine and may withdraw from such care at any time.

## 2023-04-19 ENCOUNTER — OFFICE VISIT (OUTPATIENT)
Dept: SURGERY | Facility: CLINIC | Age: 43
End: 2023-04-19
Payer: MEDICARE

## 2023-04-19 VITALS
DIASTOLIC BLOOD PRESSURE: 72 MMHG | BODY MASS INDEX: 18.58 KG/M2 | SYSTOLIC BLOOD PRESSURE: 102 MMHG | HEIGHT: 72 IN | WEIGHT: 137.19 LBS | HEART RATE: 111 BPM

## 2023-04-19 DIAGNOSIS — K64.8 HEMORRHOID PROLAPSE: Primary | ICD-10-CM

## 2023-04-19 PROCEDURE — 3008F BODY MASS INDEX DOCD: CPT | Mod: CPTII,S$GLB,, | Performed by: NURSE PRACTITIONER

## 2023-04-19 PROCEDURE — 3078F DIAST BP <80 MM HG: CPT | Mod: CPTII,S$GLB,, | Performed by: NURSE PRACTITIONER

## 2023-04-19 PROCEDURE — 99999 PR PBB SHADOW E&M-EST. PATIENT-LVL V: CPT | Mod: PBBFAC,,, | Performed by: NURSE PRACTITIONER

## 2023-04-19 PROCEDURE — 99214 PR OFFICE/OUTPT VISIT, EST, LEVL IV, 30-39 MIN: ICD-10-PCS | Mod: 25,S$GLB,, | Performed by: NURSE PRACTITIONER

## 2023-04-19 PROCEDURE — 3078F PR MOST RECENT DIASTOLIC BLOOD PRESSURE < 80 MM HG: ICD-10-PCS | Mod: CPTII,S$GLB,, | Performed by: NURSE PRACTITIONER

## 2023-04-19 PROCEDURE — 99214 OFFICE O/P EST MOD 30 MIN: CPT | Mod: 25,S$GLB,, | Performed by: NURSE PRACTITIONER

## 2023-04-19 PROCEDURE — 46600 PR DIAG2STIC A2SCOPY: ICD-10-PCS | Mod: S$GLB,,, | Performed by: NURSE PRACTITIONER

## 2023-04-19 PROCEDURE — 3074F PR MOST RECENT SYSTOLIC BLOOD PRESSURE < 130 MM HG: ICD-10-PCS | Mod: CPTII,S$GLB,, | Performed by: NURSE PRACTITIONER

## 2023-04-19 PROCEDURE — 1159F PR MEDICATION LIST DOCUMENTED IN MEDICAL RECORD: ICD-10-PCS | Mod: CPTII,S$GLB,, | Performed by: NURSE PRACTITIONER

## 2023-04-19 PROCEDURE — 3008F PR BODY MASS INDEX (BMI) DOCUMENTED: ICD-10-PCS | Mod: CPTII,S$GLB,, | Performed by: NURSE PRACTITIONER

## 2023-04-19 PROCEDURE — 3074F SYST BP LT 130 MM HG: CPT | Mod: CPTII,S$GLB,, | Performed by: NURSE PRACTITIONER

## 2023-04-19 PROCEDURE — 99999 PR PBB SHADOW E&M-EST. PATIENT-LVL V: ICD-10-PCS | Mod: PBBFAC,,, | Performed by: NURSE PRACTITIONER

## 2023-04-19 PROCEDURE — 1159F MED LIST DOCD IN RCRD: CPT | Mod: CPTII,S$GLB,, | Performed by: NURSE PRACTITIONER

## 2023-04-19 PROCEDURE — 46600 DIAGNOSTIC ANOSCOPY SPX: CPT | Mod: S$GLB,,, | Performed by: NURSE PRACTITIONER

## 2023-04-19 RX ORDER — HYDROCORTISONE 25 MG/G
CREAM TOPICAL 2 TIMES DAILY
Qty: 28 G | Refills: 1 | Status: SHIPPED | OUTPATIENT
Start: 2023-04-19

## 2023-04-19 NOTE — PROGRESS NOTES
CRS Office Visit History and Physical    Referring Md:   Willa Newell, Np  [address] [place] [address]    SUBJECTIVE:     Chief Complaint: hemorrhoids    History of Present Illness:  The patient is new patient to this practice.   Course is as follows:  Patient is a 42 y.o. adult presents with hemorrhoids.  Symptoms have been present for 4-5 weeks ago. Got better but then flared up again. Painful. Occasional bleeding. Occasional itching. This also happened 7 years ago.  Has a BM almost every day. Occasional sitting on toilet longer then 5 mins. Soft -liquid stools since starting her transition. Uses imodium PRN  Has not tried   Prep H suppository does help    Last Colonoscopy: no  Family history of colorectal cancer or IBD: no.    Review of patient's allergies indicates:   Allergen Reactions    Mustard Itching, Nausea And Vomiting, Shortness Of Breath and Swelling    Lipitor [atorvastatin] Itching    Mushroom Itching, Nausea And Vomiting and Swelling    Niaspan extended-release [niacin] Itching    Nystatin Hives     Other reaction(s): hives    Olive extract Itching, Nausea And Vomiting and Swelling    Oyster extract     Extendryl [ozaikyfwzcuyiyey-yq-eggsjgcimg] Rash    V-cillin k Rash       Past Medical History:   Diagnosis Date    Anxiety     Cancer     Chest pain 1/20/2016 12/30/2015: Began experinece chest pain.    Depression     Functional movement disorder 10/1/2019    Migraine headache     Movement disorder     Myoclonic jerkings, massive     Stroke pt. states he had a cva at 3 months old     Past Surgical History:   Procedure Laterality Date    ANGIOGRAM, CORONARY, WITH LEFT HEART CATHETERIZATION      EPIDURAL STEROID INJECTION N/A 3/26/2021    Procedure: INJECTION, STEROID, EPIDURAL L4/5;  Surgeon: Larry Brasher MD;  Location: [facility];  Service: Pain Management;  Laterality: N/A;    EPIDURAL STEROID INJECTION N/A 6/4/2021    Procedure: INJECTION,  STEROID, EPIDURAL, L4-L5 IL need consent;  Surgeon: Larry Brasher MD;  Location: [facility];  Service: Pain Management;  Laterality: N/A;    EPIDURAL STEROID INJECTION N/A 10/29/2021    Procedure: INJECTION, STEROID, EPIDURAL, L4-L5IL NEED CONSENT;  Surgeon: Larry Brasher MD;  Location: [facility];  Service: Pain Management;  Laterality: N/A;    EPIDURAL STEROID INJECTION N/A 1/27/2022    Procedure: Injection, Steroid, Epidural C7/T1;  Surgeon: Larry Brasher MD;  Location: [facility];  Service: Pain Management;  Laterality: N/A;    EPIDURAL STEROID INJECTION N/A 2/10/2022    Procedure: Injection, Steroid, Epidural L4/5;  Surgeon: Larry Brasher MD;  Location: [facility];  Service: Pain Management;  Laterality: N/A;    EPIDURAL STEROID INJECTION N/A 8/25/2022    Procedure: Injection, Steroid, Epidural C7/T1 CONTRAST;  Surgeon: Larry Brasher MD;  Location: [facility];  Service: Pain Management;  Laterality: N/A;    INJECTION OF ANESTHETIC AGENT AROUND NERVE Bilateral 5/6/2022    Procedure: BLOCK, NERVE, BILATERAL L3-L4-*L5 MEDIAL BRANCH;  Surgeon: Larry Brasher MD;  Location: [facility];  Service: Pain Management;  Laterality: Bilateral;    INJECTION OF ANESTHETIC AGENT AROUND NERVE Bilateral 6/2/2022    Procedure: BLOCK, NERVE BILATERAL L3-L4-L5 MEDIAL BRANCH 2nd, needs consent;  Surgeon: Larry Brasher MD;  Location: [facility];  Service: Pain Management;  Laterality: Bilateral;    MANDIBLE SURGERY      reconstruction    RADIOFREQUENCY ABLATION Right 6/23/2022    Procedure: RADIOFREQUENCY ABLATION RIGHT L3,L4,L5 1 of 2, consent needed;  Surgeon: Larry Brasher MD;  Location: [facility];  Service: Pain Management;  Laterality: Right;    RADIOFREQUENCY ABLATION Left 7/7/2022    Procedure: RADIOFREQUENCY ABLATION LEFT L3,L4,L5 2 of 2, needs consent;  Surgeon: Larry Brasher MD;  Location: [facility];  Service: Pain Management;  Laterality: Left;    RADIOFREQUENCY  ABLATION Right 3/3/2023    Procedure: RADIOFREQUENCY ABLATION RIGHT L3,L4,L5;  Surgeon: Larry Brasher MD;  Location: [facility];  Service: Pain Management;  Laterality: Right;    RADIOFREQUENCY ABLATION Left 3/31/2023    Procedure: Radiofrequency Ablation Left L3, L4, & L5 Pending CBC results;  Surgeon: Diana Lira MD;  Location: [facility];  Service: Pain Management;  Laterality: Left;    variceol repair       Family History   Problem Relation Age of Onset    Heart disease Father     Hypertension Father     Hyperlipidemia Father     Heart disease Paternal Uncle     Heart disease Mother     Myasthenia gravis Mother      Social History     Tobacco Use    Smoking status: Never    Smokeless tobacco: Never   Substance Use Topics    Alcohol use: No    Drug use: No        Review of Systems:  Review of Systems   Constitutional:  Negative for chills, fever and weight loss.   Gastrointestinal:  Negative for abdominal pain, nausea and vomiting.     OBJECTIVE:     Vital Signs (Most Recent)  /72 (BP Location: Left arm, Patient Position: Sitting, BP Method: Large (Automatic))   Pulse (!) 111   Ht 6' (1.829 m)   Wt 62.2 kg (137 lb 3.2 oz)   BMI 18.61 kg/m²     Physical Exam:  General: White adult in no distress   Neuro: Alert and oriented to person, place, and time.  Moves all extremities.     HEENT: No icterus.  Trachea midline  Respiratory: Respirations are even and unlabored, no cough or audible wheezing  Skin: Warm dry and intact, No visible rashes, no jaundice    Labs reviewed today:  Lab Results   Component Value Date    WBC 8.78 03/29/2023    HGB 13.1 03/29/2023    HCT 38.7 03/29/2023     03/29/2023    CHOL 196 02/24/2023    TRIG 160 (H) 02/24/2023    HDL 30 (L) 02/24/2023    ALT 6 (L) 03/16/2023    AST 10 03/16/2023     03/16/2023    K 3.4 (L) 03/16/2023     03/16/2023    CREATININE 1.0 03/16/2023    BUN 9 03/16/2023    CO2 28 03/16/2023    TSH 0.346 (L) 06/11/2022    INR 1.1  08/04/2022    HGBA1C 5.9 06/26/2012       Anorectal Exam:    Anal Skin: R anterior prolapsing hemorrhoid, reducible. Other skin tags to anus    Digital Rectal Exam:  Resting Tone normal  Mass none  Tenderness  present    Anoscopy:  Verbal consent was obtained.   Clear plastic anoscope inserted.    Hemorrhoids  present  Stigmata of bleeding  present  Stigmata of prolapsed  present  Distal rectal mucosa normal  Unable to look at L side due to discomfort        ASSESSMENT/PLAN:     Gordon was seen today for hemorrhoids.    Diagnoses and all orders for this visit:    Hemorrhoid prolapse  -     hydrocortisone (ANUSOL-HC) 2.5 % rectal cream; Place rectally 2 (two) times daily.    42 y.o. trans female here with hemorrhoids. Discomfort on exam today. I recommend conservative therapy but if no improvement in a month pt to callme and we will consider RBL    The patient was instructed to:  Increase water intake to at least 8-10 glasses of water per day.  Take a daily fiber supplement (Konsyl, Benefiber, Metamucil) and increase dietary intake to 20-30 grams/day.  Avoid straining or spending >5min/event with bowel movements.  Anusol BID    Follow-up in clinic in 6-8 weeks PRN    BEATRIZ Dukes  Colon and Rectal Surgery

## 2023-04-24 ENCOUNTER — TELEPHONE (OUTPATIENT)
Dept: PAIN MEDICINE | Facility: CLINIC | Age: 43
End: 2023-04-24
Payer: MEDICARE

## 2023-04-25 ENCOUNTER — OFFICE VISIT (OUTPATIENT)
Dept: PAIN MEDICINE | Facility: CLINIC | Age: 43
End: 2023-04-25
Payer: MEDICARE

## 2023-04-25 DIAGNOSIS — M47.812 CERVICAL SPONDYLOSIS: ICD-10-CM

## 2023-04-25 DIAGNOSIS — M53.3 SACROILIAC JOINT PAIN: ICD-10-CM

## 2023-04-25 DIAGNOSIS — M54.12 CERVICAL RADICULOPATHY: ICD-10-CM

## 2023-04-25 DIAGNOSIS — M47.816 LUMBAR SPONDYLOSIS: ICD-10-CM

## 2023-04-25 DIAGNOSIS — M51.36 DDD (DEGENERATIVE DISC DISEASE), LUMBAR: ICD-10-CM

## 2023-04-25 DIAGNOSIS — G89.4 CHRONIC PAIN SYNDROME: Primary | ICD-10-CM

## 2023-04-25 DIAGNOSIS — M50.30 DDD (DEGENERATIVE DISC DISEASE), CERVICAL: ICD-10-CM

## 2023-04-25 DIAGNOSIS — M54.16 LUMBAR RADICULOPATHY: ICD-10-CM

## 2023-04-25 PROCEDURE — 1160F PR REVIEW ALL MEDS BY PRESCRIBER/CLIN PHARMACIST DOCUMENTED: ICD-10-PCS | Mod: CPTII,95,, | Performed by: NURSE PRACTITIONER

## 2023-04-25 PROCEDURE — 1159F MED LIST DOCD IN RCRD: CPT | Mod: CPTII,95,, | Performed by: NURSE PRACTITIONER

## 2023-04-25 PROCEDURE — 99213 PR OFFICE/OUTPT VISIT, EST, LEVL III, 20-29 MIN: ICD-10-PCS | Mod: 95,,, | Performed by: NURSE PRACTITIONER

## 2023-04-25 PROCEDURE — 1159F PR MEDICATION LIST DOCUMENTED IN MEDICAL RECORD: ICD-10-PCS | Mod: CPTII,95,, | Performed by: NURSE PRACTITIONER

## 2023-04-25 PROCEDURE — 99213 OFFICE O/P EST LOW 20 MIN: CPT | Mod: 95,,, | Performed by: NURSE PRACTITIONER

## 2023-04-25 PROCEDURE — 1160F RVW MEDS BY RX/DR IN RCRD: CPT | Mod: CPTII,95,, | Performed by: NURSE PRACTITIONER

## 2023-04-25 RX ORDER — GABAPENTIN 300 MG/1
300 CAPSULE ORAL 3 TIMES DAILY
Qty: 90 CAPSULE | Refills: 3 | Status: SHIPPED | OUTPATIENT
Start: 2023-04-25 | End: 2023-06-05 | Stop reason: SDUPTHER

## 2023-04-26 ENCOUNTER — PATIENT MESSAGE (OUTPATIENT)
Dept: PAIN MEDICINE | Facility: CLINIC | Age: 43
End: 2023-04-26
Payer: MEDICARE

## 2023-04-26 ENCOUNTER — TELEPHONE (OUTPATIENT)
Dept: PAIN MEDICINE | Facility: OTHER | Age: 43
End: 2023-04-26
Payer: MEDICARE

## 2023-04-27 ENCOUNTER — PATIENT MESSAGE (OUTPATIENT)
Dept: PAIN MEDICINE | Facility: OTHER | Age: 43
End: 2023-04-27
Payer: MEDICARE
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
## 2023-04-30 ENCOUNTER — HOSPITAL ENCOUNTER (EMERGENCY)
Facility: HOSPITAL | Age: 43
Discharge: HOME | End: 2023-04-30
Attending: EMERGENCY MEDICINE
Payer: MEDICARE

## 2023-04-30 VITALS
RESPIRATION RATE: 16 BRPM | OXYGEN SATURATION: 98 % | DIASTOLIC BLOOD PRESSURE: 60 MMHG | SYSTOLIC BLOOD PRESSURE: 107 MMHG | HEART RATE: 84 BPM | TEMPERATURE: 98 F

## 2023-04-30 DIAGNOSIS — G43.809 OTHER MIGRAINE WITHOUT STATUS MIGRAINOSUS, NOT INTRACTABLE: Primary | ICD-10-CM

## 2023-04-30 PROCEDURE — 96361 HYDRATE IV INFUSION ADD-ON: CPT

## 2023-04-30 PROCEDURE — 99284 PR EMERGENCY DEPT VISIT,LEVEL IV: ICD-10-PCS | Mod: ,,, | Performed by: EMERGENCY MEDICINE

## 2023-04-30 PROCEDURE — 96375 TX/PRO/DX INJ NEW DRUG ADDON: CPT

## 2023-04-30 PROCEDURE — 99284 EMERGENCY DEPT VISIT MOD MDM: CPT | Mod: 25

## 2023-04-30 PROCEDURE — 25000003 PHARM REV CODE 250: Performed by: PHYSICIAN ASSISTANT

## 2023-04-30 PROCEDURE — 99284 EMERGENCY DEPT VISIT MOD MDM: CPT | Mod: ,,, | Performed by: EMERGENCY MEDICINE

## 2023-04-30 PROCEDURE — 96374 THER/PROPH/DIAG INJ IV PUSH: CPT

## 2023-04-30 PROCEDURE — 63600175 PHARM REV CODE 636 W HCPCS: Performed by: PHYSICIAN ASSISTANT

## 2023-04-30 RX ORDER — KETOROLAC TROMETHAMINE 30 MG/ML
10 INJECTION, SOLUTION INTRAMUSCULAR; INTRAVENOUS
Status: COMPLETED | OUTPATIENT
Start: 2023-04-30 | End: 2023-04-30

## 2023-04-30 RX ORDER — METOCLOPRAMIDE HYDROCHLORIDE 5 MG/ML
10 INJECTION INTRAMUSCULAR; INTRAVENOUS
Status: COMPLETED | OUTPATIENT
Start: 2023-04-30 | End: 2023-04-30

## 2023-04-30 RX ORDER — DIPHENHYDRAMINE HYDROCHLORIDE 50 MG/ML
12.5 INJECTION INTRAMUSCULAR; INTRAVENOUS
Status: COMPLETED | OUTPATIENT
Start: 2023-04-30 | End: 2023-04-30

## 2023-04-30 RX ADMIN — KETOROLAC TROMETHAMINE 10 MG: 30 INJECTION, SOLUTION INTRAMUSCULAR; INTRAVENOUS at 08:04

## 2023-04-30 RX ADMIN — SODIUM CHLORIDE 1000 ML: 9 INJECTION, SOLUTION INTRAVENOUS at 08:04

## 2023-04-30 RX ADMIN — METOCLOPRAMIDE 10 MG: 5 INJECTION, SOLUTION INTRAMUSCULAR; INTRAVENOUS at 08:04

## 2023-04-30 RX ADMIN — DIPHENHYDRAMINE HYDROCHLORIDE 12.5 MG: 50 INJECTION, SOLUTION INTRAMUSCULAR; INTRAVENOUS at 08:04

## 2023-04-30 NOTE — ED PROVIDER NOTES
"Encounter Date: 4/30/2023       History     Chief Complaint   Patient presents with    Headache     Hx migraines, tried "home cocktail" of Firocet, Stadol, Toradol, and Compazine, no relief.       43 y/o with history of migraines, movement disorder, anxiety, depression presents to the ED c/o migraine headache that started last night.  Currently, headache is 9/10 frontal and occipital "sharp" that has not been relieved with at home migraine medications - toradol, compazine, fioricet. This morning, became nauseated and had 1 episode of emesis. This feels like a normal migraine, followed by neurology for migraines - Dr Maribel Nichols. She reports photophobia but no phonophobia. Denies f/c, chest pain, SOB, abdominal pain, diarrhea, dizziness, numbness, confusion, changes in vision, changes in gait. No recent falls or head trauma, not on blood thinners.     The history is provided by the patient.   Review of patient's allergies indicates:   Allergen Reactions    Mustard Itching, Nausea And Vomiting, Shortness Of Breath and Swelling    Lipitor [atorvastatin] Itching    Mushroom Itching, Nausea And Vomiting and Swelling    Niaspan extended-release [niacin] Itching    Nystatin Hives     Other reaction(s): hives    Olive extract Itching, Nausea And Vomiting and Swelling    Oyster extract     Extendryl [smyhuadlffdwhjge-uz-pbogbvifkg] Rash    V-cillin k Rash     Past Medical History:   Diagnosis Date    Anxiety     Cancer     Chest pain 1/20/2016 12/30/2015: Began experinece chest pain.    Depression     Functional movement disorder 10/1/2019    Migraine headache     Movement disorder     Myoclonic jerkings, massive     Stroke pt. states he had a cva at 3 months old     Past Surgical History:   Procedure Laterality Date    ANGIOGRAM, CORONARY, WITH LEFT HEART CATHETERIZATION      EPIDURAL STEROID INJECTION N/A 3/26/2021    Procedure: INJECTION, STEROID, EPIDURAL L4/5;  Surgeon: Larry Brasher MD;  Location: [facility] [place] [facility];  " Service: Pain Management;  Laterality: N/A;    EPIDURAL STEROID INJECTION N/A 6/4/2021    Procedure: INJECTION, STEROID, EPIDURAL, L4-L5 IL need consent;  Surgeon: Larry Brasher MD;  Location: [facility];  Service: Pain Management;  Laterality: N/A;    EPIDURAL STEROID INJECTION N/A 10/29/2021    Procedure: INJECTION, STEROID, EPIDURAL, L4-L5IL NEED CONSENT;  Surgeon: Larry Brasher MD;  Location: [facility];  Service: Pain Management;  Laterality: N/A;    EPIDURAL STEROID INJECTION N/A 1/27/2022    Procedure: Injection, Steroid, Epidural C7/T1;  Surgeon: Larry Brasher MD;  Location: [facility];  Service: Pain Management;  Laterality: N/A;    EPIDURAL STEROID INJECTION N/A 2/10/2022    Procedure: Injection, Steroid, Epidural L4/5;  Surgeon: Larry Brasher MD;  Location: [facility];  Service: Pain Management;  Laterality: N/A;    EPIDURAL STEROID INJECTION N/A 8/25/2022    Procedure: Injection, Steroid, Epidural C7/T1 CONTRAST;  Surgeon: Larry Brasher MD;  Location: [facility];  Service: Pain Management;  Laterality: N/A;    INJECTION OF ANESTHETIC AGENT AROUND NERVE Bilateral 5/6/2022    Procedure: BLOCK, NERVE, BILATERAL L3-L4-*L5 MEDIAL BRANCH;  Surgeon: Larry Brasher MD;  Location: [facility];  Service: Pain Management;  Laterality: Bilateral;    INJECTION OF ANESTHETIC AGENT AROUND NERVE Bilateral 6/2/2022    Procedure: BLOCK, NERVE BILATERAL L3-L4-L5 MEDIAL BRANCH 2nd, needs consent;  Surgeon: Larry Brasher MD;  Location: [facility];  Service: Pain Management;  Laterality: Bilateral;    MANDIBLE SURGERY      reconstruction    RADIOFREQUENCY ABLATION Right 6/23/2022    Procedure: RADIOFREQUENCY ABLATION RIGHT L3,L4,L5 1 of 2, consent needed;  Surgeon: Larry Brasher MD;  Location: [facility];  Service: Pain Management;  Laterality: Right;    RADIOFREQUENCY ABLATION Left 7/7/2022    Procedure: RADIOFREQUENCY ABLATION LEFT L3,L4,L5 2 of 2, needs consent;  Surgeon:  Larry Brasher MD;  Location: [facility] PAIN T;  Service: Pain Management;  Laterality: Left;    RADIOFREQUENCY ABLATION Right 3/3/2023    Procedure: RADIOFREQUENCY ABLATION RIGHT L3,L4,L5;  Surgeon: Larry Brasher MD;  Location: [facility] PAIN [facility]GT;  Service: Pain Management;  Laterality: Right;    RADIOFREQUENCY ABLATION Left 3/31/2023    Procedure: Radiofrequency Ablation Left L3, L4, & L5 Pending CBC results;  Surgeon: Diana Lira MD;  Location: [facility] PAIN T;  Service: Pain Management;  Laterality: Left;    variceol repair       Family History   Problem Relation Age of Onset    Heart disease Father     Hypertension Father     Hyperlipidemia Father     Heart disease Paternal Uncle     Heart disease Mother     Myasthenia gravis Mother      Social History     Tobacco Use    Smoking status: Never    Smokeless tobacco: Never   Substance Use Topics    Alcohol use: No    Drug use: No     Review of Systems   Constitutional:  Negative for chills and fever.   HENT:          No phonophobia   Eyes:  Positive for photophobia. Negative for visual disturbance.   Respiratory:  Negative for cough and shortness of breath.    Cardiovascular:  Negative for chest pain.   Gastrointestinal:  Positive for nausea and vomiting (x1). Negative for abdominal pain, constipation and diarrhea.   Genitourinary:  Negative for dysuria.   Musculoskeletal:  Negative for back pain.   Skin:  Negative for rash.   Neurological:  Positive for headaches.   Psychiatric/Behavioral:  Negative for confusion.      Physical Exam     Initial Vitals [04/30/23 0810]   BP Pulse Resp Temp SpO2   131/76 94 20 97.9 °F (36.6 °C) 99 %      MAP       --         Physical Exam    Nursing note and vitals reviewed.  Constitutional: She appears well-developed and well-nourished. She is not diaphoretic. No distress.   HENT:   Head: Normocephalic and atraumatic.   Poor dentition   Eyes: EOM are normal. Pupils are equal, round, and reactive to light.   Neck: Neck supple.    Normal range of motion.  Cardiovascular:  Normal rate, regular rhythm and normal heart sounds.     Exam reveals no gallop and no friction rub.       No murmur heard.  Pulmonary/Chest: Breath sounds normal. She has no wheezes. She has no rhonchi. She has no rales.   Abdominal: Abdomen is soft. Bowel sounds are normal. There is no abdominal tenderness. There is no rebound and no guarding.   Musculoskeletal:         General: Normal range of motion.      Cervical back: Normal range of motion and neck supple.     Neurological: She is alert and oriented to person, place, and time. She has normal strength. No cranial nerve deficit or sensory deficit. GCS score is 15. GCS eye subscore is 4. GCS verbal subscore is 5. GCS motor subscore is 6.   Skin: Skin is warm and dry.   Psychiatric: She has a normal mood and affect.       ED Course   Procedures  Labs Reviewed - No data to display       Imaging Results    None          Medications   ketorolac injection 9.999 mg (9.999 mg Intravenous Given 4/30/23 0847)   sodium chloride 0.9% bolus 1,000 mL 1,000 mL (0 mLs Intravenous Stopped 4/30/23 0940)   diphenhydrAMINE injection 12.5 mg (12.5 mg Intravenous Given 4/30/23 0845)   metoclopramide HCl injection 10 mg (10 mg Intravenous Given 4/30/23 0843)     Medical Decision Making:   History:   Old Medical Records: I decided to obtain old medical records.  Old Records Summarized: records from previous admission(s) and records from clinic visits.       <> Summary of Records: Followed by neurology for migraines - Dr Maribel Nichols     APC / Resident Notes:   41 y/o with history of migraines, movement disorder, anxiety, depression presents to the ED c/o migraine headache that started last night.  VSS. Well appearing. RRR. Lungs clear. Neuro intact without any focal neuro deficits. Symptoms today consistent with prior migraine headaches. No red flag headache symptoms. I do not suspect ICH. Will treat with migraine cocktail, patient reports IV  reglan works better than IV compazine normally. Will give IVF, toradol, benadryl and reglan and reassess.    Drastic improvement of headache with IVF, benadryl, toradol, reglan. Requesting discharge home. I do not feel that she needs any further labs or imaging at this time. Stable for discharge.     She was discharged without any new prescriptions.  She will follow up with her neurologist.  Strict ED return precautions given.  All of the patient's questions were answered.  I reviewed the patient's chart.                   Clinical Impression:   Final diagnoses:  [G43.809] Other migraine without status migrainosus, not intractable (Primary)        ED Disposition Condition    Discharge Stable          ED Prescriptions    None       Follow-up Information       Follow up With Specialties Details Why Contact Info    Maribel Nichols MD Neurology   [address] [facility] [address]  [phone]               Sherie Nesbitt PA-C  04/30/23 7146

## 2023-04-30 NOTE — ED NOTES
Patient identifiers verified and correct for Dylon Griffin  LOC: The patient is awake, alert and aware of environment with an appropriate affect, the patient is oriented x 3 and speaking appropriately. Pt reports headache   APPEARANCE: Patient appears comfortable and in no acute distress, patient is clean and well groomed.  SKIN: The skin is warm and dry, color consistent with ethnicity, patient has normal skin turgor and moist mucus membranes, skin intact, no breakdown or bruising noted.   MUSCULOSKELETAL: Patient moving all extremities spontaneously, no swelling noted.  RESPIRATORY: Airway is open and patent, respirations are spontaneous, patient has a normal effort and rate, no accessory muscle use noted, pt placed on continuous pulse ox with O2 sats noted at 97% on room air.  CARDIAC: Pt placed on cardiac monitor. Patient has a normal rate and regular rhythm, no edema noted, capillary refill < 3 seconds.   GASTRO: Soft and non tender to palpation, no distention noted, normoactive bowel sounds present in all four quadrants. Pt states bowel movements have been regular.  : Pt denies any pain or frequency with urination.  NEURO: Pt opens eyes spontaneously, behavior appropriate to situation, follows commands, facial expression symmetrical, bilateral hand grasp equal and even, purposeful motor response noted, normal sensation in all extremities when touched with a finger.

## 2023-04-30 NOTE — ED TRIAGE NOTES
"Headache (Hx migraines, tried "home cocktail" of Firocet, Stadol, Toradol, and Compazine, no relief. )  "

## 2023-05-01 ENCOUNTER — OFFICE VISIT (OUTPATIENT)
Dept: NEUROLOGY | Facility: CLINIC | Age: 43
End: 2023-05-01
Payer: MEDICARE

## 2023-05-01 VITALS
BODY MASS INDEX: 18.96 KG/M2 | HEART RATE: 100 BPM | HEIGHT: 72 IN | WEIGHT: 140 LBS | DIASTOLIC BLOOD PRESSURE: 79 MMHG | SYSTOLIC BLOOD PRESSURE: 124 MMHG

## 2023-05-01 DIAGNOSIS — G25.3 MYOCLONUS: Primary | ICD-10-CM

## 2023-05-01 DIAGNOSIS — R26.9 ABNORMALITY OF GAIT AND MOBILITY: ICD-10-CM

## 2023-05-01 DIAGNOSIS — G24.9 DYSTONIA: ICD-10-CM

## 2023-05-01 PROCEDURE — 1159F MED LIST DOCD IN RCRD: CPT | Mod: CPTII,S$GLB,, | Performed by: STUDENT IN AN ORGANIZED HEALTH CARE EDUCATION/TRAINING PROGRAM

## 2023-05-01 PROCEDURE — 3008F PR BODY MASS INDEX (BMI) DOCUMENTED: ICD-10-PCS | Mod: CPTII,S$GLB,, | Performed by: STUDENT IN AN ORGANIZED HEALTH CARE EDUCATION/TRAINING PROGRAM

## 2023-05-01 PROCEDURE — 3074F SYST BP LT 130 MM HG: CPT | Mod: CPTII,S$GLB,, | Performed by: STUDENT IN AN ORGANIZED HEALTH CARE EDUCATION/TRAINING PROGRAM

## 2023-05-01 PROCEDURE — 3078F PR MOST RECENT DIASTOLIC BLOOD PRESSURE < 80 MM HG: ICD-10-PCS | Mod: CPTII,S$GLB,, | Performed by: STUDENT IN AN ORGANIZED HEALTH CARE EDUCATION/TRAINING PROGRAM

## 2023-05-01 PROCEDURE — 99215 OFFICE O/P EST HI 40 MIN: CPT | Mod: S$GLB,,, | Performed by: STUDENT IN AN ORGANIZED HEALTH CARE EDUCATION/TRAINING PROGRAM

## 2023-05-01 PROCEDURE — 3078F DIAST BP <80 MM HG: CPT | Mod: CPTII,S$GLB,, | Performed by: STUDENT IN AN ORGANIZED HEALTH CARE EDUCATION/TRAINING PROGRAM

## 2023-05-01 PROCEDURE — 99999 PR PBB SHADOW E&M-EST. PATIENT-LVL V: CPT | Mod: PBBFAC,,, | Performed by: STUDENT IN AN ORGANIZED HEALTH CARE EDUCATION/TRAINING PROGRAM

## 2023-05-01 PROCEDURE — 3074F PR MOST RECENT SYSTOLIC BLOOD PRESSURE < 130 MM HG: ICD-10-PCS | Mod: CPTII,S$GLB,, | Performed by: STUDENT IN AN ORGANIZED HEALTH CARE EDUCATION/TRAINING PROGRAM

## 2023-05-01 PROCEDURE — 1159F PR MEDICATION LIST DOCUMENTED IN MEDICAL RECORD: ICD-10-PCS | Mod: CPTII,S$GLB,, | Performed by: STUDENT IN AN ORGANIZED HEALTH CARE EDUCATION/TRAINING PROGRAM

## 2023-05-01 PROCEDURE — 99215 PR OFFICE/OUTPT VISIT, EST, LEVL V, 40-54 MIN: ICD-10-PCS | Mod: S$GLB,,, | Performed by: STUDENT IN AN ORGANIZED HEALTH CARE EDUCATION/TRAINING PROGRAM

## 2023-05-01 PROCEDURE — 99999 PR PBB SHADOW E&M-EST. PATIENT-LVL V: ICD-10-PCS | Mod: PBBFAC,,, | Performed by: STUDENT IN AN ORGANIZED HEALTH CARE EDUCATION/TRAINING PROGRAM

## 2023-05-01 PROCEDURE — 3008F BODY MASS INDEX DOCD: CPT | Mod: CPTII,S$GLB,, | Performed by: STUDENT IN AN ORGANIZED HEALTH CARE EDUCATION/TRAINING PROGRAM

## 2023-05-01 RX ORDER — TOPIRAMATE 25 MG/1
25 TABLET ORAL 2 TIMES DAILY
Qty: 60 TABLET | Refills: 11 | Status: ON HOLD | OUTPATIENT
Start: 2023-05-01 | End: 2023-05-26 | Stop reason: HOSPADM

## 2023-05-16 ENCOUNTER — CLINICAL SUPPORT (OUTPATIENT)
Dept: REHABILITATION | Facility: HOSPITAL | Age: 43
End: 2023-05-16
Attending: STUDENT IN AN ORGANIZED HEALTH CARE EDUCATION/TRAINING PROGRAM
Payer: MEDICARE

## 2023-05-16 DIAGNOSIS — R26.9 ABNORMALITY OF GAIT AND MOBILITY: ICD-10-CM

## 2023-05-16 PROCEDURE — 97162 PT EVAL MOD COMPLEX 30 MIN: CPT | Mod: PO

## 2023-05-16 RX ORDER — OXYBUTYNIN CHLORIDE 10 MG/1
TABLET, EXTENDED RELEASE ORAL
Qty: 30 TABLET | Refills: 11 | Status: SHIPPED | OUTPATIENT
Start: 2023-05-16

## 2023-05-16 RX ORDER — TAMSULOSIN HYDROCHLORIDE 0.4 MG/1
CAPSULE ORAL
Qty: 30 CAPSULE | Refills: 11 | Status: SHIPPED | OUTPATIENT
Start: 2023-05-16
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
## 2023-05-22 ENCOUNTER — OFFICE VISIT (OUTPATIENT)
Dept: UROLOGY | Facility: CLINIC | Age: 43
End: 2023-05-22
Payer: MEDICARE

## 2023-05-22 VITALS
HEIGHT: 72 IN | BODY MASS INDEX: 19.12 KG/M2 | DIASTOLIC BLOOD PRESSURE: 77 MMHG | HEART RATE: 96 BPM | SYSTOLIC BLOOD PRESSURE: 112 MMHG | WEIGHT: 141.13 LBS

## 2023-05-22 DIAGNOSIS — Z79.899 TRANSGENDER WOMAN ON HORMONE THERAPY: ICD-10-CM

## 2023-05-22 DIAGNOSIS — N50.811 PAIN IN BOTH TESTICLES: ICD-10-CM

## 2023-05-22 DIAGNOSIS — F64.0 TRANSGENDER WOMAN ON HORMONE THERAPY: ICD-10-CM

## 2023-05-22 DIAGNOSIS — N50.812 PAIN IN BOTH TESTICLES: ICD-10-CM

## 2023-05-22 PROCEDURE — 1159F MED LIST DOCD IN RCRD: CPT | Mod: CPTII,S$GLB,, | Performed by: UROLOGY

## 2023-05-22 PROCEDURE — 99213 PR OFFICE/OUTPT VISIT, EST, LEVL III, 20-29 MIN: ICD-10-PCS | Mod: S$GLB,,, | Performed by: UROLOGY

## 2023-05-22 PROCEDURE — 1159F PR MEDICATION LIST DOCUMENTED IN MEDICAL RECORD: ICD-10-PCS | Mod: CPTII,S$GLB,, | Performed by: UROLOGY

## 2023-05-22 PROCEDURE — 3008F PR BODY MASS INDEX (BMI) DOCUMENTED: ICD-10-PCS | Mod: CPTII,S$GLB,, | Performed by: UROLOGY

## 2023-05-22 PROCEDURE — 3008F BODY MASS INDEX DOCD: CPT | Mod: CPTII,S$GLB,, | Performed by: UROLOGY

## 2023-05-22 PROCEDURE — 3078F DIAST BP <80 MM HG: CPT | Mod: CPTII,S$GLB,, | Performed by: UROLOGY

## 2023-05-22 PROCEDURE — 99213 OFFICE O/P EST LOW 20 MIN: CPT | Mod: S$GLB,,, | Performed by: UROLOGY

## 2023-05-22 PROCEDURE — 3074F PR MOST RECENT SYSTOLIC BLOOD PRESSURE < 130 MM HG: ICD-10-PCS | Mod: CPTII,S$GLB,, | Performed by: UROLOGY

## 2023-05-22 PROCEDURE — 99999 PR PBB SHADOW E&M-EST. PATIENT-LVL V: ICD-10-PCS | Mod: PBBFAC,,, | Performed by: UROLOGY

## 2023-05-22 PROCEDURE — 99999 PR PBB SHADOW E&M-EST. PATIENT-LVL V: CPT | Mod: PBBFAC,,, | Performed by: UROLOGY

## 2023-05-22 PROCEDURE — 3078F PR MOST RECENT DIASTOLIC BLOOD PRESSURE < 80 MM HG: ICD-10-PCS | Mod: CPTII,S$GLB,, | Performed by: UROLOGY

## 2023-05-22 PROCEDURE — 3074F SYST BP LT 130 MM HG: CPT | Mod: CPTII,S$GLB,, | Performed by: UROLOGY

## 2023-05-22 NOTE — PATIENT INSTRUCTIONS
Mary (Cortez) Natan, my surgery scheduler will schedule your surgery ([phone])    Stop ASA 1 wk before
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
## 2023-05-23 ENCOUNTER — TELEPHONE (OUTPATIENT)
Dept: UROLOGY | Facility: CLINIC | Age: 43
End: 2023-05-23
Payer: MEDICARE

## 2023-05-23 DIAGNOSIS — N50.812 PAIN IN BOTH TESTICLES: ICD-10-CM

## 2023-05-23 DIAGNOSIS — Z78.9 MALE-TO-FEMALE TRANSGENDER PERSON: Primary | ICD-10-CM

## 2023-05-23 DIAGNOSIS — N50.811 PAIN IN BOTH TESTICLES: ICD-10-CM

## 2023-05-23 NOTE — TELEPHONE ENCOUNTER
Received fax from [facility] that included 2 letters of clearance, using the WPATH criteria for bottom surgery. One letter from leo HENDERSON and  one from Sandra olsen MD.  Letters sent to  medical records to be scanned into her chart

## 2023-05-23 NOTE — TELEPHONE ENCOUNTER
Called the patient to confirm the surgery date of 11/8/23, patient accepted. Informed the patient that I will give her a call the day before surgery with arrival time and pre-op instructions. Patient verbalized understanding.

## 2023-05-23 NOTE — TELEPHONE ENCOUNTER
Spoke with the patient to inform per Dr. Mcgrath she is to stop her Aspirin medication 1 week prior to surgery. Patient verbalized understanding.

## 2023-05-23 NOTE — TELEPHONE ENCOUNTER
Male-to-female transgender person  -     Case Request Operating Room: ORCHIECTOMY    Pain in both testicles  -     Case Request Operating Room: ORCHIECTOMY

## 2023-05-24 ENCOUNTER — PATIENT MESSAGE (OUTPATIENT)
Dept: PSYCHIATRY | Facility: OTHER | Age: 43
End: 2023-05-24
Payer: MEDICARE

## 2023-05-24 ENCOUNTER — CLINICAL SUPPORT (OUTPATIENT)
Dept: REHABILITATION | Facility: HOSPITAL | Age: 43
End: 2023-05-24
Payer: MEDICARE

## 2023-05-24 ENCOUNTER — PATIENT MESSAGE (OUTPATIENT)
Dept: NEUROLOGY | Facility: CLINIC | Age: 43
End: 2023-05-24
Payer: MEDICARE

## 2023-05-24 DIAGNOSIS — R29.6 FREQUENT FALLS: ICD-10-CM

## 2023-05-24 DIAGNOSIS — R26.9 ABNORMALITY OF GAIT AND MOBILITY: Primary | ICD-10-CM

## 2023-05-24 PROCEDURE — 97112 NEUROMUSCULAR REEDUCATION: CPT | Mod: PO,CQ

## 2023-05-24 PROCEDURE — 97110 THERAPEUTIC EXERCISES: CPT | Mod: PO,CQ

## 2023-05-24 NOTE — PROGRESS NOTES
"[facility]   Physical Therapy Treatment Note      Name: Gordon Griffin  [facility] Number: [id number]    Therapy Diagnosis:   Encounter Diagnoses   Name Primary?    Abnormality of gait and mobility Yes    Frequent falls      Physician: Ilene Smalls MD    Visit Date: 5/24/2023    Physician: Ilene Smalls MD     Physician Orders: PT Eval and Treat   Medical Diagnosis from Referral: Abnormality of gait and mobility  Evaluation Date: 5/16/2023  Authorization Period Expiration: 04/30/2024  Plan of Care Expiration: 7/11/2023  Visit # / Visits authorized: 01/ 20 ( plus eval)        Time In: 9:20  Time Out: 10:13  Total Billable Time: 53  minutes     Precautions: Standard  Patient's preferred pronouns: She/her     PTA Visit #: 1/5       Subjective     Pt reports: " I'm doing ok, my toes are foot are starting to curl up already."  She  will be given an HEP today    Response to previous treatment: no adverse effects  Functional change: ongoing    Pain: 9/10  Location: both legs, mainly right side, migraine     Objective      Objective Measures updated at progress report unless specified.     Treatment     Dylon received the treatments listed below:      therapeutic exercises to develop strength, endurance, ROM, flexibility, posture, and core stabilization for 40 minutes including:  X 10 min on SCi Fit recumbent stepper.  B UE/B LE on level 1.0    Sitting on EOM: ( HEP)  2 x 10 reps of Sit to stand  2 x 10 reps of B LE Hip flexion    Standing: HEP  2 x 10 reps of B LE hip flexion     Squats:  3 x 10 reps of B LE squats on leg press with #100lbs applied.     neuromuscular re-education activities to improve: Balance, Coordination, Kinesthetic, Sense, Proprioception, and Posture for 15 minutes. The following activities were included:  Airex foam pad: CGA  2 x 30 sec of static standing with WBOS, eyes opened, no UE support  2 x 30 sec of static standing with NBOS, eye opened, no UE support  2 x 30 " sec of static standing with eyes closed and WBOS, no UE support  2 x 30 sec of static standing with eyes closed, and NBOS, no UE support  2 x 10 reps of B LE hip flexion tapping the top of an orange cone with 1 UE support    therapeutic activities to improve functional performance for 0  minutes, including:      gait training to improve functional mobility and safety for 0  minutes, including:        Patient Education and Home Exercises       Education provided:   - HEP    Written Home Exercises Provided: yes. Exercises were reviewed and Dylon was able to demonstrate them prior to the end of the session.  Dylon demonstrated good  understanding of the education provided. See EMR under Patient Instructions for exercises provided during therapy sessions    Assessment     Dylon tolerated her first tx session following initial evaluation well and did not have any problems noted.  Dylon began cardiovascular endurance on the stepper and was educated on her first installment of HEP.  Dylon was able to demonstrate understanding of both sitting and standing home exercises.  Dylon began dynamic balance on the Airex foam pad and required occasional 1 UE support for safety.  Cont with plan of care.     Dylon Is progressing well towards her goals.   Pt prognosis is Good.     Pt will continue to benefit from skilled outpatient physical therapy to address the deficits listed in the problem list box on initial evaluation, provide pt/family education and to maximize pt's level of independence in the home and community environment.     Pt's spiritual, cultural and educational needs considered and pt agreeable to plan of care and goals.     Anticipated barriers to physical therapy:  co-morbidities     Goals:  Short Term Goals: 4 weeks   1. Patient to be (I) with established home exercise program.  2. Patient to improve bilateral hip flexion by 1/3 MMT to improve balance and functional mobility.   3. Patient to improve FGA score to 23/30 for  improved stability with ambulation.   4. Patient to improve SSWS to 1.0  m/sec for improved safety with ambulation.   5. Patient to improve GST condition 4 to 15 seconds with BLE leading for improves stability with mobility.      Long Term Goals: 8 weeks   1. Patient to be (I) with advanced home exercise program.  2. Patient to improve bilateral hip flexion by 2/3 MMT to improve balance and functional mobility.   3. Patient to improve FGA score to 25/30 for improved stability with ambulation.   4. Patient to improve SSWS to 1.2  m/sec for improved safety with ambulation.   5. Patient to improve GST condition 4 to 30 seconds with BLE leading for improves stability with mobility.     Plan     Cont with strengthening, endurance and balance    Lisa Rowe, PTA

## 2023-05-24 NOTE — PATIENT INSTRUCTIONS
unctional Quadriceps: Sit to Stand        Sit on edge of chair, feet flat on floor. Stand upright, extending knees fully.  Repeat _10___ times per set. Do _2___ sets per session. Do __1__ sessions per day.     HIP / KNEE: Flexion - Sitting        Raise knee to chest. Keep back straight, knee bent.  __10_ reps per set, __2_ sets per day, ___ days per week       HIP / KNEE: Flexion - Standing        Raise knee to chest. Keep back straight. Perform slowly.  _10__ reps per set, __2_ sets per day. Hold on for support    Copyright © I. All rights reserved.

## 2023-05-25 ENCOUNTER — PATIENT MESSAGE (OUTPATIENT)
Dept: PAIN MEDICINE | Facility: OTHER | Age: 43
End: 2023-05-25
Payer: MEDICARE

## 2023-05-26 ENCOUNTER — HOSPITAL ENCOUNTER (OUTPATIENT)
Facility: OTHER | Age: 43
Discharge: HOME | End: 2023-05-26
Attending: ANESTHESIOLOGY | Admitting: STUDENT IN AN ORGANIZED HEALTH CARE EDUCATION/TRAINING PROGRAM
Payer: MEDICARE

## 2023-05-26 VITALS
HEART RATE: 77 BPM | RESPIRATION RATE: 16 BRPM | OXYGEN SATURATION: 99 % | DIASTOLIC BLOOD PRESSURE: 53 MMHG | TEMPERATURE: 98 F | SYSTOLIC BLOOD PRESSURE: 125 MMHG | BODY MASS INDEX: 20.32 KG/M2 | HEIGHT: 72 IN | WEIGHT: 150 LBS

## 2023-05-26 DIAGNOSIS — M50.30 DEGENERATIVE DISC DISEASE, CERVICAL: Primary | ICD-10-CM

## 2023-05-26 DIAGNOSIS — G89.29 CHRONIC PAIN: ICD-10-CM

## 2023-05-26 PROCEDURE — 25000003 PHARM REV CODE 250: Performed by: ANESTHESIOLOGY

## 2023-05-26 PROCEDURE — 62321 PR INJ CERV/THORAC, W/GUIDANCE: ICD-10-PCS | Mod: ,,, | Performed by: ANESTHESIOLOGY

## 2023-05-26 PROCEDURE — 62321 NJX INTERLAMINAR CRV/THRC: CPT | Performed by: ANESTHESIOLOGY

## 2023-05-26 PROCEDURE — 25500020 PHARM REV CODE 255: Performed by: ANESTHESIOLOGY

## 2023-05-26 PROCEDURE — 62321 NJX INTERLAMINAR CRV/THRC: CPT | Mod: ,,, | Performed by: ANESTHESIOLOGY

## 2023-05-26 PROCEDURE — 63600175 PHARM REV CODE 636 W HCPCS: Performed by: ANESTHESIOLOGY

## 2023-05-26 RX ORDER — FENTANYL CITRATE 50 UG/ML
INJECTION, SOLUTION INTRAMUSCULAR; INTRAVENOUS
Status: DISCONTINUED | OUTPATIENT
Start: 2023-05-26 | End: 2023-05-26 | Stop reason: HOSPADM

## 2023-05-26 RX ORDER — DEXAMETHASONE SODIUM PHOSPHATE 10 MG/ML
INJECTION INTRAMUSCULAR; INTRAVENOUS
Status: DISCONTINUED | OUTPATIENT
Start: 2023-05-26 | End: 2023-05-26 | Stop reason: HOSPADM

## 2023-05-26 RX ORDER — SODIUM CHLORIDE 9 MG/ML
500 INJECTION, SOLUTION INTRAVENOUS CONTINUOUS
Status: DISCONTINUED | OUTPATIENT
Start: 2023-05-26 | End: 2023-05-26 | Stop reason: HOSPADM

## 2023-05-26 RX ORDER — MIDAZOLAM HYDROCHLORIDE 1 MG/ML
INJECTION INTRAMUSCULAR; INTRAVENOUS
Status: DISCONTINUED | OUTPATIENT
Start: 2023-05-26 | End: 2023-05-26 | Stop reason: HOSPADM

## 2023-05-26 RX ORDER — LIDOCAINE HYDROCHLORIDE 20 MG/ML
INJECTION, SOLUTION INFILTRATION; PERINEURAL
Status: DISCONTINUED | OUTPATIENT
Start: 2023-05-26 | End: 2023-05-26 | Stop reason: HOSPADM

## 2023-05-26 RX ORDER — LIDOCAINE HYDROCHLORIDE 10 MG/ML
INJECTION, SOLUTION EPIDURAL; INFILTRATION; INTRACAUDAL; PERINEURAL
Status: DISCONTINUED | OUTPATIENT
Start: 2023-05-26 | End: 2023-05-26 | Stop reason: HOSPADM

## 2023-05-26 NOTE — DISCHARGE SUMMARY
Discharge Note  Short Stay      SUMMARY     Admit Date: 5/26/2023    Attending Physician: Larry Brasher      Discharge Physician: Larry Brasher      Discharge Date: 5/26/2023 2:32 PM    Procedure(s) (LRB):  INJECTION, STEROID, EPIDURAL C7/T1 IL (N/A)    Final Diagnosis: Cervical radiculopathy [M54.12]    Disposition: Home or self care    Patient Instructions:   Current Discharge Medication List        CONTINUE these medications which have NOT CHANGED    Details   aspirin 81 MG Chew Take 81 mg by mouth once daily.      azelastine (ASTELIN) 137 mcg (0.1 %) nasal spray USE 1 TO 2 SPRAYS IN EACH NOSTRIL TWICE DAILY FOR CONGESTION      baclofen (LIORESAL) 20 MG tablet Take 1 tablet by mouth 3 (three) times daily as needed.       benztropine (COGENTIN) 0.5 MG tablet Take 0.5 mg by mouth once daily.      butalbital-acetaminophen-caffeine -40 mg (FIORICET, ESGIC) -40 mg per tablet Take 1 tablet by mouth every 4 (four) hours as needed.      !! butorphanol (STADOL) 10 mg/mL nasal spray 1 spray by Nasal route every 4 (four) hours as needed for Pain.      !! butorphanol (STADOL) 10 mg/mL nasal spray use 2 sprays into each nostril every 4 hours as needed for pain.  Qty: 250 mL, Refills: 5    Comments: Quantity prescribed more than 7 day supply? Press F2 and select one:[id number]        cloNIDine (CATAPRES) 0.1 MG tablet TAKE 1 TABLET(0.1 MG) BY MOUTH TWICE DAILY  Qty: 60 tablet, Refills: 3    Comments: ZERO refills remain on this prescription. Your patient is requesting advance approval of refills for this medication to PREVENT ANY MISSED DOSES  Associated Diagnoses: Tic      cyclobenzaprine (FLEXERIL) 10 MG tablet TK 1 T PO Q 8 H PRF PAIN      diazePAM (VALIUM) 2 MG tablet Take 2 mg by mouth 2 (two) times daily as needed.      erenumab-aooe (AIMOVIG AUTOINJECTOR SUBQ) Inject into the skin.      EScitalopram oxalate (LEXAPRO) 20 MG tablet Take 20 mg by mouth once daily.      estradiol valerate (DELESTROGEN) 20 mg/mL  injection SMARTSI.3 Milliliter(s) IM Once a Week      ezetimibe (ZETIA) 10 mg tablet Take 1 tablet (10 mg total) by mouth once daily.  Qty: 90 tablet, Refills: 3      FLUCELVAX QUAD [id number], PF, 60 mcg (15 mcg x 4)/0.5 mL Syrg vaccine ADM 0.5ML IM UTD  Refills: 0      fluticasone (FLONASE) 50 mcg/actuation nasal spray SPRAY TWICE IEN QD  Refills: 5      gabapentin (NEURONTIN) 300 MG capsule Take 1 capsule (300 mg total) by mouth 3 (three) times daily.  Qty: 90 capsule, Refills: 3    Associated Diagnoses: Lumbar radiculopathy      hydrocortisone (ANUSOL-HC) 2.5 % rectal cream Place rectally 2 (two) times daily.  Qty: 28 g, Refills: 1    Associated Diagnoses: Hemorrhoid prolapse      hydrOXYzine pamoate (VISTARIL) 50 MG Cap Take  mg by mouth nightly as needed.      ketorolac (TORADOL) 10 mg tablet ketorolac 10 mg tablet      levETIRAcetam (KEPPRA) 1000 MG tablet Take 1,000 mg by mouth 2 (two) times daily.      methocarbamoL (ROBAXIN) 750 MG Tab Take 750 mg by mouth 3 (three) times daily.      metoclopramide HCl (REGLAN) 10 MG tablet 10 mg.      moxifloxacin (AVELOX) 400 mg tablet Take 400 mg by mouth.      NARCAN 4 mg/actuation Spry SPRAY 0.1ML IN 1 NOSTRIL MAY REPEAT DOSE EVERY 2-3 MINUTES AS NEEDED ALTERNATING NOSTRILS EACH DOSE  Qty: 1 each, Refills: 3    Associated Diagnoses: Chronic pain disorder; Lumbar radiculopathy      nitroGLYCERIN (NITROSTAT) 0.4 MG SL tablet Place 1 tablet (0.4 mg total) under the tongue every 5 (five) minutes as needed for Chest pain.  Qty: 90 tablet, Refills: 3      NURTEC 75 mg odt Take 75 mg by mouth as needed for Migraine.      omeprazole (PRILOSEC) 20 MG capsule Take 20 mg by mouth once daily.      onabotulinumtoxina (BOTOX) 200 unit SolR Inject 200 Units into the muscle.      ondansetron (ZOFRAN) 4 MG tablet Take 1 tablet (4 mg total) by mouth every 6 (six) hours as needed for Nausea.  Qty: 12 tablet, Refills: 0      ondansetron (ZOFRAN-ODT) 8 MG TbDL Take 8 mg by mouth 2  (two) times daily.      oxybutynin (DITROPAN-XL) 10 MG 24 hr tablet TAKE 1 TABLET(10 MG) BY MOUTH EVERY DAY  Qty: 30 tablet, Refills: 11      pantoprazole (PROTONIX) 20 MG tablet Take 20 mg by mouth.      predniSONE (DELTASONE) 20 MG tablet Take by mouth.      prochlorperazine (COMPAZINE) 10 MG tablet Take 10 mg by mouth 3 (three) times daily.      propranoloL (INDERAL LA) 60 MG 24 hr capsule Take 60 mg by mouth every evening.      rosuvastatin (CRESTOR) 20 MG tablet Take 1 tablet (20 mg total) by mouth once daily.  Qty: 90 tablet, Refills: 9      spironolactone (ALDACTONE) 25 MG tablet Take 25 mg by mouth once daily.      tamsulosin (FLOMAX) 0.4 mg Cap TAKE 1 CAPSULE(0.4 MG) BY MOUTH EVERY DAY  Qty: 30 capsule, Refills: 11      !! traZODone (DESYREL) 100 MG tablet Take 100 mg by mouth every evening.      !! traZODone (DESYREL) 50 MG tablet Take 50 mg by mouth every evening.      verapamiL (VERELAN) 240 MG C24P Take 240 mg by mouth Daily.       !! - Potential duplicate medications found. Please discuss with provider.        STOP taking these medications       topiramate (TOPAMAX) 25 MG tablet Comments:   Reason for Stopping:                   Discharge Diagnosis: Cervical radiculopathy [M54.12]  Condition on Discharge: Stable with no complications to procedure   Diet on Discharge: Same as before.  Activity: as per instruction sheet.  Discharge to: Home with a responsible adult.  Follow up: 2-4 weeks

## 2023-05-26 NOTE — OP NOTE
Cervical Interlaminar Epidural Steroid Injection under Fluoroscopic Guidance    The procedure, risks, benefits, and options were discussed with the patient. There are no contraindications to the procedure. The patent expressed understanding and agreed to the procedure. Informed written consent was obtained prior to the start of the procedure and can be found in the patient's chart.     PATIENT NAME: Gordon Griffin   MRN: [id number]     DATE OF PROCEDURE: 05/26/2023    PROCEDURE: Cervical Interlaminar Epidural Steroid Injection C7/T1 under Fluoroscopic Guidance    PRE-OP DIAGNOSIS: Cervical radiculopathy [M54.12] Cervical radiculopathy [M54.12]    POST-OP DIAGNOSIS: Same    PHYSICIAN: Larry Brasher MD     ASSISTANTS: Nicol Garcia MD    MEDICATIONS INJECTED: Preservative-free Decadron 10mg with 1cc of Lidocaine 1% MPF and preservative free normal saline    LOCAL ANESTHETIC INJECTED: Xylocaine 2%     SEDATION: Versed 2mg and Fentanyl 50mcg                                                                                                                                                                                     Conscious sedation ordered by M.D. Patient re-evaluation prior to administration of conscious sedation. No changes noted in patient's status from initial evaluation. The patient's vital signs were monitored by RN and patient remained hemodynamically stable throughout the procedure.    Event Time In   Sedation Start 1426   Sedation End 1432       ESTIMATED BLOOD LOSS: None    COMPLICATIONS: None    TECHNIQUE: Time-out was performed to identify the patient and procedure to be performed. With the patient laying in a prone position, the surgical area was prepped and draped in the usual sterile fashion using ChloraPrep and a fenestrated drape. The level was determined under fluoroscopy guidance. Skin anesthesia was achieved by injecting Lidocaine 2% over the injection site.  The interlaminar space was then  approached with a 20 gauge, 3.5 inch Tuohy needle that was introduced under fluoroscopic guidance with AP, lateral and/or contralateral oblique imaging. Once the Ligamentum flavum was encountered loss of resistance to saline was used to enter the epidural space. With positive loss of resistance and negative aspiration for CSF or Blood, contrast dye  Omnipaque (240mg/mL) was injected to confirm placement and there was no vascular runoff. Then 3 mL of the medication mixture listed above was then injected slowly. Displacement of the radio opaque contrast after injection of the medication confirmed that the medication went into the area of the epidural space. The needles were removed, and bleeding was nil. A sterile dressing was applied. No specimens collected. The patient tolerated the procedure well.       The patient was monitored after the procedure in the recovery area. They were given post-procedure and discharge instructions to follow at home. The patient was discharged in a stable condition.    Larry Brasher MD

## 2023-05-26 NOTE — DISCHARGE INSTRUCTIONS

## 2023-05-26 NOTE — H&P
HPI  Gordon Griffin presents for Procedure(s):  INJECTION, STEROID, EPIDURAL C7/T1 IL    Recent anticoagulation/antiplatelets reviewed and verified with nursing.  Recent antibiotics/recent infections reviewed and verified with nursing.    Past Medical History:   Diagnosis Date    Anxiety     Cancer     Chest pain 1/20/2016 12/30/2015: Began experinece chest pain.    Depression     Functional movement disorder 10/1/2019    Migraine headache     Movement disorder     Myoclonic jerkings, massive     Stroke pt. states he had a cva at 3 months old     Past Surgical History:   Procedure Laterality Date    ANGIOGRAM, CORONARY, WITH LEFT HEART CATHETERIZATION      EPIDURAL STEROID INJECTION N/A 3/26/2021    Procedure: INJECTION, STEROID, EPIDURAL L4/5;  Surgeon: Larry Brasher MD;  Location: [facility];  Service: Pain Management;  Laterality: N/A;    EPIDURAL STEROID INJECTION N/A 6/4/2021    Procedure: INJECTION, STEROID, EPIDURAL, L4-L5 IL need consent;  Surgeon: Larry Brasher MD;  Location: [facility];  Service: Pain Management;  Laterality: N/A;    EPIDURAL STEROID INJECTION N/A 10/29/2021    Procedure: INJECTION, STEROID, EPIDURAL, L4-L5IL NEED CONSENT;  Surgeon: Larry Brasher MD;  Location: [facility];  Service: Pain Management;  Laterality: N/A;    EPIDURAL STEROID INJECTION N/A 1/27/2022    Procedure: Injection, Steroid, Epidural C7/T1;  Surgeon: Larry Brasher MD;  Location: [facility];  Service: Pain Management;  Laterality: N/A;    EPIDURAL STEROID INJECTION N/A 2/10/2022    Procedure: Injection, Steroid, Epidural L4/5;  Surgeon: Larry Brasher MD;  Location: [facility];  Service: Pain Management;  Laterality: N/A;    EPIDURAL STEROID INJECTION N/A 8/25/2022    Procedure: Injection, Steroid, Epidural C7/T1 CONTRAST;  Surgeon: Larry Brasher MD;  Location: [facility];  Service: Pain Management;  Laterality: N/A;    INJECTION OF ANESTHETIC AGENT AROUND NERVE Bilateral 5/6/2022     Procedure: BLOCK, NERVE, BILATERAL L3-L4-*L5 MEDIAL BRANCH;  Surgeon: Larry Brasher MD;  Location: [facility];  Service: Pain Management;  Laterality: Bilateral;    INJECTION OF ANESTHETIC AGENT AROUND NERVE Bilateral 6/2/2022    Procedure: BLOCK, NERVE BILATERAL L3-L4-L5 MEDIAL BRANCH 2nd, needs consent;  Surgeon: Larry Brasher MD;  Location: [facility];  Service: Pain Management;  Laterality: Bilateral;    MANDIBLE SURGERY      reconstruction    RADIOFREQUENCY ABLATION Right 6/23/2022    Procedure: RADIOFREQUENCY ABLATION RIGHT L3,L4,L5 1 of 2, consent needed;  Surgeon: Larry Brasher MD;  Location: [facility];  Service: Pain Management;  Laterality: Right;    RADIOFREQUENCY ABLATION Left 7/7/2022    Procedure: RADIOFREQUENCY ABLATION LEFT L3,L4,L5 2 of 2, needs consent;  Surgeon: Larry Brasher MD;  Location: [facility];  Service: Pain Management;  Laterality: Left;    RADIOFREQUENCY ABLATION Right 3/3/2023    Procedure: RADIOFREQUENCY ABLATION RIGHT L3,L4,L5;  Surgeon: Larry Brasher MD;  Location: [facility];  Service: Pain Management;  Laterality: Right;    RADIOFREQUENCY ABLATION Left 3/31/2023    Procedure: Radiofrequency Ablation Left L3, L4, & L5 Pending CBC results;  Surgeon: Diana Lira MD;  Location: [facility];  Service: Pain Management;  Laterality: Left;    variceol repair       Review of patient's allergies indicates:   Allergen Reactions    Mustard Itching, Nausea And Vomiting, Shortness Of Breath and Swelling    Lipitor [atorvastatin] Itching    Mushroom Itching, Nausea And Vomiting and Swelling    Niaspan extended-release [niacin] Itching    Nystatin Hives     Other reaction(s): hives    Olive extract Itching, Nausea And Vomiting and Swelling    Oyster extract     Extendryl [flxxzdivomdqrewu-hl-muambfihem] Rash    V-cillin k Rash        PMHx, PSHx, Allergies, Medications reviewed in epic      ROS negative except pain complaints in HPI    OBJECTIVE:    BP  134/84 (BP Location: Right arm, Patient Position: Lying)   Pulse 80   Temp 97.6 °F (36.4 °C) (Oral)   Resp 16   Ht 6' (1.829 m)   Wt 68 kg (150 lb)   SpO2 99%   Breastfeeding No   BMI 20.34 kg/m²     PHYSICAL EXAMINATION:    GENERAL: Well appearing, in no acute distress, alert and oriented to name, situation, location.  PSYCH:  Mood and affect appropriate.  SKIN: Skin color, texture, turgor normal, no rashes or lesions at site of procedure.  CV: regular rate with palpation of the radial artery.  PULM: No evidence of respiratory difficulty, symmetric chest rise. No audible abnormal respiratory sounds.  NEURO: Cranial nerves grossly intact.    Plan:    Proceed with procedure as planned    Nicol Garcia  05/26/2023

## 2023-05-30 ENCOUNTER — OFFICE VISIT (OUTPATIENT)
Dept: SURGERY | Facility: CLINIC | Age: 43
End: 2023-05-30
Payer: MEDICARE

## 2023-05-30 VITALS
DIASTOLIC BLOOD PRESSURE: 63 MMHG | HEART RATE: 87 BPM | SYSTOLIC BLOOD PRESSURE: 114 MMHG | HEIGHT: 72 IN | BODY MASS INDEX: 20 KG/M2 | WEIGHT: 147.69 LBS

## 2023-05-30 DIAGNOSIS — K64.8 HEMORRHOID PROLAPSE: Primary | ICD-10-CM

## 2023-05-30 PROCEDURE — 3074F PR MOST RECENT SYSTOLIC BLOOD PRESSURE < 130 MM HG: ICD-10-PCS | Mod: CPTII,S$GLB,, | Performed by: NURSE PRACTITIONER

## 2023-05-30 PROCEDURE — 3008F BODY MASS INDEX DOCD: CPT | Mod: CPTII,S$GLB,, | Performed by: NURSE PRACTITIONER

## 2023-05-30 PROCEDURE — 1160F PR REVIEW ALL MEDS BY PRESCRIBER/CLIN PHARMACIST DOCUMENTED: ICD-10-PCS | Mod: CPTII,S$GLB,, | Performed by: NURSE PRACTITIONER

## 2023-05-30 PROCEDURE — 1159F MED LIST DOCD IN RCRD: CPT | Mod: CPTII,S$GLB,, | Performed by: NURSE PRACTITIONER

## 2023-05-30 PROCEDURE — 3074F SYST BP LT 130 MM HG: CPT | Mod: CPTII,S$GLB,, | Performed by: NURSE PRACTITIONER

## 2023-05-30 PROCEDURE — 99213 PR OFFICE/OUTPT VISIT, EST, LEVL III, 20-29 MIN: ICD-10-PCS | Mod: S$GLB,,, | Performed by: NURSE PRACTITIONER

## 2023-05-30 PROCEDURE — 3078F DIAST BP <80 MM HG: CPT | Mod: CPTII,S$GLB,, | Performed by: NURSE PRACTITIONER

## 2023-05-30 PROCEDURE — 3008F PR BODY MASS INDEX (BMI) DOCUMENTED: ICD-10-PCS | Mod: CPTII,S$GLB,, | Performed by: NURSE PRACTITIONER

## 2023-05-30 PROCEDURE — 1160F RVW MEDS BY RX/DR IN RCRD: CPT | Mod: CPTII,S$GLB,, | Performed by: NURSE PRACTITIONER

## 2023-05-30 PROCEDURE — 99999 PR PBB SHADOW E&M-EST. PATIENT-LVL V: ICD-10-PCS | Mod: PBBFAC,,, | Performed by: NURSE PRACTITIONER

## 2023-05-30 PROCEDURE — 1159F PR MEDICATION LIST DOCUMENTED IN MEDICAL RECORD: ICD-10-PCS | Mod: CPTII,S$GLB,, | Performed by: NURSE PRACTITIONER

## 2023-05-30 PROCEDURE — 3078F PR MOST RECENT DIASTOLIC BLOOD PRESSURE < 80 MM HG: ICD-10-PCS | Mod: CPTII,S$GLB,, | Performed by: NURSE PRACTITIONER

## 2023-05-30 PROCEDURE — 99999 PR PBB SHADOW E&M-EST. PATIENT-LVL V: CPT | Mod: PBBFAC,,, | Performed by: NURSE PRACTITIONER

## 2023-05-30 PROCEDURE — 99213 OFFICE O/P EST LOW 20 MIN: CPT | Mod: S$GLB,,, | Performed by: NURSE PRACTITIONER

## 2023-05-30 NOTE — PROGRESS NOTES
CRS Office Visit History and Physical    Referring Md:   No referring provider defined for this encounter.    SUBJECTIVE:       Initial History of Present Illness 4/19/23:  The patient is new patient to this practice.   Course is as follows:  Patient is a 42 y.o. adult presents with hemorrhoids.  Symptoms have been present for 4-5 weeks ago. Got better but then flared up again. Painful. Occasional bleeding. Occasional itching. This also happened 7 years ago.  Has a BM almost every day. Occasional sitting on toilet longer then 5 mins. Soft -liquid stools since starting her transition. Uses imodium PRN  Has not tried   Prep H suppository does help     Last Colonoscopy: no  Family history of colorectal cancer or IBD: no.    Interval hx 5/30/23  Had some discomfort due to hemorrhoids recently so made this appt.   Tried the anusol, this helped a little\  Did not start fiber supplement  Has a lot of work events coming up, short staffed, cant miss work    Review of patient's allergies indicates:   Allergen Reactions    Mustard Itching, Nausea And Vomiting, Shortness Of Breath and Swelling    Lipitor [atorvastatin] Itching    Mushroom Itching, Nausea And Vomiting and Swelling    Niaspan extended-release [niacin] Itching    Nystatin Hives     Other reaction(s): hives    Olive extract Itching, Nausea And Vomiting and Swelling    Oyster extract     Extendryl [kywieyuloypagbon-uq-utuueumfje] Rash    V-cillin k Rash       Past Medical History:   Diagnosis Date    Anxiety     Cancer     Chest pain 1/20/2016 12/30/2015: Began experinece chest pain.    Depression     Functional movement disorder 10/1/2019    Migraine headache     Movement disorder     Myoclonic jerkings, massive     Stroke pt. states he had a cva at 3 months old     Past Surgical History:   Procedure Laterality Date    ANGIOGRAM, CORONARY, WITH LEFT HEART CATHETERIZATION      EPIDURAL STEROID INJECTION N/A 3/26/2021    Procedure: INJECTION, STEROID, EPIDURAL  L4/5;  Surgeon: Larry Brasher MD;  Location: [facility];  Service: Pain Management;  Laterality: N/A;    EPIDURAL STEROID INJECTION N/A 6/4/2021    Procedure: INJECTION, STEROID, EPIDURAL, L4-L5 IL need consent;  Surgeon: Larry Brasher MD;  Location: [facility];  Service: Pain Management;  Laterality: N/A;    EPIDURAL STEROID INJECTION N/A 10/29/2021    Procedure: INJECTION, STEROID, EPIDURAL, L4-L5IL NEED CONSENT;  Surgeon: Larry Brasher MD;  Location: [facility];  Service: Pain Management;  Laterality: N/A;    EPIDURAL STEROID INJECTION N/A 1/27/2022    Procedure: Injection, Steroid, Epidural C7/T1;  Surgeon: Larry Brasher MD;  Location: [facility];  Service: Pain Management;  Laterality: N/A;    EPIDURAL STEROID INJECTION N/A 2/10/2022    Procedure: Injection, Steroid, Epidural L4/5;  Surgeon: Larry Brasher MD;  Location: [facility];  Service: Pain Management;  Laterality: N/A;    EPIDURAL STEROID INJECTION N/A 8/25/2022    Procedure: Injection, Steroid, Epidural C7/T1 CONTRAST;  Surgeon: Larry Brasher MD;  Location: [facility];  Service: Pain Management;  Laterality: N/A;    EPIDURAL STEROID INJECTION N/A 5/26/2023    Procedure: INJECTION, STEROID, EPIDURAL C7/T1 IL;  Surgeon: Larry Brasher MD;  Location: [facility];  Service: Pain Management;  Laterality: N/A;    INJECTION OF ANESTHETIC AGENT AROUND NERVE Bilateral 5/6/2022    Procedure: BLOCK, NERVE, BILATERAL L3-L4-*L5 MEDIAL BRANCH;  Surgeon: Larry Brasher MD;  Location: [facility];  Service: Pain Management;  Laterality: Bilateral;    INJECTION OF ANESTHETIC AGENT AROUND NERVE Bilateral 6/2/2022    Procedure: BLOCK, NERVE BILATERAL L3-L4-L5 MEDIAL BRANCH 2nd, needs consent;  Surgeon: Larry Brasher MD;  Location: [facility];  Service: Pain Management;  Laterality: Bilateral;    MANDIBLE SURGERY      reconstruction    RADIOFREQUENCY ABLATION Right 6/23/2022    Procedure: RADIOFREQUENCY ABLATION RIGHT  L3,L4,L5 1 of 2, consent needed;  Surgeon: Larry Brasher MD;  Location: [facility] [place];  Service: Pain Management;  Laterality: Right;    RADIOFREQUENCY ABLATION Left 7/7/2022    Procedure: RADIOFREQUENCY ABLATION LEFT L3,L4,L5 2 of 2, needs consent;  Surgeon: Larry Brasher MD;  Location: [facility] [place];  Service: Pain Management;  Laterality: Left;    RADIOFREQUENCY ABLATION Right 3/3/2023    Procedure: RADIOFREQUENCY ABLATION RIGHT L3,L4,L5;  Surgeon: Larry Brasher MD;  Location: [facility] [place];  Service: Pain Management;  Laterality: Right;    RADIOFREQUENCY ABLATION Left 3/31/2023    Procedure: Radiofrequency Ablation Left L3, L4, & L5 Pending CBC results;  Surgeon: Diana Lira MD;  Location: [facility] [place];  Service: Pain Management;  Laterality: Left;    variceol repair       Family History   Problem Relation Age of Onset    Heart disease Father     Hypertension Father     Hyperlipidemia Father     Heart disease Paternal Uncle     Heart disease Mother     Myasthenia gravis Mother      Social History     Tobacco Use    Smoking status: Never    Smokeless tobacco: Never   Substance Use Topics    Alcohol use: No    Drug use: No        Review of Systems:  ROS    OBJECTIVE:     Vital Signs (Most Recent)  /63 (BP Location: Right arm, Patient Position: Sitting, BP Method: Large (Automatic))   Pulse 87   Ht 6' (1.829 m)   Wt 67 kg (147 lb 11.2 oz)   BMI 20.03 kg/m²     Physical Exam:  General: White adult in no distress   Neuro: Alert and oriented to person, place, and time.  Moves all extremities.     HEENT: No icterus.  Trachea midline  Respiratory: Respirations are even and unlabored, no cough or audible wheezing  Skin: Warm dry and intact, No visible rashes, no jaundice    Labs reviewed today:  Lab Results   Component Value Date    WBC 8.78 03/29/2023    HGB 13.1 03/29/2023    HCT 38.7 03/29/2023     03/29/2023    CHOL 196 02/24/2023    TRIG 160 (H) 02/24/2023    HDL 30 (L) 02/24/2023     ALT 6 (L) 03/16/2023    AST 10 03/16/2023     03/16/2023    K 3.4 (L) 03/16/2023     03/16/2023    CREATININE 1.0 03/16/2023    BUN 9 03/16/2023    CO2 28 03/16/2023    TSH 0.346 (L) 06/11/2022    INR 1.1 08/04/2022    HGBA1C 5.9 06/26/2012       Anorectal Exam:    Anal Skin: External hemorrhoids,reducible but then immediatly prolapse right out    Digital Rectal Exam:  Resting Tone normal  Mass none  Tenderness  absent    Anoscopy:  Deferred        ASSESSMENT/PLAN:     Diagnoses and all orders for this visit:    Hemorrhoid prolapse    42 y.o. trans F here for hemorrhoid f/u. Not bandable today due to too much external disease plus has too much going on at work to risk having to miss due to RBL complications. We agreed she should continue what she is doing with high fiber diet and anusol and f/u w MD if hemorrhoids flare again      VERNON DukesP-C  Colon and Rectal Surgery

## 2023-05-31 ENCOUNTER — CLINICAL SUPPORT (OUTPATIENT)
Dept: REHABILITATION | Facility: HOSPITAL | Age: 43
End: 2023-05-31
Payer: MEDICARE

## 2023-05-31 DIAGNOSIS — R26.9 ABNORMALITY OF GAIT AND MOBILITY: Primary | ICD-10-CM

## 2023-05-31 PROCEDURE — 97112 NEUROMUSCULAR REEDUCATION: CPT | Mod: PO,CQ

## 2023-05-31 PROCEDURE — 97110 THERAPEUTIC EXERCISES: CPT | Mod: PO,CQ

## 2023-05-31 NOTE — PROGRESS NOTES
"[facility]   Physical Therapy Treatment Note      Name: Gordon Griffin  [facility] Number: [id number]    Therapy Diagnosis:   No diagnosis found.    Physician: Ilene Smalls MD    Visit Date: 2023    Physician: Ilene Smalls MD     Physician Orders: PT Eval and Treat   Medical Diagnosis from Referral: Abnormality of gait and mobility  Evaluation Date: 2023  Authorization Period Expiration: 2024  Plan of Care Expiration: 2023  Visit # / Visits authorized:  ( plus eval)        Time In: 13: 45  Time Out: 14:30  Total Billable Time: 45 minutes     Precautions: Standard  Patient's preferred pronouns: She/her     PTA Visit #:        Subjective     Pt reports: " I almost didn't come here today, I was at a "   She was compliant with her HEP  Response to previous treatment: no adverse effects  Functional change: ongoing    Pain: 9/10  Location: both legs, mainly right side, migraine     Objective      Objective Measures updated at progress report unless specified.     Treatment     Dylon received the treatments listed below:      therapeutic exercises to develop strength, endurance, ROM, flexibility, posture, and core stabilization for 25 minutes including:  X 10 min on SCi Fit recumbent stepper.  B UE/B LE on level 1.5      Standing:   2 x 10 reps of B LE hip flexion with #1.5lb cuff weights  2 x 10 reps of B LE hip abd/add with #1.5lb cuff weights  2 x 10 reps of B LE heel/toe raises with #1.5lb cuff weights     neuromuscular re-education activities to improve: Balance, Coordination, Kinesthetic, Sense, Proprioception, and Posture for 20 minutes. The following activities were included:    4 point foam fitter board:  CGA  2 x 30 sec of static standing with WBOS, eyes opened, no UE support  2 x 30 sec of static standing with NBOS, eye opened, no UE support  X 60 sec of medial/lateral weight shifting with occ 1 UE support  X 60 sec of anterior/posterior weight " shifting with occ 1 UE support  1 x 10 reps of B LE single leg step ups with 1 UE support    therapeutic activities to improve functional performance for 0  minutes, including:      gait training to improve functional mobility and safety for 0  minutes, including:        Patient Education and Home Exercises       Education provided:   - HEP    Written Home Exercises Provided: yes. Exercises were reviewed and Dylon was able to demonstrate them prior to the end of the session.  Dylon demonstrated good  understanding of the education provided. See EMR under Patient Instructions for exercises provided during therapy sessions    Assessment     Dylon tolerated her tx session well and did not have any problems noted.  Dylon was able to increase her resistance on the stepper slightly and progressed to balance on the 4 point foam fitter board.  No loss of balance noted.. Cont with plan of care.     Dylon Is progressing well towards her goals.   Pt prognosis is Good.     Pt will continue to benefit from skilled outpatient physical therapy to address the deficits listed in the problem list box on initial evaluation, provide pt/family education and to maximize pt's level of independence in the home and community environment.     Pt's spiritual, cultural and educational needs considered and pt agreeable to plan of care and goals.     Anticipated barriers to physical therapy:  co-morbidities     Goals:  Short Term Goals: 4 weeks   1. Patient to be (I) with established home exercise program.  2. Patient to improve bilateral hip flexion by 1/3 MMT to improve balance and functional mobility.   3. Patient to improve FGA score to 23/30 for improved stability with ambulation.   4. Patient to improve SSWS to 1.0  m/sec for improved safety with ambulation.   5. Patient to improve GST condition 4 to 15 seconds with BLE leading for improves stability with mobility.      Long Term Goals: 8 weeks   1. Patient to be (I) with advanced home exercise  program.  2. Patient to improve bilateral hip flexion by 2/3 MMT to improve balance and functional mobility.   3. Patient to improve FGA score to 25/30 for improved stability with ambulation.   4. Patient to improve SSWS to 1.2  m/sec for improved safety with ambulation.   5. Patient to improve GST condition 4 to 30 seconds with BLE leading for improves stability with mobility.     Plan     Cont with strengthening, endurance and balance    Lisa Rowe, PTA

## 2023-06-02 ENCOUNTER — CLINICAL SUPPORT (OUTPATIENT)
Dept: REHABILITATION | Facility: HOSPITAL | Age: 43
End: 2023-06-02
Payer: MEDICARE

## 2023-06-02 DIAGNOSIS — R26.9 ABNORMALITY OF GAIT AND MOBILITY: Primary | ICD-10-CM

## 2023-06-02 PROCEDURE — 97110 THERAPEUTIC EXERCISES: CPT | Mod: PO,CQ

## 2023-06-02 PROCEDURE — 97112 NEUROMUSCULAR REEDUCATION: CPT | Mod: PO,CQ

## 2023-06-02 NOTE — PROGRESS NOTES
"[facility]   Physical Therapy Treatment Note      Name: Gordon Griffin  [facility] Number: [id number]    Therapy Diagnosis:   Encounter Diagnosis   Name Primary?    Abnormality of gait and mobility Yes       Physician: Ilene Smalls MD    Visit Date: 6/2/2023       Physician Orders: PT Eval and Treat   Medical Diagnosis from Referral: Abnormality of gait and mobility  Evaluation Date: 5/16/2023  Authorization Period Expiration: 04/30/2024  Plan of Care Expiration: 7/11/2023  Visit # / Visits authorized: 03/ 20 ( plus eval)        Time In: 7:51  Time Out: 8:30  Total Billable Time: 39 minutes     Precautions: Standard  Patient's preferred pronouns: She/her     PTA Visit #: 3/5       Subjective     Pt reports: "I got called in for a job at 4 am."   She was compliant with her HEP  Response to previous treatment: no adverse effects  Functional change: ongoing    Pain: 9/10  Location: both legs, mainly right side, migraine     Objective      Objective Measures updated at progress report unless specified.     Treatment     Dylon received the treatments listed below:      therapeutic exercises to develop strength, endurance, ROM, flexibility, posture, and core stabilization for 8 minutes including:  X 8 min on SCi Fit recumbent stepper.  B UE/B LE on level 2.0    neuromuscular re-education activities to improve: Balance, Coordination, Kinesthetic, Sense, Proprioception, and Posture for 31 minutes. The following activities were included:    2 point wooden fitter board: CGA      Small Bosu ball: CGA  Flat side up:  2 X 60 sec of medial/lateral weight shifting, no UE support  2 x 60 sec of anterior/posterior weight shifting, no UE support  2 x 60 sec of static standing, 1 UE support to no UE support  2 x 30 sec each of B LE single leg stance, with unilateral support     Round sideup:   X 2 min of alternating forward lunges onto and off the Bosu.   1 x 10 reps of B LE single leg step ups with 1 UE " support.  VCs to slow down and take her time to avoid twisting an ankle    therapeutic activities to improve functional performance for 0  minutes, including:      gait training to improve functional mobility and safety for 0  minutes, including:        Patient Education and Home Exercises       Education provided:   - HEP    Written Home Exercises Provided: yes. Exercises were reviewed and Dylon was able to demonstrate them prior to the end of the session.  Dylon demonstrated good  understanding of the education provided. See EMR under Patient Instructions for exercises provided during therapy sessions    Assessment     Dylon tolerated her tx session well, and despite running a few minutes late due to work, she was able to progress to more dynamic balance activities.  Dylon was able to increase her resistance on the stepper and required occasional 1 UE support for safety on balance.  Dylon required verbal cues to slow down and take her time with the single leg activities to avoid injury.  Cont with plan of care.     Dylon Is progressing well towards her goals.   Pt prognosis is Good.     Pt will continue to benefit from skilled outpatient physical therapy to address the deficits listed in the problem list box on initial evaluation, provide pt/family education and to maximize pt's level of independence in the home and community environment.     Pt's spiritual, cultural and educational needs considered and pt agreeable to plan of care and goals.     Anticipated barriers to physical therapy:  co-morbidities     Goals:  Short Term Goals: 4 weeks   1. Patient to be (I) with established home exercise program.  2. Patient to improve bilateral hip flexion by 1/3 MMT to improve balance and functional mobility.   3. Patient to improve FGA score to 23/30 for improved stability with ambulation.   4. Patient to improve SSWS to 1.0  m/sec for improved safety with ambulation.   5. Patient to improve GST condition 4 to 15 seconds with  BLE leading for improves stability with mobility.      Long Term Goals: 8 weeks   1. Patient to be (I) with advanced home exercise program.  2. Patient to improve bilateral hip flexion by 2/3 MMT to improve balance and functional mobility.   3. Patient to improve FGA score to 25/30 for improved stability with ambulation.   4. Patient to improve SSWS to 1.2  m/sec for improved safety with ambulation.   5. Patient to improve GST condition 4 to 30 seconds with BLE leading for improves stability with mobility.     Plan     Cont with strengthening, endurance and balance    Lisa Rowe, PTA

## 2023-06-05 ENCOUNTER — OFFICE VISIT (OUTPATIENT)
Dept: NEUROLOGY | Facility: CLINIC | Age: 43
End: 2023-06-05
Payer: MEDICARE

## 2023-06-05 ENCOUNTER — OFFICE VISIT (OUTPATIENT)
Dept: PAIN MEDICINE | Facility: CLINIC | Age: 43
End: 2023-06-05
Payer: MEDICARE

## 2023-06-05 VITALS
HEART RATE: 91 BPM | RESPIRATION RATE: 17 BRPM | OXYGEN SATURATION: 100 % | DIASTOLIC BLOOD PRESSURE: 71 MMHG | SYSTOLIC BLOOD PRESSURE: 113 MMHG | BODY MASS INDEX: 20.22 KG/M2 | WEIGHT: 149.25 LBS | HEIGHT: 72 IN

## 2023-06-05 VITALS
BODY MASS INDEX: 20.05 KG/M2 | HEART RATE: 94 BPM | DIASTOLIC BLOOD PRESSURE: 72 MMHG | SYSTOLIC BLOOD PRESSURE: 110 MMHG | WEIGHT: 148 LBS | HEIGHT: 72 IN

## 2023-06-05 DIAGNOSIS — M51.36 DDD (DEGENERATIVE DISC DISEASE), LUMBAR: ICD-10-CM

## 2023-06-05 DIAGNOSIS — M47.816 LUMBAR SPONDYLOSIS: ICD-10-CM

## 2023-06-05 DIAGNOSIS — M54.16 LUMBAR RADICULOPATHY: ICD-10-CM

## 2023-06-05 DIAGNOSIS — M53.3 SACROILIAC JOINT PAIN: Primary | ICD-10-CM

## 2023-06-05 DIAGNOSIS — R29.90 EPISODE OF TRANSIENT NEUROLOGIC SYMPTOMS: Primary | ICD-10-CM

## 2023-06-05 PROCEDURE — 3078F DIAST BP <80 MM HG: CPT | Mod: CPTII,S$GLB,, | Performed by: STUDENT IN AN ORGANIZED HEALTH CARE EDUCATION/TRAINING PROGRAM

## 2023-06-05 PROCEDURE — 99215 PR OFFICE/OUTPT VISIT, EST, LEVL V, 40-54 MIN: ICD-10-PCS | Mod: S$GLB,,, | Performed by: STUDENT IN AN ORGANIZED HEALTH CARE EDUCATION/TRAINING PROGRAM

## 2023-06-05 PROCEDURE — 3008F BODY MASS INDEX DOCD: CPT | Mod: CPTII,S$GLB,, | Performed by: STUDENT IN AN ORGANIZED HEALTH CARE EDUCATION/TRAINING PROGRAM

## 2023-06-05 PROCEDURE — 1159F PR MEDICATION LIST DOCUMENTED IN MEDICAL RECORD: ICD-10-PCS | Mod: CPTII,S$GLB,, | Performed by: NURSE PRACTITIONER

## 2023-06-05 PROCEDURE — 99213 PR OFFICE/OUTPT VISIT, EST, LEVL III, 20-29 MIN: ICD-10-PCS | Mod: S$GLB,,, | Performed by: NURSE PRACTITIONER

## 2023-06-05 PROCEDURE — 99999 PR PBB SHADOW E&M-EST. PATIENT-LVL V: ICD-10-PCS | Mod: PBBFAC,,, | Performed by: NURSE PRACTITIONER

## 2023-06-05 PROCEDURE — 3074F SYST BP LT 130 MM HG: CPT | Mod: CPTII,S$GLB,, | Performed by: NURSE PRACTITIONER

## 2023-06-05 PROCEDURE — 3078F PR MOST RECENT DIASTOLIC BLOOD PRESSURE < 80 MM HG: ICD-10-PCS | Mod: CPTII,S$GLB,, | Performed by: NURSE PRACTITIONER

## 2023-06-05 PROCEDURE — 3078F DIAST BP <80 MM HG: CPT | Mod: CPTII,S$GLB,, | Performed by: NURSE PRACTITIONER

## 2023-06-05 PROCEDURE — 1159F MED LIST DOCD IN RCRD: CPT | Mod: CPTII,S$GLB,, | Performed by: STUDENT IN AN ORGANIZED HEALTH CARE EDUCATION/TRAINING PROGRAM

## 2023-06-05 PROCEDURE — 3074F PR MOST RECENT SYSTOLIC BLOOD PRESSURE < 130 MM HG: ICD-10-PCS | Mod: CPTII,S$GLB,, | Performed by: STUDENT IN AN ORGANIZED HEALTH CARE EDUCATION/TRAINING PROGRAM

## 2023-06-05 PROCEDURE — 99215 OFFICE O/P EST HI 40 MIN: CPT | Mod: S$GLB,,, | Performed by: STUDENT IN AN ORGANIZED HEALTH CARE EDUCATION/TRAINING PROGRAM

## 2023-06-05 PROCEDURE — 3074F SYST BP LT 130 MM HG: CPT | Mod: CPTII,S$GLB,, | Performed by: STUDENT IN AN ORGANIZED HEALTH CARE EDUCATION/TRAINING PROGRAM

## 2023-06-05 PROCEDURE — 99999 PR PBB SHADOW E&M-EST. PATIENT-LVL IV: CPT | Mod: PBBFAC,,, | Performed by: STUDENT IN AN ORGANIZED HEALTH CARE EDUCATION/TRAINING PROGRAM

## 2023-06-05 PROCEDURE — 99999 PR PBB SHADOW E&M-EST. PATIENT-LVL IV: ICD-10-PCS | Mod: PBBFAC,,, | Performed by: STUDENT IN AN ORGANIZED HEALTH CARE EDUCATION/TRAINING PROGRAM

## 2023-06-05 PROCEDURE — 3074F PR MOST RECENT SYSTOLIC BLOOD PRESSURE < 130 MM HG: ICD-10-PCS | Mod: CPTII,S$GLB,, | Performed by: NURSE PRACTITIONER

## 2023-06-05 PROCEDURE — 1159F PR MEDICATION LIST DOCUMENTED IN MEDICAL RECORD: ICD-10-PCS | Mod: CPTII,S$GLB,, | Performed by: STUDENT IN AN ORGANIZED HEALTH CARE EDUCATION/TRAINING PROGRAM

## 2023-06-05 PROCEDURE — 1159F MED LIST DOCD IN RCRD: CPT | Mod: CPTII,S$GLB,, | Performed by: NURSE PRACTITIONER

## 2023-06-05 PROCEDURE — 3078F PR MOST RECENT DIASTOLIC BLOOD PRESSURE < 80 MM HG: ICD-10-PCS | Mod: CPTII,S$GLB,, | Performed by: STUDENT IN AN ORGANIZED HEALTH CARE EDUCATION/TRAINING PROGRAM

## 2023-06-05 PROCEDURE — 99999 PR PBB SHADOW E&M-EST. PATIENT-LVL V: CPT | Mod: PBBFAC,,, | Performed by: NURSE PRACTITIONER

## 2023-06-05 PROCEDURE — 3008F PR BODY MASS INDEX (BMI) DOCUMENTED: ICD-10-PCS | Mod: CPTII,S$GLB,, | Performed by: STUDENT IN AN ORGANIZED HEALTH CARE EDUCATION/TRAINING PROGRAM

## 2023-06-05 PROCEDURE — 3008F PR BODY MASS INDEX (BMI) DOCUMENTED: ICD-10-PCS | Mod: CPTII,S$GLB,, | Performed by: NURSE PRACTITIONER

## 2023-06-05 PROCEDURE — 3008F BODY MASS INDEX DOCD: CPT | Mod: CPTII,S$GLB,, | Performed by: NURSE PRACTITIONER

## 2023-06-05 PROCEDURE — 1160F PR REVIEW ALL MEDS BY PRESCRIBER/CLIN PHARMACIST DOCUMENTED: ICD-10-PCS | Mod: CPTII,S$GLB,, | Performed by: NURSE PRACTITIONER

## 2023-06-05 PROCEDURE — 99213 OFFICE O/P EST LOW 20 MIN: CPT | Mod: S$GLB,,, | Performed by: NURSE PRACTITIONER

## 2023-06-05 PROCEDURE — 1160F RVW MEDS BY RX/DR IN RCRD: CPT | Mod: CPTII,S$GLB,, | Performed by: NURSE PRACTITIONER

## 2023-06-05 RX ORDER — GABAPENTIN 300 MG/1
300 CAPSULE ORAL 3 TIMES DAILY
Qty: 90 CAPSULE | Refills: 3 | Status: SHIPPED | OUTPATIENT
Start: 2023-06-05 | End: 2023-09-05 | Stop reason: SDUPTHER

## 2023-06-05 NOTE — PROGRESS NOTES
Vascular Neurology Clinic  Initial Consult    Patient Name: Gordon Griffin  MRN: [id number]  Date: 6/5/23  Referring Provider: No ref. provider found    CC: Transient neurologic symptoms    SUBJECTIVE:      History of Present Illness: Gordon Griffin is a 42 y.o. transgender female (on estrogen and spironolactone) with a past medical history significant for migraine headaches, abnormal involuntary movements, chronic pain, anxiety/depression who presents as a referral for evaluation of transient neurologic symptoms.    She was evaluated in the ED earlier today for a one day history of episodes of slurred speech and right-sided weakness and numbness. Last night, she was getting out of an EMS truck, when she experienced these symptoms. The symptoms would resolve and recur and she had multiple episodes lasting 30s to a few minutes. Overall, the entire event lasted 1 hour. Reports a history of slurred speech, but never had right-sided symptoms before. Reports a history of stroke when she was 3 months old.     She was evaluated in movement disorders clinic for myoclonic movements (right shoulder jerking and right head jerking towards the shoulder) as well as dystonic foot cramping. Some improvement with keppra. Overall concern for functional neurologic disorder.    Interval History:  CTA head and neck showed no intra- or extracranial stenosis, aneurysm, or vascular malformation. 30-day cardiac monitor was negative for atrial arrhythmia. She had an episode of slurred speech lasting 30 seconds with full resolution. No DEVEN/LOC. She was working on paperwork, which was stressful at the time. She continues to undergo PT/OT.       Work-up:    Imaging:  - CTA head and neck (03/20/2023): CT head shows no acute intracranial abnormality. CTA head neck shows no high-grade stenosis, large vessel occlusion or aneurysm. Paranasal sinus disease with postop changes of the maxilla and mandible.  - MRI brain without contrast (2/14/2023): No  acute intracranial abnormality. Right maxillary sinus disease.  - Bilateral CUS (2/24/2023): No evidence of a hemodynamically significant carotid bifurcation stenosis.    - EEG (02/13/2020): Normal awake and asleep EEG    Cardiac Evaluation:  - Cardiac PET Stress Test (02/24/2023):    There is a small sized, mild intensity distal to apical anteroseptal and inferoapical fixed perfusion abnormality involving 5% of LV myocardium in the distribution of the LAD. After pharmacologic stress, this defect improves, indicative of non-transmural scar.    There are no other significant perfusion abnormalities.    The whole heart absolute myocardial perfusion values averaged 0.53 cc/min/g at rest, which is reduced; 1.40 cc/min/g at stress, which is mildly reduced; and CFR is 2.66 , which is low normal.    CT attenuation images demonstrate no coronary calcifications and no aortic calcifications.    The gated perfusion images showed an ejection fraction of 71% at rest and 78% during stress. A normal ejection fraction is greater than 47%.    The wall motion is normal at rest and during stress.    The LV cavity size is normal at rest and stress.    The ECG portion of the study is abnormal but not diagnostic.    There were no arrhythmias during stress.    The patient reported no chest pain during the stress test.    There are no prior studies for comparison.     - Cardiac monitor: pending    Labs:  Recent Labs   Lab 02/24/23  0726   LDL Cholesterol 134.0   HDL 30 L   Triglycerides 160 H   Cholesterol 196         Review of Systems: The following systems were reviewed with pertinent positives and negatives documented in the HPI: constitutional, eyes, CV, respiratory, GI, , musculoskeletal, skin, neurological, psychiatric    Past Medical History:  Past Medical History:   Diagnosis Date    Anxiety     Cancer     Chest pain 1/20/2016 12/30/2015: Began experinece chest pain.    Depression     Functional movement disorder 10/1/2019     Migraine headache     Movement disorder     Myoclonic jerkings, massive     Stroke pt. states he had a cva at 3 months old       Medications:    Current Outpatient Medications:     aspirin 81 MG Chew, Take 81 mg by mouth once daily., Disp: , Rfl:     azelastine (ASTELIN) 137 mcg (0.1 %) nasal spray, USE 1 TO 2 SPRAYS IN EACH NOSTRIL TWICE DAILY FOR CONGESTION, Disp: , Rfl:     baclofen (LIORESAL) 20 MG tablet, Take 1 tablet by mouth 3 (three) times daily as needed. , Disp: , Rfl:     benztropine (COGENTIN) 0.5 MG tablet, Take 0.5 mg by mouth once daily., Disp: , Rfl:     butalbital-acetaminophen-caffeine -40 mg (FIORICET, ESGIC) -40 mg per tablet, Take 1 tablet by mouth every 4 (four) hours as needed., Disp: , Rfl:     butorphanol (STADOL) 10 mg/mL nasal spray, 1 spray by Nasal route every 4 (four) hours as needed for Pain., Disp: , Rfl:     butorphanol (STADOL) 10 mg/mL nasal spray, use 2 sprays into each nostril every 4 hours as needed for pain., Disp: 250 mL, Rfl: 5    cloNIDine (CATAPRES) 0.1 MG tablet, TAKE 1 TABLET(0.1 MG) BY MOUTH TWICE DAILY, Disp: 60 tablet, Rfl: 3    cyclobenzaprine (FLEXERIL) 10 MG tablet, TK 1 T PO Q 8 H PRF PAIN, Disp: , Rfl:     diazePAM (VALIUM) 2 MG tablet, Take 2 mg by mouth 2 (two) times daily as needed., Disp: , Rfl:     erenumab-aooe (AIMOVIG AUTOINJECTOR SUBQ), Inject into the skin., Disp: , Rfl:     EScitalopram oxalate (LEXAPRO) 20 MG tablet, Take 20 mg by mouth once daily., Disp: , Rfl:     estradiol valerate (DELESTROGEN) 20 mg/mL injection, SMARTSI.3 Milliliter(s) IM Once a Week, Disp: , Rfl:     ezetimibe (ZETIA) 10 mg tablet, Take 1 tablet (10 mg total) by mouth once daily., Disp: 90 tablet, Rfl: 3    FLUCELVAX QUAD 2571-5029, PF, 60 mcg (15 mcg x 4)/0.5 mL Syrg vaccine, ADM 0.5ML IM UTD, Disp: , Rfl: 0    fluticasone (FLONASE) 50 mcg/actuation nasal spray, SPRAY TWICE IEN QD, Disp: , Rfl: 5    gabapentin (NEURONTIN) 300 MG capsule, Take 1 capsule (300 mg  total) by mouth 3 (three) times daily., Disp: 90 capsule, Rfl: 3    hydrocortisone (ANUSOL-HC) 2.5 % rectal cream, Place rectally 2 (two) times daily., Disp: 28 g, Rfl: 1    hydrOXYzine pamoate (VISTARIL) 50 MG Cap, Take  mg by mouth nightly as needed., Disp: , Rfl:     ketorolac (TORADOL) 10 mg tablet, ketorolac 10 mg tablet, Disp: , Rfl:     levETIRAcetam (KEPPRA) 1000 MG tablet, Take 1,000 mg by mouth 2 (two) times daily., Disp: , Rfl:     methocarbamoL (ROBAXIN) 750 MG Tab, Take 750 mg by mouth 3 (three) times daily., Disp: , Rfl:     metoclopramide HCl (REGLAN) 10 MG tablet, 10 mg., Disp: , Rfl:     moxifloxacin (AVELOX) 400 mg tablet, Take 400 mg by mouth., Disp: , Rfl:     NARCAN 4 mg/actuation Spry, SPRAY 0.1ML IN 1 NOSTRIL MAY REPEAT DOSE EVERY 2-3 MINUTES AS NEEDED ALTERNATING NOSTRILS EACH DOSE, Disp: 1 each, Rfl: 3    nitroGLYCERIN (NITROSTAT) 0.4 MG SL tablet, Place 1 tablet (0.4 mg total) under the tongue every 5 (five) minutes as needed for Chest pain., Disp: 90 tablet, Rfl: 3    NURTEC 75 mg odt, Take 75 mg by mouth as needed for Migraine., Disp: , Rfl:     omeprazole (PRILOSEC) 20 MG capsule, Take 20 mg by mouth once daily., Disp: , Rfl:     onabotulinumtoxina (BOTOX) 200 unit SolR, Inject 200 Units into the muscle., Disp: , Rfl:     ondansetron (ZOFRAN) 4 MG tablet, Take 1 tablet (4 mg total) by mouth every 6 (six) hours as needed for Nausea., Disp: 12 tablet, Rfl: 0    ondansetron (ZOFRAN-ODT) 8 MG TbDL, Take 8 mg by mouth 2 (two) times daily., Disp: , Rfl:     oxybutynin (DITROPAN-XL) 10 MG 24 hr tablet, TAKE 1 TABLET(10 MG) BY MOUTH EVERY DAY, Disp: 30 tablet, Rfl: 11    pantoprazole (PROTONIX) 20 MG tablet, Take 20 mg by mouth., Disp: , Rfl:     predniSONE (DELTASONE) 20 MG tablet, Take by mouth., Disp: , Rfl:     prochlorperazine (COMPAZINE) 10 MG tablet, Take 10 mg by mouth 3 (three) times daily., Disp: , Rfl:     propranoloL (INDERAL LA) 60 MG 24 hr capsule, Take 60 mg by mouth every  evening., Disp: , Rfl:     rosuvastatin (CRESTOR) 20 MG tablet, Take 1 tablet (20 mg total) by mouth once daily., Disp: 90 tablet, Rfl: 9    spironolactone (ALDACTONE) 25 MG tablet, Take 25 mg by mouth once daily., Disp: , Rfl:     tamsulosin (FLOMAX) 0.4 mg Cap, TAKE 1 CAPSULE(0.4 MG) BY MOUTH EVERY DAY, Disp: 30 capsule, Rfl: 11    traZODone (DESYREL) 100 MG tablet, Take 100 mg by mouth every evening., Disp: , Rfl:     traZODone (DESYREL) 50 MG tablet, Take 50 mg by mouth every evening., Disp: , Rfl:     verapamiL (VERELAN) 240 MG C24P, Take 240 mg by mouth Daily., Disp: , Rfl:   Any other notable medications as documented in HPI.    Allergies:  Review of patient's allergies indicates:   Allergen Reactions    Mustard Itching, Nausea And Vomiting, Shortness Of Breath and Swelling    Lipitor [atorvastatin] Itching    Mushroom Itching, Nausea And Vomiting and Swelling    Niaspan extended-release [niacin] Itching    Nystatin Hives     Other reaction(s): hives    Olive extract Itching, Nausea And Vomiting and Swelling    Oyster extract     Extendryl [[id number]] Rash    V-cillin k Rash       Social History:  Social History     Socioeconomic History    Marital status:    Tobacco Use    Smoking status: Never    Smokeless tobacco: Never   Substance and Sexual Activity    Alcohol use: No    Drug use: No    Sexual activity: Not Currently     Partners: Female     Any other notable Social History as documented in HPI.    Family History:  Family History   Problem Relation Age of Onset    Heart disease Father     Hypertension Father     Hyperlipidemia Father     Heart disease Paternal Uncle     Heart disease Mother     Myasthenia gravis Mother      Any other notable FMH as documented in HPI.      OBJECTIVE:     Physical Exam:   Vitals:    06/05/23 1136   BP: 110/72   Pulse: 94       GEN: NAD, pleasant, cooperative  CV: RRR  PULM: No signs of resp distress, on room air  EXT: No cyanosis, clubbing, or  edema.    NEURO:  Mental status: The patient is alert, attentive, and oriented to self, age, month, year  Speech: Fluent speech with intact repetition, comprehension, and naming. Phonation is normal.    Cranial nerves:  CN II: Visual fields are full to confrontation. Pupils are 3mm and reactive to light OU.  CN III, IV, VI: EOMI, no nystagmus, no ptosis  CN V: Facial sensation is intact in all 3 divisions bilaterally.  CN VII: Face is symmetric with normal eye closure and smile.  CN VIII: Hearing is normal bilaterally.  CN IX, X: Palate elevates symmetrically.   CN XI: Head turning and shoulder shrug are intact.  CN XII: Tongue is midline with normal movements and no atrophy.    Motor: Muscle bulk and tone are normal. No pronator drift. 5/5 strength throughout. Transient cramping/dystonic posturing of the right foot.    Reflexes: Reflexes are 2+ and symmetric at the biceps, triceps, knees, and ankles.     Sensory: Light touch intact in bilateral upper and lower extremities.     Coordination: Rapid alternating movements and fine finger movements are intact. There is no dysmetria on finger-to-nose and heel-knee-shin. There are no abnormal or extraneous movements.    Gait/Stance: Posture is normal. Gait is steady with normal steps, base, arm swing, and turning.       ASSESSMENT/PLAN:     Diagnosis/Etiology: Transient neurologic symptoms, ddx includes TIA vs migraine with atypical aura vs seizure vs functional neurologic disorder. Stroke work-up including CTA, cardiac stress test, and cardiac monitoring as well as EEG in the past have been unremarkable for a clear etiology. Symptoms occur/worsen with stressful situations. We discussed use of estrogen in stroke risk, which she will consider and discuss with her endocrinologist.  Stroke Risk Factors: Estrogen use, HLD  Effects of Stroke: None    Recommendations:   - Additional studies: Repeat EEG given new transient episodes.   - Antiplatelet/Anticoagulation: Continue ASA  81mg daily.   - Lipid Management: Target is LDL < 70mg/dL. Continue high-intensity statin.   - Diabetes: Target hemoglobin A1c <7%, measured 2X/year or quarterly if not meeting goals  - Hypertension: Target systolic BP <130mmHg. At goal today.   - Therapy: Consider PT/OT as needed.   - Follow-up: RTC in 3 months. Recommend close follow-up with their PCP for monitoring and management of the above vascular risk factors as needed to meet goals.       Problem List Items Addressed This Visit    None  Visit Diagnoses       Episode of transient neurologic symptoms    -  Primary    Relevant Orders    EEG,w/awake & drowsy record                Elizabeth Rowan MD, PhD  [facility]  Department of Vascular Neurology
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
## 2023-06-06 ENCOUNTER — PATIENT MESSAGE (OUTPATIENT)
Dept: PAIN MEDICINE | Facility: OTHER | Age: 43
End: 2023-06-06
Payer: MEDICARE

## 2023-06-06 NOTE — PROGRESS NOTES
[facility]   Physical Therapy Treatment Note      Name: Gordon Griffin  [facility] Number: [id number]    Therapy Diagnosis:   Encounter Diagnosis   Name Primary?    Impaired gait and mobility          Physician: Ilene Smalls MD    Visit Date: 6/7/2023       Physician Orders: PT Eval and Treat   Medical Diagnosis from Referral: Abnormality of gait and mobility  Evaluation Date: 5/16/2023  Authorization Period Expiration: 04/30/2024  Plan of Care Expiration: 7/11/2023  Visit # / Visits authorized: 03/ 20 ( plus eval)        Time In: 930  Time Out: 1013  Total Billable Time: 43 minutes     Precautions: Standard  Patient's preferred pronouns: She/her     PTA Visit #: 0/5       Subjective     Pt reports: that his lower back is bothering him but he feels fine   She was compliant with her HEP  Response to previous treatment: no adverse effects  Functional change: ongoing    Pain: 9/10  Location: both legs, mainly right side, migraine     Objective      Objective Measures updated at progress report unless specified.     Treatment     Dylon received the treatments listed below:      therapeutic exercises to develop strength, endurance, ROM, flexibility, posture, and core stabilization for 10 minutes including:  X 10 min on SCi Fit recumbent stepper.  B UE/B LE on level 2.0    neuromuscular re-education activities to improve: Balance, Coordination, Kinesthetic, Sense, Proprioception, and Posture for 8 minutes. The following activities were included:    Round sideup:   X 2 min of alternating forward lunges onto and off the Bosu.   - attempted but discontinued due to rolling of ankle causing exercise to be unsafe     2 x 10 reps step up to foam fitter with opposite leg hip hike, BLE, CGA     therapeutic activities to improve functional performance for 8  minutes, including:    3 x 10 reps sit to stands from low mat while standing on foam pad, SBA    4 laps forward reciprocal stepping over 5 hurdles,  SBA  4 laps side stepping over 5 hurdles, SBA     gait training to improve functional mobility and safety for 17  minutes, including:    In hallway:   2 x 100 feet Ambulation with horizontal head turns while holding tidal tank, CGA   2 x 100 feet vertical  with horizontal head turns while holding tidal tank, CGA   100 feet ambulation while bouncing small basket ball   100 feet backwards ambulation with vertical self ball toss  2 x 100 feet ambulation with ball toss to/from tech while walking forward/ backwards    Patient Education and Home Exercises       Education provided:   - HEP    Written Home Exercises Provided: yes. Exercises were reviewed and Dylon was able to demonstrate them prior to the end of the session.  Dylon demonstrated good  understanding of the education provided. See EMR under Patient Instructions for exercises provided during therapy sessions    Assessment     Dylon tolerated her tx session fairly well. Half way through the session the patient's ankle began to curl in. The patient was instructed to sit down and rest to ensure the patient's safety due to it being unsafe to ambulate on the lateral portion of the foot. After having discussion with patient about safety, decreased supination noted. The patient will benefit from continued physical therapy intervention to address remaining functional mobility deficits.     Dylon Is progressing well towards her goals.   Pt prognosis is Good.     Pt will continue to benefit from skilled outpatient physical therapy to address the deficits listed in the problem list box on initial evaluation, provide pt/family education and to maximize pt's level of independence in the home and community environment.     Pt's spiritual, cultural and educational needs considered and pt agreeable to plan of care and goals.     Anticipated barriers to physical therapy:  co-morbidities     Goals:  Short Term Goals: 4 weeks   1. Patient to be (I) with established home exercise  program. Ongoing   2. Patient to improve bilateral hip flexion by 1/3 MMT to improve balance and functional mobility. Ongoing  3. Patient to improve FGA score to 23/30 for improved stability with ambulation. Ongoing  4. Patient to improve SSWS to 1.0  m/sec for improved safety with ambulation. Ongoing  5. Patient to improve GST condition 4 to 15 seconds with BLE leading for improves stability with mobility. Ongoing     Long Term Goals: 8 weeks   1. Patient to be (I) with advanced home exercise program.Ongoing  2. Patient to improve bilateral hip flexion by 2/3 MMT to improve balance and functional mobility. Ongoing  3. Patient to improve FGA score to 25/30 for improved stability with ambulation. Ongoing  4. Patient to improve SSWS to 1.2  m/sec for improved safety with ambulation. Ongoing  5. Patient to improve GST condition 4 to 30 seconds with BLE leading for improves stability with mobility. Ongoing    Plan     Cont with strengthening, endurance and balance    Ibis Watkins, PT

## 2023-06-07 ENCOUNTER — CLINICAL SUPPORT (OUTPATIENT)
Dept: REHABILITATION | Facility: HOSPITAL | Age: 43
End: 2023-06-07
Payer: MEDICARE

## 2023-06-07 DIAGNOSIS — R26.89 IMPAIRED GAIT AND MOBILITY: ICD-10-CM

## 2023-06-07 PROCEDURE — 97530 THERAPEUTIC ACTIVITIES: CPT | Mod: PO

## 2023-06-07 PROCEDURE — 97116 GAIT TRAINING THERAPY: CPT | Mod: PO

## 2023-06-07 PROCEDURE — 97112 NEUROMUSCULAR REEDUCATION: CPT | Mod: PO

## 2023-06-07 PROCEDURE — 97110 THERAPEUTIC EXERCISES: CPT | Mod: PO

## 2023-06-07 NOTE — PROGRESS NOTES
[facility]   Physical Therapy Treatment Note      Name: Gordon Griffin  [facility] Number: [id number]    Therapy Diagnosis:   Encounter Diagnosis   Name Primary?    Impaired gait and mobility Yes       Physician: Ilene Smalls MD    Visit Date: 6/9/2023       Physician Orders: PT Eval and Treat   Medical Diagnosis from Referral: Abnormality of gait and mobility  Evaluation Date: 5/16/2023  Authorization Period Expiration: 04/30/2024  Plan of Care Expiration: 7/11/2023  Visit # / Visits authorized: 05/ 20 ( plus eval)        Time In: 745  Time Out: 0830  Total Billable Time: 45 minutes     Precautions: Standard  Patient's preferred pronouns: She/her     PTA Visit #: 0/5       Subjective     Pt reports: Still feeling a little off but her ankle feels stable.     She was compliant with her HEP  Response to previous treatment: no adverse effects  Functional change: ongoing    Pain: 9/10  Location: both legs, mainly right side, migraine     Objective      Objective Measures updated at progress report unless specified.     RANGE OF MOTION--LOWER EXTREMITIES  (R) LE Hip: normal              Knee: normal              Ankle: normal     (L) LE: Hip: normal              Knee: normal              Ankle: normal     Strength: manual muscle test grades below         Lower Extremity Strength  Right LE  Eval  6/9/2023 Left LE  Eval  6/9/2023   Hip Flexion: 4+/5 5/5 Hip Flexion: 4+/5 5/5   Hip Extension:  5/5 5/5 Hip Extension: 5/5 5/5   Hip Abduction: 5/5 5/5 Hip Abduction: 5/5 5/5   Hip Adduction: 5/5 5/5 Hip Adduction 5/5 5/5   Knee Extension: 5/5 5/5 Knee Extension: 5/5 5/5   Knee Flexion: 5/5 5/5 Knee Flexion: 5/5 5/5   Ankle Dorsiflexion: 5/5 5/5 Ankle Dorsiflexion: 5/5 5/5   Ankle Plantarflexion: 5/5 5/5 Ankle Plantarflexion: 5/5 5/5      Abdominal Strength: 3/5     Flexibility: WNL          Evaluation 6/9/2023   30 second Chair Rise  (adults > 61 y/o) 17 completed with no arms 17 completed with no  "arms    5 times sit-stand  (adults 18-65 y/o) 8 seconds with no arms   >12 sec= fall risk for general elderly  >16 sec= fall risk for Parkinson's disease  >10 sec= balance/vestibular dysfunction (<59 y/o)  >14.2 sec= balance/vestibular dysfunction (>59 y/o)  >12 sec= fall risk for CVA 7 seconds with no arms             KIMBERLY SENSORY TESTING:  (P= Pass, F= Fail; note any sway; hold each position for 30")  Condition 1: (firm surface/feet together/eyes open) P   Condition 2: (firm surface/feet together/eyes closed) P  Condition 3: (firm surface/feet in tandem/eyes open) P BLE leading    Condition 4: (firm surface/feet in tandem/eyes closed) P BLE leading   Condition 5: (soft surface/feet together/eyes open) P  Condition 6: (soft surface/feet together/eyes closed) P   Condition 7: (Fakuda step test), measure distance varied from center starting position NT         Gait Assessment:   - AD used: none  - Assistance: SBA   - Distance: ambulatory throughout session      GAIT DEVIATIONS:  Gordon displays the following deviations with ambulation: initially when ambulating into the clinic no significant gait deficits noted. After performing the subjective portion of the exam the patient reports the spasms were starting up in his R foot. Significant R foot supination noted with ambulation at this time with foot drop. Decreased chapis and velocity noted     Impairments contributing to deviations: impaired motor control, impaired coordination, muscle spasms      Endurance Deficit: moderate         Evaluation 6/9/2023   Self Selected Walking Speed 0.86 m/sec (6m/7s) 0.86 m/sec (6m/7s)   Fast Walking Speed 0.86 m/sec (6m/7s) 1.2 m/sec (6m/5s)         Functional Gait Assessment:   1. Gait on level surface =  3  2. Change in Gait Speed = 3  3. Gait with horizontal head turns  = 2  4. Gait with vertical head turns = 3  5. Gait with pivot turns = 3  6. Step over obstacle = 3  7. Gait with Narrow DOROTHY = 3  8. Gait with eyes closed = " 2  9. Ambulating Backwards = 2  10. Steps = 3      6/9/2023 score: 27/30  Score 21/30   Score:   <22/30 fall risk   <20/30 fall risk in older adults   <18/30 fall risk in Parkinsons          Treatment     Dylon received the treatments listed below:      therapeutic exercises to develop strength, endurance, ROM, flexibility, posture, and core stabilization for 10 minutes including:  X 10 min on SCi Fit recumbent stepper.  B UE/B LE on level 2.0    neuromuscular re-education activities to improve: Balance, Coordination, Kinesthetic, Sense, Proprioception, and Posture for 17 minutes. The following activities were included:    Time above includes time to complete objective measures     therapeutic activities to improve functional performance for 8  minutes, including:    3 x 12 reps sit to stands from elevating mat while standing on BOSU,  SBA      gait training to improve functional mobility and safety for 10 minutes, including:    In hallway:   2 x 100 feet Ambulation with horizontal head turns while holding tidal tank, CGA   2 x 100 feet vertical  with horizontal head turns while holding tidal tank, CGA   2 x 100 feet ambulation with vertical self ball toss while ambulating forward/backwards, CGA     Patient Education and Home Exercises       Education provided:   - HEP    Written Home Exercises Provided: yes. Exercises were reviewed and Dylon was able to demonstrate them prior to the end of the session.  Dylon demonstrated good  understanding of the education provided. See EMR under Patient Instructions for exercises provided during therapy sessions    Assessment     Dylon tolerated today's reassessment session very well this morning. Based on the patient's MMT scores, improved hip flexion strength noted. No change noted in the patient's SSWS today. Based on the patient's FGA score, the patient falls out of the elevated fall risk category. The patient passed all conditions of GST, indicating good static balance. The  patient's 5 time sit to stand/ 3 second chair rise score places the patient outside of the elevated fall risk category and indicated good LE strength/ endurance. Discharge at the end of the month discussed with the patient and the patient verbalizes understanding. The patient will benefit from continued physical therapy intervention to address remaining functional mobility deficits    Dylon Is progressing well towards her goals.   Pt prognosis is Good.     Pt will continue to benefit from skilled outpatient physical therapy to address the deficits listed in the problem list box on initial evaluation, provide pt/family education and to maximize pt's level of independence in the home and community environment.     Pt's spiritual, cultural and educational needs considered and pt agreeable to plan of care and goals.     Anticipated barriers to physical therapy:  co-morbidities     Goals:  Short Term Goals: 4 weeks   1. Patient to be (I) with established home exercise program. MET 6/9/2023   2. Patient to improve bilateral hip flexion by 1/3 MMT to improve balance and functional mobility. MET 6/9/2023   3. Patient to improve FGA score to 23/30 for improved stability with ambulation. MET 6/9/2023   4. Patient to improve SSWS to 1.0  m/sec for improved safety with ambulation. Ongoing  5. Patient to improve GST condition 4 to 15 seconds with BLE leading for improves stability with mobility. MET 6/9/2023      Long Term Goals: 8 weeks   1. Patient to be (I) with advanced home exercise program.Ongoing  2. Patient to improve FGA score to 25/30 for improved stability with ambulation. . MET 6/9/2023   3. Patient to improve SSWS to 1.2  m/sec for improved safety with ambulation. Ongoing  4. Patient to improve GST condition 4 to 30 seconds with BLE leading for improves stability with mobility.MET 6/9/2023     Plan     Cont with strengthening, endurance and balance    Ibis Watkins, PT

## 2023-06-09 ENCOUNTER — HOSPITAL ENCOUNTER (OUTPATIENT)
Facility: OTHER | Age: 43
Discharge: HOME | End: 2023-06-09
Attending: ANESTHESIOLOGY | Admitting: ANESTHESIOLOGY
Payer: MEDICARE

## 2023-06-09 ENCOUNTER — CLINICAL SUPPORT (OUTPATIENT)
Dept: REHABILITATION | Facility: HOSPITAL | Age: 43
End: 2023-06-09
Payer: MEDICARE

## 2023-06-09 ENCOUNTER — DOCUMENTATION ONLY (OUTPATIENT)
Dept: REHABILITATION | Facility: HOSPITAL | Age: 43
End: 2023-06-09

## 2023-06-09 VITALS
DIASTOLIC BLOOD PRESSURE: 67 MMHG | WEIGHT: 150 LBS | OXYGEN SATURATION: 98 % | BODY MASS INDEX: 20.32 KG/M2 | SYSTOLIC BLOOD PRESSURE: 118 MMHG | RESPIRATION RATE: 16 BRPM | HEIGHT: 72 IN | HEART RATE: 77 BPM | TEMPERATURE: 98 F

## 2023-06-09 DIAGNOSIS — M46.1 SACROILIITIS: Primary | ICD-10-CM

## 2023-06-09 DIAGNOSIS — G89.29 CHRONIC PAIN: ICD-10-CM

## 2023-06-09 DIAGNOSIS — R26.89 IMPAIRED GAIT AND MOBILITY: Primary | ICD-10-CM

## 2023-06-09 PROCEDURE — 97116 GAIT TRAINING THERAPY: CPT | Mod: PO

## 2023-06-09 PROCEDURE — 27096 INJECT SACROILIAC JOINT: CPT | Mod: 50,,, | Performed by: ANESTHESIOLOGY

## 2023-06-09 PROCEDURE — 63600175 PHARM REV CODE 636 W HCPCS: Performed by: ANESTHESIOLOGY

## 2023-06-09 PROCEDURE — 97112 NEUROMUSCULAR REEDUCATION: CPT | Mod: PO

## 2023-06-09 PROCEDURE — 97110 THERAPEUTIC EXERCISES: CPT | Mod: PO

## 2023-06-09 PROCEDURE — 97530 THERAPEUTIC ACTIVITIES: CPT | Mod: PO

## 2023-06-09 PROCEDURE — 25000003 PHARM REV CODE 250: Performed by: ANESTHESIOLOGY

## 2023-06-09 PROCEDURE — 27096 PR INJECTION,SACROILIAC JOINT: ICD-10-PCS | Mod: 50,,, | Performed by: ANESTHESIOLOGY

## 2023-06-09 PROCEDURE — 27096 INJECT SACROILIAC JOINT: CPT | Mod: RT | Performed by: ANESTHESIOLOGY

## 2023-06-09 RX ORDER — ALPRAZOLAM 0.5 MG/1
1 TABLET ORAL ONCE
Status: DISCONTINUED | OUTPATIENT
Start: 2023-06-09 | End: 2023-06-09 | Stop reason: HOSPADM

## 2023-06-09 RX ORDER — TRIAMCINOLONE ACETONIDE 40 MG/ML
INJECTION, SUSPENSION INTRA-ARTICULAR; INTRAMUSCULAR
Status: DISCONTINUED | OUTPATIENT
Start: 2023-06-09 | End: 2023-06-09 | Stop reason: HOSPADM

## 2023-06-09 RX ORDER — BUPIVACAINE HYDROCHLORIDE 2.5 MG/ML
INJECTION, SOLUTION EPIDURAL; INFILTRATION; INTRACAUDAL
Status: DISCONTINUED | OUTPATIENT
Start: 2023-06-09 | End: 2023-06-09 | Stop reason: HOSPADM

## 2023-06-09 RX ORDER — LIDOCAINE HYDROCHLORIDE 20 MG/ML
INJECTION, SOLUTION INFILTRATION; PERINEURAL
Status: DISCONTINUED | OUTPATIENT
Start: 2023-06-09 | End: 2023-06-09 | Stop reason: HOSPADM

## 2023-06-09 RX ORDER — SODIUM CHLORIDE 9 MG/ML
INJECTION, SOLUTION INTRAVENOUS CONTINUOUS
Status: DISCONTINUED | OUTPATIENT
Start: 2023-06-09 | End: 2023-06-09 | Stop reason: HOSPADM

## 2023-06-09 NOTE — DISCHARGE INSTRUCTIONS

## 2023-06-09 NOTE — DISCHARGE SUMMARY
Discharge Note  Short Stay      SUMMARY     Admit Date: 6/9/2023    Attending Physician: Larry Brasher      Discharge Physician: Larry Brasher      Discharge Date: 6/9/2023 3:38 PM    Procedure(s) (LRB):  INJECTION,SACROILIAC JOINT, BILATERAL SI (Bilateral)    Final Diagnosis: Sacroiliac joint pain [M53.3]    Disposition: Home or self care    Patient Instructions:   Current Discharge Medication List        CONTINUE these medications which have NOT CHANGED    Details   aspirin 81 MG Chew Take 81 mg by mouth once daily.      azelastine (ASTELIN) 137 mcg (0.1 %) nasal spray USE 1 TO 2 SPRAYS IN EACH NOSTRIL TWICE DAILY FOR CONGESTION      baclofen (LIORESAL) 20 MG tablet Take 1 tablet by mouth 3 (three) times daily as needed.       benztropine (COGENTIN) 0.5 MG tablet Take 0.5 mg by mouth once daily.      butalbital-acetaminophen-caffeine -40 mg (FIORICET, ESGIC) -40 mg per tablet Take 1 tablet by mouth every 4 (four) hours as needed.      !! butorphanol (STADOL) 10 mg/mL nasal spray 1 spray by Nasal route every 4 (four) hours as needed for Pain.      !! butorphanol (STADOL) 10 mg/mL nasal spray use 2 sprays into each nostril every 4 hours as needed for pain.  Qty: 250 mL, Refills: 5    Comments: Quantity prescribed more than 7 day supply? Press F2 and select one:[id number]        cloNIDine (CATAPRES) 0.1 MG tablet TAKE 1 TABLET(0.1 MG) BY MOUTH TWICE DAILY  Qty: 60 tablet, Refills: 3    Comments: ZERO refills remain on this prescription. Your patient is requesting advance approval of refills for this medication to PREVENT ANY MISSED DOSES  Associated Diagnoses: Tic      cyclobenzaprine (FLEXERIL) 10 MG tablet TK 1 T PO Q 8 H PRF PAIN      diazePAM (VALIUM) 2 MG tablet Take 2 mg by mouth 2 (two) times daily as needed.      erenumab-aooe (AIMOVIG AUTOINJECTOR SUBQ) Inject into the skin.      EScitalopram oxalate (LEXAPRO) 20 MG tablet Take 20 mg by mouth once daily.      estradiol valerate (DELESTROGEN) 20  mg/mL injection SMARTSI.3 Milliliter(s) IM Once a Week      ezetimibe (ZETIA) 10 mg tablet Take 1 tablet (10 mg total) by mouth once daily.  Qty: 90 tablet, Refills: 3      FLUCELVAX QUAD 3722-3744, PF, 60 mcg (15 mcg x 4)/0.5 mL Syrg vaccine ADM 0.5ML IM UTD  Refills: 0      fluticasone (FLONASE) 50 mcg/actuation nasal spray SPRAY TWICE IEN QD  Refills: 5      gabapentin (NEURONTIN) 300 MG capsule Take 1 capsule (300 mg total) by mouth 3 (three) times daily.  Qty: 90 capsule, Refills: 3    Associated Diagnoses: Lumbar radiculopathy      hydrocortisone (ANUSOL-HC) 2.5 % rectal cream Place rectally 2 (two) times daily.  Qty: 28 g, Refills: 1    Associated Diagnoses: Hemorrhoid prolapse      hydrOXYzine pamoate (VISTARIL) 50 MG Cap Take  mg by mouth nightly as needed.      ketorolac (TORADOL) 10 mg tablet ketorolac 10 mg tablet      levETIRAcetam (KEPPRA) 1000 MG tablet Take 1,000 mg by mouth 2 (two) times daily.      methocarbamoL (ROBAXIN) 750 MG Tab Take 750 mg by mouth 3 (three) times daily.      metoclopramide HCl (REGLAN) 10 MG tablet 10 mg.      moxifloxacin (AVELOX) 400 mg tablet Take 400 mg by mouth.      NARCAN 4 mg/actuation Spry SPRAY 0.1ML IN 1 NOSTRIL MAY REPEAT DOSE EVERY 2-3 MINUTES AS NEEDED ALTERNATING NOSTRILS EACH DOSE  Qty: 1 each, Refills: 3    Associated Diagnoses: Chronic pain disorder; Lumbar radiculopathy      nitroGLYCERIN (NITROSTAT) 0.4 MG SL tablet Place 1 tablet (0.4 mg total) under the tongue every 5 (five) minutes as needed for Chest pain.  Qty: 90 tablet, Refills: 3      NURTEC 75 mg odt Take 75 mg by mouth as needed for Migraine.      omeprazole (PRILOSEC) 20 MG capsule Take 20 mg by mouth once daily.      onabotulinumtoxina (BOTOX) 200 unit SolR Inject 200 Units into the muscle.      ondansetron (ZOFRAN) 4 MG tablet Take 1 tablet (4 mg total) by mouth every 6 (six) hours as needed for Nausea.  Qty: 12 tablet, Refills: 0      ondansetron (ZOFRAN-ODT) 8 MG TbDL Take 8 mg by  mouth 2 (two) times daily.      oxybutynin (DITROPAN-XL) 10 MG 24 hr tablet TAKE 1 TABLET(10 MG) BY MOUTH EVERY DAY  Qty: 30 tablet, Refills: 11      pantoprazole (PROTONIX) 20 MG tablet Take 20 mg by mouth.      predniSONE (DELTASONE) 20 MG tablet Take by mouth.      prochlorperazine (COMPAZINE) 10 MG tablet Take 10 mg by mouth 3 (three) times daily.      propranoloL (INDERAL LA) 60 MG 24 hr capsule Take 60 mg by mouth every evening.      rosuvastatin (CRESTOR) 20 MG tablet Take 1 tablet (20 mg total) by mouth once daily.  Qty: 90 tablet, Refills: 9      spironolactone (ALDACTONE) 25 MG tablet Take 25 mg by mouth once daily.      tamsulosin (FLOMAX) 0.4 mg Cap TAKE 1 CAPSULE(0.4 MG) BY MOUTH EVERY DAY  Qty: 30 capsule, Refills: 11      !! traZODone (DESYREL) 100 MG tablet Take 100 mg by mouth every evening.      !! traZODone (DESYREL) 50 MG tablet Take 50 mg by mouth every evening.      verapamiL (VERELAN) 240 MG C24P Take 240 mg by mouth Daily.       !! - Potential duplicate medications found. Please discuss with provider.              Discharge Diagnosis: Sacroiliac joint pain [M53.3]  Condition on Discharge: Stable with no complications to procedure   Diet on Discharge: Same as before.  Activity: as per instruction sheet.  Discharge to: Home with a responsible adult.  Follow up: 2-4 weeks

## 2023-06-09 NOTE — OP NOTE
Sacroiliac Joint Injection under Fluoroscopic Guidance    The procedure, risks, benefits, and options were discussed with the patient. There are no contraindications to the procedure. The patent expressed understanding and agreed to the procedure. Informed written consent was obtained prior to the start of the procedure and can be found in the patient's chart.    PATIENT NAME: Gordon Griffin   MRN: [id number]     DATE OF PROCEDURE: 06/09/2023    PROCEDURE: Bilateral Sacroiliac Joint Injection under Fluoroscopic Guidance    PRE-OP DIAGNOSIS: Sacroiliac joint pain [M53.3]    POST-OP DIAGNOSIS: Same    PHYSICIAN: Larry Brasher MD    ASSISTANTS: None     MEDICATIONS INJECTED: Preservative-free Kenalog 40mg with 3cc of Bupivacine 0.25%     LOCAL ANESTHETIC INJECTED: Xylocaine 2%     SEDATION: None    ESTIMATED BLOOD LOSS: None    COMPLICATIONS: None    TECHNIQUE: Time-out was performed to identify the patient and procedure to be performed. With the patient laying in a prone position, the surgical area was prepped and draped in the usual sterile fashion using ChloraPrep and a fenestrated drape. The sacroiliac joint was determined under fluoroscopy guidance. Skin anesthesia was achieved by injecting Lidocaine 2% over the injection site. The sacroiliac joint was  then approached with a 25 gauge, 3.5 inch spinal quinke needle that was introduced under fluoroscopic guidance in the AP and Lateral views. Once the needle tip was in the area of the joint, and there was no blood, contrast dye Omnipaque (300mg/mL) was injected to confirm placement and there was no vascular runoff. Fluoroscopic imaging in the AP and lateral views revealed a clear outline of the joint space. 4 mL of the medication mixture listed above was injected slowly intraarticular and hanh-articular. Displacement of the radio opaque contrast after injection of the medication confirmed that the medication went into the area of the joint. The needles were removed  and bleeding was nil. The same procedure was repeated on the contralateral side. A sterile dressing was applied. No specimens collected. The patient tolerated the procedure well.       The patient was monitored after the procedure in the recovery area. They were given post-procedure and discharge instructions to follow at home. The patient was discharged in a stable condition.        Larry Brasher MD

## 2023-06-09 NOTE — PROGRESS NOTES
PT/PTA met face to face to discuss pt's treatment plan and progress towards established goals.  Continue with current PT POC with focus on gait and balance.  Patient will be seen by physical therapist at least every sixth treatment or 30 days, whichever occurs first.    Lisa Rowe, PTA  06/09/2023

## 2023-06-13 NOTE — PROGRESS NOTES
[facility]   Physical Therapy Treatment Note      Name: Gordon Griffin  [facility] Number: [id number]    Therapy Diagnosis:   Encounter Diagnosis   Name Primary?    Impaired gait and mobility Yes         Physician: Ilene Smalls MD    Visit Date: 6/14/2023       Physician Orders: PT Eval and Treat   Medical Diagnosis from Referral: Abnormality of gait and mobility  Evaluation Date: 5/16/2023  Authorization Period Expiration: 04/30/2024  Plan of Care Expiration: 7/11/2023  Visit # / Visits authorized: 06/ 20 ( plus eval)        Time In: 1030  Time Out: 1115  Total Billable Time: 45 minutes     Precautions: Standard  Patient's preferred pronouns: She/her     PTA Visit #: 0/5       Subjective     Pt reports: that she if feeling pretty good today and has no new complaints    She was compliant with her HEP  Response to previous treatment: no adverse effects  Functional change: ongoing    Pain: 9/10  Location: both legs, mainly right side, migraine     Objective      Objective Measures updated at progress report unless specified.          Treatment     Dylon received the treatments listed below:      therapeutic exercises to develop strength, endurance, ROM, flexibility, posture, and core stabilization for 10 minutes including:  X 10 min on SCi Fit recumbent stepper.  B UE/B LE on level 2.0    neuromuscular re-education activities to improve: Balance, Coordination, Kinesthetic, Sense, Proprioception, and Posture for 15 minutes. The following activities were included:    Round sideup:   X 4 min of alternating forward lunges onto and off the Bosu.      2 x 10 reps step up to foam fitter with opposite leg hip hike, BLE, CGA      In // bars:   4 laps tandem ambulation, SBA  4 laps marching with 3 second holds, SBA    therapeutic activities to improve functional performance for 8  minutes, including:    3 x 12 reps sit to stands from elevating mat while standing on foam fitter,  no UE support SBA  -  verbal cues for slow and controlled movements       gait training to improve functional mobility and safety for 12  minutes, including:    In hallway:   2 x 100 feet Ambulation with horizontal head turns while holding tidal tank, CGA   2 x 100 feet vertical  with horizontal head turns while holding tidal tank, CGA   2 x 100 feet ambulation with vertical self ball toss while ambulating forward/backwards, CGA     Patient Education and Home Exercises       Education provided:   - HEP    Written Home Exercises Provided: yes. Exercises were reviewed and Dylon was able to demonstrate them prior to the end of the session.  Dylon demonstrated good  understanding of the education provided. See EMR under Patient Instructions for exercises provided during therapy sessions    Assessment     Dylon tolerated today's session very well this morning. Patient required mo UE support throughout all balance activities. The patient requires no rest breaks. Plan to continue preparing the patient for discharge at end of current POC. The patient will benefit from continued physical therapy intervention to address remaining functional mobility deficits      Dylon Is progressing well towards her goals.   Pt prognosis is Good.     Pt will continue to benefit from skilled outpatient physical therapy to address the deficits listed in the problem list box on initial evaluation, provide pt/family education and to maximize pt's level of independence in the home and community environment.     Pt's spiritual, cultural and educational needs considered and pt agreeable to plan of care and goals.     Anticipated barriers to physical therapy:  co-morbidities     Goals:  Short Term Goals: 4 weeks   1. Patient to be (I) with established home exercise program. MET 6/9/2023   2. Patient to improve bilateral hip flexion by 1/3 MMT to improve balance and functional mobility. MET 6/9/2023   3. Patient to improve FGA score to 23/30 for improved stability with  ambulation. MET 6/9/2023   4. Patient to improve SSWS to 1.0  m/sec for improved safety with ambulation. Ongoing  5. Patient to improve GST condition 4 to 15 seconds with BLE leading for improves stability with mobility. MET 6/9/2023      Long Term Goals: 8 weeks   1. Patient to be (I) with advanced home exercise program.Ongoing  2. Patient to improve FGA score to 25/30 for improved stability with ambulation. . MET 6/9/2023   3. Patient to improve SSWS to 1.2  m/sec for improved safety with ambulation. Ongoing  4. Patient to improve GST condition 4 to 30 seconds with BLE leading for improves stability with mobility.MET 6/9/2023     Plan     Cont with strengthening, endurance and balance    Ibis Watkins, PT

## 2023-06-14 ENCOUNTER — CLINICAL SUPPORT (OUTPATIENT)
Dept: REHABILITATION | Facility: HOSPITAL | Age: 43
End: 2023-06-14
Payer: MEDICARE

## 2023-06-14 DIAGNOSIS — R26.89 IMPAIRED GAIT AND MOBILITY: Primary | ICD-10-CM

## 2023-06-14 PROCEDURE — 97110 THERAPEUTIC EXERCISES: CPT | Mod: PO

## 2023-06-14 PROCEDURE — 97530 THERAPEUTIC ACTIVITIES: CPT | Mod: PO

## 2023-06-14 PROCEDURE — 97112 NEUROMUSCULAR REEDUCATION: CPT | Mod: PO

## 2023-06-14 PROCEDURE — 97116 GAIT TRAINING THERAPY: CPT | Mod: PO

## 2023-06-16 ENCOUNTER — CLINICAL SUPPORT (OUTPATIENT)
Dept: REHABILITATION | Facility: HOSPITAL | Age: 43
End: 2023-06-16
Payer: MEDICARE

## 2023-06-16 DIAGNOSIS — R26.89 IMPAIRED GAIT AND MOBILITY: Primary | ICD-10-CM

## 2023-06-16 PROCEDURE — 97116 GAIT TRAINING THERAPY: CPT | Mod: PO,CQ

## 2023-06-16 PROCEDURE — 97110 THERAPEUTIC EXERCISES: CPT | Mod: PO,CQ

## 2023-06-16 PROCEDURE — 97112 NEUROMUSCULAR REEDUCATION: CPT | Mod: PO,CQ

## 2023-06-16 NOTE — PROGRESS NOTES
"[facility]   Physical Therapy Treatment Note      Name: Gordon Griffin  [facility] Number: [id number]    Therapy Diagnosis:   Encounter Diagnosis   Name Primary?    Impaired gait and mobility Yes         Physician: Ilene Smalls MD    Visit Date: 6/16/2023       Physician Orders: PT Eval and Treat   Medical Diagnosis from Referral: Abnormality of gait and mobility  Evaluation Date: 5/16/2023  Authorization Period Expiration: 04/30/2024  Plan of Care Expiration: 7/11/2023  Visit # / Visits authorized: 07/ 20 ( plus eval)        Time In: 07:45  Time Out: 08:30  Total Billable Time: 45 minutes     Precautions: Standard  Patient's preferred pronouns: She/her     PTA Visit #: 1/5       Subjective     Pt reports: " This weather has my head feeling really bad."     She was compliant with her HEP  Response to previous treatment: no adverse effects  Functional change: ongoing    Pain: 8/10, 2/10  Location:  migraine, R knee    Objective      Objective Measures updated at progress report unless specified.          Treatment     Dylon received the treatments listed below:      therapeutic exercises to develop strength, endurance, ROM, flexibility, posture, and core stabilization for 10 minutes including:  X 10 min on SCi Fit recumbent stepper.  B UE/B LE on level 5.0    neuromuscular re-education activities to improve: Balance, Coordination, Kinesthetic, Sense, Proprioception, and Posture for 22 minutes. The following activities were included:  // bars:   2 point wooden fitter board: CGA   2 x 60 sec of anterior/posterior weight shifting with no UE support   X 60 sec of medial/lateral weight shifting with no UE support    2 Airex foam balance beams in front of each other: CGA    X 6 laps of forward tandem ambulation with occ 1 finger touchdown   X 6 laps of forward marching with 3 sec hold, occ 1 finger touchdown    Flat ground  4 laps tandem ambulation, SBA  4 laps marching with 3 second holds, " SBA  2 x 30 sec of B LE tandem stance, no UE support    therapeutic activities to improve functional performance for 0 minutes, including:      gait training to improve functional mobility and safety for 10 minutes, including:    In hallway:   2 x 100 feet Ambulation with horizontal head turns while holding tidal tank, CGA   2 x 100 feet vertical  with horizontal head turns while holding tidal tank, CGA      Patient Education and Home Exercises       Education provided:   - HEP    Written Home Exercises Provided: yes. Exercises were reviewed and Dylon was able to demonstrate them prior to the end of the session.  Dylon demonstrated good  understanding of the education provided. See EMR under Patient Instructions for exercises provided during therapy sessions    Assessment     Dylon tolerated her tx session well and did not have any problems noted.  Dylon did not require UE support today and only required an occasional 1 finger touchdown with dynamic activities.  Dylon did not have any loss of balance noted.  Cont with plan of care.      Dylon Is progressing well towards her goals.   Pt prognosis is Good.     Pt will continue to benefit from skilled outpatient physical therapy to address the deficits listed in the problem list box on initial evaluation, provide pt/family education and to maximize pt's level of independence in the home and community environment.     Pt's spiritual, cultural and educational needs considered and pt agreeable to plan of care and goals.     Anticipated barriers to physical therapy:  co-morbidities     Goals:  Short Term Goals: 4 weeks   1. Patient to be (I) with established home exercise program. MET 6/9/2023   2. Patient to improve bilateral hip flexion by 1/3 MMT to improve balance and functional mobility. MET 6/9/2023   3. Patient to improve FGA score to 23/30 for improved stability with ambulation. MET 6/9/2023   4. Patient to improve SSWS to 1.0  m/sec for improved safety with ambulation.  Ongoing  5. Patient to improve GST condition 4 to 15 seconds with BLE leading for improves stability with mobility. MET 6/9/2023      Long Term Goals: 8 weeks   1. Patient to be (I) with advanced home exercise program.Ongoing  2. Patient to improve FGA score to 25/30 for improved stability with ambulation. . MET 6/9/2023   3. Patient to improve SSWS to 1.2  m/sec for improved safety with ambulation. Ongoing  4. Patient to improve GST condition 4 to 30 seconds with BLE leading for improves stability with mobility.MET 6/9/2023     Plan     Cont with strengthening, endurance and balance    Lisa Rowe, PTA

## 2023-06-18 ENCOUNTER — HOSPITAL ENCOUNTER (EMERGENCY)
Facility: HOSPITAL | Age: 43
Discharge: HOME | End: 2023-06-18
Attending: EMERGENCY MEDICINE
Payer: MEDICARE

## 2023-06-18 VITALS
DIASTOLIC BLOOD PRESSURE: 92 MMHG | BODY MASS INDEX: 20.32 KG/M2 | OXYGEN SATURATION: 100 % | HEIGHT: 72 IN | RESPIRATION RATE: 17 BRPM | HEART RATE: 99 BPM | WEIGHT: 150 LBS | TEMPERATURE: 98 F | SYSTOLIC BLOOD PRESSURE: 136 MMHG

## 2023-06-18 DIAGNOSIS — I86.1 RIGHT VARICOCELE: ICD-10-CM

## 2023-06-18 DIAGNOSIS — N50.812 LEFT TESTICULAR PAIN: ICD-10-CM

## 2023-06-18 DIAGNOSIS — G43.109 MIGRAINE WITH AURA AND WITHOUT STATUS MIGRAINOSUS, NOT INTRACTABLE: Primary | ICD-10-CM

## 2023-06-18 DIAGNOSIS — N44.2 TESTICULAR CYST: ICD-10-CM

## 2023-06-18 LAB
BILIRUB UR QL STRIP: NEGATIVE
CLARITY UR REFRACT.AUTO: CLEAR
COLOR UR AUTO: YELLOW
GLUCOSE UR QL STRIP: NEGATIVE
HCV AB SERPL QL IA: NORMAL
HGB UR QL STRIP: NEGATIVE
HIV 1+2 AB+HIV1 P24 AG SERPL QL IA: NORMAL
KETONES UR QL STRIP: ABNORMAL
LEUKOCYTE ESTERASE UR QL STRIP: NEGATIVE
NITRITE UR QL STRIP: NEGATIVE
PH UR STRIP: 6 [PH] (ref 5–8)
PROT UR QL STRIP: NEGATIVE
SP GR UR STRIP: 1.01 (ref 1–1.03)
URN SPEC COLLECT METH UR: ABNORMAL

## 2023-06-18 PROCEDURE — 99285 EMERGENCY DEPT VISIT HI MDM: CPT | Mod: 25

## 2023-06-18 PROCEDURE — 99284 EMERGENCY DEPT VISIT MOD MDM: CPT | Mod: ,,, | Performed by: EMERGENCY MEDICINE

## 2023-06-18 PROCEDURE — 99284 PR EMERGENCY DEPT VISIT,LEVEL IV: ICD-10-PCS | Mod: ,,, | Performed by: EMERGENCY MEDICINE

## 2023-06-18 PROCEDURE — 81003 URINALYSIS AUTO W/O SCOPE: CPT | Performed by: EMERGENCY MEDICINE

## 2023-06-18 PROCEDURE — 86803 HEPATITIS C AB TEST: CPT | Performed by: PHYSICIAN ASSISTANT

## 2023-06-18 PROCEDURE — 96361 HYDRATE IV INFUSION ADD-ON: CPT

## 2023-06-18 PROCEDURE — 63600175 PHARM REV CODE 636 W HCPCS: Performed by: EMERGENCY MEDICINE

## 2023-06-18 PROCEDURE — 96374 THER/PROPH/DIAG INJ IV PUSH: CPT

## 2023-06-18 PROCEDURE — 96375 TX/PRO/DX INJ NEW DRUG ADDON: CPT

## 2023-06-18 PROCEDURE — 87389 HIV-1 AG W/HIV-1&-2 AB AG IA: CPT | Performed by: PHYSICIAN ASSISTANT

## 2023-06-18 RX ORDER — KETOROLAC TROMETHAMINE 30 MG/ML
10 INJECTION, SOLUTION INTRAMUSCULAR; INTRAVENOUS
Status: COMPLETED | OUTPATIENT
Start: 2023-06-18 | End: 2023-06-18

## 2023-06-18 RX ORDER — PROCHLORPERAZINE EDISYLATE 5 MG/ML
10 INJECTION INTRAMUSCULAR; INTRAVENOUS
Status: COMPLETED | OUTPATIENT
Start: 2023-06-18 | End: 2023-06-18

## 2023-06-18 RX ADMIN — SODIUM CHLORIDE, POTASSIUM CHLORIDE, SODIUM LACTATE AND CALCIUM CHLORIDE 1000 ML: 600; 310; 30; 20 INJECTION, SOLUTION INTRAVENOUS at 09:06

## 2023-06-18 RX ADMIN — KETOROLAC TROMETHAMINE 10 MG: 30 INJECTION, SOLUTION INTRAMUSCULAR; INTRAVENOUS at 09:06

## 2023-06-18 RX ADMIN — PROCHLORPERAZINE EDISYLATE 10 MG: 5 INJECTION INTRAMUSCULAR; INTRAVENOUS at 09:06

## 2023-06-18 NOTE — ED PROVIDER NOTES
Encounter Date: 6/18/2023       History     Chief Complaint   Patient presents with    Headache     Migraine started last night; hx of migraines. Denies pain medication prior to arrival. Pt also endorsing testicular pain that started this morning; hx of testicular cysts.     Dylon Harrell is a 42-year-old transgender male to female presenting today for migraine and testicular pain.  Patient states she has history of migraines, suffers with them daily.  Yesterday as migraine became worse.  She typically takes Fioricet and stadol for her headaches but has not had relief.  Has not take any meds prior to arrival.  Headache is located in the frontal area with a visual aura which she describes as halos.  Denies unilateral weakness, slurred speech or any other neurologic deficits.  Occasionally gets worse with sound and photosensitivity.  This is typical of her usual migraines.  No fevers or chills.  No recent URI.  She also reports testicle pain that started this morning.  It is described as throbbing pain located in the left testicle.  She states she has a history of a cyst.  No dysuria, penile discharge or testicular swelling/redness.      Review of patient's allergies indicates:   Allergen Reactions    Mustard Itching, Nausea And Vomiting, Shortness Of Breath and Swelling    Lipitor [atorvastatin] Itching    Mushroom Itching, Nausea And Vomiting and Swelling    Niaspan extended-release [niacin] Itching    Nystatin Hives     Other reaction(s): hives    Olive extract Itching, Nausea And Vomiting and Swelling    Oyster extract     Extendryl [[id number]] Rash    V-cillin k Rash     Past Medical History:   Diagnosis Date    Anxiety     Cancer     Chest pain 1/20/2016 12/30/2015: Began experinece chest pain.    Depression     Functional movement disorder 10/1/2019    Migraine headache     Movement disorder     Myoclonic jerkings, massive     Stroke pt. states he had a cva at 3 months old     Past Surgical  History:   Procedure Laterality Date    ANGIOGRAM, CORONARY, WITH LEFT HEART CATHETERIZATION      EPIDURAL STEROID INJECTION N/A 3/26/2021    Procedure: INJECTION, STEROID, EPIDURAL L4/5;  Surgeon: Larry Brasher MD;  Location: [facility];  Service: Pain Management;  Laterality: N/A;    EPIDURAL STEROID INJECTION N/A 6/4/2021    Procedure: INJECTION, STEROID, EPIDURAL, L4-L5 IL need consent;  Surgeon: Larry Brasher MD;  Location: [facility];  Service: Pain Management;  Laterality: N/A;    EPIDURAL STEROID INJECTION N/A 10/29/2021    Procedure: INJECTION, STEROID, EPIDURAL, L4-L5IL NEED CONSENT;  Surgeon: Larry Brasher MD;  Location: [facility];  Service: Pain Management;  Laterality: N/A;    EPIDURAL STEROID INJECTION N/A 1/27/2022    Procedure: Injection, Steroid, Epidural C7/T1;  Surgeon: Larry Brasher MD;  Location: [facility];  Service: Pain Management;  Laterality: N/A;    EPIDURAL STEROID INJECTION N/A 2/10/2022    Procedure: Injection, Steroid, Epidural L4/5;  Surgeon: Larry Brasher MD;  Location: [facility];  Service: Pain Management;  Laterality: N/A;    EPIDURAL STEROID INJECTION N/A 8/25/2022    Procedure: Injection, Steroid, Epidural C7/T1 CONTRAST;  Surgeon: Larry Brasher MD;  Location: [facility];  Service: Pain Management;  Laterality: N/A;    EPIDURAL STEROID INJECTION N/A 5/26/2023    Procedure: INJECTION, STEROID, EPIDURAL C7/T1 IL;  Surgeon: Larry Brasher MD;  Location: [facility];  Service: Pain Management;  Laterality: N/A;    INJECTION OF ANESTHETIC AGENT AROUND NERVE Bilateral 5/6/2022    Procedure: BLOCK, NERVE, BILATERAL L3-L4-*L5 MEDIAL BRANCH;  Surgeon: Larry Brasher MD;  Location: [facility];  Service: Pain Management;  Laterality: Bilateral;    INJECTION OF ANESTHETIC AGENT AROUND NERVE Bilateral 6/2/2022    Procedure: BLOCK, NERVE BILATERAL L3-L4-L5 MEDIAL BRANCH 2nd, needs consent;  Surgeon: Larry Brasher MD;  Location: [facility];   Service: Pain Management;  Laterality: Bilateral;    INJECTION, SACROILIAC JOINT Bilateral 6/9/2023    Procedure: INJECTION,SACROILIAC JOINT, BILATERAL SI;  Surgeon: Larry Brasher MD;  Location: [facility];  Service: Pain Management;  Laterality: Bilateral;    MANDIBLE SURGERY      reconstruction    RADIOFREQUENCY ABLATION Right 6/23/2022    Procedure: RADIOFREQUENCY ABLATION RIGHT L3,L4,L5 1 of 2, consent needed;  Surgeon: Larry Brasher MD;  Location: [facility];  Service: Pain Management;  Laterality: Right;    RADIOFREQUENCY ABLATION Left 7/7/2022    Procedure: RADIOFREQUENCY ABLATION LEFT L3,L4,L5 2 of 2, needs consent;  Surgeon: Larry Brasher MD;  Location: [facility];  Service: Pain Management;  Laterality: Left;    RADIOFREQUENCY ABLATION Right 3/3/2023    Procedure: RADIOFREQUENCY ABLATION RIGHT L3,L4,L5;  Surgeon: Larry Brasher MD;  Location: [facility];  Service: Pain Management;  Laterality: Right;    RADIOFREQUENCY ABLATION Left 3/31/2023    Procedure: Radiofrequency Ablation Left L3, L4, & L5 Pending CBC results;  Surgeon: Diana Lira MD;  Location: [facility];  Service: Pain Management;  Laterality: Left;    variceol repair       Family History   Problem Relation Age of Onset    Heart disease Father     Hypertension Father     Hyperlipidemia Father     Heart disease Paternal Uncle     Heart disease Mother     Myasthenia gravis Mother      Social History     Tobacco Use    Smoking status: Never    Smokeless tobacco: Never   Substance Use Topics    Alcohol use: No    Drug use: No     Review of Systems    Physical Exam     Initial Vitals [06/18/23 0826]   BP Pulse Resp Temp SpO2   121/67 (!) 119 18 97.8 °F (36.6 °C) 98 %      MAP       --         Physical Exam    Nursing note and vitals reviewed.  Constitutional: She appears well-developed and well-nourished. No distress.   HENT:   Mouth/Throat: Oropharynx is clear and moist.   Eyes: Conjunctivae are normal.   Neck: Neck  supple.   Cardiovascular:  Normal rate, regular rhythm and intact distal pulses.           Pulmonary/Chest: Breath sounds normal. She has no wheezes. She has no rales.   Abdominal: Abdomen is soft. Bowel sounds are normal. There is no abdominal tenderness.   Genitourinary:    Genitourinary Comments: Tenderness to the left testicle with circular mass.  No scrotal edema or erythema.  Cremasteric reflex intact.  Penis normal without discharge     Musculoskeletal:         General: No edema.      Cervical back: Neck supple.     Lymphadenopathy:     She has no cervical adenopathy.   Neurological: She is alert and oriented to person, place, and time. She has normal strength. No cranial nerve deficit or sensory deficit.   Skin: No rash noted.   Psychiatric: She has a normal mood and affect.       ED Course   Procedures  Labs Reviewed   URINALYSIS, REFLEX TO URINE CULTURE - Abnormal; Notable for the following components:       Result Value    Ketones, UA Trace (*)     All other components within normal limits    Narrative:     Specimen Source->Urine   HIV 1 / 2 ANTIBODY    Narrative:     Release to patient->Immediate   HEPATITIS C ANTIBODY    Narrative:     Release to patient->Immediate          Imaging Results    None          Medications   ketorolac injection 9.999 mg (9.999 mg Intravenous Given 6/18/23 0926)   prochlorperazine injection Soln 10 mg (10 mg Intravenous Given 6/18/23 0927)   lactated ringers bolus 1,000 mL (0 mLs Intravenous Stopped 6/18/23 1032)     Medical Decision Making:   History:   Old Medical Records: I decided to obtain old medical records.  Initial Assessment:   Emergent evaluation of 42-year-old female presenting today for migraine and scrotal pain.    She is afebrile but tachycardic to 119 which could be related to pain.  BP stable  Differential Diagnosis:   Differential includes but not limited to acute migraine, tension headache, low suspicion for subarachnoid hemorrhage given chronicity of  symptoms.  Also considered testicular cyst, hydrocele, varicocele, epididymitis  Clinical Tests:   Lab Tests: Ordered and Reviewed  Radiological Study: Reviewed and Ordered  ED Management:  - UA  - scrotal ultrasound  - Toradol, Compazine, IV fluids           ED Course as of 06/18/23 1357   Sun Jun 18, 2023   1008 Ketones, UA(!): Trace [GM]   1105 Impression:     Interval development of unilateral right varicocele.  Findings could suggest secondary varicocele.  Consider further evaluation with CT abdomen pelvis.     Interval increased size of left epididymal head cyst.     This report was flagged in Epic as abnormal.     Electronically signed by resident: Venice Rankin  Date:                                            06/18/2023  Time:                                           10:09     Electronically signed by: Anand Azul MD  Date:                                            06/18/2023  Time:                                           10:59 [GM]   1131 Patient re-evaluated, states headache has completely resolved.  Discussed findings on the up scrotal ultrasound with the new varicocele on the right.  He states she is had this previously.  He denies abdominal pain, leg swelling or other symptoms at this time.  Discussed need for CT abdomen pelvis to further evaluate, states that she needs to go back to work and will contact his urologist and have it done as an outpatient.  Patient given strict return precautions. [GM]      ED Course User Index  [GM] Kathi Khanna MD                 Clinical Impression:   Final diagnoses:  [N50.812] Left testicular pain  [G43.109] Migraine with aura and without status migrainosus, not intractable (Primary)  [N44.2] Testicular cyst  [I86.1] Right varicocele               Kathi Khanna MD  06/18/23 6144

## 2023-06-18 NOTE — DISCHARGE INSTRUCTIONS
I am happy your feeling better.  Please follow-up with a neurologist and urologist for further evaluation as you might need a CT scan of your abdomen pelvis because of your new right-sided varicocele.  Return to emergency department immediately for worsening testicular pain, swelling or any other concerning symptom.

## 2023-06-18 NOTE — ED TRIAGE NOTES
Pt to ED via personal transport with c/o migraine since last night. Pt endorses nausea, halos around vision. Pt also endorses testicular pain. H X testicular cysts + epididymitis.

## 2023-06-22 NOTE — PROGRESS NOTES
[facility]   Physical Therapy Treatment Note      Name: Gordon Griffin  [facility] Number: [id number]    Therapy Diagnosis:   Encounter Diagnosis   Name Primary?    Impaired gait and mobility Yes           Physician: Ilene Smalls MD    Visit Date: 6/23/2023       Physician Orders: PT Eval and Treat   Medical Diagnosis from Referral: Abnormality of gait and mobility  Evaluation Date: 5/16/2023  Authorization Period Expiration: 04/30/2024  Plan of Care Expiration: 7/11/2023  Visit # / Visits authorized: 08/ 20 ( plus eval)        Time In: 07:45  Time Out: 08:30  Total Billable Time: 45 minutes     Precautions: Standard  Patient's preferred pronouns: She/her     PTA Visit #: 0/5       Subjective     Pt reports: she is feeling stiff from sitting in a class all day for the past two days     She was compliant with her HEP  Response to previous treatment: no adverse effects  Functional change: ongoing    Pain: 8/10, 2/10  Location:  migraine, R knee    Objective      Objective Measures updated at progress report unless specified.     Treatment     Dylon received the treatments listed below:      therapeutic exercises to develop strength, endurance, ROM, flexibility, posture, and core stabilization for 10 minutes including:  X 10 min on SCi Fit recumbent stepper.  B UE/B LE on level 5.0    neuromuscular re-education activities to improve: Balance, Coordination, Kinesthetic, Sense, Proprioception, and Posture for 25 minutes. The following activities were included:  // bars:   2 point wooden fitter board: CGA   2 x 60 sec of anterior/posterior weight shifting with no UE support   2 X 60 sec of medial/lateral weight shifting with no UE support    2 Airex foam balance beams in front of each other: CGA    X 6 laps of forward tandem ambulation with occ 1 finger touchdown   X 6 laps of forward marching with 3 sec hold, occ 1 finger touchdown    30 seconds static standing on BOSU, CGA   3 x 30 seconds  static standing with eyes closed, CGA, no UE support   2 x 30 seconds static standing on BOSU with 2.2# ball toss to/from tech, CGA     10 reps step up onto soft side of BOSU with opposite leg large cone tap, CGA   10 reps BLE leading side step onto soft side of BOSU with opposite leg hike, occ touchdown support, CGA     therapeutic activities to improve functional performance for 0 minutes, including:      gait training to improve functional mobility and safety for 10 minutes, including:    In hallway:   2 x 100 feet Ambulation with horizontal head turns while holding tidal tank, CGA   2 x 100 feet vertical  with horizontal head turns while holding tidal tank, CGA      Patient Education and Home Exercises       Education provided:   - HEP    Written Home Exercises Provided: yes. Exercises were reviewed and Dylon was able to demonstrate them prior to the end of the session.  Dylon demonstrated good  understanding of the education provided. See EMR under Patient Instructions for exercises provided during therapy sessions    Assessment     Dylon tolerated today's session very well this morning. Patient did well with all high level dynamic/ static balance activities. Patient has made excellent progress and is prepared for DC at end pf POC. The patient will benefit from continued PT intervention to address remaining functional mobility deficits.     Dylon Is progressing well towards her goals.   Pt prognosis is Good.     Pt will continue to benefit from skilled outpatient physical therapy to address the deficits listed in the problem list box on initial evaluation, provide pt/family education and to maximize pt's level of independence in the home and community environment.     Pt's spiritual, cultural and educational needs considered and pt agreeable to plan of care and goals.     Anticipated barriers to physical therapy:  co-morbidities     Goals:  Short Term Goals: 4 weeks   1. Patient to be (I) with established home  exercise program. MET 6/9/2023   2. Patient to improve bilateral hip flexion by 1/3 MMT to improve balance and functional mobility. MET 6/9/2023   3. Patient to improve FGA score to 23/30 for improved stability with ambulation. MET 6/9/2023   4. Patient to improve SSWS to 1.0  m/sec for improved safety with ambulation. Ongoing  5. Patient to improve GST condition 4 to 15 seconds with BLE leading for improves stability with mobility. MET 6/9/2023      Long Term Goals: 8 weeks   1. Patient to be (I) with advanced home exercise program.Ongoing  2. Patient to improve FGA score to 25/30 for improved stability with ambulation. . MET 6/9/2023   3. Patient to improve SSWS to 1.2  m/sec for improved safety with ambulation. Ongoing  4. Patient to improve GST condition 4 to 30 seconds with BLE leading for improves stability with mobility.MET 6/9/2023     Plan     Cont with strengthening, endurance and balance    Ibis Watkins, PT

## 2023-06-23 ENCOUNTER — CLINICAL SUPPORT (OUTPATIENT)
Dept: REHABILITATION | Facility: HOSPITAL | Age: 43
End: 2023-06-23
Payer: MEDICARE

## 2023-06-23 DIAGNOSIS — R26.89 IMPAIRED GAIT AND MOBILITY: Primary | ICD-10-CM

## 2023-06-23 PROCEDURE — 97116 GAIT TRAINING THERAPY: CPT | Mod: PO

## 2023-06-23 PROCEDURE — 97112 NEUROMUSCULAR REEDUCATION: CPT | Mod: PO

## 2023-06-23 PROCEDURE — 97110 THERAPEUTIC EXERCISES: CPT | Mod: PO

## 2023-06-29 ENCOUNTER — OFFICE VISIT (OUTPATIENT)
Dept: CARDIOLOGY | Facility: CLINIC | Age: 43
End: 2023-06-29
Payer: COMMERCIAL

## 2023-06-29 VITALS
HEIGHT: 72 IN | BODY MASS INDEX: 19.41 KG/M2 | OXYGEN SATURATION: 99 % | WEIGHT: 143.31 LBS | SYSTOLIC BLOOD PRESSURE: 118 MMHG | DIASTOLIC BLOOD PRESSURE: 68 MMHG | HEART RATE: 105 BPM

## 2023-06-29 DIAGNOSIS — I25.84 CORONARY ATHEROSCLEROSIS DUE TO CALCIFIED CORONARY LESION: Primary | ICD-10-CM

## 2023-06-29 DIAGNOSIS — I25.10 CORONARY ATHEROSCLEROSIS DUE TO CALCIFIED CORONARY LESION: Primary | ICD-10-CM

## 2023-06-29 DIAGNOSIS — I25.10 CORONARY ARTERY DISEASE INVOLVING NATIVE CORONARY ARTERY OF NATIVE HEART WITHOUT ANGINA PECTORIS: ICD-10-CM

## 2023-06-29 DIAGNOSIS — E78.00 PURE HYPERCHOLESTEROLEMIA: ICD-10-CM

## 2023-06-29 PROCEDURE — 99214 PR OFFICE/OUTPT VISIT, EST, LEVL IV, 30-39 MIN: ICD-10-PCS | Mod: GC,S$GLB,, | Performed by: STUDENT IN AN ORGANIZED HEALTH CARE EDUCATION/TRAINING PROGRAM

## 2023-06-29 PROCEDURE — 1159F PR MEDICATION LIST DOCUMENTED IN MEDICAL RECORD: ICD-10-PCS | Mod: CPTII,GC,S$GLB, | Performed by: STUDENT IN AN ORGANIZED HEALTH CARE EDUCATION/TRAINING PROGRAM

## 2023-06-29 PROCEDURE — 3074F PR MOST RECENT SYSTOLIC BLOOD PRESSURE < 130 MM HG: ICD-10-PCS | Mod: CPTII,GC,S$GLB, | Performed by: STUDENT IN AN ORGANIZED HEALTH CARE EDUCATION/TRAINING PROGRAM

## 2023-06-29 PROCEDURE — 3078F PR MOST RECENT DIASTOLIC BLOOD PRESSURE < 80 MM HG: ICD-10-PCS | Mod: CPTII,GC,S$GLB, | Performed by: STUDENT IN AN ORGANIZED HEALTH CARE EDUCATION/TRAINING PROGRAM

## 2023-06-29 PROCEDURE — 3078F DIAST BP <80 MM HG: CPT | Mod: CPTII,GC,S$GLB, | Performed by: STUDENT IN AN ORGANIZED HEALTH CARE EDUCATION/TRAINING PROGRAM

## 2023-06-29 PROCEDURE — 3074F SYST BP LT 130 MM HG: CPT | Mod: CPTII,GC,S$GLB, | Performed by: STUDENT IN AN ORGANIZED HEALTH CARE EDUCATION/TRAINING PROGRAM

## 2023-06-29 PROCEDURE — 1160F RVW MEDS BY RX/DR IN RCRD: CPT | Mod: CPTII,GC,S$GLB, | Performed by: STUDENT IN AN ORGANIZED HEALTH CARE EDUCATION/TRAINING PROGRAM

## 2023-06-29 PROCEDURE — 99999 PR PBB SHADOW E&M-EST. PATIENT-LVL V: ICD-10-PCS | Mod: PBBFAC,,, | Performed by: STUDENT IN AN ORGANIZED HEALTH CARE EDUCATION/TRAINING PROGRAM

## 2023-06-29 PROCEDURE — 3008F PR BODY MASS INDEX (BMI) DOCUMENTED: ICD-10-PCS | Mod: CPTII,GC,S$GLB, | Performed by: STUDENT IN AN ORGANIZED HEALTH CARE EDUCATION/TRAINING PROGRAM

## 2023-06-29 PROCEDURE — 1160F PR REVIEW ALL MEDS BY PRESCRIBER/CLIN PHARMACIST DOCUMENTED: ICD-10-PCS | Mod: CPTII,GC,S$GLB, | Performed by: STUDENT IN AN ORGANIZED HEALTH CARE EDUCATION/TRAINING PROGRAM

## 2023-06-29 PROCEDURE — 99999 PR PBB SHADOW E&M-EST. PATIENT-LVL V: CPT | Mod: PBBFAC,,, | Performed by: STUDENT IN AN ORGANIZED HEALTH CARE EDUCATION/TRAINING PROGRAM

## 2023-06-29 PROCEDURE — 3008F BODY MASS INDEX DOCD: CPT | Mod: CPTII,GC,S$GLB, | Performed by: STUDENT IN AN ORGANIZED HEALTH CARE EDUCATION/TRAINING PROGRAM

## 2023-06-29 PROCEDURE — 1159F MED LIST DOCD IN RCRD: CPT | Mod: CPTII,GC,S$GLB, | Performed by: STUDENT IN AN ORGANIZED HEALTH CARE EDUCATION/TRAINING PROGRAM

## 2023-06-29 PROCEDURE — 99214 OFFICE O/P EST MOD 30 MIN: CPT | Mod: GC,S$GLB,, | Performed by: STUDENT IN AN ORGANIZED HEALTH CARE EDUCATION/TRAINING PROGRAM
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
## 2023-06-30 ENCOUNTER — TELEPHONE (OUTPATIENT)
Dept: PHARMACY | Facility: CLINIC | Age: 43
End: 2023-06-30
Payer: MEDICARE

## 2023-06-30 NOTE — TELEPHONE ENCOUNTER
Duane, this is Taisha Johnson, clinical pharmacist with [facility] that is part of your care team.  We have begun working on your prescription that your doctor has sent us. Our next steps include:     Working with your insurance company to obtain approval for your medication  Working with you to ensure your medication is affordable     We will be calling you along the way with updates on your medication but if you have any concerns or receive information that you would like to discuss please reach us at [phone].    Welcome call outcome: Left voicemail

## 2023-07-03 ENCOUNTER — SPECIALTY PHARMACY (OUTPATIENT)
Dept: PHARMACY | Facility: CLINIC | Age: 43
End: 2023-07-03
Payer: MEDICARE

## 2023-07-05 ENCOUNTER — SPECIALTY PHARMACY (OUTPATIENT)
Dept: PHARMACY | Facility: CLINIC | Age: 43
End: 2023-07-05
Payer: MEDICARE

## 2023-07-06 ENCOUNTER — OFFICE VISIT (OUTPATIENT)
Dept: ORTHOPEDICS | Facility: CLINIC | Age: 43
End: 2023-07-06
Payer: MEDICARE

## 2023-07-06 ENCOUNTER — HOSPITAL ENCOUNTER (OUTPATIENT)
Dept: RADIOLOGY | Facility: HOSPITAL | Age: 43
Discharge: HOME | End: 2023-07-06
Attending: PHYSICIAN ASSISTANT
Payer: MEDICARE

## 2023-07-06 VITALS — WEIGHT: 145.94 LBS | HEIGHT: 72 IN | BODY MASS INDEX: 19.77 KG/M2

## 2023-07-06 DIAGNOSIS — M22.2X2 PATELLOFEMORAL PAIN SYNDROME OF BOTH KNEES: Primary | ICD-10-CM

## 2023-07-06 DIAGNOSIS — R52 PAIN: ICD-10-CM

## 2023-07-06 DIAGNOSIS — M22.2X1 PATELLOFEMORAL PAIN SYNDROME OF BOTH KNEES: Primary | ICD-10-CM

## 2023-07-06 DIAGNOSIS — M25.571 CHRONIC PAIN OF BOTH ANKLES: ICD-10-CM

## 2023-07-06 DIAGNOSIS — G89.29 CHRONIC PAIN OF BOTH ANKLES: ICD-10-CM

## 2023-07-06 DIAGNOSIS — M25.572 CHRONIC PAIN OF BOTH ANKLES: ICD-10-CM

## 2023-07-06 DIAGNOSIS — M17.11 PRIMARY OSTEOARTHRITIS OF RIGHT KNEE: ICD-10-CM

## 2023-07-06 PROCEDURE — 3008F BODY MASS INDEX DOCD: CPT | Mod: CPTII,S$GLB,, | Performed by: PHYSICIAN ASSISTANT

## 2023-07-06 PROCEDURE — 73562 X-RAY EXAM OF KNEE 3: CPT | Mod: 26,LT,, | Performed by: RADIOLOGY

## 2023-07-06 PROCEDURE — 99214 PR OFFICE/OUTPT VISIT, EST, LEVL IV, 30-39 MIN: ICD-10-PCS | Mod: 25,S$GLB,, | Performed by: PHYSICIAN ASSISTANT

## 2023-07-06 PROCEDURE — 99999 PR PBB SHADOW E&M-EST. PATIENT-LVL V: ICD-10-PCS | Mod: PBBFAC,,, | Performed by: PHYSICIAN ASSISTANT

## 2023-07-06 PROCEDURE — 99214 OFFICE O/P EST MOD 30 MIN: CPT | Mod: 25,S$GLB,, | Performed by: PHYSICIAN ASSISTANT

## 2023-07-06 PROCEDURE — 73562 PR  X-RAY KNEE 3 VIEW: ICD-10-PCS | Mod: 26,LT,, | Performed by: RADIOLOGY

## 2023-07-06 PROCEDURE — 73610 X-RAY EXAM OF ANKLE: CPT | Mod: 26,50,, | Performed by: RADIOLOGY

## 2023-07-06 PROCEDURE — 1159F MED LIST DOCD IN RCRD: CPT | Mod: CPTII,S$GLB,, | Performed by: PHYSICIAN ASSISTANT

## 2023-07-06 PROCEDURE — 3008F PR BODY MASS INDEX (BMI) DOCUMENTED: ICD-10-PCS | Mod: CPTII,S$GLB,, | Performed by: PHYSICIAN ASSISTANT

## 2023-07-06 PROCEDURE — 20610 DRAIN/INJ JOINT/BURSA W/O US: CPT | Mod: RT,S$GLB,, | Performed by: PHYSICIAN ASSISTANT

## 2023-07-06 PROCEDURE — 73562 X-RAY EXAM OF KNEE 3: CPT | Mod: 26,RT,, | Performed by: RADIOLOGY

## 2023-07-06 PROCEDURE — 1160F RVW MEDS BY RX/DR IN RCRD: CPT | Mod: CPTII,S$GLB,, | Performed by: PHYSICIAN ASSISTANT

## 2023-07-06 PROCEDURE — 73562 X-RAY EXAM OF KNEE 3: CPT | Mod: TC,50

## 2023-07-06 PROCEDURE — 1159F PR MEDICATION LIST DOCUMENTED IN MEDICAL RECORD: ICD-10-PCS | Mod: CPTII,S$GLB,, | Performed by: PHYSICIAN ASSISTANT

## 2023-07-06 PROCEDURE — 20610 LARGE JOINT ASPIRATION/INJECTION: R KNEE: ICD-10-PCS | Mod: RT,S$GLB,, | Performed by: PHYSICIAN ASSISTANT

## 2023-07-06 PROCEDURE — 73610 X-RAY EXAM OF ANKLE: CPT | Mod: TC,50

## 2023-07-06 PROCEDURE — 73610 XR ANKLE COMPLETE 3 VIEW BILATERAL: ICD-10-PCS | Mod: 26,50,, | Performed by: RADIOLOGY

## 2023-07-06 PROCEDURE — 99999 PR PBB SHADOW E&M-EST. PATIENT-LVL V: CPT | Mod: PBBFAC,,, | Performed by: PHYSICIAN ASSISTANT

## 2023-07-06 PROCEDURE — 1160F PR REVIEW ALL MEDS BY PRESCRIBER/CLIN PHARMACIST DOCUMENTED: ICD-10-PCS | Mod: CPTII,S$GLB,, | Performed by: PHYSICIAN ASSISTANT

## 2023-07-06 RX ORDER — TRIAMCINOLONE ACETONIDE 40 MG/ML
40 INJECTION, SUSPENSION INTRA-ARTICULAR; INTRAMUSCULAR
Status: DISCONTINUED | OUTPATIENT
Start: 2023-07-06 | End: 2023-07-06 | Stop reason: HOSPADM

## 2023-07-06 RX ORDER — LIDOCAINE HYDROCHLORIDE 10 MG/ML
2 INJECTION INFILTRATION; PERINEURAL
Status: DISCONTINUED | OUTPATIENT
Start: 2023-07-06 | End: 2023-07-06 | Stop reason: HOSPADM

## 2023-07-06 RX ADMIN — TRIAMCINOLONE ACETONIDE 40 MG: 40 INJECTION, SUSPENSION INTRA-ARTICULAR; INTRAMUSCULAR at 09:07

## 2023-07-06 RX ADMIN — LIDOCAINE HYDROCHLORIDE 2 ML: 10 INJECTION INFILTRATION; PERINEURAL at 09:07

## 2023-07-06 NOTE — PROGRESS NOTES
[facility]   Physical Therapy Treatment Note      Name: Gordon Griffin  [facility] Number: [id number]    Therapy Diagnosis:   Encounter Diagnosis   Name Primary?    Impaired gait and mobility Yes       Physician: Ilene Smalls MD    Visit Date: 7/7/2023       Physician Orders: PT Eval and Treat   Medical Diagnosis from Referral: Abnormality of gait and mobility  Evaluation Date: 5/16/2023  Authorization Period Expiration: 04/30/2024  Plan of Care Expiration: 7/11/2023  Visit # / Visits authorized: 09/ 20 ( plus eval)        Time In: 0930  Time Out: 10:15  Total Billable Time: 45 minutes     Precautions: Standard  Patient's preferred pronouns: She/her     PTA Visit #: 0/5       Subjective     Pt reports: that she has two bad migraines that made her very nauseous but she is feeling better now     She was compliant with her HEP  Response to previous treatment: no adverse effects  Functional change: ongoing    Pain: 8/10, 2/10  Location:  migraine, R knee    Objective      Objective Measures updated at progress report unless specified.     Treatment     Dylon received the treatments listed below:      therapeutic exercises to develop strength, endurance, ROM, flexibility, posture, and core stabilization for 10 minutes including:  X 10 min on SCi Fit recumbent stepper.  B UE/B LE on level 6.0    neuromuscular re-education activities to improve: Balance, Coordination, Kinesthetic, Sense, Proprioception, and Posture for 15 minutes. The following activities were included:  // bars:   2 point wooden fitter board: CGA   2 x 60 sec of anterior/posterior weight shifting with no UE support   2 X 60 sec of medial/lateral weight shifting with no UE support    2 Airex foam balance beams in front of each other: CGA    X 6 laps of forward tandem ambulation with occ 1 finger touchdown   X 6 laps of forward marching with 3 sec hold, occ 1 finger touchdown    10 reps step up onto soft side of BOSU with opposite  leg large cone tap, CGA       therapeutic activities to improve functional performance for 0 minutes, including:      gait training to improve functional mobility and safety for 20 minutes, including:    In hallway:   2 x 100 feet Ambulation with horizontal head turns while holding tidal tank, CGA   2 x 100 feet horizontal head turns while holding tidal tank, CGA    2 x 100 feet ambulation with eyes closed while holding tidal tank, CGA  2 x 100 feet backwards ambulation while holding tidal tank, CGA    Patient Education and Home Exercises       Education provided:   - HEP    Written Home Exercises Provided: yes. Exercises were reviewed and Dylon was able to demonstrate them prior to the end of the session.  Dylon demonstrated good  understanding of the education provided. See EMR under Patient Instructions for exercises provided during therapy sessions    Assessment     Dylon tolerated today's session very well this morning. Patient reports she feels prepared for discharge after the two remaining sessions. Improved stability noted with all balance activities. The patient will benefit from continued PT intervention to address remaining functional mobility deficits.       Dylon Is progressing well towards her goals.   Pt prognosis is Good.     Pt will continue to benefit from skilled outpatient physical therapy to address the deficits listed in the problem list box on initial evaluation, provide pt/family education and to maximize pt's level of independence in the home and community environment.     Pt's spiritual, cultural and educational needs considered and pt agreeable to plan of care and goals.     Anticipated barriers to physical therapy:  co-morbidities     Goals:  Short Term Goals: 4 weeks   1. Patient to be (I) with established home exercise program. MET 6/9/2023   2. Patient to improve bilateral hip flexion by 1/3 MMT to improve balance and functional mobility. MET 6/9/2023   3. Patient to improve FGA score to  23/30 for improved stability with ambulation. MET 6/9/2023   4. Patient to improve SSWS to 1.0  m/sec for improved safety with ambulation. Ongoing  5. Patient to improve GST condition 4 to 15 seconds with BLE leading for improves stability with mobility. MET 6/9/2023      Long Term Goals: 8 weeks   1. Patient to be (I) with advanced home exercise program.Ongoing  2. Patient to improve FGA score to 25/30 for improved stability with ambulation. . MET 6/9/2023   3. Patient to improve SSWS to 1.2  m/sec for improved safety with ambulation. Ongoing  4. Patient to improve GST condition 4 to 30 seconds with BLE leading for improves stability with mobility.MET 6/9/2023     Plan     Cont with strengthening, endurance and balance    Ibis Watkins, PT

## 2023-07-06 NOTE — TELEPHONE ENCOUNTER
Specialty Pharmacy - Initial Clinical Assessment    Specialty Medication Orders Linked to Encounter      Flowsheet Row Most Recent Value   Medication #1 evolocumab (REPATHA SURECLICK) 140 mg/mL PnIj (Order#[id number], Rx#[id number])          Patient Diagnosis   Z86.73 - History of CVA (cerebrovascular accident)  I25.10, I25.84 - Coronary atherosclerosis due to calcified coronary lesion    Subjective    Gordon Griffin is a 42 y.o. adult, who is followed by the specialty pharmacy service for management and education.    Recent Encounters       Date Type Provider Description    07/05/2023 Specialty Pharmacy Taisha Johnson PharmD Initial Clinical Assessment    07/03/2023 Specialty Pharmacy Taisha Johnson PharmD Referral Authorization            Current Outpatient Medications   Medication Sig    aspirin 81 MG Chew Take 81 mg by mouth once daily.    azelastine (ASTELIN) 137 mcg (0.1 %) nasal spray USE 1 TO 2 SPRAYS IN EACH NOSTRIL TWICE DAILY FOR CONGESTION    baclofen (LIORESAL) 20 MG tablet Take 1 tablet by mouth 3 (three) times daily as needed.     benztropine (COGENTIN) 0.5 MG tablet Take 0.5 mg by mouth once daily.    butalbital-acetaminophen-caffeine -40 mg (FIORICET, ESGIC) -40 mg per tablet Take 1 tablet by mouth every 4 (four) hours as needed.    butorphanol (STADOL) 10 mg/mL nasal spray 1 spray by Nasal route every 4 (four) hours as needed for Pain.    butorphanol (STADOL) 10 mg/mL nasal spray use 2 sprays into each nostril every 4 hours as needed for pain.    butorphanol (STADOL) 10 mg/mL nasal spray 2 sprays by Nasal route every 4 (four) hours as needed for pain    cloNIDine (CATAPRES) 0.1 MG tablet TAKE 1 TABLET(0.1 MG) BY MOUTH TWICE DAILY    cyclobenzaprine (FLEXERIL) 10 MG tablet TK 1 T PO Q 8 H PRF PAIN    diazePAM (VALIUM) 2 MG tablet Take 2 mg by mouth 2 (two) times daily as needed.    erenumab-aooe (AIMOVIG AUTOINJECTOR SUBQ) Inject into the skin.    EScitalopram oxalate (LEXAPRO) 20 MG  tablet Take 20 mg by mouth once daily.    estradiol valerate (DELESTROGEN) 20 mg/mL injection SMARTSI.3 Milliliter(s) IM Once a Week    evolocumab (REPATHA SURECLICK) 140 mg/mL PnIj Inject 1 mL (140 mg total) into the skin every 14 (fourteen) days.    ezetimibe (ZETIA) 10 mg tablet Take 1 tablet (10 mg total) by mouth once daily.    FLUCELVAX QUAD [id number], PF, 60 mcg (15 mcg x 4)/0.5 mL Syrg vaccine ADM 0.5ML IM UTD    fluticasone (FLONASE) 50 mcg/actuation nasal spray SPRAY TWICE IEN QD    gabapentin (NEURONTIN) 300 MG capsule Take 1 capsule (300 mg total) by mouth 3 (three) times daily.    hydrocortisone (ANUSOL-HC) 2.5 % rectal cream Place rectally 2 (two) times daily.    hydrOXYzine pamoate (VISTARIL) 50 MG Cap Take  mg by mouth nightly as needed.    ketorolac (TORADOL) 10 mg tablet ketorolac 10 mg tablet    levETIRAcetam (KEPPRA) 1000 MG tablet Take 1,000 mg by mouth 2 (two) times daily.    methocarbamoL (ROBAXIN) 750 MG Tab Take 750 mg by mouth 3 (three) times daily.    metoclopramide HCl (REGLAN) 10 MG tablet 10 mg.    moxifloxacin (AVELOX) 400 mg tablet Take 400 mg by mouth.    NARCAN 4 mg/actuation Spry SPRAY 0.1ML IN 1 NOSTRIL MAY REPEAT DOSE EVERY 2-3 MINUTES AS NEEDED ALTERNATING NOSTRILS EACH DOSE    nitroGLYCERIN (NITROSTAT) 0.4 MG SL tablet Place 1 tablet (0.4 mg total) under the tongue every 5 (five) minutes as needed for Chest pain.    NURTEC 75 mg odt Take 75 mg by mouth as needed for Migraine.    omeprazole (PRILOSEC) 20 MG capsule Take 20 mg by mouth once daily.    onabotulinumtoxina (BOTOX) 200 unit SolR Inject 200 Units into the muscle.    ondansetron (ZOFRAN) 4 MG tablet Take 1 tablet (4 mg total) by mouth every 6 (six) hours as needed for Nausea.    ondansetron (ZOFRAN-ODT) 8 MG TbDL Take 8 mg by mouth 2 (two) times daily.    oxybutynin (DITROPAN-XL) 10 MG 24 hr tablet TAKE 1 TABLET(10 MG) BY MOUTH EVERY DAY    pantoprazole (PROTONIX) 20 MG tablet Take 20 mg by mouth.    predniSONE  (DELTASONE) 20 MG tablet Take by mouth.    prochlorperazine (COMPAZINE) 10 MG tablet Take 10 mg by mouth 3 (three) times daily.    propranoloL (INDERAL LA) 60 MG 24 hr capsule Take 60 mg by mouth every evening.    rosuvastatin (CRESTOR) 20 MG tablet Take 1 tablet (20 mg total) by mouth once daily.    spironolactone (ALDACTONE) 25 MG tablet Take 25 mg by mouth once daily.    tamsulosin (FLOMAX) 0.4 mg Cap TAKE 1 CAPSULE(0.4 MG) BY MOUTH EVERY DAY    traZODone (DESYREL) 100 MG tablet Take 100 mg by mouth every evening.    traZODone (DESYREL) 50 MG tablet Take 50 mg by mouth every evening.    verapamiL (VERELAN) 240 MG C24P Take 240 mg by mouth Daily.   Last reviewed on 7/6/2023  9:14 AM by Suresh Duran MA    Review of patient's allergies indicates:   Allergen Reactions    Mustard Itching, Nausea And Vomiting, Shortness Of Breath and Swelling    Lipitor [atorvastatin] Itching    Mushroom Itching, Nausea And Vomiting and Swelling    Niaspan extended-release [niacin] Itching    Nystatin Hives     Other reaction(s): hives    Olive extract Itching, Nausea And Vomiting and Swelling    Oyster extract     Extendryl [[id number]] Rash    V-cillin k Rash   Last reviewed on  7/6/2023 9:14 AM by Suresh Duran          Assessment Questions - Documented Responses      Flowsheet Row Most Recent Value   Assessment    Medication Reconciliation completed for patient No   Goals of Therapy Status Discussed (new start)   Status of the patients ability to self-administer: Is Able   All education points have been covered with patient? Yes, supplemental printed education provided   Welcome packet contents reviewed and discussed with patient? Yes   Assesment completed? Yes   Plan Therapy being initiated   Do you need to open a clinical intervention (i-vent)? No   Do you want to schedule first shipment? Yes   Medication #1 Assessment Info    Patient status New medication, New to [facility]   Is this medication appropriate for the  "patient? Yes   Is this medication effective? Not yet started          Refill Questions - Documented Responses      Flowsheet Row Most Recent Value   Patient Availability and HIPAA Verification    Does patient want to proceed with activity? Yes   HIPAA/medical authority confirmed? Yes   Relationship to patient of person spoken to? Self   Refill Screening Questions    When does the patient need to receive the medication? 07/06/23   Refill Delivery Questions    How will the patient receive the medication? Pickup   When does the patient need to receive the medication? 07/06/23   Expected Copay ($) 0   Is the patient able to afford the medication copay? Yes   Payment Method zero copay   Days supply of Refill 28   Supplies needed? No supplies needed   Refill activity completed? Yes   Refill activity plan Refill scheduled   Shipment/Pickup Date: 07/06/23            Objective    She has a past medical history of Anxiety, Cancer, Chest pain (1/20/2016), Depression, Functional movement disorder (10/1/2019), Migraine headache, Movement disorder, Myoclonic jerkings, massive, and Stroke (pt. states he had a cva at 3 months old).      BP Readings from Last 4 Encounters:   06/29/23 118/68   06/18/23 (!) 136/92   06/09/23 118/67   06/05/23 110/72     Ht Readings from Last 4 Encounters:   07/06/23 6' 0.99" (1.854 m)   06/29/23 6' 1" (1.854 m)   06/18/23 6' 1" (1.854 m)   06/09/23 6' (1.829 m)     Wt Readings from Last 4 Encounters:   07/06/23 66.2 kg (145 lb 15.1 oz)   06/29/23 65 kg (143 lb 4.8 oz)   06/18/23 68 kg (150 lb)   06/09/23 68 kg (150 lb)       The goals of prescribed drug therapy management include:  Supporting patient to meet the prescriber's medical treatment objectives  Improving or maintaining quality of life  Maintaining optimal therapy adherence  Minimizing and managing side effects      Goals of Therapy Status: Discussed (new start)    Assessment/Plan  Patient plans to start therapy on 07/06/23      Indication, " dosage, appropriateness, effectiveness, safety and convenience of her specialty medication(s) were reviewed today.     Patient Education   Patient received education on the following:   Expectations and possible outcomes of therapy  Proper use, timely administration, and missed dose management  Duration of therapy  Side effects, including prevention, minimization, and management  Contraindications and safety precautions  New or changed medications, including prescribe and over the counter medications and supplements  Reviews recommended vaccinations, as appropriate  Storage, safe handling, and disposal        Tasks added this encounter   No tasks added.   Tasks due within next 3 months   7/6/2023 - Initial Clinical Assessment/Patient Education (1 Time Occurence)  7/3/2023 - Set up Initial Fill  7/27/2023 - Refill Coordination Outreach (1 time occurrence)     Taisha Johnson, PharmD  [facility]  [address]  [facility] [address]  Phone: [phone]  Fax: [phone]

## 2023-07-06 NOTE — PROCEDURES
Large Joint Aspiration/Injection: R knee    Date/Time: 7/6/2023 9:30 AM  Performed by: Vale Neil PA-C  Authorized by: Vale Neil PA-C     Consent Done?:  Yes (Verbal)  Indications:  Pain  Prep: patient was prepped and draped in usual sterile fashion    Local anesthetic:  Topical anesthetic    Details:  Needle Size:  22 G  Approach:  Anterolateral  Location:  Knee  Site:  R knee  Medications:  40 mg triamcinolone acetonide 40 mg/mL; 2 mL LIDOcaine HCL 10 mg/ml (1%) 10 mg/mL (1 %)  Patient tolerance:  Patient tolerated the procedure well with no immediate complications

## 2023-07-06 NOTE — PROGRESS NOTES
"  SUBJECTIVE:     Chief Complaint   Patient presents with    Right Knee - Pain    Left Knee - Pain    Right Ankle - Pain    Left Ankle - Pain       History of Present Illness:  Gordon Griffin is a 42 y.o. year old adult here with complaints of bilateral knee pain that worsened after she felt a "pop" in her right knee last week.  The pain is mostly anterior.  The pain is worse with activity.  She reports having some issues with her knees in the past.  She does have a very physical job.  She also has myoclonus and muscle spasms in legs.  This causes her to do a lot of walking on inverted ankles.     Review of patient's allergies indicates:   Allergen Reactions    Mustard Itching, Nausea And Vomiting, Shortness Of Breath and Swelling    Lipitor [atorvastatin] Itching    Mushroom Itching, Nausea And Vomiting and Swelling    Niaspan extended-release [niacin] Itching    Nystatin Hives     Other reaction(s): hives    Olive extract Itching, Nausea And Vomiting and Swelling    Oyster extract     Extendryl [[id number]] Rash    V-cillin k Rash         Current Outpatient Medications   Medication Sig Dispense Refill    aspirin 81 MG Chew Take 81 mg by mouth once daily.      azelastine (ASTELIN) 137 mcg (0.1 %) nasal spray USE 1 TO 2 SPRAYS IN EACH NOSTRIL TWICE DAILY FOR CONGESTION      baclofen (LIORESAL) 20 MG tablet Take 1 tablet by mouth 3 (three) times daily as needed.       benztropine (COGENTIN) 0.5 MG tablet Take 0.5 mg by mouth once daily.      butalbital-acetaminophen-caffeine -40 mg (FIORICET, ESGIC) -40 mg per tablet Take 1 tablet by mouth every 4 (four) hours as needed.      butorphanol (STADOL) 10 mg/mL nasal spray 1 spray by Nasal route every 4 (four) hours as needed for Pain.      butorphanol (STADOL) 10 mg/mL nasal spray use 2 sprays into each nostril every 4 hours as needed for pain. 250 mL 5    butorphanol (STADOL) 10 mg/mL nasal spray 2 sprays by Nasal route every 4 (four) " hours as needed for pain 100 mL 5    cloNIDine (CATAPRES) 0.1 MG tablet TAKE 1 TABLET(0.1 MG) BY MOUTH TWICE DAILY 60 tablet 3    cyclobenzaprine (FLEXERIL) 10 MG tablet TK 1 T PO Q 8 H PRF PAIN      diazePAM (VALIUM) 2 MG tablet Take 2 mg by mouth 2 (two) times daily as needed.      erenumab-aooe (AIMOVIG AUTOINJECTOR SUBQ) Inject into the skin.      EScitalopram oxalate (LEXAPRO) 20 MG tablet Take 20 mg by mouth once daily.      estradiol valerate (DELESTROGEN) 20 mg/mL injection SMARTSI.3 Milliliter(s) IM Once a Week      evolocumab (REPATHA SURECLICK) 140 mg/mL PnIj Inject 1 mL (140 mg total) into the skin every 14 (fourteen) days. 2 mL 0    ezetimibe (ZETIA) 10 mg tablet Take 1 tablet (10 mg total) by mouth once daily. 90 tablet 3    FLUCELVAX QUAD 2186-5297, PF, 60 mcg (15 mcg x 4)/0.5 mL Syrg vaccine ADM 0.5ML IM UTD  0    fluticasone (FLONASE) 50 mcg/actuation nasal spray SPRAY TWICE IEN QD  5    gabapentin (NEURONTIN) 300 MG capsule Take 1 capsule (300 mg total) by mouth 3 (three) times daily. 90 capsule 3    hydrocortisone (ANUSOL-HC) 2.5 % rectal cream Place rectally 2 (two) times daily. 28 g 1    hydrOXYzine pamoate (VISTARIL) 50 MG Cap Take  mg by mouth nightly as needed.      ketorolac (TORADOL) 10 mg tablet ketorolac 10 mg tablet      levETIRAcetam (KEPPRA) 1000 MG tablet Take 1,000 mg by mouth 2 (two) times daily.      methocarbamoL (ROBAXIN) 750 MG Tab Take 750 mg by mouth 3 (three) times daily.      metoclopramide HCl (REGLAN) 10 MG tablet 10 mg.      moxifloxacin (AVELOX) 400 mg tablet Take 400 mg by mouth.      NARCAN 4 mg/actuation Spry SPRAY 0.1ML IN 1 NOSTRIL MAY REPEAT DOSE EVERY 2-3 MINUTES AS NEEDED ALTERNATING NOSTRILS EACH DOSE 1 each 3    NURTEC 75 mg odt Take 75 mg by mouth as needed for Migraine.      omeprazole (PRILOSEC) 20 MG capsule Take 20 mg by mouth once daily.      onabotulinumtoxina (BOTOX) 200 unit SolR Inject 200 Units into the muscle.      ondansetron (ZOFRAN) 4 MG  tablet Take 1 tablet (4 mg total) by mouth every 6 (six) hours as needed for Nausea. 12 tablet 0    ondansetron (ZOFRAN-ODT) 8 MG TbDL Take 8 mg by mouth 2 (two) times daily.      oxybutynin (DITROPAN-XL) 10 MG 24 hr tablet TAKE 1 TABLET(10 MG) BY MOUTH EVERY DAY 30 tablet 11    pantoprazole (PROTONIX) 20 MG tablet Take 20 mg by mouth.      predniSONE (DELTASONE) 20 MG tablet Take by mouth.      prochlorperazine (COMPAZINE) 10 MG tablet Take 10 mg by mouth 3 (three) times daily.      propranoloL (INDERAL LA) 60 MG 24 hr capsule Take 60 mg by mouth every evening.      rosuvastatin (CRESTOR) 20 MG tablet Take 1 tablet (20 mg total) by mouth once daily. 90 tablet 9    spironolactone (ALDACTONE) 25 MG tablet Take 25 mg by mouth once daily.      tamsulosin (FLOMAX) 0.4 mg Cap TAKE 1 CAPSULE(0.4 MG) BY MOUTH EVERY DAY 30 capsule 11    traZODone (DESYREL) 100 MG tablet Take 100 mg by mouth every evening.      traZODone (DESYREL) 50 MG tablet Take 50 mg by mouth every evening.      verapamiL (VERELAN) 240 MG C24P Take 240 mg by mouth Daily.      nitroGLYCERIN (NITROSTAT) 0.4 MG SL tablet Place 1 tablet (0.4 mg total) under the tongue every 5 (five) minutes as needed for Chest pain. 90 tablet 3     No current facility-administered medications for this visit.       Past Medical History:   Diagnosis Date    Anxiety     Cancer     Chest pain 1/20/2016 12/30/2015: Began experinece chest pain.    Depression     Functional movement disorder 10/1/2019    Migraine headache     Movement disorder     Myoclonic jerkings, massive     Stroke pt. states he had a cva at 3 months old       Past Surgical History:   Procedure Laterality Date    ANGIOGRAM, CORONARY, WITH LEFT HEART CATHETERIZATION      EPIDURAL STEROID INJECTION N/A 3/26/2021    Procedure: INJECTION, STEROID, EPIDURAL L4/5;  Surgeon: Larry Brasher MD;  Location: [facility];  Service: Pain Management;  Laterality: N/A;    EPIDURAL STEROID INJECTION N/A 6/4/2021     Procedure: INJECTION, STEROID, EPIDURAL, L4-L5 IL need consent;  Surgeon: Larry Brasher MD;  Location: [facility]IN [facility]GT;  Service: Pain Management;  Laterality: N/A;    EPIDURAL STEROID INJECTION N/A 10/29/2021    Procedure: INJECTION, STEROID, EPIDURAL, L4-L5IL NEED CONSENT;  Surgeon: Larry Brasher MD;  Location: [facility]IN [facility]GT;  Service: Pain Management;  Laterality: N/A;    EPIDURAL STEROID INJECTION N/A 1/27/2022    Procedure: Injection, Steroid, Epidural C7/T1;  Surgeon: Larry Brasher MD;  Location: [facility];  Service: Pain Management;  Laterality: N/A;    EPIDURAL STEROID INJECTION N/A 2/10/2022    Procedure: Injection, Steroid, Epidural L4/5;  Surgeon: Larry Brasher MD;  Location: [facility];  Service: Pain Management;  Laterality: N/A;    EPIDURAL STEROID INJECTION N/A 8/25/2022    Procedure: Injection, Steroid, Epidural C7/T1 CONTRAST;  Surgeon: Larry Brasher MD;  Location: [facility];  Service: Pain Management;  Laterality: N/A;    EPIDURAL STEROID INJECTION N/A 5/26/2023    Procedure: INJECTION, STEROID, EPIDURAL C7/T1 IL;  Surgeon: Larry Brasher MD;  Location: [facility];  Service: Pain Management;  Laterality: N/A;    INJECTION OF ANESTHETIC AGENT AROUND NERVE Bilateral 5/6/2022    Procedure: BLOCK, NERVE, BILATERAL L3-L4-*L5 MEDIAL BRANCH;  Surgeon: Larry Brasher MD;  Location: [facility];  Service: Pain Management;  Laterality: Bilateral;    INJECTION OF ANESTHETIC AGENT AROUND NERVE Bilateral 6/2/2022    Procedure: BLOCK, NERVE BILATERAL L3-L4-L5 MEDIAL BRANCH 2nd, needs consent;  Surgeon: Larry Brasher MD;  Location: [facility];  Service: Pain Management;  Laterality: Bilateral;    INJECTION, SACROILIAC JOINT Bilateral 6/9/2023    Procedure: INJECTION,SACROILIAC JOINT, BILATERAL SI;  Surgeon: Larry Brasher MD;  Location: [facility];  Service: Pain Management;  Laterality: Bilateral;    MANDIBLE SURGERY      reconstruction    RADIOFREQUENCY ABLATION Right  "6/23/2022    Procedure: RADIOFREQUENCY ABLATION RIGHT L3,L4,L5 1 of 2, consent needed;  Surgeon: Larry Brasher MD;  Location: [facility];  Service: Pain Management;  Laterality: Right;    RADIOFREQUENCY ABLATION Left 7/7/2022    Procedure: RADIOFREQUENCY ABLATION LEFT L3,L4,L5 2 of 2, needs consent;  Surgeon: Larry Brasher MD;  Location: [facility];  Service: Pain Management;  Laterality: Left;    RADIOFREQUENCY ABLATION Right 3/3/2023    Procedure: RADIOFREQUENCY ABLATION RIGHT L3,L4,L5;  Surgeon: Larry Brasher MD;  Location: [facility];  Service: Pain Management;  Laterality: Right;    RADIOFREQUENCY ABLATION Left 3/31/2023    Procedure: Radiofrequency Ablation Left L3, L4, & L5 Pending CBC results;  Surgeon: Diana Lira MD;  Location: [facility];  Service: Pain Management;  Laterality: Left;    variceol repair           Review of Systems:  ROS:  Constitutional: no fever or chills  Eyes: no visual changes  ENT: no nasal congestion or sore throat  Respiratory: no cough or shortness of breath  Musculoskeletal: no arthralgias or myalgias      OBJECTIVE:     PHYSICAL EXAM:  Height: 6' 0.99" (185.4 cm) Weight: 66.2 kg (145 lb 15.1 oz)   General: Well developed, well nourished, in no acute distress.  Neurological: Mood & affect are normal.  HEENT: NCAT, sclera nonicteric   Lungs: Respirations are equal and unlabored.   CV: 2+ bilateral upper and lower extremity pulses.   Skin: Intact throughout with no rashes, erythema, or lesions  Extremities: No LE edema, no erythema or warmth of the skin in either lower extremity.    Bilateral Knee Exam:  Knee Range of Motion: 0-130   Effusion: none  Condition of skin: intact  Location of tenderness: None   Strength: 5 of 5 quadriceps strength and 5 of 5 hamstring strength  Stability:  stable to testing  Varus /Valgus stress:  normal      Bilateral Hip Examination:  full painless range of motion, without tenderness    Bilateral ankle  No effusion or " swelling  No TTP  No instability noted with testing  There is muscle spasm noted with some limited examination    IMAGING:    X-rays of the bilateral knee, personally reviewed by me, demonstrate mild degenerative changes.  No fracture or dislocation.   X-rays of the bilateral ankle, personally reviewed by me, demonstrate no fracture or dislocation.     ASSESSMENT/PLAN:   42 y.o. year old adult with bilateral knee patellofemoral pain, chronic bilateral ankle pain    Plan: We discussed with the patient at length all the different treatment options available for the knee including NSAIDS, acetaminophen, rest, ice, knee strengthening exercise, steroid injections for temporary relief, Viscosupplementation, and knee arthroplasty.     - She elected to proceed with right knee CSI today  - Recommend rest, ice as needed  - We can consider PT if no improvement  - Ankle- will continue to monitor for now- no structural abnormality noted on xray today  - The majority of complaints likely due to abnormal gait  - Follow up if symptoms worsen or fail to improve

## 2023-07-07 ENCOUNTER — CLINICAL SUPPORT (OUTPATIENT)
Dept: REHABILITATION | Facility: HOSPITAL | Age: 43
End: 2023-07-07
Payer: MEDICARE

## 2023-07-07 ENCOUNTER — TELEPHONE (OUTPATIENT)
Dept: PAIN MEDICINE | Facility: CLINIC | Age: 43
End: 2023-07-07
Payer: MEDICARE

## 2023-07-07 DIAGNOSIS — R26.89 IMPAIRED GAIT AND MOBILITY: Primary | ICD-10-CM

## 2023-07-07 PROCEDURE — 97112 NEUROMUSCULAR REEDUCATION: CPT | Mod: PO

## 2023-07-07 PROCEDURE — 97530 THERAPEUTIC ACTIVITIES: CPT | Mod: PO

## 2023-07-07 PROCEDURE — 97110 THERAPEUTIC EXERCISES: CPT | Mod: PO

## 2023-07-10 ENCOUNTER — OFFICE VISIT (OUTPATIENT)
Dept: PAIN MEDICINE | Facility: CLINIC | Age: 43
End: 2023-07-10
Payer: MEDICARE

## 2023-07-10 VITALS
RESPIRATION RATE: 18 BRPM | HEIGHT: 72 IN | HEART RATE: 94 BPM | WEIGHT: 145.94 LBS | BODY MASS INDEX: 19.77 KG/M2 | OXYGEN SATURATION: 100 % | SYSTOLIC BLOOD PRESSURE: 125 MMHG | DIASTOLIC BLOOD PRESSURE: 76 MMHG

## 2023-07-10 DIAGNOSIS — M53.3 SACROILIAC JOINT PAIN: ICD-10-CM

## 2023-07-10 DIAGNOSIS — M47.816 LUMBAR SPONDYLOSIS: ICD-10-CM

## 2023-07-10 DIAGNOSIS — M51.36 DDD (DEGENERATIVE DISC DISEASE), LUMBAR: ICD-10-CM

## 2023-07-10 DIAGNOSIS — G89.4 CHRONIC PAIN SYNDROME: Primary | ICD-10-CM

## 2023-07-10 DIAGNOSIS — M47.812 CERVICAL SPONDYLOSIS: ICD-10-CM

## 2023-07-10 DIAGNOSIS — M50.30 DDD (DEGENERATIVE DISC DISEASE), CERVICAL: ICD-10-CM

## 2023-07-10 DIAGNOSIS — M54.12 CERVICAL RADICULOPATHY: ICD-10-CM

## 2023-07-10 PROCEDURE — 3074F SYST BP LT 130 MM HG: CPT | Mod: CPTII,S$GLB,, | Performed by: NURSE PRACTITIONER

## 2023-07-10 PROCEDURE — 99999 PR PBB SHADOW E&M-EST. PATIENT-LVL V: ICD-10-PCS | Mod: PBBFAC,,, | Performed by: NURSE PRACTITIONER

## 2023-07-10 PROCEDURE — 3074F PR MOST RECENT SYSTOLIC BLOOD PRESSURE < 130 MM HG: ICD-10-PCS | Mod: CPTII,S$GLB,, | Performed by: NURSE PRACTITIONER

## 2023-07-10 PROCEDURE — 3078F PR MOST RECENT DIASTOLIC BLOOD PRESSURE < 80 MM HG: ICD-10-PCS | Mod: CPTII,S$GLB,, | Performed by: NURSE PRACTITIONER

## 2023-07-10 PROCEDURE — 1160F RVW MEDS BY RX/DR IN RCRD: CPT | Mod: CPTII,S$GLB,, | Performed by: NURSE PRACTITIONER

## 2023-07-10 PROCEDURE — 99213 OFFICE O/P EST LOW 20 MIN: CPT | Mod: S$GLB,,, | Performed by: NURSE PRACTITIONER

## 2023-07-10 PROCEDURE — 3008F BODY MASS INDEX DOCD: CPT | Mod: CPTII,S$GLB,, | Performed by: NURSE PRACTITIONER

## 2023-07-10 PROCEDURE — 1159F PR MEDICATION LIST DOCUMENTED IN MEDICAL RECORD: ICD-10-PCS | Mod: CPTII,S$GLB,, | Performed by: NURSE PRACTITIONER

## 2023-07-10 PROCEDURE — 1160F PR REVIEW ALL MEDS BY PRESCRIBER/CLIN PHARMACIST DOCUMENTED: ICD-10-PCS | Mod: CPTII,S$GLB,, | Performed by: NURSE PRACTITIONER

## 2023-07-10 PROCEDURE — 3008F PR BODY MASS INDEX (BMI) DOCUMENTED: ICD-10-PCS | Mod: CPTII,S$GLB,, | Performed by: NURSE PRACTITIONER

## 2023-07-10 PROCEDURE — 99213 PR OFFICE/OUTPT VISIT, EST, LEVL III, 20-29 MIN: ICD-10-PCS | Mod: S$GLB,,, | Performed by: NURSE PRACTITIONER

## 2023-07-10 PROCEDURE — 3078F DIAST BP <80 MM HG: CPT | Mod: CPTII,S$GLB,, | Performed by: NURSE PRACTITIONER

## 2023-07-10 PROCEDURE — 1159F MED LIST DOCD IN RCRD: CPT | Mod: CPTII,S$GLB,, | Performed by: NURSE PRACTITIONER

## 2023-07-10 PROCEDURE — 99999 PR PBB SHADOW E&M-EST. PATIENT-LVL V: CPT | Mod: PBBFAC,,, | Performed by: NURSE PRACTITIONER

## 2023-07-10 NOTE — PROGRESS NOTES
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 exercise plan to help with pain and improve health.    - RTC in 2 months or sooner if needed.      The above plan and management options were discussed at length with patient. Patient is in agreement with the above and verbalized understanding.    Maribel Bright NP   7/10/2023

## 2023-07-12 ENCOUNTER — DOCUMENTATION ONLY (OUTPATIENT)
Dept: REHABILITATION | Facility: HOSPITAL | Age: 43
End: 2023-07-12
Payer: MEDICARE

## 2023-07-12 NOTE — PROGRESS NOTES
PT/PTA met face to face to discuss pt's treatment plan and progress towards established goals.  Continue with current PT POC with focus on strength, endurance, and balance.  Patient will be seen by physical therapist at least every sixth treatment or 30 days, whichever occurs first.    Lisa Rowe, PTA  07/12/2023

## 2023-07-13 NOTE — PROGRESS NOTES
[facility]   Physical Therapy Treatment and Discharge  Note      Name: Gordon Griffin  Clinic Number: [id number]    Therapy Diagnosis:   Encounter Diagnosis   Name Primary?    Impaired gait and mobility Yes         Physician: Ilene Smalls MD    Visit Date: 7/14/2023       Physician Orders: PT Eval and Treat   Medical Diagnosis from Referral: Abnormality of gait and mobility  Evaluation Date: 5/16/2023  Authorization Period Expiration: 04/30/2024  Plan of Care Expiration: 7/11/2023  Visit # / Visits authorized: 10/ 20 ( plus eval)        Time In: 745  Time Out: 810  Total Billable Time: 25 minutes     Precautions: Standard  Patient's preferred pronouns: She/her     PTA Visit #: 0/5       Subjective     Pt reports: that she has a migraine today and has not been been feeling great but is prepared for discharge     She was compliant with her HEP  Response to previous treatment: no adverse effects  Functional change: ongoing    Pain: 8/10, 2/10  Location:  migraine, R knee    Objective      Objective Measures updated at progress report unless specified.     Lower Extremity Strength  Right LE  Eval  6/9/2023 7/14/2023 Left LE  Eval  6/9/2023 7/14/2023   Hip Flexion: 4+/5 5/5 5/5 Hip Flexion: 4+/5 5/5 5/5   Hip Extension:  5/5 5/5 5/5 Hip Extension: 5/5 5/5 5/5   Hip Abduction: 5/5 5/5 5/5 Hip Abduction: 5/5 5/5 5/5   Hip Adduction: 5/5 5/5 5/5 Hip Adduction 5/5 5/5 5/5   Knee Extension: 5/5 5/5 5/5 Knee Extension: 5/5 5/5 5/5   Knee Flexion: 5/5 5/5 5/5 Knee Flexion: 5/5 5/5 5/5   Ankle Dorsiflexion: 5/5 5/5 5/5 Ankle Dorsiflexion: 5/5 5/5 5/5   Ankle Plantarflexion: 5/5 5/5 5/5 Ankle Plantarflexion: 5/5 5/5 5/5      Abdominal Strength: 3/5     Flexibility: WNL          Evaluation 6/9/2023 7/14/2023   30 second Chair Rise  (adults > 59 y/o) 17 completed with no arms 17 completed with no arms  20 completed with no arms    5 times sit-stand  (adults 18-65 y/o) 8 seconds with no arms   >12  sec= fall risk for general elderly  >16 sec= fall risk for Parkinson's disease  >10 sec= balance/vestibular dysfunction (<61 y/o)  >14.2 sec= balance/vestibular dysfunction (>61 y/o)  >12 sec= fall risk for CVA 7 seconds with no arms  6 seconds             Evaluation 6/9/2023 7/14/2023   Self Selected Walking Speed 0.86 m/sec (6m/7s) 0.86 m/sec (6m/7s) 1.0 m/sec (6m/6s)   Fast Walking Speed 0.86 m/sec (6m/7s) 1.2 m/sec (6m/5s) 1.2 m/sec (6m/5s)        Treatment     Dylon received the treatments listed below:      therapeutic exercises to develop strength, endurance, ROM, flexibility, posture, and core stabilization for 10  minutes including:    X 10 min on SCi Fit recumbent stepper.  B UE/B LE on level 6.0    neuromuscular re-education activities to improve: Balance, Coordination, Kinesthetic, Sense, Proprioception, and Posture for 0 minutes. The following activities were included:      therapeutic activities to improve functional performance for 15 minutes, including:    The following home exercise program was reviewed and demonstrated to the patient, the patient demonstrates understanding of each exercise:   HOME EXERCISE PROGRAM   3 x 30 seconds in corner with heel to toe   Progression: eyes closed   3 x 30 seconds standing in corner with feet close together and eyes closed   3 x 30 seconds on one leg   Walking with head turns left and right   Walking with head turns up and down   Walking heel to toe  Walking with marches for three second holds  Sit to stands with no arm support  Step up to step with opposite leg hike   time above includes time to complete objective measures and ambulate to/from waiting room.       gait training to improve functional mobility and safety for 0minutes, including:        Patient Education and Home Exercises       Education provided:   - HEP    Written Home Exercises Provided: yes. Exercises were reviewed and Dylon was able to demonstrate them prior to the end of the session.  Dylon  demonstrated good  understanding of the education provided. See EMR under Patient Instructions for exercises provided during therapy sessions      PHYSICAL THERAPY DISCHARGE SUMMARY   Total Visits:11  Missed Visits:5    Status Towards Goals Met: The patient has met 5/5 STG and 3/4 LTG.     Goals Not achieved and why:   Patient made excellent progress with therapy and has met majority of her goals. The patient was given advanced home exercise program to maintain progress made with therapy at home following discharge. The patient verbalizes/demonstrates understanding of home exercise program and reports she feels prepared for discharge at this time.  The patient reports she is planning on joining [facility] to maintain mobility.     Goals:  Short Term Goals: 4 weeks   1. Patient to be (I) with established home exercise program. MET 6/9/2023   2. Patient to improve bilateral hip flexion by 1/3 MMT to improve balance and functional mobility. MET 6/9/2023   3. Patient to improve FGA score to 23/30 for improved stability with ambulation. MET 6/9/2023   4. Patient to improve SSWS to 1.0  m/sec for improved safety with ambulation. MET 7/14/2023  5. Patient to improve GST condition 4 to 15 seconds with BLE leading for improves stability with mobility. MET 6/9/2023      Long Term Goals: 8 weeks   1. Patient to be (I) with advanced home exercise program.MET 7/14/2023  2. Patient to improve FGA score to 25/30 for improved stability with ambulation. . MET 6/9/2023   3. Patient to improve SSWS to 1.2  m/sec for improved safety with ambulation. Not met   4. Patient to improve GST condition 4 to 30 seconds with BLE leading for improves stability with mobility.MET 6/9/2023     Discharge reason : Met goals and Patient has maximized benefit from PT at this time    PLAN   This patient is discharged from [facility].     Ibis Watkins, PT  07/14/2023

## 2023-07-14 ENCOUNTER — CLINICAL SUPPORT (OUTPATIENT)
Dept: REHABILITATION | Facility: HOSPITAL | Age: 43
End: 2023-07-14
Payer: MEDICARE

## 2023-07-14 DIAGNOSIS — R26.89 IMPAIRED GAIT AND MOBILITY: Primary | ICD-10-CM

## 2023-07-14 PROCEDURE — 97530 THERAPEUTIC ACTIVITIES: CPT | Mod: PO

## 2023-07-14 PROCEDURE — 97110 THERAPEUTIC EXERCISES: CPT | Mod: PO

## 2023-07-14 NOTE — PATIENT INSTRUCTIONS
HOME EXERCISE PROGRAM   3 x 30 seconds in corner with heel to toe   Progression: eyes closed   3 x 30 seconds standing in corner with feet close together and eyes closed   3 x 30 seconds on one leg   Walking with head turns left and right   Walking with head turns up and down   Walking heel to toe  Walking with marches for three second holds  Sit to stands with no arm support  Step up to step with opposite leg hike

## 2023-07-18 ENCOUNTER — HOSPITAL ENCOUNTER (OUTPATIENT)
Dept: NEUROLOGY | Facility: CLINIC | Age: 43
Discharge: HOME | End: 2023-07-18
Payer: MEDICARE

## 2023-07-18 DIAGNOSIS — R29.90 EPISODE OF TRANSIENT NEUROLOGIC SYMPTOMS: ICD-10-CM

## 2023-07-18 PROCEDURE — 95816 EEG AWAKE AND DROWSY: CPT | Mod: S$GLB,,, | Performed by: PSYCHIATRY & NEUROLOGY

## 2023-07-18 PROCEDURE — 95816 PR EEG,W/AWAKE & DROWSY RECORD: ICD-10-PCS | Mod: S$GLB,,, | Performed by: PSYCHIATRY & NEUROLOGY

## 2023-07-18 NOTE — PROCEDURES
Procedures  EEG REPORT      Gordon Griffin  [id number]  1980    DATE OF SERVICE: 7/18/2023         METHODOLOGY      Extended electroencephalographic recording is made while the patient is ambulatory and continuing normal daily activities.  Electrodes are placed according to the International 10-20 placement system and included T1 and T2 electrode placement.  Twenty four (24) channels of digital signal (sampling rate of 512/sec) was simultaneously recorded from the scalp including EKG and eye monitors.  Recording band pass was 0.1 to 100 hz and all data was stored digitally on the recorder.  The patient is instructed to press an event button when clinical symptoms occur and write the symptoms into a diary. Activation procedures which include photic stimulation, hyperventilation and instructing patients to perform simple task are done in selected patients.        The EEG is displayed on a monitor screen and can be reformatted into different montages for evaluation.  The entire recoding is submitted for computer assisted analysis to detect spike and electrographic seizure activity.  The entire recording is visually reviewed and the times identified by computer analysis as being spikes or seizures are reviewed again.  Compresses spectral analysis (CSA) is also performed on the activity recorded from each individual channel.  This is displayed as a power display of frequencies from 0 to 30 Hz over time.   The CSA analysis is done and displayed continuously.  This is reviewed for asymmetries in power between homologous areas of the scalp and for presence of changes in power which canbe seen when seizures occur.  Sections of suspected abnormalities on the CSA is then compared with the original EEG recording.  .     HQ plus software was also utilized in the review of this study.  This software suite analyzes the EEG recording in multiple domains.  Coherence and rhythmicity is computed to identify EEG sections  which may contain organized seizures.  Each channel undergoes analysis to detect presence of spike and sharp waves which have special and morphological characteristic of epileptic activity.  The routine EEG recording is converted from spacial into frequency domain.  This is then displayed comparing homologous areas to identify areas of significant asymmetry.  Algorithm to identify non-cortically generated artifact is used to separate eye movement, EMG and other artifact from the EEG     Recording Times    A total of 00:30:09 hours of EEG was recorded.      EEG FINDINGS:  Background activity:   The background rhythm was characterized by alpha and anterior dominant beta activity with a 10Hz posterior dominant alpha rhythm at 30-70 microvolts.   Symmetry and continuity: the background was continuous and symmetric     Sleep:   No sleep transients were seen.    Activation procedures:   Photic stimulation was performed with no abnormalities seen  Hyperventilation was performed with no abnormalities seen    Abnormal activity:   No epileptiform discharges, periodic discharges, lateralized rhythmic delta activity or electrographic seizures were seen.    IMPRESSION:   Normal EEG of the waking state.      Sohan Russo MD  Neurology-Epilepsy.  [facility].

## 2023-07-19 RX ORDER — NITROGLYCERIN 0.4 MG/1
0.4 TABLET SUBLINGUAL EVERY 5 MIN PRN
Qty: 25 TABLET | Refills: 4 | Status: SHIPPED | OUTPATIENT
Start: 2023-07-19 | End: 2023-11-29 | Stop reason: SDUPTHER

## 2023-07-25 ENCOUNTER — SPECIALTY PHARMACY (OUTPATIENT)
Dept: PHARMACY | Facility: CLINIC | Age: 43
End: 2023-07-25
Payer: MEDICARE

## 2023-07-25 DIAGNOSIS — E78.5 HYPERLIPIDEMIA, UNSPECIFIED HYPERLIPIDEMIA TYPE: Primary | ICD-10-CM

## 2023-07-25 DIAGNOSIS — I25.10 ATHEROSCLEROSIS OF NATIVE CORONARY ARTERY OF NATIVE HEART WITHOUT ANGINA PECTORIS: ICD-10-CM

## 2023-07-25 RX ORDER — EVOLOCUMAB 140 MG/ML
140 INJECTION, SOLUTION SUBCUTANEOUS
Qty: 2 ML | Refills: 0 | Status: ACTIVE | OUTPATIENT
Start: 2023-07-25 | End: 2023-08-29 | Stop reason: SDUPTHER
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
## 2023-08-01 ENCOUNTER — OFFICE VISIT (OUTPATIENT)
Dept: NEUROLOGY | Facility: CLINIC | Age: 43
End: 2023-08-01
Payer: COMMERCIAL

## 2023-08-01 VITALS
BODY MASS INDEX: 20.32 KG/M2 | SYSTOLIC BLOOD PRESSURE: 121 MMHG | HEIGHT: 72 IN | DIASTOLIC BLOOD PRESSURE: 80 MMHG | WEIGHT: 150 LBS | HEART RATE: 106 BPM

## 2023-08-01 DIAGNOSIS — G43.709 CHRONIC MIGRAINE WITHOUT AURA WITHOUT STATUS MIGRAINOSUS, NOT INTRACTABLE: ICD-10-CM

## 2023-08-01 DIAGNOSIS — G25.3 MYOCLONUS: ICD-10-CM

## 2023-08-01 DIAGNOSIS — G24.9 DYSTONIA: Primary | ICD-10-CM

## 2023-08-01 PROCEDURE — 3079F DIAST BP 80-89 MM HG: CPT | Mod: CPTII,S$GLB,, | Performed by: STUDENT IN AN ORGANIZED HEALTH CARE EDUCATION/TRAINING PROGRAM

## 2023-08-01 PROCEDURE — 3074F PR MOST RECENT SYSTOLIC BLOOD PRESSURE < 130 MM HG: ICD-10-PCS | Mod: CPTII,S$GLB,, | Performed by: STUDENT IN AN ORGANIZED HEALTH CARE EDUCATION/TRAINING PROGRAM

## 2023-08-01 PROCEDURE — 99214 PR OFFICE/OUTPT VISIT, EST, LEVL IV, 30-39 MIN: ICD-10-PCS | Mod: S$GLB,,, | Performed by: STUDENT IN AN ORGANIZED HEALTH CARE EDUCATION/TRAINING PROGRAM

## 2023-08-01 PROCEDURE — 99999 PR PBB SHADOW E&M-EST. PATIENT-LVL III: ICD-10-PCS | Mod: PBBFAC,,, | Performed by: STUDENT IN AN ORGANIZED HEALTH CARE EDUCATION/TRAINING PROGRAM

## 2023-08-01 PROCEDURE — 3008F PR BODY MASS INDEX (BMI) DOCUMENTED: ICD-10-PCS | Mod: CPTII,S$GLB,, | Performed by: STUDENT IN AN ORGANIZED HEALTH CARE EDUCATION/TRAINING PROGRAM

## 2023-08-01 PROCEDURE — 3079F PR MOST RECENT DIASTOLIC BLOOD PRESSURE 80-89 MM HG: ICD-10-PCS | Mod: CPTII,S$GLB,, | Performed by: STUDENT IN AN ORGANIZED HEALTH CARE EDUCATION/TRAINING PROGRAM

## 2023-08-01 PROCEDURE — 99214 OFFICE O/P EST MOD 30 MIN: CPT | Mod: S$GLB,,, | Performed by: STUDENT IN AN ORGANIZED HEALTH CARE EDUCATION/TRAINING PROGRAM

## 2023-08-01 PROCEDURE — 99999 PR PBB SHADOW E&M-EST. PATIENT-LVL III: CPT | Mod: PBBFAC,,, | Performed by: STUDENT IN AN ORGANIZED HEALTH CARE EDUCATION/TRAINING PROGRAM

## 2023-08-01 PROCEDURE — 3008F BODY MASS INDEX DOCD: CPT | Mod: CPTII,S$GLB,, | Performed by: STUDENT IN AN ORGANIZED HEALTH CARE EDUCATION/TRAINING PROGRAM

## 2023-08-01 PROCEDURE — 3074F SYST BP LT 130 MM HG: CPT | Mod: CPTII,S$GLB,, | Performed by: STUDENT IN AN ORGANIZED HEALTH CARE EDUCATION/TRAINING PROGRAM

## 2023-08-01 RX ORDER — GABAPENTIN 300 MG/1
300 CAPSULE ORAL 3 TIMES DAILY
Qty: 90 CAPSULE | Refills: 3 | Status: CANCELLED | OUTPATIENT
Start: 2023-08-01

## 2023-08-01 NOTE — TELEPHONE ENCOUNTER
Specialty Pharmacy - Refill Coordination    Specialty Medication Orders Linked to Encounter      Flowsheet Row Most Recent Value   Medication #1 evolocumab (REPATHA SURECLICK) 140 mg/mL PnIj (Order#[id number], Rx#[id number])            Refill Questions - Documented Responses      Flowsheet Row Most Recent Value   Patient Availability and HIPAA Verification    Does patient want to proceed with activity? Yes   HIPAA/medical authority confirmed? Yes   Relationship to patient of person spoken to? Self   Refill Screening Questions    Would patient like to speak to a pharmacist? No   When does the patient need to receive the medication? 08/05/23   Refill Delivery Questions    How will the patient receive the medication? [facility]   When does the patient need to receive the medication? 08/05/23   Shipping Address Home   Address in [facility] confirmed and updated if neccessary? Yes   Expected Copay ($) 0   Is the patient able to afford the medication copay? Yes   Payment Method zero copay   Days supply of Refill 28   Supplies needed? No supplies needed   Refill activity completed? Yes   Refill activity plan Refill scheduled   Shipment/Pickup Date: 08/03/23            Current Outpatient Medications   Medication Sig    aspirin 81 MG Chew Take 81 mg by mouth once daily.    azelastine (ASTELIN) 137 mcg (0.1 %) nasal spray USE 1 TO 2 SPRAYS IN EACH NOSTRIL TWICE DAILY FOR CONGESTION    baclofen (LIORESAL) 20 MG tablet Take 1 tablet by mouth 3 (three) times daily as needed.     benztropine (COGENTIN) 0.5 MG tablet Take 0.5 mg by mouth once daily.    butalbital-acetaminophen-caffeine -40 mg (FIORICET, ESGIC) -40 mg per tablet Take 1 tablet by mouth every 4 (four) hours as needed.    butorphanol (STADOL) 10 mg/mL nasal spray 1 spray by Nasal route every 4 (four) hours as needed for Pain.    butorphanol (STADOL) 10 mg/mL nasal spray use 2 sprays into each nostril every 4 hours as needed for pain.    butorphanol  (STADOL) 10 mg/mL nasal spray 2 sprays by Nasal route every 4 (four) hours as needed for pain    cloNIDine (CATAPRES) 0.1 MG tablet TAKE 1 TABLET(0.1 MG) BY MOUTH TWICE DAILY    cyclobenzaprine (FLEXERIL) 10 MG tablet TK 1 T PO Q 8 H PRF PAIN    diazePAM (VALIUM) 2 MG tablet Take 2 mg by mouth 2 (two) times daily as needed.    erenumab-aooe (AIMOVIG AUTOINJECTOR SUBQ) Inject into the skin.    EScitalopram oxalate (LEXAPRO) 20 MG tablet Take 20 mg by mouth once daily.    estradiol valerate (DELESTROGEN) 20 mg/mL injection SMARTSI.3 Milliliter(s) IM Once a Week    evolocumab (REPATHA SURECLICK) 140 mg/mL PnIj Inject 1 mL (140 mg total) into the skin every 14 (fourteen) days.    ezetimibe (ZETIA) 10 mg tablet Take 1 tablet (10 mg total) by mouth once daily.    FLUCELVAX QUAD 1568-5110, PF, 60 mcg (15 mcg x 4)/0.5 mL Syrg vaccine ADM 0.5ML IM UTD    fluticasone (FLONASE) 50 mcg/actuation nasal spray SPRAY TWICE IEN QD    gabapentin (NEURONTIN) 300 MG capsule Take 1 capsule (300 mg total) by mouth 3 (three) times daily.    hydrocortisone (ANUSOL-HC) 2.5 % rectal cream Place rectally 2 (two) times daily.    hydrOXYzine pamoate (VISTARIL) 50 MG Cap Take  mg by mouth nightly as needed.    ketorolac (TORADOL) 10 mg tablet ketorolac 10 mg tablet    levETIRAcetam (KEPPRA) 1000 MG tablet Take 1,000 mg by mouth 2 (two) times daily.    methocarbamoL (ROBAXIN) 750 MG Tab Take 750 mg by mouth 3 (three) times daily.    metoclopramide HCl (REGLAN) 10 MG tablet 10 mg.    moxifloxacin (AVELOX) 400 mg tablet Take 400 mg by mouth.    NARCAN 4 mg/actuation Spry SPRAY 0.1ML IN 1 NOSTRIL MAY REPEAT DOSE EVERY 2-3 MINUTES AS NEEDED ALTERNATING NOSTRILS EACH DOSE    nitroGLYCERIN (NITROSTAT) 0.4 MG SL tablet Place 1 tablet (0.4 mg total) under the tongue every 5 (five) minutes as needed for Chest pain. Repeat twice as needed for a maximum total dose of 3 tablets. If still having chest pain, go to the emergency room.    NURTEC 75 mg  odt Take 75 mg by mouth as needed for Migraine.    omeprazole (PRILOSEC) 20 MG capsule Take 20 mg by mouth once daily.    onabotulinumtoxina (BOTOX) 200 unit SolR Inject 200 Units into the muscle.    ondansetron (ZOFRAN) 4 MG tablet Take 1 tablet (4 mg total) by mouth every 6 (six) hours as needed for Nausea.    ondansetron (ZOFRAN-ODT) 8 MG TbDL Take 8 mg by mouth 2 (two) times daily.    oxybutynin (DITROPAN-XL) 10 MG 24 hr tablet TAKE 1 TABLET(10 MG) BY MOUTH EVERY DAY    pantoprazole (PROTONIX) 20 MG tablet Take 20 mg by mouth.    predniSONE (DELTASONE) 20 MG tablet Take by mouth.    prochlorperazine (COMPAZINE) 10 MG tablet Take 10 mg by mouth 3 (three) times daily.    propranoloL (INDERAL LA) 60 MG 24 hr capsule Take 60 mg by mouth every evening.    rosuvastatin (CRESTOR) 20 MG tablet Take 1 tablet (20 mg total) by mouth once daily.    spironolactone (ALDACTONE) 25 MG tablet Take 25 mg by mouth once daily.    tamsulosin (FLOMAX) 0.4 mg Cap TAKE 1 CAPSULE(0.4 MG) BY MOUTH EVERY DAY    traZODone (DESYREL) 100 MG tablet Take 100 mg by mouth every evening.    traZODone (DESYREL) 50 MG tablet Take 50 mg by mouth every evening.    verapamiL (VERELAN) 240 MG C24P Take 240 mg by mouth Daily.   Last reviewed on 7/10/2023  8:45 AM by Maribel Bright NP    Review of patient's allergies indicates:   Allergen Reactions    Mustard Itching, Nausea And Vomiting, Shortness Of Breath and Swelling    Lipitor [atorvastatin] Itching    Mushroom Itching, Nausea And Vomiting and Swelling    Niaspan extended-release [niacin] Itching    Nystatin Hives     Other reaction(s): hives    Olive extract Itching, Nausea And Vomiting and Swelling    Oyster extract     Extendryl [[id number]] Rash    V-cillin k Rash    Last reviewed on  7/10/2023 8:45 AM by Maribel Bright      Tasks added this encounter   No tasks added.   Tasks due within next 3 months   8/1/2023 - Refill Coordination Outreach (1 time occurrence)     [place]  [facility]  [address]  [facility] [address]  Phone: [phone]  Fax: [phone]

## 2023-08-02 PROBLEM — G43.709 CHRONIC MIGRAINE WITHOUT AURA WITHOUT STATUS MIGRAINOSUS, NOT INTRACTABLE: Status: ACTIVE | Noted: 2018-02-07

## 2023-08-09 ENCOUNTER — OFFICE VISIT (OUTPATIENT)
Dept: DERMATOLOGY | Facility: CLINIC | Age: 43
End: 2023-08-09
Payer: MEDICARE

## 2023-08-09 DIAGNOSIS — D18.01 CHERRY ANGIOMA: ICD-10-CM

## 2023-08-09 DIAGNOSIS — D22.9 MULTIPLE BENIGN NEVI: ICD-10-CM

## 2023-08-09 DIAGNOSIS — D23.9 DERMATOFIBROMA: ICD-10-CM

## 2023-08-09 DIAGNOSIS — Z12.83 SKIN CANCER SCREENING: Primary | ICD-10-CM

## 2023-08-09 DIAGNOSIS — L81.4 LENTIGINES: ICD-10-CM

## 2023-08-09 PROCEDURE — 99999 PR PBB SHADOW E&M-EST. PATIENT-LVL IV: ICD-10-PCS | Mod: PBBFAC,,, | Performed by: DERMATOLOGY

## 2023-08-09 PROCEDURE — 99203 PR OFFICE/OUTPT VISIT, NEW, LEVL III, 30-44 MIN: ICD-10-PCS | Mod: S$GLB,,, | Performed by: DERMATOLOGY

## 2023-08-09 PROCEDURE — 99999 PR PBB SHADOW E&M-EST. PATIENT-LVL IV: CPT | Mod: PBBFAC,,, | Performed by: DERMATOLOGY

## 2023-08-09 PROCEDURE — 99203 OFFICE O/P NEW LOW 30 MIN: CPT | Mod: S$GLB,,, | Performed by: DERMATOLOGY

## 2023-08-09 NOTE — PATIENT INSTRUCTIONS

## 2023-08-09 NOTE — PROGRESS NOTES
Subjective:      Patient ID:  Gordon Griffin is a 43 y.o. adult who presents for   Chief Complaint   Patient presents with    Skin Check     TBSE     Patient here for Total Body Skin Exam    Last seen by dermatologist: 4-5 years ago    yes - personal history of atypical moles removed  no - personal history of MM   no - family history of MM  yes - childhood blistering sunburns  no - tanning bed use  yes - personal history of NMSC    This is a high risk patient here to check for the development of new lesions.  Pt's occupation in the sun    No new concerning moles or lesions      Pt states she had skin cancers on each temple and one on each leg at age 6. Unsure which type of skin cancer.    Review of Systems   Constitutional:  Negative for fever and chills.   Skin:  Positive for activity-related sunscreen use, recent sunburn and wears hat (sometimes). Negative for daily sunscreen use.   Hematologic/Lymphatic: Bruises/bleeds easily.       Objective:   Physical Exam   Constitutional: She appears well-developed and well-nourished. No distress.   Neurological: She is alert and oriented to person, place, and time. She is not disoriented.   Psychiatric: She has a normal mood and affect.   Skin:   Areas Examined (abnormalities noted in diagram):   Scalp / Hair Palpated and Inspected  Head / Face Inspection Performed  Neck Inspection Performed  Chest / Axilla Inspection Performed  Abdomen Inspection Performed  Genitals / Buttocks / Groin Inspection Performed  Back Inspection Performed  RUE Inspected  LUE Inspection Performed  RLE Inspected  LLE Inspection Performed  Nails and Digits Inspection Performed                 Diagram Legend     Erythematous scaling macule/papule c/w actinic keratosis       Vascular papule c/w angioma      Pigmented verrucoid papule/plaque c/w seborrheic keratosis      Yellow umbilicated papule c/w sebaceous hyperplasia      Irregularly shaped tan macule c/w lentigo     1-2 mm smooth white  papules consistent with Milia      Movable subcutaneous cyst with punctum c/w epidermal inclusion cyst      Subcutaneous movable cyst c/w pilar cyst      Firm pink to brown papule c/w dermatofibroma      Pedunculated fleshy papule(s) c/w skin tag(s)      Evenly pigmented macule c/w junctional nevus     Mildly variegated pigmented, slightly irregular-bordered macule c/w mildly atypical nevus      Flesh colored to evenly pigmented papule c/w intradermal nevus       Pink pearly papule/plaque c/w basal cell carcinoma      Erythematous hyperkeratotic cursted plaque c/w SCC      Surgical scar with no sign of skin cancer recurrence      Open and closed comedones      Inflammatory papules and pustules      Verrucoid papule consistent consistent with wart     Erythematous eczematous patches and plaques     Dystrophic onycholytic nail with subungual debris c/w onychomycosis     Umbilicated papule    Erythematous-base heme-crusted tan verrucoid plaque consistent with inflamed seborrheic keratosis     Erythematous Silvery Scaling Plaque c/w Psoriasis     See annotation      Assessment / Plan:        Skin cancer screening  Total body skin examination performed today including at least 12 points as noted in physical examination. No lesions suspicious for malignancy noted.  Patient instructed in importance of daily broad spectrum sunscreen use with spf at least 30. Sun avoidance and topical protection/protective clothing discussed.    Velazquez angioma  This is a benign vascular lesion. Reassurance given. No treatment required. Treatment of benign, asymptomatic lesions may be considered cosmetic.    Lentigines  These are benign sun spots which should be monitored for changes. Patient instructed in importance of daily broad spectrum sunscreen use with spf at least 30. Sun avoidance and topical protection/protective clothing discussed.    Dermatofibroma  Reassurance given to patient. No treatment is necessary.  This is benign scar  tissue from previous minor trauma to the skin such as a bug bite.    Multiple benign nevi  Benign-appearing nevi present on exam today. Reassurance provided. Periodically examine moles and return to clinic if any moles change or become symptomatic (bleeding, itching, pain, etc).    Follow up in about 1 year (around 8/9/2024) for skin check or sooner for any concerns.

## 2023-08-10 ENCOUNTER — OFFICE VISIT (OUTPATIENT)
Dept: ORTHOPEDICS | Facility: CLINIC | Age: 43
End: 2023-08-10
Payer: MEDICARE

## 2023-08-10 DIAGNOSIS — M22.2X2 PATELLOFEMORAL PAIN SYNDROME OF BOTH KNEES: Primary | ICD-10-CM

## 2023-08-10 DIAGNOSIS — M22.2X1 PATELLOFEMORAL PAIN SYNDROME OF BOTH KNEES: Primary | ICD-10-CM

## 2023-08-10 PROCEDURE — 20610 DRAIN/INJ JOINT/BURSA W/O US: CPT | Mod: LT,S$GLB,, | Performed by: PHYSICIAN ASSISTANT

## 2023-08-10 PROCEDURE — 1159F PR MEDICATION LIST DOCUMENTED IN MEDICAL RECORD: ICD-10-PCS | Mod: CPTII,S$GLB,, | Performed by: PHYSICIAN ASSISTANT

## 2023-08-10 PROCEDURE — 99213 PR OFFICE/OUTPT VISIT, EST, LEVL III, 20-29 MIN: ICD-10-PCS | Mod: 25,S$GLB,, | Performed by: PHYSICIAN ASSISTANT

## 2023-08-10 PROCEDURE — 1160F PR REVIEW ALL MEDS BY PRESCRIBER/CLIN PHARMACIST DOCUMENTED: ICD-10-PCS | Mod: CPTII,S$GLB,, | Performed by: PHYSICIAN ASSISTANT

## 2023-08-10 PROCEDURE — 99999 PR PBB SHADOW E&M-EST. PATIENT-LVL IV: ICD-10-PCS | Mod: PBBFAC,,, | Performed by: PHYSICIAN ASSISTANT

## 2023-08-10 PROCEDURE — 1160F RVW MEDS BY RX/DR IN RCRD: CPT | Mod: CPTII,S$GLB,, | Performed by: PHYSICIAN ASSISTANT

## 2023-08-10 PROCEDURE — 99999 PR PBB SHADOW E&M-EST. PATIENT-LVL IV: CPT | Mod: PBBFAC,,, | Performed by: PHYSICIAN ASSISTANT

## 2023-08-10 PROCEDURE — 1159F MED LIST DOCD IN RCRD: CPT | Mod: CPTII,S$GLB,, | Performed by: PHYSICIAN ASSISTANT

## 2023-08-10 PROCEDURE — 99213 OFFICE O/P EST LOW 20 MIN: CPT | Mod: 25,S$GLB,, | Performed by: PHYSICIAN ASSISTANT

## 2023-08-10 PROCEDURE — 20610 LARGE JOINT ASPIRATION/INJECTION: L KNEE: ICD-10-PCS | Mod: LT,S$GLB,, | Performed by: PHYSICIAN ASSISTANT

## 2023-08-10 RX ORDER — TRIAMCINOLONE ACETONIDE 40 MG/ML
40 INJECTION, SUSPENSION INTRA-ARTICULAR; INTRAMUSCULAR
Status: DISCONTINUED | OUTPATIENT
Start: 2023-08-10 | End: 2023-08-10 | Stop reason: HOSPADM

## 2023-08-10 RX ADMIN — TRIAMCINOLONE ACETONIDE 40 MG: 40 INJECTION, SUSPENSION INTRA-ARTICULAR; INTRAMUSCULAR at 01:08

## 2023-08-10 NOTE — PROGRESS NOTES
Patient ID: Gordon Griffin is a 43 y.o. adult.    Chief Complaint: Pain and Injections of the Left Knee      HISTORY:  Gordon Griffin is a 43 y.o. adult who returns to me today for follow up of left knee pain.  She was last seen by me 7/6/2023- had right knee CSI with good relief.  Today she is doing well, but notes the left knee is bothering her more.  She is getting read to go to an intensive training course and would like to get an injection in her left knee.       PMH/PSH/FamHx/SocHx:    Unchanged from prior visit.    ROS:  Constitution: Negative for chills, fever and weakness.   Respiratory: Negative for cough and shortness of breath.   Musculoskeletal: Positive for left knee pain  Psychiatric/Behavioral: The patient is not nervous/anxious.       PHYSICAL EXAM:   Left knee  No warmth or effusion  No TTP  ROM 0-130  Stable to testing  5/5 quad, 5/5 hamstring    ASSESSMENT/PLAN:    Gordon was seen today for pain and injections.    Diagnoses and all orders for this visit:    Patellofemoral pain syndrome of both knees  -     Large Joint Aspiration/Injection: L knee    - Left knee CSI performed today  - Rest, ice as needed  - Activity modification  - Follow up as needed

## 2023-08-10 NOTE — PROCEDURES
Large Joint Aspiration/Injection: L knee    Date/Time: 8/10/2023 1:30 PM    Performed by: Vale Neil PA-C  Authorized by: Vale Neil PA-C    Consent Done?:  Yes (Verbal)  Indications:  Pain  Prep: patient was prepped and draped in usual sterile fashion    Local anesthetic:  Topical anesthetic    Details:  Needle Size:  22 G  Approach:  Anterolateral  Location:  Knee  Site:  L knee  Medications:  40 mg triamcinolone acetonide 40 mg/mL  Patient tolerance:  Patient tolerated the procedure well with no immediate complications

## 2023-08-28 RX ORDER — ROSUVASTATIN CALCIUM 20 MG/1
20 TABLET, COATED ORAL DAILY
Qty: 90 TABLET | Refills: 9 | Status: SHIPPED | OUTPATIENT
Start: 2023-08-28

## 2023-08-29 DIAGNOSIS — E78.5 HYPERLIPIDEMIA, UNSPECIFIED HYPERLIPIDEMIA TYPE: ICD-10-CM

## 2023-08-29 DIAGNOSIS — I25.10 ATHEROSCLEROSIS OF NATIVE CORONARY ARTERY OF NATIVE HEART WITHOUT ANGINA PECTORIS: ICD-10-CM

## 2023-08-30 RX ORDER — EVOLOCUMAB 140 MG/ML
140 INJECTION, SOLUTION SUBCUTANEOUS
Qty: 2 ML | Refills: 3 | Status: ACTIVE | OUTPATIENT
Start: 2023-08-30 | End: 2023-12-04 | Stop reason: SDUPTHER

## 2023-08-31 ENCOUNTER — HOSPITAL ENCOUNTER (EMERGENCY)
Facility: HOSPITAL | Age: 43
Discharge: HOME | End: 2023-09-01
Attending: EMERGENCY MEDICINE
Payer: MEDICARE

## 2023-08-31 DIAGNOSIS — M62.838 MUSCLE SPASM: Primary | ICD-10-CM

## 2023-08-31 DIAGNOSIS — I63.9 STROKE: ICD-10-CM

## 2023-08-31 LAB
ALBUMIN SERPL BCP-MCNC: 4.7 G/DL (ref 3.5–5.2)
ALP SERPL-CCNC: 99 U/L (ref 55–135)
ALT SERPL W/O P-5'-P-CCNC: 17 U/L (ref 10–44)
ANION GAP SERPL CALC-SCNC: 13 MMOL/L (ref 8–16)
AST SERPL-CCNC: 16 U/L (ref 10–40)
BASOPHILS # BLD AUTO: 0.08 K/UL (ref 0–0.2)
BASOPHILS NFR BLD: 0.4 % (ref 0–1.9)
BILIRUB SERPL-MCNC: 0.3 MG/DL (ref 0.1–1)
BUN SERPL-MCNC: 15 MG/DL (ref 6–20)
BUN SERPL-MCNC: 17 MG/DL (ref 6–30)
CALCIUM SERPL-MCNC: 10 MG/DL (ref 8.7–10.5)
CHLORIDE SERPL-SCNC: 102 MMOL/L (ref 95–110)
CHLORIDE SERPL-SCNC: 106 MMOL/L (ref 95–110)
CHOLEST SERPL-MCNC: 148 MG/DL (ref 120–199)
CHOLEST/HDLC SERPL: 3.9 {RATIO} (ref 2–5)
CO2 SERPL-SCNC: 21 MMOL/L (ref 23–29)
CREAT SERPL-MCNC: 0.9 MG/DL (ref 0.5–1.4)
CREAT SERPL-MCNC: 0.9 MG/DL (ref 0.5–1.4)
CREAT SERPL-MCNC: 1.1 MG/DL (ref 0.5–1.4)
DIFFERENTIAL METHOD: ABNORMAL
EOSINOPHIL # BLD AUTO: 0 K/UL (ref 0–0.5)
EOSINOPHIL NFR BLD: 0.1 % (ref 0–8)
ERYTHROCYTE [DISTWIDTH] IN BLOOD BY AUTOMATED COUNT: 11.9 % (ref 11.5–14.5)
EST. GFR  (NO RACE VARIABLE): >60 ML/MIN/1.73 M^2
GLUCOSE SERPL-MCNC: 120 MG/DL (ref 70–110)
GLUCOSE SERPL-MCNC: 126 MG/DL (ref 70–110)
GLUCOSE SERPL-MCNC: 128 MG/DL (ref 70–110)
HCT VFR BLD AUTO: 46.5 % (ref 37–48.5)
HCT VFR BLD CALC: 48 %PCV (ref 36–54)
HDLC SERPL-MCNC: 38 MG/DL (ref 40–75)
HDLC SERPL: 25.7 % (ref 20–50)
HGB BLD-MCNC: 16.1 G/DL (ref 12–16)
IMM GRANULOCYTES # BLD AUTO: 0.1 K/UL (ref 0–0.04)
IMM GRANULOCYTES NFR BLD AUTO: 0.5 % (ref 0–0.5)
INR PPP: 1.1 (ref 0.8–1.2)
LDLC SERPL CALC-MCNC: 95.8 MG/DL (ref 63–159)
LYMPHOCYTES # BLD AUTO: 1.4 K/UL (ref 1–4.8)
LYMPHOCYTES NFR BLD: 7.4 % (ref 18–48)
MCH RBC QN AUTO: 29.3 PG (ref 27–31)
MCHC RBC AUTO-ENTMCNC: 34.6 G/DL (ref 32–36)
MCV RBC AUTO: 85 FL (ref 82–98)
MONOCYTES # BLD AUTO: 1.5 K/UL (ref 0.3–1)
MONOCYTES NFR BLD: 8.2 % (ref 4–15)
NEUTROPHILS # BLD AUTO: 15.4 K/UL (ref 1.8–7.7)
NEUTROPHILS NFR BLD: 83.4 % (ref 38–73)
NONHDLC SERPL-MCNC: 110 MG/DL
NRBC BLD-RTO: 0 /100 WBC
PLATELET # BLD AUTO: 184 K/UL (ref 150–450)
PMV BLD AUTO: 10 FL (ref 9.2–12.9)
POC IONIZED CALCIUM: 1.3 MMOL/L (ref 1.06–1.42)
POC PTINR: 1.1 (ref 0.9–1.2)
POC PTWBT: 13.1 SEC (ref 9.7–14.3)
POC TCO2 (MEASURED): 27 MMOL/L (ref 23–29)
POCT GLUCOSE: 120 MG/DL (ref 70–110)
POTASSIUM BLD-SCNC: 3.8 MMOL/L (ref 3.5–5.1)
POTASSIUM SERPL-SCNC: 3.9 MMOL/L (ref 3.5–5.1)
PROT SERPL-MCNC: 8 G/DL (ref 6–8.4)
PROTHROMBIN TIME: 11.9 SEC (ref 9–12.5)
RBC # BLD AUTO: 5.49 M/UL (ref 4–5.4)
SAMPLE: ABNORMAL
SAMPLE: NORMAL
SAMPLE: NORMAL
SODIUM BLD-SCNC: 142 MMOL/L (ref 136–145)
SODIUM SERPL-SCNC: 140 MMOL/L (ref 136–145)
TRIGL SERPL-MCNC: 71 MG/DL (ref 30–150)
WBC # BLD AUTO: 18.46 K/UL (ref 3.9–12.7)

## 2023-08-31 PROCEDURE — 93005 ELECTROCARDIOGRAM TRACING: CPT

## 2023-08-31 PROCEDURE — 85610 PROTHROMBIN TIME: CPT | Performed by: EMERGENCY MEDICINE

## 2023-08-31 PROCEDURE — 93010 EKG 12-LEAD: ICD-10-PCS | Mod: ,,, | Performed by: INTERNAL MEDICINE

## 2023-08-31 PROCEDURE — 63600175 PHARM REV CODE 636 W HCPCS: Performed by: EMERGENCY MEDICINE

## 2023-08-31 PROCEDURE — 82565 ASSAY OF CREATININE: CPT

## 2023-08-31 PROCEDURE — 80053 COMPREHEN METABOLIC PANEL: CPT | Performed by: EMERGENCY MEDICINE

## 2023-08-31 PROCEDURE — 99900035 HC TECH TIME PER 15 MIN (STAT)

## 2023-08-31 PROCEDURE — 80061 LIPID PANEL: CPT | Performed by: EMERGENCY MEDICINE

## 2023-08-31 PROCEDURE — 85025 COMPLETE CBC W/AUTO DIFF WBC: CPT | Performed by: EMERGENCY MEDICINE

## 2023-08-31 PROCEDURE — 96374 THER/PROPH/DIAG INJ IV PUSH: CPT

## 2023-08-31 PROCEDURE — 84443 ASSAY THYROID STIM HORMONE: CPT | Performed by: EMERGENCY MEDICINE

## 2023-08-31 PROCEDURE — 85610 PROTHROMBIN TIME: CPT

## 2023-08-31 PROCEDURE — 93010 ELECTROCARDIOGRAM REPORT: CPT | Mod: ,,, | Performed by: INTERNAL MEDICINE

## 2023-08-31 PROCEDURE — 82962 GLUCOSE BLOOD TEST: CPT

## 2023-08-31 PROCEDURE — 99285 EMERGENCY DEPT VISIT HI MDM: CPT | Mod: 25

## 2023-08-31 RX ORDER — DIPHENHYDRAMINE HYDROCHLORIDE 50 MG/ML
50 INJECTION INTRAMUSCULAR; INTRAVENOUS
Status: COMPLETED | OUTPATIENT
Start: 2023-08-31 | End: 2023-08-31

## 2023-08-31 RX ADMIN — IOHEXOL 100 ML: 350 INJECTION, SOLUTION INTRAVENOUS at 11:08

## 2023-08-31 RX ADMIN — DIPHENHYDRAMINE HYDROCHLORIDE 50 MG: 50 INJECTION, SOLUTION INTRAMUSCULAR; INTRAVENOUS at 11:08

## 2023-09-01 VITALS
HEART RATE: 83 BPM | RESPIRATION RATE: 20 BRPM | SYSTOLIC BLOOD PRESSURE: 107 MMHG | TEMPERATURE: 98 F | OXYGEN SATURATION: 96 % | WEIGHT: 150 LBS | DIASTOLIC BLOOD PRESSURE: 73 MMHG | BODY MASS INDEX: 20.34 KG/M2

## 2023-09-01 PROBLEM — R29.818 TRANSIENT NEUROLOGICAL SYMPTOMS: Status: ACTIVE | Noted: 2023-09-01

## 2023-09-01 PROBLEM — G43.719 INTRACTABLE CHRONIC MIGRAINE WITHOUT AURA AND WITHOUT STATUS MIGRAINOSUS: Status: ACTIVE | Noted: 2023-09-01

## 2023-09-01 LAB
AMPHET+METHAMPHET UR QL: NEGATIVE
BARBITURATES UR QL SCN>200 NG/ML: ABNORMAL
BENZODIAZ UR QL SCN>200 NG/ML: ABNORMAL
BZE UR QL SCN: NEGATIVE
CANNABINOIDS UR QL SCN: NEGATIVE
CREAT UR-MCNC: 63 MG/DL (ref 15–325)
ETHANOL SERPL-MCNC: <10 MG/DL
METHADONE UR QL SCN>300 NG/ML: NEGATIVE
OPIATES UR QL SCN: NEGATIVE
PCP UR QL SCN>25 NG/ML: NEGATIVE
TOXICOLOGY INFORMATION: ABNORMAL
TSH SERPL DL<=0.005 MIU/L-ACNC: 0.41 UIU/ML (ref 0.4–4)

## 2023-09-01 PROCEDURE — 80307 DRUG TEST PRSMV CHEM ANLYZR: CPT | Performed by: EMERGENCY MEDICINE

## 2023-09-01 PROCEDURE — 99284 EMERGENCY DEPT VISIT MOD MDM: CPT | Mod: ,,, | Performed by: PSYCHIATRY & NEUROLOGY

## 2023-09-01 PROCEDURE — 25500020 PHARM REV CODE 255: Performed by: EMERGENCY MEDICINE

## 2023-09-01 PROCEDURE — 99284 PR EMERGENCY DEPT VISIT,LEVEL IV: ICD-10-PCS | Mod: ,,, | Performed by: PSYCHIATRY & NEUROLOGY

## 2023-09-01 PROCEDURE — 82077 ASSAY SPEC XCP UR&BREATH IA: CPT | Performed by: EMERGENCY MEDICINE

## 2023-09-01 NOTE — HPI
Gordon Griffin is a 42 y.o. transgender female (on estrogen and spironolactone) with a past medical history significant for migraine headaches, abnormal involuntary movements, chronic pain, anxiety/depression who presents to the ED at [facility] due to dysarthria.  Patient states that about 1 hour prior to arrival she started with a headache and then was talking and noticed dysarthria. She has also been having spasms in right leg and took Flexeril and then a Valium as it did not get better.  Patient has had prior episode of transient neurological symptoms similar to now and had complete stroke w/u complete and no acute ischemic event seen, she follows up with Dr TAMMY Rowan.  She also recieves Botox injections from Dr Beasley for her migraines with last injection was  on 8-7-23.    Upon my examination patient moving all extremities well still having spasm in right leg. She is able to speak but mouth drooping changes from left to right side and back frequently. No visual changes, no sensory changes.     CTA Head and neck multiphase complete and no LVO seen or acute process.     Case discussed with Dr Romero Vascular Neurology fellow who looked at the images.   No thrombolytics as low NIHSS. NO intervention as no LVO.     Tox screen + for benzo and barbiturates.     MRI brain with no acute process seen.

## 2023-09-01 NOTE — ASSESSMENT & PLAN NOTE
Patient with hx of migraine and receives Botox injections for these.   Patient with headache all thru this episode  Patient needs to follow up with headache specialist

## 2023-09-01 NOTE — SUBJECTIVE & OBJECTIVE
Past Medical History:   Diagnosis Date    Anxiety     Cancer     Chest pain 1/20/2016 12/30/2015: Began experinece chest pain.    Depression     Functional movement disorder 10/1/2019    Migraine headache     Movement disorder     Myoclonic jerkings, massive     Stroke pt. states he had a cva at 3 months old     Past Surgical History:   Procedure Laterality Date    ANGIOGRAM, CORONARY, WITH LEFT HEART CATHETERIZATION      EPIDURAL STEROID INJECTION N/A 3/26/2021    Procedure: INJECTION, STEROID, EPIDURAL L4/5;  Surgeon: Larry Brasher MD;  Location: [facility];  Service: Pain Management;  Laterality: N/A;    EPIDURAL STEROID INJECTION N/A 6/4/2021    Procedure: INJECTION, STEROID, EPIDURAL, L4-L5 IL need consent;  Surgeon: Larry Brasher MD;  Location: [facility];  Service: Pain Management;  Laterality: N/A;    EPIDURAL STEROID INJECTION N/A 10/29/2021    Procedure: INJECTION, STEROID, EPIDURAL, L4-L5IL NEED CONSENT;  Surgeon: Larry Brasher MD;  Location: [facility];  Service: Pain Management;  Laterality: N/A;    EPIDURAL STEROID INJECTION N/A 1/27/2022    Procedure: Injection, Steroid, Epidural C7/T1;  Surgeon: Larry Brasher MD;  Location: [facility];  Service: Pain Management;  Laterality: N/A;    EPIDURAL STEROID INJECTION N/A 2/10/2022    Procedure: Injection, Steroid, Epidural L4/5;  Surgeon: Larry Brasher MD;  Location: [facility];  Service: Pain Management;  Laterality: N/A;    EPIDURAL STEROID INJECTION N/A 8/25/2022    Procedure: Injection, Steroid, Epidural C7/T1 CONTRAST;  Surgeon: Larry Brasher MD;  Location: [facility];  Service: Pain Management;  Laterality: N/A;    EPIDURAL STEROID INJECTION N/A 5/26/2023    Procedure: INJECTION, STEROID, EPIDURAL C7/T1 IL;  Surgeon: Larry Brasher MD;  Location: [facility];  Service: Pain Management;  Laterality: N/A;    INJECTION OF ANESTHETIC AGENT AROUND NERVE Bilateral 5/6/2022    Procedure: BLOCK, NERVE, BILATERAL  L3-L4-*L5 MEDIAL BRANCH;  Surgeon: Larry Brasher MD;  Location: [facility];  Service: Pain Management;  Laterality: Bilateral;    INJECTION OF ANESTHETIC AGENT AROUND NERVE Bilateral 6/2/2022    Procedure: BLOCK, NERVE BILATERAL L3-L4-L5 MEDIAL BRANCH 2nd, needs consent;  Surgeon: Larry Brasher MD;  Location: [facility];  Service: Pain Management;  Laterality: Bilateral;    INJECTION, SACROILIAC JOINT Bilateral 6/9/2023    Procedure: INJECTION,SACROILIAC JOINT, BILATERAL SI;  Surgeon: Larry Brasher MD;  Location: [facility];  Service: Pain Management;  Laterality: Bilateral;    MANDIBLE SURGERY      reconstruction    RADIOFREQUENCY ABLATION Right 6/23/2022    Procedure: RADIOFREQUENCY ABLATION RIGHT L3,L4,L5 1 of 2, consent needed;  Surgeon: Larry Brasher MD;  Location: [facility];  Service: Pain Management;  Laterality: Right;    RADIOFREQUENCY ABLATION Left 7/7/2022    Procedure: RADIOFREQUENCY ABLATION LEFT L3,L4,L5 2 of 2, needs consent;  Surgeon: Larry Brasher MD;  Location: [facility];  Service: Pain Management;  Laterality: Left;    RADIOFREQUENCY ABLATION Right 3/3/2023    Procedure: RADIOFREQUENCY ABLATION RIGHT L3,L4,L5;  Surgeon: Larry Brasher MD;  Location: [facility];  Service: Pain Management;  Laterality: Right;    RADIOFREQUENCY ABLATION Left 3/31/2023    Procedure: Radiofrequency Ablation Left L3, L4, & L5 Pending CBC results;  Surgeon: Diana Lira MD;  Location: [facility];  Service: Pain Management;  Laterality: Left;    variceol repair       Family History   Problem Relation Age of Onset    Heart disease Father     Hypertension Father     Hyperlipidemia Father     Heart disease Paternal Uncle     Heart disease Mother     Myasthenia gravis Mother      Social History     Tobacco Use    Smoking status: Never    Smokeless tobacco: Never   Substance Use Topics    Alcohol use: No    Drug use: No     Review of patient's allergies indicates:   Allergen  Reactions    Mustard Itching, Nausea And Vomiting, Shortness Of Breath and Swelling    Lipitor [atorvastatin] Itching    Mushroom Itching, Nausea And Vomiting and Swelling    Niaspan extended-release [niacin] Itching    Nystatin Hives     Other reaction(s): hives    Olive extract Itching, Nausea And Vomiting and Swelling    Oyster extract     Extendryl [[id number]-ql-[id number]] Rash    V-cillin k Rash       Medications: I have reviewed the current medication administration record.    (Not in a hospital admission)      Review of Systems   Constitutional:  Negative for chills and fever.   HENT:  Negative for ear discharge and ear pain.    Eyes:  Negative for pain and redness.   Respiratory:  Negative for cough and shortness of breath.    Cardiovascular:  Negative for chest pain and leg swelling.   Gastrointestinal:  Negative for abdominal distention and abdominal pain.   Endocrine: Negative for polydipsia and polyphagia.   Genitourinary:  Negative for dyspareunia and dysuria.   Musculoskeletal:  Positive for arthralgias.        Spasm right leg   Skin:  Negative for rash and wound.   Allergic/Immunologic: Negative for food allergies and immunocompromised state.   Neurological:  Positive for facial asymmetry, speech difficulty and headaches.   Hematological:  Does not bruise/bleed easily.   Psychiatric/Behavioral:  Negative for agitation and confusion.      Objective:     Vital Signs (Most Recent):  Temp: 97.6 °F (36.4 °C) (08/31/23 2255)  Pulse: 86 (09/01/23 0115)  Resp: 20 (09/01/23 0115)  BP: 125/61 (09/01/23 0115)  SpO2: 98 % (09/01/23 0115)    Vital Signs Range (Last 24H):  Temp:  [97.6 °F (36.4 °C)]   Pulse:  [84-93]   Resp:  [14-20]   BP: (112-136)/(61-89)   SpO2:  [96 %-100 %]        Physical Exam  Vitals and nursing note reviewed.   Constitutional:       Appearance: Normal appearance.   HENT:      Head: Normocephalic and atraumatic.   Eyes:      Extraocular Movements: Extraocular movements intact.       "Conjunctiva/sclera: Conjunctivae normal.      Pupils: Pupils are equal, round, and reactive to light.   Cardiovascular:      Rate and Rhythm: Normal rate and regular rhythm.   Pulmonary:      Effort: Pulmonary effort is normal.      Breath sounds: Normal breath sounds.   Abdominal:      General: Abdomen is flat. Bowel sounds are normal.      Palpations: Abdomen is soft.   Musculoskeletal:         General: Normal range of motion.      Cervical back: Normal range of motion.   Skin:     General: Skin is warm and dry.   Neurological:      Mental Status: She is alert and oriented to person, place, and time.      Comments: Facial droop that moves form left to right               Neurological Exam:   LOC: alert  Attention Span: Good   Language: No aphasia  Articulation: Dysarthria  Orientation: Person, Place, Time   Visual Fields: Full  EOM (CN III, IV, VI): Full/intact  Pupils (CN II, III): PERRL  Facial Sensation (CN V): Normal  Facial Movement (CN VII): Lower facial weakness on the Left and Right  Gag Reflex: present  Reflexes: flexor plantar responses bilaterally  Motor: Arm left  Normal 5/5  Leg left  Normal 5/5  Arm right  Normal 5/5  Leg right Normal 5/5  Cerebellum: No evidence of appendicular or axial ataxia  Sensation: Intact to light touch, temperature and vibration  Tone: Normal tone throughout      Laboratory:  CMP:   Recent Labs   Lab 08/31/23  2323   CALCIUM 10.0   ALBUMIN 4.7   PROT 8.0      K 3.9   CO2 21*      BUN 15   CREATININE 1.1   ALKPHOS 99   ALT 17   AST 16   BILITOT 0.3     CBC:   Recent Labs   Lab 08/31/23 2323   WBC 18.46*   RBC 5.49*   HGB 16.1*   HCT 46.5      MCV 85   MCH 29.3   MCHC 34.6     Lipid Panel:   Recent Labs   Lab 08/31/23 2323   CHOL 148   LDLCALC 95.8   HDL 38*   TRIG 71     Coagulation:   Recent Labs   Lab 08/31/23 2323   INR 1.1     Hgb A1C: No results for input(s): "HGBA1C" in the last 168 hours.  TSH:   Recent Labs   Lab 08/31/23 2323   TSH 0.411 "       Diagnostic Results:      Brain imaging:  MRI Brain w/o contrast 9-1-23 results:  No acute intracranial abnormality.    Vessel Imaging:  CTA head and neck multiphase 8-31-23 results:  No acute intracranial abnormality.  No high-grade arterial stenosis or major vessel occlusion.    Cardiac Evaluation:   EKG 8-31-23 results:  Normal sinus rhythm Rightward axis Borderline Abnormal ECG When compared with ECG of 16-MAR-2023 03:36, T wave inversion no longer evident in Inferior leads T wave inversion no longer evident in Anterior leads

## 2023-09-01 NOTE — ASSESSMENT & PLAN NOTE
44 y/o male with headache and dysarthria with facial droop that moves from side to side while talking.   CTA head and neck multiphase and MRI brain complete and no acute process seen    Patient already on ASA 81 mg daily, intolerant to statin so on Zetia. Patient also on hormone replacement for transgender.    Follow up with Neurology as out patient

## 2023-09-01 NOTE — ED TRIAGE NOTES
Gordon Griffin, a 43 y.o. adult presents to the ED w/ complaint of aphasia, headache, right sided facial drooping. Pt has extensive neurological history including TIA.    Triage note:  Chief Complaint   Patient presents with    Aphasia     Pt states he has had trouble getting his words out for the last 30 min. Headache x1 hour. Extensive neurological history. Hx TIA.      Review of patient's allergies indicates:   Allergen Reactions    Mustard Itching, Nausea And Vomiting, Shortness Of Breath and Swelling    Lipitor [atorvastatin] Itching    Mushroom Itching, Nausea And Vomiting and Swelling    Niaspan extended-release [niacin] Itching    Nystatin Hives     Other reaction(s): hives    Olive extract Itching, Nausea And Vomiting and Swelling    Oyster extract     Extendryl [yahoveuntjhpccmp-id-gqgfntpyvv] Rash    V-cillin k Rash     Past Medical History:   Diagnosis Date    Anxiety     Cancer     Chest pain 1/20/2016 12/30/2015: Began experinece chest pain.    Depression     Functional movement disorder 10/1/2019    Migraine headache     Movement disorder     Myoclonic jerkings, massive     Stroke pt. states he had a cva at 3 months old       Please Triage joint pain.   ----- Message -----   From: Carlos Flores   Sent: 11/2/2020   8:19 AM CST   To: [facility]   Subject: Appointment scheduled from [place] [id number]         Appointment For: Carlos Flores ([id number])

## 2023-09-01 NOTE — CONSULTS
[facility] - Emergency Dept  Vascular Neurology  [facility]  Consult Note    Inpatient consult to Vascular (Stroke) Neurology  Consult performed by: Leticia Archer NP  Consult ordered by: Diana Cheung MD        Assessment/Plan:     Patient is a 43 y.o. year old adult with:    Intractable chronic migraine without aura and without status migrainosus  Patient with hx of migraine and receives Botox injections for these.   Patient with headache all thru this episode  Patient needs to follow up with headache specialist    Transient neurological symptoms  44 y/o male with headache and dysarthria with facial droop that moves from side to side while talking.   CTA head and neck multiphase and MRI brain complete and no acute process seen    Patient already on ASA 81 mg daily, intolerant to statin so on Zetia. Patient also on hormone replacement for transgender.    Follow up with Neurology as out patient        STROKE DOCUMENTATION     Acute Stroke Times   Last Known Normal Date: 08/31/23  Last Known Normal Time: 2200  Symptom Onset Date: 08/31/20  Symptom Onset Time: 2200  Stroke Team Called Date: 08/31/20  Stroke Team Called Time: 2303  Stroke Team Arrival Date: 08/31/20  Stroke Team Arrival Time: 2311  Thrombolytic Therapy Recommended: No  CTA Interpretation Time: 2359  Thrombectomy Recommended: No    NIH Scale:  1a. Level of Consciousness: 0-->Alert, keenly responsive  1b. LOC Questions: 0-->Answers both questions correctly  1c. LOC Commands: 0-->Performs both tasks correctly  2. Best Gaze: 0-->Normal  3. Visual: 0-->No visual loss  5a. Motor Arm, Left: 0-->No drift, limb holds 90 (or 45) degrees for full 10 secs  5b. Motor Arm, Right: 0-->No drift, limb holds 90 (or 45) degrees for full 10 secs  6a. Motor Leg, Left: 0-->No drift, leg holds 30 degree position for full 5 secs  6b. Motor Leg, Right: 0-->No drift, leg holds 30 degree position for full 5 secs  7. Limb Ataxia: 0-->Absent  8. Sensory:  0-->Normal, no sensory loss  9. Best Language: 0-->No aphasia, normal  10. Dysarthria: 0-->Normal  11. Extinction and Inattention (formerly Neglect): 0-->No abnormality    Modified Parul Score: 1  Indigo Coma Scale:15   ABCD2 Score:    [id number] Score:   HAS -BLED Score:   ICH Score:   Hunt & Garcia Classification:       Thrombolysis Candidate? No, Non-disabling symptoms - Low NIHSS , Strong suspicion for stroke mimic or alternative diagnosis     Delays to Thrombolysis?  Not Applicable    Interventional Revascularization Candidate?   Is the patient eligible for mechanical endovascular reperfusion (GENET)?  No; No large vessel occlusion identified on imaging     Delays to Thrombectomy? Not Applicable    Hemorrhagic change of an Ischemic Stroke: Does this patient have an ischemic stroke with hemorrhagic changes? No     Subjective:     History of Present Illness:   Gordon Griffin is a 42 y.o. transgender female (on estrogen and spironolactone) with a past medical history significant for migraine headaches, abnormal involuntary movements, chronic pain, anxiety/depression who presents to the ED at [facility] due to dysarthria.  Patient states that about 1 hour prior to arrival she started with a headache and then was talking and noticed dysarthria. She has also been having spasms in right leg and took Flexeril and then a Valium as it did not get better.  Patient has had prior episode of transient neurological symptoms similar to now and had complete stroke w/u complete and no acute ischemic event seen, she follows up with Dr TAMMY Rowan.  She also recieves Botox injections from Dr Beasley for her migraines with last injection was  on 8-7-23.    Upon my examination patient moving all extremities well still having spasm in right leg. She is able to speak but mouth drooping changes from left to right side and back frequently. No visual changes, no sensory changes.     CTA Head and neck multiphase complete and no LVO seen or  acute process.     Case discussed with Dr Romero Vascular Neurology fellow who looked at the images.   No thrombolytics as low NIHSS. NO intervention as no LVO.     Tox screen + for benzo and barbiturates.     MRI brain with no acute process seen.          Past Medical History:   Diagnosis Date    Anxiety     Cancer     Chest pain 1/20/2016 12/30/2015: Began experinece chest pain.    Depression     Functional movement disorder 10/1/2019    Migraine headache     Movement disorder     Myoclonic jerkings, massive     Stroke pt. states he had a cva at 3 months old     Past Surgical History:   Procedure Laterality Date    ANGIOGRAM, CORONARY, WITH LEFT HEART CATHETERIZATION      EPIDURAL STEROID INJECTION N/A 3/26/2021    Procedure: INJECTION, STEROID, EPIDURAL L4/5;  Surgeon: Larry Brasher MD;  Location: [facility];  Service: Pain Management;  Laterality: N/A;    EPIDURAL STEROID INJECTION N/A 6/4/2021    Procedure: INJECTION, STEROID, EPIDURAL, L4-L5 IL need consent;  Surgeon: Larry Brasher MD;  Location: [facility];  Service: Pain Management;  Laterality: N/A;    EPIDURAL STEROID INJECTION N/A 10/29/2021    Procedure: INJECTION, STEROID, EPIDURAL, L4-L5IL NEED CONSENT;  Surgeon: Larry Brasher MD;  Location: [facility];  Service: Pain Management;  Laterality: N/A;    EPIDURAL STEROID INJECTION N/A 1/27/2022    Procedure: Injection, Steroid, Epidural C7/T1;  Surgeon: Larry Brasher MD;  Location: [facility];  Service: Pain Management;  Laterality: N/A;    EPIDURAL STEROID INJECTION N/A 2/10/2022    Procedure: Injection, Steroid, Epidural L4/5;  Surgeon: Larry Brasher MD;  Location: [facility];  Service: Pain Management;  Laterality: N/A;    EPIDURAL STEROID INJECTION N/A 8/25/2022    Procedure: Injection, Steroid, Epidural C7/T1 CONTRAST;  Surgeon: Larry Brasher MD;  [facility];  Service: Pain Management;  Laterality: N/A;    EPIDURAL STEROID INJECTION N/A  5/26/2023    Procedure: INJECTION, STEROID, EPIDURAL C7/T1 IL;  Surgeon: Larry Brasher MD;  Location: [facility];  Service: Pain Management;  Laterality: N/A;    INJECTION OF ANESTHETIC AGENT AROUND NERVE Bilateral 5/6/2022    Procedure: BLOCK, NERVE, BILATERAL L3-L4-*L5 MEDIAL BRANCH;  Surgeon: Larry Brasher MD;  Location: [facility];  Service: Pain Management;  Laterality: Bilateral;    INJECTION OF ANESTHETIC AGENT AROUND NERVE Bilateral 6/2/2022    Procedure: BLOCK, NERVE BILATERAL L3-L4-L5 MEDIAL BRANCH 2nd, needs consent;  Surgeon: Larry Brahser MD;  Location: [facility];  Service: Pain Management;  Laterality: Bilateral;    INJECTION, SACROILIAC JOINT Bilateral 6/9/2023    Procedure: INJECTION,SACROILIAC JOINT, BILATERAL SI;  Surgeon: Larry Brasher MD;  Location: [facility];  Service: Pain Management;  Laterality: Bilateral;    MANDIBLE SURGERY      reconstruction    RADIOFREQUENCY ABLATION Right 6/23/2022    Procedure: RADIOFREQUENCY ABLATION RIGHT L3,L4,L5 1 of 2, consent needed;  Surgeon: Larry Brasher MD;  Location: [facility];  Service: Pain Management;  Laterality: Right;    RADIOFREQUENCY ABLATION Left 7/7/2022    Procedure: RADIOFREQUENCY ABLATION LEFT L3,L4,L5 2 of 2, needs consent;  Surgeon: Larry Brasher MD;  Location: [facility];  Service: Pain Management;  Laterality: Left;    RADIOFREQUENCY ABLATION Right 3/3/2023    Procedure: RADIOFREQUENCY ABLATION RIGHT L3,L4,L5;  Surgeon: Larry Brasher MD;  Location: [facility];  Service: Pain Management;  Laterality: Right;    RADIOFREQUENCY ABLATION Left 3/31/2023    Procedure: Radiofrequency Ablation Left L3, L4, & L5 Pending CBC results;  Surgeon: Diana Lira MD;  Location: [facility];  Service: Pain Management;  Laterality: Left;    variceol repair       Family History   Problem Relation Age of Onset    Heart disease Father     Hypertension Father     Hyperlipidemia Father     Heart  disease Paternal Uncle     Heart disease Mother     Myasthenia gravis Mother      Social History     Tobacco Use    Smoking status: Never    Smokeless tobacco: Never   Substance Use Topics    Alcohol use: No    Drug use: No     Review of patient's allergies indicates:   Allergen Reactions    Mustard Itching, Nausea And Vomiting, Shortness Of Breath and Swelling    Lipitor [atorvastatin] Itching    Mushroom Itching, Nausea And Vomiting and Swelling    Niaspan extended-release [niacin] Itching    Nystatin Hives     Other reaction(s): hives    Olive extract Itching, Nausea And Vomiting and Swelling    Oyster extract     Extendryl [fslucgiwfyapalyh-df-sozantolfz] Rash    V-cillin k Rash       Medications: I have reviewed the current medication administration record.    (Not in a hospital admission)      Review of Systems   Constitutional:  Negative for chills and fever.   HENT:  Negative for ear discharge and ear pain.    Eyes:  Negative for pain and redness.   Respiratory:  Negative for cough and shortness of breath.    Cardiovascular:  Negative for chest pain and leg swelling.   Gastrointestinal:  Negative for abdominal distention and abdominal pain.   Endocrine: Negative for polydipsia and polyphagia.   Genitourinary:  Negative for dyspareunia and dysuria.   Musculoskeletal:  Positive for arthralgias.        Spasm right leg   Skin:  Negative for rash and wound.   Allergic/Immunologic: Negative for food allergies and immunocompromised state.   Neurological:  Positive for facial asymmetry, speech difficulty and headaches.   Hematological:  Does not bruise/bleed easily.   Psychiatric/Behavioral:  Negative for agitation and confusion.      Objective:     Vital Signs (Most Recent):  Temp: 97.6 °F (36.4 °C) (08/31/23 2255)  Pulse: 86 (09/01/23 0115)  Resp: 20 (09/01/23 0115)  BP: 125/61 (09/01/23 0115)  SpO2: 98 % (09/01/23 0115)    Vital Signs Range (Last 24H):  Temp:  [97.6 °F (36.4 °C)]   Pulse:  [84-93]    Resp:  [14-20]   BP: (112-136)/(61-89)   SpO2:  [96 %-100 %]        Physical Exam  Vitals and nursing note reviewed.   Constitutional:       Appearance: Normal appearance.   HENT:      Head: Normocephalic and atraumatic.   Eyes:      Extraocular Movements: Extraocular movements intact.      Conjunctiva/sclera: Conjunctivae normal.      Pupils: Pupils are equal, round, and reactive to light.   Cardiovascular:      Rate and Rhythm: Normal rate and regular rhythm.   Pulmonary:      Effort: Pulmonary effort is normal.      Breath sounds: Normal breath sounds.   Abdominal:      General: Abdomen is flat. Bowel sounds are normal.      Palpations: Abdomen is soft.   Musculoskeletal:         General: Normal range of motion.      Cervical back: Normal range of motion.   Skin:     General: Skin is warm and dry.   Neurological:      Mental Status: She is alert and oriented to person, place, and time.      Comments: Facial droop that moves form left to right               Neurological Exam:   LOC: alert  Attention Span: Good   Language: No aphasia  Articulation: Dysarthria  Orientation: Person, Place, Time   Visual Fields: Full  EOM (CN III, IV, VI): Full/intact  Pupils (CN II, III): PERRL  Facial Sensation (CN V): Normal  Facial Movement (CN VII): Lower facial weakness on the Left and Right  Gag Reflex: present  Reflexes: flexor plantar responses bilaterally  Motor: Arm left  Normal 5/5  Leg left  Normal 5/5  Arm right  Normal 5/5  Leg right Normal 5/5  Cerebellum: No evidence of appendicular or axial ataxia  Sensation: Intact to light touch, temperature and vibration  Tone: Normal tone throughout      Laboratory:  CMP:   Recent Labs   Lab 08/31/23  2323   CALCIUM 10.0   ALBUMIN 4.7   PROT 8.0      K 3.9   CO2 21*      BUN 15   CREATININE 1.1   ALKPHOS 99   ALT 17   AST 16   BILITOT 0.3     CBC:   Recent Labs   Lab 08/31/23  2323   WBC 18.46*   RBC 5.49*   HGB 16.1*   HCT 46.5      MCV 85   MCH 29.3   MCHC  "34.6     Lipid Panel:   Recent Labs   Lab 08/31/23  2323   CHOL 148   LDLCALC 95.8   HDL 38*   TRIG 71     Coagulation:   Recent Labs   Lab 08/31/23 2323   INR 1.1     Hgb A1C: No results for input(s): "HGBA1C" in the last 168 hours.  TSH:   Recent Labs   Lab 08/31/23 2323   TSH 0.411       Diagnostic Results:      Brain imaging:  MRI Brain w/o contrast 9-1-23 results:  No acute intracranial abnormality.    Vessel Imaging:  CTA head and neck multiphase 8-31-23 results:  No acute intracranial abnormality.  No high-grade arterial stenosis or major vessel occlusion.    Cardiac Evaluation:   EKG 8-31-23 results:  Normal sinus rhythm Rightward axis Borderline Abnormal ECG When compared with ECG of 16-MAR-2023 03:36, T wave inversion no longer evident in Inferior leads T wave inversion no longer evident in Anterior leads        Leticia Archer NP  [facility]  Department of Vascular Neurology   [facility] - Emergency Dept   "

## 2023-09-05 ENCOUNTER — OFFICE VISIT (OUTPATIENT)
Dept: PAIN MEDICINE | Facility: CLINIC | Age: 43
End: 2023-09-05
Payer: MEDICARE

## 2023-09-05 VITALS
HEART RATE: 86 BPM | RESPIRATION RATE: 19 BRPM | SYSTOLIC BLOOD PRESSURE: 123 MMHG | HEIGHT: 72 IN | BODY MASS INDEX: 19.08 KG/M2 | WEIGHT: 140.88 LBS | DIASTOLIC BLOOD PRESSURE: 74 MMHG | TEMPERATURE: 98 F

## 2023-09-05 DIAGNOSIS — M51.36 DDD (DEGENERATIVE DISC DISEASE), LUMBAR: ICD-10-CM

## 2023-09-05 DIAGNOSIS — G89.4 CHRONIC PAIN SYNDROME: Primary | ICD-10-CM

## 2023-09-05 DIAGNOSIS — M53.3 SACROILIAC JOINT PAIN: ICD-10-CM

## 2023-09-05 DIAGNOSIS — M54.16 LUMBAR RADICULOPATHY: ICD-10-CM

## 2023-09-05 DIAGNOSIS — M47.816 LUMBAR SPONDYLOSIS: ICD-10-CM

## 2023-09-05 PROCEDURE — 3008F PR BODY MASS INDEX (BMI) DOCUMENTED: ICD-10-PCS | Mod: CPTII,S$GLB,, | Performed by: NURSE PRACTITIONER

## 2023-09-05 PROCEDURE — 99999 PR PBB SHADOW E&M-EST. PATIENT-LVL V: ICD-10-PCS | Mod: PBBFAC,,, | Performed by: NURSE PRACTITIONER

## 2023-09-05 PROCEDURE — 1160F PR REVIEW ALL MEDS BY PRESCRIBER/CLIN PHARMACIST DOCUMENTED: ICD-10-PCS | Mod: CPTII,S$GLB,, | Performed by: NURSE PRACTITIONER

## 2023-09-05 PROCEDURE — 99213 OFFICE O/P EST LOW 20 MIN: CPT | Mod: S$GLB,,, | Performed by: NURSE PRACTITIONER

## 2023-09-05 PROCEDURE — 3074F PR MOST RECENT SYSTOLIC BLOOD PRESSURE < 130 MM HG: ICD-10-PCS | Mod: CPTII,S$GLB,, | Performed by: NURSE PRACTITIONER

## 2023-09-05 PROCEDURE — 1159F MED LIST DOCD IN RCRD: CPT | Mod: CPTII,S$GLB,, | Performed by: NURSE PRACTITIONER

## 2023-09-05 PROCEDURE — 1160F RVW MEDS BY RX/DR IN RCRD: CPT | Mod: CPTII,S$GLB,, | Performed by: NURSE PRACTITIONER

## 2023-09-05 PROCEDURE — 1159F PR MEDICATION LIST DOCUMENTED IN MEDICAL RECORD: ICD-10-PCS | Mod: CPTII,S$GLB,, | Performed by: NURSE PRACTITIONER

## 2023-09-05 PROCEDURE — 3074F SYST BP LT 130 MM HG: CPT | Mod: CPTII,S$GLB,, | Performed by: NURSE PRACTITIONER

## 2023-09-05 PROCEDURE — 99999 PR PBB SHADOW E&M-EST. PATIENT-LVL V: CPT | Mod: PBBFAC,,, | Performed by: NURSE PRACTITIONER

## 2023-09-05 PROCEDURE — 3078F DIAST BP <80 MM HG: CPT | Mod: CPTII,S$GLB,, | Performed by: NURSE PRACTITIONER

## 2023-09-05 PROCEDURE — 99213 PR OFFICE/OUTPT VISIT, EST, LEVL III, 20-29 MIN: ICD-10-PCS | Mod: S$GLB,,, | Performed by: NURSE PRACTITIONER

## 2023-09-05 PROCEDURE — 3078F PR MOST RECENT DIASTOLIC BLOOD PRESSURE < 80 MM HG: ICD-10-PCS | Mod: CPTII,S$GLB,, | Performed by: NURSE PRACTITIONER

## 2023-09-05 PROCEDURE — 3008F BODY MASS INDEX DOCD: CPT | Mod: CPTII,S$GLB,, | Performed by: NURSE PRACTITIONER

## 2023-09-05 RX ORDER — GABAPENTIN 300 MG/1
300 CAPSULE ORAL 3 TIMES DAILY
Qty: 90 CAPSULE | Refills: 3 | Status: SHIPPED | OUTPATIENT
Start: 2023-09-05 | End: 2024-01-05 | Stop reason: SDUPTHER

## 2023-09-05 NOTE — PROGRESS NOTES
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 year old adult with neck and lower back pain, consistent with the followin. Chronic pain syndrome        2. Lumbar spondylosis  Procedure Order to Pain Management      3. Lumbar radiculopathy  gabapentin (NEURONTIN) 300 MG capsule      4. DDD (degenerative disc disease), lumbar        5. Sacroiliac joint pain                PLAN:     - Previous imaging reviewed today.    - Schedule for right then left L3,4,5 RFA, one side at a time, two weeks apart.     - We can repeat bilateral SI joint injections as needed.     - We can repeat C7/T1 IL KYUNG as needed.     - Continue Gabapentin. Refill provided.     - I have stressed the importance of physical activity and a home exercise plan to help with pain and improve health.    - RTC 3 weeks after above procedure.       The above plan and management options were discussed at length with patient. Patient is in agreement with the above and verbalized understanding.    Maribel Bright NP   2023

## 2023-09-06 NOTE — ED PROVIDER NOTES
History:  Gordon Griffin is a 43 y.o. adult who presents to the ED with Aphasia (Pt states he has had trouble getting his words out for the last 30 min. Headache x1 hour. Extensive neurological history. Hx TIA. )    Described as 43-year-old transgender female with a history of CVA, anxiety, chronic pain, chronic migraines, myoclonus and dystonia presenting to the emergency department with concern for stroke.  Mom reports 30 minutes prior to arrival patient began to have difficulty speaking as well as increased muscle spasms/myoclonus and dystonia.    Review of Systems: Limited as patient is minimally verbal.     Medications:   Discharge Medication List as of 9/1/2023  4:03 AM        CONTINUE these medications which have NOT CHANGED    Details   aspirin 81 MG Chew Take 81 mg by mouth once daily., Historical Med      azelastine (ASTELIN) 137 mcg (0.1 %) nasal spray USE 1 TO 2 SPRAYS IN EACH NOSTRIL TWICE DAILY FOR CONGESTION, Historical Med      baclofen (LIORESAL) 20 MG tablet Take 1 tablet by mouth 3 (three) times daily as needed. , Historical Med      benztropine (COGENTIN) 0.5 MG tablet Take 0.5 mg by mouth once daily., Historical Med      butalbital-acetaminophen-caffeine -40 mg (FIORICET, ESGIC) -40 mg per tablet Take 1 tablet by mouth every 4 (four) hours as needed., Historical Med      !! butorphanol (STADOL) 10 mg/mL nasal spray 1 spray by Nasal route every 4 (four) hours as needed for Pain., Historical Med      !! butorphanol (STADOL) 10 mg/mL nasal spray use 2 sprays into each nostril every 4 hours as needed for pain., Starting Mon 4/17/2023, Normal      !! butorphanol (STADOL) 10 mg/mL nasal spray 2 sprays by Nasal route every 4 (four) hours as needed for pain, Starting Mon 6/19/2023, Normal      cyclobenzaprine (FLEXERIL) 10 MG tablet TK 1 T PO Q 8 H PRF PAIN, Historical Med      diazePAM (VALIUM) 2 MG tablet Take 2 mg by mouth 2 (two) times daily as needed., Starting Mon 1/11/2021,  Historical Med      erenumab-aooe (AIMOVIG AUTOINJECTOR SUBQ) Inject into the skin., Historical Med      EScitalopram oxalate (LEXAPRO) 20 MG tablet Take 20 mg by mouth once daily., Starting 2021, Historical Med      estradiol valerate (DELESTROGEN) 20 mg/mL injection SMARTSI.3 Milliliter(s) IM Once a Week, Historical Med      evolocumab (REPATHA SURECLICK) 140 mg/mL PnIj Inject 1 mL (140 mg total) into the skin every 14 (fourteen) days., Starting 2023, Until 2023, Fill Later      ezetimibe (ZETIA) 10 mg tablet Take 1 tablet (10 mg total) by mouth once daily., Starting 2023, Normal      FLUCELVAX QUAD 6519-9681, PF, 60 mcg (15 mcg x 4)/0.5 mL Syrg vaccine ADM 0.5ML IM UTD, Historical Med      fluticasone (FLONASE) 50 mcg/actuation nasal spray SPRAY TWICE IEN QD, Historical Med      hydrocortisone (ANUSOL-HC) 2.5 % rectal cream Place rectally 2 (two) times daily., Starting 2023, Normal      hydrOXYzine pamoate (VISTARIL) 50 MG Cap Take  mg by mouth nightly as needed., Historical Med      ketorolac (TORADOL) 10 mg tablet ketorolac 10 mg tablet, Historical Med      levETIRAcetam (KEPPRA) 1000 MG tablet Take 1,000 mg by mouth 2 (two) times daily., Starting 2021, Historical Med      methocarbamoL (ROBAXIN) 750 MG Tab Take 750 mg by mouth 3 (three) times daily., Starting 2023, Historical Med      metoclopramide HCl (REGLAN) 10 MG tablet 10 mg., Historical Med      moxifloxacin (AVELOX) 400 mg tablet Take 400 mg by mouth., Starting 2023, Historical Med      NARCAN 4 mg/actuation Spry SPRAY 0.1ML IN 1 NOSTRIL MAY REPEAT DOSE EVERY 2-3 MINUTES AS NEEDED ALTERNATING NOSTRILS EACH DOSE, Normal      nitroGLYCERIN (NITROSTAT) 0.4 MG SL tablet Place 1 tablet (0.4 mg total) under the tongue every 5 (five) minutes as needed for Chest pain. Repeat twice as needed for a maximum total dose of 3 tablets. If still having chest pain, go to the emergency  room., Starting Wed 7/19/2023, Normal      NURTEC 75 mg odt Take 75 mg by mouth as needed for Migraine., Starting Wed 10/27/2021, Historical Med      onabotulinumtoxina (BOTOX) 200 unit SolR Inject 200 Units into the muscle., Starting Mon 1/30/2023, Historical Med      ondansetron (ZOFRAN) 4 MG tablet Take 1 tablet (4 mg total) by mouth every 6 (six) hours as needed for Nausea., Starting 11/26/2012, Until Discontinued, Normal      ondansetron (ZOFRAN-ODT) 8 MG TbDL Take 8 mg by mouth 2 (two) times daily., Historical Med      oxybutynin (DITROPAN-XL) 10 MG 24 hr tablet TAKE 1 TABLET(10 MG) BY MOUTH EVERY DAY, Normal      pantoprazole (PROTONIX) 20 MG tablet Take 20 mg by mouth., Starting Sat 3/25/2023, Historical Med      predniSONE (DELTASONE) 20 MG tablet Take by mouth., Starting Fri 1/13/2023, Historical Med      prochlorperazine (COMPAZINE) 10 MG tablet Take 10 mg by mouth 3 (three) times daily., Historical Med      propranoloL (INDERAL LA) 60 MG 24 hr capsule Take 60 mg by mouth every evening., Starting Fri 2/5/2021, Historical Med      rosuvastatin (CRESTOR) 20 MG tablet Take 1 tablet (20 mg total) by mouth once daily., Starting Mon 8/28/2023, Normal      spironolactone (ALDACTONE) 25 MG tablet Take 25 mg by mouth once daily., Historical Med      tamsulosin (FLOMAX) 0.4 mg Cap TAKE 1 CAPSULE(0.4 MG) BY MOUTH EVERY DAY, Normal      !! traZODone (DESYREL) 100 MG tablet Take 100 mg by mouth every evening., Starting Wed 9/8/2021, Historical Med      !! traZODone (DESYREL) 50 MG tablet Take 50 mg by mouth every evening., Starting Tue 9/7/2021, Historical Med      verapamiL (VERELAN) 240 MG C24P Take 240 mg by mouth Daily., Historical Med      gabapentin (NEURONTIN) 300 MG capsule Take 1 capsule (300 mg total) by mouth 3 (three) times daily., Starting Mon 6/5/2023, Normal      omeprazole (PRILOSEC) 20 MG capsule Take 20 mg by mouth once daily., Historical Med       !! - Potential duplicate medications found. Please  discuss with provider.          PMH:   Past Medical History:   Diagnosis Date    Anxiety     Cancer     Chest pain 1/20/2016 12/30/2015: Began experinece chest pain.    Depression     Functional movement disorder 10/1/2019    Migraine headache     Movement disorder     Myoclonic jerkings, massive     Stroke pt. states he had a cva at 3 months old     PSH:   Past Surgical History:   Procedure Laterality Date    ANGIOGRAM, CORONARY, WITH LEFT HEART CATHETERIZATION      EPIDURAL STEROID INJECTION N/A 3/26/2021    Procedure: INJECTION, STEROID, EPIDURAL L4/5;  Surgeon: Larry Brasher MD;  Location: [facility];  Service: Pain Management;  Laterality: N/A;    EPIDURAL STEROID INJECTION N/A 6/4/2021    Procedure: INJECTION, STEROID, EPIDURAL, L4-L5 IL need consent;  Surgeon: Larry Brasher MD;  Location: [facility];  Service: Pain Management;  Laterality: N/A;    EPIDURAL STEROID INJECTION N/A 10/29/2021    Procedure: INJECTION, STEROID, EPIDURAL, L4-L5IL NEED CONSENT;  Surgeon: Larry Brasher MD;  Location: [facility];  Service: Pain Management;  Laterality: N/A;    EPIDURAL STEROID INJECTION N/A 1/27/2022    Procedure: Injection, Steroid, Epidural C7/T1;  Surgeon: Larry Brasher MD;  Location: [facility];  Service: Pain Management;  Laterality: N/A;    EPIDURAL STEROID INJECTION N/A 2/10/2022    Procedure: Injection, Steroid, Epidural L4/5;  Surgeon: Larry Brasher MD;  Location: [facility];  Service: Pain Management;  Laterality: N/A;    EPIDURAL STEROID INJECTION N/A 8/25/2022    Procedure: Injection, Steroid, Epidural C7/T1 CONTRAST;  Surgeon: Larry Brasher MD;  Location: [facility];  Service: Pain Management;  Laterality: N/A;    EPIDURAL STEROID INJECTION N/A 5/26/2023    Procedure: INJECTION, STEROID, EPIDURAL C7/T1 IL;  Surgeon: Larry Brasher MD;  Location: [facility];  Service: Pain Management;  Laterality: N/A;    INJECTION OF ANESTHETIC AGENT AROUND NERVE Bilateral  5/6/2022    Procedure: BLOCK, NERVE, BILATERAL L3-L4-*L5 MEDIAL BRANCH;  Surgeon: Larry Brasher MD;  Location: [facility];  Service: Pain Management;  Laterality: Bilateral;    INJECTION OF ANESTHETIC AGENT AROUND NERVE Bilateral 6/2/2022    Procedure: BLOCK, NERVE BILATERAL L3-L4-L5 MEDIAL BRANCH 2nd, needs consent;  Surgeon: Larry Brasher MD;  Location: [facility];  Service: Pain Management;  Laterality: Bilateral;    INJECTION, SACROILIAC JOINT Bilateral 6/9/2023    Procedure: INJECTION,SACROILIAC JOINT, BILATERAL SI;  Surgeon: Larry Brasher MD;  Location: [facility];  Service: Pain Management;  Laterality: Bilateral;    MANDIBLE SURGERY      reconstruction    RADIOFREQUENCY ABLATION Right 6/23/2022    Procedure: RADIOFREQUENCY ABLATION RIGHT L3,L4,L5 1 of 2, consent needed;  Surgeon: Larry Brasher MD;  Location: [facility];  Service: Pain Management;  Laterality: Right;    RADIOFREQUENCY ABLATION Left 7/7/2022    Procedure: RADIOFREQUENCY ABLATION LEFT L3,L4,L5 2 of 2, needs consent;  Surgeon: Larry Brasher MD;  Location: [facility];  Service: Pain Management;  Laterality: Left;    RADIOFREQUENCY ABLATION Right 3/3/2023    Procedure: RADIOFREQUENCY ABLATION RIGHT L3,L4,L5;  Surgeon: Larry Brasher MD;  Location: [facility];  Service: Pain Management;  Laterality: Right;    RADIOFREQUENCY ABLATION Left 3/31/2023    Procedure: Radiofrequency Ablation Left L3, L4, & L5 Pending CBC results;  Surgeon: Diana Lira MD;  Location: [facility];  Service: Pain Management;  Laterality: Left;    variceol repair       Allergies: She is allergic to mustard, lipitor [atorvastatin], mushroom, niaspan extended-release [niacin], nystatin, olive extract, oyster extract, extendryl [[id number]-[id number]], and v-cillin k.  Social History: Marital Status: . She  reports that she has never smoked. She has never used smokeless tobacco.. She  reports no history of alcohol  use..       Exam:  VITAL SIGNS:   Vitals:    09/01/23 0130 09/01/23 0314 09/01/23 0400 09/01/23 0430   BP: 131/70 122/73 117/77 107/73   BP Location:  Left arm Left arm    Patient Position:  Lying Lying    Pulse: 88 81 82 83   Resp: 14 18 18 20   Temp:  98 °F (36.7 °C)     TempSrc:  Oral     SpO2: 97% 97% 96% 96%   Weight:         Const: Awake and alert, anxious appearing  Head: Atraumatic  Eyes: Normal Conjunctiva  ENT: Normal External Ears, Nose and Mouth.  Neck: Full range of motion. No meningismus.  Resp: Normal respiratory effort, No distress  Cardio: Equal and intact distal pulses  Abd: Soft, non tender, non distended.   Skin: No petechiae or rashes  Ext: No cyanosis, or edema  Neur: Awake and alert, strength and sensation intact BUE/BLE. +dysarthria and mild aphasia. Mouth intermittently held open/stretched, other times closed. CN otherwise intact.   Psych: Anxious appearing    Data:  Results for orders placed or performed during the hospital encounter of 08/31/23   CBC W/ AUTO DIFFERENTIAL   Result Value Ref Range    WBC 18.46 (H) 3.90 - 12.70 K/uL    RBC 5.49 (H) 4.00 - 5.40 M/uL    Hemoglobin 16.1 (H) 12.0 - 16.0 g/dL    Hematocrit 46.5 37.0 - 48.5 %    MCV 85 82 - 98 fL    MCH 29.3 27.0 - 31.0 pg    MCHC 34.6 32.0 - 36.0 g/dL    RDW 11.9 11.5 - 14.5 %    Platelets 184 150 - 450 K/uL    MPV 10.0 9.2 - 12.9 fL    Immature Granulocytes 0.5 0.0 - 0.5 %    Gran # (ANC) 15.4 (H) 1.8 - 7.7 K/uL    Immature Grans (Abs) 0.10 (H) 0.00 - 0.04 K/uL    Lymph # 1.4 1.0 - 4.8 K/uL    Mono # 1.5 (H) 0.3 - 1.0 K/uL    Eos # 0.0 0.0 - 0.5 K/uL    Baso # 0.08 0.00 - 0.20 K/uL    nRBC 0 0 /100 WBC    Gran % 83.4 (H) 38.0 - 73.0 %    Lymph % 7.4 (L) 18.0 - 48.0 %    Mono % 8.2 4.0 - 15.0 %    Eosinophil % 0.1 0.0 - 8.0 %    Basophil % 0.4 0.0 - 1.9 %    Differential Method Automated    Comprehensive metabolic panel   Result Value Ref Range    Sodium 140 136 - 145 mmol/L    Potassium 3.9 3.5 - 5.1 mmol/L    Chloride 106 95 -  110 mmol/L    CO2 21 (L) 23 - 29 mmol/L    Glucose 126 (H) 70 - 110 mg/dL    BUN 15 6 - 20 mg/dL    Creatinine 1.1 0.5 - 1.4 mg/dL    Calcium 10.0 8.7 - 10.5 mg/dL    Total Protein 8.0 6.0 - 8.4 g/dL    Albumin 4.7 3.5 - 5.2 g/dL    Total Bilirubin 0.3 0.1 - 1.0 mg/dL    Alkaline Phosphatase 99 55 - 135 U/L    AST 16 10 - 40 U/L    ALT 17 10 - 44 U/L    eGFR >60.0 >60 mL/min/1.73 m^2    Anion Gap 13 8 - 16 mmol/L   Protime-INR   Result Value Ref Range    Prothrombin Time 11.9 9.0 - 12.5 sec    INR 1.1 0.8 - 1.2   TSH   Result Value Ref Range    TSH 0.411 0.400 - 4.000 uIU/mL   LDL - Lipid Panel   Result Value Ref Range    Cholesterol 148 120 - 199 mg/dL    Triglycerides 71 30 - 150 mg/dL    HDL 38 (L) 40 - 75 mg/dL    LDL Cholesterol 95.8 63.0 - 159.0 mg/dL    HDL/Cholesterol Ratio 25.7 20.0 - 50.0 %    Total Cholesterol/HDL Ratio 3.9 2.0 - 5.0    Non-HDL Cholesterol 110 mg/dL   Ethanol   Result Value Ref Range    Alcohol, Serum <10 <10 mg/dL   Drug screen panel, emergency   Result Value Ref Range    Benzodiazepines Presumptive Positive (A) Negative    Methadone metabolites Negative Negative    Cocaine (Metab.) Negative Negative    Opiate Scrn, Ur Negative Negative    Barbiturate Screen, Ur Presumptive Positive (A) Negative    Amphetamine Screen, Ur Negative Negative    THC Negative Negative    Phencyclidine Negative Negative    Creatinine, Urine 63.0 15.0 - 325.0 mg/dL    Toxicology Information SEE COMMENT    POCT Glucose, Hand-Held Device   Result Value Ref Range    POC Glucose 120 (A) 70 - 110 MG/DL   POCT glucose   Result Value Ref Range    POCT Glucose 120 (H) 70 - 110 mg/dL   ISTAT CREATININE   Result Value Ref Range    POC Creatinine 0.9 0.5 - 1.4 mg/dL    Sample unknown    ISTAT PROCEDURE   Result Value Ref Range    POC PTWBT 13.1 9.7 - 14.3 sec    POC PTINR 1.1 0.9 - 1.2    Sample unknown    ISTAT PROCEDURE   Result Value Ref Range    POC Glucose 128 (H) 70 - 110 mg/dL    POC BUN 17 6 - 30 mg/dL    POC  Creatinine 0.9 0.5 - 1.4 mg/dL    POC Sodium 142 136 - 145 mmol/L    POC Potassium 3.8 3.5 - 5.1 mmol/L    POC Chloride 102 95 - 110 mmol/L    POC TCO2 (MEASURED) 27 23 - 29 mmol/L    POC Ionized Calcium 1.30 1.06 - 1.42 mmol/L    POC Hematocrit 48 36 - 54 %PCV    Sample MATIAS      Imaging Results              MRI Brain Without Contrast (Final result)  Result time 09/01/23 03:48:03      Final result by Chris Sullivan MD (09/01/23 03:48:03)                   Impression:      No acute intracranial abnormality.    Electronically signed by resident: Jose Singh  Date:    09/01/2023  Time:    02:23    Electronically signed by: Chris Sullivan  Date:    09/01/2023  Time:    03:48               Narrative:    EXAMINATION:  MRI BRAIN WITHOUT CONTRAST    CLINICAL HISTORY:  Stroke, follow up;.    TECHNIQUE:  Multiplanar multisequence MR imaging of the brain was performed without contrast.    COMPARISON:  MRI brain 02/14/2023, CTA multiphase 08/31/2023    FINDINGS:  Intracranial compartment:    Ventricles and sulci are normal in size for age without evidence of hydrocephalus. No extra-axial blood or fluid collections.    The brain parenchyma appears with normal signal.  No mass lesion, acute hemorrhage, edema or acute infarct.    Normal vascular flow voids are preserved.    Skull/extracranial contents (limited evaluation): Bone marrow signal intensity is normal.  Bilateral maxillary sinus mucosal thickening.                                       CTA STROKE MULTI-PHASE (Final result)  Result time 09/01/23 00:32:34      Final result by Chris Sullivan MD (09/01/23 00:32:34)                   Impression:      No acute intracranial abnormality.  No high-grade arterial stenosis or major vessel occlusion.    Electronically signed by resident: Jose Singh  Date:    08/31/2023  Time:    23:59    Electronically signed by: Chris Sullivan  Date:    09/01/2023  Time:    00:32               Narrative:    EXAMINATION:  CTA STROKE  MULTI-PHASE    CLINICAL HISTORY:  Neuro deficit, acute, stroke suspected;    TECHNIQUE:  Non contrast low dose axial images were obtained thought the head. CT angiogram was performed from the level of the loi to the top of the head following the IV administration of 100mL of Omnipaque 350.   Sagittal and coronal reconstructions and maximum intensity projection reconstructions were performed. Arterial stenosis percentages are based on NASCET measurement criteria.  Two additional phases of immediate post-contrast CTA images were performed through the head alone.    COMPARISON:  CT head neck 03/20/2023    FINDINGS:  Intracranial Compartment:    Ventricles and sulci are normal in size for age without evidence of hydrocephalus. No extra-axial blood or fluid collections.    The brain parenchyma appears with normal attenuation.  No parenchymal mass, hemorrhage, edema, or major vascular distribution infarct.    Skull/Extracranial Contents (limited evaluation): No displaced calvarial fracture.  Mastoid air cells are essentially clear mild paranasal sinus mucosal thickening.  Prior operative changes of the maxilla and mandible bilaterally..    Non-Vascular Structures of the Neck/Thoracic Inlet (limited evaluation): Normal.    Aorta: Common origin of the innominate and left common carotid artery.    Anterior circulation:    Bilateral common carotid arteries, internal carotid arteries, proximal anterior cerebral and middle cerebral arteries are patent noting a dominant left A1 segment.    Posterior circulation:    Patent extracranial vertebral arteries and intracranial vertebrobasilar system.  The left vertebral artery P2 segment demonstrates small fenestration.  The proximal posterior cerebral arteries are patent.    Venous structures (limited evaluation): Dural venous sinuses are patent.                                    12-LEAD EKG INTERPRETATION BY ME:  Rate/Rhythm: Normal Sinus Rhythm with rate of 82 beats per  minute  QRS, ST, T-waves: No changes consistent with acute ischemia  Impression:  No evidence of ischemia or arrhythmia     Labs & Imaging studies were reviewed independently by me.     Medical Decision Makin-year-old transgender female presenting to the emergency department with dysarthria and aphasia, also complaining of increased dystonia/myoclonus.  Patient has a bizarre neurologic exam, though given reported aphasia, code stroke activated.  CTA negative for SBO or signs of acute stroke.  MRI performed, negative for stroke.  She was given Benadryl and on re-evaluation symptoms have resolved.  Consider complex migraine.  Less likely partial seizures given bilateral facial symptoms.  She does have a significant leukocytosis, though no acute infectious symptoms, thought likely reactive.  Doubt sepsis.  Laboratory studies otherwise unremarkable.  Patient cleared by vascular neurology for outpatient follow up.  Patient reports she has 4 different neurologists that she sees for these bizarre symptoms.  Stable for discharge home.    Critical Care Time: 40 minutes  Treatments/Evaluations: Close monitoring and treatment of unstable vital signs, cardiorespiratory, and neurologic status, while maintaining tight balance of fluid, respiratory, and cardiac interventions. This time includes discussing the case with the patient and the patients family. This time does not include all procedures stated elsewhere in this record. This time also includes reviewing old records, labs and radiological studies. This time includes examining and re-examining the patient. Additionally, this time also includes arranging care with admitting and consulting physicians.       Clinical Impression:  1. Muscle spasm    2. Stroke             Medication List        ASK your doctor about these medications      AIMOVIG AUTOINJECTOR SUBQ     aspirin 81 MG Chew     azelastine 137 mcg (0.1 %) nasal spray  Commonly known as: ASTELIN     baclofen 20  MG tablet  Commonly known as: LIORESAL     benztropine 0.5 MG tablet  Commonly known as: COGENTIN     BOTOX 200 unit Solr  Generic drug: onabotulinumtoxina     butalbital-acetaminophen-caffeine -40 mg -40 mg per tablet  Commonly known as: FIORICET, ESGIC     * butorphanol 10 mg/mL nasal spray  Commonly known as: STADOL     * butorphanol 10 mg/mL nasal spray  Commonly known as: STADOL  use 2 sprays into each nostril every 4 hours as needed for pain.     * butorphanol 10 mg/mL nasal spray  Commonly known as: STADOL  2 sprays by Nasal route every 4 (four) hours as needed for pain     cyclobenzaprine 10 MG tablet  Commonly known as: FLEXERIL     diazePAM 2 MG tablet  Commonly known as: VALIUM     EScitalopram oxalate 20 MG tablet  Commonly known as: LEXAPRO     estradiol valerate 20 mg/mL injection  Commonly known as: DELESTROGEN     ezetimibe 10 mg tablet  Commonly known as: ZETIA  Take 1 tablet (10 mg total) by mouth once daily.     FLUCELVAX QUAD 6604-5667 (PF) 60 mcg (15 mcg x 4)/0.5 mL Syrg vaccine  Generic drug: influenza     fluticasone propionate 50 mcg/actuation nasal spray  Commonly known as: FLONASE     hydrocortisone 2.5 % rectal cream  Commonly known as: ANUSOL-HC  Place rectally 2 (two) times daily.     hydrOXYzine pamoate 50 MG Cap  Commonly known as: VISTARIL     ketorolac 10 mg tablet  Commonly known as: TORADOL     levETIRAcetam 1000 MG tablet  Commonly known as: KEPPRA     methocarbamoL 750 MG Tab  Commonly known as: ROBAXIN     metoclopramide HCl 10 MG tablet  Commonly known as: REGLAN     moxifloxacin 400 mg tablet  Commonly known as: AVELOX     NARCAN 4 mg/actuation Spry  Generic drug: naloxone  SPRAY 0.1ML IN 1 NOSTRIL MAY REPEAT DOSE EVERY 2-3 MINUTES AS NEEDED ALTERNATING NOSTRILS EACH DOSE     nitroGLYCERIN 0.4 MG SL tablet  Commonly known as: NITROSTAT  Place 1 tablet (0.4 mg total) under the tongue every 5 (five) minutes as needed for Chest pain. Repeat twice as needed for a  maximum total dose of 3 tablets. If still having chest pain, go to the emergency room.     NURTEC 75 mg odt  Generic drug: rimegepant     ondansetron 4 MG tablet  Commonly known as: ZOFRAN  Take 1 tablet (4 mg total) by mouth every 6 (six) hours as needed for Nausea.     ondansetron 8 MG Tbdl  Commonly known as: ZOFRAN-ODT     oxybutynin 10 MG 24 hr tablet  Commonly known as: DITROPAN-XL  TAKE 1 TABLET(10 MG) BY MOUTH EVERY DAY     pantoprazole 20 MG tablet  Commonly known as: PROTONIX     predniSONE 20 MG tablet  Commonly known as: DELTASONE     prochlorperazine 10 MG tablet  Commonly known as: COMPAZINE     propranoloL 60 MG 24 hr capsule  Commonly known as: INDERAL LA     REPATHA SURECLICK 140 mg/mL Pnij  Generic drug: evolocumab  Inject 1 mL (140 mg total) into the skin every 14 (fourteen) days.     rosuvastatin 20 MG tablet  Commonly known as: CRESTOR  Take 1 tablet (20 mg total) by mouth once daily.     spironolactone 25 MG tablet  Commonly known as: ALDACTONE     tamsulosin 0.4 mg Cap  Commonly known as: FLOMAX  TAKE 1 CAPSULE(0.4 MG) BY MOUTH EVERY DAY     * traZODone 50 MG tablet  Commonly known as: DESYREL     * traZODone 100 MG tablet  Commonly known as: DESYREL     verapamiL 240 MG C24p  Commonly known as: VERELAN           * This list has 5 medication(s) that are the same as other medications prescribed for you. Read the directions carefully, and ask your doctor or other care provider to review them with you.                  Follow-up Information       Willa Newell NP.    Specialty: Family Medicine  Contact information:  [address] [facility] [address]  [phone]               [facility] 7th Fl.    Specialty: Neurology  Contact information:  [address]  [facility] [address] [phone]  Additional information:  [facility] - Main Building, 7th Floor   Please park in [facility] and take Clinic elevator                              Diana Cheung MD  09/05/23 [phone]

## 2023-09-07 ENCOUNTER — PATIENT MESSAGE (OUTPATIENT)
Dept: PAIN MEDICINE | Facility: OTHER | Age: 43
End: 2023-09-07
Payer: MEDICARE

## 2023-09-27 ENCOUNTER — PATIENT MESSAGE (OUTPATIENT)
Dept: NEUROLOGY | Facility: CLINIC | Age: 43
End: 2023-09-27
Payer: MEDICARE

## 2023-09-27 DIAGNOSIS — M62.838 MUSCLE SPASMS OF BOTH LOWER EXTREMITIES: ICD-10-CM

## 2023-09-27 DIAGNOSIS — G25.9 FUNCTIONAL MOVEMENT DISORDER: Primary | ICD-10-CM

## 2023-09-28 ENCOUNTER — PATIENT MESSAGE (OUTPATIENT)
Dept: PAIN MEDICINE | Facility: CLINIC | Age: 43
End: 2023-09-28
Payer: MEDICARE

## 2023-09-28 ENCOUNTER — TELEPHONE (OUTPATIENT)
Dept: ADMINISTRATIVE | Facility: OTHER | Age: 43
End: 2023-09-28
Payer: MEDICARE

## 2023-09-28 NOTE — TELEPHONE ENCOUNTER
----- Message from Larry Brasher MD sent at 9/27/2023  1:09 PM CDT -----  It was denied and we need to wait until March to do another RFA

## 2023-09-29 ENCOUNTER — TELEPHONE (OUTPATIENT)
Dept: PAIN MEDICINE | Facility: CLINIC | Age: 43
End: 2023-09-29
Payer: MEDICARE

## 2023-09-29 DIAGNOSIS — M47.812 CERVICAL SPONDYLOSIS: Primary | ICD-10-CM

## 2023-10-01 ENCOUNTER — TELEPHONE (OUTPATIENT)
Dept: ADMINISTRATIVE | Facility: OTHER | Age: 43
End: 2023-10-01
Payer: MEDICARE

## 2023-10-02 ENCOUNTER — TELEPHONE (OUTPATIENT)
Dept: ADMINISTRATIVE | Facility: OTHER | Age: 43
End: 2023-10-02
Payer: MEDICARE

## 2023-10-02 ENCOUNTER — PATIENT MESSAGE (OUTPATIENT)
Dept: PAIN MEDICINE | Facility: OTHER | Age: 43
End: 2023-10-02
Payer: MEDICARE

## 2023-10-02 NOTE — TELEPHONE ENCOUNTER
----- Message from Larry Brasher MD sent at 9/29/2023  9:05 AM CDT -----  I called her and scheduled NIA    ----- Message -----  From: Maribel Bright NP  Sent: 9/29/2023   8:45 AM CDT  To: Karen Manley; Larry Brasher MD    Good morning,     Dr. Brasher, what is the reason for the denial? Is it the 2 episodes per year issue? I just want to be fully aware to discuss with patient.     Maribel Wheatley  ----- Message -----  From: Karen Manley  Sent: 9/28/2023   5:44 PM CDT  To: Maribel Bright NP; Larry Brasher MD    Pt. States if he wait until March it will be a year he's in a lot of pain , pt. Also states pre-service didn't call him regarding his procedure being denied, Maribel pt. Will like to speak with you Friday.     ----- Message -----  From: Larry Brasher MD  Sent: 9/27/2023   1:10 PM CDT  To: Karen Manley; Guanako Brown; #    It was denied and we need to wait until March to do another RFA

## 2023-10-03 ENCOUNTER — OFFICE VISIT (OUTPATIENT)
Dept: UROLOGY | Facility: CLINIC | Age: 43
End: 2023-10-03
Payer: MEDICARE

## 2023-10-03 DIAGNOSIS — Z78.9 MALE-TO-FEMALE TRANSGENDER PERSON: ICD-10-CM

## 2023-10-03 DIAGNOSIS — N50.819 PERSISTENT TESTICULAR PAIN: ICD-10-CM

## 2023-10-03 DIAGNOSIS — N50.811 PAIN IN BOTH TESTICLES: Primary | ICD-10-CM

## 2023-10-03 DIAGNOSIS — N50.812 PAIN IN BOTH TESTICLES: Primary | ICD-10-CM

## 2023-10-03 PROCEDURE — 99214 OFFICE O/P EST MOD 30 MIN: CPT | Mod: 57,S$GLB,, | Performed by: UROLOGY

## 2023-10-03 PROCEDURE — 99999 PR PBB SHADOW E&M-EST. PATIENT-LVL II: ICD-10-PCS | Mod: PBBFAC,,, | Performed by: UROLOGY

## 2023-10-03 PROCEDURE — 99214 PR OFFICE/OUTPT VISIT, EST, LEVL IV, 30-39 MIN: ICD-10-PCS | Mod: 57,S$GLB,, | Performed by: UROLOGY

## 2023-10-03 PROCEDURE — 99999 PR PBB SHADOW E&M-EST. PATIENT-LVL II: CPT | Mod: PBBFAC,,, | Performed by: UROLOGY
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
## 2023-10-03 NOTE — PATIENT INSTRUCTIONS
Mary ([place]) Natan, my surgery scheduler will schedule your surgery ([phone]).  The risks and benefits of the procedure were discussed with the patient in detail.    The patient was told to stop all blood thinners prior to surgery.  Stop ASA and ASA products ( all NSADIS) 1 wk before  Stop Plavix 5 days before.    Stop Eliquis, Xarelto 2 days before.  Stop Plavix 5 days before.    Antibiotic soap shower a night before surgery

## 2023-10-06 ENCOUNTER — PATIENT MESSAGE (OUTPATIENT)
Dept: ADMINISTRATIVE | Facility: OTHER | Age: 43
End: 2023-10-06
Payer: MEDICARE

## 2023-10-10 ENCOUNTER — PATIENT MESSAGE (OUTPATIENT)
Dept: SURGERY | Facility: HOSPITAL | Age: 43
End: 2023-10-10
Payer: MEDICARE

## 2023-10-12 PROBLEM — M54.12 CERVICAL RADICULOPATHY: Status: ACTIVE | Noted: 2023-10-12

## 2023-10-13 ENCOUNTER — TELEPHONE (OUTPATIENT)
Dept: PAIN MEDICINE | Facility: CLINIC | Age: 43
End: 2023-10-13
Payer: MEDICARE

## 2023-10-13 ENCOUNTER — HOSPITAL ENCOUNTER (OUTPATIENT)
Facility: OTHER | Age: 43
Discharge: HOME | End: 2023-10-13
Attending: ANESTHESIOLOGY | Admitting: ANESTHESIOLOGY
Payer: MEDICARE

## 2023-10-13 ENCOUNTER — PATIENT MESSAGE (OUTPATIENT)
Dept: ADMINISTRATIVE | Facility: OTHER | Age: 43
End: 2023-10-13
Payer: MEDICARE

## 2023-10-13 ENCOUNTER — PATIENT MESSAGE (OUTPATIENT)
Dept: SURGERY | Facility: HOSPITAL | Age: 43
End: 2023-10-13
Payer: MEDICARE

## 2023-10-13 VITALS
RESPIRATION RATE: 14 BRPM | TEMPERATURE: 98 F | OXYGEN SATURATION: 97 % | SYSTOLIC BLOOD PRESSURE: 107 MMHG | HEIGHT: 72 IN | WEIGHT: 150 LBS | HEART RATE: 66 BPM | DIASTOLIC BLOOD PRESSURE: 58 MMHG | BODY MASS INDEX: 20.32 KG/M2

## 2023-10-13 DIAGNOSIS — G89.29 CHRONIC PAIN: ICD-10-CM

## 2023-10-13 DIAGNOSIS — M50.30 DDD (DEGENERATIVE DISC DISEASE), CERVICAL: ICD-10-CM

## 2023-10-13 DIAGNOSIS — M54.12 CERVICAL RADICULOPATHY: Primary | ICD-10-CM

## 2023-10-13 PROCEDURE — 63600175 PHARM REV CODE 636 W HCPCS: Performed by: ANESTHESIOLOGY

## 2023-10-13 PROCEDURE — 62321 NJX INTERLAMINAR CRV/THRC: CPT | Performed by: ANESTHESIOLOGY

## 2023-10-13 PROCEDURE — 62321 NJX INTERLAMINAR CRV/THRC: CPT | Mod: ,,, | Performed by: ANESTHESIOLOGY

## 2023-10-13 PROCEDURE — 25500020 PHARM REV CODE 255: Performed by: ANESTHESIOLOGY

## 2023-10-13 PROCEDURE — 62321 PR INJ CERV/THORAC, W/GUIDANCE: ICD-10-PCS | Mod: ,,, | Performed by: ANESTHESIOLOGY

## 2023-10-13 PROCEDURE — 25000003 PHARM REV CODE 250: Performed by: ANESTHESIOLOGY

## 2023-10-13 RX ORDER — LIDOCAINE HYDROCHLORIDE 20 MG/ML
INJECTION, SOLUTION INFILTRATION; PERINEURAL
Status: DISCONTINUED | OUTPATIENT
Start: 2023-10-13 | End: 2023-10-13 | Stop reason: HOSPADM

## 2023-10-13 RX ORDER — MIDAZOLAM HYDROCHLORIDE 1 MG/ML
INJECTION INTRAMUSCULAR; INTRAVENOUS
Status: DISCONTINUED | OUTPATIENT
Start: 2023-10-13 | End: 2023-10-13 | Stop reason: HOSPADM

## 2023-10-13 RX ORDER — SODIUM CHLORIDE 9 MG/ML
INJECTION, SOLUTION INTRAVENOUS CONTINUOUS
Status: DISCONTINUED | OUTPATIENT
Start: 2023-10-13 | End: 2023-10-13 | Stop reason: HOSPADM

## 2023-10-13 RX ORDER — LIDOCAINE HYDROCHLORIDE 10 MG/ML
INJECTION, SOLUTION EPIDURAL; INFILTRATION; INTRACAUDAL; PERINEURAL
Status: DISCONTINUED | OUTPATIENT
Start: 2023-10-13 | End: 2023-10-13 | Stop reason: HOSPADM

## 2023-10-13 RX ORDER — FENTANYL CITRATE 50 UG/ML
INJECTION, SOLUTION INTRAMUSCULAR; INTRAVENOUS
Status: DISCONTINUED | OUTPATIENT
Start: 2023-10-13 | End: 2023-10-13 | Stop reason: HOSPADM

## 2023-10-13 RX ORDER — DEXAMETHASONE SODIUM PHOSPHATE 10 MG/ML
INJECTION INTRAMUSCULAR; INTRAVENOUS
Status: DISCONTINUED | OUTPATIENT
Start: 2023-10-13 | End: 2023-10-13 | Stop reason: HOSPADM

## 2023-10-13 NOTE — TELEPHONE ENCOUNTER
----- Message from Asa Ferris RN sent at 10/13/2023  3:09 PM CDT -----  Pt wants to come to the follow up appt on 11/2/23 not the 10/31/23. Thank you

## 2023-10-13 NOTE — TELEPHONE ENCOUNTER
Staff spoke with pt and verfied his appt date and time.    ----- Message from Asa Ferris RN sent at 10/13/2023  3:09 PM CDT -----  Pt wants to come to the follow up appt on 11/2/23 not the 10/31/23. Thank you

## 2023-10-13 NOTE — DISCHARGE INSTRUCTIONS

## 2023-10-13 NOTE — H&P
HPI  Patient presenting for Procedure(s) (LRB):  INJECTION, STEROID, EPIDURAL, C7-T1 (N/A)     Patient on Anti-coagulation  reviewed    No health changes since previous encounter    Past Medical History:   Diagnosis Date    Anxiety     Cancer     Chest pain 1/20/2016 12/30/2015: Began experinece chest pain.    Depression     Functional movement disorder 10/1/2019    Migraine headache     Movement disorder     Myoclonic jerkings, massive     Stroke pt. states he had a cva at 3 months old     Past Surgical History:   Procedure Laterality Date    ANGIOGRAM, CORONARY, WITH LEFT HEART CATHETERIZATION      EPIDURAL STEROID INJECTION N/A 3/26/2021    Procedure: INJECTION, STEROID, EPIDURAL L4/5;  Surgeon: Larry Brasher MD;  Location: [facility]IN [facility];  Service: Pain Management;  Laterality: N/A;    EPIDURAL STEROID INJECTION N/A 6/4/2021    Procedure: INJECTION, STEROID, EPIDURAL, L4-L5 IL need consent;  Surgeon: Larry Brasher MD;  Location: [facility];  Service: Pain Management;  Laterality: N/A;    EPIDURAL STEROID INJECTION N/A 10/29/2021    Procedure: INJECTION, STEROID, EPIDURAL, L4-L5IL NEED CONSENT;  Surgeon: Larry Brasher MD;  Location: [facility];  Service: Pain Management;  Laterality: N/A;    EPIDURAL STEROID INJECTION N/A 1/27/2022    Procedure: Injection, Steroid, Epidural C7/T1;  Surgeon: Larry Brasher MD;  Location: [facility];  Service: Pain Management;  Laterality: N/A;    EPIDURAL STEROID INJECTION N/A 2/10/2022    Procedure: Injection, Steroid, Epidural L4/5;  Surgeon: Larry Brasher MD;  Location: [facility];  Service: Pain Management;  Laterality: N/A;    EPIDURAL STEROID INJECTION N/A 8/25/2022    Procedure: Injection, Steroid, Epidural C7/T1 CONTRAST;  Surgeon: Larry Brasher MD;  Location: [facility];  Service: Pain Management;  Laterality: N/A;    EPIDURAL STEROID INJECTION N/A 5/26/2023    Procedure: INJECTION, STEROID, EPIDURAL C7/T1 IL;  Surgeon: Larry Brasher  MD;  Location: [facility]IN [facility];  Service: Pain Management;  Laterality: N/A;    INJECTION OF ANESTHETIC AGENT AROUND NERVE Bilateral 5/6/2022    Procedure: BLOCK, NERVE, BILATERAL L3-L4-*L5 MEDIAL BRANCH;  Surgeon: Larry Brasher MD;  Location: [facility];  Service: Pain Management;  Laterality: Bilateral;    INJECTION OF ANESTHETIC AGENT AROUND NERVE Bilateral 6/2/2022    Procedure: BLOCK, NERVE BILATERAL L3-L4-L5 MEDIAL BRANCH 2nd, needs consent;  Surgeon: Larry Brasher MD;  Location: [facility];  Service: Pain Management;  Laterality: Bilateral;    INJECTION, SACROILIAC JOINT Bilateral 6/9/2023    Procedure: INJECTION,SACROILIAC JOINT, BILATERAL SI;  Surgeon: Larry Brasher MD;  Location: [facility]IN [facility];  Service: Pain Management;  Laterality: Bilateral;    MANDIBLE SURGERY      reconstruction    RADIOFREQUENCY ABLATION Right 6/23/2022    Procedure: RADIOFREQUENCY ABLATION RIGHT L3,L4,L5 1 of 2, consent needed;  Surgeon: Larry Brasher MD;  Location: [facility];  Service: Pain Management;  Laterality: Right;    RADIOFREQUENCY ABLATION Left 7/7/2022    Procedure: RADIOFREQUENCY ABLATION LEFT L3,L4,L5 2 of 2, needs consent;  Surgeon: Larry Brasher MD;  Location: [facility];  Service: Pain Management;  Laterality: Left;    RADIOFREQUENCY ABLATION Right 3/3/2023    Procedure: RADIOFREQUENCY ABLATION RIGHT L3,L4,L5;  Surgeon: Larry Brasher MD;  Location: [facility];  Service: Pain Management;  Laterality: Right;    RADIOFREQUENCY ABLATION Left 3/31/2023    Procedure: Radiofrequency Ablation Left L3, L4, & L5 Pending CBC results;  Surgeon: Diana Lira MD;  Location: [facility]IN [facility];  Service: Pain Management;  Laterality: Left;    variceol repair       Review of patient's allergies indicates:   Allergen Reactions    Mustard Itching, Nausea And Vomiting, Shortness Of Breath and Swelling    Lipitor [atorvastatin] Itching    Mushroom Itching, Nausea And Vomiting and Swelling    Niaspan  extended-release [niacin] Itching    Nystatin Hives     Other reaction(s): hives    Olive extract Itching, Nausea And Vomiting and Swelling    Oyster extract     Extendryl [zrexdecgfcsgjlbm-dm-kuzgsxrlgh] Rash    V-cillin k Rash      Current Facility-Administered Medications   Medication    0.9%  NaCl infusion       PMHx, PSHx, Allergies, Medications reviewed in epic    ROS negative except pain complaints in HPI    OBJECTIVE:    /70 (BP Location: Right arm, Patient Position: Lying)   Pulse 68   Temp 97.7 °F (36.5 °C)   Resp 16   Ht 6' (1.829 m)   Wt 68 kg (150 lb)   SpO2 98%   Breastfeeding No   BMI 20.34 kg/m²     PHYSICAL EXAMINATION:    GENERAL: Well appearing, in no acute distress, alert and oriented x3.  PSYCH:  Mood and affect appropriate.  SKIN: Skin color, texture, turgor normal, no rashes or lesions which will impact the procedure.  CV: RRR with palpation of the radial artery.  PULM: No evidence of respiratory difficulty, symmetric chest rise. Clear to auscultation.  NEURO: Cranial nerves grossly intact.    Plan:    Proceed with procedure as planned Procedure(s) (LRB):  INJECTION, STEROID, EPIDURAL, C7-T1 (N/A)    Wes Centeno  10/13/2023

## 2023-10-13 NOTE — OP NOTE
Cervical Interlaminar Epidural Steroid Injection under Fluoroscopic Guidance    The procedure, risks, benefits, and options were discussed with the patient. There are no contraindications to the procedure. The patent expressed understanding and agreed to the procedure. Informed written consent was obtained prior to the start of the procedure and can be found in the patient's chart.     PATIENT NAME: Gordon Griffin   MRN: [id number]     DATE OF PROCEDURE: 10/13/2023    PROCEDURE: Cervical Interlaminar Epidural Steroid Injection C7/T1 under Fluoroscopic Guidance    PRE-OP DIAGNOSIS: Cervical spondylosis [M47.812] Cervical radiculopathy [M54.12]    POST-OP DIAGNOSIS: Same    PHYSICIAN: Larry Brasher MD     ASSISTANTS: Aman Liriano MD Fellow      MEDICATIONS INJECTED: Preservative-free Decadron 10mg with 1cc of Lidocaine 1% MPF and preservative free normal saline    LOCAL ANESTHETIC INJECTED: Xylocaine 2%     SEDATION: Versed 2mg and Fentanyl 50mcg                                                                                                                                                                                     Conscious sedation ordered by M.D. Patient re-evaluation prior to administration of conscious sedation. No changes noted in patient's status from initial evaluation. The patient's vital signs were monitored by RN and patient remained hemodynamically stable throughout the procedure.    Event Time In   Sedation Start 1335   Sedation End 1339       ESTIMATED BLOOD LOSS: None    COMPLICATIONS: None    TECHNIQUE: Time-out was performed to identify the patient and procedure to be performed. With the patient laying in a prone position, the surgical area was prepped and draped in the usual sterile fashion using ChloraPrep and a fenestrated drape. The level was determined under fluoroscopy guidance. Skin anesthesia was achieved by injecting Lidocaine 2% over the injection site.  The interlaminar space was  then approached with a 20 gauge, 3.5 inch Tuohy needle that was introduced under fluoroscopic guidance with AP, lateral and/or contralateral oblique imaging. Once the Ligamentum flavum was encountered droplet aspiration technique was used to enter the epidural space. With positive loss of resistance and negative aspiration for CSF or Blood, contrast dye  Omnipaque (300mg/mL) was injected to confirm placement and there was no vascular runoff. Then 3 mL of the medication mixture listed above was then injected slowly. Displacement of the radio opaque contrast after injection of the medication confirmed that the medication went into the area of the epidural space. The needles were removed, and bleeding was nil. A sterile dressing was applied. No specimens collected. The patient tolerated the procedure well.         The patient was monitored after the procedure in the recovery area. They were given post-procedure and discharge instructions to follow at home. The patient was discharged in a stable condition.    Wes Centeno MD    I reviewed and edited the fellow's note. I conducted my own interview and physical examination. I agree with the findings. I was present and supervising all critical portions of the procedure.    Larry Brasher MD

## 2023-10-13 NOTE — DISCHARGE SUMMARY
Discharge Note  Short Stay      SUMMARY     Admit Date: 10/13/2023    Attending Physician: Wes Centeno      Discharge Physician: Wes Centeno      Discharge Date: 10/13/2023 1:33 PM    Procedure(s) (LRB):  INJECTION, STEROID, EPIDURAL, C7-T1 (N/A)    Final Diagnosis: Cervical spondylosis [M47.812]    Disposition: Home or self care    Patient Instructions:   Current Discharge Medication List        CONTINUE these medications which have NOT CHANGED    Details   aspirin 81 MG Chew Take 81 mg by mouth once daily.      azelastine (ASTELIN) 137 mcg (0.1 %) nasal spray USE 1 TO 2 SPRAYS IN EACH NOSTRIL TWICE DAILY FOR CONGESTION      baclofen (LIORESAL) 20 MG tablet Take 1 tablet by mouth 3 (three) times daily as needed.       benztropine (COGENTIN) 0.5 MG tablet Take 0.5 mg by mouth once daily.      butalbital-acetaminophen-caffeine -40 mg (FIORICET, ESGIC) -40 mg per tablet Take 1 tablet by mouth every 4 (four) hours as needed.      !! butorphanol (STADOL) 10 mg/mL nasal spray 1 spray by Nasal route every 4 (four) hours as needed for Pain.      !! butorphanol (STADOL) 10 mg/mL nasal spray use 2 sprays into each nostril every 4 hours as needed for pain.  Qty: 250 mL, Refills: 5    Comments: Quantity prescribed more than 7 day supply? Press F2 and select one:[id number]        !! butorphanol (STADOL) 10 mg/mL nasal spray 2 sprays by Nasal route every 4 (four) hours as needed for pain  Qty: 100 mL, Refills: 5    Comments: Quantity prescribed more than 7 day supply? Press F2 and select one:[id number]        cyclobenzaprine (FLEXERIL) 10 MG tablet TK 1 T PO Q 8 H PRF PAIN      diazePAM (VALIUM) 2 MG tablet Take 2 mg by mouth 2 (two) times daily as needed.      erenumab-aooe (AIMOVIG AUTOINJECTOR SUBQ) Inject into the skin.      EScitalopram oxalate (LEXAPRO) 20 MG tablet Take 20 mg by mouth once daily.      estradiol valerate (DELESTROGEN) 20 mg/mL injection SMARTSI.3 Milliliter(s) IM Once a Week      evolocumab (REPATHA  SURECLICK) 140 mg/mL PnIj Inject 1 mL (140 mg total) into the skin every 14 (fourteen) days.  Qty: 2 mL, Refills: 3    Associated Diagnoses: Hyperlipidemia, unspecified hyperlipidemia type; Atherosclerosis of native coronary artery of native heart without angina pectoris      ezetimibe (ZETIA) 10 mg tablet Take 1 tablet (10 mg total) by mouth once daily.  Qty: 90 tablet, Refills: 3      FLUCELVAX QUAD 5643-2561, PF, 60 mcg (15 mcg x 4)/0.5 mL Syrg vaccine ADM 0.5ML IM UTD  Refills: 0      fluticasone (FLONASE) 50 mcg/actuation nasal spray SPRAY TWICE IEN QD  Refills: 5      gabapentin (NEURONTIN) 300 MG capsule Take 1 capsule (300 mg total) by mouth 3 (three) times daily.  Qty: 90 capsule, Refills: 3    Associated Diagnoses: Lumbar radiculopathy      hydrocortisone (ANUSOL-HC) 2.5 % rectal cream Place rectally 2 (two) times daily.  Qty: 28 g, Refills: 1    Associated Diagnoses: Hemorrhoid prolapse      hydrOXYzine pamoate (VISTARIL) 50 MG Cap Take  mg by mouth nightly as needed.      ketorolac (TORADOL) 10 mg tablet ketorolac 10 mg tablet      levETIRAcetam (KEPPRA) 1000 MG tablet Take 1,000 mg by mouth 2 (two) times daily.      methocarbamoL (ROBAXIN) 750 MG Tab Take 750 mg by mouth 3 (three) times daily.      metoclopramide HCl (REGLAN) 10 MG tablet 10 mg.      moxifloxacin (AVELOX) 400 mg tablet Take 400 mg by mouth.      NARCAN 4 mg/actuation Spry SPRAY 0.1ML IN 1 NOSTRIL MAY REPEAT DOSE EVERY 2-3 MINUTES AS NEEDED ALTERNATING NOSTRILS EACH DOSE  Qty: 1 each, Refills: 3    Associated Diagnoses: Chronic pain disorder; Lumbar radiculopathy      nitroGLYCERIN (NITROSTAT) 0.4 MG SL tablet Place 1 tablet (0.4 mg total) under the tongue every 5 (five) minutes as needed for Chest pain. Repeat twice as needed for a maximum total dose of 3 tablets. If still having chest pain, go to the emergency room.  Qty: 25 tablet, Refills: 4      NURTEC 75 mg odt Take 75 mg by mouth as needed for Migraine.       onabotulinumtoxina (BOTOX) 200 unit SolR Inject 200 Units into the muscle.      ondansetron (ZOFRAN) 4 MG tablet Take 1 tablet (4 mg total) by mouth every 6 (six) hours as needed for Nausea.  Qty: 12 tablet, Refills: 0      ondansetron (ZOFRAN-ODT) 8 MG TbDL Take 8 mg by mouth 2 (two) times daily.      oxybutynin (DITROPAN-XL) 10 MG 24 hr tablet TAKE 1 TABLET(10 MG) BY MOUTH EVERY DAY  Qty: 30 tablet, Refills: 11      pantoprazole (PROTONIX) 20 MG tablet Take 20 mg by mouth.      predniSONE (DELTASONE) 20 MG tablet Take by mouth.      prochlorperazine (COMPAZINE) 10 MG tablet Take 10 mg by mouth 3 (three) times daily.      propranoloL (INDERAL LA) 60 MG 24 hr capsule Take 60 mg by mouth every evening.      rosuvastatin (CRESTOR) 20 MG tablet Take 1 tablet (20 mg total) by mouth once daily.  Qty: 90 tablet, Refills: 9      spironolactone (ALDACTONE) 25 MG tablet Take 25 mg by mouth once daily.      tamsulosin (FLOMAX) 0.4 mg Cap TAKE 1 CAPSULE(0.4 MG) BY MOUTH EVERY DAY  Qty: 30 capsule, Refills: 11      !! traZODone (DESYREL) 100 MG tablet Take 100 mg by mouth every evening.      !! traZODone (DESYREL) 50 MG tablet Take 50 mg by mouth every evening.      verapamiL (VERELAN) 240 MG C24P Take 240 mg by mouth Daily.       !! - Potential duplicate medications found. Please discuss with provider.              Discharge Diagnosis: Cervical spondylosis [M47.812]  Condition on Discharge: Stable with no complications to procedure   Diet on Discharge: Same as before.  Activity: as per instruction sheet.  Discharge to: Home with a responsible adult.  Follow up: 2-4 weeks       Please call my office or pager at [phone] if experienced any weakness or loss of sensation, fever > 101.5, pain uncontrolled with oral medications, persistent nausea/vomiting/or diarrhea, redness or drainage from the incisions, or any other worrisome concerns. If physician on call was not reached or could not communicate with our office for any  reason please go to the nearest emergency department

## 2023-10-15 ENCOUNTER — PATIENT MESSAGE (OUTPATIENT)
Dept: ADMINISTRATIVE | Facility: OTHER | Age: 43
End: 2023-10-15
Payer: MEDICARE

## 2023-10-19 ENCOUNTER — PATIENT MESSAGE (OUTPATIENT)
Dept: ORTHOPEDICS | Facility: CLINIC | Age: 43
End: 2023-10-19
Payer: MEDICARE

## 2023-10-25 ENCOUNTER — CLINICAL SUPPORT (OUTPATIENT)
Dept: REHABILITATION | Facility: HOSPITAL | Age: 43
End: 2023-10-25
Attending: STUDENT IN AN ORGANIZED HEALTH CARE EDUCATION/TRAINING PROGRAM
Payer: MEDICARE

## 2023-10-25 DIAGNOSIS — M62.838 MUSCLE SPASMS OF BOTH LOWER EXTREMITIES: ICD-10-CM

## 2023-10-25 DIAGNOSIS — G25.9 FUNCTIONAL MOVEMENT DISORDER: ICD-10-CM

## 2023-10-25 DIAGNOSIS — Z74.09 IMPAIRED MOBILITY AND ENDURANCE: Primary | ICD-10-CM

## 2023-10-25 PROCEDURE — 97161 PT EVAL LOW COMPLEX 20 MIN: CPT | Mod: PO

## 2023-10-25 NOTE — PLAN OF CARE
"[facility]  Physical Therapy Neurological Rehabilitation Initial Evaluation     Name: Gordon Griffin  Clinic Number: [id number]    Therapy Diagnosis:   Encounter Diagnoses   Name Primary?    Functional movement disorder     Muscle spasms of both lower extremities     Impaired mobility and endurance Yes     Physician: Ilene Smalls MD    Physician Orders: PT Eval and Treat   MD Comment: Consideration of dry needling?   Medical Diagnosis from Referral: G25.9 (ICD-10-CM) - Functional movement disorder M62.838 (ICD-10-CM) - Muscle spasms of both lower extremities   Evaluation Date: 10/25/2023  Authorization Period Expiration: 9/28/2024  Plan of Care Expiration: 1/10/2024  Progress Note Due: 11/25/2023  Date of Surgery: N/A  Visit # / Visits authorized: 1/ 1  FOTO: 1/ 3    Precautions: Standard and Fall  Patient's preferred pronouns: She/her     Time In: 07:55  Time Out: 08:27  Total Billable Time: 32 minutes    Subjective      History of current condition - Dylon reports: the nerve oblation wore off about a month and a half ago. It's gotten very bad since then. More spasms and difficulty walking. Her combat boots help with stabilizing her ankle, but she isn't always able to wear them. Her AFOs don't really fit so she doesn't wear them either.     From last PT eval: "Dylon reports: hx of strokes over the past years. The patient reports that her last series of strokes were one month prior and that the ER misdiagnosed the strokes as dizziness. Patient reports periodic muscle spasms. Patient reports she takes muscle relaxors and this is very helpful for her. The patient reports 5-6 falls over the past year. Decreased frequency of falls since the patient started medically managing the spasms. The patient reports she walks on the side if her foot and that her right ankle rolls out. The patient reports occasionally her toes will curl under making it difficult to walk (this occurs more frequently on " "the right). Patient reports she carries a cane in case she is feeling unstable but she does not like to use it.  Patient reports she has recently been noticing that she fatigues very easily."     Imaging, MRI studies: PT reviewed chart, nothing remarkable     Prior Therapy: Yes, last at [facility] in July   Social History:  lives with their partner  Falls: several - 5 or 6 since last leaving therapy   DME: has AFOs but don't really wear them (don't fit); also has cane, crutches, and RW     Home Environment: no [facility] home, [facility]   Exercise Routine / History: none   Family Present at time of Eval: none   Occupation: full time employee for emergency services   Prior Level of Function: Ind  Current Level of Function: Ind but with more pain, cautious with moving and avoiding some activities at work    Pain:  Current 10/10, worst 10/10, best 9/10   Location: right foot, sometimes left curls up too   Description: Burning, Sharp, Shooting, and stabbing  Aggravating Factors: weight-bearing or positions that force her to flex her foot or back  Easing Factors: pain medication and heating pad    Patient's goals: "reduce my pain as much as I can"    Medical History:   Past Medical History:   Diagnosis Date    Anxiety     Cancer     Chest pain 1/20/2016 12/30/2015: Began experinece chest pain.    Depression     Functional movement disorder 10/1/2019    Migraine headache     Movement disorder     Myoclonic jerkings, massive     Stroke pt. states he had a cva at 3 months old       Surgical History:   Gordon Griffin  has a past surgical history that includes Mandible surgery; variceol repair; ANGIOGRAM, CORONARY, WITH LEFT HEART CATHETERIZATION; Epidural steroid injection (N/A, 3/26/2021); Epidural steroid injection (N/A, 6/4/2021); Epidural steroid injection (N/A, 10/29/2021); Epidural steroid injection (N/A, 1/27/2022); Epidural steroid injection (N/A, 2/10/2022); Injection of anesthetic agent around nerve (Bilateral, 5/6/2022); " Injection of anesthetic agent around nerve (Bilateral, 6/2/2022); Radiofrequency ablation (Right, 6/23/2022); Radiofrequency ablation (Left, 7/7/2022); Epidural steroid injection (N/A, 8/25/2022); Radiofrequency ablation (Right, 3/3/2023); Radiofrequency ablation (Left, 3/31/2023); Epidural steroid injection (N/A, 5/26/2023); injection, sacroiliac joint (Bilateral, 6/9/2023); and Epidural steroid injection (N/A, 10/13/2023).    Medications:   Gordon has a current medication list which includes the following prescription(s): aspirin, azelastine, baclofen, benztropine, butalbital-acetaminophen-caffeine -40 mg, butorphanol, butorphanol, butorphanol, cyclobenzaprine, diazepam, erenumab-aooe, escitalopram oxalate, estradiol valerate, repatha sureclick, ezetimibe, flucelvax quad 2946-4716 (pf), fluticasone propionate, gabapentin, hydrocortisone, hydroxyzine pamoate, ketorolac, levetiracetam, methocarbamol, metoclopramide hcl, moxifloxacin, narcan, nitroglycerin, nurtec, botox, ondansetron, ondansetron, oxybutynin, pantoprazole, prednisone, prochlorperazine, propranolol, rosuvastatin, spironolactone, tamsulosin, trazodone, trazodone, and verapamil.    Allergies:   Review of patient's allergies indicates:   Allergen Reactions    Mustard Itching, Nausea And Vomiting, Shortness Of Breath and Swelling    Lipitor [atorvastatin] Itching    Mushroom Itching, Nausea And Vomiting and Swelling    Niaspan extended-release [niacin] Itching    Nystatin Hives     Other reaction(s): hives    Olive extract Itching, Nausea And Vomiting and Swelling    Oyster extract     Extendryl [wiqukiheylplzmof-zy-foosqlajes] Rash    V-cillin k Rash        Objective      Patient's mobility presenting to therapy evaluation: walks    Mental status: alert, oriented to person, place, and time, normal mood, behavior, speech, dress, motor activity, and thought processes  Appearance: Casually dressed  Behavior:  calm, cooperative, and adequate rapport  can be established  Attention Span and Concentration:  Normal  Follows commands: 100% of time   Speech: no deficits     Dominant hand:  right     Posture Alignment in sitting: WFL    Posture Alignment in standing: WFL    Sensation: Light Touch: Intact     Edema observed: No        Tone: WNL for everywhere except at ankles. Dystonia noted at bilateral ankles, R>L. Notable curling of the right foot with tendency towards PF and inversion. Left foot mildly in PF and inv though not as significant. Patient reports spasms in bilateral ankles throughout the day.     Visual/Auditory: denies changes     Coordination:   - fine motor:  Intact  - UE coordination:  Intact  - LE coordination:   Intact    ROM:   UPPER EXTREMITY--AROM/PROM  (R) UE: WNLs  (L) UE: WNLs         RANGE OF MOTION--LOWER EXTREMITIES  (R) LE Hip: normal   Knee: normal   Ankle: normal    (L) LE: Hip: normal   Knee: normal   Ankle: normal    Strength: manual muscle test grades below     Lower Extremity Strength  Right LE  Left LE    Hip Flexion: 5/5 Hip Flexion: 5/5   Hip Extension:  5/5 Hip Extension: 5/5   Hip Abduction: 4/5 Hip Abduction: 4/5   Hip Adduction: 4/5 Hip Adduction 4/5   Knee Extension: 5/5 Knee Extension: 5/5   Knee Flexion: 4+/5 Knee Flexion: 4+/5   Ankle Dorsiflexion: 4-/5 Ankle Dorsiflexion: 4/5   Ankle Plantarflexion: 4+/5 Ankle Plantarflexion: 4+/5     Abdominal Strength: 5/5    Flexibility: intact       Evaluation 10/25/2023   30 second Chair Rise   18 completed with no arms     5 times sit-stand  Fall risk cutoff scores by population:  General >12s  PD >16s  Vestibular/balance over 65 years >/= 15s  CVA >/=  12s   8 seconds with no arms         Balance Testing    Evaluation 10/25/2023   Tandem Stance R LE forward, eyes open 30s  (<30 sec = Increased FALL RISK)   Tandem Stance L LE forward, eyes open 30s  (<30 sec = Increased FALL RISK)   Single Limb Stance R LE  eyes open 10s  (<10 sec = HIGH FALL RISK)   Single Limb Stance L LE  eyes  open 10s  (<10 sec = HIGH FALL RISK)       Postural control: MCTSIB: Evaluation 10/25/2023   1. Eyes Open/feet together/Firm:  30 seconds   2. Eyes Closed/feet together/Firm:  30 seconds   3. Eyes Open/feet together/Foam:  30 seconds   4. Eyes Closed/feet together/Foam:  30 seconds       GAIT ASSESSMENT  - AD used: No Assistive Device  - Assistance: independence  - Distance: community distances    Observed Gait Deviations:  Gordon displays the following deviations with ambulation:  Abnormal,  landing on lateral side of R foot during IC (foot inverted)      Impairments contributing to deviations/impairments: impaired motor control and dystonia     Evaluation 10/25/2023   Self Selected Walking Speed 1.0 m/sec (6m/6s) with  No Assistive Device   Fast Walking Speed 1.5 m/sec (6m/4s) with  No Assistive Device       Endurance Deficit: mild    Functional Mobility (Bed mobility, transfers)  Bed mobility:  I  Supine to sit:  I  Sit to supine:  I  Sit to stand:   I  Stand pivot:    I  Transfers to bed:  I  Transfers to toilet: I  Transfers to shower / tub:   I  Car transfers:   I  Wheelchair mobility: N/A    Intake Outcome Measure for FOTO Neuromuscular Disorder Survey    Therapist reviewed FOTO scores for Gordon Griffin on 10/25/2023.   FOTO report - see Media section or FOTO account episode details.    Intake Score: 63%       Treatment     No treatment provided today, all time spent performing initial evaluation.    Education:   HEP will be issued and reviewed next session.       Patient Education and Home Exercises     Education provided:   - PT plan of care  - dystonia and FND and how mental health can impact symptoms     Assessment     Gordon is a 43 y.o. adult referred to outpatient Physical Therapy with a medical diagnosis of functional movement disorder with muscle spasms of both lower extremities . Patient presents with WNL for strength, balance, and functional mobility independence. However, she does display  deficits with her gait such as inversion of her R foot during IC which is causing her significant pain and limits her from fully participating in job-related activities that are required of her. In addition, her signs/symptoms are consistent with dystonia as it seems to progress during times of high stress and exhaustion. She would benefit from an integrated approach to address her deficits and is a good candidate for outpatient neuro PT at this time.     Patient prognosis is Good.   Patient will benefit from skilled outpatient Physical Therapy to address the deficits stated above and in the chart below, provide patient /family education, and to maximize patient's level of independence.     Plan of care discussed with patient: Yes  Patient's spiritual, cultural and educational needs considered and patient is agreeable to the plan of care and goals as stated below:     Anticipated Barriers for therapy: co-morbidities    Medical Necessity is demonstrated by the following  History  Co-morbidities and personal factors that may impact the plan of care [] LOW: no personal factors / co-morbidities  [] MODERATE: 1-2 personal factors / co-morbidities  [x] HIGH: 3+ personal factors / co-morbidities    Moderate / High Support Documentation:   Co-morbidities affecting plan of care: anxiety, CA, migraine headaches, depression    Personal Factors:   coping style     Examination  Body Structures and Functions, activity limitations and participation restrictions that may impact the plan of care [x] LOW: addressing 1-2 elements  [] MODERATE: 3+ elements  [] HIGH: 4+ elements (please support below)    Moderate / High Support Documentation: motor control at ankle, gait deficits     Clinical Presentation [x] LOW: stable  [] MODERATE: Evolving  [] HIGH: Unstable     Decision Making/ Complexity Score: low       Goals:  Short Term Goals: 4 weeks   Pt will report pain at R ankle at </= 7/10 entering clinic.  Pt will improve dystonia and  motor control at right ankle by performing 5 standing heel raises without pain.  Pt will demonstrate appropriate heel strike consistently at IC on RLE for at least 50 ft of ambulation.   Pt will be able to ambulate 300 feet without an assistive device without loss of balance or significant gait deficits for increased independence in the home with mobility.     Long Term Goals: 8 weeks   Pt will report pain at R ankle at </= 4/10 entering clinic.  Pt will improve self selected walking speed (SSWS) with no AD to at least 1.2 m/s for improved safety with home and community ambulation.   Pt will improve FOTO limitation score to </= 32% for improved self perception of functional mobility.  Pt will improve dystonia and motor control at right ankle by performing 10 standing heel raises without pain.  Pt will be able to ambulate 500 feet without an assistive device without loss of balance or significant gait deficits for increased independence in the home with mobility.   Plan     Plan of care Certification: 10/25/2023 to 1/10/2024.    Outpatient Physical Therapy 2 times weekly for 8 weeks to include the following interventions: Gait Training, Moist Heat/ Ice, Neuromuscular Re-ed, Orthotic Management and Training, Patient Education, Therapeutic Activities, and Therapeutic Exercise. To be started following surgery.     Lynda David, PT        Physician's Signature: _________________________________________ Date: ________________

## 2023-10-26 ENCOUNTER — PATIENT MESSAGE (OUTPATIENT)
Dept: ADMINISTRATIVE | Facility: OTHER | Age: 43
End: 2023-10-26
Payer: MEDICARE

## 2023-10-31 ENCOUNTER — OFFICE VISIT (OUTPATIENT)
Dept: PAIN MEDICINE | Facility: CLINIC | Age: 43
End: 2023-10-31
Payer: MEDICARE

## 2023-10-31 ENCOUNTER — PATIENT MESSAGE (OUTPATIENT)
Dept: SURGERY | Facility: HOSPITAL | Age: 43
End: 2023-10-31
Payer: MEDICARE

## 2023-10-31 VITALS
BODY MASS INDEX: 19.02 KG/M2 | RESPIRATION RATE: 18 BRPM | DIASTOLIC BLOOD PRESSURE: 70 MMHG | WEIGHT: 140.19 LBS | SYSTOLIC BLOOD PRESSURE: 107 MMHG | HEART RATE: 85 BPM | OXYGEN SATURATION: 98 % | TEMPERATURE: 98 F

## 2023-10-31 DIAGNOSIS — M47.816 LUMBAR SPONDYLOSIS: ICD-10-CM

## 2023-10-31 DIAGNOSIS — M51.36 DDD (DEGENERATIVE DISC DISEASE), LUMBAR: ICD-10-CM

## 2023-10-31 DIAGNOSIS — M50.30 DDD (DEGENERATIVE DISC DISEASE), CERVICAL: ICD-10-CM

## 2023-10-31 DIAGNOSIS — M54.12 CERVICAL RADICULOPATHY: ICD-10-CM

## 2023-10-31 DIAGNOSIS — G89.4 CHRONIC PAIN SYNDROME: ICD-10-CM

## 2023-10-31 DIAGNOSIS — G25.3 MYOCLONUS: ICD-10-CM

## 2023-10-31 DIAGNOSIS — M54.16 LUMBAR RADICULOPATHY: Primary | ICD-10-CM

## 2023-10-31 DIAGNOSIS — M47.812 CERVICAL SPONDYLOSIS: ICD-10-CM

## 2023-10-31 PROCEDURE — 99213 PR OFFICE/OUTPT VISIT, EST, LEVL III, 20-29 MIN: ICD-10-PCS | Mod: S$GLB,,, | Performed by: NURSE PRACTITIONER

## 2023-10-31 PROCEDURE — 3074F PR MOST RECENT SYSTOLIC BLOOD PRESSURE < 130 MM HG: ICD-10-PCS | Mod: CPTII,S$GLB,, | Performed by: NURSE PRACTITIONER

## 2023-10-31 PROCEDURE — 1159F MED LIST DOCD IN RCRD: CPT | Mod: CPTII,S$GLB,, | Performed by: NURSE PRACTITIONER

## 2023-10-31 PROCEDURE — 99999 PR PBB SHADOW E&M-EST. PATIENT-LVL V: CPT | Mod: PBBFAC,,, | Performed by: NURSE PRACTITIONER

## 2023-10-31 PROCEDURE — 1160F PR REVIEW ALL MEDS BY PRESCRIBER/CLIN PHARMACIST DOCUMENTED: ICD-10-PCS | Mod: CPTII,S$GLB,, | Performed by: NURSE PRACTITIONER

## 2023-10-31 PROCEDURE — 3078F DIAST BP <80 MM HG: CPT | Mod: CPTII,S$GLB,, | Performed by: NURSE PRACTITIONER

## 2023-10-31 PROCEDURE — 3078F PR MOST RECENT DIASTOLIC BLOOD PRESSURE < 80 MM HG: ICD-10-PCS | Mod: CPTII,S$GLB,, | Performed by: NURSE PRACTITIONER

## 2023-10-31 PROCEDURE — 1159F PR MEDICATION LIST DOCUMENTED IN MEDICAL RECORD: ICD-10-PCS | Mod: CPTII,S$GLB,, | Performed by: NURSE PRACTITIONER

## 2023-10-31 PROCEDURE — 1160F RVW MEDS BY RX/DR IN RCRD: CPT | Mod: CPTII,S$GLB,, | Performed by: NURSE PRACTITIONER

## 2023-10-31 PROCEDURE — 99999 PR PBB SHADOW E&M-EST. PATIENT-LVL V: ICD-10-PCS | Mod: PBBFAC,,, | Performed by: NURSE PRACTITIONER

## 2023-10-31 PROCEDURE — 99213 OFFICE O/P EST LOW 20 MIN: CPT | Mod: S$GLB,,, | Performed by: NURSE PRACTITIONER

## 2023-10-31 PROCEDURE — 3008F PR BODY MASS INDEX (BMI) DOCUMENTED: ICD-10-PCS | Mod: CPTII,S$GLB,, | Performed by: NURSE PRACTITIONER

## 2023-10-31 PROCEDURE — 3008F BODY MASS INDEX DOCD: CPT | Mod: CPTII,S$GLB,, | Performed by: NURSE PRACTITIONER

## 2023-10-31 PROCEDURE — 3074F SYST BP LT 130 MM HG: CPT | Mod: CPTII,S$GLB,, | Performed by: NURSE PRACTITIONER

## 2023-10-31 NOTE — PROGRESS NOTES
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 normocephalic. No palpable lymph nodes  Cor: RRR  Pulm: Symmetric chest rise.   Back: Straight leg raising in the sitting position is positive to radicular pain bilaterally.  There is pain with palpation over lumbar facet joints bilaterally. Limited ROM with pain on extension. Positive facet loading bilaterally.   Extremities: No deformities, edema, or skin discoloration. Good capillary refill.  Musculoskeletal: 5/5 strength in right ankle with plantar and dorsiflexion. 4/5 strength in left ankle with plantar and dorsiflexion. 5/5 strength with right knee flexion and extension. 5/5 strength with left knee flexion and extension.   Neuro:  No loss of sensation is noted.  Gait: Antalgic- ambulates without assistance.     ASSESSMENT: 43 y.o. year old adult with neck and lower back pain, consistent with the followin. Lumbar radiculopathy  Procedure Order to Pain Management      2. Lumbar spondylosis        3. DDD (degenerative disc disease), lumbar        4. Cervical radiculopathy        5. Cervical spondylosis        6. DDD (degenerative disc disease), cervical        7. Chronic pain syndrome            PLAN:     - Previous imaging reviewed today.    - She is s/p C7/T1 IL KYUNG with benefit. We can repeat this as needed.     - Schedule  for L4/5 IL KYUNG.     - Continue Gabapentin.      - I have stressed the importance of physical activity and a home exercise plan to help with pain and improve health.    - RTC 2 weeks after above procedure.       The above plan and management options were discussed at length with patient. Patient is in agreement with the above and verbalized understanding.    Maribel Bright NP   10/31/2023

## 2023-11-01 DIAGNOSIS — M54.16 LUMBAR RADICULOPATHY: Primary | ICD-10-CM

## 2023-11-03 ENCOUNTER — OFFICE VISIT (OUTPATIENT)
Dept: NEUROLOGY | Facility: CLINIC | Age: 43
End: 2023-11-03
Payer: MEDICARE

## 2023-11-03 ENCOUNTER — TELEPHONE (OUTPATIENT)
Dept: INTERNAL MEDICINE | Facility: CLINIC | Age: 43
End: 2023-11-03
Payer: MEDICARE

## 2023-11-03 ENCOUNTER — TELEPHONE (OUTPATIENT)
Dept: PREADMISSION TESTING | Facility: HOSPITAL | Age: 43
End: 2023-11-03
Payer: MEDICARE

## 2023-11-03 VITALS
WEIGHT: 142 LBS | DIASTOLIC BLOOD PRESSURE: 63 MMHG | HEART RATE: 77 BPM | SYSTOLIC BLOOD PRESSURE: 107 MMHG | HEIGHT: 72 IN | BODY MASS INDEX: 19.23 KG/M2

## 2023-11-03 DIAGNOSIS — G43.709 CHRONIC MIGRAINE WITHOUT AURA WITHOUT STATUS MIGRAINOSUS, NOT INTRACTABLE: ICD-10-CM

## 2023-11-03 DIAGNOSIS — G24.9 DYSTONIA: Primary | ICD-10-CM

## 2023-11-03 DIAGNOSIS — R26.9 ABNORMALITY OF GAIT AND MOBILITY: ICD-10-CM

## 2023-11-03 PROCEDURE — 1159F PR MEDICATION LIST DOCUMENTED IN MEDICAL RECORD: ICD-10-PCS | Mod: CPTII,S$GLB,, | Performed by: STUDENT IN AN ORGANIZED HEALTH CARE EDUCATION/TRAINING PROGRAM

## 2023-11-03 PROCEDURE — 3078F DIAST BP <80 MM HG: CPT | Mod: CPTII,S$GLB,, | Performed by: STUDENT IN AN ORGANIZED HEALTH CARE EDUCATION/TRAINING PROGRAM

## 2023-11-03 PROCEDURE — 99214 PR OFFICE/OUTPT VISIT, EST, LEVL IV, 30-39 MIN: ICD-10-PCS | Mod: S$GLB,,, | Performed by: STUDENT IN AN ORGANIZED HEALTH CARE EDUCATION/TRAINING PROGRAM

## 2023-11-03 PROCEDURE — 3008F PR BODY MASS INDEX (BMI) DOCUMENTED: ICD-10-PCS | Mod: CPTII,S$GLB,, | Performed by: STUDENT IN AN ORGANIZED HEALTH CARE EDUCATION/TRAINING PROGRAM

## 2023-11-03 PROCEDURE — 3008F BODY MASS INDEX DOCD: CPT | Mod: CPTII,S$GLB,, | Performed by: STUDENT IN AN ORGANIZED HEALTH CARE EDUCATION/TRAINING PROGRAM

## 2023-11-03 PROCEDURE — 3074F SYST BP LT 130 MM HG: CPT | Mod: CPTII,S$GLB,, | Performed by: STUDENT IN AN ORGANIZED HEALTH CARE EDUCATION/TRAINING PROGRAM

## 2023-11-03 PROCEDURE — 3074F PR MOST RECENT SYSTOLIC BLOOD PRESSURE < 130 MM HG: ICD-10-PCS | Mod: CPTII,S$GLB,, | Performed by: STUDENT IN AN ORGANIZED HEALTH CARE EDUCATION/TRAINING PROGRAM

## 2023-11-03 PROCEDURE — 1159F MED LIST DOCD IN RCRD: CPT | Mod: CPTII,S$GLB,, | Performed by: STUDENT IN AN ORGANIZED HEALTH CARE EDUCATION/TRAINING PROGRAM

## 2023-11-03 PROCEDURE — 3078F PR MOST RECENT DIASTOLIC BLOOD PRESSURE < 80 MM HG: ICD-10-PCS | Mod: CPTII,S$GLB,, | Performed by: STUDENT IN AN ORGANIZED HEALTH CARE EDUCATION/TRAINING PROGRAM

## 2023-11-03 PROCEDURE — 99999 PR PBB SHADOW E&M-EST. PATIENT-LVL IV: ICD-10-PCS | Mod: PBBFAC,,, | Performed by: STUDENT IN AN ORGANIZED HEALTH CARE EDUCATION/TRAINING PROGRAM

## 2023-11-03 PROCEDURE — 99214 OFFICE O/P EST MOD 30 MIN: CPT | Mod: S$GLB,,, | Performed by: STUDENT IN AN ORGANIZED HEALTH CARE EDUCATION/TRAINING PROGRAM

## 2023-11-03 PROCEDURE — 99999 PR PBB SHADOW E&M-EST. PATIENT-LVL IV: CPT | Mod: PBBFAC,,, | Performed by: STUDENT IN AN ORGANIZED HEALTH CARE EDUCATION/TRAINING PROGRAM

## 2023-11-03 RX ORDER — ATORVASTATIN CALCIUM 80 MG/1
TABLET, FILM COATED ORAL
COMMUNITY

## 2023-11-03 NOTE — ANESTHESIA PAT ROS NOTE
11/03/2023  Gordon Griffin is a 43 y.o., adult.      Pre-op Assessment          Review of Systems           Anesthesia Assessment: Preoperative EQUATION    Planned Procedure: Procedure(s) (LRB):  ORCHIECTOMY (Bilateral)  Requested Anesthesia Type:General  Surgeon: Ronald Mcgrath MD  Service: Urology  Known or anticipated Date of Surgery:11/8/2023    Surgeon notes: reviewed    Previous anesthesia records:Not available    Last PCP note: 6-12 months ago , outside [facility]   Subspecialty notes: Neurology, Ortho, Pain Management, Urology    Other important co-morbidities:  Anxiety, depression, Hx Stroke, Hx TIA, Migraine, Movement disorder       Tests already available:  Available tests,  within 3 months , within [facility] .  8/31/2023 Lipid Panel, TSH, PT/INR, CMP, CBC, EKG     Optimization:  Anesthesia Preop Clinic Assessment  Indicated    Medical Opinion Indicated           Plan:    Testing:  None Needed   Pre-anesthesia  visit       Visit focus: position other than supine, post-operative pain control for chronic pain patient     Consultation:IM Perioperative Hospitalist     Patient  has previously scheduled Medical Appointment: Not at this time prior to surgery    Navigation: Tests Scheduled.              Consults scheduled.             Results will be tracked by Preop Clinic.
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
## 2023-11-06 ENCOUNTER — LAB VISIT (OUTPATIENT)
Dept: LAB | Facility: HOSPITAL | Age: 43
End: 2023-11-06
Payer: MEDICARE

## 2023-11-06 ENCOUNTER — OFFICE VISIT (OUTPATIENT)
Dept: ORTHOPEDICS | Facility: CLINIC | Age: 43
End: 2023-11-06
Payer: MEDICARE

## 2023-11-06 ENCOUNTER — OFFICE VISIT (OUTPATIENT)
Dept: INTERNAL MEDICINE | Facility: CLINIC | Age: 43
End: 2023-11-06
Payer: MEDICARE

## 2023-11-06 VITALS — WEIGHT: 142 LBS | BODY MASS INDEX: 19.23 KG/M2 | HEIGHT: 72 IN

## 2023-11-06 VITALS
HEART RATE: 89 BPM | SYSTOLIC BLOOD PRESSURE: 131 MMHG | BODY MASS INDEX: 19.23 KG/M2 | OXYGEN SATURATION: 98 % | WEIGHT: 142 LBS | HEIGHT: 72 IN | TEMPERATURE: 98 F | DIASTOLIC BLOOD PRESSURE: 68 MMHG

## 2023-11-06 DIAGNOSIS — E87.6 HYPOKALEMIA: ICD-10-CM

## 2023-11-06 DIAGNOSIS — D69.6 THROMBOCYTOPENIA, UNSPECIFIED: ICD-10-CM

## 2023-11-06 DIAGNOSIS — F64.0 TRANSGENDER WOMAN ON HORMONE THERAPY: ICD-10-CM

## 2023-11-06 DIAGNOSIS — N50.819 PERSISTENT TESTICULAR PAIN: ICD-10-CM

## 2023-11-06 DIAGNOSIS — R29.818 TRANSIENT NEUROLOGICAL SYMPTOMS: ICD-10-CM

## 2023-11-06 DIAGNOSIS — G25.3 MYOCLONUS: ICD-10-CM

## 2023-11-06 DIAGNOSIS — Z86.73 HISTORY OF CVA (CEREBROVASCULAR ACCIDENT): ICD-10-CM

## 2023-11-06 DIAGNOSIS — M47.892 OTHER SPONDYLOSIS, CERVICAL REGION: ICD-10-CM

## 2023-11-06 DIAGNOSIS — F32.A DEPRESSION, UNSPECIFIED DEPRESSION TYPE: ICD-10-CM

## 2023-11-06 DIAGNOSIS — Z79.899 TRANSGENDER WOMAN ON HORMONE THERAPY: ICD-10-CM

## 2023-11-06 DIAGNOSIS — G43.719 INTRACTABLE CHRONIC MIGRAINE WITHOUT AURA AND WITHOUT STATUS MIGRAINOSUS: ICD-10-CM

## 2023-11-06 DIAGNOSIS — M22.2X2 PATELLOFEMORAL PAIN SYNDROME OF BOTH KNEES: ICD-10-CM

## 2023-11-06 DIAGNOSIS — M25.371 ANKLE INSTABILITY, RIGHT: Primary | ICD-10-CM

## 2023-11-06 DIAGNOSIS — M17.12 PRIMARY OSTEOARTHRITIS OF LEFT KNEE: ICD-10-CM

## 2023-11-06 DIAGNOSIS — M22.2X1 PATELLOFEMORAL PAIN SYNDROME OF BOTH KNEES: ICD-10-CM

## 2023-11-06 DIAGNOSIS — I25.10 CORONARY ARTERY DISEASE INVOLVING NATIVE CORONARY ARTERY OF NATIVE HEART WITHOUT ANGINA PECTORIS: ICD-10-CM

## 2023-11-06 DIAGNOSIS — F41.9 ANXIETY: ICD-10-CM

## 2023-11-06 DIAGNOSIS — I20.1 CORONARY VASOSPASM: ICD-10-CM

## 2023-11-06 DIAGNOSIS — G43.719 INTRACTABLE CHRONIC MIGRAINE WITHOUT AURA AND WITHOUT STATUS MIGRAINOSUS: Primary | ICD-10-CM

## 2023-11-06 PROBLEM — N50.812 PAIN IN BOTH TESTICLES: Status: RESOLVED | Noted: 2023-05-22 | Resolved: 2023-11-06

## 2023-11-06 PROBLEM — G43.709 CHRONIC MIGRAINE WITHOUT AURA WITHOUT STATUS MIGRAINOSUS, NOT INTRACTABLE: Status: RESOLVED | Noted: 2018-02-07 | Resolved: 2023-11-06

## 2023-11-06 PROBLEM — Z87.898 HISTORY OF PROGRESSIVE WEAKNESS: Status: RESOLVED | Noted: 2019-03-04 | Resolved: 2023-11-06

## 2023-11-06 PROBLEM — R26.89 IMPAIRED GAIT AND MOBILITY: Status: RESOLVED | Noted: 2023-06-07 | Resolved: 2023-11-06

## 2023-11-06 PROBLEM — Z74.09 IMPAIRED MOBILITY AND ENDURANCE: Status: RESOLVED | Noted: 2020-09-04 | Resolved: 2023-11-06

## 2023-11-06 PROBLEM — G89.29 CHRONIC PAIN: Status: RESOLVED | Noted: 2021-03-26 | Resolved: 2023-11-06

## 2023-11-06 PROBLEM — T14.8XXA MUSCLE STRAIN: Status: RESOLVED | Noted: 2022-10-16 | Resolved: 2023-11-06

## 2023-11-06 PROBLEM — N50.811 PAIN IN BOTH TESTICLES: Status: RESOLVED | Noted: 2023-05-22 | Resolved: 2023-11-06
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
## 2023-11-06 LAB
BASOPHILS # BLD AUTO: 0.04 K/UL (ref 0–0.2)
BASOPHILS NFR BLD: 0.6 % (ref 0–1.9)
DIFFERENTIAL METHOD: NORMAL
EOSINOPHIL # BLD AUTO: 0.1 K/UL (ref 0–0.5)
EOSINOPHIL NFR BLD: 1.4 % (ref 0–8)
ERYTHROCYTE [DISTWIDTH] IN BLOOD BY AUTOMATED COUNT: 11.9 % (ref 11.5–14.5)
HCT VFR BLD AUTO: 38.4 % (ref 37–48.5)
HGB BLD-MCNC: 12.7 G/DL (ref 12–16)
IMM GRANULOCYTES # BLD AUTO: 0.02 K/UL (ref 0–0.04)
IMM GRANULOCYTES NFR BLD AUTO: 0.3 % (ref 0–0.5)
LYMPHOCYTES # BLD AUTO: 1.2 K/UL (ref 1–4.8)
LYMPHOCYTES NFR BLD: 19.3 % (ref 18–48)
MCH RBC QN AUTO: 29.2 PG (ref 27–31)
MCHC RBC AUTO-ENTMCNC: 33.1 G/DL (ref 32–36)
MCV RBC AUTO: 88 FL (ref 82–98)
MONOCYTES # BLD AUTO: 0.5 K/UL (ref 0.3–1)
MONOCYTES NFR BLD: 7.6 % (ref 4–15)
NEUTROPHILS # BLD AUTO: 4.5 K/UL (ref 1.8–7.7)
NEUTROPHILS NFR BLD: 70.8 % (ref 38–73)
NRBC BLD-RTO: 0 /100 WBC
PLATELET # BLD AUTO: 154 K/UL (ref 150–450)
PMV BLD AUTO: 10.3 FL (ref 9.2–12.9)
RBC # BLD AUTO: 4.35 M/UL (ref 4–5.4)
WBC # BLD AUTO: 6.33 K/UL (ref 3.9–12.7)

## 2023-11-06 PROCEDURE — 99999 PR PBB SHADOW E&M-EST. PATIENT-LVL V: CPT | Mod: PBBFAC,,, | Performed by: NURSE PRACTITIONER

## 2023-11-06 PROCEDURE — 99213 OFFICE O/P EST LOW 20 MIN: CPT | Mod: 25,S$GLB,, | Performed by: PHYSICIAN ASSISTANT

## 2023-11-06 PROCEDURE — 99417 PR PROLONGED SVC, OUTPT, W/WO DIRECT PT CONTACT,  EA ADDTL 15 MIN: ICD-10-PCS | Mod: KX,S$GLB,, | Performed by: NURSE PRACTITIONER

## 2023-11-06 PROCEDURE — 85025 COMPLETE CBC W/AUTO DIFF WBC: CPT | Performed by: NURSE PRACTITIONER

## 2023-11-06 PROCEDURE — 3008F PR BODY MASS INDEX (BMI) DOCUMENTED: ICD-10-PCS | Mod: CPTII,S$GLB,, | Performed by: PHYSICIAN ASSISTANT

## 2023-11-06 PROCEDURE — 20610 LARGE JOINT ASPIRATION/INJECTION: R KNEE: ICD-10-PCS | Mod: RT,S$GLB,, | Performed by: PHYSICIAN ASSISTANT

## 2023-11-06 PROCEDURE — 1159F PR MEDICATION LIST DOCUMENTED IN MEDICAL RECORD: ICD-10-PCS | Mod: CPTII,S$GLB,, | Performed by: NURSE PRACTITIONER

## 2023-11-06 PROCEDURE — 1160F PR REVIEW ALL MEDS BY PRESCRIBER/CLIN PHARMACIST DOCUMENTED: ICD-10-PCS | Mod: CPTII,S$GLB,, | Performed by: PHYSICIAN ASSISTANT

## 2023-11-06 PROCEDURE — 99999 PR PBB SHADOW E&M-EST. PATIENT-LVL IV: CPT | Mod: PBBFAC,,, | Performed by: PHYSICIAN ASSISTANT

## 2023-11-06 PROCEDURE — 1159F MED LIST DOCD IN RCRD: CPT | Mod: CPTII,S$GLB,, | Performed by: NURSE PRACTITIONER

## 2023-11-06 PROCEDURE — 3008F BODY MASS INDEX DOCD: CPT | Mod: CPTII,S$GLB,, | Performed by: PHYSICIAN ASSISTANT

## 2023-11-06 PROCEDURE — 3008F BODY MASS INDEX DOCD: CPT | Mod: CPTII,S$GLB,, | Performed by: NURSE PRACTITIONER

## 2023-11-06 PROCEDURE — 1159F MED LIST DOCD IN RCRD: CPT | Mod: CPTII,S$GLB,, | Performed by: PHYSICIAN ASSISTANT

## 2023-11-06 PROCEDURE — 1160F RVW MEDS BY RX/DR IN RCRD: CPT | Mod: CPTII,S$GLB,, | Performed by: PHYSICIAN ASSISTANT

## 2023-11-06 PROCEDURE — 99205 OFFICE O/P NEW HI 60 MIN: CPT | Mod: KX,S$GLB,, | Performed by: NURSE PRACTITIONER

## 2023-11-06 PROCEDURE — 3008F PR BODY MASS INDEX (BMI) DOCUMENTED: ICD-10-PCS | Mod: CPTII,S$GLB,, | Performed by: NURSE PRACTITIONER

## 2023-11-06 PROCEDURE — 20610 DRAIN/INJ JOINT/BURSA W/O US: CPT | Mod: RT,S$GLB,, | Performed by: PHYSICIAN ASSISTANT

## 2023-11-06 PROCEDURE — 3075F PR MOST RECENT SYSTOLIC BLOOD PRESS GE 130-139MM HG: ICD-10-PCS | Mod: CPTII,S$GLB,, | Performed by: NURSE PRACTITIONER

## 2023-11-06 PROCEDURE — 1160F RVW MEDS BY RX/DR IN RCRD: CPT | Mod: CPTII,S$GLB,, | Performed by: NURSE PRACTITIONER

## 2023-11-06 PROCEDURE — 36415 COLL VENOUS BLD VENIPUNCTURE: CPT | Performed by: NURSE PRACTITIONER

## 2023-11-06 PROCEDURE — 1160F PR REVIEW ALL MEDS BY PRESCRIBER/CLIN PHARMACIST DOCUMENTED: ICD-10-PCS | Mod: CPTII,S$GLB,, | Performed by: NURSE PRACTITIONER

## 2023-11-06 PROCEDURE — 99999 PR PBB SHADOW E&M-EST. PATIENT-LVL V: ICD-10-PCS | Mod: PBBFAC,,, | Performed by: NURSE PRACTITIONER

## 2023-11-06 PROCEDURE — 99999 PR PBB SHADOW E&M-EST. PATIENT-LVL IV: ICD-10-PCS | Mod: PBBFAC,,, | Performed by: PHYSICIAN ASSISTANT

## 2023-11-06 PROCEDURE — 99205 PR OFFICE/OUTPT VISIT, NEW, LEVL V, 60-74 MIN: ICD-10-PCS | Mod: KX,S$GLB,, | Performed by: NURSE PRACTITIONER

## 2023-11-06 PROCEDURE — 3078F PR MOST RECENT DIASTOLIC BLOOD PRESSURE < 80 MM HG: ICD-10-PCS | Mod: CPTII,S$GLB,, | Performed by: NURSE PRACTITIONER

## 2023-11-06 PROCEDURE — 3078F DIAST BP <80 MM HG: CPT | Mod: CPTII,S$GLB,, | Performed by: NURSE PRACTITIONER

## 2023-11-06 PROCEDURE — 3075F SYST BP GE 130 - 139MM HG: CPT | Mod: CPTII,S$GLB,, | Performed by: NURSE PRACTITIONER

## 2023-11-06 PROCEDURE — 1159F PR MEDICATION LIST DOCUMENTED IN MEDICAL RECORD: ICD-10-PCS | Mod: CPTII,S$GLB,, | Performed by: PHYSICIAN ASSISTANT

## 2023-11-06 PROCEDURE — 99417 PROLNG OP E/M EACH 15 MIN: CPT | Mod: KX,S$GLB,, | Performed by: NURSE PRACTITIONER

## 2023-11-06 PROCEDURE — 99213 PR OFFICE/OUTPT VISIT, EST, LEVL III, 20-29 MIN: ICD-10-PCS | Mod: 25,S$GLB,, | Performed by: PHYSICIAN ASSISTANT

## 2023-11-06 RX ORDER — LIDOCAINE HYDROCHLORIDE 10 MG/ML
2 INJECTION INFILTRATION; PERINEURAL
Status: DISCONTINUED | OUTPATIENT
Start: 2023-11-06 | End: 2023-11-06 | Stop reason: HOSPADM

## 2023-11-06 RX ORDER — TRIAMCINOLONE ACETONIDE 40 MG/ML
40 INJECTION, SUSPENSION INTRA-ARTICULAR; INTRAMUSCULAR
Status: DISCONTINUED | OUTPATIENT
Start: 2023-11-06 | End: 2023-11-06 | Stop reason: HOSPADM

## 2023-11-06 RX ADMIN — LIDOCAINE HYDROCHLORIDE 2 ML: 10 INJECTION INFILTRATION; PERINEURAL at 10:11

## 2023-11-06 RX ADMIN — TRIAMCINOLONE ACETONIDE 40 MG: 40 INJECTION, SUSPENSION INTRA-ARTICULAR; INTRAMUSCULAR at 10:11

## 2023-11-06 NOTE — ASSESSMENT & PLAN NOTE
Keppra, Muscle relaxers  Care provided by Ilene Smalls MD  Last visit 11/3/23   Stable  Improved with PT
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
## 2023-11-06 NOTE — DISCHARGE INSTRUCTIONS
Your surgery has been scheduled for:______11/8/23____________________________________    You should report to:  ____[facility], located on the [place] side of the first floor of the           [facility] ([phone])  __X__[facility], located on the [facility] side of the            Second floor of the [facility] ([phone])  ____3rd Floor [facility] located on the [facility] side of the [facility] [phone]  ____[facility]: located at [address] [place] [address]. [address]     Please Note   Tell your doctor if you take Aspirin, products containing Aspirin, herbal medications  or blood thinners, such as Coumadin, Ticlid, or Plavix.  (Consult your provider regarding holding or stopping before surgery).  Arrange for someone to drive you home following surgery.  You will not be allowed to leave the surgical facility alone or drive yourself home following sedation and anesthesia.    Before Surgery  Stop taking all herbal medications, vitamins, and supplements 7 days prior to surgery  No Motrin/Advil (Ibuprofen) 7 days before surgery  No Aleve (Naproxen) 7 days before surgery  Stop Taking Asprin, products containing Aspirin __7___days before surgery   No Goody's/BC Powder 7 days before surgery  Refrain from drinking alcoholic beverages for 24 hours before and after surgery  Stop or limit smoking at least 24 hours prior to surgery  You may take Tylenol for pain    Night before Surgery  Do not eat or drink after midnight  Take a shower or bath (shower is recommended).  Bathe with Hibiclens soap or an antibacterial soap from the neck down.  If not supplied by your surgeon, hibiclens soap will need to be purchased over the counter in pharmacy.  Rinse soap off thoroughly.  Shampoo your hair with your regular shampoo    The Day of Surgery  Take another bath or shower with hibiclens  or any antibacterial soap, to reduce the chance of infection.  Take heart and blood pressure medications with a small sip of water, as advised by the perioperative team.  Do not take fluid pills  You may brush your teeth and rinse your mouth, but do not swallow any additional water.   Do not apply perfumes, powder, body lotions or deodorant on the day of surgery.  Nail polish should be removed.  Do not wear makeup or moisturizer  Wear comfortable clothes, such as a button front shirt and loose fitting pants.  Leave all jewelry, including body piercings, and valuables at home.    Bring any devices you will neeed after surgery such as crutches or canes.  If you have sleep apnea, please bring your CPAP machine  In the event that your physical condition changes including the onset of a cold or respiratory illness, or if you have to delay or cancel your surgery, please notify your surgeon.
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
## 2023-11-06 NOTE — HPI
This is a 43 y.o. female  who presents today for a preoperative evaluation in preparation for Orchiectomy  Surgery is indicated for Male to female transgender person    .   Patient is new to me.    The history has been obtained by speaking with the patient and reviewing the electronic medical record and/or outside health information. Significant health conditions for the perioperative period are discussed below in assessment and plan.     Patient reports current health status to be Good.  Denies any new symptoms before surgery.

## 2023-11-06 NOTE — PROGRESS NOTES
"Patient ID: Gordon Griffin is a 43 y.o. adult.    Chief Complaint: Injections and Pain of the Right Knee      HISTORY:  Gordon Griffin is a 43 y.o. adult who returns to me today for follow up of right knee pain.  She had relief with last right knee injection- would like to repeat one today.  Needs new ankle brace- had custom [place] brace that helps with stability      PMH/PSH/FamHx/SocHx:    Unchanged from prior visit.    ROS:  Constitution: Negative for chills, fever and weakness.   Respiratory: Negative for cough and shortness of breath.   Musculoskeletal: Positive for right knee  Psychiatric/Behavioral: The patient is not nervous/anxious.       PHYSICAL EXAM:   Ht 6' 0.01" (1.829 m)   Wt 64.4 kg (141 lb 15.6 oz)   BMI 19.25 kg/m²   Right knee  Skin intact  No warmth or effusion  ROM 0-130  Sensation intact    ASSESSMENT/PLAN:    Gordon was seen today for injections and pain.    Diagnoses and all orders for this visit:    Ankle instability, right  -     Cancel: HME - OTHER  -     HME - OTHER    Primary osteoarthritis of left knee  -     hyaluronate sodium, stabilized (MONOVISC) Syrg 88 mg  -     Prior authorization Order    Patellofemoral pain syndrome of both knees  -     Large Joint Aspiration/Injection: R knee      - Right knee CSI today  - rest, ice as needed  - Monovisc for next visit- will schedule in about one month  - Referral to [facility] for custom brace fitting      "

## 2023-11-06 NOTE — ASSESSMENT & PLAN NOTE
She denies any symptoms of chest pain, shortness of breath at rest, peripheral edema, orthopnea, palpitations, lightheadedness, presyncope, or syncope.  NTG as needed prescribed

## 2023-11-06 NOTE — ASSESSMENT & PLAN NOTE
She denies any symptoms of chest pain, shortness of breath at rest, peripheral edema, orthopnea, palpitations, lightheadedness, presyncope, or syncope.   RCRI 1 Able to meet 4 METs     [facility] 2/8/2016   SUMMARY/POST-OPERATIVE DIAGNOSIS:     1. Patent left main coronary artery.   2. 40% stenosis in mid LAD. FFR 0.92   3. No angiographic disease in CRX and its main braches   4. No angiographic disease in RCA or its branches   5. Noraml Left ventriculgram   EF 55%     Stress Test 1/12/2023    Dx: Atherosclerosis of native coronary artery of native heart with unstable angina pectoris [     Conclusion    There is a small sized, mild intensity distal to apical anteroseptal and inferoapical fixed perfusion abnormality involving 5% of LV myocardium in the distribution of the LAD. After pharmacologic stress, this defect improves, indicative of non-transmural scar.    There are no other significant perfusion abnormalities.    The whole heart absolute myocardial perfusion values averaged 0.53 cc/min/g at rest, which is reduced; 1.40 cc/min/g at stress, which is mildly reduced; and CFR is 2.66 , which is low normal.    CT attenuation images demonstrate no coronary calcifications and no aortic calcifications.    The gated perfusion images showed an ejection fraction of 71% at rest and 78% during stress. A normal ejection fraction is greater than 47%.    The wall motion is normal at rest and during stress.    The LV cavity size is normal at rest and stress.    The ECG portion of the study is abnormal but not diagnostic.    There were no arrhythmias during stress.    The patient reported no chest pain during the stress test.    There are no prior studies for comparison.

## 2023-11-06 NOTE — ASSESSMENT & PLAN NOTE
"Seen in ED for Neurological changes 9/1/23 Summary of visit below":  "43-year-old transgender female presenting to the emergency department with dysarthria and aphasia, also complaining of increased dystonia/myoclonus.  Patient has a bizarre neurologic exam, though given reported aphasia, code stroke activated.  CTA negative for SBO or signs of acute stroke.  MRI performed, negative for stroke.  She was given Benadryl and on re-evaluation symptoms have resolved.  Consider complex migraine.  Less likely partial seizures given bilateral facial symptoms.  She does have a significant leukocytosis, though no acute infectious symptoms, thought likely reactive.  Doubt sepsis.  Laboratory studies otherwise unremarkable.  Patient cleared by vascular neurology for outpatient follow up.  Patient reports she has 4 different neurologists that she sees for these bizarre symptoms.  Stable for discharge home."    "

## 2023-11-06 NOTE — PROCEDURES
Large Joint Aspiration/Injection: R knee    Date/Time: 11/6/2023 10:00 AM    Performed by: Vale Neil PA-C  Authorized by: Vale Neil PA-C    Consent Done?:  Yes (Verbal)  Indications:  Pain  Timeout: prior to procedure the correct patient, procedure, and site was verified    Prep: patient was prepped and draped in usual sterile fashion    Local anesthetic:  Topical anesthetic    Details:  Needle Size:  22 G  Approach:  Anterolateral  Location:  Knee  Site:  R knee  Medications:  40 mg triamcinolone acetonide 40 mg/mL; 2 mL LIDOcaine HCL 10 mg/ml (1%) 10 mg/mL (1 %)  Patient tolerance:  Patient tolerated the procedure well with no immediate complications

## 2023-11-07 ENCOUNTER — TELEPHONE (OUTPATIENT)
Dept: UROLOGY | Facility: CLINIC | Age: 43
End: 2023-11-07
Payer: MEDICARE

## 2023-11-07 ENCOUNTER — ANESTHESIA EVENT (OUTPATIENT)
Dept: SURGERY | Facility: HOSPITAL | Age: 43
End: 2023-11-07
Payer: MEDICARE
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
## 2023-11-07 NOTE — TELEPHONE ENCOUNTER
Called pt to confirm arrival time of 5:45am for procedure on 11/8/23. Gave pt NPO instructions and gave pt opportunity to ask questions. Pt verbalized understanding.

## 2023-11-08 ENCOUNTER — ANESTHESIA (OUTPATIENT)
Dept: SURGERY | Facility: HOSPITAL | Age: 43
End: 2023-11-08
Payer: MEDICARE

## 2023-11-08 ENCOUNTER — HOSPITAL ENCOUNTER (OUTPATIENT)
Facility: HOSPITAL | Age: 43
Discharge: HOME | End: 2023-11-08
Attending: UROLOGY | Admitting: UROLOGY
Payer: MEDICARE

## 2023-11-08 VITALS
WEIGHT: 142 LBS | HEART RATE: 85 BPM | DIASTOLIC BLOOD PRESSURE: 78 MMHG | HEIGHT: 72 IN | SYSTOLIC BLOOD PRESSURE: 132 MMHG | TEMPERATURE: 98 F | OXYGEN SATURATION: 100 % | RESPIRATION RATE: 16 BRPM | BODY MASS INDEX: 19.23 KG/M2

## 2023-11-08 DIAGNOSIS — Z79.899 TRANSGENDER PERSON ON HORMONE THERAPY: Primary | ICD-10-CM

## 2023-11-08 DIAGNOSIS — F64.0 TRANSGENDER PERSON ON HORMONE THERAPY: Primary | ICD-10-CM

## 2023-11-08 PROCEDURE — 36000707: Performed by: UROLOGY

## 2023-11-08 PROCEDURE — 63600175 PHARM REV CODE 636 W HCPCS

## 2023-11-08 PROCEDURE — 25000003 PHARM REV CODE 250: Performed by: UROLOGY

## 2023-11-08 PROCEDURE — D9220A PRA ANESTHESIA: ICD-10-PCS | Mod: KX,,, | Performed by: ANESTHESIOLOGY

## 2023-11-08 PROCEDURE — 88305 TISSUE EXAM BY PATHOLOGIST: CPT | Mod: 59 | Performed by: PATHOLOGY

## 2023-11-08 PROCEDURE — 54520 PR REMOVAL TESTIS,SIMPLE: ICD-10-PCS | Mod: 50,,, | Performed by: UROLOGY

## 2023-11-08 PROCEDURE — 63600175 PHARM REV CODE 636 W HCPCS: Performed by: UROLOGY

## 2023-11-08 PROCEDURE — 37000008 HC ANESTHESIA 1ST 15 MINUTES: Performed by: UROLOGY

## 2023-11-08 PROCEDURE — 25000003 PHARM REV CODE 250

## 2023-11-08 PROCEDURE — 36000706: Performed by: UROLOGY

## 2023-11-08 PROCEDURE — 71000044 HC DOSC ROUTINE RECOVERY FIRST HOUR: Performed by: UROLOGY

## 2023-11-08 PROCEDURE — 71000015 HC POSTOP RECOV 1ST HR: Performed by: UROLOGY

## 2023-11-08 PROCEDURE — 54520 REMOVAL OF TESTIS: CPT | Mod: 50,,, | Performed by: UROLOGY

## 2023-11-08 PROCEDURE — 88305 TISSUE EXAM BY PATHOLOGIST: CPT | Mod: 26,KX,, | Performed by: PATHOLOGY

## 2023-11-08 PROCEDURE — 37000009 HC ANESTHESIA EA ADD 15 MINS: Performed by: UROLOGY

## 2023-11-08 PROCEDURE — D9220A PRA ANESTHESIA: Mod: KX,,, | Performed by: ANESTHESIOLOGY

## 2023-11-08 PROCEDURE — 88305 TISSUE EXAM BY PATHOLOGIST: ICD-10-PCS | Mod: 26,KX,, | Performed by: PATHOLOGY

## 2023-11-08 RX ORDER — HYDROCODONE BITARTRATE AND ACETAMINOPHEN 5; 325 MG/1; MG/1
1 TABLET ORAL EVERY 6 HOURS PRN
Qty: 10 TABLET | Refills: 0 | Status: SHIPPED | OUTPATIENT
Start: 2023-11-08

## 2023-11-08 RX ORDER — ACETAMINOPHEN 500 MG
1000 TABLET ORAL
Status: DISCONTINUED | OUTPATIENT
Start: 2023-11-08 | End: 2023-11-08 | Stop reason: HOSPADM

## 2023-11-08 RX ORDER — PROPOFOL 10 MG/ML
VIAL (ML) INTRAVENOUS
Status: DISCONTINUED | OUTPATIENT
Start: 2023-11-08 | End: 2023-11-08

## 2023-11-08 RX ORDER — LIDOCAINE HYDROCHLORIDE 20 MG/ML
INJECTION, SOLUTION EPIDURAL; INFILTRATION; INTRACAUDAL; PERINEURAL
Status: DISCONTINUED | OUTPATIENT
Start: 2023-11-08 | End: 2023-11-08

## 2023-11-08 RX ORDER — LIDOCAINE HYDROCHLORIDE 10 MG/ML
INJECTION, SOLUTION EPIDURAL; INFILTRATION; INTRACAUDAL; PERINEURAL
Status: DISCONTINUED | OUTPATIENT
Start: 2023-11-08 | End: 2023-11-08 | Stop reason: HOSPADM

## 2023-11-08 RX ORDER — BUPIVACAINE HYDROCHLORIDE 2.5 MG/ML
INJECTION, SOLUTION EPIDURAL; INFILTRATION; INTRACAUDAL
Status: DISCONTINUED | OUTPATIENT
Start: 2023-11-08 | End: 2023-11-08 | Stop reason: HOSPADM

## 2023-11-08 RX ORDER — FENTANYL CITRATE 50 UG/ML
25 INJECTION, SOLUTION INTRAMUSCULAR; INTRAVENOUS EVERY 5 MIN PRN
Status: DISCONTINUED | OUTPATIENT
Start: 2023-11-08 | End: 2023-11-08 | Stop reason: HOSPADM

## 2023-11-08 RX ORDER — KETAMINE HCL IN 0.9 % NACL 50 MG/5 ML
SYRINGE (ML) INTRAVENOUS
Status: DISCONTINUED | OUTPATIENT
Start: 2023-11-08 | End: 2023-11-08

## 2023-11-08 RX ORDER — ROCURONIUM BROMIDE 10 MG/ML
INJECTION, SOLUTION INTRAVENOUS
Status: DISCONTINUED | OUTPATIENT
Start: 2023-11-08 | End: 2023-11-08

## 2023-11-08 RX ORDER — ONDANSETRON 2 MG/ML
INJECTION INTRAMUSCULAR; INTRAVENOUS
Status: DISCONTINUED | OUTPATIENT
Start: 2023-11-08 | End: 2023-11-08

## 2023-11-08 RX ORDER — DEXAMETHASONE SODIUM PHOSPHATE 4 MG/ML
INJECTION, SOLUTION INTRA-ARTICULAR; INTRALESIONAL; INTRAMUSCULAR; INTRAVENOUS; SOFT TISSUE
Status: DISCONTINUED | OUTPATIENT
Start: 2023-11-08 | End: 2023-11-08

## 2023-11-08 RX ORDER — CEFAZOLIN SODIUM 1 G/3ML
INJECTION, POWDER, FOR SOLUTION INTRAMUSCULAR; INTRAVENOUS
Status: DISCONTINUED | OUTPATIENT
Start: 2023-11-08 | End: 2023-11-08

## 2023-11-08 RX ORDER — SODIUM CHLORIDE 0.9 % (FLUSH) 0.9 %
10 SYRINGE (ML) INJECTION
Status: DISCONTINUED | OUTPATIENT
Start: 2023-11-08 | End: 2023-11-08 | Stop reason: HOSPADM

## 2023-11-08 RX ORDER — FENTANYL CITRATE 50 UG/ML
INJECTION, SOLUTION INTRAMUSCULAR; INTRAVENOUS
Status: DISCONTINUED | OUTPATIENT
Start: 2023-11-08 | End: 2023-11-08

## 2023-11-08 RX ORDER — MIDAZOLAM HYDROCHLORIDE 1 MG/ML
INJECTION INTRAMUSCULAR; INTRAVENOUS
Status: DISCONTINUED | OUTPATIENT
Start: 2023-11-08 | End: 2023-11-08

## 2023-11-08 RX ADMIN — CEFAZOLIN 2 G: 330 INJECTION, POWDER, FOR SOLUTION INTRAMUSCULAR; INTRAVENOUS at 08:11

## 2023-11-08 RX ADMIN — MIDAZOLAM HYDROCHLORIDE 2 MG: 1 INJECTION INTRAMUSCULAR; INTRAVENOUS at 08:11

## 2023-11-08 RX ADMIN — PROPOFOL 170 MG: 10 INJECTION, EMULSION INTRAVENOUS at 08:11

## 2023-11-08 RX ADMIN — FENTANYL CITRATE 25 MCG: 50 INJECTION, SOLUTION INTRAMUSCULAR; INTRAVENOUS at 08:11

## 2023-11-08 RX ADMIN — FENTANYL CITRATE 50 MCG: 50 INJECTION, SOLUTION INTRAMUSCULAR; INTRAVENOUS at 08:11

## 2023-11-08 RX ADMIN — ROCURONIUM BROMIDE 50 MG: 10 INJECTION, SOLUTION INTRAVENOUS at 08:11

## 2023-11-08 RX ADMIN — SODIUM CHLORIDE: 0.9 INJECTION, SOLUTION INTRAVENOUS at 08:11

## 2023-11-08 RX ADMIN — LIDOCAINE HYDROCHLORIDE 100 MG: 20 INJECTION, SOLUTION EPIDURAL; INFILTRATION; INTRACAUDAL; PERINEURAL at 08:11

## 2023-11-08 RX ADMIN — DEXAMETHASONE SODIUM PHOSPHATE 8 MG: 4 INJECTION, SOLUTION INTRAMUSCULAR; INTRAVENOUS at 08:11

## 2023-11-08 RX ADMIN — Medication 25 MG: at 08:11

## 2023-11-08 RX ADMIN — ONDANSETRON 4 MG: 2 INJECTION INTRAMUSCULAR; INTRAVENOUS at 08:11

## 2023-11-08 RX ADMIN — SUGAMMADEX 200 MG: 100 INJECTION, SOLUTION INTRAVENOUS at 09:11

## 2023-11-08 NOTE — BRIEF OP NOTE
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 [Z78.9]     * Pain in both testicles [N50.811, N50.812]    Procedure(s) (LRB):  ORCHIECTOMY (Bilateral)    Anesthesia: General    Operative Findings:   Bilateral simple orchiectomy performed without issue    Estimated Blood Loss: * No values recorded between 11/8/2023  8:26 AM and 11/8/2023  9:08 AM *         Specimens:   Specimen (24h ago, onward)       Start     Ordered    11/08/23 0901  Specimen to Pathology, Surgery Urology  Once        Comments: Pre-op Diagnosis: 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 Left testicle - perm2. Right testicle - perm     References:    Click here for ordering Quick Tip   Question Answer Comment   Procedure Type: Urology    Which provider would you like to cc? JESSICA MCGRATH    Release to patient Immediate        11/08/23 0901                      Discharge Note    OUTCOME: Patient tolerated treatment/procedure well without complication and is now ready for discharge.    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    FOLLOWUP: In clinic    DISCHARGE INSTRUCTIONS:    Discharge Procedure Orders   Notify your health care provider if you experience any of the following:  temperature >100.4     Notify your health care provider if you experience any of the following:  persistent nausea and vomiting or diarrhea     Notify your health care provider if you experience any of the following:  severe uncontrolled pain     Notify your health care provider if you  experience any of the following:  difficulty breathing or increased cough     Notify your health care provider if you experience any of the following:  severe persistent headache     Notify your health care provider if you experience any of the following:  persistent dizziness, light-headedness, or visual disturbances     Notify your health care provider if you experience any of the following:  increased confusion or weakness

## 2023-11-08 NOTE — ANESTHESIA PROCEDURE NOTES
Intubation    Date/Time: 11/8/2023 8:08 AM    Performed by: Chance Oconnell DO  Authorized by: Reji Mooney MD    Intubation:     Induction:  Intravenous    Intubated:  Postinduction    Mask Ventilation:  Easy mask    Attempts:  1    Attempted By:  Resident anesthesiologist    Method of Intubation:  Direct    Blade:  Rueda 2    Laryngeal View Grade: Grade I - full view of cords      Difficult Airway Encountered?: No      Complications:  None    Airway Device:  Oral endotracheal tube    Airway Device Size:  7.5    Style/Cuff Inflation:  Cuffed (inflated to minimal occlusive pressure)    Tube secured:  22    Secured at:  The teeth    Placement Verified By:  Capnometry and Revisualization with laryngoscopy    Complicating Factors:  None    Findings Post-Intubation:  BS equal bilateral and atraumatic/condition of teeth unchanged
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
## 2023-11-08 NOTE — TRANSFER OF CARE
Anesthesia Transfer of Care Note    Patient: Gordon Griffin    Procedure(s) Performed: Procedure(s) (LRB):  ORCHIECTOMY (Bilateral)    Patient location: PACU    Anesthesia Type: general    Transport from OR: Transported from OR on 6-10 L/min O2 by face mask with adequate spontaneous ventilation    Post pain: adequate analgesia    Post assessment: no apparent anesthetic complications and tolerated procedure well    Post vital signs: stable    Level of consciousness: awake, alert and oriented    Nausea/Vomiting: no nausea/vomiting    Complications: none    Transfer of care protocol was followed      Last vitals: Visit Vitals  /79   Pulse 89   Temp 36.7 °C (98 °F) (Temporal)   Resp 20   Ht 6' (1.829 m)   Wt 64.4 kg (142 lb)   SpO2 100%   Breastfeeding No   BMI 19.26 kg/m²

## 2023-11-08 NOTE — OP NOTE
[facility]  Operative Note    Date: 11/8/23    Pre-Op Diagnosis: male-to-female transgender  Patient Active Problem List    Diagnosis Date Noted    Depression 11/06/2023    Cervical radiculopathy 10/12/2023    Persistent testicular pain, bilateral 10/03/2023    Transient neurological symptoms 09/01/2023    Intractable chronic migraine without aura and without status migrainosus 09/01/2023    Transgender woman on hormone therapy 04/06/2023    Functional neurological symptom disorder with abnormal movement 05/23/2022    Dystonia 11/08/2021    Tic disorder 11/08/2021    Increased frequency of urination 04/07/2021    Intermittent urinary stream 04/07/2021    Coronary artery disease involving native coronary artery of native heart without angina pectoris 11/04/2020    Intention tremor     Myoclonus     Coronary atherosclerosis due to calcified coronary lesion 11/15/2019    Neck pain 11/15/2019    Other spondylosis, cervical region 11/15/2019    Pure hypercholesterolemia 11/15/2019    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  Bilateral simple orchiectomy    Specimen(s): Bilateral testicles    Staff Surgeon: Ronald Mcgrath MD    Assistant Surgeon: Travis Nicholson MD     Anesthesia: General endotracheal anesthesia    Indications: Gordon Griffin is a 43 y.o. transitioning from male to female presenting for bilateral simple orchiectomy. The risks, benefits, and alternatives have been discussed at length and the patient understands the hormonal and fertility  implications of orchiectomy. After being given to opportunity to ask questions and have those questions answered to her satisfaction, she has elected to proceed as planned.    Findings:   Bilateral simple orchiectomy performed in standard fashion    Estimated Blood Loss: min    Drains: none    Procedure in detail:  After the risks and benefits of the procedure were explained, informed consent was obtained. The patient was taken to the operating room and placed int he supine position.  SCDs were applied and working.  Anesthesia was administered.  The patient was shaved, prepped and draped in the usual sterile fashion. Timeout was performed and preoperative antibiotics were confirmed.    An approximately 4 cm was marked in the midline along the median raphe. This was incised sharply with a 15 blade scapel. The underlying dartos tissue was dissected with Bovie electrocautery. The left testicle was delivered and dissected down through the dartos and the tunica vaginalis to expose the testicle. The cord was dissected proximally to allow ligation as high as possible. The cord was doubly clamped with a Rosey clamp and divided between the clamps. The specimen was passed off the field. A 0 silk stick tie as well as free tie was used to ligate the proximal spermatic cord. Hemostasis was excellent. We then turned to the contralateral testicle and performed the exact same procedure. The right testicle was delivered and dissected down through the dartos and the tunica vaginalis to expose the testicle. The cord was dissected proximally to allow ligation as high as possible. The cord was doubly clamped with a Rosey clamp and divided between the clamps. The specimen was passed off the field. A 0 silk stick tie as well as free tie was used to ligate the proximal spermatic cord. Hemostasis was excellent. 0.5% marcaine with 1% lidocaine was injected into bilateral cords.    The dartos was re-approximated with a 2-0 vicryl and the skin  was closed with a 4-0 Monocryl in running horizontal mattress fashion. Dermabond was applied to the incision. Scrotal support and mesh underwear were used.    The patient tolerated the procedure well and was transferred to the recovery room in stable condition.      Disposition:  The patient will follow up with Dr. Mcgrath in one month.    Travis Nicholson MD
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
## 2023-11-09 ENCOUNTER — PATIENT MESSAGE (OUTPATIENT)
Dept: ADMINISTRATIVE | Facility: OTHER | Age: 43
End: 2023-11-09
Payer: MEDICARE

## 2023-11-10 ENCOUNTER — TELEPHONE (OUTPATIENT)
Dept: PAIN MEDICINE | Facility: CLINIC | Age: 43
End: 2023-11-10
Payer: MEDICARE

## 2023-11-10 ENCOUNTER — PATIENT MESSAGE (OUTPATIENT)
Dept: UROLOGY | Facility: CLINIC | Age: 43
End: 2023-11-10
Payer: MEDICARE

## 2023-11-10 ENCOUNTER — PATIENT MESSAGE (OUTPATIENT)
Dept: REHABILITATION | Facility: HOSPITAL | Age: 43
End: 2023-11-10
Payer: MEDICARE

## 2023-11-10 ENCOUNTER — OUTPATIENT CASE MANAGEMENT (OUTPATIENT)
Dept: ADMINISTRATIVE | Facility: OTHER | Age: 43
End: 2023-11-10
Payer: MEDICARE

## 2023-11-10 ENCOUNTER — TELEPHONE (OUTPATIENT)
Dept: OTOLARYNGOLOGY | Facility: CLINIC | Age: 43
End: 2023-11-10
Payer: MEDICARE

## 2023-11-10 DIAGNOSIS — Z01.818 PRE-OP TESTING: Primary | ICD-10-CM
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
## 2023-11-10 NOTE — ANESTHESIA POSTPROCEDURE EVALUATION
Anesthesia Post Evaluation    Patient: Gordon Griffin    Procedure(s) Performed: Procedure(s) (LRB):  ORCHIECTOMY (Bilateral)    Final Anesthesia Type: general      Patient location during evaluation: PACU  Patient participation: Yes- Able to Participate  Level of consciousness: awake  Post-procedure vital signs: reviewed and stable  Pain management: adequate  Airway patency: patent    PONV status at discharge: No PONV  Anesthetic complications: no      Cardiovascular status: blood pressure returned to baseline  Respiratory status: unassisted  Hydration status: euvolemic  Follow-up not needed.          Vitals Value Taken Time   /82 11/08/23 1031   Temp 36.7 °C (98 °F) 11/08/23 0932   Pulse 84 11/08/23 1033   Resp 34 11/08/23 1031   SpO2 100 % 11/08/23 1033   Vitals shown include unvalidated device data.      No case tracking events are documented in the log.      Pain/Vicki Score: No data recorded
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
## 2023-11-10 NOTE — TELEPHONE ENCOUNTER
I am so sad to hear this. We will be happy to send records as needed. Dr. Brasher, do you know any pain providers in [place], [place]?
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
## 2023-11-13 LAB
FINAL PATHOLOGIC DIAGNOSIS: NORMAL
GROSS: NORMAL
Lab: NORMAL

## 2023-11-14 ENCOUNTER — OUTPATIENT CASE MANAGEMENT (OUTPATIENT)
Dept: ADMINISTRATIVE | Facility: OTHER | Age: 43
End: 2023-11-14
Payer: MEDICARE

## 2023-11-16 ENCOUNTER — PATIENT MESSAGE (OUTPATIENT)
Dept: REHABILITATION | Facility: HOSPITAL | Age: 43
End: 2023-11-16
Payer: MEDICARE

## 2023-11-16 NOTE — PROGRESS NOTES
[facility]   Physical Therapy Treatment and discharge Note     Name: Gordon Griffin  Clinic Number: [id number]    Therapy Diagnosis:   Encounter Diagnosis   Name Primary?    Impaired functional mobility and activity tolerance Yes     Physician: Ilene Smalls MD    Visit Date: 11/17/2023    Physician Orders: PT Eval and Treat   MD Comment: Consideration of dry needling?   Medical Diagnosis from Referral: G25.9 (ICD-10-CM) - Functional movement disorder M62.838 (ICD-10-CM) - Muscle spasms of both lower extremities   Evaluation Date: 10/25/2023  Authorization Period Expiration: 9/28/2024  Plan of Care Expiration: 1/10/2024  Progress Note Due: 11/25/2023  Date of Surgery: N/A  Visit # / Visits authorized: 1/ 12 (+eval)   FOTO: 1/ 3     Precautions: Standard and Fall  Patient's preferred pronouns: She/her      Time In: 07:45  Time Out: 08:00  Total Billable Time: 15 minutes    SUBJECTIVE     Pt reports: not feeling good this morning, ready to leave town .  She was instructed to continue prior home exercise program given at previous POC.   Response to previous treatment: good  Functional change: ongoing     Pain: 8/10  Location: right ankles     OBJECTIVE     Objective Measures updated at progress report unless specified.       Patient performed 10 heel raises but reports ankle pain while performing        Evaluation 10/25/2023 11/17/2023   Self Selected Walking Speed 1.0 m/sec (6m/6s) with  No Assistive Device 0.85 m/sec (6m/7s)   Fast Walking Speed 1.5 m/sec (6m/4s) with  No Assistive Device 1.2 m/sec(6m/5s)         Intake Outcome Measure for FOTO Neuromuscular Disorder Survey     Therapist reviewed FOTO scores for Gordon Griffin on 10/25/2023.   FOTO report - see Media section or FOTO account episode details.     Intake Score: 44%           Treatment     Dylon received the treatments listed below:      therapeutic exercises to develop strength, endurance, ROM, flexibility, posture, and  core stabilization for 15 minutes including:    Time above includes time to complete objective measurements       Patient Education and Home Exercises     Home Exercises Provided and Patient Education Provided     Education provided:   - to continue prior home exercise program     Written Home Exercises Provided:Patient instructed to continue prior home exercise program     PHYSICAL THERAPY DISCHARGE SUMMARY   Total Visits:2  Missed Visits:0  Cancelled Visits: 0    Status Towards Goals Met: Patient did not meet goals     Goals Not achieved and why:   the patient did not meet goals due to not having any follow up appointments after the evaluation. The patient will be moving states and establishing care with a different facility. The patient was instructed to continue with prior home exercise program provided at the previous POC.     Goals:  Short Term Goals: 4 weeks   Pt will report pain at R ankle at </= 7/10 entering clinic. Not met   Pt will improve dystonia and motor control at right ankle by performing 5 standing heel raises without pain.Not met   Pt will demonstrate appropriate heel strike consistently at IC on RLE for at least 50 ft of ambulation. Not met   Pt will be able to ambulate 300 feet without an assistive device without loss of balance or significant gait deficits for increased independence in the home with mobility. Not met      Long Term Goals: 8 weeks   Pt will report pain at R ankle at </= 4/10 entering clinic.Not met   Pt will improve self selected walking speed (SSWS) with no AD to at least 1.2 m/s for improved safety with home and community ambulation. Not met   Pt will improve FOTO limitation score to </= 32% for improved self perception of functional mobility.Not met   Pt will improve dystonia and motor control at right ankle by performing 10 standing heel raises without pain.Not met   Pt will be able to ambulate 500 feet without an assistive device without loss of balance or significant gait  deficits for increased independence in the home with mobility. Not met    Discharge reason : the patient is moving and will be changing therapy clinics     PLAN   This patient is discharged from Outpatient Physical Therapy Services.     Ibis Watkins, PT  11/17/2023

## 2023-11-17 ENCOUNTER — CLINICAL SUPPORT (OUTPATIENT)
Dept: REHABILITATION | Facility: HOSPITAL | Age: 43
End: 2023-11-17
Payer: MEDICARE

## 2023-11-17 DIAGNOSIS — Z74.09 IMPAIRED FUNCTIONAL MOBILITY AND ACTIVITY TOLERANCE: Primary | ICD-10-CM

## 2023-11-17 PROCEDURE — 97110 THERAPEUTIC EXERCISES: CPT | Mod: PO

## 2023-11-20 ENCOUNTER — OUTPATIENT CASE MANAGEMENT (OUTPATIENT)
Dept: ADMINISTRATIVE | Facility: OTHER | Age: 43
End: 2023-11-20
Payer: MEDICARE

## 2023-11-21 NOTE — PROGRESS NOTES
Outpatient Care Management   - Care Plan Follow Up    Patient: Gordon Griffin  MRN:  [id number]  Date of Service:  11/21/2023  Completed by:  Rosey Cedillo LCSW  Referral Date: 11/10/2023    Reason for Visit   Patient presents with    Case Closure     11/21/23       Brief Summary: SW spoke with Pt regarding the pending move. She reported they are leaving today. She received the release forms and information for medical records in the folder on Friday after her therapy session and will bring it with her when she establishes care with her new MDs and therapists. All tasks completed and Pt will be living out of state so she is aware the case will be closed and how to reopen if she does move back to [place] for any reason and becomes in need of services.     Rosey Cedillo LCSW  Neuro Therapy   [facility]  [phone]

## 2023-11-29 RX ORDER — NITROGLYCERIN 0.4 MG/1
0.4 TABLET SUBLINGUAL EVERY 5 MIN PRN
Qty: 25 TABLET | Refills: 4 | Status: SHIPPED | OUTPATIENT
Start: 2023-11-29

## 2023-12-01 ENCOUNTER — TELEPHONE (OUTPATIENT)
Dept: PAIN MEDICINE | Facility: CLINIC | Age: 43
End: 2023-12-01
Payer: MEDICARE

## 2023-12-01 NOTE — TELEPHONE ENCOUNTER
Staff spoke with pt regarding her message about moving out of state and cancelling her procedure. Pt message was forwarded to the procedure dept.    ----- Message from Stacy Troncoso sent at 12/1/2023  9:10 AM CST -----  Regarding: Patient Advice                Name of Who is Calling: Dylon Griffin    Who Left The Message: Dylon Griffin      What is the request in detail:         Patient called requesting  a call to make the Office aware that she would like to cancel her procedure with Dr. AURORA Brasher MD.  on Friday 12/08/2023. Patient states she moving out of state.   Please further advise.   Thank you      Reply by [facility]: YES      Preferred Call Back : [phone] (Z)

## 2023-12-01 NOTE — TELEPHONE ENCOUNTER
----- Message from Stacy Troncoso sent at 12/1/2023  9:10 AM CST -----  Regarding: Patient Advice                Name of Who is Calling: Dylon Griffin    Who Left The Message: Dylon Griffin      What is the request in detail:         Patient called requesting  a call to make the Office aware that she would like to cancel her procedure with Dr. AURORA Brasher MD.  on Friday 12/08/2023. Patient states she moving out of state.   Please further advise.   Thank you      Reply by [facility]: YES      Preferred Call Back : [phone] (E)

## 2023-12-04 DIAGNOSIS — E78.5 HYPERLIPIDEMIA, UNSPECIFIED HYPERLIPIDEMIA TYPE: ICD-10-CM

## 2023-12-04 DIAGNOSIS — I25.10 ATHEROSCLEROSIS OF NATIVE CORONARY ARTERY OF NATIVE HEART WITHOUT ANGINA PECTORIS: ICD-10-CM

## 2023-12-04 RX ORDER — EVOLOCUMAB 140 MG/ML
140 INJECTION, SOLUTION SUBCUTANEOUS
Qty: 6 ML | Refills: 3 | Status: SHIPPED | OUTPATIENT
Start: 2023-12-04 | End: 2024-11-04

## 2023-12-04 NOTE — ED PROVIDER NOTES
Encounter Date: 2/4/2018    SCRIBE #1 NOTE: I, Nehemiah Lynn, am scribing for, and in the presence of, Dr. Ceballos.       History     Chief Complaint   Patient presents with    Hemorrhoids     bleeding hemorrhoids       02/04/2018 10:09 AM     Chief complaint: Hemorrhoids      Gordon Griffin III is a 37 y.o. male with a PMHx of stroke who presents to the ED c/o hemorrhoids. He states he was here yesterday and they began bleeding again this morning. He says the medication he was prescribed yesterday keeps dropping his blood pressure. Allergens include Niaspan, Extendryl, and Penicillin.       The history is provided by the patient.     Review of patient's allergies indicates:   Allergen Reactions    Niaspan extended-release [niacin] Itching    Extendryl [kjaieyhxuxpctzyi-nb-xwsfrpwdvr] Rash    V-cillin k Rash     Past Medical History:   Diagnosis Date    Anxiety     Chest pain 1/20/2016 12/30/2015: Began experinece chest pain.    Depression     Migraine headache     Stroke pt. states he had a cva at 3 months old     Past Surgical History:   Procedure Laterality Date    MANDIBLE SURGERY      reconstruction     Family History   Problem Relation Age of Onset    Heart disease Father     Heart disease Paternal Uncle      Social History   Substance Use Topics    Smoking status: Never Smoker    Smokeless tobacco: Never Used    Alcohol use No     Review of Systems   Constitutional: Negative for fever.   HENT: Negative for sore throat.    Respiratory: Negative for shortness of breath.    Cardiovascular: Negative for chest pain.   Gastrointestinal: Positive for rectal pain. Negative for nausea.   Genitourinary: Negative for dysuria.   Musculoskeletal: Negative for back pain.   Skin: Negative for rash.   Neurological: Negative for weakness.   Hematological: Does not bruise/bleed easily.       Physical Exam     Initial Vitals [02/04/18 0957]   BP Pulse Resp Temp SpO2   131/77 98 16 97.8 °F (36.6 °C) 97 %       MAP       95         Physical Exam    Constitutional: He appears well-developed and well-nourished.  Non-toxic appearance. No distress.       HENT:   Head: Normocephalic and atraumatic.   Eyes: EOM are normal. Pupils are equal, round, and reactive to light.   Neck: Normal range of motion. Neck supple. No neck rigidity. No JVD present.   Cardiovascular: Normal rate, regular rhythm, normal heart sounds and intact distal pulses.   Abdominal: Soft. Bowel sounds are normal. He exhibits no distension. There is no tenderness. There is no rigidity, no rebound and no guarding.   Genitourinary:   Genitourinary Comments: Thrombosed inflamed hemorrhoid at 11 o'clock and 4 o'clock position.  Recent bleeding. No active bleeding.    Musculoskeletal: Normal range of motion.   Neurological: He is alert and oriented to person, place, and time. He has normal strength and normal reflexes. No cranial nerve deficit or sensory deficit. He exhibits normal muscle tone. Coordination normal. GCS eye subscore is 4. GCS verbal subscore is 5. GCS motor subscore is 6.   Skin: Skin is warm and dry.   Psychiatric: He has a normal mood and affect. His speech is normal and behavior is normal. He is not actively hallucinating.         ED Course   Procedures  Labs Reviewed - No data to display          Medical Decision Making:   History:   Old Medical Records: I decided to obtain old medical records.  Initial Assessment:   Patient with thrombosed external hemorrhoids with recent bleeding.  Anti-inflammatory suppositories prescription provided.  Sitz bath and stool softening encouraged.  Patient wants definitive  treatment.  Will send to general surgery for evaluation.            Scribe Attestation:   Scribe #1: I performed the above scribed service and the documentation accurately describes the services I performed. I attest to the accuracy of the note.    I, Tucker Matos, personally performed the services described in this documentation. All  medical record entries made by the scribe were at my direction and in my presence.  I have reviewed the chart and agree that the record reflects my personal performance and is accurate and complete. Matt Ceballos MD.  1:27 PM 02/07/2018          ED Course      Clinical Impression:   The encounter diagnosis was Bleeding hemorrhoids.                           Matt Ceballos MD  02/07/18 5612     96.6

## 2023-12-08 ENCOUNTER — OFFICE VISIT (OUTPATIENT)
Dept: UROLOGY | Facility: CLINIC | Age: 43
End: 2023-12-08
Payer: MEDICARE

## 2023-12-08 DIAGNOSIS — Z90.79 HISTORY OF ORCHIECTOMY, BILATERAL: Primary | ICD-10-CM

## 2023-12-08 DIAGNOSIS — Z78.9 MALE-TO-FEMALE TRANSGENDER PERSON: ICD-10-CM

## 2023-12-08 PROCEDURE — 99499 UNLISTED E&M SERVICE: CPT | Mod: 95,,, | Performed by: UROLOGY

## 2023-12-08 PROCEDURE — 99499 NO LOS: ICD-10-PCS | Mod: 95,,, | Performed by: UROLOGY
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
## 2024-01-05 DIAGNOSIS — M54.16 LUMBAR RADICULOPATHY: ICD-10-CM

## 2024-01-05 RX ORDER — GABAPENTIN 300 MG/1
300 CAPSULE ORAL 3 TIMES DAILY
Qty: 90 CAPSULE | Refills: 3 | Status: SHIPPED | OUTPATIENT
Start: 2024-01-05 | End: 2024-02-21 | Stop reason: SDUPTHER

## 2024-01-05 NOTE — TELEPHONE ENCOUNTER
----- Message from Carey Delarosa sent at 1/5/2024  9:35 AM CST -----  Regarding: Pharmacy called  Name of Who is Calling: [facility] [place] [facility] on behalf of [facility] [[id number]]            What is the request in detail: Patient pharmacy is requesting a call back regarding rx needs BRIJESH number for control substance       gabapentin (NEURONTIN) 300 MG capsule       What Number to Call Back : [phone]

## 2024-02-16 ENCOUNTER — TELEPHONE (OUTPATIENT)
Dept: PAIN MEDICINE | Facility: CLINIC | Age: 44
End: 2024-02-16
Payer: MEDICARE

## 2024-02-16 NOTE — TELEPHONE ENCOUNTER
Pt appt has been rescheduled with Britney Sorto.    ----- Message from Ayla Silva sent at 2/16/2024  1:46 PM CST -----  Regarding: Appt  Contact: [phone]  Patient is calling to schedule a sooner appointment than march 19. Please contact pt

## 2024-02-16 NOTE — TELEPHONE ENCOUNTER
----- Message from Ayla Silva sent at 2/16/2024  1:46 PM CST -----  Regarding: Appt  Contact: [phone]  Patient is calling to schedule a sooner appointment than march 19. Please contact pt

## 2024-02-21 ENCOUNTER — OFFICE VISIT (OUTPATIENT)
Dept: PAIN MEDICINE | Facility: CLINIC | Age: 44
End: 2024-02-21
Payer: MEDICARE

## 2024-02-21 VITALS
WEIGHT: 141.13 LBS | HEART RATE: 77 BPM | BODY MASS INDEX: 19.12 KG/M2 | RESPIRATION RATE: 18 BRPM | DIASTOLIC BLOOD PRESSURE: 66 MMHG | HEIGHT: 72 IN | TEMPERATURE: 98 F | SYSTOLIC BLOOD PRESSURE: 112 MMHG

## 2024-02-21 DIAGNOSIS — M54.16 LUMBAR RADICULOPATHY: ICD-10-CM

## 2024-02-21 DIAGNOSIS — M50.30 DEGENERATIVE DISC DISEASE, CERVICAL: ICD-10-CM

## 2024-02-21 DIAGNOSIS — G89.4 CHRONIC PAIN DISORDER: ICD-10-CM

## 2024-02-21 DIAGNOSIS — M47.817 LUMBAR AND SACRAL OSTEOARTHRITIS: ICD-10-CM

## 2024-02-21 DIAGNOSIS — M47.816 LUMBAR SPONDYLOSIS: Primary | ICD-10-CM

## 2024-02-21 DIAGNOSIS — M54.12 CERVICAL RADICULOPATHY: ICD-10-CM

## 2024-02-21 PROCEDURE — 99213 OFFICE O/P EST LOW 20 MIN: CPT | Mod: S$GLB,,,

## 2024-02-21 PROCEDURE — 3008F BODY MASS INDEX DOCD: CPT | Mod: CPTII,S$GLB,,

## 2024-02-21 PROCEDURE — 3078F DIAST BP <80 MM HG: CPT | Mod: CPTII,S$GLB,,

## 2024-02-21 PROCEDURE — 99999 PR PBB SHADOW E&M-EST. PATIENT-LVL V: CPT | Mod: PBBFAC,,,

## 2024-02-21 PROCEDURE — 3074F SYST BP LT 130 MM HG: CPT | Mod: CPTII,S$GLB,,

## 2024-02-21 PROCEDURE — 1160F RVW MEDS BY RX/DR IN RCRD: CPT | Mod: CPTII,S$GLB,,

## 2024-02-21 PROCEDURE — 1159F MED LIST DOCD IN RCRD: CPT | Mod: CPTII,S$GLB,,

## 2024-02-21 RX ORDER — NALOXONE HYDROCHLORIDE 4 MG/.1ML
SPRAY NASAL
Qty: 1 EACH | Refills: 3 | Status: SHIPPED | OUTPATIENT
Start: 2024-02-21

## 2024-02-21 RX ORDER — GABAPENTIN 300 MG/1
300 CAPSULE ORAL 3 TIMES DAILY
Qty: 90 CAPSULE | Refills: 3 | Status: SHIPPED | OUTPATIENT
Start: 2024-02-21 | End: 2024-05-10 | Stop reason: SDUPTHER

## 2024-02-21 NOTE — PROGRESS NOTES
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 11/8/2023    Procedure: ORCHIECTOMY;  Surgeon: Ronald Mcgrath MD;  [facility];  Service: Urology;  Laterality: Bilateral;  1 hr    RADIOFREQUENCY ABLATION Right 6/23/2022    Procedure: RADIOFREQUENCY ABLATION RIGHT L3,L4,L5 1 of 2, consent needed;  Surgeon: Larry Brasher MD;  Location: [facility];  Service: Pain Management;  Laterality: Right;    RADIOFREQUENCY ABLATION Left 7/7/2022    Procedure: RADIOFREQUENCY ABLATION LEFT L3,L4,L5 2 of 2, needs consent;  Surgeon: Larry Brasher MD;  Location: [facility];  Service: Pain Management;  Laterality: Left;    RADIOFREQUENCY ABLATION Right 3/3/2023    Procedure: RADIOFREQUENCY ABLATION RIGHT L3,L4,L5;  Surgeon: Larry Brasher MD;  Location: [facility];  Service: Pain Management;  Laterality: Right;    RADIOFREQUENCY ABLATION Left 3/31/2023    Procedure: Radiofrequency Ablation Left L3, L4, & L5 Pending CBC results;  Surgeon: Diana Lira MD;  Location: [facility];  Service: Pain Management;  Laterality: Left;    variceol repair         Family History:  Family History   Problem Relation Age of Onset    Heart disease Mother     Myasthenia gravis Mother     Myasthenia gravis Father     Heart disease Father     Hypertension Father     Hyperlipidemia Father     Heart disease Paternal Uncle         Social History:  Social History     Socioeconomic History    Marital status:    Occupational History    Occupation: disable/   Tobacco Use    Smoking status: Never    Smokeless tobacco: Never   Substance and Sexual Activity    Alcohol use: No    Drug use: No    Sexual activity: Not Currently     Partners: Female   Social History Narrative    No stairs     Social Determinants of Health     Financial Resource Strain: Low Risk  (11/10/2023)    Overall Financial Resource Strain (CARDIA)     Difficulty of Paying Living Expenses: Not hard at all   Food Insecurity: No Food Insecurity (11/10/2023)    Hunger Vital Sign     Worried About Running Out of Food in the Last Year: Never true     Ran Out of Food in the Last Year: Never true   Transportation Needs: No Transportation Needs (11/10/2023)    PRAPARE - Transportation     Lack of Transportation (Medical): No     Lack of Transportation (Non-Medical): No   Stress: Stress Concern Present (11/10/2023)    [employer] [employer] - Occupational Stress Questionnaire     Feeling of Stress : Rather much   Social Connections: Unknown (11/10/2023)    Social Connection and Isolation Panel [NHANES]     Frequency of Communication with Friends and Family: More than three times a week     Frequency of Social Gatherings with Friends and Family: More than three times a week     Marital Status: Living with partner   Housing Stability: Unknown (11/10/2023)    Housing Stability Vital Sign     Unable to Pay for Housing in the Last Year: No     Unstable Housing in the Last Year: No       OBJECTIVE:    Vitals:    02/21/24 1007   BP: 112/66   Pulse: 77   Resp: 18   Temp: 98 °F (36.7 °C)   TempSrc: Oral   Weight: 64 kg (141 lb 1.5 oz)   Height: 6' (1.829 m)   PainSc:   9   PainLoc: Back          PHYSICAL EXAMINATION:    General appearance: Well appearing, in no acute distress.  Psych:  Mood and affect appropriate.  Skin: Skin color, texture, turgor  normal, no rashes or lesions to visible areas.  Head/face:  Atraumatic, normocephalic. No palpable lymph nodes  Cor: No lower extremity edema.  Capillary refill <2 seconds.  Pulm: Symmetric chest rise. No apparent respiratory distress.  Neck: Spurling positive bilaterally to light pressure. TTP over cervical spine and bilateral paraspinals and trapezius with light pressure.  Back: Straight leg raising in the sitting position is positive to radicular pain bilaterally.  There is pain with palpation over lumbar facet joints bilaterally. Limited ROM with pain on extension. Positive facet loading bilaterally.   Extremities: No deformities, edema, or skin discoloration. Good capillary refill.  Musculoskeletal: 5/5 strength in right ankle with plantar and dorsiflexion. 4/5 strength in left ankle with plantar and dorsiflexion. 5/5 strength with right knee flexion and extension. 5/5 strength with left knee flexion and extension.   Neuro:  No loss of sensation is noted.  Gait: Antalgic- ambulates without assistance.     ASSESSMENT: 43 y.o. year old adult with neck and lower back pain, consistent with the followin. Lumbar spondylosis  Procedure Order to Pain Management      2. Chronic pain disorder  NARCAN 4 mg/actuation Spry    Procedure Order to Pain Management      3. Lumbar radiculopathy  NARCAN 4 mg/actuation Spry    gabapentin (NEURONTIN) 300 MG capsule      4. Degenerative disc disease, cervical        5. Lumbar and sacral osteoarthritis  Procedure Order to Pain Management      6. Cervical radiculopathy            PLAN:     - Previous imaging reviewed today.    - Schedule BL L3,4,5 RFA-orders placed today.    - Continue Gabapentin and Narcan-refills sent today.    - I have stressed the importance of physical activity and a home exercise plan to help with pain and improve health.    - RTC 4 weeks after above procedure.       The above plan and management options were discussed at length with patient. Patient is  in agreement with the above and verbalized understanding.    Britney Sorto, CHARLES   2/21/2024

## 2024-02-21 NOTE — H&P (VIEW-ONLY)
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 Bilateral 11/8/2023    Procedure: ORCHIECTOMY;  Surgeon: Ronald Mcgrath MD;  [facility];  Service: Urology;  Laterality: Bilateral;  1 hr    RADIOFREQUENCY ABLATION Right 6/23/2022    Procedure: RADIOFREQUENCY ABLATION RIGHT L3,L4,L5 1 of 2, consent needed;  Surgeon: Larry Brasher MD;  Location: [facility];  Service: Pain Management;  Laterality: Right;    RADIOFREQUENCY ABLATION Left 7/7/2022    Procedure: RADIOFREQUENCY ABLATION LEFT L3,L4,L5 2 of 2, needs consent;  Surgeon: Larry Brasher MD;  Location: [facility];  Service: Pain Management;  Laterality: Left;    RADIOFREQUENCY ABLATION Right 3/3/2023    Procedure: RADIOFREQUENCY ABLATION RIGHT L3,L4,L5;  Surgeon: Larry Brasher MD;  Location: [facility];  Service: Pain Management;  Laterality: Right;    RADIOFREQUENCY ABLATION Left 3/31/2023    Procedure: Radiofrequency Ablation Left L3, L4, & L5 Pending CBC results;  Surgeon: Diana Lira MD;  Location: [facility];  Service: Pain Management;  Laterality: Left;    variceol repair         Family History:  Family History   Problem Relation Age of Onset    Heart disease Mother     Myasthenia gravis Mother     Myasthenia gravis Father     Heart disease Father     Hypertension Father     Hyperlipidemia Father     Heart disease Paternal Uncle         Social History:  Social History     Socioeconomic History    Marital status:    Occupational History    Occupation: disable/   Tobacco Use    Smoking status: Never    Smokeless tobacco: Never   Substance and Sexual Activity    Alcohol use: No    Drug use: No    Sexual activity: Not Currently     Partners: Female   Social History Narrative    No stairs     Social Determinants of Health     Financial Resource Strain: Low Risk  (11/10/2023)    Overall Financial Resource Strain (CARDIA)     Difficulty of Paying Living Expenses: Not hard at all   Food Insecurity: No Food Insecurity (11/10/2023)    Hunger Vital Sign     Worried About Running Out of Food in the Last Year: Never true     Ran Out of Food in the Last Year: Never true   Transportation Needs: No Transportation Needs (11/10/2023)    PRAPARE - Transportation     Lack of Transportation (Medical): No     Lack of Transportation (Non-Medical): No   Stress: Stress Concern Present (11/10/2023)    [employer] [employer] - Occupational Stress Questionnaire     Feeling of Stress : Rather much   Social Connections: Unknown (11/10/2023)    Social Connection and Isolation Panel [NHANES]     Frequency of Communication with Friends and Family: More than three times a week     Frequency of Social Gatherings with Friends and Family: More than three times a week     Marital Status: Living with partner   Housing Stability: Unknown (11/10/2023)    Housing Stability Vital Sign     Unable to Pay for Housing in the Last Year: No     Unstable Housing in the Last Year: No       OBJECTIVE:    Vitals:    02/21/24 1007   BP: 112/66   Pulse: 77   Resp: 18   Temp: 98 °F (36.7 °C)   TempSrc: Oral   Weight: 64 kg (141 lb 1.5 oz)   Height: 6' (1.829 m)   PainSc:   9   PainLoc: Back          PHYSICAL EXAMINATION:    General appearance: Well appearing, in no acute distress.  Psych:  Mood and affect appropriate.  Skin: Skin color, texture, turgor  normal, no rashes or lesions to visible areas.  Head/face:  Atraumatic, normocephalic. No palpable lymph nodes  Cor: No lower extremity edema.  Capillary refill <2 seconds.  Pulm: Symmetric chest rise. No apparent respiratory distress.  Neck: Spurling positive bilaterally to light pressure. TTP over cervical spine and bilateral paraspinals and trapezius with light pressure.  Back: Straight leg raising in the sitting position is positive to radicular pain bilaterally.  There is pain with palpation over lumbar facet joints bilaterally. Limited ROM with pain on extension. Positive facet loading bilaterally.   Extremities: No deformities, edema, or skin discoloration. Good capillary refill.  Musculoskeletal: 5/5 strength in right ankle with plantar and dorsiflexion. 4/5 strength in left ankle with plantar and dorsiflexion. 5/5 strength with right knee flexion and extension. 5/5 strength with left knee flexion and extension.   Neuro:  No loss of sensation is noted.  Gait: Antalgic- ambulates without assistance.     ASSESSMENT: 43 y.o. year old adult with neck and lower back pain, consistent with the followin. Lumbar spondylosis  Procedure Order to Pain Management      2. Chronic pain disorder  NARCAN 4 mg/actuation Spry    Procedure Order to Pain Management      3. Lumbar radiculopathy  NARCAN 4 mg/actuation Spry    gabapentin (NEURONTIN) 300 MG capsule      4. Degenerative disc disease, cervical        5. Lumbar and sacral osteoarthritis  Procedure Order to Pain Management      6. Cervical radiculopathy            PLAN:     - Previous imaging reviewed today.    - Schedule BL L3,4,5 RFA-orders placed today.    - Continue Gabapentin and Narcan-refills sent today.    - I have stressed the importance of physical activity and a home exercise plan to help with pain and improve health.    - RTC 4 weeks after above procedure.       The above plan and management options were discussed at length with patient. Patient is  in agreement with the above and verbalized understanding.    Britney Sorto, CHARLES   2/21/2024

## 2024-02-22 ENCOUNTER — PATIENT MESSAGE (OUTPATIENT)
Dept: PAIN MEDICINE | Facility: OTHER | Age: 44
End: 2024-02-22
Payer: MEDICARE

## 2024-02-22 DIAGNOSIS — M47.817 LUMBAR AND SACRAL OSTEOARTHRITIS: ICD-10-CM

## 2024-02-22 DIAGNOSIS — M47.816 LUMBAR SPONDYLOSIS: Primary | ICD-10-CM

## 2024-03-04 ENCOUNTER — OFFICE VISIT (OUTPATIENT)
Dept: ORTHOPEDICS | Facility: CLINIC | Age: 44
End: 2024-03-04
Payer: MEDICARE

## 2024-03-04 VITALS — BODY MASS INDEX: 19.88 KG/M2 | WEIGHT: 146.63 LBS

## 2024-03-04 DIAGNOSIS — M17.11 PRIMARY OSTEOARTHRITIS OF RIGHT KNEE: ICD-10-CM

## 2024-03-04 DIAGNOSIS — M22.2X1 PATELLOFEMORAL PAIN SYNDROME OF BOTH KNEES: ICD-10-CM

## 2024-03-04 DIAGNOSIS — M17.12 PRIMARY OSTEOARTHRITIS OF LEFT KNEE: Primary | ICD-10-CM

## 2024-03-04 DIAGNOSIS — M22.2X2 PATELLOFEMORAL PAIN SYNDROME OF BOTH KNEES: ICD-10-CM

## 2024-03-04 PROCEDURE — 99999 PR PBB SHADOW E&M-EST. PATIENT-LVL IV: CPT | Mod: PBBFAC,,, | Performed by: PHYSICIAN ASSISTANT

## 2024-03-04 PROCEDURE — 3008F BODY MASS INDEX DOCD: CPT | Mod: CPTII,S$GLB,, | Performed by: PHYSICIAN ASSISTANT

## 2024-03-04 PROCEDURE — 1159F MED LIST DOCD IN RCRD: CPT | Mod: CPTII,S$GLB,, | Performed by: PHYSICIAN ASSISTANT

## 2024-03-04 PROCEDURE — 20610 DRAIN/INJ JOINT/BURSA W/O US: CPT | Mod: RT,S$GLB,, | Performed by: PHYSICIAN ASSISTANT

## 2024-03-04 PROCEDURE — 1160F RVW MEDS BY RX/DR IN RCRD: CPT | Mod: CPTII,S$GLB,, | Performed by: PHYSICIAN ASSISTANT

## 2024-03-04 PROCEDURE — 99213 OFFICE O/P EST LOW 20 MIN: CPT | Mod: 25,S$GLB,, | Performed by: PHYSICIAN ASSISTANT

## 2024-03-04 RX ORDER — TRIAMCINOLONE ACETONIDE 40 MG/ML
40 INJECTION, SUSPENSION INTRA-ARTICULAR; INTRAMUSCULAR
Status: DISCONTINUED | OUTPATIENT
Start: 2024-03-04 | End: 2024-03-04 | Stop reason: HOSPADM

## 2024-03-04 RX ORDER — LIDOCAINE HYDROCHLORIDE 10 MG/ML
2 INJECTION INFILTRATION; PERINEURAL
Status: DISCONTINUED | OUTPATIENT
Start: 2024-03-04 | End: 2024-03-04 | Stop reason: HOSPADM

## 2024-03-04 RX ADMIN — TRIAMCINOLONE ACETONIDE 40 MG: 40 INJECTION, SUSPENSION INTRA-ARTICULAR; INTRAMUSCULAR at 09:03

## 2024-03-04 RX ADMIN — LIDOCAINE HYDROCHLORIDE 2 ML: 10 INJECTION INFILTRATION; PERINEURAL at 09:03

## 2024-03-04 NOTE — PROGRESS NOTES
Chief Complaint: Pain of the Right Knee      HISTORY:  Gordon Griffin is a 43 y.o. adult who returns to me today for follow up of right knee pain.  She was last seen by me 11/6/2023.  She went out of town for several months and has just returned.  Since then she has worsening pain in the right knee.  She was not able to return for Monovisc injection either so she would like to try that for the left knee.        PMH/PSH/FamHx/SocHx:    Unchanged from prior visit.    ROS:  Constitution: Negative for chills, fever and weakness.   Respiratory: Negative for cough and shortness of breath.   Musculoskeletal: Positive for right knee pain  Psychiatric/Behavioral: The patient is not nervous/anxious.       PHYSICAL EXAM:   Wt 66.5 kg (146 lb 9.7 oz)   BMI 19.88 kg/m²   Bilateral knee  Skin intact  No warmth or effusion  ROM 0-130   5/5 quad, 5/5 hamstring      ASSESSMENT/PLAN:    Gordon was seen today for pain.    Diagnoses and all orders for this visit:    Primary osteoarthritis of left knee  -     sodium hyaluronate (orthovisc) 30 mg  -     Prior authorization Order    Patellofemoral pain syndrome of both knees    Primary osteoarthritis of right knee  -     Large Joint Aspiration/Injection: R knee    - Right knee CSI performed today  - Left knee orthovisc ordered- follow up pending authorization    MEDICAL NECESSITY FOR VISCOSUPPLEMENTATION:  A thorough evaluation of the patient, have determined that viscosupplementation is medically necessary.  The patient has painful DJD of the knee with failure of conservative therapy including lifestyle modifications and rehabilitation exercises.  Oral analgesics/NSAIDs have not adequately controlled symptoms and there is radiographic evidence of joint space narrowing, subchondral sclerosis, and osteophyte formation - Kellgren Misael grade 2

## 2024-03-04 NOTE — PROCEDURES
Large Joint Aspiration/Injection: R knee    Date/Time: 3/4/2024 9:30 AM    Performed by: Vale Neil PA-C  Authorized by: Vale Neil PA-C    Consent Done?:  Yes (Verbal)  Indications:  Pain  Timeout: prior to procedure the correct patient, procedure, and site was verified    Prep: patient was prepped and draped in usual sterile fashion    Local anesthetic:  Topical anesthetic    Details:  Needle Size:  22 G  Approach:  Anterolateral  Location:  Knee  Site:  R knee  Medications:  40 mg triamcinolone acetonide 40 mg/mL; 2 mL LIDOcaine HCL 10 mg/ml (1%) 10 mg/mL (1 %)  Patient tolerance:  Patient tolerated the procedure well with no immediate complications

## 2024-03-08 ENCOUNTER — HOSPITAL ENCOUNTER (OUTPATIENT)
Facility: OTHER | Age: 44
Discharge: HOME | End: 2024-03-08
Attending: ANESTHESIOLOGY | Admitting: ANESTHESIOLOGY
Payer: MEDICARE

## 2024-03-08 VITALS
DIASTOLIC BLOOD PRESSURE: 76 MMHG | BODY MASS INDEX: 20.32 KG/M2 | HEART RATE: 84 BPM | OXYGEN SATURATION: 97 % | HEIGHT: 72 IN | TEMPERATURE: 97 F | WEIGHT: 150 LBS | SYSTOLIC BLOOD PRESSURE: 129 MMHG | RESPIRATION RATE: 16 BRPM

## 2024-03-08 DIAGNOSIS — M47.816 LUMBAR SPONDYLOSIS: Primary | ICD-10-CM

## 2024-03-08 DIAGNOSIS — G89.29 CHRONIC PAIN: ICD-10-CM

## 2024-03-08 PROCEDURE — 64635 DESTROY LUMB/SAC FACET JNT: CPT | Mod: 50,,, | Performed by: ANESTHESIOLOGY

## 2024-03-08 PROCEDURE — 25000003 PHARM REV CODE 250: Performed by: ANESTHESIOLOGY

## 2024-03-08 PROCEDURE — 64635 DESTROY LUMB/SAC FACET JNT: CPT | Mod: 50 | Performed by: ANESTHESIOLOGY

## 2024-03-08 PROCEDURE — 99152 MOD SED SAME PHYS/QHP 5/>YRS: CPT | Performed by: ANESTHESIOLOGY

## 2024-03-08 PROCEDURE — 64636 DESTROY L/S FACET JNT ADDL: CPT | Mod: 50,,, | Performed by: ANESTHESIOLOGY

## 2024-03-08 PROCEDURE — 63600175 PHARM REV CODE 636 W HCPCS: Performed by: ANESTHESIOLOGY

## 2024-03-08 PROCEDURE — 64636 DESTROY L/S FACET JNT ADDL: CPT | Mod: 50 | Performed by: ANESTHESIOLOGY

## 2024-03-08 PROCEDURE — 25000003 PHARM REV CODE 250: Performed by: STUDENT IN AN ORGANIZED HEALTH CARE EDUCATION/TRAINING PROGRAM

## 2024-03-08 RX ORDER — FENTANYL CITRATE 50 UG/ML
INJECTION, SOLUTION INTRAMUSCULAR; INTRAVENOUS
Status: DISCONTINUED | OUTPATIENT
Start: 2024-03-08 | End: 2024-03-08 | Stop reason: HOSPADM

## 2024-03-08 RX ORDER — LIDOCAINE HYDROCHLORIDE 20 MG/ML
INJECTION, SOLUTION INFILTRATION; PERINEURAL
Status: DISCONTINUED | OUTPATIENT
Start: 2024-03-08 | End: 2024-03-08 | Stop reason: HOSPADM

## 2024-03-08 RX ORDER — BUPIVACAINE HYDROCHLORIDE 2.5 MG/ML
INJECTION, SOLUTION EPIDURAL; INFILTRATION; INTRACAUDAL
Status: DISCONTINUED | OUTPATIENT
Start: 2024-03-08 | End: 2024-03-08 | Stop reason: HOSPADM

## 2024-03-08 RX ORDER — DEXAMETHASONE SODIUM PHOSPHATE 10 MG/ML
INJECTION INTRAMUSCULAR; INTRAVENOUS
Status: DISCONTINUED | OUTPATIENT
Start: 2024-03-08 | End: 2024-03-08 | Stop reason: HOSPADM

## 2024-03-08 RX ORDER — SODIUM CHLORIDE 9 MG/ML
INJECTION, SOLUTION INTRAVENOUS CONTINUOUS
Status: DISCONTINUED | OUTPATIENT
Start: 2024-03-08 | End: 2024-03-08 | Stop reason: HOSPADM

## 2024-03-08 RX ORDER — MIDAZOLAM HYDROCHLORIDE 1 MG/ML
INJECTION INTRAMUSCULAR; INTRAVENOUS
Status: DISCONTINUED | OUTPATIENT
Start: 2024-03-08 | End: 2024-03-08 | Stop reason: HOSPADM

## 2024-03-08 NOTE — DISCHARGE INSTRUCTIONS

## 2024-03-08 NOTE — OP NOTE
Therapeutic Lumbar Medial Branch Radiofrequency Ablation under Fluoroscopy     The procedure, risks, benefits, and options were discussed with the patient. There are no contraindications to the procedure. The patent expressed understanding and agreed to the procedure. Informed written consent was obtained prior to the start of the procedure and can be found in the patient's chart.        PATIENT NAME: Gordon Griffin   MRN: [id number]     DATE OF PROCEDURE: 03/08/2024     PROCEDURE:  Bilateral L3, L4, and L5 Lumbar Radiofrequency Ablation under Fluoroscopy    PRE-OP DIAGNOSIS: Lumbar spondylosis [M47.816]  Lumbar and sacral osteoarthritis [M47.817] Lumbar spondylosis [M47.816]    POST-OP DIAGNOSIS: Same    PHYSICIAN: Larry Brasher MD    ASSISTANTS: Lei Linares MD     MEDICATIONS INJECTED:  Preservative-free Decadron 10mg with 9cc of Bupivicaine 0.25%    LOCAL ANESTHETIC INJECTED:   Xylocaine 2%    SEDATION: Versed 4mg and Fentanyl 100mcg                                                                                                                                                                                     Conscious sedation ordered by M.D. Patient re-evaluation prior to administration of conscious sedation. No changes noted in patient's status from initial evaluation. The patient's vital signs were monitored by RN and patient remained hemodynamically stable throughout the procedure.    Event Time In   Sedation Start 1350   Sedation End 1411       ESTIMATED BLOOD LOSS:  None    COMPLICATIONS:  None     INTERVAL HISTORY: Patient has clinical and imaging findings suggestive of recurrent facet mediated pain. Patient has undergone a previous RFA at specified levels with at least 50% relief for at least 6 months. Successful diagnostic medial branch blocks have been completed within the past 2 years.    TECHNIQUE: Time-out was performed to identify the patient and procedure to be performed. With the patient  laying in a prone position, the surgical area was prepped and draped in the usual sterile fashion using ChloraPrep and fenestrated drape. The levels were determined under fluoroscopic guidance. Skin anesthesia was achieved by injecting Lidocaine 2% over the injection sites. A 18 gauge 10mm curved active tip needle was introduced to the anatomic local of the medial branch at each of the above levels using AP, lateral and/or contralateral oblique fluoroscopic imaging. Then sensory and motor testing was performed to confirm that the needle tips were in the correct location. After negative aspiration for blood or CSF was confirmed, 1 mL of the lidocaine 2% listed above was injected slowly at each site. This was followed by thermal lesioning at 80 degrees celsius for 90 seconds. That was followed by slowly injecting 1 mL of the medication mixture listed above at each site. The needles were removed and bleeding was nil. A sterile dressing was applied. No specimens collected. The patient tolerated the procedure well and did not have any procedure related motor deficit at the conclusion of the procedure.    The patient was monitored after the procedure in the recovery area. They were given post-procedure and discharge instructions to follow at home. The patient was discharged in a stable condition.    Lei Linares MD    I reviewed and edited the fellow's note. I conducted my own interview and physical examination. I agree with the findings. I was present and supervising all critical portions of the procedure.      Larry Brasher MD

## 2024-03-08 NOTE — DISCHARGE SUMMARY
Discharge Note  Short Stay      SUMMARY     Admit Date: 3/8/2024    Attending Physician: Lei Linares      Discharge Physician: Lei Linares      Discharge Date: 3/8/2024 2:10 PM    Procedure(s) (LRB):  RADIOFREQUENCY ABLATION BILATERAL L3, 4, 5 (Bilateral)    Final Diagnosis: Lumbar spondylosis [M47.816]  Lumbar and sacral osteoarthritis [M47.817]    Disposition: Home or self care    Patient Instructions:   Current Discharge Medication List        CONTINUE these medications which have NOT CHANGED    Details   aspirin 81 MG Chew Take 81 mg by mouth once daily.      atorvastatin (LIPITOR) 80 MG tablet       azelastine (ASTELIN) 137 mcg (0.1 %) nasal spray USE 1 TO 2 SPRAYS IN EACH NOSTRIL TWICE DAILY FOR CONGESTION      baclofen (LIORESAL) 20 MG tablet Take 1 tablet by mouth 3 (three) times daily as needed.       benztropine (COGENTIN) 0.5 MG tablet Take 0.5 mg by mouth once daily.      busPIRone (BUSPAR) 10 MG tablet Tale 1 tablet Orally Twice a day 30 days  Qty: 60 tablet, Refills: 0      butalbital-acetaminophen-caffeine -40 mg (FIORICET, ESGIC) -40 mg per tablet Take 1 tablet by mouth every 4 (four) hours as needed.      butorphanol (STADOL) 10 mg/mL nasal spray 2 sprays by Nasal route every 4 (four) hours as needed for pain  Qty: 100 mL, Refills: 5    Comments: Quantity prescribed more than 7 day supply? Press F2 and select one:[id number]        cyclobenzaprine (FLEXERIL) 10 MG tablet TK 1 T PO Q 8 H PRF PAIN      diazePAM (VALIUM) 2 MG tablet Take 2 mg by mouth 2 (two) times daily as needed.      erenumab-aooe (AIMOVIG AUTOINJECTOR SUBQ) Inject into the skin.      EScitalopram oxalate (LEXAPRO) 20 MG tablet Take 1 tablet (20 mg total) by mouth once daily.  Qty: 30 tablet, Refills: 0      estradiol valerate (DELESTROGEN) 20 mg/mL injection SMARTSI.3 Milliliter(s) IM Once a Week      evolocumab (REPATHA SURECLICK) 140 mg/mL PnIj Inject 1 mL (140 mg total) into the skin every 14 (fourteen)  days.  Qty: 6 mL, Refills: 3    Associated Diagnoses: Hyperlipidemia, unspecified hyperlipidemia type; Atherosclerosis of native coronary artery of native heart without angina pectoris      ezetimibe (ZETIA) 10 mg tablet Take 1 tablet (10 mg total) by mouth once daily.  Qty: 90 tablet, Refills: 3      fluticasone (FLONASE) 50 mcg/actuation nasal spray SPRAY TWICE IEN QD  Refills: 5      gabapentin (NEURONTIN) 300 MG capsule Take 1 capsule (300 mg total) by mouth 3 (three) times daily.  Qty: 90 capsule, Refills: 3    Associated Diagnoses: Lumbar radiculopathy      HYDROcodone-acetaminophen (NORCO) 5-325 mg per tablet Take 1 tablet by mouth every 6 (six) hours as needed for Pain.  Qty: 10 tablet, Refills: 0    Comments: Quantity prescribed more than 7 day supply? No      hydrocortisone (ANUSOL-HC) 2.5 % rectal cream Place rectally 2 (two) times daily.  Qty: 28 g, Refills: 1    Associated Diagnoses: Hemorrhoid prolapse      hydrOXYzine pamoate (VISTARIL) 50 MG Cap Take  mg by mouth nightly as needed.      ketorolac (TORADOL) 10 mg tablet Pt takes PRN      levETIRAcetam (KEPPRA) 1000 MG tablet Take 1,000 mg by mouth 2 (two) times daily.      methocarbamoL (ROBAXIN) 750 MG Tab Take 750 mg by mouth 3 (three) times daily.      metoclopramide HCl (REGLAN) 10 MG tablet 10 mg.      NARCAN 4 mg/actuation Spry SPRAY 0.1ML IN 1 NOSTRIL MAY REPEAT DOSE EVERY 2-3 MINUTES AS NEEDED ALTERNATING NOSTRILS EACH DOSE  Qty: 1 each, Refills: 3    Associated Diagnoses: Chronic pain disorder; Lumbar radiculopathy      nitroGLYCERIN (NITROSTAT) 0.4 MG SL tablet Place 1 tablet (0.4 mg total) under the tongue every 5 (five) minutes as needed for Chest pain. Repeat twice as needed for a maximum total dose of 3 tablets. If still having chest pain, go to the emergency room.  Qty: 25 tablet, Refills: 4      NURTEC 75 mg odt Take 75 mg by mouth as needed for Migraine.      onabotulinumtoxina (BOTOX) 200 unit SolR Inject 200 Units into the  muscle.      ondansetron (ZOFRAN) 4 MG tablet Take 1 tablet (4 mg total) by mouth every 6 (six) hours as needed for Nausea.  Qty: 12 tablet, Refills: 0      ondansetron (ZOFRAN-ODT) 8 MG TbDL Take 8 mg by mouth 2 (two) times daily.      oxybutynin (DITROPAN-XL) 10 MG 24 hr tablet TAKE 1 TABLET(10 MG) BY MOUTH EVERY DAY  Qty: 30 tablet, Refills: 11      pantoprazole (PROTONIX) 20 MG tablet Take 20 mg by mouth.      prazosin (MINIPRESS) 2 MG Cap Tale 1 capsule Orally twice daily 30 days  Qty: 60 capsule, Refills: 0    Comments: .      prochlorperazine (COMPAZINE) 10 MG tablet Take 10 mg by mouth 3 (three) times daily. Pt takes PRN      propranoloL (INDERAL LA) 60 MG 24 hr capsule Take 60 mg by mouth every evening.      rosuvastatin (CRESTOR) 20 MG tablet Take 1 tablet (20 mg total) by mouth once daily.  Qty: 90 tablet, Refills: 9      spironolactone (ALDACTONE) 25 MG tablet Take 25 mg by mouth once daily.      tamsulosin (FLOMAX) 0.4 mg Cap TAKE 1 CAPSULE(0.4 MG) BY MOUTH EVERY DAY  Qty: 30 capsule, Refills: 11      traZODone (DESYREL) 100 MG tablet Take 2 tablet at bedtime as needed Orally 30 days  Qty: 60 tablet, Refills: 0      verapamiL (VERELAN) 240 MG C24P Take 240 mg by mouth Daily.                 Discharge Diagnosis: Lumbar spondylosis [M47.816]  Lumbar and sacral osteoarthritis [M47.817]  Condition on Discharge: Stable with no complications to procedure   Diet on Discharge: Same as before.  Activity: as per instruction sheet.  Discharge to: Home with a responsible adult.  Follow up: 2-4 weeks       Please call my office or pager at [phone] if experienced any weakness or loss of sensation, fever > 101.5, pain uncontrolled with oral medications, persistent nausea/vomiting/or diarrhea, redness or drainage from the incisions, or any other worrisome concerns. If physician on call was not reached or could not communicate with our office for any reason please go to the nearest emergency department

## 2024-03-11 ENCOUNTER — TELEPHONE (OUTPATIENT)
Dept: INTERNAL MEDICINE | Facility: CLINIC | Age: 44
End: 2024-03-11
Payer: MEDICARE

## 2024-03-11 NOTE — TELEPHONE ENCOUNTER
----- Message from Shabana Majano sent at 3/8/2024  3:33 PM CST -----  Type:  Sooner Apoointment Request    Caller is requesting a sooner appointment.  Caller declined first available appointment listed below.  Caller will not accept being placed on the waitlist and is requesting a message be sent to doctor.  Name of Caller:pt   When is the first available appointment?none   Symptoms:est care   Would the patient rather a call back or a response via [employer]? Call   Best Call Back Number:[phone]  Additional Information: first available

## 2024-03-18 ENCOUNTER — HOSPITAL ENCOUNTER (EMERGENCY)
Facility: HOSPITAL | Age: 44
Discharge: HOME | End: 2024-03-18
Attending: EMERGENCY MEDICINE
Payer: MEDICARE

## 2024-03-18 ENCOUNTER — TELEPHONE (OUTPATIENT)
Dept: NEUROLOGY | Facility: CLINIC | Age: 44
End: 2024-03-18
Payer: MEDICARE

## 2024-03-18 VITALS
BODY MASS INDEX: 18.99 KG/M2 | WEIGHT: 140 LBS | DIASTOLIC BLOOD PRESSURE: 63 MMHG | TEMPERATURE: 98 F | RESPIRATION RATE: 15 BRPM | HEART RATE: 73 BPM | SYSTOLIC BLOOD PRESSURE: 103 MMHG | OXYGEN SATURATION: 96 %
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
## 2024-03-18 DIAGNOSIS — S09.90XA CLOSED HEAD INJURY, INITIAL ENCOUNTER: Primary | ICD-10-CM

## 2024-03-18 LAB
ALBUMIN SERPL BCP-MCNC: 4.5 G/DL (ref 3.5–5.2)
ALP SERPL-CCNC: 106 U/L (ref 55–135)
ALT SERPL W/O P-5'-P-CCNC: 35 U/L (ref 10–44)
ANION GAP SERPL CALC-SCNC: 12 MMOL/L (ref 8–16)
AST SERPL-CCNC: 36 U/L (ref 10–40)
BASOPHILS # BLD AUTO: 0.09 K/UL (ref 0–0.2)
BASOPHILS NFR BLD: 0.4 % (ref 0–1.9)
BILIRUB SERPL-MCNC: 0.3 MG/DL (ref 0.1–1)
BILIRUB UR QL STRIP: NEGATIVE
BUN SERPL-MCNC: 8 MG/DL (ref 6–20)
BUN SERPL-MCNC: 8 MG/DL (ref 6–30)
CALCIUM SERPL-MCNC: 9.9 MG/DL (ref 8.7–10.5)
CHLORIDE SERPL-SCNC: 102 MMOL/L (ref 95–110)
CHLORIDE SERPL-SCNC: 103 MMOL/L (ref 95–110)
CLARITY UR REFRACT.AUTO: CLEAR
CO2 SERPL-SCNC: 28 MMOL/L (ref 23–29)
COLOR UR AUTO: YELLOW
CREAT SERPL-MCNC: 0.8 MG/DL (ref 0.5–1.4)
CREAT SERPL-MCNC: 0.8 MG/DL (ref 0.5–1.4)
DIFFERENTIAL METHOD BLD: ABNORMAL
EOSINOPHIL # BLD AUTO: 0 K/UL (ref 0–0.5)
EOSINOPHIL NFR BLD: 0 % (ref 0–8)
ERYTHROCYTE [DISTWIDTH] IN BLOOD BY AUTOMATED COUNT: 11.8 % (ref 11.5–14.5)
EST. GFR  (NO RACE VARIABLE): >60 ML/MIN/1.73 M^2
GLUCOSE SERPL-MCNC: 113 MG/DL (ref 70–110)
GLUCOSE SERPL-MCNC: 117 MG/DL (ref 70–110)
GLUCOSE UR QL STRIP: ABNORMAL
HCT VFR BLD AUTO: 43.1 % (ref 37–48.5)
HCT VFR BLD CALC: 45 %PCV (ref 36–54)
HGB BLD-MCNC: 15.4 G/DL (ref 12–16)
HGB UR QL STRIP: NEGATIVE
IMM GRANULOCYTES # BLD AUTO: 0.15 K/UL (ref 0–0.04)
IMM GRANULOCYTES NFR BLD AUTO: 0.7 % (ref 0–0.5)
KETONES UR QL STRIP: NEGATIVE
LEUKOCYTE ESTERASE UR QL STRIP: NEGATIVE
LYMPHOCYTES # BLD AUTO: 0.8 K/UL (ref 1–4.8)
LYMPHOCYTES NFR BLD: 3.8 % (ref 18–48)
MAGNESIUM SERPL-MCNC: 2 MG/DL (ref 1.6–2.6)
MCH RBC QN AUTO: 30.3 PG (ref 27–31)
MCHC RBC AUTO-ENTMCNC: 35.7 G/DL (ref 32–36)
MCV RBC AUTO: 85 FL (ref 82–98)
MONOCYTES # BLD AUTO: 1.3 K/UL (ref 0.3–1)
MONOCYTES NFR BLD: 5.7 % (ref 4–15)
NEUTROPHILS # BLD AUTO: 19.9 K/UL (ref 1.8–7.7)
NEUTROPHILS NFR BLD: 89.4 % (ref 38–73)
NITRITE UR QL STRIP: NEGATIVE
NRBC BLD-RTO: 0 /100 WBC
PH UR STRIP: 6 [PH] (ref 5–8)
PLATELET # BLD AUTO: 206 K/UL (ref 150–450)
PMV BLD AUTO: 9.8 FL (ref 9.2–12.9)
POC IONIZED CALCIUM: 1.25 MMOL/L (ref 1.06–1.42)
POC TCO2 (MEASURED): 30 MMOL/L (ref 23–29)
POTASSIUM BLD-SCNC: 4.1 MMOL/L (ref 3.5–5.1)
POTASSIUM SERPL-SCNC: 4 MMOL/L (ref 3.5–5.1)
PROT SERPL-MCNC: 7.9 G/DL (ref 6–8.4)
PROT UR QL STRIP: ABNORMAL
RBC # BLD AUTO: 5.08 M/UL (ref 4–5.4)
SAMPLE: ABNORMAL
SODIUM BLD-SCNC: 142 MMOL/L (ref 136–145)
SODIUM SERPL-SCNC: 143 MMOL/L (ref 136–145)
SP GR UR STRIP: 1.03 (ref 1–1.03)
TSH SERPL DL<=0.005 MIU/L-ACNC: 0.41 UIU/ML (ref 0.4–4)
URN SPEC COLLECT METH UR: ABNORMAL
WBC # BLD AUTO: 22.28 K/UL (ref 3.9–12.7)

## 2024-03-18 PROCEDURE — 96375 TX/PRO/DX INJ NEW DRUG ADDON: CPT

## 2024-03-18 PROCEDURE — 84443 ASSAY THYROID STIM HORMONE: CPT | Performed by: EMERGENCY MEDICINE

## 2024-03-18 PROCEDURE — 93005 ELECTROCARDIOGRAM TRACING: CPT

## 2024-03-18 PROCEDURE — 96374 THER/PROPH/DIAG INJ IV PUSH: CPT

## 2024-03-18 PROCEDURE — 93010 ELECTROCARDIOGRAM REPORT: CPT | Mod: ,,, | Performed by: INTERNAL MEDICINE

## 2024-03-18 PROCEDURE — 83735 ASSAY OF MAGNESIUM: CPT | Performed by: EMERGENCY MEDICINE

## 2024-03-18 PROCEDURE — 25000003 PHARM REV CODE 250: Performed by: EMERGENCY MEDICINE

## 2024-03-18 PROCEDURE — 96361 HYDRATE IV INFUSION ADD-ON: CPT

## 2024-03-18 PROCEDURE — 80048 BASIC METABOLIC PNL TOTAL CA: CPT | Mod: XB

## 2024-03-18 PROCEDURE — 80053 COMPREHEN METABOLIC PANEL: CPT | Performed by: EMERGENCY MEDICINE

## 2024-03-18 PROCEDURE — 85025 COMPLETE CBC W/AUTO DIFF WBC: CPT | Performed by: EMERGENCY MEDICINE

## 2024-03-18 PROCEDURE — 81003 URINALYSIS AUTO W/O SCOPE: CPT | Performed by: EMERGENCY MEDICINE

## 2024-03-18 PROCEDURE — 63600175 PHARM REV CODE 636 W HCPCS: Performed by: EMERGENCY MEDICINE

## 2024-03-18 PROCEDURE — 99285 EMERGENCY DEPT VISIT HI MDM: CPT | Mod: 25

## 2024-03-18 RX ORDER — CYCLOBENZAPRINE HCL 10 MG
10 TABLET ORAL
Status: COMPLETED | OUTPATIENT
Start: 2024-03-18 | End: 2024-03-18

## 2024-03-18 RX ORDER — DIPHENHYDRAMINE HYDROCHLORIDE 50 MG/ML
25 INJECTION INTRAMUSCULAR; INTRAVENOUS
Status: COMPLETED | OUTPATIENT
Start: 2024-03-18 | End: 2024-03-18

## 2024-03-18 RX ORDER — METOCLOPRAMIDE HYDROCHLORIDE 5 MG/ML
10 INJECTION INTRAMUSCULAR; INTRAVENOUS
Status: COMPLETED | OUTPATIENT
Start: 2024-03-18 | End: 2024-03-18

## 2024-03-18 RX ORDER — BACLOFEN 10 MG/1
20 TABLET ORAL
Status: COMPLETED | OUTPATIENT
Start: 2024-03-18 | End: 2024-03-18

## 2024-03-18 RX ADMIN — SODIUM CHLORIDE 1000 ML: 9 INJECTION, SOLUTION INTRAVENOUS at 07:03

## 2024-03-18 RX ADMIN — METOCLOPRAMIDE 10 MG: 5 INJECTION, SOLUTION INTRAMUSCULAR; INTRAVENOUS at 07:03

## 2024-03-18 RX ADMIN — BACLOFEN 20 MG: 10 TABLET ORAL at 07:03

## 2024-03-18 RX ADMIN — DIPHENHYDRAMINE HYDROCHLORIDE 25 MG: 50 INJECTION, SOLUTION INTRAMUSCULAR; INTRAVENOUS at 07:03

## 2024-03-18 RX ADMIN — CYCLOBENZAPRINE 10 MG: 10 TABLET, FILM COATED ORAL at 07:03

## 2024-03-18 NOTE — ED NOTES
I-STAT Chem-8+ Results:   Value Reference Range   Sodium 142 136-145 mmol/L   Potassium  4.1 3.5-5.1 mmol/L   Chloride 102  mmol/L   Ionized Calcium 1.25 1.06-1.42 mmol/L   CO2 (measured) 30 23-29 mmol/L   Glucose 117  mg/dL   BUN 8 6-30 mg/dL   Creatinine 0.8 0.5-1.4 mg/dL   Hematocrit 45 36-54%

## 2024-03-18 NOTE — TELEPHONE ENCOUNTER
Called pt. They are in the ER. They said they blacked out at home and fell hitting head on the counter. Questioning whether they had a seizure.  They are having some dystonia at this time.  Waiting to be seen. Informed them I would route to Dr. Smalls and call to check in tomorrow.

## 2024-03-18 NOTE — ED TRIAGE NOTES
"Pt here for "seizure activity" which is described as just muscle jerking and "spacing out" but never having LOC.  Pt has seen neurology for same c/o.   Pt c/o migraine x few days.   "

## 2024-03-18 NOTE — TELEPHONE ENCOUNTER
----- Message from Sada Reed sent at 3/18/2024 12:45 PM CDT -----  Regarding: question  Contact: @ [phone]  Pt called in regards to speaking with someone she is having a movement disorder issue and wants to know if she should come to the office or go to the ER   .....Please call and adv @ [phone]

## 2024-03-18 NOTE — TELEPHONE ENCOUNTER
----- Message from Davie Orellana sent at 3/18/2024  2:45 PM CDT -----  Regarding: Call requested  Contact: [phone]  Gordon Griffin calling regarding Patient Advice (message) for #Pt called in regards to speaking with someone she is having a movement disorder issue and wants to know if she should come to the office or go to the ER .....Please call and adv @ [phone]

## 2024-03-18 NOTE — TELEPHONE ENCOUNTER
Called patient with no answer.  I will forward her message to our Nurse in the department as well.

## 2024-03-18 NOTE — FIRST PROVIDER EVALUATION
Medical screening examination initiated.  I have conducted a focused provider triage encounter, findings are as follows:    Brief history of present illness:  44 y/o patient with seizure episodes (hx of myoclonus) and syncopal episodes. These have been intermittent. No fevers or chills.     Vitals:    03/18/24 1523   BP: 119/77   Pulse: 94   Resp: 18   Temp: 97.9 °F (36.6 °C)   TempSrc: Oral   SpO2: 96%   Weight: 63.5 kg (140 lb)       Pertinent physical exam:  w/c, not post-ictal, no deficits    Brief workup plan:  labs, ecg    Preliminary workup initiated; this workup will be continued and followed by the physician or advanced practice provider that is assigned to the patient when roomed.    Trevon Jaime DO, FAAEM  Emergency Staff Physician   Dept of Emergency Medicine   [facility]  Spectralink: [id number]        Disclaimer: This note has been generated using voice-recognition software. There may be typographical errors that have been missed during proof-reading.
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
## 2024-03-19 ENCOUNTER — OFFICE VISIT (OUTPATIENT)
Dept: ORTHOPEDICS | Facility: CLINIC | Age: 44
End: 2024-03-19
Payer: MEDICARE

## 2024-03-19 DIAGNOSIS — M17.12 PRIMARY OSTEOARTHRITIS OF LEFT KNEE: Primary | ICD-10-CM

## 2024-03-19 LAB
OHS QRS DURATION: 80 MS
OHS QTC CALCULATION: 440 MS

## 2024-03-19 PROCEDURE — 99499 UNLISTED E&M SERVICE: CPT | Mod: S$GLB,,, | Performed by: PHYSICIAN ASSISTANT

## 2024-03-19 PROCEDURE — 99999 PR PBB SHADOW E&M-EST. PATIENT-LVL IV: CPT | Mod: PBBFAC,,, | Performed by: PHYSICIAN ASSISTANT

## 2024-03-19 PROCEDURE — 20610 DRAIN/INJ JOINT/BURSA W/O US: CPT | Mod: LT,S$GLB,, | Performed by: PHYSICIAN ASSISTANT

## 2024-03-19 NOTE — PROGRESS NOTES
Gordon Griffin presents to clinic today for the first left knee  orthovisc injection.  There has been no significant change in her medical status since her last visit. No fever, chills, malaise, or unexplained weight change.    Allergies, medications, past medical and surgical history were reviewed.    Exam demonstrates there is no effusion in the  left knee, and the skin is intact.    Diagnosis: left knee osteoarthritis    After time out was performed, verbal consent obtained and patient ID, side, and site were verified, the  left  knee was sterilly prepped in the standard fashion.  A 22-gauge needle was introduced into left knee joint from an dhaval-lateral site without complication and knee was then injected with 2 ml of orthovisc.  Sterile dressing was applied.  The patient was instructed to resume activities as tolerated and to call with any problems.     We will see Gordon Griffin back next week for the second injection.

## 2024-03-19 NOTE — DISCHARGE INSTRUCTIONS
Diagnosis:  Closed head injury    Tests today showed:   Labs Reviewed   CBC W/ AUTO DIFFERENTIAL - Abnormal; Notable for the following components:       Result Value    WBC 22.28 (*)     Immature Granulocytes 0.7 (*)     Gran # (ANC) 19.9 (*)     Immature Grans (Abs) 0.15 (*)     Lymph # 0.8 (*)     Mono # 1.3 (*)     Gran % 89.4 (*)     Lymph % 3.8 (*)     All other components within normal limits   COMPREHENSIVE METABOLIC PANEL - Abnormal; Notable for the following components:    Glucose 113 (*)     All other components within normal limits   URINALYSIS, REFLEX TO URINE CULTURE - Abnormal; Notable for the following components:    Protein, UA Trace (*)     Glucose, UA 2+ (*)     All other components within normal limits    Narrative:     Specimen Source->Urine   ISTAT PROCEDURE - Abnormal; Notable for the following components:    POC Glucose 117 (*)     POC TCO2 (MEASURED) 30 (*)     All other components within normal limits   MAGNESIUM   TSH   ISTAT CHEM8     Imaging Results              CT Head Without Contrast (Final result)  Result time 03/18/24 21:29:46      Final result by Fer Jones MD (03/18/24 21:29:46)                   Impression:      No acute abnormality.      Electronically signed by: Fer Jones  Date:    03/18/2024  Time:    21:29               Narrative:    EXAMINATION:  CT HEAD WITHOUT CONTRAST    CLINICAL HISTORY:  Head trauma, focal neuro findings (Age 19-64y);    TECHNIQUE:  Low dose axial CT images obtained throughout the head without intravenous contrast. Sagittal and coronal reconstructions were performed.    COMPARISON:  MRI of the brain, 09/01/2023    FINDINGS:  Intracranial compartment:    Ventricles and sulci are normal in size for age without evidence of hydrocephalus. No extra-axial blood or fluid collections.    The brain parenchyma appears normal. No parenchymal mass, hemorrhage, edema or major vascular distribution infarct.    Skull/extracranial contents (limited  evaluation): No fracture. Mastoid air cells and paranasal sinuses are essentially clear.                                      Treatments you had today:   Medications   sodium chloride 0.9% bolus 1,000 mL 1,000 mL (0 mLs Intravenous Stopped 3/18/24 2148)   cyclobenzaprine tablet 10 mg (10 mg Oral Given 3/18/24 1943)   baclofen tablet 20 mg (20 mg Oral Given 3/18/24 1943)   metoclopramide injection 10 mg (10 mg Intravenous Given 3/18/24 1948)   diphenhydrAMINE injection 25 mg (25 mg Intravenous Given 3/18/24 1946)       Follow-Up Plan:  - Follow-up with Primary care physician within 7days  - Additional testing and/or evaluation as directed by your Primary care physician    Return to the Emergency Department for symptoms including but not limited to: worsening symptoms, shortness of breath or chest pain, vomiting with inability to hold down fluids, fevers greater than 100.4°F, passing out/fainting/unconsciousness, or other concerning symptoms.

## 2024-03-19 NOTE — ED NOTES
Took report from Eun KELLEY and Baudilio RN, and assumed care of pt at this time with Adrianne BROWN. Pt resting comfortably and independently repositioned in stretcher with bed locked in lowest position for safety. NAD noted at this time. Respirations even and unlabored and visible chest rise noted.  Patient offered bathroom assistance and denies need at this time. Pt instructed to call if assistance is needed. Pt on continuous cardiac, BP, and O2 monitoring. Call light within reach. Family at bedside. No needs at this time. Will continue to monitor.

## 2024-03-22 ENCOUNTER — TELEPHONE (OUTPATIENT)
Dept: NEUROLOGY | Facility: CLINIC | Age: 44
End: 2024-03-22
Payer: MEDICARE

## 2024-03-22 NOTE — TELEPHONE ENCOUNTER
----- Message from Valeria Dougherty RN sent at 3/18/2024  4:09 PM CDT -----  Regarding: ER follow up  Call to check post ER

## 2024-03-26 ENCOUNTER — OFFICE VISIT (OUTPATIENT)
Dept: ORTHOPEDICS | Facility: CLINIC | Age: 44
End: 2024-03-26
Payer: MEDICARE

## 2024-03-26 DIAGNOSIS — M17.12 PRIMARY OSTEOARTHRITIS OF LEFT KNEE: Primary | ICD-10-CM

## 2024-03-26 PROCEDURE — 99999 PR PBB SHADOW E&M-EST. PATIENT-LVL III: CPT | Mod: PBBFAC,,, | Performed by: PHYSICIAN ASSISTANT

## 2024-03-26 PROCEDURE — 99499 UNLISTED E&M SERVICE: CPT | Mod: S$GLB,,, | Performed by: PHYSICIAN ASSISTANT

## 2024-03-26 PROCEDURE — 20610 DRAIN/INJ JOINT/BURSA W/O US: CPT | Mod: LT,S$GLB,, | Performed by: PHYSICIAN ASSISTANT

## 2024-03-26 NOTE — PROGRESS NOTES
Gordon Griffin presents to clinic today for the second left knee  orthovisc injection.  There has been no significant change in her medical status since her last visit. No fever, chills, malaise, or unexplained weight change.    Allergies, medications, past medical and surgical history were reviewed.    Exam demonstrates there is no effusion in the  left knee, and the skin is intact.    Diagnosis: left knee osteoarthritis    After time out was performed, verbal consent obtained and patient ID, side, and site were verified, the  left  knee was sterilly prepped in the standard fashion.  A 22-gauge needle was introduced into left knee joint from an dhaval-lateral site without complication and knee was then injected with 2 ml of orthovisc.  Sterile dressing was applied.  The patient was instructed to resume activities as tolerated and to call with any problems.     We will see Gordon Griffin back next week for the second injection.

## 2024-04-01 ENCOUNTER — HOSPITAL ENCOUNTER (EMERGENCY)
Facility: HOSPITAL | Age: 44
Discharge: HOME | End: 2024-04-01
Attending: STUDENT IN AN ORGANIZED HEALTH CARE EDUCATION/TRAINING PROGRAM
Payer: MEDICARE

## 2024-04-01 VITALS
RESPIRATION RATE: 16 BRPM | HEART RATE: 80 BPM | WEIGHT: 145 LBS | BODY MASS INDEX: 19.64 KG/M2 | DIASTOLIC BLOOD PRESSURE: 74 MMHG | SYSTOLIC BLOOD PRESSURE: 123 MMHG | TEMPERATURE: 98 F | OXYGEN SATURATION: 99 % | HEIGHT: 72 IN

## 2024-04-01 DIAGNOSIS — G43.809 OTHER MIGRAINE WITHOUT STATUS MIGRAINOSUS, NOT INTRACTABLE: Primary | ICD-10-CM

## 2024-04-01 PROCEDURE — 63600175 PHARM REV CODE 636 W HCPCS: Performed by: STUDENT IN AN ORGANIZED HEALTH CARE EDUCATION/TRAINING PROGRAM

## 2024-04-01 PROCEDURE — 96372 THER/PROPH/DIAG INJ SC/IM: CPT | Performed by: STUDENT IN AN ORGANIZED HEALTH CARE EDUCATION/TRAINING PROGRAM

## 2024-04-01 PROCEDURE — 99284 EMERGENCY DEPT VISIT MOD MDM: CPT | Mod: 25

## 2024-04-01 RX ORDER — KETOROLAC TROMETHAMINE 30 MG/ML
15 INJECTION, SOLUTION INTRAMUSCULAR; INTRAVENOUS
Status: COMPLETED | OUTPATIENT
Start: 2024-04-01 | End: 2024-04-01

## 2024-04-01 RX ORDER — DIPHENHYDRAMINE HYDROCHLORIDE 50 MG/ML
25 INJECTION INTRAMUSCULAR; INTRAVENOUS
Status: COMPLETED | OUTPATIENT
Start: 2024-04-01 | End: 2024-04-01

## 2024-04-01 RX ORDER — PROCHLORPERAZINE EDISYLATE 5 MG/ML
10 INJECTION INTRAMUSCULAR; INTRAVENOUS ONCE
Status: COMPLETED | OUTPATIENT
Start: 2024-04-01 | End: 2024-04-01

## 2024-04-01 RX ADMIN — KETOROLAC TROMETHAMINE 15 MG: 30 INJECTION, SOLUTION INTRAMUSCULAR; INTRAVENOUS at 11:04

## 2024-04-01 RX ADMIN — DIPHENHYDRAMINE HYDROCHLORIDE 25 MG: 50 INJECTION, SOLUTION INTRAMUSCULAR; INTRAVENOUS at 11:04

## 2024-04-01 RX ADMIN — PROCHLORPERAZINE EDISYLATE 10 MG: 5 INJECTION INTRAMUSCULAR; INTRAVENOUS at 11:04

## 2024-04-01 NOTE — ED NOTES
Bed: FLEX 02  Expected date: 3/30/24  Expected time: 3:24 PM  Means of arrival:   Comments:  Elias

## 2024-04-01 NOTE — DISCHARGE INSTRUCTIONS
You were evaluated in the emergency department today for migraine headache.  Although there were no findings of concern to necessitate admission to the hospital or warrant immediate surgical intervention, disease exists on a spectrum and your disease process may progress.  If this is the case, please watch your symptoms and return to the emergency department if you feel worse and are unable to discuss care with your primary care doctor in follow up in the next several days.  Specific information regarding your complaint has been provided.  Thank you for choosing [facility]!    If your headaches continue, or you develop nausea or vomiting, trouble with your balance, weakness or numbness or confusion - return to the ED promptly. If you do not begin to feel better in 2-3 days, please make an appointment to be evaluated at our neurology clinic or with your primary physician for continued care of your headaches. Otherwise, take any already prescribed medications or any over the counter headache medication that may be of benefit.

## 2024-04-01 NOTE — Clinical Note
"Gordon Davies" Elias was seen and treated in our emergency department on 4/1/2024.  She may return to work on 04/02/2024.       If you have any questions or concerns, please don't hesitate to call.      Nav Child, DO"
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
## 2024-04-02 ENCOUNTER — OFFICE VISIT (OUTPATIENT)
Dept: ORTHOPEDICS | Facility: CLINIC | Age: 44
End: 2024-04-02
Payer: MEDICARE

## 2024-04-02 DIAGNOSIS — M17.12 PRIMARY OSTEOARTHRITIS OF LEFT KNEE: Primary | ICD-10-CM

## 2024-04-02 PROCEDURE — 20610 DRAIN/INJ JOINT/BURSA W/O US: CPT | Mod: LT,S$GLB,, | Performed by: PHYSICIAN ASSISTANT

## 2024-04-02 PROCEDURE — 99999 PR PBB SHADOW E&M-EST. PATIENT-LVL IV: CPT | Mod: PBBFAC,,, | Performed by: PHYSICIAN ASSISTANT

## 2024-04-02 PROCEDURE — 99499 UNLISTED E&M SERVICE: CPT | Mod: S$GLB,,, | Performed by: PHYSICIAN ASSISTANT

## 2024-04-02 NOTE — PROGRESS NOTES
Gordon Griffin presents to clinic today for the third left knee  orthovisc injection.  There has been no significant change in her medical status since her last visit. No fever, chills, malaise, or unexplained weight change.    Allergies, medications, past medical and surgical history were reviewed.    Exam demonstrates there is no effusion in the  left knee, and the skin is intact.    Diagnosis: left knee osteoarthritis    After time out was performed, verbal consent obtained and patient ID, side, and site were verified, the  left  knee was sterilly prepped in the standard fashion.  A 22-gauge needle was introduced into left knee joint from an dhaval-lateral site without complication and knee was then injected with 2 ml of orthovisc.  Sterile dressing was applied.  The patient was instructed to resume activities as tolerated and to call with any problems.     We will see Gordon Griffin back for follow up as needed

## 2024-04-03 ENCOUNTER — PATIENT MESSAGE (OUTPATIENT)
Dept: RESEARCH | Facility: CLINIC | Age: 44
End: 2024-04-03
Payer: MEDICARE

## 2024-04-04 ENCOUNTER — PATIENT MESSAGE (OUTPATIENT)
Dept: RESEARCH | Facility: CLINIC | Age: 44
End: 2024-04-04
Payer: MEDICARE

## 2024-04-09 ENCOUNTER — OFFICE VISIT (OUTPATIENT)
Dept: PAIN MEDICINE | Facility: CLINIC | Age: 44
End: 2024-04-09
Payer: MEDICARE

## 2024-04-09 DIAGNOSIS — M47.816 LUMBAR SPONDYLOSIS: ICD-10-CM

## 2024-04-09 DIAGNOSIS — M47.812 CERVICAL SPONDYLOSIS: ICD-10-CM

## 2024-04-09 DIAGNOSIS — M54.12 CERVICAL RADICULOPATHY: ICD-10-CM

## 2024-04-09 DIAGNOSIS — M54.12 CERVICAL RADICULOPATHY: Primary | ICD-10-CM

## 2024-04-09 DIAGNOSIS — M51.36 DDD (DEGENERATIVE DISC DISEASE), LUMBAR: ICD-10-CM

## 2024-04-09 DIAGNOSIS — M50.30 DDD (DEGENERATIVE DISC DISEASE), CERVICAL: ICD-10-CM

## 2024-04-09 DIAGNOSIS — G89.4 CHRONIC PAIN DISORDER: Primary | ICD-10-CM

## 2024-04-09 PROCEDURE — 1159F MED LIST DOCD IN RCRD: CPT | Mod: CPTII,95,, | Performed by: NURSE PRACTITIONER

## 2024-04-09 PROCEDURE — 1160F RVW MEDS BY RX/DR IN RCRD: CPT | Mod: CPTII,95,, | Performed by: NURSE PRACTITIONER

## 2024-04-09 PROCEDURE — 99213 OFFICE O/P EST LOW 20 MIN: CPT | Mod: 95,,, | Performed by: NURSE PRACTITIONER

## 2024-04-09 NOTE — PROGRESS NOTES
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 Injection, Steroid, Epidural C7/T1 CONTRAST;  Surgeon: Larry Brasher MD;  Location: [facility];  Service: Pain Management;  Laterality: N/A;    EPIDURAL STEROID INJECTION N/A 5/26/2023    Procedure: INJECTION, STEROID, EPIDURAL C7/T1 IL;  Surgeon: Larry Brasher MD;  Location: [facility];  Service: Pain Management;  Laterality: N/A;    EPIDURAL STEROID INJECTION N/A 10/13/2023    Procedure: INJECTION, STEROID, EPIDURAL, C7-T1;  Surgeon: Larry Brasher MD;  Location: [facility];  Service: Pain Management;  Laterality: N/A;    INJECTION OF ANESTHETIC AGENT AROUND NERVE Bilateral 5/6/2022    Procedure: BLOCK, NERVE, BILATERAL L3-L4-*L5 MEDIAL BRANCH;  Surgeon: Larry Brasher MD;  Location: [facility];  Service: Pain Management;  Laterality: Bilateral;    INJECTION OF ANESTHETIC AGENT AROUND NERVE Bilateral 6/2/2022    Procedure: BLOCK, NERVE BILATERAL L3-L4-L5 MEDIAL BRANCH 2nd, needs consent;  Surgeon: Larry Brasher MD;  Location: [facility];  Service: Pain Management;  Laterality: Bilateral;    INJECTION, SACROILIAC JOINT Bilateral 6/9/2023    Procedure: INJECTION,SACROILIAC JOINT, BILATERAL SI;  Surgeon: Larry Brasher MD;  Location: [facility];  Service: Pain Management;  Laterality: Bilateral;    MANDIBLE SURGERY      reconstruction    ORCHIECTOMY Bilateral 11/8/2023    Procedure: ORCHIECTOMY;  Surgeon: Ronald Mcgrath MD;   Location: [facility] 2ND [facility];  Service: Urology;  Laterality: Bilateral;  1 hr    RADIOFREQUENCY ABLATION Right 6/23/2022    Procedure: RADIOFREQUENCY ABLATION RIGHT L3,L4,L5 1 of 2, consent needed;  Surgeon: Larry Brasher MD;  Location: [facility];  Service: Pain Management;  Laterality: Right;    RADIOFREQUENCY ABLATION Left 7/7/2022    Procedure: RADIOFREQUENCY ABLATION LEFT L3,L4,L5 2 of 2, needs consent;  Surgeon: Larry Brasher MD;  Location: [facility];  Service: Pain Management;  Laterality: Left;    RADIOFREQUENCY ABLATION Right 3/3/2023    Procedure: RADIOFREQUENCY ABLATION RIGHT L3,L4,L5;  Surgeon: Larry Brasher MD;  Location: [facility];  Service: Pain Management;  Laterality: Right;    RADIOFREQUENCY ABLATION Left 3/31/2023    Procedure: Radiofrequency Ablation Left L3, L4, & L5 Pending CBC results;  Surgeon: Diana Lira MD;  Location: [facility];  Service: Pain Management;  Laterality: Left;    RADIOFREQUENCY ABLATION Bilateral 3/8/2024    Procedure: RADIOFREQUENCY ABLATION BILATERAL L3, 4, 5;  Surgeon: Larry Brasher MD;  Location: [facility];  Service: Pain Management;  Laterality: Bilateral;  [phone]  4 WK F/U VERONIQUE    variceol repair         Family History:  Family History   Problem Relation Age of Onset    Heart disease Mother     Myasthenia gravis Mother     Myasthenia gravis Father     Heart disease Father     Hypertension Father     Hyperlipidemia Father     Heart disease Paternal Uncle        Social History:  Social History     Socioeconomic History    Marital status:    Occupational History    Occupation: disable/   Tobacco Use    Smoking status: Never    Smokeless tobacco: Never   Substance and Sexual Activity    Alcohol use: No    Drug use: No    Sexual activity: Not Currently     Partners: Female   Social History Narrative    No stairs     Social Determinants of Health     Financial Resource Strain: Low Risk  (11/10/2023)    Overall  Financial Resource Strain (CARDIA)     Difficulty of Paying Living Expenses: Not hard at all   Food Insecurity: No Food Insecurity (11/10/2023)    Hunger Vital Sign     Worried About Running Out of Food in the Last Year: Never true     Ran Out of Food in the Last Year: Never true   Transportation Needs: No Transportation Needs (11/10/2023)    PRAPARE - Transportation     Lack of Transportation (Medical): No     Lack of Transportation (Non-Medical): No   Stress: Stress Concern Present (11/10/2023)    [employer] - Occupational Stress Questionnaire     Feeling of Stress : Rather much   Social Connections: Unknown (11/10/2023)    Social Connection and Isolation Panel [NHANES]     Frequency of Communication with Friends and Family: More than three times a week     Frequency of Social Gatherings with Friends and Family: More than three times a week     Marital Status: Living with partner   Housing Stability: Unknown (11/10/2023)    Housing Stability Vital Sign     Unable to Pay for Housing in the Last Year: No     Unstable Housing in the Last Year: No       OBJECTIVE:    Exam limited due to virtual visit:  General appearance: Well appearing, in no acute distress.  Psych:  Mood and affect appropriate.  Neck: Limited ROM with pain on flexion and extension.     Previous physical exam:  There were no vitals filed for this visit.     PHYSICAL EXAMINATION:    General appearance: Well appearing, in no acute distress.  Psych:  Mood and affect appropriate.  Skin: Skin color, texture, turgor normal, no rashes or lesions to visible areas.  Head/face:  Atraumatic, normocephalic. No palpable lymph nodes  Cor: No lower extremity edema.  Capillary refill <2 seconds.  Pulm: Symmetric chest rise. No apparent respiratory distress.  Neck: Spurling positive bilaterally to light pressure. TTP over cervical spine and bilateral paraspinals and trapezius with light pressure.  Back: Straight leg raising in the  sitting position is positive to radicular pain bilaterally.  There is pain with palpation over lumbar facet joints bilaterally. Limited ROM with pain on extension. Positive facet loading bilaterally.   Extremities: No deformities, edema, or skin discoloration. Good capillary refill.  Musculoskeletal: 5/5 strength in right ankle with plantar and dorsiflexion. 4/5 strength in left ankle with plantar and dorsiflexion. 5/5 strength with right knee flexion and extension. 5/5 strength with left knee flexion and extension.   Neuro:  No loss of sensation is noted.  Gait: Antalgic- ambulates without assistance.     ASSESSMENT: 43 y.o. year old adult with neck and lower back pain, consistent with the followin. Chronic pain disorder        2. Cervical radiculopathy  Procedure Order to Pain Management      3. Cervical spondylosis        4. DDD (degenerative disc disease), cervical        5. Lumbar spondylosis        6. DDD (degenerative disc disease), lumbar              PLAN:     - Previous imaging reviewed today.    - She is s/p bilateral L3,4,5 RFA with 70% relief.     - Schedule for C7/T1 IL KYUNG.     - Continue Gabapentin.    - I have stressed the importance of physical activity and a home exercise plan to help with pain and improve health.    - RTC 4 weeks after above procedure.       The above plan and management options were discussed at length with patient. Patient is in agreement with the above and verbalized understanding.    Maribel Bright NP   2024

## 2024-04-09 NOTE — H&P (VIEW-ONLY)
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 Injection, Steroid, Epidural C7/T1 CONTRAST;  Surgeon: Larry Brasher MD;  Location: [facility];  Service: Pain Management;  Laterality: N/A;    EPIDURAL STEROID INJECTION N/A 5/26/2023    Procedure: INJECTION, STEROID, EPIDURAL C7/T1 IL;  Surgeon: Larry Brasher MD;  Location: [facility];  Service: Pain Management;  Laterality: N/A;    EPIDURAL STEROID INJECTION N/A 10/13/2023    Procedure: INJECTION, STEROID, EPIDURAL, C7-T1;  Surgeon: Larry Brasher MD;  Location: [facility];  Service: Pain Management;  Laterality: N/A;    INJECTION OF ANESTHETIC AGENT AROUND NERVE Bilateral 5/6/2022    Procedure: BLOCK, NERVE, BILATERAL L3-L4-*L5 MEDIAL BRANCH;  Surgeon: Larry Brasher MD;  Location: [facility];  Service: Pain Management;  Laterality: Bilateral;    INJECTION OF ANESTHETIC AGENT AROUND NERVE Bilateral 6/2/2022    Procedure: BLOCK, NERVE BILATERAL L3-L4-L5 MEDIAL BRANCH 2nd, needs consent;  Surgeon: Larry Brasher MD;  Location: [facility];  Service: Pain Management;  Laterality: Bilateral;    INJECTION, SACROILIAC JOINT Bilateral 6/9/2023    Procedure: INJECTION,SACROILIAC JOINT, BILATERAL SI;  Surgeon: Larry Brasher MD;  Location: [facility];  Service: Pain Management;  Laterality: Bilateral;    MANDIBLE SURGERY      reconstruction    ORCHIECTOMY Bilateral 11/8/2023    Procedure: ORCHIECTOMY;  Surgeon: Ronald Mcgrath MD;   Location: [facility] OR 2ND [facility]LR;  Service: Urology;  Laterality: Bilateral;  1 hr    RADIOFREQUENCY ABLATION Right 6/23/2022    Procedure: RADIOFREQUENCY ABLATION RIGHT L3,L4,L5 1 of 2, consent needed;  Surgeon: Larry Brasher MD;  Location: [facility];  Service: Pain Management;  Laterality: Right;    RADIOFREQUENCY ABLATION Left 7/7/2022    Procedure: RADIOFREQUENCY ABLATION LEFT L3,L4,L5 2 of 2, needs consent;  Surgeon: Larry Brasher MD;  Location: [facility];  Service: Pain Management;  Laterality: Left;    RADIOFREQUENCY ABLATION Right 3/3/2023    Procedure: RADIOFREQUENCY ABLATION RIGHT L3,L4,L5;  Surgeon: Larry Brasher MD;  Location: [facility];  Service: Pain Management;  Laterality: Right;    RADIOFREQUENCY ABLATION Left 3/31/2023    Procedure: Radiofrequency Ablation Left L3, L4, & L5 Pending CBC results;  Surgeon: Diana Lira MD;  Location: [facility];  Service: Pain Management;  Laterality: Left;    RADIOFREQUENCY ABLATION Bilateral 3/8/2024    Procedure: RADIOFREQUENCY ABLATION BILATERAL L3, 4, 5;  Surgeon: Larry Brasher MD;  Location: [facility];  Service: Pain Management;  Laterality: Bilateral;  [phone]  4 WK F/U VERONIQUE    variceol repair         Family History:  Family History   Problem Relation Age of Onset    Heart disease Mother     Myasthenia gravis Mother     Myasthenia gravis Father     Heart disease Father     Hypertension Father     Hyperlipidemia Father     Heart disease Paternal Uncle        Social History:  Social History     Socioeconomic History    Marital status:    Occupational History    Occupation: disable/   Tobacco Use    Smoking status: Never    Smokeless tobacco: Never   Substance and Sexual Activity    Alcohol use: No    Drug use: No    Sexual activity: Not Currently     Partners: Female   Social History Narrative    No stairs     Social Determinants of Health     Financial Resource Strain: Low Risk  (11/10/2023)    Overall  Financial Resource Strain (CARDIA)     Difficulty of Paying Living Expenses: Not hard at all   Food Insecurity: No Food Insecurity (11/10/2023)    Hunger Vital Sign     Worried About Running Out of Food in the Last Year: Never true     Ran Out of Food in the Last Year: Never true   Transportation Needs: No Transportation Needs (11/10/2023)    PRAPARE - Transportation     Lack of Transportation (Medical): No     Lack of Transportation (Non-Medical): No   Stress: Stress Concern Present (11/10/2023)    [place] [employer] - Occupational Stress Questionnaire     Feeling of Stress : Rather much   Social Connections: Unknown (11/10/2023)    Social Connection and Isolation Panel [NHANES]     Frequency of Communication with Friends and Family: More than three times a week     Frequency of Social Gatherings with Friends and Family: More than three times a week     Marital Status: Living with partner   Housing Stability: Unknown (11/10/2023)    Housing Stability Vital Sign     Unable to Pay for Housing in the Last Year: No     Unstable Housing in the Last Year: No       OBJECTIVE:    Exam limited due to virtual visit:  General appearance: Well appearing, in no acute distress.  Psych:  Mood and affect appropriate.  Neck: Limited ROM with pain on flexion and extension.     Previous physical exam:  There were no vitals filed for this visit.     PHYSICAL EXAMINATION:    General appearance: Well appearing, in no acute distress.  Psych:  Mood and affect appropriate.  Skin: Skin color, texture, turgor normal, no rashes or lesions to visible areas.  Head/face:  Atraumatic, normocephalic. No palpable lymph nodes  Cor: No lower extremity edema.  Capillary refill <2 seconds.  Pulm: Symmetric chest rise. No apparent respiratory distress.  Neck: Spurling positive bilaterally to light pressure. TTP over cervical spine and bilateral paraspinals and trapezius with light pressure.  Back: Straight leg raising in the  sitting position is positive to radicular pain bilaterally.  There is pain with palpation over lumbar facet joints bilaterally. Limited ROM with pain on extension. Positive facet loading bilaterally.   Extremities: No deformities, edema, or skin discoloration. Good capillary refill.  Musculoskeletal: 5/5 strength in right ankle with plantar and dorsiflexion. 4/5 strength in left ankle with plantar and dorsiflexion. 5/5 strength with right knee flexion and extension. 5/5 strength with left knee flexion and extension.   Neuro:  No loss of sensation is noted.  Gait: Antalgic- ambulates without assistance.     ASSESSMENT: 43 y.o. year old adult with neck and lower back pain, consistent with the followin. Chronic pain disorder        2. Cervical radiculopathy  Procedure Order to Pain Management      3. Cervical spondylosis        4. DDD (degenerative disc disease), cervical        5. Lumbar spondylosis        6. DDD (degenerative disc disease), lumbar              PLAN:     - Previous imaging reviewed today.    - She is s/p bilateral L3,4,5 RFA with 70% relief.     - Schedule for C7/T1 IL KYUNG.     - Continue Gabapentin.    - I have stressed the importance of physical activity and a home exercise plan to help with pain and improve health.    - RTC 4 weeks after above procedure.       The above plan and management options were discussed at length with patient. Patient is in agreement with the above and verbalized understanding.    Maribel Bright NP   2024

## 2024-04-10 ENCOUNTER — PATIENT MESSAGE (OUTPATIENT)
Dept: PAIN MEDICINE | Facility: OTHER | Age: 44
End: 2024-04-10
Payer: MEDICARE

## 2024-04-12 ENCOUNTER — TELEPHONE (OUTPATIENT)
Dept: NEUROLOGY | Facility: CLINIC | Age: 44
End: 2024-04-12
Payer: MEDICARE

## 2024-04-15 ENCOUNTER — OFFICE VISIT (OUTPATIENT)
Dept: NEUROLOGY | Facility: CLINIC | Age: 44
End: 2024-04-15
Payer: MEDICARE

## 2024-04-15 VITALS
BODY MASS INDEX: 19.64 KG/M2 | HEIGHT: 72 IN | SYSTOLIC BLOOD PRESSURE: 126 MMHG | DIASTOLIC BLOOD PRESSURE: 64 MMHG | HEART RATE: 81 BPM | WEIGHT: 145 LBS

## 2024-04-15 DIAGNOSIS — G25.9 FUNCTIONAL MOVEMENT DISORDER: Primary | ICD-10-CM

## 2024-04-15 DIAGNOSIS — R26.9 ABNORMALITY OF GAIT AND MOBILITY: ICD-10-CM

## 2024-04-15 DIAGNOSIS — G24.9 DYSTONIA: ICD-10-CM

## 2024-04-15 PROCEDURE — 3008F BODY MASS INDEX DOCD: CPT | Mod: CPTII,S$GLB,, | Performed by: STUDENT IN AN ORGANIZED HEALTH CARE EDUCATION/TRAINING PROGRAM

## 2024-04-15 PROCEDURE — 99999 PR PBB SHADOW E&M-EST. PATIENT-LVL V: CPT | Mod: PBBFAC,,, | Performed by: STUDENT IN AN ORGANIZED HEALTH CARE EDUCATION/TRAINING PROGRAM

## 2024-04-15 PROCEDURE — 3078F DIAST BP <80 MM HG: CPT | Mod: CPTII,S$GLB,, | Performed by: STUDENT IN AN ORGANIZED HEALTH CARE EDUCATION/TRAINING PROGRAM

## 2024-04-15 PROCEDURE — G2211 COMPLEX E/M VISIT ADD ON: HCPCS | Mod: S$GLB,,, | Performed by: STUDENT IN AN ORGANIZED HEALTH CARE EDUCATION/TRAINING PROGRAM

## 2024-04-15 PROCEDURE — 99214 OFFICE O/P EST MOD 30 MIN: CPT | Mod: S$GLB,,, | Performed by: STUDENT IN AN ORGANIZED HEALTH CARE EDUCATION/TRAINING PROGRAM

## 2024-04-15 PROCEDURE — 3074F SYST BP LT 130 MM HG: CPT | Mod: CPTII,S$GLB,, | Performed by: STUDENT IN AN ORGANIZED HEALTH CARE EDUCATION/TRAINING PROGRAM

## 2024-04-15 PROCEDURE — 1159F MED LIST DOCD IN RCRD: CPT | Mod: CPTII,S$GLB,, | Performed by: STUDENT IN AN ORGANIZED HEALTH CARE EDUCATION/TRAINING PROGRAM
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
## 2024-04-22 ENCOUNTER — PATIENT MESSAGE (OUTPATIENT)
Dept: RESEARCH | Facility: CLINIC | Age: 44
End: 2024-04-22
Payer: MEDICARE

## 2024-04-22 ENCOUNTER — OFFICE VISIT (OUTPATIENT)
Dept: ORTHOPEDICS | Facility: CLINIC | Age: 44
End: 2024-04-22
Payer: MEDICARE

## 2024-04-22 VITALS — HEIGHT: 72 IN | BODY MASS INDEX: 18.26 KG/M2 | WEIGHT: 134.81 LBS

## 2024-04-22 DIAGNOSIS — G89.29 CHRONIC PAIN OF RIGHT ANKLE: Primary | ICD-10-CM

## 2024-04-22 DIAGNOSIS — M25.571 CHRONIC PAIN OF RIGHT ANKLE: Primary | ICD-10-CM

## 2024-04-22 PROCEDURE — 99213 OFFICE O/P EST LOW 20 MIN: CPT | Mod: 25,S$GLB,, | Performed by: PHYSICIAN ASSISTANT

## 2024-04-22 PROCEDURE — 3008F BODY MASS INDEX DOCD: CPT | Mod: CPTII,S$GLB,, | Performed by: PHYSICIAN ASSISTANT

## 2024-04-22 PROCEDURE — 99999 PR PBB SHADOW E&M-EST. PATIENT-LVL IV: CPT | Mod: PBBFAC,,, | Performed by: PHYSICIAN ASSISTANT

## 2024-04-22 PROCEDURE — 20605 DRAIN/INJ JOINT/BURSA W/O US: CPT | Mod: RT,S$GLB,, | Performed by: PHYSICIAN ASSISTANT

## 2024-04-22 PROCEDURE — 1160F RVW MEDS BY RX/DR IN RCRD: CPT | Mod: CPTII,S$GLB,, | Performed by: PHYSICIAN ASSISTANT

## 2024-04-22 PROCEDURE — 1159F MED LIST DOCD IN RCRD: CPT | Mod: CPTII,S$GLB,, | Performed by: PHYSICIAN ASSISTANT

## 2024-04-22 RX ORDER — TRIAMCINOLONE ACETONIDE 40 MG/ML
40 INJECTION, SUSPENSION INTRA-ARTICULAR; INTRAMUSCULAR
Status: DISCONTINUED | OUTPATIENT
Start: 2024-04-22 | End: 2024-04-22 | Stop reason: HOSPADM

## 2024-04-22 RX ORDER — LIDOCAINE HYDROCHLORIDE 10 MG/ML
1 INJECTION INFILTRATION; PERINEURAL
Status: DISCONTINUED | OUTPATIENT
Start: 2024-04-22 | End: 2024-04-22 | Stop reason: HOSPADM

## 2024-04-22 RX ADMIN — LIDOCAINE HYDROCHLORIDE 1 ML: 10 INJECTION INFILTRATION; PERINEURAL at 09:04

## 2024-04-22 RX ADMIN — TRIAMCINOLONE ACETONIDE 40 MG: 40 INJECTION, SUSPENSION INTRA-ARTICULAR; INTRAMUSCULAR at 09:04

## 2024-04-22 NOTE — PROGRESS NOTES
"Patient ID: Gordon Griffin is a 43 y.o. adult.    Chief Complaint: Ankle Pain (Right ankle injection )      HISTORY:  Gordon Griffin is a 43 y.o. adult who returns to me today for follow up of right ankle pain.    She has a long history of chronic right ankle pain.  She has noticed an increase in pain with activity. Symptoms are improved with rest.  Most of the pain is across the joint line.  She has tried bracing, wears boots for work which help stabilize, AFO, NSAIDS. She would like to try an injection today      PMH/PSH/FamHx/SocHx:    Unchanged from prior visit.    ROS:  Constitution: Negative for chills, fever and weakness.   Respiratory: Negative for cough and shortness of breath.   Musculoskeletal: Positive for right ankle  Psychiatric/Behavioral: The patient is not nervous/anxious.       PHYSICAL EXAM:   Ht 6' (1.829 m)   Wt 61.1 kg (134 lb 13 oz)   BMI 18.28 kg/m²   Right ankle  Skin intact  No warmth or erythema  No effusion  FROM  Stable to testing  5/5 strength testing    IMAGING: Previous X-rays of the right ankle, personally reviewed by me, demonstrate well maintained joint space.  No fracture or dislocation.     ASSESSMENT/PLAN:    Gordon Davies" was seen today for ankle pain.    Diagnoses and all orders for this visit:    Chronic pain of right ankle  -     Intermediate Joint Aspiration/Injection: R ankle    - She elected to proceed with diagnostic/therapeutic right ankle CSI today- see procedure note  - Recommend rest, ice as needed  - Follow up if symptoms worsen or fail to improve    "

## 2024-04-22 NOTE — PROCEDURES
Intermediate Joint Aspiration/Injection: R ankle    Date/Time: 4/22/2024 9:30 AM    Performed by: Vale Neil PA-C  Authorized by: Vale Neil PA-C    Consent Done?:  Yes (Verbal)  Indications:  Pain    Location:  Ankle  Site:  R ankle  Needle size:  22 G  Approach:  Anteromedial  Medications:  1 mL LIDOcaine HCL 10 mg/ml (1%) 10 mg/mL (1 %); 40 mg triamcinolone acetonide 40 mg/mL  Patient tolerance:  Patient tolerated the procedure well with no immediate complications

## 2024-04-24 ENCOUNTER — HOSPITAL ENCOUNTER (OUTPATIENT)
Dept: RADIOLOGY | Facility: HOSPITAL | Age: 44
Discharge: HOME | End: 2024-04-24
Attending: PHYSICIAN ASSISTANT
Payer: MEDICARE

## 2024-04-24 ENCOUNTER — OFFICE VISIT (OUTPATIENT)
Dept: ORTHOPEDICS | Facility: CLINIC | Age: 44
End: 2024-04-24
Payer: COMMERCIAL

## 2024-04-24 VITALS — HEIGHT: 72 IN | BODY MASS INDEX: 18.24 KG/M2 | WEIGHT: 134.69 LBS

## 2024-04-24 DIAGNOSIS — M25.571 ACUTE RIGHT ANKLE PAIN: Primary | ICD-10-CM

## 2024-04-24 DIAGNOSIS — M25.571 PAIN IN JOINT INVOLVING RIGHT ANKLE AND FOOT: ICD-10-CM

## 2024-04-24 DIAGNOSIS — M25.571 ACUTE RIGHT ANKLE PAIN: ICD-10-CM

## 2024-04-24 PROCEDURE — 1159F MED LIST DOCD IN RCRD: CPT | Mod: CPTII,S$GLB,, | Performed by: PHYSICIAN ASSISTANT

## 2024-04-24 PROCEDURE — 1160F RVW MEDS BY RX/DR IN RCRD: CPT | Mod: CPTII,S$GLB,, | Performed by: PHYSICIAN ASSISTANT

## 2024-04-24 PROCEDURE — 3008F BODY MASS INDEX DOCD: CPT | Mod: CPTII,S$GLB,, | Performed by: PHYSICIAN ASSISTANT

## 2024-04-24 PROCEDURE — 99999 PR PBB SHADOW E&M-EST. PATIENT-LVL V: CPT | Mod: PBBFAC,,, | Performed by: PHYSICIAN ASSISTANT

## 2024-04-24 PROCEDURE — 73610 X-RAY EXAM OF ANKLE: CPT | Mod: 26,RT,, | Performed by: RADIOLOGY

## 2024-04-24 PROCEDURE — 99213 OFFICE O/P EST LOW 20 MIN: CPT | Mod: S$GLB,,, | Performed by: PHYSICIAN ASSISTANT

## 2024-04-24 PROCEDURE — 73610 X-RAY EXAM OF ANKLE: CPT | Mod: TC,RT

## 2024-04-24 RX ORDER — NAPROXEN 500 MG/1
500 TABLET ORAL 2 TIMES DAILY WITH MEALS
Qty: 40 TABLET | Refills: 0 | Status: SHIPPED | OUTPATIENT
Start: 2024-04-24 | End: 2024-04-25

## 2024-04-24 RX ORDER — HYDROCODONE BITARTRATE AND ACETAMINOPHEN 5; 325 MG/1; MG/1
1 TABLET ORAL EVERY 6 HOURS PRN
Qty: 15 TABLET | Refills: 0 | Status: SHIPPED | OUTPATIENT
Start: 2024-04-24 | End: 2024-04-25

## 2024-04-24 NOTE — PROGRESS NOTES
"Patient ID: Gordon Griffin is a 43 y.o. adult.    Chief Complaint: Pain of the Right Ankle      HISTORY:  Gordon Griffin is a 43 y.o. adult who returns to me today for follow up of right ankle pain.  She was last seen by me 4/22/2024.  She was given ankle injection in clinic that morning and reports feeling good the rest of the day.    The next morning she reports severe pain and swelling in the ankle with inability to bear weight.  She was seen in the ED at [facility]- ultrasound was done which indicated an effusion.    At baseline she has significant contracture in her ankle.  This has also been worse and she is not able to move the ankle due to pain.      PMH/PSH/FamHx/SocHx:    Unchanged from prior visit.    ROS:  Constitution: Negative for chills, fever and weakness.   Respiratory: Negative for cough and shortness of breath.   Musculoskeletal: Positive for right ankle pain  Psychiatric/Behavioral: The patient is not nervous/anxious.       PHYSICAL EXAM:   Ht 6' (1.829 m)   Wt 61.1 kg (134 lb 11.2 oz)   BMI 18.27 kg/m²   Right ankle  Skin intact  Mild effusion  No significant erythema or warmth  TTP along joint line, medial and lateral  No palpable defect along ATFL, tendon intact  Sensation intact  2+ DP    IMAGING: X-rays of the right ankle, personally reviewed by me, demonstrate no acute abnormality.  No fracture or dislocation.     ASSESSMENT/PLAN:    Gorodn Davies" was seen today for pain.    Diagnoses and all orders for this visit:    Acute right ankle pain  -     X-Ray Ankle Complete Right; Future  -     naproxen (NAPROSYN) 500 MG tablet; Take 1 tablet (500 mg total) by mouth 2 (two) times daily with meals. for 20 days  -     HYDROcodone-acetaminophen (NORCO) 5-325 mg per tablet; Take 1 tablet by mouth every 6 (six) hours as needed for Pain.  -     MRI Ankle W WO Contrast Right; Future    Pain in joint involving right ankle and foot  -     naproxen (NAPROSYN) 500 MG tablet; Take 1 tablet (500 mg total) by " mouth 2 (two) times daily with meals. for 20 days  -     HYDROcodone-acetaminophen (NORCO) 5-325 mg per tablet; Take 1 tablet by mouth every 6 (six) hours as needed for Pain.  -     MRI Ankle W WO Contrast Right; Future    - I suspect corticosteroid flare from injection  - Discussed strict rest, continue boot, ice, elevate  - Naproxen sent in- instructed to d/c any other NSAIDS  - Norco for breakthrough pain  - MRI ordered- has had a chronic issue with this ankle.  If symptoms don't improve, will proceed with MRI  - Monitor closely- if any worsening pain, redness, erythema she should follow up or go to ED

## 2024-04-25 ENCOUNTER — HOSPITAL ENCOUNTER (OUTPATIENT)
Facility: OTHER | Age: 44
Discharge: HOME | End: 2024-04-25
Attending: ANESTHESIOLOGY | Admitting: ANESTHESIOLOGY
Payer: COMMERCIAL

## 2024-04-25 ENCOUNTER — TELEPHONE (OUTPATIENT)
Dept: ORTHOPEDICS | Facility: CLINIC | Age: 44
End: 2024-04-25
Payer: MEDICARE

## 2024-04-25 VITALS
OXYGEN SATURATION: 97 % | TEMPERATURE: 98 F | HEART RATE: 65 BPM | DIASTOLIC BLOOD PRESSURE: 65 MMHG | BODY MASS INDEX: 18.15 KG/M2 | WEIGHT: 134 LBS | RESPIRATION RATE: 16 BRPM | HEIGHT: 72 IN | SYSTOLIC BLOOD PRESSURE: 115 MMHG

## 2024-04-25 DIAGNOSIS — M54.12 CERVICAL RADICULOPATHY: Primary | ICD-10-CM

## 2024-04-25 DIAGNOSIS — G89.29 CHRONIC PAIN: ICD-10-CM

## 2024-04-25 DIAGNOSIS — M25.571 ACUTE RIGHT ANKLE PAIN: Primary | ICD-10-CM

## 2024-04-25 DIAGNOSIS — M25.571 PAIN IN JOINT INVOLVING RIGHT ANKLE AND FOOT: ICD-10-CM

## 2024-04-25 DIAGNOSIS — M50.30 DDD (DEGENERATIVE DISC DISEASE), CERVICAL: ICD-10-CM

## 2024-04-25 PROCEDURE — 25500020 PHARM REV CODE 255: Performed by: ANESTHESIOLOGY

## 2024-04-25 PROCEDURE — 63600175 PHARM REV CODE 636 W HCPCS: Performed by: ANESTHESIOLOGY

## 2024-04-25 PROCEDURE — 25000003 PHARM REV CODE 250: Performed by: ANESTHESIOLOGY

## 2024-04-25 PROCEDURE — 62321 NJX INTERLAMINAR CRV/THRC: CPT | Performed by: ANESTHESIOLOGY

## 2024-04-25 PROCEDURE — 25000003 PHARM REV CODE 250: Performed by: STUDENT IN AN ORGANIZED HEALTH CARE EDUCATION/TRAINING PROGRAM

## 2024-04-25 PROCEDURE — 62321 NJX INTERLAMINAR CRV/THRC: CPT | Mod: ,,, | Performed by: ANESTHESIOLOGY

## 2024-04-25 RX ORDER — HYDROCODONE BITARTRATE AND ACETAMINOPHEN 5; 325 MG/1; MG/1
1 TABLET ORAL EVERY 6 HOURS PRN
Qty: 15 TABLET | Refills: 0 | Status: SHIPPED | OUTPATIENT
Start: 2024-04-25

## 2024-04-25 RX ORDER — LIDOCAINE HYDROCHLORIDE 20 MG/ML
INJECTION, SOLUTION INFILTRATION; PERINEURAL
Status: DISCONTINUED | OUTPATIENT
Start: 2024-04-25 | End: 2024-04-25 | Stop reason: HOSPADM

## 2024-04-25 RX ORDER — DEXAMETHASONE SODIUM PHOSPHATE 10 MG/ML
INJECTION INTRAMUSCULAR; INTRAVENOUS
Status: DISCONTINUED | OUTPATIENT
Start: 2024-04-25 | End: 2024-04-25 | Stop reason: HOSPADM

## 2024-04-25 RX ORDER — NAPROXEN 500 MG/1
500 TABLET ORAL 2 TIMES DAILY WITH MEALS
Qty: 40 TABLET | Refills: 0 | Status: SHIPPED | OUTPATIENT
Start: 2024-04-25 | End: 2024-05-15

## 2024-04-25 RX ORDER — FENTANYL CITRATE 50 UG/ML
INJECTION, SOLUTION INTRAMUSCULAR; INTRAVENOUS
Status: DISCONTINUED | OUTPATIENT
Start: 2024-04-25 | End: 2024-04-25 | Stop reason: HOSPADM

## 2024-04-25 RX ORDER — MIDAZOLAM HYDROCHLORIDE 1 MG/ML
INJECTION INTRAMUSCULAR; INTRAVENOUS
Status: DISCONTINUED | OUTPATIENT
Start: 2024-04-25 | End: 2024-04-25 | Stop reason: HOSPADM

## 2024-04-25 RX ORDER — SODIUM CHLORIDE 9 MG/ML
INJECTION, SOLUTION INTRAVENOUS CONTINUOUS
Status: DISCONTINUED | OUTPATIENT
Start: 2024-04-25 | End: 2024-04-25 | Stop reason: HOSPADM

## 2024-04-25 RX ORDER — LIDOCAINE HYDROCHLORIDE 10 MG/ML
INJECTION, SOLUTION EPIDURAL; INFILTRATION; INTRACAUDAL; PERINEURAL
Status: DISCONTINUED | OUTPATIENT
Start: 2024-04-25 | End: 2024-04-25 | Stop reason: HOSPADM

## 2024-04-25 NOTE — OP NOTE
Cervical Interlaminar Epidural Steroid Injection under Fluoroscopic Guidance    The procedure, risks, benefits, and options were discussed with the patient. There are no contraindications to the procedure. The patent expressed understanding and agreed to the procedure. Informed written consent was obtained prior to the start of the procedure and can be found in the patient's chart.     PATIENT NAME: Gordon Griffin   MRN: [id number]     DATE OF PROCEDURE: 04/25/2024    PROCEDURE: Cervical Interlaminar Epidural Steroid Injection C7/T1 under Fluoroscopic Guidance    PRE-OP DIAGNOSIS: Cervical radiculopathy [M54.12] Cervical radiculopathy [M54.12]    POST-OP DIAGNOSIS: Same    PHYSICIAN: Larry Brasher MD     ASSISTANTS: Del Hair MD    MEDICATIONS INJECTED: Preservative-free Decadron 10mg with 2cc of Lidocaine 1% MPF and preservative free normal saline    LOCAL ANESTHETIC INJECTED: Xylocaine 2%     SEDATION: Versed 2mg and Fentanyl 50mcg                                                                                                                                                                                     Conscious sedation ordered by M.D. Patient re-evaluation prior to administration of conscious sedation. No changes noted in patient's status from initial evaluation. The patient's vital signs were monitored by RN and patient remained hemodynamically stable throughout the procedure.    Event Time In   Sedation Start 1320   Sedation End 1329       ESTIMATED BLOOD LOSS: None    COMPLICATIONS: None    TECHNIQUE: Time-out was performed to identify the patient and procedure to be performed. With the patient laying in a prone position, the surgical area was prepped and draped in the usual sterile fashion using ChloraPrep and a fenestrated drape. The level was determined under fluoroscopy guidance. Skin anesthesia was achieved by injecting Lidocaine 2% over the injection site.  The interlaminar space was then  approached with a 20 gauge, 3.5 inch Tuohy needle that was introduced under fluoroscopic guidance with AP, lateral and/or contralateral oblique imaging. Once the Ligamentum flavum was encountered loss of resistance to saline was used to enter the epidural space. With positive loss of resistance and negative aspiration for CSF or Blood, contrast dye  Omnipaque (300mg/mL) was injected to confirm placement and there was no vascular runoff. Then 3 mL of the medication mixture listed above was then injected slowly. Displacement of the radio opaque contrast after injection of the medication confirmed that the medication went into the area of the epidural space. The needles were removed, and bleeding was nil. A sterile dressing was applied. No specimens collected. The patient tolerated the procedure well.     The patient was monitored after the procedure in the recovery area. They were given post-procedure and discharge instructions to follow at home. The patient was discharged in a stable condition.    Del Hair MD     I reviewed and edited the fellow's note. I conducted my own interview and physical examination. I agree with the findings. I was present and supervising all critical portions of the procedure.      Larry Brasher MD

## 2024-04-25 NOTE — DISCHARGE INSTRUCTIONS

## 2024-04-25 NOTE — DISCHARGE SUMMARY
Discharge Note  Short Stay      SUMMARY     Admit Date: 2024    Attending Physician: Larry Brasher MD      Discharge Physician: Larry Brasher MD      Discharge Date: 2024 1:32 PM    Procedure(s) (LRB):  CERVICAL C7/T1 IL KYUNG *ASPIRIN OTC* HOLD FOR 5 DAYS (N/A)    Final Diagnosis: Cervical radiculopathy [M54.12]    Disposition: Home or self care    Patient Instructions:   Current Discharge Medication List        CONTINUE these medications which have NOT CHANGED    Details   aspirin 81 MG Chew Take 81 mg by mouth once daily.      atorvastatin (LIPITOR) 80 MG tablet       azelastine (ASTELIN) 137 mcg (0.1 %) nasal spray USE 1 TO 2 SPRAYS IN EACH NOSTRIL TWICE DAILY FOR CONGESTION      baclofen (LIORESAL) 20 MG tablet Take 1 tablet by mouth 3 (three) times daily as needed.       benztropine (COGENTIN) 0.5 MG tablet Take 0.5 mg by mouth once daily.      busPIRone (BUSPAR) 10 MG tablet Tale 1 tablet Orally Twice a day 30 days  Qty: 60 tablet, Refills: 0      butalbital-acetaminophen-caffeine -40 mg (FIORICET, ESGIC) -40 mg per tablet Take 1 tablet by mouth every 4 (four) hours as needed.      butorphanol (STADOL) 10 mg/mL nasal spray 2 sprays by Nasal route every 4 (four) hours as needed for pain  Qty: 100 mL, Refills: 5    Comments: Quantity prescribed more than 7 day supply? Press F2 and select one:[id number]        cyclobenzaprine (FLEXERIL) 10 MG tablet TK 1 T PO Q 8 H PRF PAIN      diazePAM (VALIUM) 2 MG tablet Take 2 mg by mouth 2 (two) times daily as needed.      erenumab-aooe (AIMOVIG AUTOINJECTOR SUBQ) Inject into the skin.      EScitalopram oxalate (LEXAPRO) 20 MG tablet Take 1 tablet (20 mg total) by mouth once daily.  Qty: 30 tablet, Refills: 0      estradiol valerate (DELESTROGEN) 20 mg/mL injection SMARTSI.3 Milliliter(s) IM Once a Week      evolocumab (REPATHA SURECLICK) 140 mg/mL PnIj Inject 1 mL (140 mg total) into the skin every 14 (fourteen) days.  Qty: 6 mL, Refills: 3     Associated Diagnoses: Hyperlipidemia, unspecified hyperlipidemia type; Atherosclerosis of native coronary artery of native heart without angina pectoris      ezetimibe (ZETIA) 10 mg tablet Take 1 tablet (10 mg total) by mouth once daily.  Qty: 90 tablet, Refills: 3      fluticasone (FLONASE) 50 mcg/actuation nasal spray SPRAY TWICE IEN QD  Refills: 5      gabapentin (NEURONTIN) 300 MG capsule Take 1 capsule (300 mg total) by mouth 3 (three) times daily.  Qty: 90 capsule, Refills: 3    Associated Diagnoses: Lumbar radiculopathy      HYDROcodone-acetaminophen (NORCO) 5-325 mg per tablet Take 1 tablet by mouth every 6 (six) hours as needed for Pain.  Qty: 15 tablet, Refills: 0    Comments: Quantity prescribed more than 7 day supply? No  Associated Diagnoses: Acute right ankle pain; Pain in joint involving right ankle and foot      hydrocortisone (ANUSOL-HC) 2.5 % rectal cream Place rectally 2 (two) times daily.  Qty: 28 g, Refills: 1    Associated Diagnoses: Hemorrhoid prolapse      hydrOXYzine pamoate (VISTARIL) 50 MG Cap Take  mg by mouth nightly as needed.      ketorolac (TORADOL) 10 mg tablet Pt takes PRN      levETIRAcetam (KEPPRA) 1000 MG tablet Take 1,000 mg by mouth 2 (two) times daily.      methocarbamoL (ROBAXIN) 750 MG Tab Take 750 mg by mouth 3 (three) times daily.      metoclopramide HCl (REGLAN) 10 MG tablet 10 mg.      naproxen (NAPROSYN) 500 MG tablet Take 1 tablet (500 mg total) by mouth 2 (two) times daily with meals. for 20 days  Qty: 40 tablet, Refills: 0    Associated Diagnoses: Acute right ankle pain; Pain in joint involving right ankle and foot      NARCAN 4 mg/actuation Spry SPRAY 0.1ML IN 1 NOSTRIL MAY REPEAT DOSE EVERY 2-3 MINUTES AS NEEDED ALTERNATING NOSTRILS EACH DOSE  Qty: 1 each, Refills: 3    Associated Diagnoses: Chronic pain disorder; Lumbar radiculopathy      nitroGLYCERIN (NITROSTAT) 0.4 MG SL tablet Place 1 tablet (0.4 mg total) under the tongue every 5 (five) minutes as  needed for Chest pain. Repeat twice as needed for a maximum total dose of 3 tablets. If still having chest pain, go to the emergency room.  Qty: 25 tablet, Refills: 4      NURTEC 75 mg odt Take 75 mg by mouth as needed for Migraine.      onabotulinumtoxina (BOTOX) 200 unit SolR Inject 200 Units into the muscle.      ondansetron (ZOFRAN) 4 MG tablet Take 1 tablet (4 mg total) by mouth every 6 (six) hours as needed for Nausea.  Qty: 12 tablet, Refills: 0      ondansetron (ZOFRAN-ODT) 8 MG TbDL Take 8 mg by mouth 2 (two) times daily.      oxybutynin (DITROPAN-XL) 10 MG 24 hr tablet TAKE 1 TABLET(10 MG) BY MOUTH EVERY DAY  Qty: 30 tablet, Refills: 11      pantoprazole (PROTONIX) 20 MG tablet Take 20 mg by mouth.      prazosin (MINIPRESS) 2 MG Cap Tale 1 capsule Orally twice daily 30 days  Qty: 60 capsule, Refills: 0    Comments: .      prochlorperazine (COMPAZINE) 10 MG tablet Take 10 mg by mouth 3 (three) times daily. Pt takes PRN      propranoloL (INDERAL LA) 60 MG 24 hr capsule Take 60 mg by mouth every evening.      rosuvastatin (CRESTOR) 20 MG tablet Take 1 tablet (20 mg total) by mouth once daily.  Qty: 90 tablet, Refills: 9      spironolactone (ALDACTONE) 25 MG tablet Take 25 mg by mouth once daily.      tamsulosin (FLOMAX) 0.4 mg Cap TAKE 1 CAPSULE(0.4 MG) BY MOUTH EVERY DAY  Qty: 30 capsule, Refills: 11      traZODone (DESYREL) 100 MG tablet Take 2 tablet at bedtime as needed Orally 30 days  Qty: 60 tablet, Refills: 0      verapamiL (VERELAN) 240 MG C24P Take 240 mg by mouth Daily.                 Discharge Diagnosis: Cervical radiculopathy [M54.12]  Condition on Discharge: Stable with no complications to procedure   Diet on Discharge: Same as before.  Activity: as per instruction sheet.  Discharge to: Home with a responsible adult.  Follow up: 2-4 weeks       Please call my office or pager at [phone] if experienced any weakness or loss of sensation, fever > 101.5, pain uncontrolled with oral medications,  persistent nausea/vomiting/or diarrhea, redness or drainage from the incisions, or any other worrisome concerns. If physician on call was not reached or could not communicate with our office for any reason please go to the nearest emergency department

## 2024-04-26 ENCOUNTER — PATIENT MESSAGE (OUTPATIENT)
Dept: ORTHOPEDICS | Facility: CLINIC | Age: 44
End: 2024-04-26
Payer: MEDICARE

## 2024-04-29 DIAGNOSIS — M25.571 PAIN IN JOINT INVOLVING RIGHT ANKLE AND FOOT: Primary | ICD-10-CM

## 2024-04-29 DIAGNOSIS — M25.571 ACUTE RIGHT ANKLE PAIN: ICD-10-CM

## 2024-05-10 ENCOUNTER — OFFICE VISIT (OUTPATIENT)
Dept: PAIN MEDICINE | Facility: CLINIC | Age: 44
End: 2024-05-10
Payer: MEDICARE

## 2024-05-10 VITALS
WEIGHT: 131.63 LBS | DIASTOLIC BLOOD PRESSURE: 78 MMHG | BODY MASS INDEX: 17.85 KG/M2 | OXYGEN SATURATION: 98 % | TEMPERATURE: 98 F | SYSTOLIC BLOOD PRESSURE: 128 MMHG | RESPIRATION RATE: 18 BRPM | HEART RATE: 90 BPM

## 2024-05-10 DIAGNOSIS — M54.12 CERVICAL RADICULOPATHY: ICD-10-CM

## 2024-05-10 DIAGNOSIS — M47.812 CERVICAL SPONDYLOSIS: ICD-10-CM

## 2024-05-10 DIAGNOSIS — G89.4 CHRONIC PAIN SYNDROME: Primary | ICD-10-CM

## 2024-05-10 DIAGNOSIS — M50.30 DDD (DEGENERATIVE DISC DISEASE), CERVICAL: ICD-10-CM

## 2024-05-10 DIAGNOSIS — M54.16 LUMBAR RADICULOPATHY: ICD-10-CM

## 2024-05-10 DIAGNOSIS — M47.816 LUMBAR SPONDYLOSIS: ICD-10-CM

## 2024-05-10 DIAGNOSIS — M51.36 DDD (DEGENERATIVE DISC DISEASE), LUMBAR: ICD-10-CM

## 2024-05-10 PROCEDURE — 99999 PR PBB SHADOW E&M-EST. PATIENT-LVL V: CPT | Mod: PBBFAC,,, | Performed by: NURSE PRACTITIONER

## 2024-05-10 PROCEDURE — 1159F MED LIST DOCD IN RCRD: CPT | Mod: CPTII,S$GLB,, | Performed by: NURSE PRACTITIONER

## 2024-05-10 PROCEDURE — 1160F RVW MEDS BY RX/DR IN RCRD: CPT | Mod: CPTII,S$GLB,, | Performed by: NURSE PRACTITIONER

## 2024-05-10 PROCEDURE — 99213 OFFICE O/P EST LOW 20 MIN: CPT | Mod: S$GLB,,, | Performed by: NURSE PRACTITIONER

## 2024-05-10 PROCEDURE — 3074F SYST BP LT 130 MM HG: CPT | Mod: CPTII,S$GLB,, | Performed by: NURSE PRACTITIONER

## 2024-05-10 PROCEDURE — 3008F BODY MASS INDEX DOCD: CPT | Mod: CPTII,S$GLB,, | Performed by: NURSE PRACTITIONER

## 2024-05-10 PROCEDURE — 3078F DIAST BP <80 MM HG: CPT | Mod: CPTII,S$GLB,, | Performed by: NURSE PRACTITIONER

## 2024-05-10 RX ORDER — GABAPENTIN 300 MG/1
300 CAPSULE ORAL 3 TIMES DAILY
Qty: 90 CAPSULE | Refills: 3 | Status: SHIPPED | OUTPATIENT
Start: 2024-05-10

## 2024-05-10 NOTE — PROGRESS NOTES
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  Service: Pain Management;  Laterality: N/A;    EPIDURAL STEROID INJECTION N/A 8/25/2022    Procedure: Injection, Steroid, Epidural C7/T1 CONTRAST;  Surgeon: Larry Brasher MD;  Location: [facility];  Service: Pain Management;  Laterality: N/A;    EPIDURAL STEROID INJECTION N/A 5/26/2023    Procedure: INJECTION, STEROID, EPIDURAL C7/T1 IL;  Surgeon: Larry Brasher MD;  Location: [facility];  Service: Pain Management;  Laterality: N/A;    EPIDURAL STEROID INJECTION N/A 10/13/2023    Procedure: INJECTION, STEROID, EPIDURAL, C7-T1;  Surgeon: Larry Brasher MD;  Location: [facility];  Service: Pain Management;  Laterality: N/A;    EPIDURAL STEROID INJECTION N/A 4/25/2024    Procedure: CERVICAL C7/T1 IL KYUNG *ASPIRIN OTC* HOLD FOR 5 DAYS;  Surgeon: Larry Brasher MD;  Location: [facility];  Service: Pain Management;  Laterality: N/A;  [phone]  2 WK F/U TASHA    INJECTION OF ANESTHETIC AGENT AROUND NERVE Bilateral 5/6/2022    Procedure: BLOCK, NERVE, BILATERAL L3-L4-*L5 MEDIAL BRANCH;  Surgeon: Larry Brasher MD;  Location: [facility];  Service: Pain Management;  Laterality: Bilateral;    INJECTION OF ANESTHETIC AGENT AROUND NERVE Bilateral 6/2/2022    Procedure: BLOCK, NERVE BILATERAL L3-L4-L5 MEDIAL BRANCH 2nd, needs consent;  Surgeon: Larry Brasher MD;  [facility];  Service: Pain Management;  Laterality:  Bilateral;    INJECTION, SACROILIAC JOINT Bilateral 6/9/2023    Procedure: INJECTION,SACROILIAC JOINT, BILATERAL SI;  Surgeon: Larry Brasher MD;  Location: [facility];  Service: Pain Management;  Laterality: Bilateral;    MANDIBLE SURGERY      reconstruction    ORCHIECTOMY Bilateral 11/8/2023    Procedure: ORCHIECTOMY;  Surgeon: Ronald Mcgrath MD;  Location: [facility] OR 2ND FLR;  Service: Urology;  Laterality: Bilateral;  1 hr    RADIOFREQUENCY ABLATION Right 6/23/2022    Procedure: RADIOFREQUENCY ABLATION RIGHT L3,L4,L5 1 of 2, consent needed;  Surgeon: Larry Brasher MD;  Location: [facility];  Service: Pain Management;  Laterality: Right;    RADIOFREQUENCY ABLATION Left 7/7/2022    Procedure: RADIOFREQUENCY ABLATION LEFT L3,L4,L5 2 of 2, needs consent;  Surgeon: Larry Brasher MD;  Location: [facility];  Service: Pain Management;  Laterality: Left;    RADIOFREQUENCY ABLATION Right 3/3/2023    Procedure: RADIOFREQUENCY ABLATION RIGHT L3,L4,L5;  Surgeon: Larry Brasher MD;  Location: [facility];  Service: Pain Management;  Laterality: Right;    RADIOFREQUENCY ABLATION Left 3/31/2023    Procedure: Radiofrequency Ablation Left L3, L4, & L5 Pending CBC results;  Surgeon: Diana Lira MD;  Location: [facility];  Service: Pain Management;  Laterality: Left;    RADIOFREQUENCY ABLATION Bilateral 3/8/2024    Procedure: RADIOFREQUENCY ABLATION BILATERAL L3, 4, 5;  Surgeon: Larry Brasher MD;  Location: [facility];  Service: Pain Management;  Laterality: Bilateral;  [phone]  4 WK F/U VERONIQUE    variceol repair         Family History:  Family History   Problem Relation Name Age of Onset    Heart disease Mother      Myasthenia gravis Mother      Myasthenia gravis Father      Heart disease Father      Hypertension Father      Hyperlipidemia Father      Heart disease Paternal Uncle         Social History:  Social History     Socioeconomic History    Marital status:    Occupational  History    Occupation: disable/   Tobacco Use    Smoking status: Never    Smokeless tobacco: Never   Substance and Sexual Activity    Alcohol use: No    Drug use: No    Sexual activity: Not Currently     Partners: Female   Social History Narrative    No stairs     Social Determinants of Health     Financial Resource Strain: Low Risk  (11/10/2023)    Overall Financial Resource Strain (CARDIA)     Difficulty of Paying Living Expenses: Not hard at all   Food Insecurity: No Food Insecurity (11/10/2023)    Hunger Vital Sign     Worried About Running Out of Food in the Last Year: Never true     Ran Out of Food in the Last Year: Never true   Transportation Needs: No Transportation Needs (11/10/2023)    PRAPARE - Transportation     Lack of Transportation (Medical): No     Lack of Transportation (Non-Medical): No   Physical Activity: Sufficiently Active (10/23/2022)    Received from [facility], [facility]    Exercise Vital Sign     Days of Exercise per Week: 5 days     Minutes of Exercise per Session: 60 min   Stress: Stress Concern Present (11/10/2023)    [employer] [place] [employer] [employer] - Occupational Stress Questionnaire     Feeling of Stress : Rather much   Housing Stability: Unknown (11/10/2023)    Housing Stability Vital Sign     Unable to Pay for Housing in the Last Year: No     Unstable Housing in the Last Year: No       OBJECTIVE:    Vitals:    05/10/24 1313   BP: 128/78   Pulse: 90   Resp: 18   Temp: 98.3 °F (36.8 °C)   SpO2: 98%   Weight: 59.7 kg (131 lb 9.8 oz)   PainSc:   5   PainLoc: Back        PHYSICAL EXAMINATION:    General appearance: Well appearing, in no acute distress.  Psych:  Mood and affect appropriate.  Skin: Skin color, texture, turgor normal, no rashes or lesions to visible areas.  Head/face:  Atraumatic, normocephalic. No palpable lymph nodes  Cor: No lower extremity edema.  Capillary refill <2 seconds.  Pulm: Symmetric chest rise. No apparent respiratory distress.  Neck:   There is mild pain with palpation over cervical paraspinals. Improved ROM with mild pain on extension.   Back: Straight leg raising in the sitting position is negative for radicular pain.   Extremities: No deformities, edema, or skin discoloration. Good capillary refill.  Musculoskeletal: 5/5 strength in right ankle with plantar and dorsiflexion. 4/5 strength in left ankle with plantar and dorsiflexion. 5/5 strength with right knee flexion and extension. 5/5 strength with left knee flexion and extension.   Neuro:  No loss of sensation is noted.  Gait: Antalgic- ambulates without assistance.     ASSESSMENT: 43 y.o. year old adult with neck and lower back pain, consistent with the followin. Chronic pain syndrome        2. Cervical radiculopathy        3. Cervical spondylosis        4. DDD (degenerative disc disease), cervical        5. Lumbar spondylosis        6. DDD (degenerative disc disease), lumbar        7. Lumbar radiculopathy            PLAN:     - Previous imaging reviewed today.    - She is s/p C7/T1 IL KYUNG with benefit. We can repeat this as needed.     - We can repeat bilateral L3,4,5 RFA as needed.     - Continue Gabapentin. Refill provided.     - I have stressed the importance of physical activity and a home exercise plan to help with pain and improve health.    - RTC PRN.     The above plan and management options were discussed at length with patient. Patient is in agreement with the above and verbalized understanding.    Maribel Bright NP   5/10/2024

## 2024-05-11 ENCOUNTER — HOSPITAL ENCOUNTER (OUTPATIENT)
Dept: RADIOLOGY | Facility: HOSPITAL | Age: 44
Discharge: HOME | End: 2024-05-11
Attending: PHYSICIAN ASSISTANT
Payer: MEDICARE

## 2024-05-11 DIAGNOSIS — M25.571 ACUTE RIGHT ANKLE PAIN: ICD-10-CM

## 2024-05-11 DIAGNOSIS — M25.571 PAIN IN JOINT INVOLVING RIGHT ANKLE AND FOOT: ICD-10-CM

## 2024-05-11 PROCEDURE — A9585 GADOBUTROL INJECTION: HCPCS | Performed by: PHYSICIAN ASSISTANT

## 2024-05-11 PROCEDURE — 73723 MRI JOINT LWR EXTR W/O&W/DYE: CPT | Mod: TC,RT

## 2024-05-11 PROCEDURE — 73723 MRI JOINT LWR EXTR W/O&W/DYE: CPT | Mod: 26,RT,, | Performed by: RADIOLOGY

## 2024-05-11 PROCEDURE — 25500020 PHARM REV CODE 255: Performed by: PHYSICIAN ASSISTANT

## 2024-05-11 RX ORDER — GADOBUTROL 604.72 MG/ML
6 INJECTION INTRAVENOUS
Status: COMPLETED | OUTPATIENT
Start: 2024-05-11 | End: 2024-05-11

## 2024-05-11 RX ADMIN — GADOBUTROL 6 ML: 604.72 INJECTION INTRAVENOUS at 01:05

## 2024-05-16 ENCOUNTER — TELEPHONE (OUTPATIENT)
Dept: ORTHOPEDICS | Facility: CLINIC | Age: 44
End: 2024-05-16
Payer: MEDICARE

## 2024-05-17 ENCOUNTER — CLINICAL SUPPORT (OUTPATIENT)
Dept: REHABILITATION | Facility: HOSPITAL | Age: 44
End: 2024-05-17
Payer: MEDICARE

## 2024-05-17 DIAGNOSIS — R29.898 ABNORMAL MUSCLE TONE: ICD-10-CM

## 2024-05-17 DIAGNOSIS — G25.9 FUNCTIONAL MOVEMENT DISORDER: ICD-10-CM

## 2024-05-17 DIAGNOSIS — R26.89 BALANCE PROBLEM: Primary | ICD-10-CM

## 2024-05-17 DIAGNOSIS — G24.9 DYSTONIA: ICD-10-CM

## 2024-05-17 DIAGNOSIS — R26.9 ABNORMALITY OF GAIT AND MOBILITY: ICD-10-CM

## 2024-05-17 PROCEDURE — 97161 PT EVAL LOW COMPLEX 20 MIN: CPT | Mod: PO
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
## 2024-05-20 ENCOUNTER — CLINICAL SUPPORT (OUTPATIENT)
Dept: REHABILITATION | Facility: HOSPITAL | Age: 44
End: 2024-05-20
Payer: MEDICARE

## 2024-05-20 DIAGNOSIS — R26.89 BALANCE PROBLEM: Primary | ICD-10-CM

## 2024-05-20 DIAGNOSIS — R29.898 ABNORMAL MUSCLE TONE: ICD-10-CM

## 2024-05-20 PROCEDURE — 97533 SENSORY INTEGRATION: CPT | Mod: PO

## 2024-05-20 PROCEDURE — 97110 THERAPEUTIC EXERCISES: CPT | Mod: PO

## 2024-05-20 PROCEDURE — 97530 THERAPEUTIC ACTIVITIES: CPT | Mod: PO

## 2024-05-20 NOTE — PROGRESS NOTES
[facility]   Physical Therapy Treatment Note      Name: Gordon Griffin  [facility] Number: [id number]    Therapy Diagnosis:   Encounter Diagnoses   Name Primary?    Balance problem Yes    Abnormal muscle tone      Physician: Ilene Smalls MD    Visit Date: 5/20/2024    Physician Orders: PT Eval and Treat   Medical Diagnosis from Referral:   G25.9 (ICD-10-CM) - Functional movement disorder   G24.9 (ICD-10-CM) - Dystonia   R26.9 (ICD-10-CM) - Abnormality of gait and mobility      Evaluation Date: 5/17/2024  Authorization Period Expiration: 12/31/2024  Plan of Care Expiration: 7/12/24  Progress Note Due: 6/17/24  Visit # / Visits authorized: 1/ 20 (+eval)  FOTO: 1/3     Precautions: Standard and Fall  Patient's preferred pronouns: She/her      Time In: 11:58  Time Out: 12:41  Total Billable Time: 43 minutes    PTA Visit #: 0/5       Subjective     Patient reports: she is doing alright but has been up since 2 am. They found a cyst in her right ankle 3 weeks ago and was in a boot.   She was not yet given a home exercise program.  Response to previous treatment: eval only  Functional change: fluctuating     Pain: 7/10  Location: right ankles     Objective      Objective Measures updated at progress report unless specified.     Self-Efficacy For Exercise (SEE) Scale: 26/90  --> higher score indicates higher self-efficacy for exercise    General Self-Efficacy Scale (GSE): 26/40   --> higher score indicating more self-efficacy    Sleep Hygiene Index (DELON): 35/52  --> lower score indicating better sleep hygiene    Lower Extremity Function Scale (LEFS): 47/80 = 58.75%  --> the lower the score, the greater the disability     Treatment     Dylon received the treatments listed below:      therapeutic exercises to develop strength, endurance, ROM, and flexibility for 10 minutes including:  Upright bike on L5 for x10 minutes     neuromuscular re-education activities to improve: Balance, Coordination, and  "Proprioception for 00 minutes. The following activities were included:  NP    sensory integrative techniques to improve sensory processing for 13 minutes including:     Mirror therapy: patient performed the following activities with mirror in between legs and focusing on movement of left foot while imagining right foot in mirror  - active range of motion of plantarflexion trying to achieve flexion at metatarsals   - active range of motion of ankle inversion/eversion   --> PT utilizing patient interest of cooking as talking point to reduce attending to task     Active range of motion of bilateral DF with feet placed on textured side of lian-disc  --> PT utilizing music to focus on to reduce internal cueing/decrease focus on movement of ankles    therapeutic activities to improve functional performance for 15 minutes, including:    Objective measures (questionnaires as above)   Review of FMD education and need to prioritize lifestyle changes to reduce stress     gait training to improve functional mobility and safety for 5 minutes, including:    Patient ambulated x300 ft without boots on to allow for visualizing of ankle. PT taped a cup onto top of foot and asked patient to imagine if the cup was full and then "don't spill the cup." Patient working on achieving more normal movement of R foot into dorsiflexion and allowing for heel strike at IC.       Patient Education and Home Exercises       Education provided:   - went through [employer] PT FMD workbook focusing on emotional well-being, nutrition, stress reduction     Written Home Exercises Provided: will provided at later date; home exercise program focused on breathing and stress management     Assessment     Dylon did well with her first follow-up session with a focus on understanding her diagnosis and learning about current triggers. It appears that a great deal of her flare-ups are related to stress and lifestyle with high autonomic component (as noted by above " questionnaires). PT edu pt on decreasing stressors and using mindfulness techniques to work on reducing sympathetic activation and to allow for a safer environment to work on motor control. When given an external cue of using a cup on the foot, she was able to achieve appropriate heel strike at IC with better DF during swing limb advancement on the right lower extremity. In addition, she responded well to mirror therapy for increased control over ankle movement. She remains appropriate for outpatient neuro PT at this time to maximize self-efficacy and improve overall movement with functional mobility tasks.     Dylon Is progressing well towards her goals.   Patient prognosis is Good.     Patient will continue to benefit from skilled outpatient physical therapy to address the deficits listed in the problem list box on initial evaluation, provide pt/family education and to maximize pt's level of independence in the home and community environment.     Patient's spiritual, cultural and educational needs considered and pt agreeable to plan of care and goals.     Anticipated barriers to physical therapy: chronicity of condition     Goals:   Short Term Goals: 4 weeks   Pt will be issued first installment of HEP and report at least partial compliance. Ongoing   Pt will increase R LE SLS to 9 seconds or > for improved overall balance and ankle stability. Ongoing   Pt will increase score on condition # 4 of the KIMBERLY to 20 seconds to demonstrate improved balance in tight spaces with limited vision. Ongoing   Pt will increase strength grade for R ankle plantarflexion to 4+/5 for improved stability during ambulation and exercise. Ongoing      Long Term Goals: 8 weeks   Pt will be at least partially compliant with finalized HEP to help maintain potential gains realized in PT. Ongoing   Pt will increase FOTO intake score to 60 % for improved self-perception of deficits. Ongoing   Pt will increase R LE SLS to 12 seconds or > for  improved overall balance and ankle stability. Ongoing   Pt will increase score on condition # 4 of the KIMBERLY to 25 seconds to demonstrate improved balance in tight spaces with limited vision. Ongoing   Pt will demonstrate at least 5 degree increase in B hamstring flexibility( as measured by SLR) to aid in decreasing back pain and perhaps allow more normal gait pattern with respect to R ankle/foot. Ongoing     Plan     Continue PT plan of care with a focus on autonomic nervous system management, motor control, and sensory reintegration      Lynda David, PT
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
## 2024-05-24 ENCOUNTER — CLINICAL SUPPORT (OUTPATIENT)
Dept: REHABILITATION | Facility: HOSPITAL | Age: 44
End: 2024-05-24
Payer: MEDICARE

## 2024-05-24 DIAGNOSIS — R26.89 BALANCE PROBLEM: Primary | ICD-10-CM

## 2024-05-24 DIAGNOSIS — R29.898 ABNORMAL MUSCLE TONE: ICD-10-CM

## 2024-05-24 PROCEDURE — 97112 NEUROMUSCULAR REEDUCATION: CPT | Mod: PO,CQ

## 2024-05-24 PROCEDURE — 97110 THERAPEUTIC EXERCISES: CPT | Mod: PO,CQ

## 2024-05-24 PROCEDURE — 97533 SENSORY INTEGRATION: CPT | Mod: PO,CQ

## 2024-05-24 NOTE — PROGRESS NOTES
"[facility]   Physical Therapy Treatment Note      Name: Gordon Griffin  [facility] Number: [id number]    Therapy Diagnosis:   Encounter Diagnoses   Name Primary?    Balance problem Yes    Abnormal muscle tone      Physician: Ilene Smalls MD    Visit Date: 5/24/2024    Physician Orders: PT Eval and Treat   Medical Diagnosis from Referral:   G25.9 (ICD-10-CM) - Functional movement disorder   G24.9 (ICD-10-CM) - Dystonia   R26.9 (ICD-10-CM) - Abnormality of gait and mobility      Evaluation Date: 5/17/2024  Authorization Period Expiration: 12/31/2024  Plan of Care Expiration: 7/12/24  Progress Note Due: 6/17/24  Visit # / Visits authorized: 2/ 20 (+eval)  FOTO: 1/3     Precautions: Standard and Fall  Patient's preferred pronouns: She/her      Time In: 8:40  Time Out: 9:25  Total Billable Time: 45 minutes    PTA Visit #: 1/5       Subjective     Patient reports: " I'm doing ok."   She was not yet given a home exercise program.  Response to previous treatment: fatigue  Functional change: fluctuating     Pain: 8/10  Location: right ankles     Objective      Objective Measures updated at progress report unless specified.       Treatment     Dylon received the treatments listed below:      therapeutic exercises to develop strength, endurance, ROM, and flexibility for 10 minutes including:  Upright bike on L5 for x10 minutes     neuromuscular re-education activities to improve: Balance, Coordination, and Proprioception for 17 minutes. The following activities were included:  Near QED | [employer] bar: CGA  [place]: Shoes removed  X 30 sec of static standing with no UE support and eyes opened  2 X 30 sec of static standing with no UE support and eyes closed  X 30 sec of standing with head turns right to left, no UE support  X 30 sec of standing with head nods, no UE support  X 60 sec of medial/lateral weight shifting with no UE support  X 60 sec of anterior/posterior weight shifting with no UE support  X 60 " "sec of marching in place with occ 1 finger support  2 x 30 sec each of B LE single leg stance with unilateral support, occ 1 UE support    sensory integrative techniques to improve sensory processing for 15 minutes including:     Mirror therapy: patient performed the following activities with mirror in between legs and focusing on movement of left foot while imagining right foot in mirror  - active range of motion of plantarflexion trying to achieve flexion at metatarsals - 2 mins  - active range of motion of ankle inversion/eversion - 2 min       Active range of motion of bilateral DF with feet placed on textured side of lian-disc - 2 mins  --> PT utilizing music to focus on to reduce internal cueing/decrease focus on movement of ankles    therapeutic activities to improve functional performance for 0 minutes, including:        gait training to improve functional mobility and safety for 0 minutes, including:    Patient ambulated x300 ft without boots on to allow for visualizing of ankle. PT taped a cup onto top of foot and asked patient to imagine if the cup was full and then "don't spill the cup." Patient working on achieving more normal movement of R foot into dorsiflexion and allowing for heel strike at IC.       Patient Education and Home Exercises       Education provided:   - went through [employer] PT FMD workbook focusing on emotional well-being, nutrition, stress reduction     Written Home Exercises Provided: will provided at later date; home exercise program focused on breathing and stress management     Assessment     Dylon tolerated her tx session well and did not have any problems noted.  Dylon focused on mirror therapy and Range of motion for her ankles again and balance with no UE support.  Dylon performed most activities with shoes removed on the [place].     Dylon Is progressing well towards her goals.   Patient prognosis is Good.     Patient will continue to benefit from skilled outpatient physical " therapy to address the deficits listed in the problem list box on initial evaluation, provide pt/family education and to maximize pt's level of independence in the home and community environment.     Patient's spiritual, cultural and educational needs considered and pt agreeable to plan of care and goals.     Anticipated barriers to physical therapy: chronicity of condition     Goals:   Short Term Goals: 4 weeks   Pt will be issued first installment of HEP and report at least partial compliance. Ongoing   Pt will increase R LE SLS to 9 seconds or > for improved overall balance and ankle stability. Ongoing   Pt will increase score on condition # 4 of the KIMBERLY to 20 seconds to demonstrate improved balance in tight spaces with limited vision. Ongoing   Pt will increase strength grade for R ankle plantarflexion to 4+/5 for improved stability during ambulation and exercise. Ongoing      Long Term Goals: 8 weeks   Pt will be at least partially compliant with finalized HEP to help maintain potential gains realized in PT. Ongoing   Pt will increase FOTO intake score to 60 % for improved self-perception of deficits. Ongoing   Pt will increase R LE SLS to 12 seconds or > for improved overall balance and ankle stability. Ongoing   Pt will increase score on condition # 4 of the KIMBERLY to 25 seconds to demonstrate improved balance in tight spaces with limited vision. Ongoing   Pt will demonstrate at least 5 degree increase in B hamstring flexibility( as measured by SLR) to aid in decreasing back pain and perhaps allow more normal gait pattern with respect to R ankle/foot. Ongoing     Plan     Continue PT plan of care with a focus on autonomic nervous system management, motor control, and sensory reintegration      Lisa Rowe, PTA

## 2024-05-27 ENCOUNTER — OFFICE VISIT (OUTPATIENT)
Dept: ORTHOPEDICS | Facility: CLINIC | Age: 44
End: 2024-05-27
Payer: MEDICARE

## 2024-05-27 ENCOUNTER — TELEPHONE (OUTPATIENT)
Dept: DERMATOLOGY | Facility: CLINIC | Age: 44
End: 2024-05-27
Payer: MEDICARE

## 2024-05-27 DIAGNOSIS — M25.571 PAIN IN JOINT INVOLVING RIGHT ANKLE AND FOOT: Primary | ICD-10-CM

## 2024-05-27 PROCEDURE — 1160F RVW MEDS BY RX/DR IN RCRD: CPT | Mod: CPTII,S$GLB,, | Performed by: PHYSICIAN ASSISTANT

## 2024-05-27 PROCEDURE — 99999 PR PBB SHADOW E&M-EST. PATIENT-LVL III: CPT | Mod: PBBFAC,,, | Performed by: PHYSICIAN ASSISTANT

## 2024-05-27 PROCEDURE — 1159F MED LIST DOCD IN RCRD: CPT | Mod: CPTII,S$GLB,, | Performed by: PHYSICIAN ASSISTANT

## 2024-05-27 PROCEDURE — 99213 OFFICE O/P EST LOW 20 MIN: CPT | Mod: S$GLB,,, | Performed by: PHYSICIAN ASSISTANT

## 2024-05-27 NOTE — PROGRESS NOTES
Chief Complaint: right ankle pain      HISTORY:  Gordon Griffin is a 43 y.o. adult who returns to me today for follow up of right ankle pain.  Had steroid flare from injection, but this improved.    Overall she is better, but still has diffuse achy pain- worse with activity.   Wearing combat boot, which helps support the ankle.  Swelling has resolved.      PMH/PSH/FamHx/SocHx:    Unchanged from prior visit.    ROS:  Constitution: Negative for chills, fever and weakness.   Respiratory: Negative for cough and shortness of breath.   Musculoskeletal: Positive for right ankle pain  Psychiatric/Behavioral: The patient is not nervous/anxious.       PHYSICAL EXAM:   Right ankle  Skin intact  No warmth or effusion  FROM  No TTP  5/5 strength testing    MRI right ankle:  No findings to suggest septic arthritis or inflammatory arthropathy.     ASSESSMENT/PLAN:    Diagnoses and all orders for this visit:    Pain in joint involving right ankle and foot    - Continue PT  - Bracing- bioskin brace given today  - Discussed MRI with Dr. Banda- no surgical intervention  - Follow up if symptoms worsen or fail to improve

## 2024-05-27 NOTE — TELEPHONE ENCOUNTER
I left a message stating the purpose of my call is to respond to a message in my in-basket that Mrs. Osborne would like an sooner appointment.  I suggested calling the clinic back or sending the clinic a message with her desired day of the week and time frame she would like to be seen.

## 2024-06-04 ENCOUNTER — DOCUMENTATION ONLY (OUTPATIENT)
Dept: REHABILITATION | Facility: HOSPITAL | Age: 44
End: 2024-06-04
Payer: MEDICARE

## 2024-06-04 NOTE — PROGRESS NOTES
PT/PTA met face to face to discuss pt's treatment plan and progress towards established goals.  Continue with current PT POC with focus on ankle strengthening, endurance and balance.  Patient will be seen by physical therapist at least every sixth treatment or 30 days, whichever occurs first.    Lisa Rowe, PTA  06/04/2024

## 2024-06-05 ENCOUNTER — CLINICAL SUPPORT (OUTPATIENT)
Dept: REHABILITATION | Facility: HOSPITAL | Age: 44
End: 2024-06-05
Payer: MEDICARE

## 2024-06-05 DIAGNOSIS — R26.89 BALANCE PROBLEM: Primary | ICD-10-CM

## 2024-06-05 DIAGNOSIS — R29.898 ABNORMAL MUSCLE TONE: ICD-10-CM

## 2024-06-05 PROCEDURE — 97112 NEUROMUSCULAR REEDUCATION: CPT | Mod: PO,CQ

## 2024-06-05 PROCEDURE — 97533 SENSORY INTEGRATION: CPT | Mod: PO,CQ

## 2024-06-05 PROCEDURE — 97110 THERAPEUTIC EXERCISES: CPT | Mod: PO,CQ

## 2024-06-05 NOTE — PROGRESS NOTES
"[facility]   Physical Therapy Treatment Note      Name: Gordon Griffin  [facility] Number: [id number]    Therapy Diagnosis:   Encounter Diagnoses   Name Primary?    Balance problem Yes    Abnormal muscle tone      Physician: Ilene Smalls MD    Visit Date: 6/5/2024    Physician Orders: PT Eval and Treat   Medical Diagnosis from Referral:   G25.9 (ICD-10-CM) - Functional movement disorder   G24.9 (ICD-10-CM) - Dystonia   R26.9 (ICD-10-CM) - Abnormality of gait and mobility      Evaluation Date: 5/17/2024  Authorization Period Expiration: 12/31/2024  Plan of Care Expiration: 7/12/24  Progress Note Due: 6/17/24  Visit # / Visits authorized: 3/ 20 (+eval)  FOTO: 1/3     Precautions: Standard and Fall  Patient's preferred pronouns: She/her      Time In: 1530  Time Out: 1615  Total Billable Time: 45 minutes    PTA Visit #: 2/5       Subjective     Patient reports: " I'm doing ok, I feel like the ankles and balance have gotten better since starting therapy"  She was not yet given a home exercise program.  Response to previous treatment: fatigue  Functional change: ankles are feeling better    Pain: 6/10  Location: both ankles     Objective      Objective Measures updated at progress report unless specified.       Treatment     Dylon received the treatments listed below:      therapeutic exercises to develop strength, endurance, ROM, and flexibility for 10 minutes including:  Recumbent bike, multi peak hills setting on L5 for x10 minutes     neuromuscular re-education activities to improve: Balance, Coordination, and Proprioception for 20 minutes. The following activities were included:  Near ballet bar: CGA    2 [employer] balance beams in front of each other:   X 8 laps of forward tandem ambulation with occ 1 finger support  X 8 laps of side stepping with occ 1 finger support  X 8 laps of forward marching with 3 sec hold, occ 1 finger support    Small Bosu/ Flat side up: CGA  4 x 30 sec ech of AKUA DUNN " single leg stance with occ toe tap for safety.  Occ 1 finger support.        sensory integrative techniques to improve sensory processing for 15 minutes including:     Mirror therapy: patient performed the following activities with mirror in between legs and focusing on movement of left foot while imagining right foot in mirror  - active range of motion of plantarflexion trying to achieve flexion at metatarsals - 2 mins  - active range of motion of ankle inversion/eversion - 2 min       Active range of motion of bilateral DF with feet placed on textured side of lian-disc - 2 mins  --> PT utilizing music to focus on to reduce internal cueing/decrease focus on movement of ankles    therapeutic activities to improve functional performance for 0 minutes, including:        gait training to improve functional mobility and safety for 0 minutes, including:      Patient Education and Home Exercises       Education provided:   - went through [employer] PT FMD workbook focusing on emotional well-being, nutrition, stress reduction     Written Home Exercises Provided: will provided at later date; home exercise program focused on breathing and stress management     Assessment     Dylon tolerated her tx session well and did not have any problems noted.  Dylon was able to perform single leg stance on the flat side of the Bosu today with little to no support and no loss of balance.  Dylon reports having better balance and ankle stability since starting therapy.  Cont with plan of care.     Dylon Is progressing well towards her goals.   Patient prognosis is Good.     Patient will continue to benefit from skilled outpatient physical therapy to address the deficits listed in the problem list box on initial evaluation, provide pt/family education and to maximize pt's level of independence in the home and community environment.     Patient's spiritual, cultural and educational needs considered and pt agreeable to plan of care and goals.      Anticipated barriers to physical therapy: chronicity of condition     Goals:   Short Term Goals: 4 weeks   Pt will be issued first installment of HEP and report at least partial compliance. Ongoing   Pt will increase R LE SLS to 9 seconds or > for improved overall balance and ankle stability. Ongoing   Pt will increase score on condition # 4 of the KIMBERLY to 20 seconds to demonstrate improved balance in tight spaces with limited vision. Ongoing   Pt will increase strength grade for R ankle plantarflexion to 4+/5 for improved stability during ambulation and exercise. Ongoing      Long Term Goals: 8 weeks   Pt will be at least partially compliant with finalized HEP to help maintain potential gains realized in PT. Ongoing   Pt will increase FOTO intake score to 60 % for improved self-perception of deficits. Ongoing   Pt will increase R LE SLS to 12 seconds or > for improved overall balance and ankle stability. Ongoing   Pt will increase score on condition # 4 of the KIMBERLY to 25 seconds to demonstrate improved balance in tight spaces with limited vision. Ongoing   Pt will demonstrate at least 5 degree increase in B hamstring flexibility( as measured by SLR) to aid in decreasing back pain and perhaps allow more normal gait pattern with respect to R ankle/foot. Ongoing     Plan     Continue PT plan of care with a focus on autonomic nervous system management, motor control, and sensory reintegration      Lisa Rowe, PTA

## 2024-06-07 ENCOUNTER — CLINICAL SUPPORT (OUTPATIENT)
Dept: REHABILITATION | Facility: HOSPITAL | Age: 44
End: 2024-06-07
Payer: MEDICARE

## 2024-06-07 DIAGNOSIS — R29.898 ABNORMAL MUSCLE TONE: ICD-10-CM

## 2024-06-07 DIAGNOSIS — R26.89 BALANCE PROBLEM: Primary | ICD-10-CM

## 2024-06-07 PROCEDURE — 97533 SENSORY INTEGRATION: CPT | Mod: PO,CQ

## 2024-06-07 PROCEDURE — 97110 THERAPEUTIC EXERCISES: CPT | Mod: PO,CQ

## 2024-06-07 PROCEDURE — 97112 NEUROMUSCULAR REEDUCATION: CPT | Mod: PO,CQ

## 2024-06-07 NOTE — PROGRESS NOTES
"[facility]   Physical Therapy Treatment Note      Name: Gordon Griffin  [facility] Number: [id number]    Therapy Diagnosis:   Encounter Diagnoses   Name Primary?    Balance problem Yes    Abnormal muscle tone      Physician: Ilene Smalls MD    Visit Date: 6/7/2024    Physician Orders: PT Eval and Treat   Medical Diagnosis from Referral:   G25.9 (ICD-10-CM) - Functional movement disorder   G24.9 (ICD-10-CM) - Dystonia   R26.9 (ICD-10-CM) - Abnormality of gait and mobility      Evaluation Date: 5/17/2024  Authorization Period Expiration: 12/31/2024  Plan of Care Expiration: 7/12/24  Progress Note Due: 6/17/24  Visit # / Visits authorized: 3/ 20 (+eval)  FOTO: 1/3     Precautions: Standard and Fall  Patient's preferred pronouns: She/her      Time In: 1530  Time Out: 1615  Total Billable Time: 45 minutes    PTA Visit #: 2/5       Subjective     Patient reports: " I'm doing ok, I feel like the ankles and balance have gotten better since starting therapy"  She was not yet given a home exercise program.  Response to previous treatment: fatigue  Functional change: ankles are feeling better    Pain: 8/10  Location: both ankles     Objective      Objective Measures updated at progress report unless specified.       Treatment     Dylon received the treatments listed below:      therapeutic exercises to develop strength, endurance, ROM, and flexibility for 10 minutes including:  Recumbent Sci Fit stepper, twin  peak hills setting on L5 for x10 minutes     neuromuscular re-education activities to improve: Balance, Coordination, and Proprioception for 20 minutes. The following activities were included:  Near ballet bar: CGA    1 point foam fitter board: CGA  2 x 60 sec of medial/lateral weight shifting with 1 finger support  2 x 60 sec of anterior/posterior weight shifting with 1 finger support    2 point wooden fitter board: CGA  2 x 60 sec of anterior/posterior weight shifting with 1 finger support " first trial and no UE support the next trial  2 x 60 sec of avoiding moving anterior/posterior weight shifting with occ 1 finger support  2 x 60 sec of medial/lateral weight shifting with 1 finger support first trial and no UE support the next trial.  2 x 60 sec of avoiding moving M/L with 1 finger support    2 x 30 sec each of B LE single leg stance with occ 1 UE support. Able to hold phone while in single leg stance with no LOB    sensory integrative techniques to improve sensory processing for 15 minutes including:     Mirror therapy: patient performed the following activities with mirror in between legs and focusing on movement of left foot while imagining right foot in mirror  - active range of motion of plantarflexion trying to achieve flexion at metatarsals - 2 mins  - active range of motion of ankle inversion/eversion - 2 min       Active range of motion of bilateral DF with feet placed on textured side of lian-disc - 2 mins  --> PT utilizing music to focus on to reduce internal cueing/decrease focus on movement of ankles    therapeutic activities to improve functional performance for 0 minutes, including:        gait training to improve functional mobility and safety for 0 minutes, including:      Patient Education and Home Exercises       Education provided:   - went through [employer] PT FMD workbook focusing on emotional well-being, nutrition, stress reduction     Written Home Exercises Provided: will provided at later date; home exercise program focused on breathing and stress management     Assessment     Dylon tolerated her tx session well and did not have any problems noted.  Dylon cont with ankle exercises and balance activities and required 1 finger support to no UE support for all balance activities.  Dylon is improving with her single leg stance.   Cont with plan of care.     Dylon Is progressing well towards her goals.   Patient prognosis is Good.     Patient will continue to benefit from skilled  outpatient physical therapy to address the deficits listed in the problem list box on initial evaluation, provide pt/family education and to maximize pt's level of independence in the home and community environment.     Patient's spiritual, cultural and educational needs considered and pt agreeable to plan of care and goals.     Anticipated barriers to physical therapy: chronicity of condition     Goals:   Short Term Goals: 4 weeks   Pt will be issued first installment of HEP and report at least partial compliance. Ongoing   Pt will increase R LE SLS to 9 seconds or > for improved overall balance and ankle stability. Ongoing   Pt will increase score on condition # 4 of the KIMBERLY to 20 seconds to demonstrate improved balance in tight spaces with limited vision. Ongoing   Pt will increase strength grade for R ankle plantarflexion to 4+/5 for improved stability during ambulation and exercise. Ongoing      Long Term Goals: 8 weeks   Pt will be at least partially compliant with finalized HEP to help maintain potential gains realized in PT. Ongoing   Pt will increase FOTO intake score to 60 % for improved self-perception of deficits. Ongoing   Pt will increase R LE SLS to 12 seconds or > for improved overall balance and ankle stability. Ongoing   Pt will increase score on condition # 4 of the KIMBERLY to 25 seconds to demonstrate improved balance in tight spaces with limited vision. Ongoing   Pt will demonstrate at least 5 degree increase in B hamstring flexibility( as measured by SLR) to aid in decreasing back pain and perhaps allow more normal gait pattern with respect to R ankle/foot. Ongoing     Plan     Continue PT plan of care with a focus on autonomic nervous system management, motor control, and sensory reintegration      Lisa Rowe, PTA

## 2024-06-10 NOTE — PROGRESS NOTES
"[facility]   Physical Therapy Treatment and Progress Note      Name: Gordon Griffin  Clinic Number: [id number]    Therapy Diagnosis:   Encounter Diagnoses   Name Primary?    Balance problem Yes    Abnormal muscle tone        Physician: Ilene Smalls MD    Visit Date: 6/11/2024    Physician Orders: PT Eval and Treat   Medical Diagnosis from Referral:   G25.9 (ICD-10-CM) - Functional movement disorder   G24.9 (ICD-10-CM) - Dystonia   R26.9 (ICD-10-CM) - Abnormality of gait and mobility      Evaluation Date: 5/17/2024  Authorization Period Expiration: 12/31/2024  Plan of Care Expiration: 7/12/24  Progress Note Due: 6/17/24  Visit # / Visits authorized: 5/ 20 (+eval)~ KX modifier  FOTO: 2/3     Precautions: Standard and Fall  Patient's preferred pronouns: She/her      Time In: 0845   Time Out: 0930   Total Billable Time: 45  minutes    PTA Visit #: 0/5       Subjective     Patient reports: She is not doing well. She spent the night at the hospital with her ex-wife's grandfather( who fell and broke his hip). " I'm having trouble with my foot."  She was provided with a home exercise program today.  Response to previous treatment: no adverse effects  Functional change: ankles are feeling better since starting therapy    Pain: 6/10  Location: R ankle and knee     Objective      Objective Measures updated on 6/11/24. See below:    Lower Extremity Strength~ tested in sitting in chair  Right LE eval  6/11/24 Left LE eval  6/11/24   Hip flexion:  5/5 4+/5 Hip flexion: 5/5 5/5   Knee extension: 5/5 5/5 Knee extension: 4+/5 4+/5   Knee flexion: 5/5 5/5 Knee flexion: 4+/5 5/5   Ankle dorsiflexion:  5/5 5/5 Ankle dorsiflexion: 5/5 5/5   Ankle plantarflexion:  4/5 4+/5 Ankle plantarflexion: 4+/5 4+/5   Hip abduction: 5/5 5/5 Hip abduction: 5/5 5/5   Hip adduction: 4/5 4+/5 Hip adduction 4/5 4+/5   Hip extension: 4/5 4+/5 Hip extension: 5/5 4/5         Evaluation 6/11/24   Single Limb Stance R LE 5.5 " "seconds~ average of 2 trials  (<10 sec = HIGH FALL RISK) 13.5 seconds~ average of 2 trials  (<10 sec = HIGH FALL RISK)   Single Limb Stance L LE 15 seconds  (<10 sec = HIGH FALL RISK) 15.5 seconds~ average of 2 trials  (<10 sec = HIGH FALL RISK)   30 second Chair Rise NT NT   5 times sit to stand 8 seconds NT   TUG NT NT   Self selected walking speed 0.86 m/sec (6m/7s) 1.0 m/sec (6m/6s)   Fast walking speed NT 1.2 m/sec (6m/5s)     Eval  Postural control:  KIMBERLY SENSORY TESTING:  (P= Pass, F= Fail; note any sway; hold each position for 30")  Condition 1: (firm surface/feet together/eyes open) 30", P  Condition 2: (firm surface/feet together/eyes closed) 30", P  Condition 3: (firm surface/feet in tandem/eyes open) 30", P~ R foot in rear  Condition 4: (firm surface/feet in tandem/eyes closed) 16", F~ R foot in rear  Condition 5: (soft surface/feet together/eyes open) 30"  Condition 6: (soft surface/feet together/eyes closed) 30"  Condition 7: (Fakuda step test), measure distance varied from center starting position NT    6/11/24  Postural control:  KIMBERLY SENSORY TESTING:  (P= Pass, F= Fail; note any sway; hold each position for 30")  Condition 1: (firm surface/feet together/eyes open) 30", P  Condition 2: (firm surface/feet together/eyes closed) 30", P  Condition 3: (firm surface/feet in tandem/eyes open) 30", P~ R foot in rear  Condition 4: (firm surface/feet in tandem/eyes closed) 22", F~ R foot in rear  Condition 5: (soft surface/feet together/eyes open) 30", P  Condition 6: (soft surface/feet together/eyes closed) 30"  Condition 7: (Fakuda step test), measure distance varied from center starting position NT      Functional Gait Assessment:   1. Gait on level surface =  2~ 6 seconds to travel 6 m              (3) Normal: less than 5.5 sec, no A.D., no imbalance, normal gait pattern, deviates< 6in              (2) Mild impairment: 7-5.6 sec, uses A.D., mild gait deviations, or deviates 6-10 in              (1) " Moderate impairment: > 7 sec, slow speed, imbalance, deviates 10-15 in.              (0) Severe impairment: needs assist, deviates >15 in, reach/touch wall  2. Change in Gait Speed = 3              (3) Normal: smooth change w/o loss of balance or gait deviation, deviates < 6 in, significant difference between speeds              (2) Mild impairment: changes speed, but demonstrates mild gait deviations, deviates 6-10 in, OR no deviations but unable to significantly speed, OR uses A.D.              (1) Moderate impairment: minor changes to speed, OR changes speed w/ significant deviations, deviates 10-15 in, OR  Changes speed , but loses balance & recovers              (0) Severe impairment: cannot change speed, deviates >15 in, or loses balance & needs assist  3. Gait with horizontal head turns  = 2              (3) Normal: no change in gait, deviates <6 in              (2) Mild impairment: slight change in speed, deviates 6-10 in, OR uses A.D.              (1) Moderate impairment: moderate change in speed, deviates 10-15 in              (0) Severe impairment: severe disruption of gait, deviates >15in  4. Gait with vertical head turns = 2              (3) Normal: no change in gait, deviates <6 in              (2) Mild impairment: slight change in speed, deviates 6-10 in OR uses A.D.              (1) Moderate impairment: moderate change in speed, deviates 10-15 in              (0) Severe impairment: severe disruption of gait, deviates >15 in  5. Gait with pivot turns = 3              (3) Normal: performs safely in 3 sec, no LOB              (2) Mild impairment: performs in >3 sec & no LOB, OR turns safely & requires several steps to regain LOB              (1) Moderate impairment: turns slow, OR requires several small steps for balance following turn & stop              (0) Severe impairment: cannot turn safely, needs assist  6. Step over obstacle = 3              (3) Normal: steps over 2 stacked boxes w/o change in  speed or LOB              (2) Mild impairment: able to step over 1 box w/o change in speed or LOB              (1) Moderate impairment: steps over 1 box but must slow down, may require VC              (0) Severe impairment: cannot perform w/o assist  7. Gait with Narrow DOROTHY = 3              (3) Normal: 10 steps no staggering              (2) Mild impairment: 7-9 steps              (1) Moderate impairment: 4-7 steps              (0) Severe impairment: < 4 steps or cannot perform w/o assist  8. Gait with eyes closed = 2              (3) Normal: < 7 sec, no A.D., no LOB, normal gait pattern, deviates <6 in              (2) Mild impairment: 7.1-9 sec, mild gait deviations, deviates 6-10 in              (1) Moderate impairment: > 9 sec, abnormal pattern, LOB, deviates 10-15 in              (0) Severe impairment: cannot perform w/o assist, LOB, deviates >15in  9. Ambulating Backwards = 2              (3) Normal: no A.D., no LOB, normal gait pattern, deviates <6in              (2) Mild impairment: uses A.D., slower speed, mild gait deviations, deviates 6-10 in              (1) Moderate impairment: slow speed, abnormal gait pattern, LOB, deviates 10-15 in              (0) Severe impairment: severe gait deviations or LOB, deviates >15in  10. Steps = 1              (3) Normal: alternating feet, no rail              (2) Mild Impairment: alternating feet, uses rail              (1) Moderate impairment: step-to, uses rail              (0) Severe impairment: cannot perform safely     Score 28/30 at evaluation; 23/30 on 6/11/24      Score:   <22/30 fall risk   <20/30 fall risk in older adults   <18/30 fall risk in Parkinsons              Treatment     Dylon received the treatments listed below:      therapeutic exercises to develop strength, endurance, ROM, and flexibility for 25 minutes including:  (Includes time for LE MMT and completion of FOTO survey)    Upright bike on L5 x 5 minutes     Pt instructed in and performs the  "following exercises for home program:    2 x 30" B SLS at counter, no UE support    2 x 15 reps B heel raises, no UE support    2 x 30" seated B hamstring stretches~ pt places leg on mat with other LE on floor    neuromuscular re-education activities to improve: Balance, Coordination, and Proprioception for 20 minutes. The following activities were included:    (Includes time for objective testing as listed above)      sensory integrative techniques to improve sensory processing for 0 minutes including:       therapeutic activities to improve functional performance for 0 minutes, including:        gait training to improve functional mobility and safety for 0 minutes, including:      Patient Education and Home Exercises       Education provided:   - went through [employer] PT FMD workbook focusing on emotional well-being, nutrition, stress reduction   -6/11/24: PT provides 3 exercises for home. Test results reviewed.    Written Home Exercises Provided: will provided at later date; home exercise program focused on breathing and stress management     Assessment     Dylon tolerated her session well and was reassessed for progress today. This comes a bit early, as pt's next few sessions are scheduled with a PTA. She had a rough night as she was called to sit with her ex-wife's grandfather after her broke his hip. Pt's performance dipped most during FGA testing. She scored a 28/30 on the evaluation but was only able to score 23/30 today. Other test results were more positive, including FOTO survey which demonstrated a 5 point gain. Dylon also improved with some LE MMT grades, B SLS and self-selected walking test. She also improved her score on condition # 4 of the KIMBERLY by 6 seconds. PT is confident that with a better night's rest, she will bring her FGA score back up near her evaluation score. Dylon remains appropriate for continued therapy at 2 x weekly frequency to address current limitations with R ankle control and " balance.    Dylon Is progressing well towards her goals.   Patient prognosis is Good.     Patient will continue to benefit from skilled outpatient physical therapy to address the deficits listed in the problem list box on initial evaluation, provide pt/family education and to maximize pt's level of independence in the home and community environment.     Patient's spiritual, cultural and educational needs considered and pt agreeable to plan of care and goals.     Anticipated barriers to physical therapy: chronicity of condition     Goals:   Short Term Goals: 4 weeks   Pt will be issued first installment of HEP and report at least partial compliance. Ongoing   Pt will increase R LE SLS to 9 seconds or > for improved overall balance and ankle stability. ~ MET, 6/11/24   Pt will increase score on condition # 4 of the KIMBERLY to 20 seconds to demonstrate improved balance in tight spaces with limited vision. ~ MET, 6/11/24   Pt will increase strength grade for R ankle plantarflexion to 4+/5 for improved stability during ambulation and exercise. ~ MET, 6/11/24     Long Term Goals: 8 weeks   Pt will be at least partially compliant with finalized HEP to help maintain potential gains realized in PT. Ongoing   Pt will increase FOTO intake score to 60 % for improved self-perception of deficits. Ongoing and progressing  Pt will increase R LE SLS to 12 seconds or > for improved overall balance and ankle stability. ~ MET, 6/11/24  Pt will increase score on condition # 4 of the KIMBERLY to 25 seconds to demonstrate improved balance in tight spaces with limited vision. Ongoing and progressing   Pt will demonstrate at least 5 degree increase in B hamstring flexibility( as measured by SLR) to aid in decreasing back pain and perhaps allow more normal gait pattern with respect to R ankle/foot. Ongoing     Plan     Continue PT plan of care with a focus on autonomic nervous system management, motor control, and sensory reintegration.     Fer HARDY  Aman, PT   6/11/2024

## 2024-06-11 ENCOUNTER — CLINICAL SUPPORT (OUTPATIENT)
Dept: REHABILITATION | Facility: HOSPITAL | Age: 44
End: 2024-06-11
Payer: MEDICARE

## 2024-06-11 DIAGNOSIS — R29.898 ABNORMAL MUSCLE TONE: ICD-10-CM

## 2024-06-11 DIAGNOSIS — R26.89 BALANCE PROBLEM: Primary | ICD-10-CM

## 2024-06-11 PROCEDURE — 97110 THERAPEUTIC EXERCISES: CPT | Mod: KX,PO

## 2024-06-11 PROCEDURE — 97112 NEUROMUSCULAR REEDUCATION: CPT | Mod: KX,PO

## 2024-06-13 ENCOUNTER — CLINICAL SUPPORT (OUTPATIENT)
Dept: REHABILITATION | Facility: HOSPITAL | Age: 44
End: 2024-06-13
Payer: MEDICARE

## 2024-06-13 DIAGNOSIS — R26.89 BALANCE PROBLEM: Primary | ICD-10-CM

## 2024-06-13 DIAGNOSIS — R29.898 ABNORMAL MUSCLE TONE: ICD-10-CM

## 2024-06-13 PROCEDURE — 97112 NEUROMUSCULAR REEDUCATION: CPT | Mod: KX,PO,CQ

## 2024-06-13 PROCEDURE — 97110 THERAPEUTIC EXERCISES: CPT | Mod: KX,PO,CQ

## 2024-06-13 PROCEDURE — 97533 SENSORY INTEGRATION: CPT | Mod: KX,PO,CQ

## 2024-06-13 NOTE — PROGRESS NOTES
"[facility]   Physical Therapy Treatment and Progress Note      Name: Gordon Griffin  Clinic Number: [id number]    Therapy Diagnosis:   Encounter Diagnoses   Name Primary?    Balance problem Yes    Abnormal muscle tone        Physician: Ilene Smalls MD    Visit Date: 6/13/2024    Physician Orders: PT Eval and Treat   Medical Diagnosis from Referral:   G25.9 (ICD-10-CM) - Functional movement disorder   G24.9 (ICD-10-CM) - Dystonia   R26.9 (ICD-10-CM) - Abnormality of gait and mobility      Evaluation Date: 5/17/2024  Authorization Period Expiration: 12/31/2024  Plan of Care Expiration: 7/12/24  Progress Note Due: 6/17/24  Visit # / Visits authorized: 6/ 20 (+eval)~ KX modifier  FOTO: 2/3     Precautions: Standard and Fall  Patient's preferred pronouns: She/her      Time In: 0845   Time Out: 0930   Total Billable Time: 45  minutes    PTA Visit #: 1/5       Subjective     Patient reports: " She is very sore from all the training yesterday on the boat."   She was provided with a home exercise program today.  Response to previous treatment: no adverse effects  Functional change: ankles are feeling better since starting therapy    Pain: 9/10  Location: whole body    Objective      Objective Measures updated on 6/11/24. See below:      Treatment     Dylon received the treatments listed below:      therapeutic exercises to develop strength, endurance, ROM, and flexibility for 10 minutes including:    X 10 min on Sci fit recumbent stepper.  BuE/B LE on level 3.0      neuromuscular re-education activities to improve: Balance, Coordination, and Proprioception for 20 minutes. The following activities were included:  Near ballet bar: CGA  3 x 30 sec of B LE single leg stance with no UE support  3 x 30 sec each of tandem stance with no UE support  2 x 30 sec each of tandem stance with no UE support and eyes closed.    Big Bosu ball/ Flat side up:CGa  2 x 60 sec of static standing with no UE " support  X 60 sec of static standing with head turns right to left, no UE support  X 60 sec of static standing with head nods up/down , no UE support  X 60 sec of medial/lateral weight shifting with no UE support  X 60 sec of anterior/posterior weight shifting with no UE support        sensory integrative techniques to improve sensory processing for 15 minutes including:      Mirror therapy: patient performed the following activities with mirror in between legs and focusing on movement of left foot while imagining right foot in mirror  - active range of motion of plantarflexion trying to achieve flexion at metatarsals - 2 mins  - active range of motion of ankle inversion/eversion - 2 min         Active range of motion of bilateral DF with feet placed on textured side of lian-disc - 2 mins  --> PT utilizing music to focus on to reduce internal cueing/decrease focus on movement of ankles         therapeutic activities to improve functional performance for 0 minutes, including:        gait training to improve functional mobility and safety for 0 minutes, including:      Patient Education and Home Exercises       Education provided:   - went through [employer] PT FMD workbook focusing on emotional well-being, nutrition, stress reduction   -6/11/24: PT provides 3 exercises for home. Test results reviewed.    Written Home Exercises Provided: will provided at later date; home exercise program focused on breathing and stress management     Assessment     Dylon tolerated her tx session well and did not have any problems noted.  Dylon focused on balance and progressed to balance activities on the Big Bosu ball and did not require any UE support.      Dylon Is progressing well towards her goals.   Patient prognosis is Good.     Patient will continue to benefit from skilled outpatient physical therapy to address the deficits listed in the problem list box on initial evaluation, provide pt/family education and to maximize pt's level  of independence in the home and community environment.     Patient's spiritual, cultural and educational needs considered and pt agreeable to plan of care and goals.     Anticipated barriers to physical therapy: chronicity of condition     Goals:   Short Term Goals: 4 weeks   Pt will be issued first installment of HEP and report at least partial compliance. Ongoing   Pt will increase R LE SLS to 9 seconds or > for improved overall balance and ankle stability. ~ MET, 6/11/24   Pt will increase score on condition # 4 of the KIMBERLY to 20 seconds to demonstrate improved balance in tight spaces with limited vision. ~ MET, 6/11/24   Pt will increase strength grade for R ankle plantarflexion to 4+/5 for improved stability during ambulation and exercise. ~ MET, 6/11/24     Long Term Goals: 8 weeks   Pt will be at least partially compliant with finalized HEP to help maintain potential gains realized in PT. Ongoing   Pt will increase FOTO intake score to 60 % for improved self-perception of deficits. Ongoing and progressing  Pt will increase R LE SLS to 12 seconds or > for improved overall balance and ankle stability. ~ MET, 6/11/24  Pt will increase score on condition # 4 of the KIMBERLY to 25 seconds to demonstrate improved balance in tight spaces with limited vision. Ongoing and progressing   Pt will demonstrate at least 5 degree increase in B hamstring flexibility( as measured by SLR) to aid in decreasing back pain and perhaps allow more normal gait pattern with respect to R ankle/foot. Ongoing     Plan     Continue PT plan of care with a focus on autonomic nervous system management, motor control, and sensory reintegration.     Lisa Rowe, PTA   6/13/2024

## 2024-06-18 ENCOUNTER — CLINICAL SUPPORT (OUTPATIENT)
Dept: REHABILITATION | Facility: HOSPITAL | Age: 44
End: 2024-06-18
Payer: MEDICARE

## 2024-06-18 ENCOUNTER — OFFICE VISIT (OUTPATIENT)
Dept: PAIN MEDICINE | Facility: CLINIC | Age: 44
End: 2024-06-18
Payer: MEDICARE

## 2024-06-18 DIAGNOSIS — M47.816 LUMBAR SPONDYLOSIS: ICD-10-CM

## 2024-06-18 DIAGNOSIS — M47.812 CERVICAL SPONDYLOSIS: ICD-10-CM

## 2024-06-18 DIAGNOSIS — G89.4 CHRONIC PAIN SYNDROME: Primary | ICD-10-CM

## 2024-06-18 DIAGNOSIS — M53.3 SACROILIAC JOINT PAIN: Primary | ICD-10-CM

## 2024-06-18 DIAGNOSIS — R29.898 ABNORMAL MUSCLE TONE: ICD-10-CM

## 2024-06-18 DIAGNOSIS — M53.3 SACROILIAC JOINT PAIN: ICD-10-CM

## 2024-06-18 DIAGNOSIS — M50.30 DDD (DEGENERATIVE DISC DISEASE), CERVICAL: ICD-10-CM

## 2024-06-18 DIAGNOSIS — M54.12 CERVICAL RADICULOPATHY: ICD-10-CM

## 2024-06-18 DIAGNOSIS — M51.36 DDD (DEGENERATIVE DISC DISEASE), LUMBAR: ICD-10-CM

## 2024-06-18 DIAGNOSIS — R26.89 BALANCE PROBLEM: Primary | ICD-10-CM

## 2024-06-18 DIAGNOSIS — G43.719 INTRACTABLE CHRONIC MIGRAINE WITHOUT AURA AND WITHOUT STATUS MIGRAINOSUS: ICD-10-CM

## 2024-06-18 PROCEDURE — 99213 OFFICE O/P EST LOW 20 MIN: CPT | Mod: 95,,, | Performed by: NURSE PRACTITIONER

## 2024-06-18 PROCEDURE — 97110 THERAPEUTIC EXERCISES: CPT | Mod: PO,CQ

## 2024-06-18 PROCEDURE — 97533 SENSORY INTEGRATION: CPT | Mod: PO,CQ

## 2024-06-18 PROCEDURE — 97112 NEUROMUSCULAR REEDUCATION: CPT | Mod: PO,CQ

## 2024-06-18 PROCEDURE — 1159F MED LIST DOCD IN RCRD: CPT | Mod: CPTII,95,, | Performed by: NURSE PRACTITIONER

## 2024-06-18 PROCEDURE — 1160F RVW MEDS BY RX/DR IN RCRD: CPT | Mod: CPTII,95,, | Performed by: NURSE PRACTITIONER

## 2024-06-18 NOTE — H&P (VIEW-ONLY)
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    Stroke pt. states he had a cva at 3 months old    Thrombocytopenia, unspecified 3/30/2012       Past Surgical History:  Past Surgical History:   Procedure Laterality Date    ANGIOGRAM, CORONARY, WITH LEFT HEART CATHETERIZATION      EPIDURAL STEROID INJECTION N/A 3/26/2021    Procedure: INJECTION, STEROID, EPIDURAL L4/5;  Surgeon: Larry Brasher MD;  Location: [facility];  Service: Pain Management;  Laterality: N/A;    EPIDURAL STEROID INJECTION N/A 6/4/2021    Procedure: INJECTION, STEROID, EPIDURAL, L4-L5 IL need consent;  Surgeon: Larry Brasher MD;  Location: [facility];  Service: Pain Management;  Laterality: N/A;    EPIDURAL STEROID INJECTION N/A 10/29/2021    Procedure: INJECTION, STEROID, EPIDURAL, L4-L5IL NEED CONSENT;  Surgeon: Larry Brasher MD;  Location: [facility];  Service: Pain Management;  Laterality: N/A;    EPIDURAL STEROID INJECTION N/A 1/27/2022    Procedure: Injection, Steroid, Epidural C7/T1;  Surgeon: Larry Brasher MD;  Location: [facility];  Service: Pain Management;  Laterality: N/A;    EPIDURAL STEROID INJECTION N/A 2/10/2022    Procedure: Injection, Steroid, Epidural L4/5;  Surgeon: Larry Brasher MD;  Location: [facility];  Service: Pain Management;  Laterality: N/A;    EPIDURAL STEROID INJECTION N/A 8/25/2022    Procedure: Injection, Steroid, Epidural C7/T1 CONTRAST;   Surgeon: Larry Brasher MD;  Location: [facility];  Service: Pain Management;  Laterality: N/A;    EPIDURAL STEROID INJECTION N/A 5/26/2023    Procedure: INJECTION, STEROID, EPIDURAL C7/T1 IL;  Surgeon: Larry Brasher MD;  Location: [facility];  Service: Pain Management;  Laterality: N/A;    EPIDURAL STEROID INJECTION N/A 10/13/2023    Procedure: INJECTION, STEROID, EPIDURAL, C7-T1;  Surgeon: Larry Brasher MD;  Location: [facility];  Service: Pain Management;  Laterality: N/A;    EPIDURAL STEROID INJECTION N/A 4/25/2024    Procedure: CERVICAL C7/T1 IL KYUNG *ASPIRIN OTC* HOLD FOR 5 DAYS;  Surgeon: Larry Brasher MD;  Location: [facility];  Service: Pain Management;  Laterality: N/A;  [phone]  2 WK F/U TASHA    INJECTION OF ANESTHETIC AGENT AROUND NERVE Bilateral 5/6/2022    Procedure: BLOCK, NERVE, BILATERAL L3-L4-*L5 MEDIAL BRANCH;  Surgeon: Larry Brasher MD;  Location: [facility];  Service: Pain Management;  Laterality: Bilateral;    INJECTION OF ANESTHETIC AGENT AROUND NERVE Bilateral 6/2/2022    Procedure: BLOCK, NERVE BILATERAL L3-L4-L5 MEDIAL BRANCH 2nd, needs consent;  Surgeon: Larry Brasher MD;  Location: [facility];  Service: Pain Management;  Laterality: Bilateral;    INJECTION, SACROILIAC JOINT Bilateral 6/9/2023    Procedure: INJECTION,SACROILIAC JOINT, BILATERAL SI;  Surgeon: Larry Brasher MD;  Location: [facility];  Service: Pain Management;  Laterality: Bilateral;    MANDIBLE SURGERY      reconstruction    ORCHIECTOMY Bilateral 11/8/2023    Procedure: ORCHIECTOMY;  Surgeon: Ronald Mcgrath MD;  Location: [facility] OR [address] [place] [address];  Service: Urology;  Laterality: Bilateral;  1 hr    RADIOFREQUENCY ABLATION Right 6/23/2022    Procedure: RADIOFREQUENCY ABLATION RIGHT L3,L4,L5 1 of 2, consent needed;  Surgeon: Larry Brasher MD;  Location: [facility];  Service: Pain Management;  Laterality: Right;    RADIOFREQUENCY ABLATION Left 7/7/2022    Procedure: RADIOFREQUENCY  ABLATION LEFT L3,L4,L5 2 of 2, needs consent;  Surgeon: Larry Brasher MD;  Location: [facility];  Service: Pain Management;  Laterality: Left;    RADIOFREQUENCY ABLATION Right 3/3/2023    Procedure: RADIOFREQUENCY ABLATION RIGHT L3,L4,L5;  Surgeon: Larry Brasher MD;  Location: [facility];  Service: Pain Management;  Laterality: Right;    RADIOFREQUENCY ABLATION Left 3/31/2023    Procedure: Radiofrequency Ablation Left L3, L4, & L5 Pending CBC results;  Surgeon: Diana Lira MD;  Location: [facility];  Service: Pain Management;  Laterality: Left;    RADIOFREQUENCY ABLATION Bilateral 3/8/2024    Procedure: RADIOFREQUENCY ABLATION BILATERAL L3, 4, 5;  Surgeon: Larry Brasher MD;  Location: [facility];  Service: Pain Management;  Laterality: Bilateral;  [phone]  4 WK F/U VERONIQUE    variceol repair         Family History:  Family History   Problem Relation Name Age of Onset    Heart disease Mother      Myasthenia gravis Mother      Myasthenia gravis Father      Heart disease Father      Hypertension Father      Hyperlipidemia Father      Heart disease Paternal Uncle         Social History:  Social History     Socioeconomic History    Marital status:    Occupational History    Occupation: disable/   Tobacco Use    Smoking status: Never    Smokeless tobacco: Never   Substance and Sexual Activity    Alcohol use: No    Drug use: No    Sexual activity: Not Currently     Partners: Female   Social History Narrative    No stairs     Social Determinants of Health     Financial Resource Strain: Low Risk  (11/10/2023)    Overall Financial Resource Strain (CARDIA)     Difficulty of Paying Living Expenses: Not hard at all   Food Insecurity: No Food Insecurity (11/10/2023)    Hunger Vital Sign     Worried About Running Out of Food in the Last Year: Never true     Ran Out of Food in the Last Year: Never true   Transportation Needs: No Transportation Needs (11/10/2023)    PRAPARE -  Transportation     Lack of Transportation (Medical): No     Lack of Transportation (Non-Medical): No   Physical Activity: Sufficiently Active (10/23/2022)    [facility]    Exercise Vital Sign     Days of Exercise per Week: 5 days     Minutes of Exercise per Session: 60 min   Stress: Stress Concern Present (11/10/2023)    [employer] - Occupational Stress Questionnaire     Feeling of Stress : Rather much   Housing Stability: Unknown (11/10/2023)    Housing Stability Vital Sign     Unable to Pay for Housing in the Last Year: No     Unstable Housing in the Last Year: No       OBJECTIVE:    Exam limited due to virtual visit:  General appearance: Well appearing, in no acute distress.  Psych:  Tearful       Previous physical exam:  There were no vitals filed for this visit.       PHYSICAL EXAMINATION:    General appearance: Well appearing, in no acute distress.  Psych:  Mood and affect appropriate.  Skin: Skin color, texture, turgor normal, no rashes or lesions to visible areas.  Head/face:  Atraumatic, normocephalic. No palpable lymph nodes  Cor: No lower extremity edema.  Capillary refill <2 seconds.  Pulm: Symmetric chest rise. No apparent respiratory distress.  Neck:  There is mild pain with palpation over cervical paraspinals. Improved ROM with mild pain on extension.   Back: Straight leg raising in the sitting position is negative for radicular pain.   Extremities: No deformities, edema, or skin discoloration. Good capillary refill.  Musculoskeletal: There is pain with palpation over bilateral SI joints. Positive FABERs, Gaenslen's, and SI compression bilaterally. 5/5 strength in right ankle with plantar and dorsiflexion. 4/5 strength in left ankle with plantar and dorsiflexion. 5/5 strength with right knee flexion and extension. 5/5 strength with left knee flexion and extension.   Neuro:  No loss of sensation is noted.  Gait: Antalgic- ambulates without  assistance.     ASSESSMENT: 43 y.o. year old adult with neck and lower back pain, consistent with the followin. Chronic pain syndrome        2. Sacroiliac joint pain        3. Lumbar spondylosis        4. DDD (degenerative disc disease), lumbar        5. Cervical radiculopathy        6. Cervical spondylosis        7. DDD (degenerative disc disease), cervical        8. Intractable chronic migraine without aura and without status migrainosus              PLAN:     - Previous imaging reviewed today.    - Schedule for bilateral SI joint injections. Previous procedure provided 90% relief for 11 months.      - We can repeat bilateral L3,4,5 RFA as needed.     - Consider C2,C3, TON MBB in the future.     - Continue Gabapentin. Refill provided.     - I have stressed the importance of physical activity and a home exercise plan to help with pain and improve health.    - RTC 2 weeks after above procedure.     The above plan and management options were discussed at length with patient. Patient is in agreement with the above and verbalized understanding.    Maribel Bright NP   2024

## 2024-06-18 NOTE — PROGRESS NOTES
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 pt. states he had a cva at 3 months old    Thrombocytopenia, unspecified 3/30/2012       Past Surgical History:  Past Surgical History:   Procedure Laterality Date    ANGIOGRAM, CORONARY, WITH LEFT HEART CATHETERIZATION      EPIDURAL STEROID INJECTION N/A 3/26/2021    Procedure: INJECTION, STEROID, EPIDURAL L4/5;  Surgeon: Larry Brasher MD;  Location: [facility] PAIN MGT;  Service: Pain Management;  Laterality: N/A;    EPIDURAL STEROID INJECTION N/A 6/4/2021    Procedure: INJECTION, STEROID, EPIDURAL, L4-L5 IL need consent;  Surgeon: Larry Brasher MD;  Location: [facility]IN [facility]GT;  Service: Pain Management;  Laterality: N/A;    EPIDURAL STEROID INJECTION N/A 10/29/2021    Procedure: INJECTION, STEROID, EPIDURAL, L4-L5IL NEED CONSENT;  Surgeon: Larry Brasher MD;  Location: [facility]IN [facility]GT;  Service: Pain Management;  Laterality: N/A;    EPIDURAL STEROID INJECTION N/A 1/27/2022    Procedure: Injection, Steroid, Epidural C7/T1;  Surgeon: Larry Brasher MD;  Location: [facility]IN [facility]GT;  Service: Pain Management;  Laterality: N/A;    EPIDURAL STEROID INJECTION N/A 2/10/2022    Procedure: Injection, Steroid, Epidural L4/5;  Surgeon: Larry Brasher MD;  Location: [facility]GT;  Service: Pain Management;  Laterality: N/A;    EPIDURAL STEROID INJECTION N/A 8/25/2022    Procedure: Injection, Steroid, Epidural C7/T1 CONTRAST;   Surgeon: Larry Brasher MD;  Location: [facility] PAIN [facility]GT;  Service: Pain Management;  Laterality: N/A;    EPIDURAL STEROID INJECTION N/A 5/26/2023    Procedure: INJECTION, STEROID, EPIDURAL C7/T1 IL;  Surgeon: Larry Brasher MD;  Location: [facility] PAIN [facility]GT;  Service: Pain Management;  Laterality: N/A;    EPIDURAL STEROID INJECTION N/A 10/13/2023    Procedure: INJECTION, STEROID, EPIDURAL, C7-T1;  Surgeon: Larry Brasher MD;  Location: [facility] PAIN [facility]GT;  Service: Pain Management;  Laterality: N/A;    EPIDURAL STEROID INJECTION N/A 4/25/2024    Procedure: CERVICAL C7/T1 IL KYUNG *ASPIRIN OTC* HOLD FOR 5 DAYS;  Surgeon: Larry Brasher MD;  Location: [facility] PAIN [facility]GT;  Service: Pain Management;  Laterality: N/A;  [phone]  2 WK F/U TASHA    INJECTION OF ANESTHETIC AGENT AROUND NERVE Bilateral 5/6/2022    Procedure: BLOCK, NERVE, BILATERAL L3-L4-*L5 MEDIAL BRANCH;  Surgeon: Larry Brasher MD;  Location: [facility]IN [facility]GT;  Service: Pain Management;  Laterality: Bilateral;    INJECTION OF ANESTHETIC AGENT AROUND NERVE Bilateral 6/2/2022    Procedure: BLOCK, NERVE BILATERAL L3-L4-L5 MEDIAL BRANCH 2nd, needs consent;  Surgeon: Larry Brasher MD;  Location: [facility]IN [facility]GT;  Service: Pain Management;  Laterality: Bilateral;    INJECTION, SACROILIAC JOINT Bilateral 6/9/2023    Procedure: INJECTION,SACROILIAC JOINT, BILATERAL SI;  Surgeon: Larry Brasher MD;  Location: [facility]GT;  Service: Pain Management;  Laterality: Bilateral;    MANDIBLE SURGERY      reconstruction    ORCHIECTOMY Bilateral 11/8/2023    Procedure: ORCHIECTOMY;  Surgeon: Ronald Mcgrath MD;  Location: [facility] OR [address] [place] [address];  Service: Urology;  Laterality: Bilateral;  1 hr    RADIOFREQUENCY ABLATION Right 6/23/2022    Procedure: RADIOFREQUENCY ABLATION RIGHT L3,L4,L5 1 of 2, consent needed;  Surgeon: Larry Brasher MD;  Location: [facility] PAIN [facility]GT;  Service: Pain Management;  Laterality: Right;    RADIOFREQUENCY ABLATION Left 7/7/2022    Procedure: RADIOFREQUENCY  ABLATION LEFT L3,L4,L5 2 of 2, needs consent;  Surgeon: Larry Brasher MD;  Location: [facility];  Service: Pain Management;  Laterality: Left;    RADIOFREQUENCY ABLATION Right 3/3/2023    Procedure: RADIOFREQUENCY ABLATION RIGHT L3,L4,L5;  Surgeon: Larry Brasher MD;  Location: [facility];  Service: Pain Management;  Laterality: Right;    RADIOFREQUENCY ABLATION Left 3/31/2023    Procedure: Radiofrequency Ablation Left L3, L4, & L5 Pending CBC results;  Surgeon: Diana Lira MD;  Location: [facility] PAIN [facility]GT;  Service: Pain Management;  Laterality: Left;    RADIOFREQUENCY ABLATION Bilateral 3/8/2024    Procedure: RADIOFREQUENCY ABLATION BILATERAL L3, 4, 5;  Surgeon: Larry Brasher MD;  Location: [facility] PAIN [facility]GT;  Service: Pain Management;  Laterality: Bilateral;  [phone]  4 WK F/U VERONIQUE    variceol repair         Family History:  Family History   Problem Relation Name Age of Onset    Heart disease Mother      Myasthenia gravis Mother      Myasthenia gravis Father      Heart disease Father      Hypertension Father      Hyperlipidemia Father      Heart disease Paternal Uncle         Social History:  Social History     Socioeconomic History    Marital status:    Occupational History    Occupation: disable/   Tobacco Use    Smoking status: Never    Smokeless tobacco: Never   Substance and Sexual Activity    Alcohol use: No    Drug use: No    Sexual activity: Not Currently     Partners: Female   Social History Narrative    No stairs     Social Determinants of Health     Financial Resource Strain: Low Risk  (11/10/2023)    Overall Financial Resource Strain (CARDIA)     Difficulty of Paying Living Expenses: Not hard at all   Food Insecurity: No Food Insecurity (11/10/2023)    Hunger Vital Sign     Worried About Running Out of Food in the Last Year: Never true     Ran Out of Food in the Last Year: Never true   Transportation Needs: No Transportation Needs (11/10/2023)    PRAPARE -  Transportation     Lack of Transportation (Medical): No     Lack of Transportation (Non-Medical): No   Physical Activity: Sufficiently Active (10/23/2022)    Received [facility], [facility]    Exercise Vital Sign     Days of Exercise per Week: 5 days     Minutes of Exercise per Session: 60 min   Stress: Stress Concern Present (11/10/2023)    [employer] - Occupational Stress Questionnaire     Feeling of Stress : Rather much   Housing Stability: Unknown (11/10/2023)    Housing Stability Vital Sign     Unable to Pay for Housing in the Last Year: No     Unstable Housing in the Last Year: No       OBJECTIVE:    Exam limited due to virtual visit:  General appearance: Well appearing, in no acute distress.  Psych:  Tearful       Previous physical exam:  There were no vitals filed for this visit.       PHYSICAL EXAMINATION:    General appearance: Well appearing, in no acute distress.  Psych:  Mood and affect appropriate.  Skin: Skin color, texture, turgor normal, no rashes or lesions to visible areas.  Head/face:  Atraumatic, normocephalic. No palpable lymph nodes  Cor: No lower extremity edema.  Capillary refill <2 seconds.  Pulm: Symmetric chest rise. No apparent respiratory distress.  Neck:  There is mild pain with palpation over cervical paraspinals. Improved ROM with mild pain on extension.   Back: Straight leg raising in the sitting position is negative for radicular pain.   Extremities: No deformities, edema, or skin discoloration. Good capillary refill.  Musculoskeletal: There is pain with palpation over bilateral SI joints. Positive FABERs, Gaenslen's, and SI compression bilaterally. 5/5 strength in right ankle with plantar and dorsiflexion. 4/5 strength in left ankle with plantar and dorsiflexion. 5/5 strength with right knee flexion and extension. 5/5 strength with left knee flexion and extension.   Neuro:  No loss of sensation is noted.  Gait: Antalgic- ambulates without  assistance.     ASSESSMENT: 43 y.o. year old adult with neck and lower back pain, consistent with the followin. Chronic pain syndrome        2. Sacroiliac joint pain        3. Lumbar spondylosis        4. DDD (degenerative disc disease), lumbar        5. Cervical radiculopathy        6. Cervical spondylosis        7. DDD (degenerative disc disease), cervical        8. Intractable chronic migraine without aura and without status migrainosus              PLAN:     - Previous imaging reviewed today.    - Schedule for bilateral SI joint injections. Previous procedure provided 90% relief for 11 months.      - We can repeat bilateral L3,4,5 RFA as needed.     - Consider C2,C3, TON MBB in the future.     - Continue Gabapentin. Refill provided.     - I have stressed the importance of physical activity and a home exercise plan to help with pain and improve health.    - RTC 2 weeks after above procedure.     The above plan and management options were discussed at length with patient. Patient is in agreement with the above and verbalized understanding.    Maribel Brigth NP   2024

## 2024-06-18 NOTE — PROGRESS NOTES
"[facility]   Physical Therapy Treatment      Name: Gordon Griffin  [facility] Number: [id number]    Therapy Diagnosis:   Encounter Diagnoses   Name Primary?    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   G24.9 (ICD-10-CM) - Dystonia   R26.9 (ICD-10-CM) - Abnormality of gait and mobility      Evaluation Date: 5/17/2024  Authorization Period Expiration: 12/31/2024  Plan of Care Expiration: 7/12/24  Progress Note Due: 6/17/24  Visit # / Visits authorized: 7/ 20 (+eval)~ KX modifier  FOTO: 2/3     Precautions: Standard and Fall  Patient's preferred pronouns: She/her      Time In: 1345  Time Out: 1430  Total Billable Time: 45  minutes    PTA Visit #: 2/5       Subjective     Patient reports: "I may have to stop working, [place] pulled a stunt, and I'm having an episode, you get to see how bad it gets."   She was provided with a home exercise program today.  Response to previous treatment: no adverse effects  Functional change: ankles are feeling better since starting therapy    Pain: 9/10  Location: whole body    Objective      Objective Measures updated on 6/11/24. See below:      Treatment   Pt with visible tonic clonus involuntary movements today.   Pt very stressed in her personal life as per pt report.  Dylon received the treatments listed below:      therapeutic exercises to develop strength, endurance, ROM, and flexibility for 10 minutes including:    X 10 min on Sci fit recumbent stepper.  BuE/B LE on level 3.0      neuromuscular re-education activities to improve: Balance, Coordination, and Proprioception for 10 minutes. The following activities were included:    3 x 30 sec of B LE single leg stance with no UE support  3 x 30 sec each of tandem stance with no UE support  2 x 30 sec each of tandem stance " with no UE support and eyes closed.          sensory integrative techniques to improve sensory processing for 15 minutes including:      Mirror therapy: patient performed the following activities with mirror in between legs and focusing on movement of left foot while imagining right foot in mirror  - active range of motion of plantarflexion trying to achieve flexion at metatarsals - 2 mins  - active range of motion of ankle inversion/eversion - 2 min         Active range of motion of bilateral DF with feet placed on textured side of lian-disc - 2 mins  --> PT utilizing music to focus on to reduce internal cueing/decrease focus on movement of ankles         therapeutic activities to improve functional performance for 0 minutes, including:        gait training to improve functional mobility and safety for 0 minutes, including:      Patient Education and Home Exercises       Education provided:   - went through [employer] PT FMD workbook focusing on emotional well-being, nutrition, stress reduction   -6/11/24: PT provides 3 exercises for home. Test results reviewed.    Written Home Exercises Provided: will provided at later date; home exercise program focused on breathing and stress management     Assessment     Dylon tolerated her session poorly today.  Dylon had increased involuntary movements today and it was challenging to perform any kind of balance activities today with out her having a clonus episode, or her walking or balancing on the lateral aspects of her ankle malleoli.  Dylon was given a paper on FMD for her partner to read and was educated on a support group she can attend weekend on mindfulness.      Dylon Is progressing well towards her goals.   Patient prognosis is Good.     Patient will continue to benefit from skilled outpatient physical therapy to address the deficits listed in the problem list box on initial evaluation, provide pt/family education and to maximize pt's level of independence in the home  and community environment.     Patient's spiritual, cultural and educational needs considered and pt agreeable to plan of care and goals.     Anticipated barriers to physical therapy: chronicity of condition     Goals:   Short Term Goals: 4 weeks   Pt will be issued first installment of HEP and report at least partial compliance. Ongoing   Pt will increase R LE SLS to 9 seconds or > for improved overall balance and ankle stability. ~ MET, 6/11/24   Pt will increase score on condition # 4 of the KIMBERLY to 20 seconds to demonstrate improved balance in tight spaces with limited vision. ~ MET, 6/11/24   Pt will increase strength grade for R ankle plantarflexion to 4+/5 for improved stability during ambulation and exercise. ~ MET, 6/11/24     Long Term Goals: 8 weeks   Pt will be at least partially compliant with finalized HEP to help maintain potential gains realized in PT. Ongoing   Pt will increase FOTO intake score to 60 % for improved self-perception of deficits. Ongoing and progressing  Pt will increase R LE SLS to 12 seconds or > for improved overall balance and ankle stability. ~ MET, 6/11/24  Pt will increase score on condition # 4 of the KIMBERLY to 25 seconds to demonstrate improved balance in tight spaces with limited vision. Ongoing and progressing   Pt will demonstrate at least 5 degree increase in B hamstring flexibility( as measured by SLR) to aid in decreasing back pain and perhaps allow more normal gait pattern with respect to R ankle/foot. Ongoing     Plan     Continue PT plan of care with a focus on autonomic nervous system management, motor control, and sensory reintegration.     Lisa Rowe, PTA   6/18/2024

## 2024-06-19 ENCOUNTER — PATIENT MESSAGE (OUTPATIENT)
Dept: NEUROLOGY | Facility: CLINIC | Age: 44
End: 2024-06-19
Payer: MEDICARE

## 2024-06-20 ENCOUNTER — CLINICAL SUPPORT (OUTPATIENT)
Dept: REHABILITATION | Facility: HOSPITAL | Age: 44
End: 2024-06-20
Payer: MEDICARE

## 2024-06-20 DIAGNOSIS — R29.898 ABNORMAL MUSCLE TONE: ICD-10-CM

## 2024-06-20 DIAGNOSIS — R26.89 BALANCE PROBLEM: Primary | ICD-10-CM

## 2024-06-20 PROCEDURE — 97533 SENSORY INTEGRATION: CPT | Mod: KX,PO,CQ

## 2024-06-20 PROCEDURE — 97110 THERAPEUTIC EXERCISES: CPT | Mod: KX,PO,CQ

## 2024-06-20 NOTE — PROGRESS NOTES
"[facility]   Physical Therapy Treatment      Name: Gordon Griffin  [facility] Number: [id number]    Therapy Diagnosis:   Encounter Diagnoses   Name Primary?    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  Response to previous treatment: no adverse effects  Functional change: ankles are feeling better since starting therapy    Pain: 7/10  Location: whole body    Objective      Objective Measures updated on 6/11/24. See below:      Treatment     Dylon received the treatments listed below:      therapeutic exercises to develop strength, endurance, ROM, and flexibility for 30 minutes including:    X 10 min on the recumbent bike.  BuE/B LE on level 3.0    Seated EOM:  2 x 60 sec each of B LE DF/PF with YTB  2 x 60 sec each of B LE IR/ER with YTB    Pt began to have a headache during the middle of the session and had to go downstairs to the car to get her intra-nasel pain medication.      neuromuscular re-education activities to improve: Balance, Coordination, and Proprioception for 0 minutes. The following activities were included:            sensory integrative techniques to improve sensory processing for 15 minutes including:      Mirror therapy: patient performed the " following activities with mirror in between legs and focusing on movement of left foot while imagining right foot in mirror  - active range of motion of plantarflexion trying to achieve flexion at metatarsals - 2 mins  - active range of motion of ankle inversion/eversion - 2 min         Active range of motion of bilateral DF with feet placed on textured side of lian-disc - 2 mins  --> PT utilizing music to focus on to reduce internal cueing/decrease focus on movement of ankles         therapeutic activities to improve functional performance for 0 minutes, including:        gait training to improve functional mobility and safety for 0 minutes, including:      Patient Education and Home Exercises       Education provided:   - went through [employer] PT FMD workbook focusing on emotional well-being, nutrition, stress reduction   -6/11/24: PT provides 3 exercises for home. Test results reviewed.    Written Home Exercises Provided: will provided at later date; home exercise program focused on breathing and stress management     Assessment     Dylon tolerated her tx session fairly well and focused mostly on endurance, and ankle reactions and strengthening.  No balance performed today 2* Dylon feeling to instable today.       Dylon Is progressing well towards her goals.   Patient prognosis is Good.     Patient will continue to benefit from skilled outpatient physical therapy to address the deficits listed in the problem list box on initial evaluation, provide pt/family education and to maximize pt's level of independence in the home and community environment.     Patient's spiritual, cultural and educational needs considered and pt agreeable to plan of care and goals.     Anticipated barriers to physical therapy: chronicity of condition     Goals:   Short Term Goals: 4 weeks   Pt will be issued first installment of HEP and report at least partial compliance. Ongoing   Pt will increase R LE SLS to 9 seconds or > for improved  overall balance and ankle stability. ~ MET, 6/11/24   Pt will increase score on condition # 4 of the KIMBERLY to 20 seconds to demonstrate improved balance in tight spaces with limited vision. ~ MET, 6/11/24   Pt will increase strength grade for R ankle plantarflexion to 4+/5 for improved stability during ambulation and exercise. ~ MET, 6/11/24     Long Term Goals: 8 weeks   Pt will be at least partially compliant with finalized HEP to help maintain potential gains realized in PT. Ongoing   Pt will increase FOTO intake score to 60 % for improved self-perception of deficits. Ongoing and progressing  Pt will increase R LE SLS to 12 seconds or > for improved overall balance and ankle stability. ~ MET, 6/11/24  Pt will increase score on condition # 4 of the KIMBERLY to 25 seconds to demonstrate improved balance in tight spaces with limited vision. Ongoing and progressing   Pt will demonstrate at least 5 degree increase in B hamstring flexibility( as measured by SLR) to aid in decreasing back pain and perhaps allow more normal gait pattern with respect to R ankle/foot. Ongoing     Plan     Continue PT plan of care with a focus on autonomic nervous system management, motor control, and sensory reintegration.     Lisa Rowe, PTA   6/20/2024

## 2024-06-21 ENCOUNTER — PATIENT MESSAGE (OUTPATIENT)
Dept: PAIN MEDICINE | Facility: OTHER | Age: 44
End: 2024-06-21
Payer: MEDICARE

## 2024-06-25 ENCOUNTER — PATIENT MESSAGE (OUTPATIENT)
Dept: ORTHOPEDICS | Facility: CLINIC | Age: 44
End: 2024-06-25
Payer: MEDICARE

## 2024-06-26 NOTE — PROGRESS NOTES
[facility]   Physical Therapy Treatment      Name: Gordon Griffin  [facility] Number: [id number]    Therapy Diagnosis:   Encounter Diagnoses   Name Primary?    Balance problem Yes    Abnormal muscle tone          Physician: Ilene Smalls MD    Visit Date: 6/27/2024    Physician Orders: PT Eval and Treat   Medical Diagnosis from Referral:   G25.9 (ICD-10-CM) - Functional movement disorder   G24.9 (ICD-10-CM) - Dystonia   R26.9 (ICD-10-CM) - Abnormality of gait and mobility      Evaluation Date: 5/17/2024  Authorization Period Expiration: 12/31/2024  Plan of Care Expiration: 7/12/24  Progress Note Due: 7/12/24  Visit # / Visits authorized: 9/ 20 (+eval)~ KX modifier  FOTO: 2/3     Precautions: Standard and Fall  Patient's preferred pronouns: She/her      Time In: 0844   Time Out: 0928   Total Billable Time: 44   minutes    PTA Visit #: 0/5       Subjective     Patient reports: she is a little sore from some training she did at the [employer] in [facility].  She was compliant with previously provided home exercise program. 3 new exercises added today.  Response to previous treatment: no adverse effects  Functional change: ankles are feeling better with continued therapy    Pain: 7/10  Location: R ankle    Objective      Objective Measures updated on 6/11/24.       Treatment     Dylon received the treatments listed below:      therapeutic exercises to develop strength, endurance, ROM, and flexibility for 18 minutes including:    X 5 min on the upright bike, level 5 for improved LE muscular endurance    Seated edge of mat:  2 x 10 reps each of B LE ankle dorsiflexion with yellow theraband  2 x 10 reps each of B LE ankle plantarflexion with yellow theraband  2 x 10 reps each of B LE ankle eversion with yellow theraband  (Added to HEP)    neuromuscular re-education activities to improve: Balance, Coordination, and Proprioception for 15 minutes. The following activities were included:    At  ballet bar and feet on red mat( shoes removed):     2 x 30 sec of B LE single leg stance with no UE support, CGA  2 x 30 sec each of tandem stance with no UE support, CGA~ occasional touching on bar  2 x 30 sec each of narrow base of support balance on blue foam pad, eyes closed, no UE support, CGA  2 x 10 reps of B single limb risers on round side of small Bosu, rear leg dangles for 1-2 seconds, light 1 to 2 finger support on bar, CGA  2 x 30 sec of static standing on flat side of small Bosu, no UE support, CGA        sensory integrative techniques to improve sensory processing for 11 minutes including:      Mirror therapy: patient performed the following activities with mirror in between legs and focusing on movement of left foot while imagining right foot in mirror  - active range of motion of plantarflexion trying to achieve flexion at metatarsals - 2 mins(yoga mat placed for improved tactile sensation during movement)  - active range of motion of ankle inversion/eversion - 2 min         Active range of motion of bilateral ankle dorsiflexion/plantarflexion with feet placed on textured side of lian-disc - 2 mins  Active range of motion of bilateral ankle dorsiflexion/plantarflexion with feet placed on wooden rocker board - 2 mins( board placed on yoga mat and non-skid placed under feet)           therapeutic activities to improve functional performance for 0 minutes, including:        gait training to improve functional mobility and safety for 0 minutes, including:      Patient Education and Home Exercises       Education provided:   - went through [employer] PT FMD workbook focusing on emotional well-being, nutrition, stress reduction   -6/11/24: PT provides 3 exercises for home. Test results reviewed.  -6/27/24: HEP updated. PT and pt discuss the fact that pt is scheduled until July 12. PT and pt agree that this will be an appropriate time for pt to discharge from therapy.    Written Home Exercises Provided:  will provided at later date; home exercise program focused on breathing and stress management     Assessment     Dylon tolerated her treatment session very well and her HEP was updated to include ankle strengthening with yellow theraband. She did well with sensory integrative techniques and performed balance training on red mat( without shoes) near ballet bar. All activities were performed well with only occasional touching assistance against bar. Pt's R foot and ankle movements looked good today and PT expects she will test well when the current plan of care comes to an end. Pt remains appropriate for 2 x weekly sessions to address R ankle stability/control and balance deficits.    Dylon Is progressing well towards her goals.   Patient prognosis is Good.     Patient will continue to benefit from skilled outpatient physical therapy to address the deficits listed in the problem list box on initial evaluation, provide pt/family education and to maximize pt's level of independence in the home and community environment.     Patient's spiritual, cultural and educational needs considered and pt agreeable to plan of care and goals.     Anticipated barriers to physical therapy: chronicity of condition     Goals:   Short Term Goals: 4 weeks   Pt will be issued first installment of HEP and report at least partial compliance. Ongoing   Pt will increase R LE SLS to 9 seconds or > for improved overall balance and ankle stability. ~ MET, 6/11/24   Pt will increase score on condition # 4 of the KIMBERLY to 20 seconds to demonstrate improved balance in tight spaces with limited vision. ~ MET, 6/11/24   Pt will increase strength grade for R ankle plantarflexion to 4+/5 for improved stability during ambulation and exercise. ~ MET, 6/11/24     Long Term Goals: 8 weeks   Pt will be at least partially compliant with finalized HEP to help maintain potential gains realized in PT. Ongoing   Pt will increase FOTO intake score to 60 % for improved  self-perception of deficits. Ongoing and progressing  Pt will increase R LE SLS to 12 seconds or > for improved overall balance and ankle stability. ~ MET, 6/11/24  Pt will increase score on condition # 4 of the KIMBERLY to 25 seconds to demonstrate improved balance in tight spaces with limited vision. Ongoing and progressing   Pt will demonstrate at least 5 degree increase in B hamstring flexibility( as measured by SLR) to aid in decreasing back pain and perhaps allow more normal gait pattern with respect to R ankle/foot. Ongoing     Plan     Continue PT plan of care with a focus on autonomic nervous system management, motor control, and sensory integration.     Fer Newton, PT   6/27/2024

## 2024-06-27 ENCOUNTER — CLINICAL SUPPORT (OUTPATIENT)
Dept: REHABILITATION | Facility: HOSPITAL | Age: 44
End: 2024-06-27
Payer: MEDICARE

## 2024-06-27 DIAGNOSIS — R29.898 ABNORMAL MUSCLE TONE: ICD-10-CM

## 2024-06-27 DIAGNOSIS — R26.89 BALANCE PROBLEM: Primary | ICD-10-CM

## 2024-06-27 PROCEDURE — 97112 NEUROMUSCULAR REEDUCATION: CPT | Mod: KX,PO

## 2024-06-27 PROCEDURE — 97533 SENSORY INTEGRATION: CPT | Mod: KX,PO

## 2024-06-27 PROCEDURE — 97110 THERAPEUTIC EXERCISES: CPT | Mod: KX,PO

## 2024-07-01 NOTE — PROGRESS NOTES
[facility] OUTPATIENT THERAPY AND WELLNESS   Physical Therapy Treatment      Name: Gordon Griffin  [facility] Number: [id number]    Therapy Diagnosis:   Encounter Diagnoses   Name Primary?    Balance problem Yes    Abnormal muscle tone            Physician: Ilene Smalls MD    Visit Date: 7/2/2024    Physician Orders: PT Eval and Treat   Medical Diagnosis from Referral:   G25.9 (ICD-10-CM) - Functional movement disorder   G24.9 (ICD-10-CM) - Dystonia   R26.9 (ICD-10-CM) - Abnormality of gait and mobility      Evaluation Date: 5/17/2024  Authorization Period Expiration: 12/31/2024  Plan of Care Expiration: 7/12/24  Progress Note Due: 7/12/24  Visit # / Visits authorized: 10/ 20 (+eval)~ KX modifier  FOTO: 2/3     Precautions: Standard and Fall  Patient's preferred pronouns: She/her      Time In: 1113    Time Out: 1158   Total Billable Time: 45    minutes    PTA Visit #: 0/5       Subjective     Patient reports: public transportation brought her here very early today.  She was compliant with home program.  Response to previous treatment: no adverse effects  Functional change: ankles are feeling better with continued therapy    Pain: 6/10  Location: R ankle    Objective      Objective Measures updated on 6/11/24.       Treatment     Dylon received the treatments listed below:      therapeutic exercises to develop strength, endurance, ROM, and flexibility for 5 minutes including:    X 5 min on the upright bike, level 5 for improved LE muscular endurance      neuromuscular re-education activities to improve: Balance, Coordination, and Proprioception for 25 minutes. The following activities were included:    At ballet bar and feet on blue foam pad over red mat( shoes removed):     2 x 30 sec of B LE single leg stance with no UE support, CGA~occasional touching on bar  2 x 30 sec each of tandem stance with no UE support, CGA   2 x 30 sec each of narrow base of support balance, eyes closed, no UE support, CGA(pt performs on 2  stacked foam pads)  2 x 10 reps of B single limb risers on round side of small Bosu, rear leg dangles for 1-2 seconds, light 1 to 2 finger support on bar, CGA  2 x 30 sec of static standing on round side of small Bosu and performing chest press with 4.4 # ball, CGA      Standing at [employer] Box(shoes donned):    2 x 10 B LE hip and knee flexion/extension, working foot pad up and down, no UE support, all springs set on level 1, CGA    In open gym(shoes removed):    X 5 laps performing forward/backward tandem walking on foam balance beam, no UE support, CGA  X 5 laps performing lateral stepping on foam balance beam, no UE support, CGA        sensory integrative techniques to improve sensory processing for 15 minutes including:      Mirror therapy: patient performed the following activities with mirror in between legs and focusing on movement of left foot while imagining right foot in mirror  - active range of motion of plantarflexion trying to achieve flexion at metatarsals - 3 mins(yoga mat placed for improved tactile sensation during movement)  - active range of motion of ankle inversion/eversion - 3 min         Active range of motion of bilateral ankle dorsiflexion/plantarflexion with feet placed on textured side of lian-disc - 3 mins  Active range of motion of bilateral ankle dorsiflexion/plantarflexion with feet placed on wooden rocker board - 3 mins ( board placed on yoga mat and non-skid placed under feet)           therapeutic activities to improve functional performance for 0 minutes, including:        gait training to improve functional mobility and safety for 0 minutes, including:      Patient Education and Home Exercises       Education provided:   - went through [employer] PT FMD workbook focusing on emotional well-being, nutrition, stress reduction   -6/11/24: PT provides 3 exercises for home. Test results reviewed.  -6/27/24: HEP updated. PT and pt discuss the fact that pt is scheduled until July 12. PT  and pt agree that this will be an appropriate time for pt to discharge from therapy.    Written Home Exercises Provided: will provided at later date; home exercise program focused on breathing and stress management     Assessment     Dylon tolerated her treatment session very well and was compliant with theraband exercises issued the previous session. We added some new neuromuscular reeducation activities today and she responded well to all. Many of these were performed with no shoes and with use of compliant surfaces. We also continued with sensory integrative techniques which have been practiced since the beginning of this plan of care. Pt remains appropriate for 2 x weekly sessions to address R ankle stability/control and balance deficits. Dylon has 3 sessions remaining and will be discharged following her visit on 7/11/24.    Dylon Is progressing well towards her goals.   Patient prognosis is Good.     Patient will continue to benefit from skilled outpatient physical therapy to address the deficits listed in the problem list box on initial evaluation, provide pt/family education and to maximize pt's level of independence in the home and community environment.     Patient's spiritual, cultural and educational needs considered and pt agreeable to plan of care and goals.     Anticipated barriers to physical therapy: chronicity of condition     Goals:   Short Term Goals: 4 weeks   Pt will be issued first installment of HEP and report at least partial compliance. Ongoing   Pt will increase R LE SLS to 9 seconds or > for improved overall balance and ankle stability. ~ MET, 6/11/24   Pt will increase score on condition # 4 of the KIMBERLY to 20 seconds to demonstrate improved balance in tight spaces with limited vision. ~ MET, 6/11/24   Pt will increase strength grade for R ankle plantarflexion to 4+/5 for improved stability during ambulation and exercise. ~ MET, 6/11/24     Long Term Goals: 8 weeks   Pt will be at least  partially compliant with finalized HEP to help maintain potential gains realized in PT. Ongoing   Pt will increase FOTO intake score to 60 % for improved self-perception of deficits. Ongoing and progressing  Pt will increase R LE SLS to 12 seconds or > for improved overall balance and ankle stability. ~ MET, 6/11/24  Pt will increase score on condition # 4 of the KIMBERLY to 25 seconds to demonstrate improved balance in tight spaces with limited vision. Ongoing and progressing   Pt will demonstrate at least 5 degree increase in B hamstring flexibility( as measured by SLR) to aid in decreasing back pain and perhaps allow more normal gait pattern with respect to R ankle/foot. Ongoing     Plan     Continue PT plan of care with a focus on autonomic nervous system management, motor control, and sensory integration.     Fer Newton, PT   7/2/2024

## 2024-07-02 ENCOUNTER — CLINICAL SUPPORT (OUTPATIENT)
Dept: REHABILITATION | Facility: HOSPITAL | Age: 44
End: 2024-07-02
Payer: MEDICARE

## 2024-07-02 DIAGNOSIS — R26.89 BALANCE PROBLEM: Primary | ICD-10-CM

## 2024-07-02 DIAGNOSIS — R29.898 ABNORMAL MUSCLE TONE: ICD-10-CM

## 2024-07-02 PROCEDURE — 97112 NEUROMUSCULAR REEDUCATION: CPT | Mod: KX,PO

## 2024-07-02 PROCEDURE — 97533 SENSORY INTEGRATION: CPT | Mod: KX,PO

## 2024-07-05 ENCOUNTER — DOCUMENTATION ONLY (OUTPATIENT)
Dept: REHABILITATION | Facility: HOSPITAL | Age: 44
End: 2024-07-05

## 2024-07-05 ENCOUNTER — CLINICAL SUPPORT (OUTPATIENT)
Dept: REHABILITATION | Facility: HOSPITAL | Age: 44
End: 2024-07-05
Payer: MEDICARE

## 2024-07-05 DIAGNOSIS — R26.89 BALANCE PROBLEM: Primary | ICD-10-CM

## 2024-07-05 DIAGNOSIS — R29.898 ABNORMAL MUSCLE TONE: ICD-10-CM

## 2024-07-05 PROCEDURE — 97533 SENSORY INTEGRATION: CPT | Mod: PO,CQ

## 2024-07-05 PROCEDURE — 97110 THERAPEUTIC EXERCISES: CPT | Mod: PO,CQ

## 2024-07-05 PROCEDURE — 97112 NEUROMUSCULAR REEDUCATION: CPT | Mod: PO,CQ

## 2024-07-05 NOTE — PROGRESS NOTES
"[facility] OUTPATIENT THERAPY AND WELLNESS   Physical Therapy Treatment      Name: Gordon Griffin  [facility] Number: [id number]    Therapy Diagnosis:   Encounter Diagnoses   Name Primary?    Balance problem Yes    Abnormal muscle tone            Physician: Ilene Smalls MD    Visit Date: 7/5/2024    Physician Orders: PT Eval and Treat   Medical Diagnosis from Referral:   G25.9 (ICD-10-CM) - Functional movement disorder   G24.9 (ICD-10-CM) - Dystonia   R26.9 (ICD-10-CM) - Abnormality of gait and mobility      Evaluation Date: 5/17/2024  Authorization Period Expiration: 12/31/2024  Plan of Care Expiration: 7/12/24  Progress Note Due: 7/12/24  Visit # / Visits authorized: 11/ 20 (+eval)~ KX modifier  FOTO: 2/3     Precautions: Standard and Fall  Patient's preferred pronouns: She/her      Time In: 0930  Time Out: 1015   Total Billable Time: 45    minutes    PTA Visit #: 1/5       Subjective     Patient reports: " I was with Merle for 30 hours straight and went to [employer] together."   She was compliant with home program.  Response to previous treatment: no adverse effects  Functional change: ankles are feeling better with continued therapy    Pain: 6/10  Location: both knees    Objective      Objective Measures updated on 6/11/24.       Treatment     Dylon received the treatments listed below:      therapeutic exercises to develop strength, endurance, ROM, and flexibility for 10 minutes including:    X 10 min on [employer] recumbent stepper.  B LE only on level 5.5.  [place] settng      neuromuscular re-education activities to improve: Balance, Coordination, and Proprioception for 15 minutes. The following activities were included:    Standing at [employer] Box(shoes donned):    2 x 10 B LE hip and knee flexion/extension, working foot pad up and down, no UE support, all springs set on level 1, CGA    In open gym(shoes removed):    X 5 laps performing forward/backward tandem walking on foam balance beam, no UE support, CGA  X " 5 laps performing lateral stepping on foam balance beam, no UE support, CGA        sensory integrative techniques to improve sensory processing for 15 minutes including:      Mirror therapy: patient performed the following activities with mirror in between legs and focusing on movement of left foot while imagining right foot in mirror  - active range of motion of plantarflexion trying to achieve flexion at metatarsals - 3 mins(yoga mat placed for improved tactile sensation during movement)  - active range of motion of ankle inversion/eversion - 3 min         Active range of motion of bilateral ankle dorsiflexion/plantarflexion with feet placed on textured side of lian-disc - 3 mins  Active range of motion of bilateral ankle dorsiflexion/plantarflexion with feet placed on wooden rocker board - 3 mins ( board placed on yoga mat and non-skid placed under feet)           therapeutic activities to improve functional performance for 0 minutes, including:        gait training to improve functional mobility and safety for 0 minutes, including:      Patient Education and Home Exercises       Education provided:   - went through [employer] PT FMD workbook focusing on emotional well-being, nutrition, stress reduction   -6/11/24: PT provides 3 exercises for home. Test results reviewed.  -6/27/24: HEP updated. PT and pt discuss the fact that pt is scheduled until July 12. PT and pt agree that this will be an appropriate time for pt to discharge from therapy.    Written Home Exercises Provided: will provided at later date; home exercise program focused on breathing and stress management     Assessment     Dylon tolerated her tx session well and did not have any problems noted.  Dylon focused on ankle sensory exercises, functional mobility and strengthening.. Dylon has 2 sessions remaining and will be discharged following her visit on 7/11/24.    Dylon Is progressing well towards her goals.   Patient prognosis is Good.     Patient will  continue to benefit from skilled outpatient physical therapy to address the deficits listed in the problem list box on initial evaluation, provide pt/family education and to maximize pt's level of independence in the home and community environment.     Patient's spiritual, cultural and educational needs considered and pt agreeable to plan of care and goals.     Anticipated barriers to physical therapy: chronicity of condition     Goals:   Short Term Goals: 4 weeks   Pt will be issued first installment of HEP and report at least partial compliance. Ongoing   Pt will increase R LE SLS to 9 seconds or > for improved overall balance and ankle stability. ~ MET, 6/11/24   Pt will increase score on condition # 4 of the KIMBERLY to 20 seconds to demonstrate improved balance in tight spaces with limited vision. ~ MET, 6/11/24   Pt will increase strength grade for R ankle plantarflexion to 4+/5 for improved stability during ambulation and exercise. ~ MET, 6/11/24     Long Term Goals: 8 weeks   Pt will be at least partially compliant with finalized HEP to help maintain potential gains realized in PT. Ongoing   Pt will increase FOTO intake score to 60 % for improved self-perception of deficits. Ongoing and progressing  Pt will increase R LE SLS to 12 seconds or > for improved overall balance and ankle stability. ~ MET, 6/11/24  Pt will increase score on condition # 4 of the KIMBERLY to 25 seconds to demonstrate improved balance in tight spaces with limited vision. Ongoing and progressing   Pt will demonstrate at least 5 degree increase in B hamstring flexibility( as measured by SLR) to aid in decreasing back pain and perhaps allow more normal gait pattern with respect to R ankle/foot. Ongoing     Plan     Continue PT plan of care with a focus on autonomic nervous system management, motor control, and sensory integration.     Lisa Rowe, PTA   7/5/2024

## 2024-07-05 NOTE — PROGRESS NOTES
PT/PTA met face to face to discuss pt's treatment plan and progress towards established goals.  Continue with current PT POC with focus on ankle strengthening, and balance.  Patient will be seen by physical therapist at least every sixth treatment or 30 days, whichever occurs first.    Lisa Rowe, PTA  07/05/2024

## 2024-07-08 NOTE — PROGRESS NOTES
[facility]   Physical Therapy Treatment      Name: Gordon Griffin  [facility] Number: [id number]    Therapy Diagnosis:   Encounter Diagnoses   Name Primary?    Balance problem Yes    Abnormal muscle tone              Physician: Ilene Smalls MD    Visit Date: 7/9/2024    Physician Orders: PT Eval and Treat   Medical Diagnosis from Referral:   G25.9 (ICD-10-CM) - Functional movement disorder   G24.9 (ICD-10-CM) - Dystonia   R26.9 (ICD-10-CM) - Abnormality of gait and mobility      Evaluation Date: 5/17/2024  Authorization Period Expiration: 12/31/2024  Plan of Care Expiration: 7/12/24  Progress Note Due: 7/12/24  Visit # / Visits authorized: 12/ 20 (+eval)~ KX modifier  FOTO: 2/3     Precautions: Standard and Fall  Patient's preferred pronouns: She/her      Time In: 1215     Time Out: 1300   Total Billable Time: 45   minutes    PTA Visit #: 0/5       Subjective     Patient reports: public transportation brought her here very early again.  She was compliant with home program.  Response to previous treatment: no adverse effects  Functional change: ankles are feeling better with continued therapy    Pain: 7/10  Location: R ankle    Objective      Objective Measures updated on 6/11/24.       Treatment     Dylon received the treatments listed below:      therapeutic exercises to develop strength, endurance, ROM, and flexibility for 5 minutes including:    X 5 min on the upright bike, level 5 for improved LE muscular endurance~ cues to maintain R ankle in relative dorsiflexion      neuromuscular re-education activities to improve: Balance, Coordination, and Proprioception for 40 minutes. The following activities were included:    Standing at [employer] Box(shoes donned):    2 x 10 B LE hip and knee flexion/extension, working foot pad up and down, no UE support, all springs set on level 1, SBA        Pt performs the entire section below with shoes removed:    Inside parallel bars:  X 4 laps forward  "marching with 2 " hold, no UE support, SBA~ pt performs with yoga mats and non-skid on floor    On large Bosu( soft side up):  1 x 30" static standing with no UE support, CGA~ occasional touchdown  2 x 30" static standing with no UE support, eyes closed, CGA to slight min A  1 x 10 B LE rise ups with 1 UE support, SBA    2 x 10 reps of split stance cone taps against 3 large cones, front foot on foam fitter, no UE support, CGA    X 5 laps performing forward/backward tandem walking on 2 foam balance beams, no UE support, CGA/SBA    X 2 minutes rolling basketball back and forth to PT using R foot only    Biodex Balance Training:  Pt performs 2 trials of Random Control Training, platform set on level 8  Trial 1=77%   Trial 2= 90%        sensory integrative techniques to improve sensory processing for 0 minutes including:            therapeutic activities to improve functional performance for 0 minutes, including:        gait training to improve functional mobility and safety for 0 minutes, including:      Patient Education and Home Exercises       Education provided:   - went through Re+Active PT FMD workbook focusing on emotional well-being, nutrition, stress reduction   -6/11/24: PT provides 3 exercises for home. Test results reviewed.  -6/27/24: HEP updated. PT and pt discuss the fact that pt is scheduled until July 12. PT and pt agree that this will be an appropriate time for pt to discharge from therapy.    Written Home Exercises Provided: will provided at later date; home exercise program focused on breathing and stress management     Assessment     Dylon tolerated treatment session very well and worked 40 minutes today with her shoes removed. Most of the time was spent working inside the parallel bars on various balance tasks which stress ankle strategies. Better balance and stability was noted with pt working in modified SLS at [employer] today. Biodex balance training also introduced and pt performed well with " random control training program, scoring 90 % on her 2nd trial. Pt remains appropriate for 2 x weekly sessions to address R ankle stability/control and balance deficits. Dylon has 1 session remaining and will be discharged following her visit on 7/11/24.    Dylon Is progressing well towards her goals.   Patient prognosis is Good.     Patient will continue to benefit from skilled outpatient physical therapy to address the deficits listed in the problem list box on initial evaluation, provide pt/family education and to maximize pt's level of independence in the home and community environment.     Patient's spiritual, cultural and educational needs considered and pt agreeable to plan of care and goals.     Anticipated barriers to physical therapy: chronicity of condition     Goals:   Short Term Goals: 4 weeks   Pt will be issued first installment of HEP and report at least partial compliance. Ongoing   Pt will increase R LE SLS to 9 seconds or > for improved overall balance and ankle stability. ~ MET, 6/11/24   Pt will increase score on condition # 4 of the KIMBERLY to 20 seconds to demonstrate improved balance in tight spaces with limited vision. ~ MET, 6/11/24   Pt will increase strength grade for R ankle plantarflexion to 4+/5 for improved stability during ambulation and exercise. ~ MET, 6/11/24     Long Term Goals: 8 weeks   Pt will be at least partially compliant with finalized HEP to help maintain potential gains realized in PT. Ongoing   Pt will increase FOTO intake score to 60 % for improved self-perception of deficits. Ongoing and progressing  Pt will increase R LE SLS to 12 seconds or > for improved overall balance and ankle stability. ~ MET, 6/11/24  Pt will increase score on condition # 4 of the KIMBERLY to 25 seconds to demonstrate improved balance in tight spaces with limited vision. Ongoing and progressing   Pt will demonstrate at least 5 degree increase in B hamstring flexibility( as measured by SLR) to aid in  decreasing back pain and perhaps allow more normal gait pattern with respect to R ankle/foot. Ongoing     Plan     Next session: perform tests and measures for discharge.    Continue PT plan of care with a focus on autonomic nervous system management, motor control, and sensory integration.     Fer Newton, PT   7/9/2024

## 2024-07-09 ENCOUNTER — CLINICAL SUPPORT (OUTPATIENT)
Dept: REHABILITATION | Facility: HOSPITAL | Age: 44
End: 2024-07-09
Payer: MEDICARE

## 2024-07-09 DIAGNOSIS — R29.898 ABNORMAL MUSCLE TONE: ICD-10-CM

## 2024-07-09 DIAGNOSIS — R26.89 BALANCE PROBLEM: Primary | ICD-10-CM

## 2024-07-09 PROCEDURE — 97112 NEUROMUSCULAR REEDUCATION: CPT | Mod: KX,PO

## 2024-07-10 NOTE — PROGRESS NOTES
[facility]   Physical Therapy Treatment and Discharge Note     Name: Gordon Griffin  Clinic Number: [id number]    Therapy Diagnosis:   Encounter Diagnoses   Name Primary?    Balance problem Yes    Abnormal muscle tone          Physician: Ilene Smalls MD    Visit Date: 7/11/2024    Physician Orders: PT Eval and Treat   Medical Diagnosis from Referral:   G25.9 (ICD-10-CM) - Functional movement disorder   G24.9 (ICD-10-CM) - Dystonia   R26.9 (ICD-10-CM) - Abnormality of gait and mobility      Evaluation Date: 5/17/2024  Authorization Period Expiration: 12/31/2024  Plan of Care Expiration: 7/12/24  Progress Note Due: 7/12/24  Visit # / Visits authorized: 13/ 20 (+eval)~ KX modifier  FOTO: 3/3     Precautions: Standard and Fall  Patient's preferred pronouns: She/her      Time In: 1244    Time Out: 1325    Total Billable Time: 41   minutes    PTA Visit #: 0/5       Subjective     Patient reports: She is having a migraine today. Took medicine around 9:30 or 10:00.  She was compliant with home exercise program.  Response to previous treatment: no adverse effects  Functional change: ankles are feeling better since starting therapy    Pain: 5/10  Location: R ankle and knee     Objective      Objective Measures updated on 7/11/24. See below:    Lower Extremity Strength~ tested in sitting in chair  Right LE eval  6/11/24 7/11/24 Left LE eval  6/11/24 7/11/24   Hip flexion:  5/5 4+/5 5/5 Hip flexion: 5/5 5/5 5/5   Knee extension: 5/5 5/5 5/5 Knee extension: 4+/5 4+/5 5/5   Knee flexion: 5/5 5/5 5/5 Knee flexion: 4+/5 5/5 5/5   Ankle dorsiflexion:  5/5 5/5 4+/5 Ankle dorsiflexion: 5/5 5/5 5/5   Ankle plantarflexion:  4/5 4+/5 4+/5 Ankle plantarflexion: 4+/5 4+/5 4+/5   Hip abduction: 5/5 5/5 5/5 Hip abduction: 5/5 5/5 5/5   Hip adduction: 4/5 4+/5 5/5 Hip adduction 4/5 4+/5 4+/5   Hip extension: 4/5 4+/5 5/5 Hip extension: 5/5 4/5 5/5     Eval  Flexibility: tight hamstrings noted B  R SLR to 40  "degrees with onset of pain  L SLR to 46 degrees with onset of pain    7/11/24  Flexibility: tight hamstrings noted B  R SLR to 40 degrees with onset of pain  L SLR to 40 degrees with onset of pain        Evaluation 6/11/24 7/11/24   Single Limb Stance R LE 5.5 seconds~ average of 2 trials  (<10 sec = HIGH FALL RISK) 13.5 seconds~ average of 2 trials  (<10 sec = HIGH FALL RISK) 6 seconds~ average of 2 trials  (<10 sec = HIGH FALL RISK)   Single Limb Stance L LE 15 seconds  (<10 sec = HIGH FALL RISK) 15.5 seconds~ average of 2 trials  (<10 sec = HIGH FALL RISK) 5 seconds~ average of 2 trials  (<10 sec = HIGH FALL RISK)   30 second Chair Rise NT NT NT   5 times sit to stand 8 seconds NT NT   TUG NT NT NT   Self selected walking speed 0.86 m/sec (6m/7s) 1.0 m/sec (6m/6s) 1.2 m/sec (6m/5s)   Fast walking speed NT 1.2 m/sec (6m/5s) 1.5 m/sec (6m/4s)     Eval  Postural control:  KIMBERLY SENSORY TESTING:  (P= Pass, F= Fail; note any sway; hold each position for 30")  Condition 1: (firm surface/feet together/eyes open) 30", P  Condition 2: (firm surface/feet together/eyes closed) 30", P  Condition 3: (firm surface/feet in tandem/eyes open) 30", P~ R foot in rear  Condition 4: (firm surface/feet in tandem/eyes closed) 16", F~ R foot in rear  Condition 5: (soft surface/feet together/eyes open) 30"  Condition 6: (soft surface/feet together/eyes closed) 30"  Condition 7: (Fakuda step test), measure distance varied from center starting position NT    6/11/24  Postural control:  KIMBERLY SENSORY TESTING:  (P= Pass, F= Fail; note any sway; hold each position for 30")  Condition 1: (firm surface/feet together/eyes open) 30", P  Condition 2: (firm surface/feet together/eyes closed) 30", P  Condition 3: (firm surface/feet in tandem/eyes open) 30", P~ R foot in rear  Condition 4: (firm surface/feet in tandem/eyes closed) 22", F~ R foot in rear  Condition 5: (soft surface/feet together/eyes open) 30", P  Condition 6: (soft surface/feet " "together/eyes closed) 30"  Condition 7: (Fakuda step test), measure distance varied from center starting position NT    7/11/24  Postural control:  KIMBERLY SENSORY TESTING:  (P= Pass, F= Fail; note any sway; hold each position for 30")  Condition 1: (firm surface/feet together/eyes open) 30", P  Condition 2: (firm surface/feet together/eyes closed) 30", P  Condition 3: (firm surface/feet in tandem/eyes open) 30", P~ R foot in rear  Condition 4: (firm surface/feet in tandem/eyes closed) 30", P~ R foot in rear  Condition 5: (soft surface/feet together/eyes open) 30", P  Condition 6: (soft surface/feet together/eyes closed) 30", P  Condition 7: (Fakuda step test), measure distance varied from center starting position NT      Functional Gait Assessment:   1. Gait on level surface =  3~ 5 seconds to travel 6 m              (3) Normal: less than 5.5 sec, no A.D., no imbalance, normal gait pattern, deviates< 6in              (2) Mild impairment: 7-5.6 sec, uses A.D., mild gait deviations, or deviates 6-10 in              (1) Moderate impairment: > 7 sec, slow speed, imbalance, deviates 10-15 in.              (0) Severe impairment: needs assist, deviates >15 in, reach/touch wall  2. Change in Gait Speed = 3              (3) Normal: smooth change w/o loss of balance or gait deviation, deviates < 6 in, significant difference between speeds              (2) Mild impairment: changes speed, but demonstrates mild gait deviations, deviates 6-10 in, OR no deviations but unable to significantly speed, OR uses A.D.              (1) Moderate impairment: minor changes to speed, OR changes speed w/ significant deviations, deviates 10-15 in, OR  Changes speed , but loses balance & recovers              (0) Severe impairment: cannot change speed, deviates >15 in, or loses balance & needs assist  3. Gait with horizontal head turns  = 2~ change in speed              (3) Normal: no change in gait, deviates <6 in              (2) Mild " impairment: slight change in speed, deviates 6-10 in, OR uses A.D.              (1) Moderate impairment: moderate change in speed, deviates 10-15 in              (0) Severe impairment: severe disruption of gait, deviates >15in  4. Gait with vertical head turns = 2~ change in speed              (3) Normal: no change in gait, deviates <6 in              (2) Mild impairment: slight change in speed, deviates 6-10 in OR uses A.D.              (1) Moderate impairment: moderate change in speed, deviates 10-15 in              (0) Severe impairment: severe disruption of gait, deviates >15 in  5. Gait with pivot turns = 3              (3) Normal: performs safely in 3 sec, no LOB              (2) Mild impairment: performs in >3 sec & no LOB, OR turns safely & requires several steps to regain LOB              (1) Moderate impairment: turns slow, OR requires several small steps for balance following turn & stop              (0) Severe impairment: cannot turn safely, needs assist  6. Step over obstacle = 3              (3) Normal: steps over 2 stacked boxes w/o change in speed or LOB              (2) Mild impairment: able to step over 1 box w/o change in speed or LOB              (1) Moderate impairment: steps over 1 box but must slow down, may require VC              (0) Severe impairment: cannot perform w/o assist  7. Gait with Narrow DOROTHY = 3              (3) Normal: 10 steps no staggering              (2) Mild impairment: 7-9 steps              (1) Moderate impairment: 4-7 steps              (0) Severe impairment: < 4 steps or cannot perform w/o assist  8. Gait with eyes closed = 3              (3) Normal: < 7 sec, no A.D., no LOB, normal gait pattern, deviates <6 in              (2) Mild impairment: 7.1-9 sec, mild gait deviations, deviates 6-10 in              (1) Moderate impairment: > 9 sec, abnormal pattern, LOB, deviates 10-15 in              (0) Severe impairment: cannot perform w/o assist, LOB, deviates >15in  9.  Ambulating Backwards = 3              (3) Normal: no A.D., no LOB, normal gait pattern, deviates <6in              (2) Mild impairment: uses A.D., slower speed, mild gait deviations, deviates 6-10 in              (1) Moderate impairment: slow speed, abnormal gait pattern, LOB, deviates 10-15 in              (0) Severe impairment: severe gait deviations or LOB, deviates >15in  10. Steps = 3              (3) Normal: alternating feet, no rail              (2) Mild Impairment: alternating feet, uses rail              (1) Moderate impairment: step-to, uses rail              (0) Severe impairment: cannot perform safely     Score 28/30 at evaluation; 23/30 on 6/11/24; 28/30 on 7/11/24      Score:   <22/30 fall risk   <20/30 fall risk in older adults   <18/30 fall risk in Parkinsons              Treatment     Dylon received the treatments listed below:      therapeutic exercises to develop strength, endurance, ROM, and flexibility for 18 minutes including:  (Includes time for LE MMT and hamstring measurements)    X 8 minutes on SCI-FIT recumbent stepper, level 5, for B LE muscular and CV endurance       neuromuscular re-education activities to improve: Balance, Coordination, and Proprioception for 23 minutes. The following activities were included:    (Includes time for objective testing as listed above)      [employer] Balance Training:  Pt performs 2 trials of Random Control Training, platform set on level 8, 1 UE support trial 1, no UE support trial 2  Trial 1=99%   Trial 2= 92%      sensory integrative techniques to improve sensory processing for 0 minutes including:       therapeutic activities to improve functional performance for 0 minutes, including:        gait training to improve functional mobility and safety for 0 minutes, including:      Patient Education and Home Exercises       Education provided:   - went through Re+Active PT FMD workbook focusing on emotional well-being, nutrition, stress reduction   -6/11/24:  PT provides 3 exercises for home. Test results reviewed.  -6/27/24: HEP updated. PT and pt discuss the fact that pt is scheduled until July 12. PT and pt agree that this will be an appropriate time for pt to discharge from therapy.     Written Home Exercises Provided: will provided at later date; home exercise program focused on breathing and stress management.    PHYSICAL THERAPY DISCHARGE SUMMARY     Status Towards Goals Met:     Dylon completed her therapy episode today with mostly good results. She increased her LE MMT scores for R hip flexion, adduction and extension. L LE scores increased for knee extension and hip extension. Her FGA score of 28/30 is consistent with her score at time of initial evaluation. Her FOTO intake score also improved to 60. Unfortunately, she did not test as well with SLS today. She also did not show any improvement with B LE hamstring length as tested with SLR. Overall, PT feels pt is doing better with regard to R ankle pain and tendency for pt to walk on the lateral border of her foot has decreased over time( though this was present today during some portions of objective testing). PT and pt agree that this is a good point to stop her formal therapy sessions for awhile.    Goals:   Short Term Goals: 4 weeks   Pt will be issued first installment of HEP and report at least partial compliance. ~ MET, 7/11/24   Pt will increase R LE SLS to 9 seconds or > for improved overall balance and ankle stability. ~ MET, 6/11/24, NOT MET, 7/11/24  Pt will increase score on condition # 4 of the KIMBERLY to 20 seconds to demonstrate improved balance in tight spaces with limited vision. ~ MET, 6/11/24   Pt will increase strength grade for R ankle plantarflexion to 4+/5 for improved stability during ambulation and exercise. ~ MET, 6/11/24     Long Term Goals: 8 weeks   Pt will be at least partially compliant with finalized HEP to help maintain potential gains realized in PT.~ MET, 7/11/24   Pt will  increase FOTO intake score to 60 % for improved self-perception of deficits. ~ MET, 7/11/24  Pt will increase R LE SLS to 12 seconds or > for improved overall balance and ankle stability. ~ MET, 6/11/24, NOT MET, 7/11/24  Pt will increase score on condition # 4 of the KIMBERLY to 25 seconds to demonstrate improved balance in tight spaces with limited vision. Ongoing and progressing, MET, 7/11/24   Pt will demonstrate at least 5 degree increase in B hamstring flexibility( as measured by SLR) to aid in decreasing back pain and perhaps allow more normal gait pattern with respect to R ankle/foot.~ NOT MET, 7/11/24    Plan           Goals Not achieved and why:       Dylon did not meet goals for B SLS( though these goals were met at her previous reassessment) and B hamstring length( likely due to pt not performing stretching on a consistent basis).        Discharge reason : Patient has maximized benefit from PT at this time    PLAN   This patient is discharged from Outpatient Physical Therapy Services.     Fer Newton, PT  07/11/2024

## 2024-07-11 ENCOUNTER — CLINICAL SUPPORT (OUTPATIENT)
Dept: REHABILITATION | Facility: HOSPITAL | Age: 44
End: 2024-07-11
Payer: MEDICARE

## 2024-07-11 DIAGNOSIS — R26.89 BALANCE PROBLEM: Primary | ICD-10-CM

## 2024-07-11 DIAGNOSIS — R29.898 ABNORMAL MUSCLE TONE: ICD-10-CM

## 2024-07-11 PROCEDURE — 97112 NEUROMUSCULAR REEDUCATION: CPT | Mod: KX,PO

## 2024-07-11 PROCEDURE — 97110 THERAPEUTIC EXERCISES: CPT | Mod: KX,PO

## 2024-07-16 ENCOUNTER — HOSPITAL ENCOUNTER (OUTPATIENT)
Facility: OTHER | Age: 44
Discharge: HOME | End: 2024-07-16
Attending: ANESTHESIOLOGY | Admitting: ANESTHESIOLOGY
Payer: MEDICARE

## 2024-07-16 VITALS
OXYGEN SATURATION: 98 % | BODY MASS INDEX: 18.96 KG/M2 | HEART RATE: 67 BPM | RESPIRATION RATE: 18 BRPM | DIASTOLIC BLOOD PRESSURE: 69 MMHG | HEIGHT: 72 IN | WEIGHT: 140 LBS | TEMPERATURE: 97 F | SYSTOLIC BLOOD PRESSURE: 117 MMHG

## 2024-07-16 DIAGNOSIS — M46.1 SACROILIITIS: ICD-10-CM

## 2024-07-16 DIAGNOSIS — M54.17 LUMBOSACRAL RADICULOPATHY: Primary | ICD-10-CM

## 2024-07-16 DIAGNOSIS — G89.29 CHRONIC PAIN: ICD-10-CM

## 2024-07-16 PROCEDURE — 27096 INJECT SACROILIAC JOINT: CPT | Mod: 50,,, | Performed by: ANESTHESIOLOGY

## 2024-07-16 PROCEDURE — 25000003 PHARM REV CODE 250: Performed by: ANESTHESIOLOGY

## 2024-07-16 PROCEDURE — 25500020 PHARM REV CODE 255: Performed by: ANESTHESIOLOGY

## 2024-07-16 PROCEDURE — 27096 INJECT SACROILIAC JOINT: CPT | Mod: 50 | Performed by: ANESTHESIOLOGY

## 2024-07-16 PROCEDURE — 63600175 PHARM REV CODE 636 W HCPCS: Performed by: ANESTHESIOLOGY

## 2024-07-16 RX ORDER — SODIUM CHLORIDE 9 MG/ML
INJECTION, SOLUTION INTRAVENOUS CONTINUOUS
Status: DISCONTINUED | OUTPATIENT
Start: 2024-07-16 | End: 2024-07-16 | Stop reason: HOSPADM

## 2024-07-16 RX ORDER — FENTANYL CITRATE 50 UG/ML
INJECTION, SOLUTION INTRAMUSCULAR; INTRAVENOUS
Status: DISCONTINUED | OUTPATIENT
Start: 2024-07-16 | End: 2024-07-16 | Stop reason: HOSPADM

## 2024-07-16 RX ORDER — BUPIVACAINE HYDROCHLORIDE 2.5 MG/ML
INJECTION, SOLUTION EPIDURAL; INFILTRATION; INTRACAUDAL
Status: DISCONTINUED | OUTPATIENT
Start: 2024-07-16 | End: 2024-07-16 | Stop reason: HOSPADM

## 2024-07-16 RX ORDER — LIDOCAINE HYDROCHLORIDE 20 MG/ML
INJECTION, SOLUTION INFILTRATION; PERINEURAL
Status: DISCONTINUED | OUTPATIENT
Start: 2024-07-16 | End: 2024-07-16 | Stop reason: HOSPADM

## 2024-07-16 RX ORDER — MIDAZOLAM HYDROCHLORIDE 1 MG/ML
INJECTION INTRAMUSCULAR; INTRAVENOUS
Status: DISCONTINUED | OUTPATIENT
Start: 2024-07-16 | End: 2024-07-16 | Stop reason: HOSPADM

## 2024-07-16 RX ORDER — TRIAMCINOLONE ACETONIDE 40 MG/ML
INJECTION, SUSPENSION INTRA-ARTICULAR; INTRAMUSCULAR
Status: DISCONTINUED | OUTPATIENT
Start: 2024-07-16 | End: 2024-07-16 | Stop reason: HOSPADM

## 2024-07-16 NOTE — DISCHARGE INSTRUCTIONS
Adult Procedural Sedation Instructions    Recovery After Procedural Sedation (Adult)  You have been given medicine by vein to make you sleep during your surgery. This may have included both a pain medicine and sleeping medicine. Most of the effects have worn off. But you may still have some drowsiness for the next 6 to 8 hours.  Home care  Follow these guidelines when you get home:  For the next 8 hours, you should be watched by a responsible adult. This person should make sure your condition is not getting worse.  Don't drink any alcohol for the next 24 hours.  Don't drive, operate dangerous machinery, or make important business or personal decisions during the next 24 hours.  Note: Your healthcare provider may tell you not to take any medicine by mouth for pain or sleep in the next 4 hours. These medicines may react with the medicines you were given in the hospital. This could cause a much stronger response than usual.  Follow-up care  Follow up with your healthcare provider if you are not alert and back to your usual level of activity within 12 hours.  When to seek medical advice  Call your healthcare provider right away if any of these occur:  Drowsiness gets worse  Weakness or dizziness gets worse  Repeated vomiting  You can't be awakened   Date Last Reviewed: 10/18/2016  © 5593-8103 [employer]. [address] [place] [address], [place] [address]. All rights reserved. This information is not intended as a substitute for professional medical care. Always follow your healthcare professional's instructions.         Thank you for allowing us to care for you today. You may receive a survey about the care we provided. Your feedback is valuable and helps us provide excellent care throughout the community.     Home Care Instructions for Pain Management:    1. DIET:   You may resume your normal diet today.   2. BATHING:   You may shower with luke warm water. No tub baths or anything that will soak injection sites  under water for the next 24 hours.  3. DRESSING:   You may remove your bandage today.   4. ACTIVITY LEVEL:   You may resume your normal activities 24 hrs after your procedure. Nothing strenuous today.  5. MEDICATIONS:   You may resume your normal medications today. To restart blood thinners, ask your doctor.  6. DRIVING    If you have received any sedatives by mouth today, you may not drive for 12 hours.    If you have received any sedation through your IV, you may not drive for 24 hrs.   7. SPECIAL INSTRUCTIONS:   No heat to the injection site for 24 hrs including, hot bath or shower, heating pad, moist heat, or hot tubs.    Use ice pack to injection site for any pain or discomfort.  Apply ice packs for 20 minute intervals as needed.    IF you have diabetes, be sure to monitor your blood sugar more closely. IF your injection contained steroids your blood sugar levels may become higher than normal.    If you are still having pain upon discharge:  Your pain may improve over the next 48 hours. The anesthetic (numbing medication) works immediately to 48 hours. IF your injection contained a steroid (anti-inflammatory medication), it takes approximately 3 days to start feeling relief and 7-10 days to see your greatest results from the medication. It is possible you may need subsequent injections. This would be discussed at your follow up appointment with pain management or your referring doctor.    Please call the PAIN MANAGEMENT office at [phone] or ON CALL pager at [phone] if you experienced any:   -Weakness or loss of sensation  -Fever > 101.5  -Pain uncontrolled with oral medications   -Persistent nausea, vomiting, or diarrhea  -Redness or drainage from the injection sites, or any other worrisome concerns.   If physician on call was not reached or could not communicate with our office for any reason please go to the nearest emergency department.

## 2024-07-16 NOTE — DISCHARGE SUMMARY
Discharge Note  Short Stay      SUMMARY     Admit Date: 2024    Attending Physician: Malathi Mcmanus      Discharge Physician: Malathi Mcmanus      Discharge Date: 2024 11:10 AM    Procedure(s) (LRB):  INJECTION,SACROILIAC JOINT BILATERAL (Bilateral)    Final Diagnosis: Sacroiliac joint pain [M53.3]    Disposition: Home or self care    Patient Instructions:   Current Discharge Medication List        CONTINUE these medications which have NOT CHANGED    Details   aspirin 81 MG Chew Take 81 mg by mouth once daily.      atorvastatin (LIPITOR) 80 MG tablet       azelastine (ASTELIN) 137 mcg (0.1 %) nasal spray USE 1 TO 2 SPRAYS IN EACH NOSTRIL TWICE DAILY FOR CONGESTION      baclofen (LIORESAL) 20 MG tablet Take 1 tablet by mouth 3 (three) times daily as needed.       benztropine (COGENTIN) 0.5 MG tablet Take 0.5 mg by mouth once daily.      busPIRone (BUSPAR) 10 MG tablet Tale 1 tablet Orally Twice a day 30 days  Qty: 60 tablet, Refills: 0      butalbital-acetaminophen-caffeine -40 mg (FIORICET, ESGIC) -40 mg per tablet Take 1 tablet by mouth every 4 (four) hours as needed.      butorphanol (STADOL) 10 mg/mL nasal spray 2 sprays by Nasal route every 4 (four) hours as needed for pain  Qty: 100 mL, Refills: 5    Comments: Quantity prescribed more than 7 day supply? Press F2 and select one:[id number]        cyclobenzaprine (FLEXERIL) 10 MG tablet TK 1 T PO Q 8 H PRF PAIN      diazePAM (VALIUM) 2 MG tablet Take 2 mg by mouth 2 (two) times daily as needed.      erenumab-aooe (AIMOVIG AUTOINJECTOR SUBQ) Inject into the skin.      EScitalopram oxalate (LEXAPRO) 20 MG tablet Take 1 tablet (20 mg total) by mouth once daily.  Qty: 30 tablet, Refills: 0      estradiol valerate (DELESTROGEN) 20 mg/mL injection SMARTSI.3 Milliliter(s) IM Once a Week      evolocumab (REPATHA SURECLICK) 140 mg/mL PnIj Inject 1 mL (140 mg total) into the skin every 14 (fourteen) days.  Qty: 6 mL, Refills: 3    Associated Diagnoses:  Hyperlipidemia, unspecified hyperlipidemia type; Atherosclerosis of native coronary artery of native heart without angina pectoris      ezetimibe (ZETIA) 10 mg tablet Take 1 tablet (10 mg total) by mouth once daily.  Qty: 90 tablet, Refills: 3      fluticasone (FLONASE) 50 mcg/actuation nasal spray SPRAY TWICE IEN QD  Refills: 5      gabapentin (NEURONTIN) 300 MG capsule Take 1 capsule (300 mg total) by mouth 3 (three) times daily.  Qty: 90 capsule, Refills: 3    Associated Diagnoses: Lumbar radiculopathy      HYDROcodone-acetaminophen (NORCO) 5-325 mg per tablet Take 1 tablet by mouth every 6 (six) hours as needed for Pain.  Qty: 15 tablet, Refills: 0    Comments: Quantity prescribed more than 7 day supply? No  Associated Diagnoses: Acute right ankle pain; Pain in joint involving right ankle and foot      hydrocortisone (ANUSOL-HC) 2.5 % rectal cream Place rectally 2 (two) times daily.  Qty: 28 g, Refills: 1    Associated Diagnoses: Hemorrhoid prolapse      hydrOXYzine pamoate (VISTARIL) 50 MG Cap Take  mg by mouth nightly as needed.      ketorolac (TORADOL) 10 mg tablet Pt takes PRN      levETIRAcetam (KEPPRA) 1000 MG tablet Take 1,000 mg by mouth 2 (two) times daily.      methocarbamoL (ROBAXIN) 750 MG Tab Take 750 mg by mouth 3 (three) times daily.      metoclopramide HCl (REGLAN) 10 MG tablet 10 mg.      NARCAN 4 mg/actuation Spry SPRAY 0.1ML IN 1 NOSTRIL MAY REPEAT DOSE EVERY 2-3 MINUTES AS NEEDED ALTERNATING NOSTRILS EACH DOSE  Qty: 1 each, Refills: 3    Associated Diagnoses: Chronic pain disorder; Lumbar radiculopathy      nitroGLYCERIN (NITROSTAT) 0.4 MG SL tablet Place 1 tablet (0.4 mg total) under the tongue every 5 (five) minutes as needed for Chest pain. Repeat twice as needed for a maximum total dose of 3 tablets. If still having chest pain, go to the emergency room.  Qty: 25 tablet, Refills: 4      NURTEC 75 mg odt Take 75 mg by mouth as needed for Migraine.      onabotulinumtoxina (BOTOX) 200  unit SolR Inject 200 Units into the muscle.      ondansetron (ZOFRAN) 4 MG tablet Take 1 tablet (4 mg total) by mouth every 6 (six) hours as needed for Nausea.  Qty: 12 tablet, Refills: 0      ondansetron (ZOFRAN-ODT) 8 MG TbDL Take 8 mg by mouth 2 (two) times daily.      oxybutynin (DITROPAN-XL) 10 MG 24 hr tablet TAKE 1 TABLET(10 MG) BY MOUTH EVERY DAY  Qty: 30 tablet, Refills: 11      pantoprazole (PROTONIX) 20 MG tablet Take 20 mg by mouth.      prazosin (MINIPRESS) 2 MG Cap Tale 1 capsule Orally twice daily 30 days  Qty: 60 capsule, Refills: 0    Comments: .      prochlorperazine (COMPAZINE) 10 MG tablet Take 10 mg by mouth 3 (three) times daily. Pt takes PRN      propranoloL (INDERAL LA) 60 MG 24 hr capsule Take 60 mg by mouth every evening.      rosuvastatin (CRESTOR) 20 MG tablet Take 1 tablet (20 mg total) by mouth once daily.  Qty: 90 tablet, Refills: 9      spironolactone (ALDACTONE) 25 MG tablet Take 25 mg by mouth once daily.      tamsulosin (FLOMAX) 0.4 mg Cap TAKE 1 CAPSULE(0.4 MG) BY MOUTH EVERY DAY  Qty: 30 capsule, Refills: 11      traZODone (DESYREL) 100 MG tablet Take 2 tablet at bedtime as needed Orally 30 days  Qty: 60 tablet, Refills: 0      verapamiL (VERELAN) 240 MG C24P Take 240 mg by mouth Daily.                 Discharge Diagnosis: Sacroiliac joint pain [M53.3]  Condition on Discharge: Stable with no complications to procedure   Diet on Discharge: Same as before.  Activity: as per instruction sheet.  Discharge to: Home with a responsible adult.  Follow up: 2-4 weeks       Please call my office or pager at [phone] if experienced any weakness or loss of sensation, fever > 101.5, pain uncontrolled with oral medications, persistent nausea/vomiting/or diarrhea, redness or drainage from the incisions, or any other worrisome concerns. If physician on call was not reached or could not communicate with our office for any reason please go to the nearest emergency department

## 2024-07-16 NOTE — OP NOTE
Sacroiliac Joint Injection under Fluoroscopic Guidance    The procedure, risks, benefits, and options were discussed with the patient. There are no contraindications to the procedure. The patent expressed understanding and agreed to the procedure. Informed written consent was obtained prior to the start of the procedure and can be found in the patient's chart.    PATIENT NAME: Godron Griffin   MRN: [id number]     DATE OF PROCEDURE: 07/16/2024    PROCEDURE: Bilateral Sacroiliac Joint Injection under Fluoroscopic Guidance    PRE-OP DIAGNOSIS: Sacroiliac joint pain [M53.3]    POST-OP DIAGNOSIS: Same    PHYSICIAN: Larry Brasher MD    ASSISTANTS: Malathi Mcmanus MD and Matti Ulloa MD  [facility]       MEDICATIONS INJECTED: Preservative-free Kenalog 40mg with 3cc of Bupivacine 0.25%     LOCAL ANESTHETIC INJECTED: Xylocaine 2%     SEDATION: Versed 2mg and Fentanyl 75mcg                                                                                                                                                                                     Conscious sedation ordered by M.D. Patient re-evaluation prior to administration of conscious sedation. No changes noted in patient's status from initial evaluation. The patient's vital signs were monitored by RN and patient remained hemodynamically stable throughout the procedure.    Event Time In   Sedation Start 1119   Sedation End 1128       ESTIMATED BLOOD LOSS: None    COMPLICATIONS: None    TECHNIQUE: Time-out was performed to identify the patient and procedure to be performed. With the patient laying in a prone position, the surgical area was prepped and draped in the usual sterile fashion using ChloraPrep and a fenestrated drape. The sacroiliac joint was determined under fluoroscopy guidance. Skin anesthesia was achieved by injecting Lidocaine 2% over the injection site. The sacroiliac joint was  then approached with a 25 gauge, 3.5 inch spinal quinke needle  that was introduced under fluoroscopic guidance in the AP and Lateral views. Once the needle tip was in the area of the joint, and there was no blood, contrast dye Omnipaque (300mg/mL) was injected to confirm placement and there was no vascular runoff. Fluoroscopic imaging in the AP and lateral views revealed a clear outline of the joint space. 4 mL of the medication mixture listed above was injected slowly intraarticular and hanh-articular. Displacement of the radio opaque contrast after injection of the medication confirmed that the medication went into the area of the joint. The needles were removed and bleeding was nil. The same procedure was repeated on the contralateral side. A sterile dressing was applied. No specimens collected. The patient tolerated the procedure well.       The patient was monitored after the procedure in the recovery area. They were given post-procedure and discharge instructions to follow at home. The patient was discharged in a stable condition.      Malathi Mcmanus MD      I reviewed and edited the fellow's note. I conducted my own interview and physical examination. I agree with the findings. I was present and supervising all critical portions of the procedure.    Larry Brasher MD

## 2024-07-23 ENCOUNTER — OFFICE VISIT (OUTPATIENT)
Dept: ORTHOPEDICS | Facility: CLINIC | Age: 44
End: 2024-07-23
Payer: MEDICARE

## 2024-07-23 DIAGNOSIS — M17.11 PRIMARY OSTEOARTHRITIS OF RIGHT KNEE: Primary | ICD-10-CM

## 2024-07-23 DIAGNOSIS — M17.12 PRIMARY OSTEOARTHRITIS OF LEFT KNEE: ICD-10-CM

## 2024-07-23 PROCEDURE — 99499 UNLISTED E&M SERVICE: CPT | Mod: S$GLB,,, | Performed by: PHYSICIAN ASSISTANT

## 2024-07-23 PROCEDURE — 1160F RVW MEDS BY RX/DR IN RCRD: CPT | Mod: CPTII,S$GLB,, | Performed by: PHYSICIAN ASSISTANT

## 2024-07-23 PROCEDURE — 20610 DRAIN/INJ JOINT/BURSA W/O US: CPT | Mod: LT,S$GLB,, | Performed by: PHYSICIAN ASSISTANT

## 2024-07-23 PROCEDURE — 1159F MED LIST DOCD IN RCRD: CPT | Mod: CPTII,S$GLB,, | Performed by: PHYSICIAN ASSISTANT

## 2024-07-23 PROCEDURE — 99999 PR PBB SHADOW E&M-EST. PATIENT-LVL IV: CPT | Mod: PBBFAC,,, | Performed by: PHYSICIAN ASSISTANT

## 2024-07-23 RX ORDER — TRIAMCINOLONE ACETONIDE 40 MG/ML
40 INJECTION, SUSPENSION INTRA-ARTICULAR; INTRAMUSCULAR
Status: DISCONTINUED | OUTPATIENT
Start: 2024-07-23 | End: 2024-07-23 | Stop reason: HOSPADM

## 2024-07-23 RX ORDER — LIDOCAINE HYDROCHLORIDE 10 MG/ML
2 INJECTION INFILTRATION; PERINEURAL
Status: DISCONTINUED | OUTPATIENT
Start: 2024-07-23 | End: 2024-07-23 | Stop reason: HOSPADM

## 2024-07-23 RX ADMIN — TRIAMCINOLONE ACETONIDE 40 MG: 40 INJECTION, SUSPENSION INTRA-ARTICULAR; INTRAMUSCULAR at 09:07

## 2024-07-23 RX ADMIN — LIDOCAINE HYDROCHLORIDE 2 ML: 10 INJECTION INFILTRATION; PERINEURAL at 09:07

## 2024-07-23 NOTE — PROGRESS NOTES
"Patient ID: Gordon Griffin is a 44 y.o. adult.    Chief Complaint: Injections and Pain of the Left Knee      HISTORY:  Gordon Griffin is a 44 y.o. adult who returns to me today for follow up of bilateral knee pain.    Here today for left knee CSI  Would like to try orthovisc in right knee- had good relief in the past    PMH/PSH/FamHx/SocHx:    Unchanged from prior visit.    ROS:  Constitution: Negative for chills, fever and weakness.   Respiratory: Negative for cough and shortness of breath.   Musculoskeletal: Positive for bilateral kne  Psychiatric/Behavioral: The patient is not nervous/anxious.       PHYSICAL EXAM:   Bilateral knee  Skin intact  No effusions  FROM  Stable to testing    ASSESSMENT/PLAN:    Gordon Davies" was seen today for injections and pain.    Diagnoses and all orders for this visit:    Primary osteoarthritis of right knee  -     sodium hyaluronate (orthovisc) 30 mg  -     Prior authorization Order    Primary osteoarthritis of left knee  -     Large Joint Aspiration/Injection: L knee    - Left knee CSI performed   - Right knee orthovisc ordered- follow up pending auth    MEDICAL NECESSITY FOR VISCOSUPPLEMENTATION:  A thorough evaluation of the patient, have determined that viscosupplementation is medically necessary.  The patient has painful DJD of the knee with failure of conservative therapy including lifestyle modifications and rehabilitation exercises.  Oral analgesics/NSAIDs have not adequately controlled symptoms and there is radiographic evidence of joint space narrowing, subchondral sclerosis, and osteophyte formation - Kellgren Misael grade 2    "

## 2024-07-23 NOTE — PROCEDURES
Large Joint Aspiration/Injection: L knee    Date/Time: 7/23/2024 9:30 AM    Performed by: Vale Neil PA-C  Authorized by: Vale Neil PA-C    Consent Done?:  Yes (Verbal)  Indications:  Pain  Timeout: prior to procedure the correct patient, procedure, and site was verified    Prep: patient was prepped and draped in usual sterile fashion    Local anesthetic:  Topical anesthetic    Details:  Needle Size:  22 G  Approach:  Anterolateral  Location:  Knee  Site:  L knee  Medications:  40 mg triamcinolone acetonide 40 mg/mL; 2 mL LIDOcaine HCL 10 mg/ml (1%) 10 mg/mL (1 %)  Patient tolerance:  Patient tolerated the procedure well with no immediate complications

## 2024-07-25 ENCOUNTER — OFFICE VISIT (OUTPATIENT)
Dept: PAIN MEDICINE | Facility: CLINIC | Age: 44
End: 2024-07-25
Payer: MEDICARE

## 2024-07-25 VITALS
RESPIRATION RATE: 18 BRPM | SYSTOLIC BLOOD PRESSURE: 110 MMHG | WEIGHT: 133.81 LBS | DIASTOLIC BLOOD PRESSURE: 71 MMHG | BODY MASS INDEX: 18.15 KG/M2 | HEART RATE: 94 BPM

## 2024-07-25 DIAGNOSIS — M54.16 LUMBAR RADICULOPATHY: ICD-10-CM

## 2024-07-25 DIAGNOSIS — M54.12 CERVICAL RADICULOPATHY: Primary | ICD-10-CM

## 2024-07-25 DIAGNOSIS — G89.4 CHRONIC PAIN DISORDER: ICD-10-CM

## 2024-07-25 PROCEDURE — 3008F BODY MASS INDEX DOCD: CPT | Mod: CPTII,S$GLB,, | Performed by: NURSE PRACTITIONER

## 2024-07-25 PROCEDURE — 3074F SYST BP LT 130 MM HG: CPT | Mod: CPTII,S$GLB,, | Performed by: NURSE PRACTITIONER

## 2024-07-25 PROCEDURE — 3078F DIAST BP <80 MM HG: CPT | Mod: CPTII,S$GLB,, | Performed by: NURSE PRACTITIONER

## 2024-07-25 PROCEDURE — 1160F RVW MEDS BY RX/DR IN RCRD: CPT | Mod: CPTII,S$GLB,, | Performed by: NURSE PRACTITIONER

## 2024-07-25 PROCEDURE — 99999 PR PBB SHADOW E&M-EST. PATIENT-LVL V: CPT | Mod: PBBFAC,,, | Performed by: NURSE PRACTITIONER

## 2024-07-25 PROCEDURE — 99213 OFFICE O/P EST LOW 20 MIN: CPT | Mod: S$GLB,,, | Performed by: NURSE PRACTITIONER

## 2024-07-25 PROCEDURE — 1159F MED LIST DOCD IN RCRD: CPT | Mod: CPTII,S$GLB,, | Performed by: NURSE PRACTITIONER

## 2024-07-25 NOTE — H&P (VIEW-ONLY)
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 testicles 05/22/2023    Stroke pt. states he had a cva at 3 months old    Thrombocytopenia, unspecified 3/30/2012       Past Surgical History:  Past Surgical History:   Procedure Laterality Date    ANGIOGRAM, CORONARY, WITH LEFT HEART CATHETERIZATION      EPIDURAL STEROID INJECTION N/A 3/26/2021    Procedure: INJECTION, STEROID, EPIDURAL L4/5;  Surgeon: Larry Brasher MD;  Location: [facility] PAIN [facility]GT;  Service: Pain Management;  Laterality: N/A;    EPIDURAL STEROID INJECTION N/A 6/4/2021    Procedure: INJECTION, STEROID, EPIDURAL, L4-L5 IL need consent;  Surgeon: Larry Brasher MD;  Location: [facility] PAIN [facility]GT;  Service: Pain Management;  Laterality: N/A;    EPIDURAL STEROID INJECTION N/A 10/29/2021    Procedure: INJECTION, STEROID, EPIDURAL, L4-L5IL NEED CONSENT;  Surgeon: Larry Brasher MD;  Location: [facility]IN [facility];  Service: Pain Management;  Laterality: N/A;    EPIDURAL STEROID INJECTION N/A 1/27/2022    Procedure: Injection, Steroid, Epidural C7/T1;  Surgeon: Larry Brasher MD;  Location: [facility]IN [facility];  Service: Pain Management;  Laterality: N/A;    EPIDURAL STEROID INJECTION N/A 2/10/2022    Procedure: Injection, Steroid, Epidural L4/5;  Surgeon: Larry Brasher MD;  Location: [facility];  Service: Pain Management;  Laterality: N/A;    EPIDURAL STEROID INJECTION N/A 8/25/2022    Procedure: Injection, Steroid, Epidural C7/T1 CONTRAST;   Surgeon: Larry Brasher MD;  Location: [facility] PAIN MGT;  Service: Pain Management;  Laterality: N/A;    EPIDURAL STEROID INJECTION N/A 5/26/2023    Procedure: INJECTION, STEROID, EPIDURAL C7/T1 IL;  Surgeon: Larry Brasher MD;  Location: [facility] PAIN [facility]GT;  Service: Pain Management;  Laterality: N/A;    EPIDURAL STEROID INJECTION N/A 10/13/2023    Procedure: INJECTION, STEROID, EPIDURAL, C7-T1;  Surgeon: Larry Brasher MD;  Location: [facility] PAIN [facility]GT;  Service: Pain Management;  Laterality: N/A;    EPIDURAL STEROID INJECTION N/A 4/25/2024    Procedure: CERVICAL C7/T1 IL KYUNG *ASPIRIN OTC* HOLD FOR 5 DAYS;  Surgeon: Larry Brasher MD;  Location: [facility] PAIN [facility]GT;  Service: Pain Management;  Laterality: N/A;  [phone]  2 WK F/U TASHA    INJECTION OF ANESTHETIC AGENT AROUND NERVE Bilateral 5/6/2022    Procedure: BLOCK, NERVE, BILATERAL L3-L4-*L5 MEDIAL BRANCH;  Surgeon: Larry Brasher MD;  Location: [facility] PAIN [facility]GT;  Service: Pain Management;  Laterality: Bilateral;    INJECTION OF ANESTHETIC AGENT AROUND NERVE Bilateral 6/2/2022    Procedure: BLOCK, NERVE BILATERAL L3-L4-L5 MEDIAL BRANCH 2nd, needs consent;  Surgeon: Larry Brasher MD;  Location: [facility] PAIN [facility];  Service: Pain Management;  Laterality: Bilateral;    INJECTION, SACROILIAC JOINT Bilateral 6/9/2023    Procedure: INJECTION,SACROILIAC JOINT, BILATERAL SI;  Surgeon: Larry Brasher MD;  Location: [facility] PAIN [facility];  Service: Pain Management;  Laterality: Bilateral;    INJECTION, SACROILIAC JOINT Bilateral 7/16/2024    Procedure: INJECTION,SACROILIAC JOINT BILATERAL;  Surgeon: Larry Brasher MD;  Location: [facility] PAIN [facility]GT;  Service: Pain Management;  Laterality: Bilateral;  [phone]  2 WK F/U TASHA    MANDIBLE SURGERY      reconstruction    ORCHIECTOMY Bilateral 11/8/2023    Procedure: ORCHIECTOMY;  Surgeon: Ronald Mcgrath MD;  [facility];  Service: Urology;  Laterality: Bilateral;  1 hr    RADIOFREQUENCY ABLATION Right 6/23/2022    Procedure:  RADIOFREQUENCY ABLATION RIGHT L3,L4,L5 1 of 2, consent needed;  Surgeon: Larry Brasher MD;  Location: [facility] PAIN MGT;  Service: Pain Management;  Laterality: Right;    RADIOFREQUENCY ABLATION Left 7/7/2022    Procedure: RADIOFREQUENCY ABLATION LEFT L3,L4,L5 2 of 2, needs consent;  Surgeon: Larry Brasher MD;  Location: [facility] PAIN [facility]GT;  Service: Pain Management;  Laterality: Left;    RADIOFREQUENCY ABLATION Right 3/3/2023    Procedure: RADIOFREQUENCY ABLATION RIGHT L3,L4,L5;  Surgeon: Larry Brasher MD;  Location: [facility] PAIN [facility]GT;  Service: Pain Management;  Laterality: Right;    RADIOFREQUENCY ABLATION Left 3/31/2023    Procedure: Radiofrequency Ablation Left L3, L4, & L5 Pending CBC results;  Surgeon: Diana Lira MD;  Location: [facility]IN [facility];  Service: Pain Management;  Laterality: Left;    RADIOFREQUENCY ABLATION Bilateral 3/8/2024    Procedure: RADIOFREQUENCY ABLATION BILATERAL L3, 4, 5;  Surgeon: Larry Brasher MD;  Location: [facility]IN [facility];  Service: Pain Management;  Laterality: Bilateral;  [phone]  4 WK F/U VERONIQUE    variceol repair         Family History:  Family History   Problem Relation Name Age of Onset    Heart disease Mother      Myasthenia gravis Mother      Myasthenia gravis Father      Heart disease Father      Hypertension Father      Hyperlipidemia Father      Heart disease Paternal Uncle         Social History:  Social History     Socioeconomic History    Marital status:    Occupational History    Occupation: disable/   Tobacco Use    Smoking status: Never    Smokeless tobacco: Never   Substance and Sexual Activity    Alcohol use: No    Drug use: No    Sexual activity: Not Currently     Partners: Female   Social History Narrative    No stairs     Social Determinants of Health     Financial Resource Strain: Low Risk  (11/10/2023)    Overall Financial Resource Strain (CARDIA)     Difficulty of Paying Living Expenses: Not hard at all   Food Insecurity: No Food  Insecurity (11/10/2023)    Hunger Vital Sign     Worried About Running Out of Food in the Last Year: Never true     Ran Out of Food in the Last Year: Never true   Transportation Needs: No Transportation Needs (11/10/2023)    PRAPARE - Transportation     Lack of Transportation (Medical): No     Lack of Transportation (Non-Medical): No   Physical Activity: Sufficiently Active (10/23/2022)    [facility]    Exercise Vital Sign     Days of Exercise per Week: 5 days     Minutes of Exercise per Session: 60 min   Stress: Stress Concern Present (11/10/2023)    [employer] - Occupational Stress Questionnaire     Feeling of Stress : Rather much   Housing Stability: Unknown (11/10/2023)    Housing Stability Vital Sign     Unable to Pay for Housing in the Last Year: No     Unstable Housing in the Last Year: No       OBJECTIVE:    Vitals:    24 0834   BP: 110/71   Pulse: 94   Resp: 18   Weight: 60.7 kg (133 lb 13.1 oz)   PainSc:   7   PainLoc: Neck          PHYSICAL EXAMINATION:    General appearance: Well appearing, in no acute distress.  Psych:  Mood and affect appropriate.  Skin: Skin color, texture, turgor normal, no rashes or lesions to visible areas.  Head/face:  Atraumatic, normocephalic. No palpable lymph nodes  Cor: No lower extremity edema.  Capillary refill <2 seconds.  Pulm: Symmetric chest rise. No apparent respiratory distress.  Neck:  There is pain with palpation over cervical paraspinals. Limited ROM with pain on flexion. Spurling positive on the right.  Extremities: No deformities, edema, or skin discoloration. Good capillary refill.  Musculoskeletal: BUE strength is 5/5 and symmetric.  Neuro:  No loss of sensation is noted.  Gait: Antalgic- ambulates without assistance.     ASSESSMENT: 44 y.o. year old adult with neck and lower back pain, consistent with the followin. Cervical radiculopathy  Procedure Order to Pain Management      2. Chronic  pain disorder        3. Lumbar radiculopathy            PLAN:     - Previous imaging reviewed today.    - Schedule for C7/T1 IL KYUNG. Previous procedure provided 80% relief for 3 months.      - Continue Gabapentin.     - I have stressed the importance of physical activity and a home exercise plan to help with pain and improve health.    - RTC 2 weeks after above procedure.     The above plan and management options were discussed at length with patient. Patient is in agreement with the above and verbalized understanding.    Brittany Moser, KENYON   7/25/2024

## 2024-07-25 NOTE — PROGRESS NOTES
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 testicles 05/22/2023    Stroke pt. states he had a cva at 3 months old    Thrombocytopenia, unspecified 3/30/2012       Past Surgical History:  Past Surgical History:   Procedure Laterality Date    ANGIOGRAM, CORONARY, WITH LEFT HEART CATHETERIZATION      EPIDURAL STEROID INJECTION N/A 3/26/2021    Procedure: INJECTION, STEROID, EPIDURAL L4/5;  Surgeon: Larry Brasher MD;  Location: [facility];  Service: Pain Management;  Laterality: N/A;    EPIDURAL STEROID INJECTION N/A 6/4/2021    Procedure: INJECTION, STEROID, EPIDURAL, L4-L5 IL need consent;  Surgeon: Larry Brasher MD;  Location: [facility];  Service: Pain Management;  Laterality: N/A;    EPIDURAL STEROID INJECTION N/A 10/29/2021    Procedure: INJECTION, STEROID, EPIDURAL, L4-L5IL NEED CONSENT;  Surgeon: Larry Brasher MD;  Location: [facility];  Service: Pain Management;  Laterality: N/A;    EPIDURAL STEROID INJECTION N/A 1/27/2022    Procedure: Injection, Steroid, Epidural C7/T1;  Surgeon: Larry Brasher MD;  Location: [facility];  Service: Pain Management;  Laterality: N/A;    EPIDURAL STEROID INJECTION N/A 2/10/2022    Procedure: Injection, Steroid, Epidural L4/5;  Surgeon: Larry Brasher MD;  Location: [facility];  Service: Pain Management;  Laterality: N/A;    EPIDURAL STEROID INJECTION N/A 8/25/2022    Procedure: Injection, Steroid, Epidural C7/T1 CONTRAST;   Surgeon: Larry Brasher MD;  [facility]: [facility];  Service: Pain Management;  Laterality: N/A;    EPIDURAL STEROID INJECTION N/A 5/26/2023    Procedure: INJECTION, STEROID, EPIDURAL C7/T1 IL;  Surgeon: Larry Brasher MD;  [facility]: [facility];  Service: Pain Management;  Laterality: N/A;    EPIDURAL STEROID INJECTION N/A 10/13/2023    Procedure: INJECTION, STEROID, EPIDURAL, C7-T1;  Surgeon: Larry Brasher MD;  [facility];  Service: Pain Management;  Laterality: N/A;    EPIDURAL STEROID INJECTION N/A 4/25/2024    Procedure: CERVICAL C7/T1 IL KYUNG *ASPIRIN OTC* HOLD FOR 5 DAYS;  Surgeon: Larry Brasher MD;  Location: [facility];  Service: Pain Management;  Laterality: N/A;  [phone]  2 WK F/U TASHA    INJECTION OF ANESTHETIC AGENT AROUND NERVE Bilateral 5/6/2022    Procedure: BLOCK, NERVE, BILATERAL L3-L4-*L5 MEDIAL BRANCH;  Surgeon: Larry Brasher MD;  [facility]: [facility];  Service: Pain Management;  Laterality: Bilateral;    INJECTION OF ANESTHETIC AGENT AROUND NERVE Bilateral 6/2/2022    Procedure: BLOCK, NERVE BILATERAL L3-L4-L5 MEDIAL BRANCH 2nd, needs consent;  Surgeon: Larry Brasher MD;  [facility]: [facility];  Service: Pain Management;  Laterality: Bilateral;    INJECTION, SACROILIAC JOINT Bilateral 6/9/2023    Procedure: INJECTION,SACROILIAC JOINT, BILATERAL SI;  Surgeon: Larry Brasher MD;  [facility]: [facility];  Service: Pain Management;  Laterality: Bilateral;    INJECTION, SACROILIAC JOINT Bilateral 7/16/2024    Procedure: INJECTION,SACROILIAC JOINT BILATERAL;  Surgeon: Larry Brasher MD;  [facility]: [facility];  Service: Pain Management;  Laterality: Bilateral;  [phone]  2 WK F/U TASHA    MANDIBLE SURGERY      reconstruction    ORCHIECTOMY Bilateral 11/8/2023    Procedure: ORCHIECTOMY;  Surgeon: Ronald Mcgrath MD;  [facility];  Service: Urology;  Laterality: Bilateral;  1 hr    RADIOFREQUENCY ABLATION Right 6/23/2022    Procedure:  RADIOFREQUENCY ABLATION RIGHT L3,L4,L5 1 of 2, consent needed;  Surgeon: Larry Brasher MD;  Location: [facility];  Service: Pain Management;  Laterality: Right;    RADIOFREQUENCY ABLATION Left 7/7/2022    Procedure: RADIOFREQUENCY ABLATION LEFT L3,L4,L5 2 of 2, needs consent;  Surgeon: Larry Brasher MD;  Location: [facility];  Service: Pain Management;  Laterality: Left;    RADIOFREQUENCY ABLATION Right 3/3/2023    Procedure: RADIOFREQUENCY ABLATION RIGHT L3,L4,L5;  Surgeon: Larry Brasher MD;  Location: [facility];  Service: Pain Management;  Laterality: Right;    RADIOFREQUENCY ABLATION Left 3/31/2023    Procedure: Radiofrequency Ablation Left L3, L4, & L5 Pending CBC results;  Surgeon: Diana Lira MD;  Location: [facility];  Service: Pain Management;  Laterality: Left;    RADIOFREQUENCY ABLATION Bilateral 3/8/2024    Procedure: RADIOFREQUENCY ABLATION BILATERAL L3, 4, 5;  Surgeon: Larry Brasher MD;  Location: [facility];  Service: Pain Management;  Laterality: Bilateral;  [phone]  4 WK F/U VERONIQUE    variceol repair         Family History:  Family History   Problem Relation Name Age of Onset    Heart disease Mother      Myasthenia gravis Mother      Myasthenia gravis Father      Heart disease Father      Hypertension Father      Hyperlipidemia Father      Heart disease Paternal Uncle         Social History:  Social History     Socioeconomic History    Marital status:    Occupational History    Occupation: disable/   Tobacco Use    Smoking status: Never    Smokeless tobacco: Never   Substance and Sexual Activity    Alcohol use: No    Drug use: No    Sexual activity: Not Currently     Partners: Female   Social History Narrative    No stairs     Social Determinants of Health     Financial Resource Strain: Low Risk  (11/10/2023)    Overall Financial Resource Strain (CARDIA)     Difficulty of Paying Living Expenses: Not hard at all   Food Insecurity: No Food  Insecurity (11/10/2023)    Hunger Vital Sign     Worried About Running Out of Food in the Last Year: Never true     Ran Out of Food in the Last Year: Never true   Transportation Needs: No Transportation Needs (11/10/2023)    PRAPARE - Transportation     Lack of Transportation (Medical): No     Lack of Transportation (Non-Medical): No   Physical Activity: Sufficiently Active (10/23/2022)    [facility]    Exercise Vital Sign     Days of Exercise per Week: 5 days     Minutes of Exercise per Session: 60 min   Stress: Stress Concern Present (11/10/2023)    [employer] - Occupational Stress Questionnaire     Feeling of Stress : Rather much   Housing Stability: Unknown (11/10/2023)    Housing Stability Vital Sign     Unable to Pay for Housing in the Last Year: No     Unstable Housing in the Last Year: No       OBJECTIVE:    Vitals:    24 0834   BP: 110/71   Pulse: 94   Resp: 18   Weight: 60.7 kg (133 lb 13.1 oz)   PainSc:   7   PainLoc: Neck          PHYSICAL EXAMINATION:    General appearance: Well appearing, in no acute distress.  Psych:  Mood and affect appropriate.  Skin: Skin color, texture, turgor normal, no rashes or lesions to visible areas.  Head/face:  Atraumatic, normocephalic. No palpable lymph nodes  Cor: No lower extremity edema.  Capillary refill <2 seconds.  Pulm: Symmetric chest rise. No apparent respiratory distress.  Neck:  There is pain with palpation over cervical paraspinals. Limited ROM with pain on flexion. Spurling positive on the right.  Extremities: No deformities, edema, or skin discoloration. Good capillary refill.  Musculoskeletal: BUE strength is 5/5 and symmetric.  Neuro:  No loss of sensation is noted.  Gait: Antalgic- ambulates without assistance.     ASSESSMENT: 44 y.o. year old adult with neck and lower back pain, consistent with the followin. Cervical radiculopathy  Procedure Order to Pain Management      2. Chronic  pain disorder        3. Lumbar radiculopathy            PLAN:     - Previous imaging reviewed today.    - Schedule for C7/T1 IL KYUNG. Previous procedure provided 80% relief for 3 months.      - Continue Gabapentin.     - I have stressed the importance of physical activity and a home exercise plan to help with pain and improve health.    - RTC 2 weeks after above procedure.     The above plan and management options were discussed at length with patient. Patient is in agreement with the above and verbalized understanding.    Brittany Moser, KENYON   7/25/2024

## 2024-07-30 ENCOUNTER — PATIENT MESSAGE (OUTPATIENT)
Dept: PAIN MEDICINE | Facility: OTHER | Age: 44
End: 2024-07-30
Payer: MEDICARE

## 2024-08-05 ENCOUNTER — OFFICE VISIT (OUTPATIENT)
Dept: CARDIOLOGY | Facility: CLINIC | Age: 44
End: 2024-08-05
Payer: MEDICARE

## 2024-08-05 VITALS
WEIGHT: 140 LBS | HEART RATE: 98 BPM | HEIGHT: 72 IN | DIASTOLIC BLOOD PRESSURE: 80 MMHG | BODY MASS INDEX: 18.96 KG/M2 | OXYGEN SATURATION: 98 % | SYSTOLIC BLOOD PRESSURE: 118 MMHG

## 2024-08-05 DIAGNOSIS — I25.10 CORONARY ARTERY DISEASE INVOLVING NATIVE CORONARY ARTERY OF NATIVE HEART WITHOUT ANGINA PECTORIS: Primary | ICD-10-CM

## 2024-08-05 DIAGNOSIS — E78.00 PURE HYPERCHOLESTEROLEMIA: ICD-10-CM

## 2024-08-05 PROBLEM — I25.84 CORONARY ATHEROSCLEROSIS DUE TO CALCIFIED CORONARY LESION: Status: RESOLVED | Noted: 2019-11-15 | Resolved: 2024-08-05

## 2024-08-05 PROCEDURE — 1159F MED LIST DOCD IN RCRD: CPT | Mod: CPTII,S$GLB,, | Performed by: PHYSICIAN ASSISTANT

## 2024-08-05 PROCEDURE — 3074F SYST BP LT 130 MM HG: CPT | Mod: CPTII,S$GLB,, | Performed by: PHYSICIAN ASSISTANT

## 2024-08-05 PROCEDURE — 99999 PR PBB SHADOW E&M-EST. PATIENT-LVL V: CPT | Mod: PBBFAC,,, | Performed by: PHYSICIAN ASSISTANT

## 2024-08-05 PROCEDURE — 3008F BODY MASS INDEX DOCD: CPT | Mod: CPTII,S$GLB,, | Performed by: PHYSICIAN ASSISTANT

## 2024-08-05 PROCEDURE — 3079F DIAST BP 80-89 MM HG: CPT | Mod: CPTII,S$GLB,, | Performed by: PHYSICIAN ASSISTANT

## 2024-08-05 PROCEDURE — 1160F RVW MEDS BY RX/DR IN RCRD: CPT | Mod: CPTII,S$GLB,, | Performed by: PHYSICIAN ASSISTANT

## 2024-08-05 PROCEDURE — 99214 OFFICE O/P EST MOD 30 MIN: CPT | Mod: S$GLB,,, | Performed by: PHYSICIAN ASSISTANT

## 2024-08-05 RX ORDER — NITROGLYCERIN 0.4 MG/1
0.4 TABLET SUBLINGUAL EVERY 5 MIN PRN
Qty: 25 TABLET | Refills: 4 | Status: SHIPPED | OUTPATIENT
Start: 2024-08-05

## 2024-08-06 ENCOUNTER — OFFICE VISIT (OUTPATIENT)
Dept: ORTHOPEDICS | Facility: CLINIC | Age: 44
End: 2024-08-06
Payer: MEDICARE

## 2024-08-06 DIAGNOSIS — M17.11 PRIMARY OSTEOARTHRITIS OF RIGHT KNEE: Primary | ICD-10-CM

## 2024-08-06 PROCEDURE — 99999 PR PBB SHADOW E&M-EST. PATIENT-LVL IV: CPT | Mod: PBBFAC,,, | Performed by: PHYSICIAN ASSISTANT

## 2024-08-07 ENCOUNTER — OFFICE VISIT (OUTPATIENT)
Dept: DERMATOLOGY | Facility: CLINIC | Age: 44
End: 2024-08-07
Payer: MEDICARE

## 2024-08-07 ENCOUNTER — PATIENT MESSAGE (OUTPATIENT)
Dept: CARDIOLOGY | Facility: CLINIC | Age: 44
End: 2024-08-07
Payer: MEDICARE

## 2024-08-07 DIAGNOSIS — D22.9 MULTIPLE BENIGN NEVI: ICD-10-CM

## 2024-08-07 DIAGNOSIS — L81.4 LENTIGINES: Primary | ICD-10-CM

## 2024-08-07 DIAGNOSIS — Z12.83 SKIN CANCER SCREENING: ICD-10-CM

## 2024-08-07 DIAGNOSIS — L82.0 INFLAMED SEBORRHEIC KERATOSIS: ICD-10-CM

## 2024-08-07 DIAGNOSIS — D23.9 DERMATOFIBROMA: ICD-10-CM

## 2024-08-07 DIAGNOSIS — D18.01 CHERRY ANGIOMA: ICD-10-CM

## 2024-08-07 PROCEDURE — 1160F RVW MEDS BY RX/DR IN RCRD: CPT | Mod: CPTII,S$GLB,, | Performed by: DERMATOLOGY

## 2024-08-07 PROCEDURE — 99213 OFFICE O/P EST LOW 20 MIN: CPT | Mod: S$GLB,,, | Performed by: DERMATOLOGY

## 2024-08-07 PROCEDURE — 99999 PR PBB SHADOW E&M-EST. PATIENT-LVL IV: CPT | Mod: PBBFAC,,, | Performed by: DERMATOLOGY

## 2024-08-07 PROCEDURE — 1159F MED LIST DOCD IN RCRD: CPT | Mod: CPTII,S$GLB,, | Performed by: DERMATOLOGY

## 2024-08-07 NOTE — PROGRESS NOTES
Subjective:      Patient ID:  Gordon Griffin is a 44 y.o. adult who presents for   Chief Complaint   Patient presents with    Skin Check     Tbse      History of Present Illness: The patient presents for follow up of skin check.    The patient was last seen on: 8/9/2023 for skin check.    This is a high risk patient here to check for the development of new lesions.  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    Other skin complaints:   Patient with new area of concern:   Location: right ankle   Duration: about a year  Symptoms: dryness, scaby  Previous treatments: none       Pt states she had skin cancers on each temple and one on each leg at age 6. Unsure which type of skin cancer.     Review of Systems   Constitutional:  Negative for fever and chills.   Skin:  Positive for activity-related sunscreen use and wears hat (sometimes). Negative for daily sunscreen use and recent sunburn.   Hematologic/Lymphatic: Bruises/bleeds easily (on asa).       Objective:   Physical Exam   Constitutional: She appears well-developed and well-nourished. No distress.   Neurological: She is alert and oriented to person, place, and time. She is not disoriented.   Psychiatric: She has a normal mood and affect.   Skin:   Areas Examined (abnormalities noted in diagram):   Scalp / Hair Palpated and Inspected  Head / Face Inspection Performed  Neck Inspection Performed  Chest / Axilla Inspection Performed  Abdomen Inspection Performed  Genitals / Buttocks / Groin Inspection Performed  Back Inspection Performed  RUE Inspected  LUE Inspection Performed  RLE Inspected  LLE Inspection Performed  Nails and Digits Inspection Performed                 Diagram Legend     Erythematous scaling macule/papule c/w actinic keratosis       Vascular papule c/w angioma      Pigmented verrucoid papule/plaque c/w seborrheic keratosis      Yellow umbilicated papule c/w sebaceous hyperplasia      Irregularly shaped tan macule c/w lentigo     1-2 mm smooth white papules  consistent with Milia      Movable subcutaneous cyst with punctum c/w epidermal inclusion cyst      Subcutaneous movable cyst c/w pilar cyst      Firm pink to brown papule c/w dermatofibroma      Pedunculated fleshy papule(s) c/w skin tag(s)      Evenly pigmented macule c/w junctional nevus     Mildly variegated pigmented, slightly irregular-bordered macule c/w mildly atypical nevus      Flesh colored to evenly pigmented papule c/w intradermal nevus       Pink pearly papule/plaque c/w basal cell carcinoma      Erythematous hyperkeratotic cursted plaque c/w SCC      Surgical scar with no sign of skin cancer recurrence      Open and closed comedones      Inflammatory papules and pustules      Verrucoid papule consistent consistent with wart     Erythematous eczematous patches and plaques     Dystrophic onycholytic nail with subungual debris c/w onychomycosis     Umbilicated papule    Erythematous-base heme-crusted tan verrucoid plaque consistent with inflamed seborrheic keratosis     Erythematous Silvery Scaling Plaque c/w Psoriasis     See annotation      Assessment / Plan:        Lentigines  These are benign sun spots which should be monitored for changes. Patient instructed in importance of daily broad spectrum sunscreen use with spf at least 30. Sun avoidance and topical protection/protective clothing discussed.    Velazquez angioma  This is a benign vascular lesion. Reassurance given. No treatment required. Treatment of benign, asymptomatic lesions may be considered cosmetic.    Inflamed seborrheic keratosis  These are benign inherited growths without a malignant potential. Reassurance given to patient. No treatment is necessary.   Treatment of benign, asymptomatic lesions may be considered cosmetic.  Warned about risk of hypo- or hyperpigmentation with treatment and risk of recurrence.    Multiple benign nevi  Benign-appearing nevi present on exam today. Reassurance provided. Periodically examine moles and return  to clinic if any moles change or become symptomatic (bleeding, itching, pain, etc).    Dermatofibroma  Reassurance given to patient. No treatment is necessary.  This is benign scar tissue from previous minor trauma to the skin such as a bug bite.    Skin cancer screening  Total body skin examination performed today including at least 12 points as noted in physical examination. No lesions suspicious for malignancy noted.  Patient instructed in importance of daily broad spectrum sunscreen use with spf at least 30. Sun avoidance and topical protection/protective clothing discussed.       Follow up in about 1 year (around 8/7/2025) for skin check or sooner for any concerns.

## 2024-08-07 NOTE — PATIENT INSTRUCTIONS
Sun Protection      The [facility] would like to remind you of the importance of sun protection all year round and particularly during the summer when the suns rays are the strongest. It has been proven that both acute and chronic sun exposure damages our cells and leads to skin cancer. Beyond skin cancer, the sun causes 90% of the symptoms of premature skin aging, including wrinkles, lentigines (brown spots), and thin, easily bruised skin. Proper sun protection can help prevent these unwanted conditions.    Many patients report that they dont go in the sun. It has been shown that the average person receives 18 hours of incidental sun exposure per week during activities such as walking through parking lots, driving, or sitting next to windows. This accumulates to several bad sunburns per year!    In choosing sunscreen, you want one that protects against both UVA and UVB rays (broad spectrum). It is recommended that you use one of SPF 30 or higher. It is important to apply the sunscreen about 20 minutes prior to sun exposure. Most sunscreens are chemical sunscreens and a reaction must take place in the skin so that they are effective. If they are applied and then you are immediately exposed to the sun or start sweating, this reaction has not had time to take place and you are therefore unprotected. Sunscreen needs to be reapplied every 2 hours if you are participating in water sports or sweating. We recommend Elta MD or CeraVe sunscreens for daily use; however there are many options and it is most important for you to find one that you will use on a consistent basis.    If you have sensitive skin, you may do best with a sunscreen that contains only physical blockers in the active ingredient section. The only physical blockers available in the [place] currently are titanium dioxide or zinc oxide. These are typically thicker and harder to apply, however they afford very good protection.  Neutrogena Sensitive Skin, Blue Lizard Sensitive Skin (pink top) or Neutrogena Pure and Free are popular ones.     Aside from sunscreen, clothes with UV protection (UPF), wide brimmed hats, and sunglasses are other means of sun protection that we recommend.      Based on a recent study (6/2021) and out of an abundance of caution, we are recommending that you AVOID the following sunscreens as they may contain the carcinogen, benzene:    Spray and gel sunscreens  Any [employer] or [employer] brands as well as Max Block and TopCare brands   Neutrogena Ultra Sheer Dry-touch Water Resistant Sunscreen LOTION SPF 70   Neutrogena Sheer Zinc Dry-touch Face Sunscreen LOTION SPF 50   5.   Aveeno Baby Continuous Protection Sensitive Skin Sunscreen LOTION - Broad Spectrum SPF 50    Please note that Benzene is not an ingredient or the degradation product of any ingredient in any sunscreen. This study suggested that the findings are a result of contamination in the manufacturing process. At this point, we don't know how effectively Benzene gets through the skin, if it gets absorbed systemically, and what effects it may have.     We do know that ultraviolet radiation is a well-established carcinogen. Please use daily sun protection/avoidance and use of at least SPF 30, broad-spectrum sunscreen not listed above.                       [facility] - DERMATOLOGY [address]  [address]  [place] [address]  Dept: [phone]  Dept Fax: [phone]   CRYOSURGERY      Your doctor has used a method called cryosurgery to treat your skin condition. Cryosurgery refers to the use of very cold substances to treat a variety of skin conditions such as warts, pre-skin cancers, molluscum contagiosum, sun spots, and several benign growths. The substance we use in cryosurgery is liquid nitrogen and is so cold (-195 degrees Celsius) that is burns when administered.     Following treatment in the office, the skin may  immediately burn and become red. You may find the area around the lesion is affected as well. It is sometimes necessary to treat not only the lesion, but a small area of the surrounding normal skin to achieve a good response.     A blister, and even a blood filled blister, may form after treatment.   This is a normal response. If the blister is painful, it is acceptable to sterilize a needle and with rubbing alcohol and gently pop the blister. It is important that you gently wash the area with soap and warm water as the blister fluid may contain wart virus if a wart was treated. Do no remove the roof of the blister.     The area treated can take anywhere from 1-3 weeks to heal. Healing time depends on the kind skin lesion treated, the location, and how aggressively the lesion was treated. It is recommended that the areas treated are covered with Vaseline or bacitracin ointment and a band-aid. If a band-aid is not practical, just ointment applied several times per day will do. Keeping these areas moist will speed the healing time.    Treatment with liquid nitrogen can leave a scar. In dark skin, it may be a light or dark scar, in light skin it may be a white or pink scar. These will generally fade with time, but may never go away completely.     If you have any concerns after your treatment, please feel free to call the office.       [facility] [place] [address]/ [phone] FAX/ [email or web address]

## 2024-08-15 ENCOUNTER — PATIENT MESSAGE (OUTPATIENT)
Dept: CARDIOLOGY | Facility: CLINIC | Age: 44
End: 2024-08-15
Payer: MEDICARE

## 2024-08-15 ENCOUNTER — OFFICE VISIT (OUTPATIENT)
Dept: ORTHOPEDICS | Facility: CLINIC | Age: 44
End: 2024-08-15
Payer: MEDICARE

## 2024-08-15 DIAGNOSIS — M17.11 PRIMARY OSTEOARTHRITIS OF RIGHT KNEE: Primary | ICD-10-CM

## 2024-08-15 PROCEDURE — 99999 PR PBB SHADOW E&M-EST. PATIENT-LVL III: CPT | Mod: PBBFAC,,, | Performed by: PHYSICIAN ASSISTANT

## 2024-08-15 NOTE — PROGRESS NOTES
Gordon Griffin presents to clinic today for the second right knee  orthovisc injection.  There has been no significant change in her medical status since her last visit. No fever, chills, malaise, or unexplained weight change.    Allergies, medications, past medical and surgical history were reviewed.    Exam demonstrates there is no effusion in the  right knee, and the skin is intact.    Diagnosis: right knee osteoarthritis    After time out was performed, verbal consent obtained and patient ID, side, and site were verified, the  right  knee was sterilly prepped in the standard fashion.  A 22-gauge needle was introduced into right knee joint from an dhaval-lateral site without complication and knee was then injected with 2 ml of orthovisc.  Sterile dressing was applied.  The patient was instructed to resume activities as tolerated and to call with any problems.     We will see Gordon Griffin back next week for the third injection.

## 2024-08-16 ENCOUNTER — TELEPHONE (OUTPATIENT)
Dept: PAIN MEDICINE | Facility: CLINIC | Age: 44
End: 2024-08-16
Payer: MEDICARE

## 2024-08-16 ENCOUNTER — HOSPITAL ENCOUNTER (OUTPATIENT)
Facility: OTHER | Age: 44
Discharge: HOME | End: 2024-08-16
Attending: ANESTHESIOLOGY | Admitting: ANESTHESIOLOGY
Payer: MEDICARE

## 2024-08-16 VITALS
HEIGHT: 72 IN | DIASTOLIC BLOOD PRESSURE: 73 MMHG | BODY MASS INDEX: 18.96 KG/M2 | SYSTOLIC BLOOD PRESSURE: 121 MMHG | RESPIRATION RATE: 18 BRPM | HEART RATE: 67 BPM | OXYGEN SATURATION: 99 % | TEMPERATURE: 98 F | WEIGHT: 140 LBS

## 2024-08-16 DIAGNOSIS — G89.29 CHRONIC PAIN: ICD-10-CM

## 2024-08-16 DIAGNOSIS — M54.12 CERVICAL RADICULOPATHY: Primary | ICD-10-CM

## 2024-08-16 PROCEDURE — 25500020 PHARM REV CODE 255: Performed by: ANESTHESIOLOGY

## 2024-08-16 PROCEDURE — 62321 NJX INTERLAMINAR CRV/THRC: CPT | Performed by: ANESTHESIOLOGY

## 2024-08-16 PROCEDURE — 25000003 PHARM REV CODE 250: Performed by: STUDENT IN AN ORGANIZED HEALTH CARE EDUCATION/TRAINING PROGRAM

## 2024-08-16 PROCEDURE — 63600175 PHARM REV CODE 636 W HCPCS: Performed by: ANESTHESIOLOGY

## 2024-08-16 PROCEDURE — 62321 NJX INTERLAMINAR CRV/THRC: CPT | Mod: ,,, | Performed by: ANESTHESIOLOGY

## 2024-08-16 PROCEDURE — 25000003 PHARM REV CODE 250: Performed by: ANESTHESIOLOGY

## 2024-08-16 RX ORDER — DEXAMETHASONE SODIUM PHOSPHATE 10 MG/ML
INJECTION INTRAMUSCULAR; INTRAVENOUS
Status: DISCONTINUED | OUTPATIENT
Start: 2024-08-16 | End: 2024-08-16 | Stop reason: HOSPADM

## 2024-08-16 RX ORDER — FENTANYL CITRATE 50 UG/ML
INJECTION, SOLUTION INTRAMUSCULAR; INTRAVENOUS
Status: DISCONTINUED | OUTPATIENT
Start: 2024-08-16 | End: 2024-08-16 | Stop reason: HOSPADM

## 2024-08-16 RX ORDER — MIDAZOLAM HYDROCHLORIDE 1 MG/ML
INJECTION INTRAMUSCULAR; INTRAVENOUS
Status: DISCONTINUED | OUTPATIENT
Start: 2024-08-16 | End: 2024-08-16 | Stop reason: HOSPADM

## 2024-08-16 RX ORDER — SODIUM CHLORIDE 9 MG/ML
INJECTION, SOLUTION INTRAVENOUS CONTINUOUS
Status: DISCONTINUED | OUTPATIENT
Start: 2024-08-16 | End: 2024-08-16 | Stop reason: HOSPADM

## 2024-08-16 RX ORDER — LIDOCAINE HYDROCHLORIDE 20 MG/ML
INJECTION, SOLUTION INFILTRATION; PERINEURAL
Status: DISCONTINUED | OUTPATIENT
Start: 2024-08-16 | End: 2024-08-16 | Stop reason: HOSPADM

## 2024-08-16 RX ORDER — LIDOCAINE HYDROCHLORIDE 10 MG/ML
INJECTION, SOLUTION EPIDURAL; INFILTRATION; INTRACAUDAL; PERINEURAL
Status: DISCONTINUED | OUTPATIENT
Start: 2024-08-16 | End: 2024-08-16 | Stop reason: HOSPADM

## 2024-08-16 NOTE — DISCHARGE INSTRUCTIONS

## 2024-08-16 NOTE — OP NOTE
Cervical Interlaminar Epidural Steroid Injection under Fluoroscopic Guidance    The procedure, risks, benefits, and options were discussed with the patient. There are no contraindications to the procedure. The patent expressed understanding and agreed to the procedure. Informed written consent was obtained prior to the start of the procedure and can be found in the patient's chart.     PATIENT NAME: Gordon Griffin   MRN: [id number]     DATE OF PROCEDURE: 08/16/2024    PROCEDURE: Cervical Interlaminar Epidural Steroid Injection C7/T1 under Fluoroscopic Guidance    PRE-OP DIAGNOSIS: Cervical radiculopathy [M54.12] Cervical radiculopathy [M54.12]    POST-OP DIAGNOSIS: Same    PHYSICIAN: Larry Brasher MD     ASSISTANTS: Matti Ulloa MD  [facility]      MEDICATIONS INJECTED: Preservative-free Decadron 10mg with 1cc of Lidocaine 1% MPF and preservative free normal saline    LOCAL ANESTHETIC INJECTED: Xylocaine 2%     SEDATION: Versed 2mg and Fentanyl 100mcg                                                                                                                                                                                     Conscious sedation ordered by M.D. Patient re-evaluation prior to administration of conscious sedation. No changes noted in patient's status from initial evaluation. The patient's vital signs were monitored by RN and patient remained hemodynamically stable throughout the procedure.    Event Time In   Sedation Start 0958   Sedation End 1006       ESTIMATED BLOOD LOSS: None    COMPLICATIONS: None    TECHNIQUE: Time-out was performed to identify the patient and procedure to be performed. With the patient laying in a prone position, the surgical area was prepped and draped in the usual sterile fashion using ChloraPrep and a fenestrated drape. The level was determined under fluoroscopy guidance. Skin anesthesia was achieved by injecting Lidocaine 2% over the injection site.  The  interlaminar space was then approached with a 20 gauge, 3.5 inch Tuohy needle that was introduced under fluoroscopic guidance with AP, lateral and/or contralateral oblique imaging. Once the Ligamentum flavum was encountered loss of resistance to saline was used to enter the epidural space. With positive loss of resistance and negative aspiration for CSF or Blood, contrast dye  Omnipaque (300mg/mL) was injected to confirm placement and there was no vascular runoff. Then 3 mL of the medication mixture listed above was then injected slowly. Displacement of the radio opaque contrast after injection of the medication confirmed that the medication went into the area of the epidural space. The needles were removed, and bleeding was nil. A sterile dressing was applied. No specimens collected. The patient tolerated the procedure well.       The patient was monitored after the procedure in the recovery area. They were given post-procedure and discharge instructions to follow at home. The patient was discharged in a stable condition.    Winston Craft MD    I reviewed and edited the fellow's note. I conducted my own interview and physical examination. I agree with the findings. I was present and supervising all critical portions of the procedure.    Larry Brasher MD

## 2024-08-16 NOTE — DISCHARGE SUMMARY
Discharge Note  Short Stay      SUMMARY     Admit Date: 2024    Attending Physician: Larry Brasher MD      Discharge Physician: Larry Brasher MD      Discharge Date: 2024 10:01 AM    Procedure(s) (LRB):  CERVICAL C7/T1 IL KYUNG (N/A)    Final Diagnosis: Cervical radiculopathy [M54.12]    Disposition: Home or self care    Patient Instructions:   Current Discharge Medication List        CONTINUE these medications which have NOT CHANGED    Details   aspirin 81 MG Chew Take 81 mg by mouth once daily.      azelastine (ASTELIN) 137 mcg (0.1 %) nasal spray USE 1 TO 2 SPRAYS IN EACH NOSTRIL TWICE DAILY FOR CONGESTION      baclofen (LIORESAL) 20 MG tablet Take 1 tablet by mouth 3 (three) times daily as needed.       benztropine (COGENTIN) 0.5 MG tablet Take 0.5 mg by mouth once daily.      busPIRone (BUSPAR) 10 MG tablet Tale 1 tablet Orally Twice a day 30 days  Qty: 60 tablet, Refills: 0      butalbital-acetaminophen-caffeine -40 mg (FIORICET, ESGIC) -40 mg per tablet Take 1 tablet by mouth every 4 (four) hours as needed.      butorphanol (STADOL) 10 mg/mL nasal spray 2 sprays by Nasal route every 4 (four) hours as needed for pain  Qty: 100 mL, Refills: 5    Comments: Quantity prescribed more than 7 day supply? Press F2 and select one:[id number]        cyclobenzaprine (FLEXERIL) 10 MG tablet TK 1 T PO Q 8 H PRF PAIN      diazePAM (VALIUM) 2 MG tablet Take 2 mg by mouth 2 (two) times daily as needed.      erenumab-aooe (AIMOVIG AUTOINJECTOR SUBQ) Inject into the skin.      EScitalopram oxalate (LEXAPRO) 20 MG tablet Take 1 tablet (20 mg total) by mouth once daily.  Qty: 30 tablet, Refills: 0      estradiol valerate (DELESTROGEN) 20 mg/mL injection SMARTSI.3 Milliliter(s) IM Once a Week      evolocumab (REPATHA SURECLICK) 140 mg/mL PnIj Inject 1 mL (140 mg total) into the skin every 14 (fourteen) days.  Qty: 6 mL, Refills: 3    Associated Diagnoses: Hyperlipidemia, unspecified hyperlipidemia type;  Atherosclerosis of native coronary artery of native heart without angina pectoris      ezetimibe (ZETIA) 10 mg tablet Take 1 tablet (10 mg total) by mouth once daily.  Qty: 90 tablet, Refills: 3      fluticasone (FLONASE) 50 mcg/actuation nasal spray SPRAY TWICE IEN QD  Refills: 5      gabapentin (NEURONTIN) 300 MG capsule Take 1 capsule (300 mg total) by mouth 3 (three) times daily.  Qty: 90 capsule, Refills: 3    Associated Diagnoses: Lumbar radiculopathy      hydrocortisone (ANUSOL-HC) 2.5 % rectal cream Place rectally 2 (two) times daily.  Qty: 28 g, Refills: 1    Associated Diagnoses: Hemorrhoid prolapse      hydrOXYzine pamoate (VISTARIL) 50 MG Cap Take  mg by mouth nightly as needed.      ketorolac (TORADOL) 10 mg tablet Pt takes PRN      levETIRAcetam (KEPPRA) 1000 MG tablet Take 1,000 mg by mouth 2 (two) times daily.      methocarbamoL (ROBAXIN) 750 MG Tab Take 750 mg by mouth 3 (three) times daily.      metoclopramide HCl (REGLAN) 10 MG tablet 10 mg.      NARCAN 4 mg/actuation Spry SPRAY 0.1ML IN 1 NOSTRIL MAY REPEAT DOSE EVERY 2-3 MINUTES AS NEEDED ALTERNATING NOSTRILS EACH DOSE  Qty: 1 each, Refills: 3    Associated Diagnoses: Chronic pain disorder; Lumbar radiculopathy      nitroGLYCERIN (NITROSTAT) 0.4 MG SL tablet Place 1 tablet (0.4 mg total) under the tongue every 5 (five) minutes as needed for Chest pain. Repeat twice as needed for a maximum total dose of 3 tablets. If still having chest pain, go to the emergency room.  Qty: 25 tablet, Refills: 4      NURTEC 75 mg odt Take 75 mg by mouth as needed for Migraine.      onabotulinumtoxina (BOTOX) 200 unit SolR Inject 200 Units into the muscle.      ondansetron (ZOFRAN) 4 MG tablet Take 1 tablet (4 mg total) by mouth every 6 (six) hours as needed for Nausea.  Qty: 12 tablet, Refills: 0      ondansetron (ZOFRAN-ODT) 8 MG TbDL Take 8 mg by mouth 2 (two) times daily.      oxybutynin (DITROPAN-XL) 10 MG 24 hr tablet TAKE 1 TABLET(10 MG) BY MOUTH EVERY  DAY  Qty: 30 tablet, Refills: 11      pantoprazole (PROTONIX) 20 MG tablet Take 20 mg by mouth.      prazosin (MINIPRESS) 2 MG Cap Tale 1 capsule Orally twice daily 30 days  Qty: 60 capsule, Refills: 0    Comments: .      prochlorperazine (COMPAZINE) 10 MG tablet Take 10 mg by mouth 3 (three) times daily. Pt takes PRN      propranoloL (INDERAL LA) 60 MG 24 hr capsule Take 60 mg by mouth every evening.      rosuvastatin (CRESTOR) 20 MG tablet Take 1 tablet (20 mg total) by mouth once daily.  Qty: 90 tablet, Refills: 9      tamsulosin (FLOMAX) 0.4 mg Cap TAKE 1 CAPSULE(0.4 MG) BY MOUTH EVERY DAY  Qty: 30 capsule, Refills: 11      traZODone (DESYREL) 100 MG tablet Take 2 tablet at bedtime as needed Orally 30 days  Qty: 60 tablet, Refills: 0      verapamiL (VERELAN) 240 MG C24P Take 240 mg by mouth Daily.                 Discharge Diagnosis: Cervical radiculopathy [M54.12]  Condition on Discharge: Stable with no complications to procedure   Diet on Discharge: Same as before.  Activity: as per instruction sheet.  Discharge to: Home with a responsible adult.  Follow up: 2-4 weeks       Please call my office or pager at [phone] if experienced any weakness or loss of sensation, fever > 101.5, pain uncontrolled with oral medications, persistent nausea/vomiting/or diarrhea, redness or drainage from the incisions, or any other worrisome concerns. If physician on call was not reached or could not communicate with our office for any reason please go to the nearest emergency department

## 2024-08-16 NOTE — TELEPHONE ENCOUNTER
----- Message from Winston Craft MD sent at 8/16/2024 10:04 AM CDT -----  Please change 9/3/24 follow up to be with Maribel instead of Britney.

## 2024-08-19 DIAGNOSIS — E78.00 PURE HYPERCHOLESTEROLEMIA: Primary | ICD-10-CM

## 2024-08-20 ENCOUNTER — LAB VISIT (OUTPATIENT)
Dept: LAB | Facility: HOSPITAL | Age: 44
End: 2024-08-20
Payer: MEDICARE

## 2024-08-20 ENCOUNTER — OFFICE VISIT (OUTPATIENT)
Dept: ORTHOPEDICS | Facility: CLINIC | Age: 44
End: 2024-08-20
Payer: MEDICARE

## 2024-08-20 DIAGNOSIS — E78.00 PURE HYPERCHOLESTEROLEMIA: ICD-10-CM

## 2024-08-20 DIAGNOSIS — M17.11 PRIMARY OSTEOARTHRITIS OF RIGHT KNEE: Primary | ICD-10-CM

## 2024-08-20 DIAGNOSIS — I25.10 CORONARY ARTERY DISEASE INVOLVING NATIVE CORONARY ARTERY OF NATIVE HEART WITHOUT ANGINA PECTORIS: Primary | ICD-10-CM

## 2024-08-20 LAB
CHOLEST SERPL-MCNC: 177 MG/DL (ref 120–199)
CHOLEST/HDLC SERPL: 4.9 {RATIO} (ref 2–5)
HDLC SERPL-MCNC: 36 MG/DL (ref 40–75)
HDLC SERPL: 20.3 % (ref 20–50)
LDLC SERPL CALC-MCNC: 100.4 MG/DL (ref 63–159)
NONHDLC SERPL-MCNC: 141 MG/DL
TRIGL SERPL-MCNC: 203 MG/DL (ref 30–150)

## 2024-08-20 PROCEDURE — 20610 DRAIN/INJ JOINT/BURSA W/O US: CPT | Mod: RT,S$GLB,, | Performed by: PHYSICIAN ASSISTANT

## 2024-08-20 PROCEDURE — 80061 LIPID PANEL: CPT | Performed by: PHYSICIAN ASSISTANT

## 2024-08-20 PROCEDURE — 99499 UNLISTED E&M SERVICE: CPT | Mod: S$GLB,,, | Performed by: PHYSICIAN ASSISTANT

## 2024-08-20 PROCEDURE — 99999 PR PBB SHADOW E&M-EST. PATIENT-LVL III: CPT | Mod: PBBFAC,,, | Performed by: PHYSICIAN ASSISTANT

## 2024-08-20 PROCEDURE — 36415 COLL VENOUS BLD VENIPUNCTURE: CPT | Performed by: PHYSICIAN ASSISTANT

## 2024-08-20 RX ORDER — ROSUVASTATIN CALCIUM 40 MG/1
40 TABLET, COATED ORAL DAILY
Qty: 90 TABLET | Refills: 3 | Status: SHIPPED | OUTPATIENT
Start: 2024-08-20 | End: 2025-08-15

## 2024-08-20 NOTE — PROGRESS NOTES
Gordon Griffin presents to clinic today for the third right knee  orthovisc injection.  There has been no significant change in her medical status since her last visit. No fever, chills, malaise, or unexplained weight change.    Allergies, medications, past medical and surgical history were reviewed.    Exam demonstrates there is no effusion in the  right knee, and the skin is intact.    Diagnosis: right knee osteoarthritis    After time out was performed, verbal consent obtained and patient ID, side, and site were verified, the  right  knee was sterilly prepped in the standard fashion.  A 22-gauge needle was introduced into right knee joint from an dhaval-lateral site without complication and knee was then injected with 2 ml of orthovisc.  Sterile dressing was applied.  The patient was instructed to resume activities as tolerated and to call with any problems.     We will see Gordon Griffin back for follow up as needed

## 2024-08-22 ENCOUNTER — ON-DEMAND VIRTUAL (OUTPATIENT)
Dept: URGENT CARE | Facility: CLINIC | Age: 44
End: 2024-08-22
Payer: MEDICARE

## 2024-08-22 DIAGNOSIS — K64.8 HEMORRHOID PROLAPSE: ICD-10-CM

## 2024-08-22 RX ORDER — HYDROCORTISONE 25 MG/G
CREAM TOPICAL 2 TIMES DAILY
Qty: 28 G | Refills: 1 | Status: SHIPPED | OUTPATIENT
Start: 2024-08-22
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
## 2024-08-27 ENCOUNTER — TELEPHONE (OUTPATIENT)
Dept: UROLOGY | Facility: CLINIC | Age: 44
End: 2024-08-27
Payer: MEDICARE

## 2024-08-28 ENCOUNTER — OFFICE VISIT (OUTPATIENT)
Dept: SPINE | Facility: CLINIC | Age: 44
End: 2024-08-28
Payer: MEDICARE

## 2024-08-28 ENCOUNTER — HOSPITAL ENCOUNTER (OUTPATIENT)
Dept: RADIOLOGY | Facility: OTHER | Age: 44
Discharge: HOME | End: 2024-08-28
Attending: NURSE PRACTITIONER
Payer: MEDICARE

## 2024-08-28 VITALS
OXYGEN SATURATION: 100 % | BODY MASS INDEX: 18.96 KG/M2 | DIASTOLIC BLOOD PRESSURE: 74 MMHG | HEIGHT: 72 IN | HEART RATE: 94 BPM | RESPIRATION RATE: 18 BRPM | WEIGHT: 140 LBS | SYSTOLIC BLOOD PRESSURE: 135 MMHG

## 2024-08-28 DIAGNOSIS — M47.816 LUMBAR SPONDYLOSIS: ICD-10-CM

## 2024-08-28 DIAGNOSIS — M51.36 DDD (DEGENERATIVE DISC DISEASE), LUMBAR: ICD-10-CM

## 2024-08-28 DIAGNOSIS — M54.16 LUMBAR RADICULOPATHY, CHRONIC: Primary | ICD-10-CM

## 2024-08-28 DIAGNOSIS — M54.16 LUMBAR RADICULOPATHY, CHRONIC: ICD-10-CM

## 2024-08-28 PROCEDURE — 3075F SYST BP GE 130 - 139MM HG: CPT | Mod: CPTII,S$GLB,, | Performed by: NURSE PRACTITIONER

## 2024-08-28 PROCEDURE — 99999 PR PBB SHADOW E&M-EST. PATIENT-LVL V: CPT | Mod: PBBFAC,,, | Performed by: NURSE PRACTITIONER

## 2024-08-28 PROCEDURE — 72114 X-RAY EXAM L-S SPINE BENDING: CPT | Mod: TC,FY

## 2024-08-28 PROCEDURE — 1159F MED LIST DOCD IN RCRD: CPT | Mod: CPTII,S$GLB,, | Performed by: NURSE PRACTITIONER

## 2024-08-28 PROCEDURE — 72114 X-RAY EXAM L-S SPINE BENDING: CPT | Mod: 26,,, | Performed by: RADIOLOGY

## 2024-08-28 PROCEDURE — 99213 OFFICE O/P EST LOW 20 MIN: CPT | Mod: S$GLB,,, | Performed by: NURSE PRACTITIONER

## 2024-08-28 PROCEDURE — 3078F DIAST BP <80 MM HG: CPT | Mod: CPTII,S$GLB,, | Performed by: NURSE PRACTITIONER

## 2024-08-28 PROCEDURE — 3008F BODY MASS INDEX DOCD: CPT | Mod: CPTII,S$GLB,, | Performed by: NURSE PRACTITIONER

## 2024-08-28 PROCEDURE — 1160F RVW MEDS BY RX/DR IN RCRD: CPT | Mod: CPTII,S$GLB,, | Performed by: NURSE PRACTITIONER

## 2024-08-28 NOTE — PROGRESS NOTES
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 migrainosus 03/30/2012    Pain in both testicles 05/22/2023    Stroke pt. states he had a cva at 3 months old    Thrombocytopenia, unspecified 3/30/2012       Past Surgical History:  Past Surgical History:   Procedure Laterality Date    ANGIOGRAM, CORONARY, WITH LEFT HEART CATHETERIZATION      EPIDURAL STEROID INJECTION N/A 3/26/2021    Procedure: INJECTION, STEROID, EPIDURAL L4/5;  Surgeon: Larry Brasher MD;  Location: [facility];  Service: Pain Management;  Laterality: N/A;    EPIDURAL STEROID INJECTION N/A 6/4/2021    Procedure: INJECTION, STEROID, EPIDURAL, L4-L5 IL need consent;  Surgeon: Larry Brasher MD;  Location: [facility];  Service: Pain Management;  Laterality: N/A;    EPIDURAL STEROID INJECTION N/A 10/29/2021    Procedure: INJECTION, STEROID, EPIDURAL, L4-L5IL NEED CONSENT;  Surgeon: Larry Brasher MD;  Location: [facility];  Service: Pain Management;  Laterality: N/A;    EPIDURAL STEROID INJECTION N/A 1/27/2022    Procedure: Injection, Steroid, Epidural C7/T1;  Surgeon: Larry Brasher MD;  Location: [facility];  Service: Pain Management;  Laterality: N/A;    EPIDURAL STEROID INJECTION N/A 2/10/2022    Procedure: Injection, Steroid, Epidural L4/5;  Surgeon: Larry Brasher MD;  Location: [facility];  Service: Pain Management;  Laterality: N/A;    EPIDURAL STEROID INJECTION N/A 8/25/2022    Procedure:  Injection, Steroid, Epidural C7/T1 CONTRAST;  Surgeon: Larry Brasher MD;  Location: [facility];  Service: Pain Management;  Laterality: N/A;    EPIDURAL STEROID INJECTION N/A 5/26/2023    Procedure: INJECTION, STEROID, EPIDURAL C7/T1 IL;  Surgeon: Larry Brasher MD;  Location: [facility];  Service: Pain Management;  Laterality: N/A;    EPIDURAL STEROID INJECTION N/A 10/13/2023    Procedure: INJECTION, STEROID, EPIDURAL, C7-T1;  Surgeon: Larry Brasher MD;  Location: [facility];  Service: Pain Management;  Laterality: N/A;    EPIDURAL STEROID INJECTION N/A 4/25/2024    Procedure: CERVICAL C7/T1 IL KYUNG *ASPIRIN OTC* HOLD FOR 5 DAYS;  Surgeon: Larry Brasher MD;  Location: [facility];  Service: Pain Management;  Laterality: N/A;  [phone]  2 WK F/U TASHA    EPIDURAL STEROID INJECTION N/A 8/16/2024    Procedure: CERVICAL C7/T1 IL KYUNG;  Surgeon: Larry Brasher MD;  Location: [facility];  Service: Pain Management;  Laterality: N/A;  [phone]  2 WK F/U VERONIQUE    INJECTION OF ANESTHETIC AGENT AROUND NERVE Bilateral 5/6/2022    Procedure: BLOCK, NERVE, BILATERAL L3-L4-*L5 MEDIAL BRANCH;  Surgeon: Larry Brasher MD;  Location: [facility];  Service: Pain Management;  Laterality: Bilateral;    INJECTION OF ANESTHETIC AGENT AROUND NERVE Bilateral 6/2/2022    Procedure: BLOCK, NERVE BILATERAL L3-L4-L5 MEDIAL BRANCH 2nd, needs consent;  Surgeon: Larry Brasher MD;  Location: [facility];  Service: Pain Management;  Laterality: Bilateral;    INJECTION, SACROILIAC JOINT Bilateral 6/9/2023    Procedure: INJECTION,SACROILIAC JOINT, BILATERAL SI;  Surgeon: Larry Brasher MD;  Location: [facility];  Service: Pain Management;  Laterality: Bilateral;    INJECTION, SACROILIAC JOINT Bilateral 7/16/2024    Procedure: INJECTION,SACROILIAC JOINT BILATERAL;  Surgeon: Larry Brasher MD;  Location: [facility];  Service: Pain Management;  Laterality: Bilateral;  [phone]  2 WK F/U TASHA     MANDIBLE SURGERY      reconstruction    ORCHIECTOMY Bilateral 11/8/2023    Procedure: ORCHIECTOMY;  Surgeon: Ronald Mcgrath MD;  [facility];  Service: Urology;  Laterality: Bilateral;  1 hr    RADIOFREQUENCY ABLATION Right 6/23/2022    Procedure: RADIOFREQUENCY ABLATION RIGHT L3,L4,L5 1 of 2, consent needed;  Surgeon: Larry Brasher MD;  Location: [facility];  Service: Pain Management;  Laterality: Right;    RADIOFREQUENCY ABLATION Left 7/7/2022    Procedure: RADIOFREQUENCY ABLATION LEFT L3,L4,L5 2 of 2, needs consent;  Surgeon: Larry Brasher MD;  Location: [facility];  Service: Pain Management;  Laterality: Left;    RADIOFREQUENCY ABLATION Right 3/3/2023    Procedure: RADIOFREQUENCY ABLATION RIGHT L3,L4,L5;  Surgeon: Larry Brasher MD;  Location: [facility];  Service: Pain Management;  Laterality: Right;    RADIOFREQUENCY ABLATION Left 3/31/2023    Procedure: Radiofrequency Ablation Left L3, L4, & L5 Pending CBC results;  Surgeon: Diana Lira MD;  Location: [facility];  Service: Pain Management;  Laterality: Left;    RADIOFREQUENCY ABLATION Bilateral 3/8/2024    Procedure: RADIOFREQUENCY ABLATION BILATERAL L3, 4, 5;  Surgeon: Larry Brasher MD;  Location: [facility];  Service: Pain Management;  Laterality: Bilateral;  [phone]  4 WK F/U EVRONIQUE    variceol repair         Family History:  Family History   Problem Relation Name Age of Onset    Heart disease Mother      Myasthenia gravis Mother      Myasthenia gravis Father      Heart disease Father      Hypertension Father      Hyperlipidemia Father      Heart disease Paternal Uncle         Social History:  Social History     Socioeconomic History    Marital status:    Occupational History    Occupation: disable/   Tobacco Use    Smoking status: Never    Smokeless tobacco: Never   Substance and Sexual Activity    Alcohol use: No    Drug use: No    Sexual activity: Not Currently     Partners:  Female   Social History Narrative    No stairs     Social Determinants of Health     Financial Resource Strain: Medium Risk (8/5/2024)    Overall Financial Resource Strain (CARDIA)     Difficulty of Paying Living Expenses: Somewhat hard   Food Insecurity: Food Insecurity Present (8/5/2024)    Hunger Vital Sign     Worried About Running Out of Food in the Last Year: Often true     Ran Out of Food in the Last Year: Often true   Transportation Needs: No Transportation Needs (11/10/2023)    PRAPARE - Transportation     Lack of Transportation (Medical): No     Lack of Transportation (Non-Medical): No   Physical Activity: Sufficiently Active (8/5/2024)    Exercise Vital Sign     Days of Exercise per Week: 4 days     Minutes of Exercise per Session: 60 min   Stress: Stress Concern Present (8/5/2024)    [employer] - Occupational Stress Questionnaire     Feeling of Stress : To some extent   Housing Stability: Unknown (11/10/2023)    Housing Stability Vital Sign     Unable to Pay for Housing in the Last Year: No     Unstable Housing in the Last Year: No       OBJECTIVE:      Vitals:    08/28/24 0820   BP: 135/74   Pulse: 94   Resp: 18   SpO2: 100%   Weight: 63.5 kg (139 lb 15.9 oz)   Height: 6' (1.829 m)   PainSc:   9   PainLoc: Back          PHYSICAL EXAMINATION:    General appearance: Well appearing, in no acute distress.  Psych:  Mood and affect appropriate.  Skin: Skin color, texture, turgor normal, no rashes or lesions to visible areas.  Head/face:  Atraumatic, normocephalic. No palpable lymph nodes  Cor: No lower extremity edema.  Capillary refill <2 seconds.  Pulm: Symmetric chest rise. No apparent respiratory distress.  Neck:  There is mild pain with palpation over cervical paraspinals. Improved ROM with mild pain on extension.   Back: Straight leg raising in the sitting position is positive for radicular pain on the right. Limited ROM with pain on flexion and extension. Positive facet  loading bilaterally.    Extremities: No deformities, edema, or skin discoloration. Good capillary refill.  Musculoskeletal: There is pain with palpation over bilateral SI joints.  5/5 strength in right ankle with plantar and dorsiflexion. 4/5 strength in left ankle with plantar and dorsiflexion. 5/5 strength with right knee flexion and extension. 5/5 strength with left knee flexion and extension.   Neuro:  No loss of sensation is noted.  Gait: Antalgic- ambulates without assistance.     ASSESSMENT: 44 y.o. year old adult with neck and lower back pain, consistent with the followin. Lumbar radiculopathy, chronic  MRI Lumbar Spine Without Contrast    X-Ray Lumbar Complete Including Flex And Ext      2. Lumbar spondylosis        3. DDD (degenerative disc disease), lumbar                PLAN:     - Previous imaging reviewed today.    - She is s/p C7/T1 IL KYUNG with 80% relief.     - Obtain updated lumbar xray and MRI.     - Consider KYUNG in the future, pending imaging.     - We can repeat bilateral L3,4,5 RFA as needed.     - Continue Gabapentin.     - I have stressed the importance of physical activity and a home exercise plan to help with pain and improve health.    - RTC after imaging.     The above plan and management options were discussed at length with patient. Patient is in agreement with the above and verbalized understanding.    Maribel Bright NP   2024

## 2024-09-05 ENCOUNTER — PATIENT MESSAGE (OUTPATIENT)
Dept: INTERNAL MEDICINE | Facility: CLINIC | Age: 44
End: 2024-09-05
Payer: MEDICARE

## 2024-09-05 ENCOUNTER — HOSPITAL ENCOUNTER (OUTPATIENT)
Dept: RADIOLOGY | Facility: OTHER | Age: 44
Discharge: HOME | End: 2024-09-05
Attending: NURSE PRACTITIONER
Payer: MEDICARE

## 2024-09-05 DIAGNOSIS — M54.16 LUMBAR RADICULOPATHY, CHRONIC: ICD-10-CM

## 2024-09-05 PROCEDURE — 72148 MRI LUMBAR SPINE W/O DYE: CPT | Mod: TC

## 2024-09-05 PROCEDURE — 72148 MRI LUMBAR SPINE W/O DYE: CPT | Mod: 26,,, | Performed by: RADIOLOGY

## 2024-09-10 ENCOUNTER — OFFICE VISIT (OUTPATIENT)
Dept: PAIN MEDICINE | Facility: CLINIC | Age: 44
End: 2024-09-10
Payer: MEDICARE

## 2024-09-10 VITALS
OXYGEN SATURATION: 99 % | HEART RATE: 74 BPM | SYSTOLIC BLOOD PRESSURE: 121 MMHG | BODY MASS INDEX: 20.36 KG/M2 | WEIGHT: 150.13 LBS | DIASTOLIC BLOOD PRESSURE: 70 MMHG | TEMPERATURE: 98 F

## 2024-09-10 DIAGNOSIS — M51.36 DDD (DEGENERATIVE DISC DISEASE), LUMBAR: ICD-10-CM

## 2024-09-10 DIAGNOSIS — M54.16 LUMBAR RADICULOPATHY: Primary | ICD-10-CM

## 2024-09-10 DIAGNOSIS — M47.816 LUMBAR SPONDYLOSIS: ICD-10-CM

## 2024-09-10 DIAGNOSIS — M54.17 LUMBOSACRAL RADICULOPATHY: ICD-10-CM

## 2024-09-10 DIAGNOSIS — M51.36 ANNULAR TEAR OF LUMBAR DISC: ICD-10-CM

## 2024-09-10 PROCEDURE — 3078F DIAST BP <80 MM HG: CPT | Mod: CPTII,S$GLB,, | Performed by: NURSE PRACTITIONER

## 2024-09-10 PROCEDURE — 1160F RVW MEDS BY RX/DR IN RCRD: CPT | Mod: CPTII,S$GLB,, | Performed by: NURSE PRACTITIONER

## 2024-09-10 PROCEDURE — 99999 PR PBB SHADOW E&M-EST. PATIENT-LVL V: CPT | Mod: PBBFAC,,, | Performed by: NURSE PRACTITIONER

## 2024-09-10 PROCEDURE — 1159F MED LIST DOCD IN RCRD: CPT | Mod: CPTII,S$GLB,, | Performed by: NURSE PRACTITIONER

## 2024-09-10 PROCEDURE — 3008F BODY MASS INDEX DOCD: CPT | Mod: CPTII,S$GLB,, | Performed by: NURSE PRACTITIONER

## 2024-09-10 PROCEDURE — 99213 OFFICE O/P EST LOW 20 MIN: CPT | Mod: S$GLB,,, | Performed by: NURSE PRACTITIONER

## 2024-09-10 PROCEDURE — 3074F SYST BP LT 130 MM HG: CPT | Mod: CPTII,S$GLB,, | Performed by: NURSE PRACTITIONER

## 2024-09-10 RX ORDER — METHYLPREDNISOLONE 4 MG/1
TABLET ORAL
Qty: 21 EACH | Refills: 0 | Status: SHIPPED | OUTPATIENT
Start: 2024-09-10 | End: 2024-10-01
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
## 2024-09-12 ENCOUNTER — PATIENT MESSAGE (OUTPATIENT)
Dept: PAIN MEDICINE | Facility: OTHER | Age: 44
End: 2024-09-12
Payer: MEDICARE

## 2024-09-19 ENCOUNTER — PATIENT MESSAGE (OUTPATIENT)
Dept: ADMINISTRATIVE | Facility: OTHER | Age: 44
End: 2024-09-19
Payer: MEDICARE

## 2024-09-26 ENCOUNTER — HOSPITAL ENCOUNTER (OUTPATIENT)
Facility: OTHER | Age: 44
Discharge: HOME | End: 2024-09-26
Attending: ANESTHESIOLOGY | Admitting: ANESTHESIOLOGY
Payer: MEDICARE

## 2024-09-26 VITALS
TEMPERATURE: 97 F | DIASTOLIC BLOOD PRESSURE: 77 MMHG | SYSTOLIC BLOOD PRESSURE: 130 MMHG | WEIGHT: 140 LBS | HEART RATE: 78 BPM | OXYGEN SATURATION: 99 % | HEIGHT: 72 IN | RESPIRATION RATE: 16 BRPM | BODY MASS INDEX: 18.96 KG/M2

## 2024-09-26 DIAGNOSIS — G89.29 CHRONIC PAIN: ICD-10-CM

## 2024-09-26 DIAGNOSIS — M54.17 LUMBOSACRAL RADICULOPATHY: Primary | ICD-10-CM

## 2024-09-26 PROCEDURE — 63600175 PHARM REV CODE 636 W HCPCS: Performed by: ANESTHESIOLOGY

## 2024-09-26 PROCEDURE — 62323 NJX INTERLAMINAR LMBR/SAC: CPT | Mod: ,,, | Performed by: ANESTHESIOLOGY

## 2024-09-26 PROCEDURE — A4216 STERILE WATER/SALINE, 10 ML: HCPCS | Performed by: ANESTHESIOLOGY

## 2024-09-26 PROCEDURE — 62323 NJX INTERLAMINAR LMBR/SAC: CPT | Performed by: ANESTHESIOLOGY

## 2024-09-26 PROCEDURE — 25000003 PHARM REV CODE 250: Performed by: ANESTHESIOLOGY

## 2024-09-26 PROCEDURE — 25500020 PHARM REV CODE 255: Performed by: ANESTHESIOLOGY

## 2024-09-26 RX ORDER — MIDAZOLAM HYDROCHLORIDE 1 MG/ML
INJECTION INTRAMUSCULAR; INTRAVENOUS
Status: DISCONTINUED | OUTPATIENT
Start: 2024-09-26 | End: 2024-09-26 | Stop reason: HOSPADM

## 2024-09-26 RX ORDER — SODIUM CHLORIDE 9 MG/ML
INJECTION, SOLUTION INTRAVENOUS CONTINUOUS
Status: DISCONTINUED | OUTPATIENT
Start: 2024-09-26 | End: 2024-09-26 | Stop reason: HOSPADM

## 2024-09-26 RX ORDER — DEXAMETHASONE SODIUM PHOSPHATE 10 MG/ML
INJECTION INTRAMUSCULAR; INTRAVENOUS
Status: DISCONTINUED | OUTPATIENT
Start: 2024-09-26 | End: 2024-09-26 | Stop reason: HOSPADM

## 2024-09-26 RX ORDER — LIDOCAINE HYDROCHLORIDE 20 MG/ML
INJECTION, SOLUTION INFILTRATION; PERINEURAL
Status: DISCONTINUED | OUTPATIENT
Start: 2024-09-26 | End: 2024-09-26 | Stop reason: HOSPADM

## 2024-09-26 RX ORDER — FENTANYL CITRATE 50 UG/ML
INJECTION, SOLUTION INTRAMUSCULAR; INTRAVENOUS
Status: DISCONTINUED | OUTPATIENT
Start: 2024-09-26 | End: 2024-09-26 | Stop reason: HOSPADM

## 2024-09-26 RX ORDER — LIDOCAINE HYDROCHLORIDE 10 MG/ML
INJECTION, SOLUTION EPIDURAL; INFILTRATION; INTRACAUDAL; PERINEURAL
Status: DISCONTINUED | OUTPATIENT
Start: 2024-09-26 | End: 2024-09-26 | Stop reason: HOSPADM

## 2024-09-26 RX ORDER — SODIUM CHLORIDE 9 MG/ML
INJECTION, SOLUTION INTRAMUSCULAR; INTRAVENOUS; SUBCUTANEOUS
Status: DISCONTINUED | OUTPATIENT
Start: 2024-09-26 | End: 2024-09-26 | Stop reason: HOSPADM

## 2024-09-26 NOTE — DISCHARGE INSTRUCTIONS

## 2024-09-26 NOTE — OP NOTE
Lumbar Interlaminar Epidural Steroid Injection under Fluoroscopic Guidance    The procedure, risks, benefits, and options were discussed with the patient. There are no contraindications to the procedure. The patent expressed understanding and agreed to the procedure. Informed written consent was obtained prior to the start of the procedure and can be found in the patient's chart.    PATIENT NAME: Gordon Griffin   MRN: [id number]     DATE OF PROCEDURE: 09/26/2024    PROCEDURE: Lumbar Interlaminar Epidural Steroid Injection L5/S1 under Fluoroscopic Guidance    PRE-OP DIAGNOSIS: Lumbar radiculopathy [M54.16] Lumbar radiculopathy [M54.16]    POST-OP DIAGNOSIS: Same    PHYSICIAN: Larry Brasher MD    ASSISTANTS: Malathi Mcmanus MD  [facility] Fellow       MEDICATIONS INJECTED: Preservative-free Decadron 10mg with 4cc of Lidocaine 1% MPF and preservative free normal saline    LOCAL ANESTHETIC INJECTED: Xylocaine 2%     SEDATION: Versed 2mg and Fentanyl 150mcg                                                                                                                                                                                     Conscious sedation ordered by M.D. Patient re-evaluation prior to administration of conscious sedation. No changes noted in patient's status from initial evaluation. The patient's vital signs were monitored by RN and patient remained hemodynamically stable throughout the procedure.    Event Time In   Sedation Start 1349   Sedation End 1356       ESTIMATED BLOOD LOSS: None    COMPLICATIONS: None    TECHNIQUE: Time-out was performed to identify the patient and procedure to be performed. With the patient laying in a prone position, the surgical area was prepped and draped in the usual sterile fashion using ChloraPrep and a fenestrated drape. The level was determined under fluoroscopy guidance. Skin anesthesia was achieved by injecting Lidocaine 2% over the injection site. The interlaminar  space was then approached with a 18 gauge,  3.5 inch Tuohy needle that was introduced under fluoroscopic guidance in the AP, lateral and/or contralateral oblique imaging. Once the Ligamentum flavum was encountered loss of resistance to saline was used to enter the epidural space. With positive loss of resistance and negative aspiration for CSF or Blood, contrast dye Omnipaque (300mg/mL) was injected to confirm placement and there was no vascular runoff. 5 mL of the medication mixture listed above was injected slowly. Displacement of the radio opaque contrast after injection of the medication confirmed that the medication went into the area of the epidural space. The needles were removed and bleeding was nil. A sterile dressing was applied. No specimens collected. The patient tolerated the procedure well.       The patient was monitored after the procedure in the recovery area. They were given post-procedure and discharge instructions to follow at home. The patient was discharged in a stable condition.      Malathi Mcmanus MD    I reviewed and edited the fellow's note. I conducted my own interview and physical examination. I agree with the findings. I was present and supervising all critical portions of the procedure.    Larry Brasher MD

## 2024-09-26 NOTE — DISCHARGE SUMMARY
Discharge Note  Short Stay      SUMMARY     Admit Date: 2024    Attending Physician: Larry Brasher MD    Discharge Physician: Larry Brasher MD      Discharge Date: 2024 3:51 PM    Procedure(s) (LRB):  LUMBAR L5/S1 IL KYUNG *ASPIRIN OTC* HOLD FOR 5 DAYS (N/A)    Final Diagnosis:  Lumbar radiculopathy [M54.16]      Disposition: Home or self care    Patient Instructions:   Discharge Medication List as of 2024  2:04 PM        CONTINUE these medications which have NOT CHANGED    Details   aspirin 81 MG Chew Take 81 mg by mouth once daily., Historical Med      azelastine (ASTELIN) 137 mcg (0.1 %) nasal spray USE 1 TO 2 SPRAYS IN EACH NOSTRIL TWICE DAILY FOR CONGESTION, Historical Med      baclofen (LIORESAL) 20 MG tablet Take 1 tablet by mouth 3 (three) times daily as needed. , Historical Med      benztropine (COGENTIN) 0.5 MG tablet Take 0.5 mg by mouth once daily., Historical Med      busPIRone (BUSPAR) 10 MG tablet Tale 1 tablet Orally Twice a day 30 days, Starting Fri 11/3/2023, Normal      butalbital-acetaminophen-caffeine -40 mg (FIORICET, ESGIC) -40 mg per tablet Take 1 tablet by mouth every 4 (four) hours as needed., Historical Med      butorphanol (STADOL) 10 mg/mL nasal spray 2 sprays by Nasal route every 4 (four) hours as needed for pain, Starting 2023, Normal      cyclobenzaprine (FLEXERIL) 10 MG tablet TK 1 T PO Q 8 H PRF PAIN, Historical Med      diazePAM (VALIUM) 2 MG tablet Take 2 mg by mouth 2 (two) times daily as needed., Starting 2021, Historical Med      erenumab-aooe (AIMOVIG AUTOINJECTOR SUBQ) Inject into the skin., Historical Med      EScitalopram oxalate (LEXAPRO) 20 MG tablet Take 1 tablet (20 mg total) by mouth once daily., Starting Fri 11/3/2023, Normal      estradiol valerate (DELESTROGEN) 20 mg/mL injection SMARTSI.3 Milliliter(s) IM Once a Week, Historical Med      evolocumab (REPATHA SURECLICK) 140 mg/mL PnNhung Inject 1 mL (140 mg total)  into the skin every 14 (fourteen) days., Starting Mon 12/4/2023, Until Mon 11/4/2024, Normal      ezetimibe (ZETIA) 10 mg tablet Take 1 tablet (10 mg total) by mouth once daily., Starting Mon 5/22/2023, Normal      fluticasone (FLONASE) 50 mcg/actuation nasal spray SPRAY TWICE IEN QD, Historical Med      gabapentin (NEURONTIN) 300 MG capsule Take 1 capsule (300 mg total) by mouth 3 (three) times daily., Starting Fri 5/10/2024, Normal      hydrocortisone (ANUSOL-HC) 2.5 % rectal cream Place rectally 2 (two) times daily., Starting Thu 8/22/2024, Normal      hydrOXYzine pamoate (VISTARIL) 50 MG Cap Take  mg by mouth nightly as needed., Historical Med      ketorolac (TORADOL) 10 mg tablet Pt takes PRN, Historical Med      levETIRAcetam (KEPPRA) 1000 MG tablet Take 1,000 mg by mouth 2 (two) times daily., Starting Wed 1/13/2021, Historical Med      methocarbamoL (ROBAXIN) 750 MG Tab Take 750 mg by mouth 3 (three) times daily., Starting Tue 2/28/2023, Historical Med      methylPREDNISolone (MEDROL DOSEPACK) 4 mg tablet use as directed, Normal      metoclopramide HCl (REGLAN) 10 MG tablet 10 mg., Historical Med      NARCAN 4 mg/actuation Spry SPRAY 0.1ML IN 1 NOSTRIL MAY REPEAT DOSE EVERY 2-3 MINUTES AS NEEDED ALTERNATING NOSTRILS EACH DOSE, Normal      nitroGLYCERIN (NITROSTAT) 0.4 MG SL tablet Place 1 tablet (0.4 mg total) under the tongue every 5 (five) minutes as needed for Chest pain. Repeat twice as needed for a maximum total dose of 3 tablets. If still having chest pain, go to the emergency room., Starting Mon 8/5/2024, Normal      NURTEC 75 mg odt Take 75 mg by mouth as needed for Migraine., Starting Wed 10/27/2021, Historical Med      onabotulinumtoxina (BOTOX) 200 unit SolR Inject 200 Units into the muscle., Starting Mon 1/30/2023, Historical Med      ondansetron (ZOFRAN) 4 MG tablet Take 1 tablet (4 mg total) by mouth every 6 (six) hours as needed for Nausea., Starting 11/26/2012, Until Discontinued, Normal       ondansetron (ZOFRAN-ODT) 8 MG TbDL Take 8 mg by mouth 2 (two) times daily., Historical Med      oxybutynin (DITROPAN-XL) 10 MG 24 hr tablet TAKE 1 TABLET(10 MG) BY MOUTH EVERY DAY, Normal      pantoprazole (PROTONIX) 20 MG tablet Take 20 mg by mouth., Starting Sat 3/25/2023, Historical Med      prazosin (MINIPRESS) 2 MG Cap Tale 1 capsule Orally twice daily 30 days, Starting Fri 11/3/2023, Normal      prochlorperazine (COMPAZINE) 10 MG tablet Take 10 mg by mouth 3 (three) times daily. Pt takes PRN, Historical Med      propranoloL (INDERAL LA) 60 MG 24 hr capsule Take 60 mg by mouth every evening., Starting Fri 2/5/2021, Historical Med      rosuvastatin (CRESTOR) 40 MG Tab Take 1 tablet (40 mg total) by mouth once daily., Starting Tue 8/20/2024, Until Fri 8/15/2025, Normal      tamsulosin (FLOMAX) 0.4 mg Cap TAKE 1 CAPSULE(0.4 MG) BY MOUTH EVERY DAY, Normal      traZODone (DESYREL) 100 MG tablet Take 2 tablet at bedtime as needed Orally 30 days, Starting Fri 11/3/2023, Normal      verapamiL (VERELAN) 240 MG C24P Take 240 mg by mouth Daily., Historical Med                 Discharge Diagnosis: Same as above  Condition on Discharge: Stable with no complications to procedure   Diet on Discharge: Same as before.  Activity: as per instruction sheet.  Discharge to: Home with a responsible adult.  Follow up: 2-4 weeks       Please call my office or pager at [phone] if experienced any weakness or loss of sensation, fever > 101.5, pain uncontrolled with oral medications, persistent nausea/vomiting/or diarrhea, redness or drainage from the incisions, or any other worrisome concerns. If physician on call was not reached or could not communicate with our office for any reason please go to the nearest emergency department     Malathi Mcmanus MD

## 2024-09-26 NOTE — INTERVAL H&P NOTE
eThe patient has been examined and the H&P has been reviewed:    I concur with the findings and no changes have occurred since H&P was written.    Procedure risks, benefits and alternative options discussed and understood by patient/family.          There are no hospital problems to display for this patient.

## 2024-10-08 ENCOUNTER — TELEPHONE (OUTPATIENT)
Dept: SPINE | Facility: CLINIC | Age: 44
End: 2024-10-08
Payer: MEDICARE

## 2024-10-09 ENCOUNTER — PATIENT MESSAGE (OUTPATIENT)
Dept: PAIN MEDICINE | Facility: OTHER | Age: 44
End: 2024-10-09
Payer: MEDICARE

## 2024-10-09 ENCOUNTER — OFFICE VISIT (OUTPATIENT)
Dept: SPINE | Facility: CLINIC | Age: 44
End: 2024-10-09
Payer: MEDICARE

## 2024-10-09 VITALS
BODY MASS INDEX: 19.02 KG/M2 | SYSTOLIC BLOOD PRESSURE: 107 MMHG | HEART RATE: 91 BPM | HEIGHT: 72 IN | DIASTOLIC BLOOD PRESSURE: 67 MMHG | WEIGHT: 140.44 LBS | RESPIRATION RATE: 18 BRPM

## 2024-10-09 DIAGNOSIS — M47.816 LUMBAR SPONDYLOSIS: Primary | ICD-10-CM

## 2024-10-09 DIAGNOSIS — M51.360 DEGENERATION OF INTERVERTEBRAL DISC OF LUMBAR REGION WITH DISCOGENIC BACK PAIN: ICD-10-CM

## 2024-10-09 PROCEDURE — 3078F DIAST BP <80 MM HG: CPT | Mod: CPTII,S$GLB,, | Performed by: NURSE PRACTITIONER

## 2024-10-09 PROCEDURE — 1159F MED LIST DOCD IN RCRD: CPT | Mod: CPTII,S$GLB,, | Performed by: NURSE PRACTITIONER

## 2024-10-09 PROCEDURE — 3074F SYST BP LT 130 MM HG: CPT | Mod: CPTII,S$GLB,, | Performed by: NURSE PRACTITIONER

## 2024-10-09 PROCEDURE — 1160F RVW MEDS BY RX/DR IN RCRD: CPT | Mod: CPTII,S$GLB,, | Performed by: NURSE PRACTITIONER

## 2024-10-09 PROCEDURE — 99213 OFFICE O/P EST LOW 20 MIN: CPT | Mod: S$GLB,,, | Performed by: NURSE PRACTITIONER

## 2024-10-09 PROCEDURE — 99999 PR PBB SHADOW E&M-EST. PATIENT-LVL V: CPT | Mod: PBBFAC,,, | Performed by: NURSE PRACTITIONER

## 2024-10-09 PROCEDURE — 3008F BODY MASS INDEX DOCD: CPT | Mod: CPTII,S$GLB,, | Performed by: NURSE PRACTITIONER

## 2024-10-09 RX ORDER — ARIPIPRAZOLE 5 MG/1
5 TABLET ORAL DAILY
COMMUNITY
Start: 2024-10-02
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
## 2024-10-25 ENCOUNTER — HOSPITAL ENCOUNTER (OUTPATIENT)
Facility: OTHER | Age: 44
Discharge: HOME | End: 2024-10-25
Attending: ANESTHESIOLOGY | Admitting: ANESTHESIOLOGY
Payer: MEDICARE

## 2024-10-25 VITALS
RESPIRATION RATE: 16 BRPM | SYSTOLIC BLOOD PRESSURE: 118 MMHG | TEMPERATURE: 98 F | HEIGHT: 72 IN | OXYGEN SATURATION: 95 % | HEART RATE: 62 BPM | DIASTOLIC BLOOD PRESSURE: 73 MMHG | WEIGHT: 145 LBS | BODY MASS INDEX: 19.64 KG/M2

## 2024-10-25 DIAGNOSIS — M47.816 LUMBAR SPONDYLOSIS: Primary | ICD-10-CM

## 2024-10-25 DIAGNOSIS — G89.29 CHRONIC PAIN: ICD-10-CM

## 2024-10-25 PROCEDURE — 99152 MOD SED SAME PHYS/QHP 5/>YRS: CPT | Mod: ,,, | Performed by: ANESTHESIOLOGY

## 2024-10-25 PROCEDURE — 64636 DESTROY L/S FACET JNT ADDL: CPT | Mod: 50,,, | Performed by: ANESTHESIOLOGY

## 2024-10-25 PROCEDURE — 64636 DESTROY L/S FACET JNT ADDL: CPT | Mod: 50 | Performed by: ANESTHESIOLOGY

## 2024-10-25 PROCEDURE — 64635 DESTROY LUMB/SAC FACET JNT: CPT | Mod: 50 | Performed by: ANESTHESIOLOGY

## 2024-10-25 PROCEDURE — 99153 MOD SED SAME PHYS/QHP EA: CPT | Performed by: ANESTHESIOLOGY

## 2024-10-25 PROCEDURE — 64635 DESTROY LUMB/SAC FACET JNT: CPT | Mod: 50,,, | Performed by: ANESTHESIOLOGY

## 2024-10-25 PROCEDURE — 63600175 PHARM REV CODE 636 W HCPCS: Performed by: ANESTHESIOLOGY

## 2024-10-25 PROCEDURE — 99152 MOD SED SAME PHYS/QHP 5/>YRS: CPT | Performed by: ANESTHESIOLOGY

## 2024-10-25 RX ORDER — DEXAMETHASONE SODIUM PHOSPHATE 10 MG/ML
INJECTION INTRAMUSCULAR; INTRAVENOUS
Status: DISCONTINUED | OUTPATIENT
Start: 2024-10-25 | End: 2024-10-25 | Stop reason: HOSPADM

## 2024-10-25 RX ORDER — MIDAZOLAM HYDROCHLORIDE 1 MG/ML
INJECTION INTRAMUSCULAR; INTRAVENOUS
Status: DISCONTINUED | OUTPATIENT
Start: 2024-10-25 | End: 2024-10-25 | Stop reason: HOSPADM

## 2024-10-25 RX ORDER — BUPIVACAINE HYDROCHLORIDE 2.5 MG/ML
INJECTION, SOLUTION EPIDURAL; INFILTRATION; INTRACAUDAL
Status: DISCONTINUED | OUTPATIENT
Start: 2024-10-25 | End: 2024-10-25 | Stop reason: HOSPADM

## 2024-10-25 RX ORDER — LIDOCAINE HYDROCHLORIDE 20 MG/ML
INJECTION, SOLUTION INFILTRATION; PERINEURAL
Status: DISCONTINUED | OUTPATIENT
Start: 2024-10-25 | End: 2024-10-25 | Stop reason: HOSPADM

## 2024-10-25 RX ORDER — FENTANYL CITRATE 50 UG/ML
INJECTION, SOLUTION INTRAMUSCULAR; INTRAVENOUS
Status: DISCONTINUED | OUTPATIENT
Start: 2024-10-25 | End: 2024-10-25 | Stop reason: HOSPADM

## 2024-10-25 RX ORDER — SODIUM CHLORIDE 9 MG/ML
INJECTION, SOLUTION INTRAVENOUS CONTINUOUS
Status: DISCONTINUED | OUTPATIENT
Start: 2024-10-25 | End: 2024-10-25 | Stop reason: HOSPADM

## 2024-11-14 DIAGNOSIS — M17.11 PRIMARY OSTEOARTHRITIS OF RIGHT KNEE: Primary | ICD-10-CM

## 2024-11-14 NOTE — PROGRESS NOTES
Patient has good relief with HA injections  She would like to repeat these - orthovisc ordered for right knee  Follow up pending auth    MEDICAL NECESSITY FOR VISCOSUPPLEMENTATION:  A thorough evaluation of the patient, have determined that viscosupplementation is medically necessary.  The patient has painful DJD of the knee with failure of conservative therapy including lifestyle modifications and rehabilitation exercises.  Oral analgesics/NSAIDs have not adequately controlled symptoms and there is radiographic evidence of joint space narrowing, subchondral sclerosis, and osteophyte formation - Kellgren Misael grade 2 or greater.

## 2024-11-19 ENCOUNTER — OFFICE VISIT (OUTPATIENT)
Dept: PAIN MEDICINE | Facility: CLINIC | Age: 44
End: 2024-11-19
Payer: MEDICARE

## 2024-11-19 VITALS
WEIGHT: 151.44 LBS | DIASTOLIC BLOOD PRESSURE: 77 MMHG | TEMPERATURE: 98 F | BODY MASS INDEX: 20.54 KG/M2 | HEART RATE: 71 BPM | OXYGEN SATURATION: 99 % | SYSTOLIC BLOOD PRESSURE: 114 MMHG

## 2024-11-19 DIAGNOSIS — M51.369 ANNULAR TEAR OF LUMBAR DISC: ICD-10-CM

## 2024-11-19 DIAGNOSIS — M47.816 LUMBAR SPONDYLOSIS: ICD-10-CM

## 2024-11-19 DIAGNOSIS — M50.30 DDD (DEGENERATIVE DISC DISEASE), CERVICAL: ICD-10-CM

## 2024-11-19 DIAGNOSIS — M54.12 CERVICAL RADICULOPATHY: ICD-10-CM

## 2024-11-19 DIAGNOSIS — G89.4 CHRONIC PAIN DISORDER: Primary | ICD-10-CM

## 2024-11-19 DIAGNOSIS — M47.812 CERVICAL SPONDYLOSIS: ICD-10-CM

## 2024-11-19 PROCEDURE — 3008F BODY MASS INDEX DOCD: CPT | Mod: CPTII,S$GLB,, | Performed by: NURSE PRACTITIONER

## 2024-11-19 PROCEDURE — 99213 OFFICE O/P EST LOW 20 MIN: CPT | Mod: S$GLB,,, | Performed by: NURSE PRACTITIONER

## 2024-11-19 PROCEDURE — 3078F DIAST BP <80 MM HG: CPT | Mod: CPTII,S$GLB,, | Performed by: NURSE PRACTITIONER

## 2024-11-19 PROCEDURE — 1159F MED LIST DOCD IN RCRD: CPT | Mod: CPTII,S$GLB,, | Performed by: NURSE PRACTITIONER

## 2024-11-19 PROCEDURE — 1160F RVW MEDS BY RX/DR IN RCRD: CPT | Mod: CPTII,S$GLB,, | Performed by: NURSE PRACTITIONER

## 2024-11-19 PROCEDURE — 99999 PR PBB SHADOW E&M-EST. PATIENT-LVL V: CPT | Mod: PBBFAC,,, | Performed by: NURSE PRACTITIONER

## 2024-11-19 PROCEDURE — 3074F SYST BP LT 130 MM HG: CPT | Mod: CPTII,S$GLB,, | Performed by: NURSE PRACTITIONER

## 2024-11-19 PROCEDURE — 3044F HG A1C LEVEL LT 7.0%: CPT | Mod: CPTII,S$GLB,, | Performed by: NURSE PRACTITIONER

## 2024-11-19 RX ORDER — NITROGLYCERIN 0.4 MG/1
0.4 TABLET SUBLINGUAL EVERY 5 MIN PRN
Qty: 25 TABLET | Refills: 4 | Status: SHIPPED | OUTPATIENT
Start: 2024-11-19
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
## 2024-11-20 ENCOUNTER — HOSPITAL ENCOUNTER (EMERGENCY)
Facility: HOSPITAL | Age: 44
Discharge: HOME | End: 2024-11-20
Attending: STUDENT IN AN ORGANIZED HEALTH CARE EDUCATION/TRAINING PROGRAM
Payer: MEDICARE

## 2024-11-20 VITALS
HEART RATE: 88 BPM | SYSTOLIC BLOOD PRESSURE: 135 MMHG | BODY MASS INDEX: 20.45 KG/M2 | HEIGHT: 72 IN | RESPIRATION RATE: 16 BRPM | WEIGHT: 151 LBS | DIASTOLIC BLOOD PRESSURE: 88 MMHG | TEMPERATURE: 98 F | OXYGEN SATURATION: 99 %

## 2024-11-20 DIAGNOSIS — R29.818 TRANSIENT NEUROLOGIC DEFICIT: ICD-10-CM

## 2024-11-20 DIAGNOSIS — R51.9 ACUTE NONINTRACTABLE HEADACHE, UNSPECIFIED HEADACHE TYPE: Primary | ICD-10-CM

## 2024-11-20 DIAGNOSIS — R29.818 ACUTE FOCAL NEUROLOGICAL DEFICIT: ICD-10-CM

## 2024-11-20 LAB
ALBUMIN SERPL BCP-MCNC: 3.9 G/DL (ref 3.5–5.2)
ALP SERPL-CCNC: 108 U/L (ref 40–150)
ALT SERPL W/O P-5'-P-CCNC: 13 U/L (ref 10–44)
ANION GAP SERPL CALC-SCNC: 8 MMOL/L (ref 8–16)
AST SERPL-CCNC: 17 U/L (ref 10–40)
BASOPHILS # BLD AUTO: 0.06 K/UL (ref 0–0.2)
BASOPHILS NFR BLD: 0.8 % (ref 0–1.9)
BILIRUB SERPL-MCNC: 0.4 MG/DL (ref 0.1–1)
BUN SERPL-MCNC: 9 MG/DL (ref 6–20)
CALCIUM SERPL-MCNC: 9.1 MG/DL (ref 8.7–10.5)
CHLORIDE SERPL-SCNC: 106 MMOL/L (ref 95–110)
CHOLEST SERPL-MCNC: 216 MG/DL (ref 120–199)
CHOLEST/HDLC SERPL: 5.5 {RATIO} (ref 2–5)
CO2 SERPL-SCNC: 24 MMOL/L (ref 23–29)
CREAT SERPL-MCNC: 0.8 MG/DL (ref 0.5–1.4)
CREAT SERPL-MCNC: 0.8 MG/DL (ref 0.5–1.4)
DIFFERENTIAL METHOD BLD: NORMAL
EOSINOPHIL # BLD AUTO: 0.1 K/UL (ref 0–0.5)
EOSINOPHIL NFR BLD: 1.7 % (ref 0–8)
ERYTHROCYTE [DISTWIDTH] IN BLOOD BY AUTOMATED COUNT: 11.9 % (ref 11.5–14.5)
EST. GFR  (NO RACE VARIABLE): >60 ML/MIN/1.73 M^2
GLUCOSE SERPL-MCNC: 106 MG/DL (ref 70–110)
HCT VFR BLD AUTO: 40.7 % (ref 37–48.5)
HDLC SERPL-MCNC: 39 MG/DL (ref 40–75)
HDLC SERPL: 18.1 % (ref 20–50)
HGB BLD-MCNC: 14.3 G/DL (ref 12–16)
IMM GRANULOCYTES # BLD AUTO: 0.04 K/UL (ref 0–0.04)
IMM GRANULOCYTES NFR BLD AUTO: 0.5 % (ref 0–0.5)
INR PPP: 1 (ref 0.8–1.2)
LDLC SERPL CALC-MCNC: 159.4 MG/DL (ref 63–159)
LYMPHOCYTES # BLD AUTO: 1.4 K/UL (ref 1–4.8)
LYMPHOCYTES NFR BLD: 18.2 % (ref 18–48)
MCH RBC QN AUTO: 29.4 PG (ref 27–31)
MCHC RBC AUTO-ENTMCNC: 35.1 G/DL (ref 32–36)
MCV RBC AUTO: 84 FL (ref 82–98)
MONOCYTES # BLD AUTO: 0.7 K/UL (ref 0.3–1)
MONOCYTES NFR BLD: 8.7 % (ref 4–15)
NEUTROPHILS # BLD AUTO: 5.3 K/UL (ref 1.8–7.7)
NEUTROPHILS NFR BLD: 70.1 % (ref 38–73)
NONHDLC SERPL-MCNC: 177 MG/DL
NRBC BLD-RTO: 0 /100 WBC
OHS QRS DURATION: 84 MS
OHS QTC CALCULATION: 447 MS
PLATELET # BLD AUTO: 166 K/UL (ref 150–450)
PMV BLD AUTO: 10.6 FL (ref 9.2–12.9)
POC PTINR: 1.3 (ref 0.9–1.2)
POC PTWBT: 13 SEC (ref 9.7–14.3)
POCT GLUCOSE: 113 MG/DL (ref 70–110)
POTASSIUM SERPL-SCNC: 3.4 MMOL/L (ref 3.5–5.1)
PROT SERPL-MCNC: 7 G/DL (ref 6–8.4)
PROTHROMBIN TIME: 10.8 SEC (ref 9–12.5)
RBC # BLD AUTO: 4.86 M/UL (ref 4–5.4)
SAMPLE: ABNORMAL
SAMPLE: NORMAL
SODIUM SERPL-SCNC: 138 MMOL/L (ref 136–145)
TRIGL SERPL-MCNC: 88 MG/DL (ref 30–150)
TSH SERPL DL<=0.005 MIU/L-ACNC: 0.49 UIU/ML (ref 0.4–4)
WBC # BLD AUTO: 7.62 K/UL (ref 3.9–12.7)

## 2024-11-20 PROCEDURE — 63600175 PHARM REV CODE 636 W HCPCS: Performed by: STUDENT IN AN ORGANIZED HEALTH CARE EDUCATION/TRAINING PROGRAM

## 2024-11-20 PROCEDURE — 96374 THER/PROPH/DIAG INJ IV PUSH: CPT

## 2024-11-20 PROCEDURE — 93010 ELECTROCARDIOGRAM REPORT: CPT | Mod: ,,, | Performed by: INTERNAL MEDICINE

## 2024-11-20 PROCEDURE — 85025 COMPLETE CBC W/AUTO DIFF WBC: CPT | Performed by: EMERGENCY MEDICINE

## 2024-11-20 PROCEDURE — 85610 PROTHROMBIN TIME: CPT | Performed by: EMERGENCY MEDICINE

## 2024-11-20 PROCEDURE — 82803 BLOOD GASES ANY COMBINATION: CPT

## 2024-11-20 PROCEDURE — 82962 GLUCOSE BLOOD TEST: CPT

## 2024-11-20 PROCEDURE — 82565 ASSAY OF CREATININE: CPT

## 2024-11-20 PROCEDURE — 99900035 HC TECH TIME PER 15 MIN (STAT)

## 2024-11-20 PROCEDURE — 85610 PROTHROMBIN TIME: CPT

## 2024-11-20 PROCEDURE — 84443 ASSAY THYROID STIM HORMONE: CPT | Performed by: EMERGENCY MEDICINE

## 2024-11-20 PROCEDURE — 25500020 PHARM REV CODE 255: Performed by: STUDENT IN AN ORGANIZED HEALTH CARE EDUCATION/TRAINING PROGRAM

## 2024-11-20 PROCEDURE — 96361 HYDRATE IV INFUSION ADD-ON: CPT

## 2024-11-20 PROCEDURE — 80053 COMPREHEN METABOLIC PANEL: CPT | Performed by: EMERGENCY MEDICINE

## 2024-11-20 PROCEDURE — 99285 EMERGENCY DEPT VISIT HI MDM: CPT | Mod: 25

## 2024-11-20 PROCEDURE — 25000003 PHARM REV CODE 250: Performed by: STUDENT IN AN ORGANIZED HEALTH CARE EDUCATION/TRAINING PROGRAM

## 2024-11-20 PROCEDURE — 80061 LIPID PANEL: CPT | Performed by: EMERGENCY MEDICINE

## 2024-11-20 PROCEDURE — 99284 EMERGENCY DEPT VISIT MOD MDM: CPT | Mod: ,,, | Performed by: PSYCHIATRY & NEUROLOGY

## 2024-11-20 PROCEDURE — 93005 ELECTROCARDIOGRAM TRACING: CPT

## 2024-11-20 RX ORDER — PROCHLORPERAZINE EDISYLATE 5 MG/ML
10 INJECTION INTRAMUSCULAR; INTRAVENOUS
Status: COMPLETED | OUTPATIENT
Start: 2024-11-20 | End: 2024-11-20

## 2024-11-20 RX ADMIN — IOHEXOL 100 ML: 350 INJECTION, SOLUTION INTRAVENOUS at 02:11

## 2024-11-20 RX ADMIN — SODIUM CHLORIDE 250 ML: 9 INJECTION, SOLUTION INTRAVENOUS at 03:11

## 2024-11-20 RX ADMIN — PROCHLORPERAZINE EDISYLATE 10 MG: 5 INJECTION INTRAMUSCULAR; INTRAVENOUS at 03:11
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
## 2024-11-20 NOTE — HPI
Gordon Griffin is a 44 y.o. adult with PMH of HLD, migraines, chronic pain syndrome, CAD, anxiety/depression, functional movement disorder, hx of transient neuro symptoms that presents to the ED c/o sudden onset R sided HA followed by R sided paresthesia and facial asymmetry. LKW 1330 11/20, approx 1 hr PTA. Patient reports hx of similar episodes although cause of symptoms unknown. Exam significant for subjective R sided paresthesia and facial asymmetry (L facial weakness at rest, R facial weakness during speech). NIHSS 2. CTH/CTA stroke multiphase negative for acute ICH, major territory infarct, or LVO. Determined not a candidate for acute stroke interventions, no TNK due to non-disabling exam and no LVO for thrombectomy. Of note, patient was recently seen 1 month ago at OSH for similar presentation and stroke workup at that time was unrevealing.

## 2024-11-20 NOTE — ED TRIAGE NOTES
43 y/o F presents to ER with c/c R facial droop and numbness and tingling starting one hour ago. Endorses HA, has h/x migraines. Arrives with NIHSS of 2, able to ambulate to bed.
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
## 2024-11-20 NOTE — ASSESSMENT & PLAN NOTE
Gordon Griffin is a 44 y.o. adult with PMH of HLD, migraines, chronic pain syndrome, CAD, anxiety/depression, functional movement disorder, hx of transient neuro symptoms that presents to the ED c/o sudden onset R sided HA followed by R sided paresthesia and facial asymmetry. LKW 1330 11/20, approx 1 hr PTA. Patient reports hx of similar episodes although cause of symptoms unknown. Exam significant for subjective R sided paresthesia and facial asymmetry (L facial weakness at rest, R facial weakness during speech). NIHSS 2. Of note, patient was recently seen 1 month ago at OSH for similar presentation and stroke workup at that time was unrevealing.    CTH/CTA: no acute ICH, major territory infarct, or LVO.       Recommendations:  --No acute stroke interventions recommended, no TNK due to non-disabling exam and no LVO for thrombectomy.   --Low clinical suspicion for acute stroke given sx onset associated with HA and patient with multiple episodes of similar presentations without evidence of acute stroke.  --HA treatment per ED team; If stroke-like symptoms persist despite adequate treatment of HA, then can obtain MRI brain for further eval to definitively r/o stroke  --Ongoing workup and management per ED team  --Long-term BP goal normotensive  --We will follow up on MRI (if obtained) and provide any additional recs pending imaging results. If MRI brain negative for evidence of acute infarct, no further stroke workup indicated and VN will sign off  --Please contact stroke team with any questions/concerns
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
## 2024-11-20 NOTE — FIRST PROVIDER EVALUATION
Medical screening examination initiated.  I have conducted a focused provider triage encounter, findings are as follows:    Brief history of present illness:  45 yo born biologic male identifies female p/w right facial numbness, facial droop, right sided weakness. Reported started 1 hour ago. Reports history of similar 1 month ago. Notes right sided headache.    Vitals:    11/20/24 1414   BP: 114/64   Pulse: 88   Resp: 18   Temp: 97.8 °F (36.6 °C)   TempSrc: Oral   SpO2: 98%   Weight: 68.5 kg (151 lb)   Height: 6' (1.829 m)       Pertinent physical exam:  Squinting right eye. Right facial asymetry, questionable volitional.    Brief workup plan:  Code stroke called though more suspicious of other cause, atypical migraine, partial seizure, nerve entrapment functional cause not excluded.    Preliminary workup initiated; this workup will be continued and followed by the physician or advanced practice provider that is assigned to the patient when roomed.

## 2024-11-21 ENCOUNTER — OFFICE VISIT (OUTPATIENT)
Dept: ORTHOPEDICS | Facility: CLINIC | Age: 44
End: 2024-11-21
Payer: MEDICARE

## 2024-11-21 ENCOUNTER — LAB VISIT (OUTPATIENT)
Dept: LAB | Facility: HOSPITAL | Age: 44
End: 2024-11-21
Payer: MEDICARE

## 2024-11-21 ENCOUNTER — PATIENT MESSAGE (OUTPATIENT)
Dept: CARDIOLOGY | Facility: CLINIC | Age: 44
End: 2024-11-21
Payer: MEDICARE

## 2024-11-21 DIAGNOSIS — E78.00 PURE HYPERCHOLESTEROLEMIA: ICD-10-CM

## 2024-11-21 DIAGNOSIS — M17.12 PRIMARY OSTEOARTHRITIS OF LEFT KNEE: Primary | ICD-10-CM

## 2024-11-21 DIAGNOSIS — M17.11 PRIMARY OSTEOARTHRITIS OF RIGHT KNEE: ICD-10-CM

## 2024-11-21 DIAGNOSIS — I25.10 CORONARY ARTERY DISEASE INVOLVING NATIVE CORONARY ARTERY OF NATIVE HEART WITHOUT ANGINA PECTORIS: ICD-10-CM

## 2024-11-21 DIAGNOSIS — E78.6 LIPOPROTEIN DEFICIENCY: ICD-10-CM

## 2024-11-21 DIAGNOSIS — I25.10 CORONARY ARTERY DISEASE INVOLVING NATIVE CORONARY ARTERY OF NATIVE HEART WITHOUT ANGINA PECTORIS: Primary | ICD-10-CM

## 2024-11-21 LAB
CHOLEST SERPL-MCNC: 204 MG/DL (ref 120–199)
CHOLEST/HDLC SERPL: 5.5 {RATIO} (ref 2–5)
HDLC SERPL-MCNC: 37 MG/DL (ref 40–75)
HDLC SERPL: 18.1 % (ref 20–50)
LDLC SERPL CALC-MCNC: 146.6 MG/DL (ref 63–159)
NONHDLC SERPL-MCNC: 167 MG/DL
TRIGL SERPL-MCNC: 102 MG/DL (ref 30–150)

## 2024-11-21 PROCEDURE — 36415 COLL VENOUS BLD VENIPUNCTURE: CPT | Performed by: PHYSICIAN ASSISTANT

## 2024-11-21 PROCEDURE — 80061 LIPID PANEL: CPT | Performed by: PHYSICIAN ASSISTANT

## 2024-11-21 PROCEDURE — 99999 PR PBB SHADOW E&M-EST. PATIENT-LVL IV: CPT | Mod: PBBFAC,,, | Performed by: PHYSICIAN ASSISTANT

## 2024-11-21 RX ORDER — TRIAMCINOLONE ACETONIDE 40 MG/ML
40 INJECTION, SUSPENSION INTRA-ARTICULAR; INTRAMUSCULAR
Status: DISCONTINUED | OUTPATIENT
Start: 2024-11-21 | End: 2024-11-21 | Stop reason: HOSPADM

## 2024-11-21 RX ORDER — LIDOCAINE HYDROCHLORIDE 10 MG/ML
2 INJECTION, SOLUTION INFILTRATION; PERINEURAL
Status: DISCONTINUED | OUTPATIENT
Start: 2024-11-21 | End: 2024-11-21 | Stop reason: HOSPADM

## 2024-11-21 RX ADMIN — TRIAMCINOLONE ACETONIDE 40 MG: 40 INJECTION, SUSPENSION INTRA-ARTICULAR; INTRAMUSCULAR at 09:11

## 2024-11-21 RX ADMIN — LIDOCAINE HYDROCHLORIDE 2 ML: 10 INJECTION, SOLUTION INFILTRATION; PERINEURAL at 09:11

## 2024-11-21 NOTE — PROGRESS NOTES
"Patient ID: Gordon Griffin is a 44 y.o. adult.    Chief Complaint: Pain and Injections of the Right Knee (CSI)      HISTORY:  Gordon Griffin is a 44 y.o. adult who returns to me today for follow up of bilateral knee pain.  She was last seen by me 8/20/2024 (had euflexxa series in the right knee).    She would like injection in the right knee today.  Would like to have HA injections in the left.      PMH/PSH/FamHx/SocHx:    Unchanged from prior visit.    ROS:  Constitution: Negative for chills, fever and weakness.   Respiratory: Negative for cough and shortness of breath.   Musculoskeletal: Positive for bilateral knee pain  Psychiatric/Behavioral: The patient is not nervous/anxious.       PHYSICAL EXAM:   Bilateral knee  Skin intact  No effusion  ROM 0-130  Stable to testing    ASSESSMENT/PLAN:    Gordon Davies" was seen today for pain and injections.    Diagnoses and all orders for this visit:    Primary osteoarthritis of left knee  -     sodium hyaluronate (orthovisc) 30 mg  -     Prior authorization Order    Primary osteoarthritis of right knee      - RIGHT knee CSI performed today  - LEFT knee orthovisc ordered  - Will follow up in 1-2 weeks for orthovisc left knee    MEDICAL NECESSITY FOR VISCOSUPPLEMENTATION:  A thorough evaluation of the patient, have determined that viscosupplementation is medically necessary.  The patient has painful DJD of the knee with failure of conservative therapy including lifestyle modifications and rehabilitation exercises.  Oral analgesics/NSAIDs have not adequately controlled symptoms and there is radiographic evidence of joint space narrowing, subchondral sclerosis, and osteophyte formation - Kellgren Misael grade 2  "
There are no Wet Read(s) to document.

## 2024-11-21 NOTE — TELEPHONE ENCOUNTER
Spoke with pt, notified her per Brie Gallegos message. Lab work has been scheduled. Please see appt desk.   Future Appointments   Date Time Provider Department Center   11/26/2024  9:40 AM Ronald Mcgrath MD [facility] UROLOGY [facility]   12/5/2024  8:00 AM Vale Neil PA-C [facility] ORTHO [facility] Or   12/12/2024  9:00 AM Vale Neil PA-C [facility]   12/17/2024 10:30 AM Tosha Lee MD [facility] DERM [facility]   12/19/2024  9:30 AM Vale Neil PA-C [facility]   12/24/2024  7:00 AM LAB, APPOINTMENT [facility]   12/30/2024  3:00 PM Ilene Smalls MD [facility] NEURO8 [facility]   1/21/2025  8:20 AM Maribel Bright, CHARLES [facility] PAINMGT [facility]

## 2024-11-21 NOTE — PROCEDURES
Large Joint Aspiration/Injection: R knee    Date/Time: 11/21/2024 9:30 AM    Performed by: Vale Neil PA-C  Authorized by: Vale Neil PA-C    Consent Done?:  Yes (Verbal)  Indications:  Pain  Timeout: prior to procedure the correct patient, procedure, and site was verified    Prep: patient was prepped and draped in usual sterile fashion    Local anesthetic:  Topical anesthetic    Details:  Needle Size:  22 G  Approach:  Anterolateral  Location:  Knee  Site:  R knee  Medications:  40 mg triamcinolone acetonide 40 mg/mL; 2 mL LIDOcaine HCL 10 mg/ml (1%) 10 mg/mL (1 %)  Patient tolerance:  Patient tolerated the procedure well with no immediate complications

## 2024-11-26 ENCOUNTER — OFFICE VISIT (OUTPATIENT)
Dept: UROLOGY | Facility: CLINIC | Age: 44
End: 2024-11-26
Payer: MEDICARE

## 2024-11-26 VITALS
HEART RATE: 89 BPM | WEIGHT: 136.25 LBS | SYSTOLIC BLOOD PRESSURE: 116 MMHG | HEIGHT: 72 IN | BODY MASS INDEX: 18.45 KG/M2 | DIASTOLIC BLOOD PRESSURE: 72 MMHG

## 2024-11-26 DIAGNOSIS — N32.81 OAB (OVERACTIVE BLADDER): Primary | ICD-10-CM

## 2024-11-26 DIAGNOSIS — Z79.899 TRANSGENDER WOMAN ON HORMONE THERAPY: ICD-10-CM

## 2024-11-26 DIAGNOSIS — F64.0 TRANSGENDER WOMAN ON HORMONE THERAPY: ICD-10-CM

## 2024-11-26 PROCEDURE — 3074F SYST BP LT 130 MM HG: CPT | Mod: CPTII,S$GLB,, | Performed by: UROLOGY

## 2024-11-26 PROCEDURE — 3044F HG A1C LEVEL LT 7.0%: CPT | Mod: CPTII,S$GLB,, | Performed by: UROLOGY

## 2024-11-26 PROCEDURE — 3078F DIAST BP <80 MM HG: CPT | Mod: CPTII,S$GLB,, | Performed by: UROLOGY

## 2024-11-26 PROCEDURE — 1159F MED LIST DOCD IN RCRD: CPT | Mod: CPTII,S$GLB,, | Performed by: UROLOGY

## 2024-11-26 PROCEDURE — 99999 PR PBB SHADOW E&M-EST. PATIENT-LVL V: CPT | Mod: PBBFAC,,, | Performed by: UROLOGY

## 2024-11-26 PROCEDURE — 3008F BODY MASS INDEX DOCD: CPT | Mod: CPTII,S$GLB,, | Performed by: UROLOGY

## 2024-11-26 PROCEDURE — 99213 OFFICE O/P EST LOW 20 MIN: CPT | Mod: S$GLB,,, | Performed by: UROLOGY

## 2024-11-26 RX ORDER — OXYBUTYNIN CHLORIDE 10 MG/1
10 TABLET, EXTENDED RELEASE ORAL DAILY
Qty: 90 TABLET | Refills: 3 | Status: SHIPPED | OUTPATIENT
Start: 2024-11-26 | End: 2025-11-26
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
## 2024-12-03 ENCOUNTER — TELEPHONE (OUTPATIENT)
Dept: INTERNAL MEDICINE | Facility: CLINIC | Age: 44
End: 2024-12-03
Payer: MEDICARE

## 2024-12-03 NOTE — TELEPHONE ENCOUNTER
----- Message from Hannah sent at 12/3/2024  9:41 AM CST -----  Type:  Appointment Request    Name of Caller:BEL YORK [[id number]]    When is the first available appointment?No access    Symptoms:esta care     Would the patient rather a call back or a response via [facility]? Call back     Best Call Back Number:[phone]    Additional Information: patient states she would like to schedule an appointment with the providers office as her PCP. Patient states she would like to see this provider if possible as the provider is part of the LBGTQ+ clinic and prefers a female pcp. Patient would like a call back with further assistance and more information regarding scheduling.

## 2024-12-05 ENCOUNTER — OFFICE VISIT (OUTPATIENT)
Dept: ORTHOPEDICS | Facility: CLINIC | Age: 44
End: 2024-12-05
Payer: MEDICARE

## 2024-12-05 DIAGNOSIS — M17.12 PRIMARY OSTEOARTHRITIS OF LEFT KNEE: Primary | ICD-10-CM

## 2024-12-05 PROCEDURE — 99999 PR PBB SHADOW E&M-EST. PATIENT-LVL III: CPT | Mod: PBBFAC,,, | Performed by: PHYSICIAN ASSISTANT

## 2024-12-05 NOTE — PROGRESS NOTES
Gordon Griffin presents to clinic today for the first left knee Euflexxa injection.  There has been no significant change in her medical status since her last visit. No fever, chills, malaise, or unexplained weight change.    Allergies, medications, past medical and surgical history were reviewed.    Exam demonstrates there is no effusion in the  left knee, and the skin is intact.    Diagnosis: left knee osteoarthritis    After time out was performed, verbal consent obtained and patient ID, side, and site were verified, the  left  knee was sterilly prepped in the standard fashion.  A 22-gauge needle was introduced into left knee joint from an dhaval-lateral site without complication and knee was then injected with 2 ml of Euflexxa.  Sterile dressing was applied.  The patient was instructed to resume activities as tolerated and to call with any problems.     We will see Gordon Griffin back next week for the second injection.

## 2024-12-09 ENCOUNTER — OFFICE VISIT (OUTPATIENT)
Dept: INTERNAL MEDICINE | Facility: CLINIC | Age: 44
End: 2024-12-09
Payer: MEDICARE

## 2024-12-09 VITALS
BODY MASS INDEX: 20.04 KG/M2 | HEIGHT: 72 IN | OXYGEN SATURATION: 99 % | WEIGHT: 147.94 LBS | HEART RATE: 73 BPM | SYSTOLIC BLOOD PRESSURE: 118 MMHG | DIASTOLIC BLOOD PRESSURE: 70 MMHG

## 2024-12-09 DIAGNOSIS — G25.9 FUNCTIONAL MOVEMENT DISORDER: Primary | ICD-10-CM

## 2024-12-09 DIAGNOSIS — G43.E09 CHRONIC MIGRAINE WITH AURA WITHOUT STATUS MIGRAINOSUS, NOT INTRACTABLE: ICD-10-CM

## 2024-12-09 DIAGNOSIS — Z86.73 HISTORY OF CVA (CEREBROVASCULAR ACCIDENT): ICD-10-CM

## 2024-12-09 DIAGNOSIS — G25.3 MYOCLONUS: ICD-10-CM

## 2024-12-09 DIAGNOSIS — I25.10 CORONARY ARTERY DISEASE INVOLVING NATIVE CORONARY ARTERY OF NATIVE HEART WITHOUT ANGINA PECTORIS: ICD-10-CM

## 2024-12-09 DIAGNOSIS — M47.892 OTHER SPONDYLOSIS, CERVICAL REGION: ICD-10-CM

## 2024-12-09 DIAGNOSIS — F32.A DEPRESSION, UNSPECIFIED DEPRESSION TYPE: ICD-10-CM

## 2024-12-09 PROCEDURE — 3074F SYST BP LT 130 MM HG: CPT | Mod: CPTII,S$GLB,, | Performed by: INTERNAL MEDICINE

## 2024-12-09 PROCEDURE — 99999 PR PBB SHADOW E&M-EST. PATIENT-LVL V: CPT | Mod: PBBFAC,,, | Performed by: INTERNAL MEDICINE

## 2024-12-09 PROCEDURE — 1159F MED LIST DOCD IN RCRD: CPT | Mod: CPTII,S$GLB,, | Performed by: INTERNAL MEDICINE

## 2024-12-09 PROCEDURE — 3078F DIAST BP <80 MM HG: CPT | Mod: CPTII,S$GLB,, | Performed by: INTERNAL MEDICINE

## 2024-12-09 PROCEDURE — 1160F RVW MEDS BY RX/DR IN RCRD: CPT | Mod: CPTII,S$GLB,, | Performed by: INTERNAL MEDICINE

## 2024-12-09 PROCEDURE — 3008F BODY MASS INDEX DOCD: CPT | Mod: CPTII,S$GLB,, | Performed by: INTERNAL MEDICINE

## 2024-12-09 PROCEDURE — 99215 OFFICE O/P EST HI 40 MIN: CPT | Mod: S$GLB,,, | Performed by: INTERNAL MEDICINE

## 2024-12-09 PROCEDURE — 3044F HG A1C LEVEL LT 7.0%: CPT | Mod: CPTII,S$GLB,, | Performed by: INTERNAL MEDICINE

## 2024-12-09 RX ORDER — RIMEGEPANT SULFATE 75 MG/75MG
75 TABLET, ORALLY DISINTEGRATING ORAL
Qty: 15 TABLET | Refills: 2 | Status: SHIPPED | OUTPATIENT
Start: 2024-12-09
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
## 2024-12-11 ENCOUNTER — TELEPHONE (OUTPATIENT)
Dept: INTERNAL MEDICINE | Facility: CLINIC | Age: 44
End: 2024-12-11
Payer: MEDICARE

## 2024-12-11 NOTE — TELEPHONE ENCOUNTER
----- Message from Ruthann sent at 12/11/2024  9:17 AM CST -----  Contact: [facility] [phone]  .[email or web address]     Patient is calling for Medical Advice regarding:Car is calling to check on the PA for this medsNURTEC 75 mg odt 15 tablet  Please call him back and advise    How long has patient had these symptoms:    Pharmacy name and phone#:       Patient wants a call back or thru [facility]:   [facility] [phone]    Comments:    Please advise patient replies from provider may take up to 48 hours.

## 2024-12-12 ENCOUNTER — OFFICE VISIT (OUTPATIENT)
Dept: ORTHOPEDICS | Facility: CLINIC | Age: 44
End: 2024-12-12
Payer: MEDICARE

## 2024-12-12 DIAGNOSIS — M17.12 PRIMARY OSTEOARTHRITIS OF LEFT KNEE: Primary | ICD-10-CM

## 2024-12-12 PROCEDURE — 99499 UNLISTED E&M SERVICE: CPT | Mod: S$GLB,,, | Performed by: PHYSICIAN ASSISTANT

## 2024-12-12 PROCEDURE — 99999 PR PBB SHADOW E&M-EST. PATIENT-LVL III: CPT | Mod: PBBFAC,,, | Performed by: PHYSICIAN ASSISTANT

## 2024-12-12 NOTE — PROGRESS NOTES
Gordon Griffin presents to clinic today for the second left knee orthovisc injection.  There has been no significant change in her medical status since her last visit. No fever, chills, malaise, or unexplained weight change.    Allergies, medications, past medical and surgical history were reviewed.    Exam demonstrates there is no effusion in the  left knee, and the skin is intact.    Diagnosis: left knee osteoarthritis    After time out was performed, verbal consent obtained and patient ID, side, and site were verified, the  left  knee was sterilly prepped in the standard fashion.  A 22-gauge needle was introduced into left knee joint from an dhaval-lateral site without complication and knee was then injected with 2 ml of Orthovisc.  Sterile dressing was applied.  The patient was instructed to resume activities as tolerated and to call with any problems.     We will see Gordon Griffin back next week for the third injection.

## 2024-12-13 ENCOUNTER — PATIENT MESSAGE (OUTPATIENT)
Dept: INTERNAL MEDICINE | Facility: CLINIC | Age: 44
End: 2024-12-13
Payer: MEDICARE

## 2024-12-13 ENCOUNTER — TELEPHONE (OUTPATIENT)
Dept: PLASTIC SURGERY | Facility: CLINIC | Age: 44
End: 2024-12-13
Payer: MEDICARE

## 2024-12-17 ENCOUNTER — OFFICE VISIT (OUTPATIENT)
Dept: DERMATOLOGY | Facility: CLINIC | Age: 44
End: 2024-12-17
Payer: MEDICARE

## 2024-12-17 ENCOUNTER — OFFICE VISIT (OUTPATIENT)
Dept: ORTHOPEDICS | Facility: CLINIC | Age: 44
End: 2024-12-17
Payer: MEDICARE

## 2024-12-17 ENCOUNTER — LAB VISIT (OUTPATIENT)
Dept: LAB | Facility: HOSPITAL | Age: 44
End: 2024-12-17
Payer: MEDICARE

## 2024-12-17 DIAGNOSIS — L81.4 LENTIGINES: ICD-10-CM

## 2024-12-17 DIAGNOSIS — E78.6 LIPOPROTEIN DEFICIENCY: ICD-10-CM

## 2024-12-17 DIAGNOSIS — M17.12 PRIMARY OSTEOARTHRITIS OF LEFT KNEE: Primary | ICD-10-CM

## 2024-12-17 DIAGNOSIS — D23.9 DERMATOFIBROMA: ICD-10-CM

## 2024-12-17 DIAGNOSIS — D22.9 MULTIPLE BENIGN NEVI: ICD-10-CM

## 2024-12-17 DIAGNOSIS — Z12.83 SKIN CANCER SCREENING: Primary | ICD-10-CM

## 2024-12-17 DIAGNOSIS — E78.00 PURE HYPERCHOLESTEROLEMIA: ICD-10-CM

## 2024-12-17 DIAGNOSIS — I25.10 CORONARY ARTERY DISEASE INVOLVING NATIVE CORONARY ARTERY OF NATIVE HEART WITHOUT ANGINA PECTORIS: ICD-10-CM

## 2024-12-17 LAB
CHOLEST SERPL-MCNC: 174 MG/DL (ref 120–199)
CHOLEST/HDLC SERPL: 4.8 {RATIO} (ref 2–5)
HDLC SERPL-MCNC: 36 MG/DL (ref 40–75)
HDLC SERPL: 20.7 % (ref 20–50)
LDLC SERPL CALC-MCNC: 119.6 MG/DL (ref 63–159)
NONHDLC SERPL-MCNC: 138 MG/DL
TRIGL SERPL-MCNC: 92 MG/DL (ref 30–150)

## 2024-12-17 PROCEDURE — 3044F HG A1C LEVEL LT 7.0%: CPT | Mod: CPTII,S$GLB,, | Performed by: DERMATOLOGY

## 2024-12-17 PROCEDURE — 99213 OFFICE O/P EST LOW 20 MIN: CPT | Mod: S$GLB,,, | Performed by: DERMATOLOGY

## 2024-12-17 PROCEDURE — 99999 PR PBB SHADOW E&M-EST. PATIENT-LVL III: CPT | Mod: PBBFAC,,, | Performed by: PHYSICIAN ASSISTANT

## 2024-12-17 PROCEDURE — 36415 COLL VENOUS BLD VENIPUNCTURE: CPT | Performed by: PHYSICIAN ASSISTANT

## 2024-12-17 PROCEDURE — 1159F MED LIST DOCD IN RCRD: CPT | Mod: CPTII,S$GLB,, | Performed by: DERMATOLOGY

## 2024-12-17 PROCEDURE — 99499 UNLISTED E&M SERVICE: CPT | Mod: S$GLB,,, | Performed by: PHYSICIAN ASSISTANT

## 2024-12-17 PROCEDURE — 99999 PR PBB SHADOW E&M-EST. PATIENT-LVL III: CPT | Mod: PBBFAC,,, | Performed by: DERMATOLOGY

## 2024-12-17 PROCEDURE — 80061 LIPID PANEL: CPT | Performed by: PHYSICIAN ASSISTANT

## 2024-12-17 PROCEDURE — 20610 DRAIN/INJ JOINT/BURSA W/O US: CPT | Mod: LT,S$GLB,, | Performed by: PHYSICIAN ASSISTANT

## 2024-12-17 NOTE — PROGRESS NOTES
"  Subjective:      Patient ID:  Gordon Griffin is a 44 y.o. adult who presents for   Chief Complaint   Patient presents with    Skin Check     TBSE     History of Present Illness:     43yo F with history of migraines, myoclonus, stroke/TIA who presents for TBSE. Preferred pronouns she/her.    The patient was last seen on: 8/7/24 with Dr. Wiley for routine skin check. Presenting to clinic here because would like to establish care at a closer location. Pt denies any spots of concern. 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    Unknown family history of skin cancer. Reports history of myasthenia gravis and breast cancer in mom.   Patient has worked as a  for over 20 years, currently part of maritime rescue response team.         Review of Systems   Skin:  Positive for daily sunscreen use, activity-related sunscreen use and wears hat. Negative for recent sunburn.   Hematologic/Lymphatic: Bruises/bleeds easily (bruises).       Objective:   Physical Exam   Constitutional: She appears well-developed and well-nourished. No distress.   Neurological: She is alert and oriented to person, place, and time. She is not disoriented.   Psychiatric: She has a normal mood and affect.   Skin:   Areas Examined (abnormalities noted in diagram):   Scalp / Hair Palpated and Inspected  Head / Face Inspection Performed  Neck Inspection Performed  Chest / Axilla Inspection Performed  Abdomen Inspection Performed  Genitals / Buttocks / Groin Inspection Performed  Back Inspection Performed  RUE Inspected  LUE Inspection Performed  RLE Inspected  LLE Inspection Performed  Nails and Digits Inspection Performed                 Diagram Legend     Erythematous scaling macule/papule c/w actinic keratosis       Vascular papule c/w angioma      Pigmented verrucoid papule/plaque c/w seborrheic keratosis      Yellow umbilicated papule c/w sebaceous hyperplasia      Irregularly shaped tan macule c/w " lentigo     1-2 mm smooth white papules consistent with Milia      Movable subcutaneous cyst with punctum c/w epidermal inclusion cyst      Subcutaneous movable cyst c/w pilar cyst      Firm pink to brown papule c/w dermatofibroma      Pedunculated fleshy papule(s) c/w skin tag(s)      Evenly pigmented macule c/w junctional nevus     Mildly variegated pigmented, slightly irregular-bordered macule c/w mildly atypical nevus      Flesh colored to evenly pigmented papule c/w intradermal nevus       Pink pearly papule/plaque c/w basal cell carcinoma      Erythematous hyperkeratotic cursted plaque c/w SCC      Surgical scar with no sign of skin cancer recurrence      Open and closed comedones      Inflammatory papules and pustules      Verrucoid papule consistent consistent with wart     Erythematous eczematous patches and plaques     Dystrophic onycholytic nail with subungual debris c/w onychomycosis     Umbilicated papule    Erythematous-base heme-crusted tan verrucoid plaque consistent with inflamed seborrheic keratosis     Erythematous Silvery Scaling Plaque c/w Psoriasis     See annotation      Assessment / Plan:        Skin cancer screening  - TBSE as above  - counseled on sun protection  Total body skin examination performed today including at least 12 points as noted in physical examination. No lesions suspicious for malignancy noted.  Recommend daily sun protection/avoidance, use of at least SPF 30, broad spectrum sunscreen (OTC drug), skin self examinations, and routine physician surveillance to optimize early detection    Lentigines  Multiple benign nevi  Dermatofibroma  - counseled on benign nature of lesions    Photodamage  Patient instructed in importance in daily sun protection of at least spf 30. Sun avoidance and topical protection discussed.   Patient encouraged to wear hat for all outdoor exposure.   Also discussed sun protective clothing.       Follow up in about 1 year (around 12/17/2025).    Katja  Smita, PGY-3  Dermatology  12/17/24    I have seen the patient, reviewed the Resident's progress note. I have personally interviewed and examined the patient at bedside and agree with the findings.

## 2024-12-26 ENCOUNTER — HOSPITAL ENCOUNTER (EMERGENCY)
Facility: HOSPITAL | Age: 44
Discharge: HOME | End: 2024-12-26
Attending: EMERGENCY MEDICINE
Payer: MEDICARE

## 2024-12-26 ENCOUNTER — PATIENT MESSAGE (OUTPATIENT)
Dept: PODIATRY | Facility: CLINIC | Age: 44
End: 2024-12-26
Payer: MEDICARE

## 2024-12-26 ENCOUNTER — TELEPHONE (OUTPATIENT)
Dept: PODIATRY | Facility: CLINIC | Age: 44
End: 2024-12-26
Payer: MEDICARE

## 2024-12-26 VITALS
HEART RATE: 75 BPM | WEIGHT: 147 LBS | DIASTOLIC BLOOD PRESSURE: 73 MMHG | TEMPERATURE: 98 F | HEIGHT: 72 IN | RESPIRATION RATE: 19 BRPM | OXYGEN SATURATION: 98 % | BODY MASS INDEX: 19.91 KG/M2 | SYSTOLIC BLOOD PRESSURE: 113 MMHG

## 2024-12-26 DIAGNOSIS — R51.9 NONINTRACTABLE HEADACHE, UNSPECIFIED CHRONICITY PATTERN, UNSPECIFIED HEADACHE TYPE: Primary | ICD-10-CM

## 2024-12-26 LAB
ABO + RH BLD: NORMAL
ALBUMIN SERPL BCP-MCNC: 4.2 G/DL (ref 3.5–5.2)
ALP SERPL-CCNC: 108 U/L (ref 40–150)
ALT SERPL W/O P-5'-P-CCNC: 12 U/L (ref 10–44)
ANION GAP SERPL CALC-SCNC: 9 MMOL/L (ref 8–16)
APTT PPP: 29 SEC (ref 21–32)
AST SERPL-CCNC: 14 U/L (ref 10–40)
BASOPHILS # BLD AUTO: 0.05 K/UL (ref 0–0.2)
BASOPHILS NFR BLD: 0.5 % (ref 0–1.9)
BILIRUB SERPL-MCNC: 0.3 MG/DL (ref 0.1–1)
BLD GP AB SCN CELLS X3 SERPL QL: NORMAL
BUN SERPL-MCNC: 11 MG/DL (ref 6–20)
BUN SERPL-MCNC: 11 MG/DL (ref 6–30)
CALCIUM SERPL-MCNC: 9.5 MG/DL (ref 8.7–10.5)
CHLORIDE SERPL-SCNC: 101 MMOL/L (ref 95–110)
CHLORIDE SERPL-SCNC: 105 MMOL/L (ref 95–110)
CO2 SERPL-SCNC: 28 MMOL/L (ref 23–29)
CREAT SERPL-MCNC: 0.8 MG/DL (ref 0.5–1.4)
CREAT SERPL-MCNC: 0.8 MG/DL (ref 0.5–1.4)
DIFFERENTIAL METHOD BLD: ABNORMAL
EOSINOPHIL # BLD AUTO: 0.1 K/UL (ref 0–0.5)
EOSINOPHIL NFR BLD: 0.5 % (ref 0–8)
ERYTHROCYTE [DISTWIDTH] IN BLOOD BY AUTOMATED COUNT: 11.9 % (ref 11.5–14.5)
EST. GFR  (NO RACE VARIABLE): >60 ML/MIN/1.73 M^2
GLUCOSE SERPL-MCNC: 123 MG/DL (ref 70–110)
GLUCOSE SERPL-MCNC: 125 MG/DL (ref 70–110)
HCT VFR BLD AUTO: 41.7 % (ref 37–48.5)
HCT VFR BLD CALC: 42 %PCV (ref 36–54)
HGB BLD-MCNC: 14.2 G/DL (ref 12–16)
IMM GRANULOCYTES # BLD AUTO: 0.03 K/UL (ref 0–0.04)
IMM GRANULOCYTES NFR BLD AUTO: 0.3 % (ref 0–0.5)
INR PPP: 1.1 (ref 0.8–1.2)
LYMPHOCYTES # BLD AUTO: 0.8 K/UL (ref 1–4.8)
LYMPHOCYTES NFR BLD: 8.4 % (ref 18–48)
MCH RBC QN AUTO: 29.6 PG (ref 27–31)
MCHC RBC AUTO-ENTMCNC: 34.1 G/DL (ref 32–36)
MCV RBC AUTO: 87 FL (ref 82–98)
MONOCYTES # BLD AUTO: 0.4 K/UL (ref 0.3–1)
MONOCYTES NFR BLD: 4.1 % (ref 4–15)
NEUTROPHILS # BLD AUTO: 8.6 K/UL (ref 1.8–7.7)
NEUTROPHILS NFR BLD: 86.2 % (ref 38–73)
NRBC BLD-RTO: 0 /100 WBC
PLATELET # BLD AUTO: 146 K/UL (ref 150–450)
PMV BLD AUTO: 10.9 FL (ref 9.2–12.9)
POC IONIZED CALCIUM: 1.23 MMOL/L (ref 1.06–1.42)
POC TCO2 (MEASURED): 28 MMOL/L (ref 23–29)
POTASSIUM BLD-SCNC: 3.8 MMOL/L (ref 3.5–5.1)
POTASSIUM SERPL-SCNC: 3.7 MMOL/L (ref 3.5–5.1)
PROT SERPL-MCNC: 7.2 G/DL (ref 6–8.4)
PROTHROMBIN TIME: 11.8 SEC (ref 9–12.5)
RBC # BLD AUTO: 4.8 M/UL (ref 4–5.4)
SAMPLE: ABNORMAL
SODIUM BLD-SCNC: 141 MMOL/L (ref 136–145)
SODIUM SERPL-SCNC: 142 MMOL/L (ref 136–145)
SPECIMEN OUTDATE: NORMAL
WBC # BLD AUTO: 10 K/UL (ref 3.9–12.7)

## 2024-12-26 PROCEDURE — 63600175 PHARM REV CODE 636 W HCPCS: Performed by: EMERGENCY MEDICINE

## 2024-12-26 PROCEDURE — 96374 THER/PROPH/DIAG INJ IV PUSH: CPT | Mod: 59

## 2024-12-26 PROCEDURE — 85730 THROMBOPLASTIN TIME PARTIAL: CPT | Performed by: EMERGENCY MEDICINE

## 2024-12-26 PROCEDURE — 99285 EMERGENCY DEPT VISIT HI MDM: CPT | Mod: 25

## 2024-12-26 PROCEDURE — 96375 TX/PRO/DX INJ NEW DRUG ADDON: CPT

## 2024-12-26 PROCEDURE — 85610 PROTHROMBIN TIME: CPT | Performed by: EMERGENCY MEDICINE

## 2024-12-26 PROCEDURE — 86900 BLOOD TYPING SEROLOGIC ABO: CPT | Performed by: EMERGENCY MEDICINE

## 2024-12-26 PROCEDURE — 80053 COMPREHEN METABOLIC PANEL: CPT | Performed by: EMERGENCY MEDICINE

## 2024-12-26 PROCEDURE — 80047 BASIC METABLC PNL IONIZED CA: CPT

## 2024-12-26 PROCEDURE — 25500020 PHARM REV CODE 255: Performed by: EMERGENCY MEDICINE

## 2024-12-26 PROCEDURE — 85025 COMPLETE CBC W/AUTO DIFF WBC: CPT | Performed by: EMERGENCY MEDICINE

## 2024-12-26 PROCEDURE — 82330 ASSAY OF CALCIUM: CPT

## 2024-12-26 PROCEDURE — 25000003 PHARM REV CODE 250: Performed by: EMERGENCY MEDICINE

## 2024-12-26 RX ORDER — ACETAMINOPHEN 500 MG
1000 TABLET ORAL
Status: COMPLETED | OUTPATIENT
Start: 2024-12-26 | End: 2024-12-26

## 2024-12-26 RX ORDER — METOCLOPRAMIDE HYDROCHLORIDE 5 MG/ML
10 INJECTION INTRAMUSCULAR; INTRAVENOUS
Status: COMPLETED | OUTPATIENT
Start: 2024-12-26 | End: 2024-12-26

## 2024-12-26 RX ORDER — DIPHENHYDRAMINE HYDROCHLORIDE 50 MG/ML
50 INJECTION INTRAMUSCULAR; INTRAVENOUS
Status: COMPLETED | OUTPATIENT
Start: 2024-12-26 | End: 2024-12-26

## 2024-12-26 RX ADMIN — IOHEXOL 75 ML: 350 INJECTION, SOLUTION INTRAVENOUS at 04:12

## 2024-12-26 RX ADMIN — DIPHENHYDRAMINE HYDROCHLORIDE 50 MG: 50 INJECTION, SOLUTION INTRAMUSCULAR; INTRAVENOUS at 03:12

## 2024-12-26 RX ADMIN — ACETAMINOPHEN 1000 MG: 500 TABLET ORAL at 04:12

## 2024-12-26 RX ADMIN — METOCLOPRAMIDE 10 MG: 5 INJECTION, SOLUTION INTRAMUSCULAR; INTRAVENOUS at 03:12

## 2024-12-26 NOTE — ED TRIAGE NOTES
Pt reports hearing pop in back of head roughly an hour prior to arrival afterwards pt reports generalized weakness, emesis and nausea, blurred vision, diaphoresis and numbness/tingling to whole body
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
## 2024-12-26 NOTE — PROVIDER PROGRESS NOTES - EMERGENCY DEPT.
Encounter Date: 12/26/2024    ED Physician Progress Notes        Physician Note:   Benefits outweigh risk for emergent CT scan without creatinine.  Concern for subarachnoid hemorrhage.  Previous creatinine 1 month ago was normal.

## 2024-12-26 NOTE — TELEPHONE ENCOUNTER
Call pt. To get rescheduled due to Dr. Fritz being out of clinic on 01/02/2025. Pt. Phone is disconnected and I was not able to leave voicemail. I scheduled pt. New Appointment  [facility] and [facility]. And if any changes need to be made he can contact us at [phone].

## 2024-12-26 NOTE — ED NOTES
I-STAT Chem-8+ Results:   Value Reference Range   Sodium 141 136-145 mmol/L   Potassium  3.8 3.5-5.1 mmol/L   Chloride 101  mmol/L   Ionized Calcium 1.23 1.06-1.42 mmol/L   CO2 (measured) 28 23-29 mmol/L   Glucose 125  mg/dL   BUN 11 6-30 mg/dL   Creatinine 0.8 0.5-1.4 mg/dL   Hematocrit 42 36-54%

## 2024-12-27 NOTE — DISCHARGE INSTRUCTIONS
You were seen for headache, your labs and imaging did not show any signs of dangerous condition and your symptoms resolved after ED treatments.    Follow-up with the neurologist on 12/30 as scheduled.    You can continue take your home medications as prescribed for headache.      If you develop any uncontrolled pain, fevers, new or unusual neurologic symptoms, or any other new, worsening worrisome symptoms please return emergency department for re-evaluation.

## 2024-12-30 ENCOUNTER — OFFICE VISIT (OUTPATIENT)
Dept: NEUROLOGY | Facility: CLINIC | Age: 44
End: 2024-12-30
Payer: MEDICARE

## 2024-12-30 DIAGNOSIS — G24.9 DYSTONIA: ICD-10-CM

## 2024-12-30 DIAGNOSIS — R26.9 ABNORMALITY OF GAIT AND MOBILITY: ICD-10-CM

## 2024-12-30 DIAGNOSIS — G25.9 FUNCTIONAL MOVEMENT DISORDER: Primary | ICD-10-CM

## 2024-12-30 DIAGNOSIS — G25.3 MYOCLONUS: ICD-10-CM

## 2024-12-30 DIAGNOSIS — G43.709 CHRONIC MIGRAINE WITHOUT AURA WITHOUT STATUS MIGRAINOSUS, NOT INTRACTABLE: ICD-10-CM

## 2024-12-30 PROCEDURE — 99214 OFFICE O/P EST MOD 30 MIN: CPT | Mod: S$GLB,,, | Performed by: STUDENT IN AN ORGANIZED HEALTH CARE EDUCATION/TRAINING PROGRAM

## 2024-12-30 PROCEDURE — 3044F HG A1C LEVEL LT 7.0%: CPT | Mod: CPTII,S$GLB,, | Performed by: STUDENT IN AN ORGANIZED HEALTH CARE EDUCATION/TRAINING PROGRAM

## 2024-12-30 PROCEDURE — 99999 PR PBB SHADOW E&M-EST. PATIENT-LVL I: CPT | Mod: PBBFAC,,, | Performed by: STUDENT IN AN ORGANIZED HEALTH CARE EDUCATION/TRAINING PROGRAM

## 2024-12-30 PROCEDURE — G2211 COMPLEX E/M VISIT ADD ON: HCPCS | Mod: S$GLB,,, | Performed by: STUDENT IN AN ORGANIZED HEALTH CARE EDUCATION/TRAINING PROGRAM
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
## 2025-01-07 ENCOUNTER — CLINICAL SUPPORT (OUTPATIENT)
Dept: REHABILITATION | Facility: HOSPITAL | Age: 45
End: 2025-01-07
Attending: STUDENT IN AN ORGANIZED HEALTH CARE EDUCATION/TRAINING PROGRAM
Payer: MEDICARE

## 2025-01-07 DIAGNOSIS — G24.9 DYSTONIA: ICD-10-CM

## 2025-01-07 DIAGNOSIS — G25.9 FUNCTIONAL MOVEMENT DISORDER: ICD-10-CM

## 2025-01-07 DIAGNOSIS — Z74.09 IMPAIRED FUNCTIONAL MOBILITY AND ACTIVITY TOLERANCE: Primary | ICD-10-CM

## 2025-01-07 PROCEDURE — 97162 PT EVAL MOD COMPLEX 30 MIN: CPT | Mod: PO
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
## 2025-01-08 ENCOUNTER — TELEPHONE (OUTPATIENT)
Dept: SURGERY | Facility: CLINIC | Age: 45
End: 2025-01-08
Payer: MEDICARE

## 2025-01-09 ENCOUNTER — OFFICE VISIT (OUTPATIENT)
Dept: SURGERY | Facility: CLINIC | Age: 45
End: 2025-01-09
Payer: MEDICARE

## 2025-01-09 VITALS
DIASTOLIC BLOOD PRESSURE: 75 MMHG | BODY MASS INDEX: 18.96 KG/M2 | WEIGHT: 140 LBS | HEIGHT: 72 IN | RESPIRATION RATE: 18 BRPM | HEART RATE: 83 BPM | SYSTOLIC BLOOD PRESSURE: 118 MMHG

## 2025-01-09 DIAGNOSIS — K64.8 HEMORRHOID PROLAPSE: Primary | ICD-10-CM

## 2025-01-09 PROCEDURE — 1159F MED LIST DOCD IN RCRD: CPT | Mod: CPTII,S$GLB,, | Performed by: NURSE PRACTITIONER

## 2025-01-09 PROCEDURE — 1160F RVW MEDS BY RX/DR IN RCRD: CPT | Mod: CPTII,S$GLB,, | Performed by: NURSE PRACTITIONER

## 2025-01-09 PROCEDURE — 99999 PR PBB SHADOW E&M-EST. PATIENT-LVL V: CPT | Mod: PBBFAC,,, | Performed by: NURSE PRACTITIONER

## 2025-01-09 PROCEDURE — 99213 OFFICE O/P EST LOW 20 MIN: CPT | Mod: S$GLB,,, | Performed by: NURSE PRACTITIONER

## 2025-01-09 PROCEDURE — 3078F DIAST BP <80 MM HG: CPT | Mod: CPTII,S$GLB,, | Performed by: NURSE PRACTITIONER

## 2025-01-09 PROCEDURE — 3008F BODY MASS INDEX DOCD: CPT | Mod: CPTII,S$GLB,, | Performed by: NURSE PRACTITIONER

## 2025-01-09 PROCEDURE — 3074F SYST BP LT 130 MM HG: CPT | Mod: CPTII,S$GLB,, | Performed by: NURSE PRACTITIONER

## 2025-01-09 RX ORDER — HYDROCORTISONE 25 MG/G
CREAM TOPICAL 2 TIMES DAILY
Qty: 28 G | Refills: 1 | Status: SHIPPED | OUTPATIENT
Start: 2025-01-09
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
## 2025-01-14 NOTE — PROGRESS NOTES
[facility]   Physical Therapy Treatment Note      Name: Gordon Griffin  [facility] Number: [id number]    Therapy Diagnosis:   Encounter Diagnosis   Name Primary?    Impaired functional mobility and activity tolerance Yes     Physician: Ilene Smalls MD    Visit Date: 1/16/2025    Physician Orders: PT Eval and Treat   Medical Diagnosis from Referral:   G24.9 (ICD-10-CM) - Dystonia   G25.9 (ICD-10-CM) - Functional movement disorder      Evaluation Date: 1/7/2025  Authorization Period Expiration: 12/31/2025  Plan of Care Expiration: 3/4/2025  Progress Note Due: 2/4/2025  Date of Surgery: NA  Visit # / Visits authorized: 01/ 20 (+eval)  FOTO: 1/ 3     Precautions: Standard and Fall     Time In: 09:29  Time Out: 10:11  Total Billable Time: 42 minutes    PTA Visit #: 0/5       Subjective     Patient reports: she barely got any sleep last night because she got a call from work just as she was getting ready for bed.  She was compliant with home exercise program.  Response to previous treatment: eval only  Functional change: fluctuating    Pain: 8/10  Location: neck and SI joint      Objective      Objective Measures updated at progress report unless specified.     Treatment     Dylon received the treatments listed below:      therapeutic exercises to develop strength, endurance, and flexibility for 10 minutes including:    Upright bike on L5 for x10 minutes for CV endurance     neuromuscular re-education activities to improve: Balance, Coordination, Proprioception, and Posture for 12 minutes. The following activities were included:    MAURIZIO Pro Blocks: increased time needed for set-up of activities   2 x 2:30 min rounds - pt stood on 4-point fitter + tapped matching color dot as lit block with foot   2 x 2:30 min rounds - pt stepped across river stones of different sizes as well as 4-point fitter & compliant step + reaching to tap lit block; encouraged scanning, turning, single limb stance, and  reaching outside of base of support     Time includes seated rest breaks as needed due to fatigue.     therapeutic activities to improve functional performance for 20 minutes, including:    Functional motion tolerance: PT SBA  Walking fwd with tidal tank for x1 lap around gym  Walking fwd with tidal tank + head nods up/down for x2 laps around gym   Side-stepping with tidal tank for 3 x 30 ft each direction (L/R)   Walking bwd with tidal tank for 4 x 30 ft       Patient Education and Home Exercises       Education provided:   - need for maintaining gains from therapy with a consistent at home program     Written Home Exercises Provided: Pt instructed to continue prior HEP.    Assessment     Dylon did fairly well during her first follow-up session for this plan of care but did require reinforcement of edu re: need to find a consistent home program to maintain PT gains. She is limited at times by distraction and requires redirection to task but overall participated appropriately. Her FND symptoms appear more often when in higher anxiety situations or with increased sympathetic activation and may benefit from some autonomic regulation techniques. She will continue to benefit from skilled neuro PT to maximize safety and independence as well as improve overall QOL.      Dylon Is progressing well towards her goals.   Patient prognosis is Good.     Patient will continue to benefit from skilled outpatient physical therapy to address the deficits listed in the problem list box on initial evaluation, provide pt/family education and to maximize pt's level of independence in the home and community environment.     Patient's spiritual, cultural and educational needs considered and pt agreeable to plan of care and goals.     Anticipated barriers to physical therapy: comorbidities, psychosocial barriers    Goals:   Short Term Goals= Long term goals : 8 weeks   1. Patient to be (I) with established home exercise program. Ongoing   2.  Patient to improve bilateral ankle DF by 1/3 MMT to improve balance and functional mobility. Ongoing   3. Patient to improve bilateral knee flexion strength by 1/3 MMT to improve balance and functional mobility. Ongoing   4. Patient to improve FGA score to 25/ 30 for decreased risk for falls with community mobility. Ongoing   5. Patient to improve SLS time to 17 seconds with BLE for decreased risk for falls with community. Ongoing   6. Patient to improve SSWS to 1.2  m/sec for improved safety with ambulation. Ongoing   7. Patient to improve MCTSIB condition 4 to 30 seconds for improved stability with mobility. Ongoing     Plan     Continue PT plan of care with a focus on higher level balance skills      Lynda David, PT

## 2025-01-16 ENCOUNTER — CLINICAL SUPPORT (OUTPATIENT)
Dept: REHABILITATION | Facility: HOSPITAL | Age: 45
End: 2025-01-16
Payer: MEDICARE

## 2025-01-16 DIAGNOSIS — Z74.09 IMPAIRED FUNCTIONAL MOBILITY AND ACTIVITY TOLERANCE: Primary | ICD-10-CM

## 2025-01-16 PROCEDURE — 97530 THERAPEUTIC ACTIVITIES: CPT | Mod: PO

## 2025-01-16 PROCEDURE — 97110 THERAPEUTIC EXERCISES: CPT | Mod: PO

## 2025-01-16 PROCEDURE — 97112 NEUROMUSCULAR REEDUCATION: CPT | Mod: PO

## 2025-01-31 ENCOUNTER — CLINICAL SUPPORT (OUTPATIENT)
Dept: REHABILITATION | Facility: HOSPITAL | Age: 45
End: 2025-01-31
Payer: MEDICARE

## 2025-01-31 DIAGNOSIS — Z74.09 IMPAIRED FUNCTIONAL MOBILITY AND ACTIVITY TOLERANCE: Primary | ICD-10-CM

## 2025-01-31 PROCEDURE — 97110 THERAPEUTIC EXERCISES: CPT | Mod: PO,CQ

## 2025-01-31 PROCEDURE — 97530 THERAPEUTIC ACTIVITIES: CPT | Mod: PO,CQ

## 2025-01-31 NOTE — PROGRESS NOTES
"[facility] OUTPATIENT THERAPY AND WELLNESS   Physical Therapy Treatment Note      Name: Gordon Griffin  [facility] Number: [id number]    Therapy Diagnosis:   Encounter Diagnosis   Name Primary?    Impaired functional mobility and activity tolerance Yes     Physician: Ilene Smalls MD    Visit Date: 1/31/2025    Physician Orders: PT Eval and Treat   Medical Diagnosis from Referral:   G24.9 (ICD-10-CM) - Dystonia   G25.9 (ICD-10-CM) - Functional movement disorder      Evaluation Date: 1/7/2025  Authorization Period Expiration: 12/31/2025  Plan of Care Expiration: 3/4/2025  Progress Note Due: 2/4/2025  Date of Surgery: NA  Visit # / Visits authorized: 02/ 20 (+eval)  FOTO: 1/ 3     Precautions: Standard and Fall     Time In: 10:30  Time Out: 11:15  Total Billable Time: 45 minutes    PTA Visit #: 1/5       Subjective     Patient reports: " Lizeth wanted me to come in with all my gear so we could do balance with it on."   She was compliant with home exercise program.  Response to previous treatment: some fatigue  Functional change: fluctuating    Pain: 8/10  Location: neck and SI joint      Objective      Objective Measures updated at progress report unless specified.     Treatment   Pt arrived wearing work vest gear with multiple weighted objects and wore it the entire times  Dylon received the treatments listed below:      therapeutic exercises to develop strength, endurance, and flexibility for 10 minutes including:    Upright bike on L5 for x10 minutes for CV endurance     neuromuscular re-education activities to improve: Balance, Coordination, Proprioception, and Posture for 7 minutes. The following activities were included:  [place]: CGA, no UE support  X 60 sec of medial/lateral weight shifting  X 60 sec of anterior/posterior weight shifting  X 60 sec of B LE marching in place   X 60 sec of static standing with head turns right to left  2 x 30 sec of static standing with eyes closed    therapeutic activities to improve " functional performance for 28 minutes, including:  Wearing vest entire time  X 1 lap of forward ambulation around the gym  2 x 100 ft of forward ambulation with head turns right to left while carrying the tidal tank   2 x 100 ft of forward ambulation with head nods up/down while carrying the tidal tank  4 x 25 ft of side stepping while carrying the tidal tank  4 x 25 ft of backward ambulation with carrying the tidal tank.    Near ballet bar:  X 6 laps of forward ambulation stepping on river stones placed in a reciprocal pattern, occ 1 UE support  X 6 laps of forward ambulation stepping on river stones placed in a tandem pattern with occ 1 UE support        Patient Education and Home Exercises       Education provided:   - need for maintaining gains from therapy with a consistent at home program     Written Home Exercises Provided: Pt instructed to continue prior HEP.    Assessment     Dylon tolerated her tx session well and did not have any problems noted.  Dylon wore her gear vest during the entire tx session and focused on functional mobility for when she is wearing the heavy vest.   She will continue to benefit from skilled neuro PT to maximize safety and independence as well as improve overall QOL.      Dylon Is progressing well towards her goals.   Patient prognosis is Good.     Patient will continue to benefit from skilled outpatient physical therapy to address the deficits listed in the problem list box on initial evaluation, provide pt/family education and to maximize pt's level of independence in the home and community environment.     Patient's spiritual, cultural and educational needs considered and pt agreeable to plan of care and goals.     Anticipated barriers to physical therapy: comorbidities, psychosocial barriers    Goals:   Short Term Goals= Long term goals : 8 weeks   1. Patient to be (I) with established home exercise program. Ongoing   2. Patient to improve bilateral ankle DF by 1/3 MMT to improve  balance and functional mobility. Ongoing   3. Patient to improve bilateral knee flexion strength by 1/3 MMT to improve balance and functional mobility. Ongoing   4. Patient to improve FGA score to 25/ 30 for decreased risk for falls with community mobility. Ongoing   5. Patient to improve SLS time to 17 seconds with BLE for decreased risk for falls with community. Ongoing   6. Patient to improve SSWS to 1.2  m/sec for improved safety with ambulation. Ongoing   7. Patient to improve MCTSIB condition 4 to 30 seconds for improved stability with mobility. Ongoing     Plan     Continue PT plan of care with a focus on higher level balance skills      Lisa Rowe, PTA
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
## 2025-02-04 ENCOUNTER — CLINICAL SUPPORT (OUTPATIENT)
Dept: REHABILITATION | Facility: HOSPITAL | Age: 45
End: 2025-02-04
Payer: MEDICARE

## 2025-02-04 DIAGNOSIS — Z74.09 IMPAIRED FUNCTIONAL MOBILITY AND ACTIVITY TOLERANCE: Primary | ICD-10-CM

## 2025-02-04 PROCEDURE — 97530 THERAPEUTIC ACTIVITIES: CPT | Mod: PO

## 2025-02-04 PROCEDURE — 97110 THERAPEUTIC EXERCISES: CPT | Mod: PO

## 2025-02-06 ENCOUNTER — PATIENT MESSAGE (OUTPATIENT)
Dept: PAIN MEDICINE | Facility: OTHER | Age: 45
End: 2025-02-06
Payer: MEDICARE

## 2025-02-06 ENCOUNTER — OFFICE VISIT (OUTPATIENT)
Dept: PAIN MEDICINE | Facility: CLINIC | Age: 45
End: 2025-02-06
Payer: MEDICARE

## 2025-02-06 VITALS
SYSTOLIC BLOOD PRESSURE: 118 MMHG | DIASTOLIC BLOOD PRESSURE: 73 MMHG | WEIGHT: 139.31 LBS | HEART RATE: 78 BPM | BODY MASS INDEX: 18.9 KG/M2

## 2025-02-06 DIAGNOSIS — G89.4 CHRONIC PAIN DISORDER: Primary | ICD-10-CM

## 2025-02-06 DIAGNOSIS — M47.812 CERVICAL SPONDYLOSIS: ICD-10-CM

## 2025-02-06 DIAGNOSIS — M47.816 LUMBAR SPONDYLOSIS: ICD-10-CM

## 2025-02-06 DIAGNOSIS — M51.369 ANNULAR TEAR OF LUMBAR DISC: ICD-10-CM

## 2025-02-06 DIAGNOSIS — M54.12 CERVICAL RADICULOPATHY: ICD-10-CM

## 2025-02-06 DIAGNOSIS — M54.16 LUMBAR RADICULOPATHY: ICD-10-CM

## 2025-02-06 DIAGNOSIS — M54.12 CERVICAL RADICULOPATHY: Primary | ICD-10-CM

## 2025-02-06 DIAGNOSIS — M50.30 DDD (DEGENERATIVE DISC DISEASE), CERVICAL: ICD-10-CM

## 2025-02-06 PROCEDURE — 3074F SYST BP LT 130 MM HG: CPT | Mod: CPTII,S$GLB,, | Performed by: NURSE PRACTITIONER

## 2025-02-06 PROCEDURE — 3078F DIAST BP <80 MM HG: CPT | Mod: CPTII,S$GLB,, | Performed by: NURSE PRACTITIONER

## 2025-02-06 PROCEDURE — 1159F MED LIST DOCD IN RCRD: CPT | Mod: CPTII,S$GLB,, | Performed by: NURSE PRACTITIONER

## 2025-02-06 PROCEDURE — 3008F BODY MASS INDEX DOCD: CPT | Mod: CPTII,S$GLB,, | Performed by: NURSE PRACTITIONER

## 2025-02-06 PROCEDURE — 99214 OFFICE O/P EST MOD 30 MIN: CPT | Mod: S$GLB,,, | Performed by: NURSE PRACTITIONER

## 2025-02-06 PROCEDURE — 1160F RVW MEDS BY RX/DR IN RCRD: CPT | Mod: CPTII,S$GLB,, | Performed by: NURSE PRACTITIONER

## 2025-02-06 PROCEDURE — 99999 PR PBB SHADOW E&M-EST. PATIENT-LVL IV: CPT | Mod: PBBFAC,,, | Performed by: NURSE PRACTITIONER

## 2025-02-06 RX ORDER — GABAPENTIN 300 MG/1
300 CAPSULE ORAL 3 TIMES DAILY
Qty: 90 CAPSULE | Refills: 3 | Status: SHIPPED | OUTPATIENT
Start: 2025-02-06
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
## 2025-02-11 ENCOUNTER — CLINICAL SUPPORT (OUTPATIENT)
Dept: REHABILITATION | Facility: HOSPITAL | Age: 45
End: 2025-02-11
Payer: MEDICARE

## 2025-02-11 DIAGNOSIS — Z74.09 IMPAIRED FUNCTIONAL MOBILITY AND ACTIVITY TOLERANCE: Primary | ICD-10-CM

## 2025-02-11 PROCEDURE — 97110 THERAPEUTIC EXERCISES: CPT | Mod: PO,CQ

## 2025-02-11 PROCEDURE — 97112 NEUROMUSCULAR REEDUCATION: CPT | Mod: PO,CQ

## 2025-02-11 NOTE — PROGRESS NOTES
"  Outpatient Rehab    Physical Therapy Visit    Patient Name: Dylon Griffin  MRN: [id number]  YOB: 1980  Today's Date: 2/11/2025    Therapy Diagnosis:   Encounter Diagnosis   Name Primary?    Impaired functional mobility and activity tolerance Yes     Physician: Ilene Smalls MD    Physician Orders: Eval and Treat  Medical Diagnosis:   G24.9 (ICD-10-CM) - Dystonia   G25.9 (ICD-10-CM) - Functional movement disorder       Evaluation Date: 1/7/2025  Authorization Period Expiration: 12/31/2025  Plan of Care Expiration: 3/4/2025  Progress Note Due: 3/4/2025  Date of Surgery: NA  Visit # / Visits authorized: 04/ 20 (+eval)       Time In: 1030   Time Out: 1115  Total Time: 45   Total Billable Time: 45           Subjective   "My back is killing me from wearing the vest."" I have an injection scheduled for the 28th.".  Pain reported as 9/10. entire back    Objective            Treatment:  Therapeutic Exercise  Therapeutic Exercise Activity 1: x 10 min on stationary bike, B LE on level 5.0    Balance/Neuromuscular Re-Education  Balance/Neuromuscular Re-Education Activity 1: 2 point wooden fitter board: CGA/SBA     2 x 60 sec of avoiding moving anterior/posterior with no UE support, 2 x 60 sec of avoiding moving medial/lateral with no UE support, 2 x 60 sec of medial/lateral weight shifting with no UE support, 2 x 60 sec of anterior/posterior weight shifting with no UE support  Balance/Neuromuscular Re-Education Activity 2: Small bosu with Flat side up: 2 x 60 sec of static standing with no UE support, x 60 sec of medial/lateral weight shifting wtih no UE support, x 60 sec anterior/posterior weight shifting with no UE support, 2 x 30 sec of static standing with eyes closed and no UE support, 1 x 10 reps of B LE single leg step ups, 1 x 10 reps of B LE single leg hip flexion tapping the top of an orange cone with 1 UE support  Balance/Neuromuscular Re-Education Activity 3: Small Bosu with round side up: CGA   1 x " 10 reps of B LE single leg step ups with occ 1 UE support, 2 x 30 sec each of B LE single leg stance with occ 1 UE support    Assessment & Plan   Assessment: Dylon tolerated her tx session well and did not have any loss of balance.  Dylon was able to perform most activities today with no UE support.  Evaluation/Treatment Tolerance: Patient tolerated treatment well    Patient will continue to benefit from skilled outpatient physical therapy to address the deficits listed in the problem list box on initial evaluation, provide pt/family education and to maximize pt's level of independence in the home and community environment.     Patient's spiritual, cultural, and educational needs considered and patient agreeable to plan of care and goals.           Plan:  Cont with plan of care.     Goals:   Active       Long Term Goals        improve hip flexion strength by 1/3 MMT        Start:  02/04/25    Expected End:  03/04/25            improve bilateral SLS time to 10 seconds       Start:  02/04/25    Expected End:  03/04/25            Improve knee flexion strength by 1/3 MMT        Start:  02/04/25    Expected End:  03/04/25               Short term goals       To be independent with home exercise program        Start:  02/04/25    Expected End:  02/18/25            increase FGA score to 25/30       Start:  02/04/25    Expected End:  02/18/25       MET 2/4/2025         increase MCTSIB condition 4 to 25 seconds        Start:  02/04/25    Expected End:  02/18/25                Lisa Rowe, PTA

## 2025-02-13 ENCOUNTER — OFFICE VISIT (OUTPATIENT)
Dept: URGENT CARE | Facility: CLINIC | Age: 45
End: 2025-02-13
Payer: MEDICARE

## 2025-02-13 VITALS
HEART RATE: 77 BPM | SYSTOLIC BLOOD PRESSURE: 116 MMHG | BODY MASS INDEX: 18.87 KG/M2 | HEIGHT: 72 IN | RESPIRATION RATE: 20 BRPM | OXYGEN SATURATION: 99 % | WEIGHT: 139.31 LBS | TEMPERATURE: 98 F | DIASTOLIC BLOOD PRESSURE: 75 MMHG

## 2025-02-13 DIAGNOSIS — M65.4 RADIAL STYLOID TENOSYNOVITIS OF LEFT HAND: Primary | ICD-10-CM

## 2025-02-13 DIAGNOSIS — S69.92XA HAND INJURY, LEFT, INITIAL ENCOUNTER: ICD-10-CM

## 2025-02-13 PROCEDURE — 73130 X-RAY EXAM OF HAND: CPT | Mod: FY,LT,S$GLB, | Performed by: RADIOLOGY

## 2025-02-13 PROCEDURE — 99213 OFFICE O/P EST LOW 20 MIN: CPT | Mod: S$GLB,,, | Performed by: FAMILY MEDICINE

## 2025-02-13 RX ORDER — DEXAMETHASONE 4 MG/1
8 TABLET ORAL DAILY
Qty: 6 TABLET | Refills: 0 | Status: SHIPPED | OUTPATIENT
Start: 2025-02-13 | End: 2025-02-16

## 2025-02-13 NOTE — PROGRESS NOTES
Subjective:      Patient ID: Gordon Griffin is a 44 y.o. adult.    Vitals:  height is 6' (1.829 m) and weight is 63.2 kg (139 lb 5.3 oz). Her oral temperature is 97.9 °F (36.6 °C). Her blood pressure is 116/75 and her pulse is 77. Her respiration is 20 and oxygen saturation is 99%.     Chief Complaint: Hand Injury    Pt rpesents today with left hand injury, fell trying to brace which caused the injury to the left hand, occurred Thursday of last week, pain, unable to move without causing pain,   Denies any work related injury     Hand Injury   Her dominant hand is their left hand. The incident occurred 5 to 7 days ago. The incident occurred at home. The injury mechanism was a fall. The pain is present in the left hand. The quality of the pain is described as aching. The pain does not radiate. The pain is at a severity of 8/10. The pain has been Constant since the incident. Associated symptoms include muscle weakness. Pertinent negatives include no chest pain, numbness or tingling. The symptoms are aggravated by movement and lifting. She has tried nothing for the symptoms. The treatment provided no relief.       Cardiovascular:  Negative for chest pain.   Neurological:  Negative for numbness.      Objective:     Physical Exam   Constitutional: She is oriented to person, place, and time. She appears well-developed. She is cooperative.  Non-toxic appearance. She does not appear ill. No distress.   HENT:   Head: Normocephalic and atraumatic.   Ears:   Right Ear: Hearing, tympanic membrane, external ear and ear canal normal.   Left Ear: Hearing, tympanic membrane, external ear and ear canal normal.   Nose: Nose normal. No mucosal edema, rhinorrhea or nasal deformity. No epistaxis. Right sinus exhibits no maxillary sinus tenderness and no frontal sinus tenderness. Left sinus exhibits no maxillary sinus tenderness and no frontal sinus tenderness.   Mouth/Throat: Uvula is midline, oropharynx is clear and moist and mucous  membranes are normal. No trismus in the jaw. Normal dentition. No uvula swelling. No oropharyngeal exudate, posterior oropharyngeal edema or posterior oropharyngeal erythema.   Eyes: Conjunctivae and lids are normal. No scleral icterus.   Neck: Trachea normal and phonation normal. Neck supple. No edema present. No erythema present. No neck rigidity present.   Cardiovascular: Normal rate, regular rhythm, normal heart sounds and normal pulses.   Pulmonary/Chest: Effort normal and breath sounds normal. No respiratory distress. She has no decreased breath sounds. She has no rhonchi.   Abdominal: Normal appearance.   Musculoskeletal:         General: No deformity.      Right hand: Testing: Finkelstein's test: positive.      Left hand: She exhibits decreased range of motion.      Comments: Tenderness over radial styloid   Neurological: She is alert and oriented to person, place, and time. She exhibits normal muscle tone. Coordination normal.   Skin: Skin is warm, dry, intact, not diaphoretic and not pale.   Psychiatric: Her speech is normal and behavior is normal. Judgment and thought content normal.   Nursing note and vitals reviewed.    XR HAND COMPLETE 3 VIEW LEFT    Result Date: 2/13/2025  EXAMINATION: XR HAND COMPLETE 3 VIEW LEFT CLINICAL HISTORY: . Unspecified injury of left wrist, hand and finger(s), initial encounter TECHNIQUE: PA, lateral, and oblique views of the left hand were performed. COMPARISON: None FINDINGS: Three views left hand. There is subtle lucency along the lateral aspect of the distal phalanx of the 5th digit proximally, suggesting vascular channel or sequela of prior injury.  No dislocation.  There is edema about the distal aspect of the 2nd digit.  There is questionable cortical irregularity about the proximal aspect of the distal phalanx of the 2nd digit versus positioning.  No radiopaque foreign body.  No significant edema.     1. Questionable irregularity about the proximal aspect of the  distal phalanx of the 2nd digit versus positioning.  Edema overlies the region.  Correlation with any focal tenderness recommended. Electronically signed by: Car Beltrán MD Date:    02/13/2025 Time:    14:22     Assessment:     1. Radial styloid tenosynovitis of left hand    2. Hand injury, left, initial encounter        Plan:       Radial styloid tenosynovitis of left hand  -     dexAMETHasone (DECADRON) 4 MG Tab; Take 2 tablets (8 mg total) by mouth once daily. for 3 doses  Dispense: 6 tablet; Refill: 0  -     THUMB ORTHOSIS SPLINT UNIVERSAL FOR HOME USE    Hand injury, left, initial encounter  -     XR HAND COMPLETE 3 VIEW LEFT; Future; Expected date: 02/13/2025      Thank you for choosing [facility]!     Our goal in the Urgent Care is to always provide outstanding medical care. You may receive a survey by mail or e-mail in the next week regarding your experience today. We would greatly appreciate you completing and returning the survey. Your feedback provides us with a way to recognize our staff who provide very good care, and it helps us learn how to improve when your experience was below our aspiration of excellence.       We appreciate you trusting us with your medical care. We hope you feel better soon. We will be happy to take care of you for all of your future medical needs.  You must understand that you've received an Urgent Care treatment only and that you may be released before all your medical problems are known or treated. You, the patient, will arrange for follow up care as instructed.  Follow up with your PCP or specialty clinic as directed in the next 1-2 weeks if not improved or as needed.  You can call [phone] to schedule an appointment with the appropriate provider.  Another option is to follow up with [facility] ([email or web address]) virtually for quick simple medical advice.  If your condition worsens we recommend that you  receive another evaluation at the emergency room immediately or contact your primary medical clinics after hours call service to discuss your concerns.  Please return here or go to the Emergency Department for any concerns or worsening of condition.      *If you were prescribed a narcotic or controlled medication, do not drive or operate heavy equipment or machinery while taking these medications.

## 2025-02-14 ENCOUNTER — PATIENT MESSAGE (OUTPATIENT)
Dept: PODIATRY | Facility: CLINIC | Age: 45
End: 2025-02-14
Payer: MEDICARE

## 2025-02-17 ENCOUNTER — TELEPHONE (OUTPATIENT)
Dept: ORTHOPEDICS | Facility: CLINIC | Age: 45
End: 2025-02-17
Payer: MEDICARE

## 2025-02-17 ENCOUNTER — OFFICE VISIT (OUTPATIENT)
Dept: PODIATRY | Facility: CLINIC | Age: 45
End: 2025-02-17
Payer: MEDICARE

## 2025-02-17 VITALS
BODY MASS INDEX: 18.75 KG/M2 | WEIGHT: 138.44 LBS | DIASTOLIC BLOOD PRESSURE: 75 MMHG | HEIGHT: 72 IN | SYSTOLIC BLOOD PRESSURE: 110 MMHG | HEART RATE: 75 BPM

## 2025-02-17 DIAGNOSIS — M79.642 PAIN OF LEFT HAND: ICD-10-CM

## 2025-02-17 DIAGNOSIS — M25.572 CHRONIC PAIN OF BOTH ANKLES: Primary | ICD-10-CM

## 2025-02-17 DIAGNOSIS — M79.642 LEFT HAND PAIN: Primary | ICD-10-CM

## 2025-02-17 DIAGNOSIS — M25.571 CHRONIC PAIN OF BOTH ANKLES: Primary | ICD-10-CM

## 2025-02-17 DIAGNOSIS — G89.29 CHRONIC PAIN OF BOTH ANKLES: Primary | ICD-10-CM

## 2025-02-17 PROCEDURE — 3078F DIAST BP <80 MM HG: CPT | Mod: CPTII,S$GLB,, | Performed by: PODIATRIST

## 2025-02-17 PROCEDURE — 3074F SYST BP LT 130 MM HG: CPT | Mod: CPTII,S$GLB,, | Performed by: PODIATRIST

## 2025-02-17 PROCEDURE — 99999 PR PBB SHADOW E&M-EST. PATIENT-LVL V: CPT | Mod: PBBFAC,,, | Performed by: PODIATRIST

## 2025-02-17 PROCEDURE — 3008F BODY MASS INDEX DOCD: CPT | Mod: CPTII,S$GLB,, | Performed by: PODIATRIST

## 2025-02-17 PROCEDURE — 99203 OFFICE O/P NEW LOW 30 MIN: CPT | Mod: S$GLB,,, | Performed by: PODIATRIST

## 2025-02-17 NOTE — TELEPHONE ENCOUNTER
Spoke c pt. Confirmed appt location & time c Abundio Live PA-C 2/25/25 with repeat XR L hand prior.. Pt expressed understanding & was thankful.

## 2025-02-17 NOTE — PROGRESS NOTES
"Subjective:      Patient ID: Gordon Griffin is a 44 y.o. adult.    Chief Complaint: Ankle Injury (Right ankle) and Ankle Pain (When it rolls it feels like throbbing, stinging and burning and depends on days )    Pt presents today with complaints of right ankle pain while ambulating. Pt states this is a chronic issue. Pt wears work boots all day. Pt denies any injuries or recent trauma.     Review of Systems   Constitutional: Negative for chills, fever and malaise/fatigue.   HENT:  Negative for hearing loss.    Cardiovascular:  Negative for claudication.   Respiratory:  Negative for shortness of breath.    Skin:  Negative for flushing and rash.   Musculoskeletal:  Positive for arthritis and joint pain. Negative for myalgias.   Neurological:  Negative for loss of balance, numbness, paresthesias and sensory change.   Psychiatric/Behavioral:  Negative for altered mental status.          Objective:      Physical Exam  Vitals reviewed.   Cardiovascular:      Pulses:           Dorsalis pedis pulses are 2+ on the right side and 2+ on the left side.        Posterior tibial pulses are 2+ on the right side and 2+ on the left side.      Comments: No edema noted b/L  Musculoskeletal:         General: Normal range of motion.      Comments:        Feet:      Right foot:      Protective Sensation: 5 sites tested.  5 sites sensed.      Left foot:      Protective Sensation: 5 sites tested.  5 sites sensed.   Skin:     General: Skin is warm.      Capillary Refill: Capillary refill takes 2 to 3 seconds.      Comments: Normal skin tugor noted.   No open lesion noted b/L  Skin temp is warm to warm from proximal to distal b/L.  Webspaces clean, dry, and intact     Neurological:      Mental Status: She is alert.      Comments: Gross sensation intact b/L           Assessment:       Encounter Diagnoses   Name Primary?    Disease of nail Yes    Pain of left hand          Plan:       Gordon Davies" was seen today for ankle injury and ankle " pain.    Diagnoses and all orders for this visit:    Disease of nail    Pain of left hand  -     Ambulatory referral/consult to Orthopedics; Future      I counseled the patient on her conditions, their implications and medical management.    Pt advised on wearing ankle brace and supportive shoes.     Pt advised on RICE and OTC NSAIDs for associated pain.     Referral sent to ortho for hand pain    Shoe modification advised for the pt. Pt was advised to obtain shoes will accommodate foot deformities.     Call or return to clinic prn if these symptoms worsen or fail to improve as anticipated.      .

## 2025-02-19 ENCOUNTER — PATIENT MESSAGE (OUTPATIENT)
Dept: ADMINISTRATIVE | Facility: OTHER | Age: 45
End: 2025-02-19
Payer: MEDICARE

## 2025-02-19 ENCOUNTER — CLINICAL SUPPORT (OUTPATIENT)
Dept: REHABILITATION | Facility: HOSPITAL | Age: 45
End: 2025-02-19
Payer: MEDICARE

## 2025-02-19 DIAGNOSIS — Z74.09 IMPAIRED FUNCTIONAL MOBILITY AND ACTIVITY TOLERANCE: Primary | ICD-10-CM

## 2025-02-19 PROCEDURE — 97112 NEUROMUSCULAR REEDUCATION: CPT | Mod: PO,CQ

## 2025-02-19 PROCEDURE — 97110 THERAPEUTIC EXERCISES: CPT | Mod: PO,CQ

## 2025-02-19 NOTE — PROGRESS NOTES
Outpatient Rehab    Physical Therapy Visit    Patient Name: Dylon Griffin  MRN: [id number]  YOB: 1980  Today's Date: 2/19/2025    Therapy Diagnosis:   Encounter Diagnosis   Name Primary?    Impaired functional mobility and activity tolerance Yes     Physician: Ilene Smalls MD    Physician Orders: Eval and Treat  Medical Diagnosis:   G24.9 (ICD-10-CM) - Dystonia   G25.9 (ICD-10-CM) - Functional movement disorder       Evaluation Date: 1/7/2025  Authorization Period Expiration: 12/31/2025  Plan of Care Expiration: 3/4/2025  Progress Note Due: 3/4/2025  Date of Surgery: NA  Visit # / Visits authorized: 05/ 20 (+eval)  PTA : 2/5    Time In: 1030   Time Out: 1115  Total Time: 45   Total Billable Time: 45           Subjective             Objective            Treatment:  Therapeutic Exercise  Therapeutic Exercise Activity 1: x 10 min on stationary bike on level 4.0    Balance/Neuromuscular Re-Education  Balance/Neuromuscular Re-Education Activity 1: 2 point wooden fitter board: 2 x 60 sec of medial/lateral weight shifting, 2 x 60 sec of anterior/posterior weight shifting, 2 x 60 sec of avoiding moving A/P, and 2 x 60 sec of avoiding moving M/L all with occ 1 finger support and CGA  Balance/Neuromuscular Re-Education Activity 2: [place] activity:  x 60 sec of static standing with no UE support, x 60 sec of static standing with head turns right to left with no UE support, x 60 sec of static standing with head nods up/down with no UE support and with CGA/SBA    Assessment & Plan   Assessment: Dylon tolerated her tx session well and did not have any problems noted.  Dylon did report some spasms during her tx session, but it did not effect her balance today.  Dylon required occasional 1 finger support for safety, but no loss of balance noted.  Evaluation/Treatment Tolerance: Patient tolerated treatment well    Patient will continue to benefit from skilled outpatient physical therapy to address the deficits  listed in the problem list box on initial evaluation, provide pt/family education and to maximize pt's level of independence in the home and community environment.     Patient's spiritual, cultural, and educational needs considered and patient agreeable to plan of care and goals.           Plan:      Goals:   Active       Long Term Goals        improve hip flexion strength by 1/3 MMT        Start:  02/04/25    Expected End:  03/04/25            improve bilateral SLS time to 10 seconds       Start:  02/04/25    Expected End:  03/04/25            Improve knee flexion strength by 1/3 MMT        Start:  02/04/25    Expected End:  03/04/25               Short term goals       To be independent with home exercise program        Start:  02/04/25    Expected End:  02/18/25            increase FGA score to 25/30       Start:  02/04/25    Expected End:  02/18/25       MET 2/4/2025         increase MCTSIB condition 4 to 25 seconds        Start:  02/04/25    Expected End:  02/18/25                Lisa Rowe, PTA

## 2025-02-21 ENCOUNTER — OFFICE VISIT (OUTPATIENT)
Dept: ORTHOPEDICS | Facility: CLINIC | Age: 45
End: 2025-02-21
Payer: MEDICARE

## 2025-02-21 ENCOUNTER — HOSPITAL ENCOUNTER (OUTPATIENT)
Dept: RADIOLOGY | Facility: OTHER | Age: 45
Discharge: HOME | End: 2025-02-21
Attending: ORTHOPAEDIC SURGERY
Payer: MEDICARE

## 2025-02-21 VITALS — BODY MASS INDEX: 18.51 KG/M2 | WEIGHT: 136.69 LBS | HEIGHT: 72 IN

## 2025-02-21 DIAGNOSIS — M79.642 LEFT HAND PAIN: ICD-10-CM

## 2025-02-21 DIAGNOSIS — M79.642 LEFT HAND PAIN: Primary | ICD-10-CM

## 2025-02-21 DIAGNOSIS — M79.642 PAIN OF LEFT HAND: ICD-10-CM

## 2025-02-21 PROCEDURE — 73130 X-RAY EXAM OF HAND: CPT | Mod: 26,LT,, | Performed by: RADIOLOGY

## 2025-02-21 PROCEDURE — 73130 X-RAY EXAM OF HAND: CPT | Mod: TC,FY,LT
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
## 2025-02-24 ENCOUNTER — TELEPHONE (OUTPATIENT)
Dept: ORTHOPEDICS | Facility: CLINIC | Age: 45
End: 2025-02-24
Payer: MEDICARE

## 2025-02-26 ENCOUNTER — HOSPITAL ENCOUNTER (OUTPATIENT)
Dept: RADIOLOGY | Facility: HOSPITAL | Age: 45
Discharge: HOME | End: 2025-02-26
Attending: PHYSICIAN ASSISTANT
Payer: MEDICARE

## 2025-02-26 ENCOUNTER — OFFICE VISIT (OUTPATIENT)
Dept: PLASTIC SURGERY | Facility: CLINIC | Age: 45
End: 2025-02-26
Payer: MEDICARE

## 2025-02-26 ENCOUNTER — OFFICE VISIT (OUTPATIENT)
Dept: SPORTS MEDICINE | Facility: CLINIC | Age: 45
End: 2025-02-26
Payer: MEDICARE

## 2025-02-26 VITALS
BODY MASS INDEX: 18.96 KG/M2 | DIASTOLIC BLOOD PRESSURE: 81 MMHG | SYSTOLIC BLOOD PRESSURE: 118 MMHG | HEIGHT: 72 IN | WEIGHT: 140 LBS | HEART RATE: 81 BPM

## 2025-02-26 VITALS
HEIGHT: 72 IN | DIASTOLIC BLOOD PRESSURE: 74 MMHG | WEIGHT: 136.69 LBS | BODY MASS INDEX: 18.51 KG/M2 | SYSTOLIC BLOOD PRESSURE: 115 MMHG | HEART RATE: 75 BPM

## 2025-02-26 DIAGNOSIS — M25.562 LEFT KNEE PAIN, UNSPECIFIED CHRONICITY: ICD-10-CM

## 2025-02-26 DIAGNOSIS — M17.11 PRIMARY OSTEOARTHRITIS OF RIGHT KNEE: Primary | ICD-10-CM

## 2025-02-26 DIAGNOSIS — Z09 SURGERY FOLLOW-UP EXAMINATION: Primary | ICD-10-CM

## 2025-02-26 DIAGNOSIS — M17.11 PRIMARY OSTEOARTHRITIS OF RIGHT KNEE: ICD-10-CM

## 2025-02-26 PROCEDURE — 73562 X-RAY EXAM OF KNEE 3: CPT | Mod: TC,LT

## 2025-02-26 PROCEDURE — 73564 X-RAY EXAM KNEE 4 OR MORE: CPT | Mod: 26,50,, | Performed by: RADIOLOGY

## 2025-02-26 PROCEDURE — 99999 PR PBB SHADOW E&M-EST. PATIENT-LVL IV: CPT | Mod: PBBFAC,,, | Performed by: SURGERY

## 2025-02-26 PROCEDURE — 99999 PR PBB SHADOW E&M-EST. PATIENT-LVL V: CPT | Mod: PBBFAC,,, | Performed by: PHYSICIAN ASSISTANT

## 2025-02-26 PROCEDURE — 73564 X-RAY EXAM KNEE 4 OR MORE: CPT | Mod: TC,50

## 2025-02-26 NOTE — PROGRESS NOTES
Patient presents Plastic surgery Clinic with trans gender dysphoria.  Patient has been transitioning over the past 6 years at [facility] in [place].  Patient has had hormonal therapy for 5 years and has been seen and treated by endocrinology a [facility].  Patient is currently status post orchiectomy November 23rd.  Patient presents for top surgery for a bilateral breast augmentation.  Physical examination shows that the patient has a effective hormonal treatment.  Examination of the chest reveals the typical male pattern of widely spaced nipple-areolar complexes and a medial space.  I had a long talk with this patient regarding position of the implants directly behind the nipple-areolar complex.  The breasts would be widely spaced the patient understands this.  I discussed with the patient that we could do fat grafting at a future date medially in order to give her more medial fullness.  Patient would like to proceed with stage I of his trans gender dysphoria surgery.  This would be a bilateral breast augmentation.  Patient has completed all successful hormonal in psychiatric treatments.  We will go ahead and schedule for the summer.    
warm
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
## 2025-02-28 ENCOUNTER — HOSPITAL ENCOUNTER (OUTPATIENT)
Facility: OTHER | Age: 45
Discharge: HOME | End: 2025-02-28
Attending: ANESTHESIOLOGY | Admitting: ANESTHESIOLOGY
Payer: MEDICARE

## 2025-02-28 VITALS
HEART RATE: 74 BPM | OXYGEN SATURATION: 96 % | DIASTOLIC BLOOD PRESSURE: 79 MMHG | HEIGHT: 72 IN | BODY MASS INDEX: 18.96 KG/M2 | WEIGHT: 140 LBS | RESPIRATION RATE: 18 BRPM | TEMPERATURE: 97 F | SYSTOLIC BLOOD PRESSURE: 132 MMHG

## 2025-02-28 DIAGNOSIS — M54.12 CERVICAL RADICULOPATHY: Primary | ICD-10-CM

## 2025-02-28 DIAGNOSIS — G89.29 CHRONIC PAIN: ICD-10-CM

## 2025-02-28 PROCEDURE — 62321 NJX INTERLAMINAR CRV/THRC: CPT | Mod: ,,, | Performed by: ANESTHESIOLOGY

## 2025-02-28 PROCEDURE — 62321 NJX INTERLAMINAR CRV/THRC: CPT | Performed by: ANESTHESIOLOGY

## 2025-02-28 PROCEDURE — 63600175 PHARM REV CODE 636 W HCPCS: Performed by: ANESTHESIOLOGY

## 2025-02-28 PROCEDURE — 25500020 PHARM REV CODE 255: Performed by: ANESTHESIOLOGY

## 2025-02-28 RX ORDER — SODIUM CHLORIDE 9 MG/ML
INJECTION, SOLUTION INTRAVENOUS CONTINUOUS
Status: DISCONTINUED | OUTPATIENT
Start: 2025-02-28 | End: 2025-02-28 | Stop reason: HOSPADM

## 2025-02-28 RX ORDER — FENTANYL CITRATE 50 UG/ML
INJECTION, SOLUTION INTRAMUSCULAR; INTRAVENOUS
Status: DISCONTINUED | OUTPATIENT
Start: 2025-02-28 | End: 2025-02-28 | Stop reason: HOSPADM

## 2025-02-28 RX ORDER — DEXAMETHASONE SODIUM PHOSPHATE 10 MG/ML
INJECTION, SOLUTION INTRA-ARTICULAR; INTRALESIONAL; INTRAMUSCULAR; INTRAVENOUS; SOFT TISSUE
Status: DISCONTINUED | OUTPATIENT
Start: 2025-02-28 | End: 2025-02-28 | Stop reason: HOSPADM

## 2025-02-28 RX ORDER — MIDAZOLAM HYDROCHLORIDE 1 MG/ML
INJECTION INTRAMUSCULAR; INTRAVENOUS
Status: DISCONTINUED | OUTPATIENT
Start: 2025-02-28 | End: 2025-02-28 | Stop reason: HOSPADM

## 2025-02-28 RX ORDER — LIDOCAINE HYDROCHLORIDE 10 MG/ML
INJECTION, SOLUTION EPIDURAL; INFILTRATION; INTRACAUDAL; PERINEURAL
Status: DISCONTINUED | OUTPATIENT
Start: 2025-02-28 | End: 2025-02-28 | Stop reason: HOSPADM

## 2025-02-28 RX ORDER — LIDOCAINE HYDROCHLORIDE 20 MG/ML
INJECTION, SOLUTION INFILTRATION; PERINEURAL
Status: DISCONTINUED | OUTPATIENT
Start: 2025-02-28 | End: 2025-02-28 | Stop reason: HOSPADM

## 2025-02-28 NOTE — DISCHARGE SUMMARY
Discharge Note  Short Stay      SUMMARY     Admit Date: 2025    Attending Physician: Larry Brasher MD    Discharge Physician: Larry Brasher MD      Discharge Date: 2025 9:09 AM    Procedure(s) (LRB):  CERVICAL C7/T1 IL KYUNG *ASPIRIN OTC* HOLD FOR 5 DAYS (N/A)    Final Diagnosis: Cervical radiculopathy [M54.12]    Disposition: Home or self care    Patient Instructions:   Current Discharge Medication List        CONTINUE these medications which have NOT CHANGED    Details   ARIPiprazole (ABILIFY) 5 MG Tab Take 5 mg by mouth once daily.      aspirin 81 MG Chew Take 81 mg by mouth once daily.      azelastine (ASTELIN) 137 mcg (0.1 %) nasal spray USE 1 TO 2 SPRAYS IN EACH NOSTRIL TWICE DAILY FOR CONGESTION      baclofen (LIORESAL) 20 MG tablet Take 1 tablet by mouth 3 (three) times daily as needed.       benztropine (COGENTIN) 0.5 MG tablet Take 0.5 mg by mouth once daily.      busPIRone (BUSPAR) 10 MG tablet Tale 1 tablet Orally Twice a day 30 days  Qty: 60 tablet, Refills: 0      butalbital-acetaminophen-caffeine -40 mg (FIORICET, ESGIC) -40 mg per tablet Take 1 tablet by mouth every 4 (four) hours as needed.      butorphanol (STADOL) 10 mg/mL nasal spray 2 sprays by Nasal route every 4 (four) hours as needed for pain  Qty: 100 mL, Refills: 5    Comments: Quantity prescribed more than 7 day supply? Press F2 and select one:[id number]        cefdinir (OMNICEF) 300 MG capsule Take 1 capsule (300 mg total) by mouth 2 (two) times daily.  Qty: 14 capsule, Refills: 0      cyclobenzaprine (FLEXERIL) 10 MG tablet TK 1 T PO Q 8 H PRF PAIN      diazePAM (VALIUM) 2 MG tablet Take 2 mg by mouth 2 (two) times daily as needed.      erenumab-aooe (AIMOVIG AUTOINJECTOR SUBQ) Inject into the skin.      EScitalopram oxalate (LEXAPRO) 20 MG tablet Take 1 tablet (20 mg total) by mouth once daily.  Qty: 30 tablet, Refills: 0      estradiol valerate (DELESTROGEN) 20 mg/mL injection SMARTSI.3 Milliliter(s) IM Once a  Week      evolocumab (REPATHA SURECLICK) 140 mg/mL PnIj Inject 1 mL (140 mg total) into the skin every 14 (fourteen) days.  Qty: 6 mL, Refills: 3    Associated Diagnoses: Coronary artery disease involving native coronary artery of native heart without angina pectoris; Pure hypercholesterolemia; Lipoprotein deficiency      ezetimibe (ZETIA) 10 mg tablet Take 1 tablet (10 mg total) by mouth once daily.  Qty: 90 tablet, Refills: 3      fluconazole (DIFLUCAN) 150 MG Tab Take 1 tablet (150 mg total) by mouth once daily.  Qty: 2 tablet, Refills: 1      fluticasone (FLONASE) 50 mcg/actuation nasal spray SPRAY TWICE IEN QD  Refills: 5      gabapentin (NEURONTIN) 300 MG capsule Take 1 capsule (300 mg total) by mouth 3 (three) times daily.  Qty: 90 capsule, Refills: 3    Associated Diagnoses: Chronic pain disorder; Cervical radiculopathy; Lumbar radiculopathy      !! hydrocortisone (ANUSOL-HC) 2.5 % rectal cream Place rectally 2 (two) times daily.  Qty: 28 g, Refills: 1    Associated Diagnoses: Hemorrhoid prolapse      !! hydrocortisone (ANUSOL-HC) 2.5 % rectal cream Place rectally 2 (two) times daily.  Qty: 28 g, Refills: 1    Associated Diagnoses: Hemorrhoid prolapse      hydrOXYzine pamoate (VISTARIL) 50 MG Cap Take  mg by mouth nightly as needed.      ketorolac (TORADOL) 10 mg tablet Pt takes PRN      levETIRAcetam (KEPPRA) 1000 MG tablet Take 1,000 mg by mouth 2 (two) times daily.      methocarbamoL (ROBAXIN) 750 MG Tab Take 750 mg by mouth 3 (three) times daily.      NARCAN 4 mg/actuation Spry SPRAY 0.1ML IN 1 NOSTRIL MAY REPEAT DOSE EVERY 2-3 MINUTES AS NEEDED ALTERNATING NOSTRILS EACH DOSE  Qty: 1 each, Refills: 3    Associated Diagnoses: Chronic pain disorder; Lumbar radiculopathy      nitroGLYCERIN (NITROSTAT) 0.4 MG SL tablet Place 1 tablet (0.4 mg total) under the tongue every 5 (five) minutes as needed for Chest pain. Repeat twice as needed for a maximum total dose of 3 tablets. If still having chest pain,  go to the emergency room.  Qty: 25 tablet, Refills: 4      NURTEC 75 mg odt Take 1 tablet (75 mg total) by mouth as needed for Migraine.  Qty: 15 tablet, Refills: 2    Associated Diagnoses: Chronic migraine with aura without status migrainosus, not intractable      onabotulinumtoxina (BOTOX) 200 unit SolR Inject 200 Units into the muscle.      ondansetron (ZOFRAN-ODT) 8 MG TbDL Take 8 mg by mouth 2 (two) times daily.      oxybutynin (DITROPAN-XL) 10 MG 24 hr tablet Take 1 tablet (10 mg total) by mouth once daily.  Qty: 90 tablet, Refills: 3    Associated Diagnoses: OAB (overactive bladder)      pantoprazole (PROTONIX) 20 MG tablet Take 20 mg by mouth once daily.      prazosin (MINIPRESS) 2 MG Cap Tale 1 capsule Orally twice daily 30 days  Qty: 60 capsule, Refills: 0    Comments: .      prochlorperazine (COMPAZINE) 10 MG tablet Take 10 mg by mouth 3 (three) times daily. Pt takes PRN      propranoloL (INDERAL LA) 60 MG 24 hr capsule Take 60 mg by mouth every evening.      rosuvastatin (CRESTOR) 40 MG Tab Take 1 tablet (40 mg total) by mouth once daily.  Qty: 90 tablet, Refills: 3      traZODone (DESYREL) 100 MG tablet Take 2 tablet at bedtime as needed Orally 30 days  Qty: 60 tablet, Refills: 0      verapamiL (VERELAN) 240 MG C24P Take 240 mg by mouth Daily.       !! - Potential duplicate medications found. Please discuss with provider.              Discharge Diagnosis: Cervical radiculopathy [M54.12]  Condition on Discharge: Stable with no complications to procedure   Diet on Discharge: Same as before.  Activity: as per instruction sheet.  Discharge to: Home with a responsible adult.  Follow up: 2-4 weeks       Please call my office or pager at [phone] if experienced any weakness or loss of sensation, fever > 101.5, pain uncontrolled with oral medications, persistent nausea/vomiting/or diarrhea, redness or drainage from the incisions, or any other worrisome concerns. If physician on call was not reached or could  not communicate with our office for any reason please go to the nearest emergency department     Sami Liu MD

## 2025-02-28 NOTE — DISCHARGE INSTRUCTIONS

## 2025-02-28 NOTE — OP NOTE
Cervical Interlaminar Epidural Steroid Injection under Fluoroscopic Guidance    The procedure, risks, benefits, and options were discussed with the patient. There are no contraindications to the procedure. The patent expressed understanding and agreed to the procedure. Informed written consent was obtained prior to the start of the procedure and can be found in the patient's chart.     PATIENT NAME: Gordon Griffin   MRN: [id number]     DATE OF PROCEDURE: 02/28/2025    PROCEDURE: Cervical Interlaminar Epidural Steroid Injection C7/T1 under Fluoroscopic Guidance    PRE-OP DIAGNOSIS: Cervical radiculopathy [M54.12] Cervical radiculopathy [M54.12]    POST-OP DIAGNOSIS: Same    PHYSICIAN: Larry Brasher MD     ASSISTANTS: Sami Liu MD  [facility] Fellow    MEDICATIONS INJECTED: Preservative-free Decadron 10mg with 1cc of Lidocaine 1% MPF and preservative free normal saline    LOCAL ANESTHETIC INJECTED: Xylocaine 2%     SEDATION: Versed 2mg and Fentanyl 100mcg                                                                                                                                                                                     Conscious sedation ordered by M.D. Patient re-evaluation prior to administration of conscious sedation. No changes noted in patient's status from initial evaluation. The patient's vital signs were monitored by RN and patient remained hemodynamically stable throughout the procedure.    Event Time In   Sedation Start 0905   Sedation End 0908       ESTIMATED BLOOD LOSS: None    COMPLICATIONS: None    TECHNIQUE: Time-out was performed to identify the patient and procedure to be performed. With the patient laying in a prone position, the surgical area was prepped and draped in the usual sterile fashion using ChloraPrep and a fenestrated drape. The level was determined under fluoroscopy guidance. Skin anesthesia was achieved by injecting Lidocaine 2% over the injection site.  The  interlaminar space was then approached with a 20 gauge, 3.5 inch Tuohy needle that was introduced under fluoroscopic guidance with AP, lateral and/or contralateral oblique imaging. Once the Ligamentum flavum was encountered loss of resistance to saline was used to enter the epidural space. With positive loss of resistance and negative aspiration for CSF or Blood, contrast dye  Omnipaque (300mg/mL) was injected to confirm placement and there was no vascular runoff. Then 3 mL of the medication mixture listed above was then injected slowly. Displacement of the radio opaque contrast after injection of the medication confirmed that the medication went into the area of the epidural space. The needles were removed, and bleeding was nil. A sterile dressing was applied. No specimens collected. The patient tolerated the procedure well.       The patient was monitored after the procedure in the recovery area. They were given post-procedure and discharge instructions to follow at home. The patient was discharged in a stable condition.    Sami Liu MD     I reviewed and edited the fellow's note. I conducted my own interview and physical examination. I agree with the findings. I was present and supervising all critical portions of the procedure.      Larry Brasher MD

## 2025-03-01 ENCOUNTER — HOSPITAL ENCOUNTER (OUTPATIENT)
Dept: RADIOLOGY | Facility: HOSPITAL | Age: 45
Discharge: HOME | End: 2025-03-01
Attending: SURGERY
Payer: MEDICARE

## 2025-03-01 DIAGNOSIS — M79.642 LEFT HAND PAIN: ICD-10-CM

## 2025-03-01 PROCEDURE — 73218 MRI UPPER EXTREMITY W/O DYE: CPT | Mod: 26,LT,, | Performed by: RADIOLOGY

## 2025-03-01 PROCEDURE — 73218 MRI UPPER EXTREMITY W/O DYE: CPT | Mod: TC,LT

## 2025-03-05 ENCOUNTER — TELEPHONE (OUTPATIENT)
Dept: SURGERY | Facility: CLINIC | Age: 45
End: 2025-03-05
Payer: MEDICARE

## 2025-03-05 ENCOUNTER — OFFICE VISIT (OUTPATIENT)
Facility: CLINIC | Age: 45
End: 2025-03-05
Payer: MEDICARE

## 2025-03-05 VITALS
DIASTOLIC BLOOD PRESSURE: 76 MMHG | BODY MASS INDEX: 18.99 KG/M2 | WEIGHT: 140 LBS | SYSTOLIC BLOOD PRESSURE: 125 MMHG | HEART RATE: 80 BPM

## 2025-03-05 DIAGNOSIS — K64.8 HEMORRHOID PROLAPSE: Primary | ICD-10-CM

## 2025-03-05 PROCEDURE — 3008F BODY MASS INDEX DOCD: CPT | Mod: CPTII,S$GLB,, | Performed by: SURGERY

## 2025-03-05 PROCEDURE — 99999 PR PBB SHADOW E&M-EST. PATIENT-LVL IV: CPT | Mod: PBBFAC,,, | Performed by: SURGERY

## 2025-03-05 PROCEDURE — 1159F MED LIST DOCD IN RCRD: CPT | Mod: CPTII,S$GLB,, | Performed by: SURGERY

## 2025-03-05 PROCEDURE — 46600 DIAGNOSTIC ANOSCOPY SPX: CPT | Mod: S$GLB,,, | Performed by: SURGERY

## 2025-03-05 PROCEDURE — 3078F DIAST BP <80 MM HG: CPT | Mod: CPTII,S$GLB,, | Performed by: SURGERY

## 2025-03-05 PROCEDURE — 3074F SYST BP LT 130 MM HG: CPT | Mod: CPTII,S$GLB,, | Performed by: SURGERY

## 2025-03-05 PROCEDURE — 99213 OFFICE O/P EST LOW 20 MIN: CPT | Mod: 25,S$GLB,, | Performed by: SURGERY

## 2025-03-05 RX ORDER — WHEAT DEXTRIN 3 G/3.5 G
1 POWDER IN PACKET (EA) ORAL DAILY
Qty: 28 PACKET | Refills: 6 | Status: SHIPPED | OUTPATIENT
Start: 2025-03-05

## 2025-03-05 NOTE — PROGRESS NOTES
Outpatient Rehab    Physical Therapy Progress Note : and Discharge Note    Patient Name: Dylon Griffin  MRN: [id number]  YOB: 1980  Encounter Date: 3/6/2025    Therapy Diagnosis:   Encounter Diagnosis   Name Primary?    Impaired functional mobility and activity tolerance Yes     Physician: Ilene Smalls MD    Physician Orders: Eval and Treat  Medical Diagnosis:   G24.9 (ICD-10-CM) - Dystonia   G25.9 (ICD-10-CM) - Functional movement disorder         Evaluation Date: 1/7/2025  Authorization Period Expiration: 12/31/2025  Plan of Care Expiration: 3/4/2025  Progress Note Due: 3/4/2025  Date of Surgery: NA  Visit # / Visits authorized: 05/ 20 (+eval)  PTA : 2/5        Time In: 1345   Time Out: 1430  Total Time: 45   Total Billable Time: 45 minutes     FOTO:  Intake Score:  %  Survey Score 1:  %  Survey Score 2:  %         Subjective         having surgery on wrist following fall    Objective          Lower Extremity Strength  Right LE  Eval  2/4/2025 3/6/2025 Left LE  Eval  2/4/2025 3/6/2025   Hip Flexion: 4+/5 4+/5 5/5 Hip Flexion: 4+/5 4+/5 5/5   Hip Extension:  4/5 4/5 4+/5 Hip Extension: 4/5 4/5 4+/5   Hip Abduction: 4/5 4/5 5/5 Hip Abduction: 4+/5 4/5 5/5   Hip Adduction: 4+/5 4+/5 4+/5 Hip Adduction 4+/5 4+/5 4+/5   Knee Extension: 5/5 5/5 5/5 Knee Extension: 5/5 5/5 5/5   Knee Flexion: 4/5 4+/5 4+/5 Knee Flexion: 4/5 4+/5 4+/5   Ankle Dorsiflexion: 4/5 4+/5 4+/5 Ankle Dorsiflexion: 4/5 4+/5 4+/5   Ankle Plantarflexion: 4/5 4+/5 5/5 Ankle Plantarflexion: 4+/5 4+/5 5/5             Evaluation 2/4/2025 3/6/2025   30 second Chair Rise  (adults > 59 y/o) 16  completed with no arms 16 completed with no arms  19 seconds    5 times sit-stand  (adults 18-65 y/o) 8 seconds  >12 sec= fall risk for general elderly  >16 sec= fall risk for Parkinson's disease  >10 sec= balance/vestibular dysfunction (<59 y/o)  >14.2 sec= balance/vestibular dysfunction (>59 y/o)  >12 sec= fall risk for CVA 7 seconds  7 seconds                Evaluation 2/4/2025 3/6/2025   Self Selected Walking Speed 1.0 m/sec (6m/6s) 1.0 m/sec (6m/6s) 1.0 m/sec (6m/6s)   Fast Walking Speed 1.0 m/sec (6m/6s) 1.2 m/sec (6m/ 5s)  1.4 m/sec (6m/5s)               Treatment:  Therapeutic Exercise  Therapeutic Exercise Activity 1: 10 minutes on SCIFIT seated elliptical for CV endurance, level 6.0    Therapeutic Activity  Therapeutic Activity 1: time to perform objective measures and FOTO  Therapeutic Activity 2: ambulate to/ from the waiting room  Therapeutic Activity 3: 2 x 100 feet ambulation with vertical head turns while holding tidal tank  Therapeutic Activity 4: 2 x 100 feet ambulation with horizontal head turns while holding tidal tank  Therapeutic Activity 5: 100 feet tandem ambulation  Therapeutic Activity 6: 100 feet backwards tandem ambulation  Therapeutic Activity 7: 100 feet  marching with three second holds    Assessment & Plan        Patient will continue to benefit from skilled outpatient physical therapy to address the deficits listed in the problem list box on initial evaluation, provide pt/family education and to maximize pt's level of independence in the home and community environment.     Patient's spiritual, cultural, and educational needs considered and patient agreeable to plan of care and goals.           PHYSICAL THERAPY DISCHARGE SUMMARY   Total Visits:6  Missed Visits:0  Cancelled Visits: 0    Status Towards Goals Met: Patient met 3/3 STG and 3/3 LTG     Goals Not achieved and why:   Dylon has met all of her goals at this time and has currently maximized the benefits of physical therapy. Dylon has shown improvements with her BLE strength, endurance, gait and balance based on the objective measures. Dylon recently injured her wrist and will be having surgery in the upcoming weeks and therefore requested to cancel the last scheduled PT appointment to focus on procedure. Dylon was educated on the importance of performing home exercise program  daily and maintaining activity levels to prevent functional decline following discharge. The patient verbalizes understanding.         Discharge reason : Met goals and Patient has maximized benefit from PT at this time  Goals:   Resolved       Long Term Goals        improve hip flexion strength by 1/3 MMT  (Met)       Start:  02/04/25    Expected End:  03/04/25    Resolved:  03/06/25         improve bilateral SLS time to 10 seconds (Met)       Start:  02/04/25    Expected End:  03/04/25    Resolved:  03/06/25         Improve knee flexion strength by 1/3 MMT  (Met)       Start:  02/04/25    Expected End:  03/04/25    Resolved:  03/06/25            Short term goals       To be independent with home exercise program  (Met)       Start:  02/04/25    Expected End:  02/18/25    Resolved:  03/06/25         increase FGA score to 25/30 (Met)       Start:  02/04/25    Expected End:  02/18/25    Resolved:  03/06/25    MET 2/4/2025         increase MCTSIB condition 4 to 25 seconds  (Met)       Start:  02/04/25    Expected End:  02/18/25    Resolved:  03/06/25               PLAN   This patient is discharged from Outpatient Physical Therapy Services.     Ibis Watkins, PT  03/06/2025

## 2025-03-05 NOTE — TELEPHONE ENCOUNTER
"Spoke with pt regarding appt today. Status shows pt has checked in but not at [address]. Pt states, "she is at [place] location but on the 1st floor." Instructed to come to 2nd floor, [address].   "

## 2025-03-06 ENCOUNTER — CLINICAL SUPPORT (OUTPATIENT)
Dept: REHABILITATION | Facility: HOSPITAL | Age: 45
End: 2025-03-06
Payer: MEDICARE

## 2025-03-06 DIAGNOSIS — Z74.09 IMPAIRED FUNCTIONAL MOBILITY AND ACTIVITY TOLERANCE: Primary | ICD-10-CM

## 2025-03-06 PROCEDURE — 97110 THERAPEUTIC EXERCISES: CPT | Mod: PO

## 2025-03-06 PROCEDURE — 97530 THERAPEUTIC ACTIVITIES: CPT | Mod: PO
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
## 2025-03-07 ENCOUNTER — OFFICE VISIT (OUTPATIENT)
Dept: ORTHOPEDICS | Facility: CLINIC | Age: 45
End: 2025-03-07
Payer: MEDICARE

## 2025-03-07 ENCOUNTER — PATIENT MESSAGE (OUTPATIENT)
Facility: CLINIC | Age: 45
End: 2025-03-07
Payer: MEDICARE

## 2025-03-07 ENCOUNTER — TELEPHONE (OUTPATIENT)
Dept: CARDIOLOGY | Facility: CLINIC | Age: 45
End: 2025-03-07
Payer: MEDICARE

## 2025-03-07 ENCOUNTER — PATIENT MESSAGE (OUTPATIENT)
Dept: PLASTIC SURGERY | Facility: CLINIC | Age: 45
End: 2025-03-07
Payer: MEDICARE

## 2025-03-07 ENCOUNTER — TELEPHONE (OUTPATIENT)
Dept: INTERNAL MEDICINE | Facility: CLINIC | Age: 45
End: 2025-03-07
Payer: MEDICARE

## 2025-03-07 DIAGNOSIS — S63.642D RUPTURE OF ULNAR COLLATERAL LIGAMENT OF LEFT THUMB, SUBSEQUENT ENCOUNTER: Primary | ICD-10-CM

## 2025-03-07 PROCEDURE — 99999 PR PBB SHADOW E&M-EST. PATIENT-LVL II: CPT | Mod: PBBFAC,,, | Performed by: SURGERY

## 2025-03-07 RX ORDER — HYDROCODONE BITARTRATE AND ACETAMINOPHEN 5; 325 MG/1; MG/1
1 TABLET ORAL EVERY 6 HOURS PRN
Qty: 20 TABLET | Refills: 0 | Status: SHIPPED | OUTPATIENT
Start: 2025-03-07

## 2025-03-07 NOTE — TELEPHONE ENCOUNTER
Pt updated on needing an EKG for clearance. She decided to come in person instead of virtual and she was told to arrive 30 mn early.  She agreed to date/time of appointment(s)

## 2025-03-07 NOTE — PROGRESS NOTES
Plastic & Reconstructive Surgery  Progress Note     S:  Pt last seen 2 wks ago.  In brief, 43 y/o R hand adult with L Th pain.      Overall ok.   Has been using brace.   Still with persistent pain to thumb.   A little numbness to thumb  Had MRI done     O: There were no vitals filed for this visit.  Gen:  NAD, A&O x3  L Th: +TTP over ulnar thumb P1 base; No discrete stopping point with radial deviation; decreased ROM; cap refill 2-3 sec. 2pd wnl     MRI L Th: high grade tear distal UCL; no fx, no Stener lesion     A/P: 44 y.o. adult with L Th UCL Tear     Discussed findings at length.   MRI confirms L Th UCL tear.   Discussed treatment options.  Recommend surgery with internal brace.   Discussed need for post-op CHT.     I described the risks and rationale of surgical treatment, including, but not limited to, bleeding, infection, pain, scarring, injury to surrounding structure, numbness, weakness, stiffness, and need for further surgery.  All questions were answered.  The patient verbalized understanding and wished to proceed.       We will plan to proceed with Left thumb exploration, repair versus reconstruction ulnar collateral ligament.  This will be done in the OR under MAC / L supraclav.  We will schedule accordingly.  Pt may call back with any questions or concerns in the interim.     Follow up:  surgery  Restriction: 1 handed     I spent 20 minutes on this patient encounter which included review of medical records.  Over half the time was spent with the patient face to face discussing the treatment plan, counseling and coordinating care.     Lillian Honeycutt MD  03/07/2025 10:29 AM     This document was transcribed using voice recognition software without a human . It may contain typographical, grammatical, and/or syntax errors.

## 2025-03-07 NOTE — TELEPHONE ENCOUNTER
----- Message from Brittnee sent at 3/7/2025 12:41 PM CST -----  Contact: Patient  [phone]  .[email or web address]        NEEDS PRE OP APT ASAP -SURGERY WEDNESDAY MARCH 12Patient is calling for Medical Advice regarding:hand surgery , colo rectal, breast augmentationHow long has patient had these symptoms:Pharmacy name and phone#:[facility] a call back or [facility]:Patient  [phone]:Please advise patient replies from provider may take up to 48 hours.

## 2025-03-08 ENCOUNTER — OFFICE VISIT (OUTPATIENT)
Dept: INTERNAL MEDICINE | Facility: CLINIC | Age: 45
End: 2025-03-08
Payer: MEDICARE

## 2025-03-08 VITALS
OXYGEN SATURATION: 100 % | WEIGHT: 140 LBS | HEART RATE: 70 BPM | HEIGHT: 72 IN | BODY MASS INDEX: 18.96 KG/M2 | DIASTOLIC BLOOD PRESSURE: 70 MMHG | SYSTOLIC BLOOD PRESSURE: 120 MMHG

## 2025-03-08 DIAGNOSIS — Z01.818 PRE-OPERATIVE EXAMINATION FOR INTERNAL MEDICINE: Primary | ICD-10-CM

## 2025-03-08 DIAGNOSIS — I25.10 CORONARY ARTERY DISEASE INVOLVING NATIVE CORONARY ARTERY OF NATIVE HEART WITHOUT ANGINA PECTORIS: ICD-10-CM

## 2025-03-08 PROCEDURE — 3074F SYST BP LT 130 MM HG: CPT | Mod: CPTII,S$GLB,, | Performed by: INTERNAL MEDICINE

## 2025-03-08 PROCEDURE — 99999 PR PBB SHADOW E&M-EST. PATIENT-LVL V: CPT | Mod: PBBFAC,,, | Performed by: INTERNAL MEDICINE

## 2025-03-08 PROCEDURE — 99214 OFFICE O/P EST MOD 30 MIN: CPT | Mod: S$GLB,,, | Performed by: INTERNAL MEDICINE

## 2025-03-08 PROCEDURE — 1160F RVW MEDS BY RX/DR IN RCRD: CPT | Mod: CPTII,S$GLB,, | Performed by: INTERNAL MEDICINE

## 2025-03-08 PROCEDURE — 3008F BODY MASS INDEX DOCD: CPT | Mod: CPTII,S$GLB,, | Performed by: INTERNAL MEDICINE

## 2025-03-08 PROCEDURE — G2211 COMPLEX E/M VISIT ADD ON: HCPCS | Mod: S$GLB,,, | Performed by: INTERNAL MEDICINE

## 2025-03-08 PROCEDURE — 1159F MED LIST DOCD IN RCRD: CPT | Mod: CPTII,S$GLB,, | Performed by: INTERNAL MEDICINE

## 2025-03-08 PROCEDURE — 3078F DIAST BP <80 MM HG: CPT | Mod: CPTII,S$GLB,, | Performed by: INTERNAL MEDICINE

## 2025-03-08 NOTE — PROGRESS NOTES
Assessment:       1. Pre-operative examination for internal medicine    2. Coronary artery disease involving native coronary artery of native heart without angina pectoris          Plan:       Pre-operative examination for internal medicine  -     CBC Auto Differential; Future; Expected date: 03/08/2025  -     Comprehensive Metabolic Panel; Future; Expected date: 03/08/2025  Pending labs and cardiology evaluation; she is medically optimized to proceed from my standpoint.     Coronary artery disease involving native coronary artery of native heart without angina pectoris      Assessment & Plan    - Continued Estradiol at current dosage and schedule.  - holding aspirin for surgery 7 days  - Discontinued aspirin in preparation for surgery.  - Ordered labs to be completed prior to cardiology appointment on Monday         You are up to date for your primary preventive health care, and there are no reminders at this time.     HOLD aspirin  Subjective:       Patient ID: Gordon Griffin is a 44 y.o. adult.    Chief Complaint: Pre-op Exam    History of Present Illness    CHIEF COMPLAINT:  Gordon presents today for follow-up regarding upcoming surgeries    UPCOMING SURGERIES:  She has hand surgery scheduled with Dr. Honeycutt on the 12th, followed by hemorrhoid surgery a few weeks later. Top surgery has been postponed to December or next year, pending housing situation.    CURRENT SYMPTOMS:  She reports occasional leg swelling with prolonged standing (12+ hours). She can climb stairs and denies chest pain or breathing difficulties.    MEDICATIONS:  She has discontinued Aspirin following instructions to hold it for an injection. She denies current use of Ketorolac or other NSAIDs.     Pt presents for a pre-operative clearance prior to surgery.  Pt denies any personal or family history of bleeding disorders, hypercoaguable disorders, blood clots, pulmonary embolisms, difficulty with anesthesia or sudden death.  Patient is able  to walk up a flight of stairs without difficulty breathing.  Denies orthopnea, pnd, CP and SOB.    Rupture of ulnar collateral ligament of left thumb. Plan for surgery with repair vs reconstruction with Dr. Honeycutt.    EKG 11/2024 with nsr, nonischemic.   Stress test  2023 showed non-transmural scar  Cardiology pre op scheduled on 3/10/25.     Review of Systems   Constitutional:  Negative for fever.   Respiratory:  Negative for shortness of breath.    Cardiovascular:  Negative for chest pain.   Musculoskeletal: Negative.    Skin: Negative.          Objective:   /70 (BP Location: Right arm, Patient Position: Sitting)   Pulse 70   Ht 6' (1.829 m)   Wt 63.5 kg (140 lb)   SpO2 100%   BMI 18.99 kg/m²      Physical Exam  Constitutional:       General: She is not in acute distress.     Appearance: She is well-developed. She is not diaphoretic.   HENT:      Head: Normocephalic and atraumatic.   Cardiovascular:      Rate and Rhythm: Normal rate and regular rhythm.   Pulmonary:      Effort: Pulmonary effort is normal. No respiratory distress.      Breath sounds: No wheezing or rales.   Skin:     General: Skin is warm and dry.   Neurological:      Mental Status: She is alert and oriented to person, place, and time.   Psychiatric:         Behavior: Behavior normal.           -----------------------------  This note was generated with the assistance of ambient listening technology. Verbal consent was obtained by the patient and accompanying visitor(s) for the recording of patient appointment to facilitate this note. I attest to having reviewed and edited the generated note for accuracy, though some syntax or spelling errors may persist. Please contact the author of this note for any clarification.

## 2025-03-09 NOTE — PRE-PROCEDURE INSTRUCTIONS
Patient reviewed- card clearance appt on 3/10. Will update chart once patient is seen by cardiology.

## 2025-03-10 ENCOUNTER — OFFICE VISIT (OUTPATIENT)
Dept: CARDIOLOGY | Facility: CLINIC | Age: 45
End: 2025-03-10
Payer: MEDICARE

## 2025-03-10 ENCOUNTER — LAB VISIT (OUTPATIENT)
Dept: LAB | Facility: HOSPITAL | Age: 45
End: 2025-03-10
Attending: INTERNAL MEDICINE
Payer: MEDICARE

## 2025-03-10 VITALS
OXYGEN SATURATION: 97 % | HEIGHT: 72 IN | WEIGHT: 140 LBS | BODY MASS INDEX: 18.96 KG/M2 | SYSTOLIC BLOOD PRESSURE: 117 MMHG | DIASTOLIC BLOOD PRESSURE: 69 MMHG | HEART RATE: 75 BPM

## 2025-03-10 DIAGNOSIS — Z01.810 PRE-OPERATIVE CARDIOVASCULAR EXAMINATION: Primary | ICD-10-CM

## 2025-03-10 DIAGNOSIS — Z01.818 PRE-OP EXAM: Primary | ICD-10-CM

## 2025-03-10 DIAGNOSIS — Z01.818 PRE-OP EXAM: ICD-10-CM

## 2025-03-10 DIAGNOSIS — K64.8 HEMORRHOID PROLAPSE: Primary | ICD-10-CM

## 2025-03-10 DIAGNOSIS — I25.10 CORONARY ARTERY DISEASE INVOLVING NATIVE CORONARY ARTERY OF NATIVE HEART WITHOUT ANGINA PECTORIS: ICD-10-CM

## 2025-03-10 DIAGNOSIS — E78.00 PURE HYPERCHOLESTEROLEMIA: ICD-10-CM

## 2025-03-10 DIAGNOSIS — Z01.818 PRE-OPERATIVE EXAMINATION FOR INTERNAL MEDICINE: ICD-10-CM

## 2025-03-10 LAB
ALBUMIN SERPL BCP-MCNC: 4 G/DL (ref 3.5–5.2)
ALP SERPL-CCNC: 111 U/L (ref 40–150)
ALT SERPL W/O P-5'-P-CCNC: 22 U/L (ref 10–44)
ANION GAP SERPL CALC-SCNC: 7 MMOL/L (ref 8–16)
AST SERPL-CCNC: 22 U/L (ref 10–40)
BASOPHILS # BLD AUTO: 0.08 K/UL (ref 0–0.2)
BASOPHILS NFR BLD: 0.7 % (ref 0–1.9)
BILIRUB SERPL-MCNC: 0.2 MG/DL (ref 0.1–1)
BUN SERPL-MCNC: 15 MG/DL (ref 6–20)
CALCIUM SERPL-MCNC: 9 MG/DL (ref 8.7–10.5)
CHLORIDE SERPL-SCNC: 103 MMOL/L (ref 95–110)
CO2 SERPL-SCNC: 27 MMOL/L (ref 23–29)
CREAT SERPL-MCNC: 0.8 MG/DL (ref 0.5–1.4)
DIFFERENTIAL METHOD BLD: ABNORMAL
EOSINOPHIL # BLD AUTO: 0.2 K/UL (ref 0–0.5)
EOSINOPHIL NFR BLD: 1.3 % (ref 0–8)
ERYTHROCYTE [DISTWIDTH] IN BLOOD BY AUTOMATED COUNT: 12.2 % (ref 11.5–14.5)
EST. GFR  (NO RACE VARIABLE): >60 ML/MIN/1.73 M^2
GLUCOSE SERPL-MCNC: 90 MG/DL (ref 70–110)
HCT VFR BLD AUTO: 41.3 % (ref 37–48.5)
HGB BLD-MCNC: 13.8 G/DL (ref 12–16)
IMM GRANULOCYTES # BLD AUTO: 0.1 K/UL (ref 0–0.04)
IMM GRANULOCYTES NFR BLD AUTO: 0.9 % (ref 0–0.5)
LYMPHOCYTES # BLD AUTO: 1.8 K/UL (ref 1–4.8)
LYMPHOCYTES NFR BLD: 15.7 % (ref 18–48)
MCH RBC QN AUTO: 28.8 PG (ref 27–31)
MCHC RBC AUTO-ENTMCNC: 33.4 G/DL (ref 32–36)
MCV RBC AUTO: 86 FL (ref 82–98)
MONOCYTES # BLD AUTO: 0.7 K/UL (ref 0.3–1)
MONOCYTES NFR BLD: 6.4 % (ref 4–15)
NEUTROPHILS # BLD AUTO: 8.4 K/UL (ref 1.8–7.7)
NEUTROPHILS NFR BLD: 75 % (ref 38–73)
NRBC BLD-RTO: 0 /100 WBC
PLATELET # BLD AUTO: 219 K/UL (ref 150–450)
PMV BLD AUTO: 9.9 FL (ref 9.2–12.9)
POTASSIUM SERPL-SCNC: 3.7 MMOL/L (ref 3.5–5.1)
PROT SERPL-MCNC: 7 G/DL (ref 6–8.4)
RBC # BLD AUTO: 4.79 M/UL (ref 4–5.4)
SODIUM SERPL-SCNC: 137 MMOL/L (ref 136–145)
WBC # BLD AUTO: 11.19 K/UL (ref 3.9–12.7)

## 2025-03-10 PROCEDURE — 85025 COMPLETE CBC W/AUTO DIFF WBC: CPT | Performed by: INTERNAL MEDICINE

## 2025-03-10 PROCEDURE — 3008F BODY MASS INDEX DOCD: CPT | Mod: CPTII,S$GLB,, | Performed by: PHYSICIAN ASSISTANT

## 2025-03-10 PROCEDURE — 80053 COMPREHEN METABOLIC PANEL: CPT | Performed by: INTERNAL MEDICINE

## 2025-03-10 PROCEDURE — 3078F DIAST BP <80 MM HG: CPT | Mod: CPTII,S$GLB,, | Performed by: PHYSICIAN ASSISTANT

## 2025-03-10 PROCEDURE — 1159F MED LIST DOCD IN RCRD: CPT | Mod: CPTII,S$GLB,, | Performed by: PHYSICIAN ASSISTANT

## 2025-03-10 PROCEDURE — 99214 OFFICE O/P EST MOD 30 MIN: CPT | Mod: S$GLB,,, | Performed by: PHYSICIAN ASSISTANT

## 2025-03-10 PROCEDURE — 93005 ELECTROCARDIOGRAM TRACING: CPT | Mod: S$GLB,,, | Performed by: PHYSICIAN ASSISTANT

## 2025-03-10 PROCEDURE — 93010 ELECTROCARDIOGRAM REPORT: CPT | Mod: S$GLB,,, | Performed by: INTERNAL MEDICINE

## 2025-03-10 PROCEDURE — 36415 COLL VENOUS BLD VENIPUNCTURE: CPT | Performed by: INTERNAL MEDICINE

## 2025-03-10 PROCEDURE — 99999 PR PBB SHADOW E&M-EST. PATIENT-LVL V: CPT | Mod: PBBFAC,,, | Performed by: PHYSICIAN ASSISTANT

## 2025-03-10 PROCEDURE — 1160F RVW MEDS BY RX/DR IN RCRD: CPT | Mod: CPTII,S$GLB,, | Performed by: PHYSICIAN ASSISTANT

## 2025-03-10 PROCEDURE — 3074F SYST BP LT 130 MM HG: CPT | Mod: CPTII,S$GLB,, | Performed by: PHYSICIAN ASSISTANT

## 2025-03-10 RX ORDER — NITROGLYCERIN 0.4 MG/1
0.4 TABLET SUBLINGUAL EVERY 5 MIN PRN
Qty: 25 TABLET | Refills: 4 | Status: SHIPPED | OUTPATIENT
Start: 2025-03-10 | End: 2025-03-10

## 2025-03-10 RX ORDER — NITROGLYCERIN 0.4 MG/1
0.4 TABLET SUBLINGUAL EVERY 5 MIN PRN
Qty: 25 TABLET | Refills: 4 | Status: SHIPPED | OUTPATIENT
Start: 2025-03-10
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
## 2025-03-11 ENCOUNTER — RESULTS FOLLOW-UP (OUTPATIENT)
Dept: INTERNAL MEDICINE | Facility: CLINIC | Age: 45
End: 2025-03-11

## 2025-03-11 LAB
OHS QRS DURATION: 84 MS
OHS QTC CALCULATION: 431 MS

## 2025-03-11 NOTE — PRE-PROCEDURE INSTRUCTIONS
Patient was informed of pre-procedure instructions and arrival time. Pt verbalized understanding on having reliable transportation following procedure.  PATIENT CONFIRMED ARRIVAL TIME OF 0915.    Patient denies taking any nsaids or aspirin products for 7 days.    Patient reports NOT wanting father to be informed or in the room in preop or recovery. Patient will accompanied by mother.

## 2025-03-12 ENCOUNTER — ANESTHESIA (OUTPATIENT)
Dept: SURGERY | Facility: HOSPITAL | Age: 45
End: 2025-03-12
Payer: MEDICARE

## 2025-03-12 ENCOUNTER — HOSPITAL ENCOUNTER (OUTPATIENT)
Facility: HOSPITAL | Age: 45
Discharge: HOME | End: 2025-03-12
Attending: SURGERY | Admitting: SURGERY
Payer: MEDICARE

## 2025-03-12 ENCOUNTER — OFFICE VISIT (OUTPATIENT)
Dept: SPINE | Facility: CLINIC | Age: 45
End: 2025-03-12
Payer: MEDICARE

## 2025-03-12 ENCOUNTER — HOSPITAL ENCOUNTER (OUTPATIENT)
Dept: RADIOLOGY | Facility: HOSPITAL | Age: 45
Discharge: HOME | End: 2025-03-12
Attending: SURGERY | Admitting: SURGERY
Payer: MEDICARE

## 2025-03-12 ENCOUNTER — ANESTHESIA EVENT (OUTPATIENT)
Dept: SURGERY | Facility: HOSPITAL | Age: 45
End: 2025-03-12
Payer: MEDICARE

## 2025-03-12 VITALS
SYSTOLIC BLOOD PRESSURE: 122 MMHG | OXYGEN SATURATION: 100 % | HEART RATE: 87 BPM | BODY MASS INDEX: 18.96 KG/M2 | RESPIRATION RATE: 19 BRPM | DIASTOLIC BLOOD PRESSURE: 63 MMHG | HEIGHT: 72 IN | WEIGHT: 140 LBS

## 2025-03-12 VITALS
DIASTOLIC BLOOD PRESSURE: 67 MMHG | TEMPERATURE: 98 F | HEART RATE: 67 BPM | RESPIRATION RATE: 13 BRPM | OXYGEN SATURATION: 99 % | SYSTOLIC BLOOD PRESSURE: 121 MMHG

## 2025-03-12 DIAGNOSIS — S63.642D RUPTURE OF ULNAR COLLATERAL LIGAMENT OF LEFT THUMB, SUBSEQUENT ENCOUNTER: ICD-10-CM

## 2025-03-12 DIAGNOSIS — M51.360 DEGENERATION OF INTERVERTEBRAL DISC OF LUMBAR REGION WITH DISCOGENIC BACK PAIN: ICD-10-CM

## 2025-03-12 DIAGNOSIS — Z01.818 PRE-OP EXAM: ICD-10-CM

## 2025-03-12 DIAGNOSIS — G89.4 CHRONIC PAIN SYNDROME: Primary | ICD-10-CM

## 2025-03-12 DIAGNOSIS — M47.816 LUMBAR SPONDYLOSIS: ICD-10-CM

## 2025-03-12 DIAGNOSIS — M54.12 CERVICAL RADICULOPATHY: ICD-10-CM

## 2025-03-12 DIAGNOSIS — M47.812 CERVICAL SPONDYLOSIS: ICD-10-CM

## 2025-03-12 DIAGNOSIS — S63.642A RUPTURE OF ULNAR COLLATERAL LIGAMENT OF LEFT THUMB: ICD-10-CM

## 2025-03-12 DIAGNOSIS — M50.30 DDD (DEGENERATIVE DISC DISEASE), CERVICAL: ICD-10-CM

## 2025-03-12 PROCEDURE — 63600175 PHARM REV CODE 636 W HCPCS: Performed by: NURSE ANESTHETIST, CERTIFIED REGISTERED

## 2025-03-12 PROCEDURE — 99213 OFFICE O/P EST LOW 20 MIN: CPT | Mod: S$GLB,,, | Performed by: NURSE PRACTITIONER

## 2025-03-12 PROCEDURE — 36000708 HC OR TIME LEV III 1ST 15 MIN: Performed by: SURGERY

## 2025-03-12 PROCEDURE — 99900035 HC TECH TIME PER 15 MIN (STAT)

## 2025-03-12 PROCEDURE — 71000033 HC RECOVERY, INTIAL HOUR: Performed by: SURGERY

## 2025-03-12 PROCEDURE — 37000009 HC ANESTHESIA EA ADD 15 MINS: Performed by: SURGERY

## 2025-03-12 PROCEDURE — 25000003 PHARM REV CODE 250: Performed by: SURGERY

## 2025-03-12 PROCEDURE — 3008F BODY MASS INDEX DOCD: CPT | Mod: CPTII,S$GLB,, | Performed by: NURSE PRACTITIONER

## 2025-03-12 PROCEDURE — 63600175 PHARM REV CODE 636 W HCPCS: Performed by: ANESTHESIOLOGY

## 2025-03-12 PROCEDURE — C1713 ANCHOR/SCREW BN/BN,TIS/BN: HCPCS | Performed by: SURGERY

## 2025-03-12 PROCEDURE — 1159F MED LIST DOCD IN RCRD: CPT | Mod: CPTII,S$GLB,, | Performed by: NURSE PRACTITIONER

## 2025-03-12 PROCEDURE — 36000709 HC OR TIME LEV III EA ADD 15 MIN: Performed by: SURGERY

## 2025-03-12 PROCEDURE — 37000008 HC ANESTHESIA 1ST 15 MINUTES: Performed by: SURGERY

## 2025-03-12 PROCEDURE — 3074F SYST BP LT 130 MM HG: CPT | Mod: CPTII,S$GLB,, | Performed by: NURSE PRACTITIONER

## 2025-03-12 PROCEDURE — 26540 REPAIR HAND JOINT: CPT | Mod: FA,,, | Performed by: SURGERY

## 2025-03-12 PROCEDURE — 76942 ECHO GUIDE FOR BIOPSY: CPT | Performed by: ANESTHESIOLOGY

## 2025-03-12 PROCEDURE — 71000015 HC POSTOP RECOV 1ST HR: Performed by: SURGERY

## 2025-03-12 PROCEDURE — 63600175 PHARM REV CODE 636 W HCPCS: Performed by: SURGERY

## 2025-03-12 PROCEDURE — 99999 PR PBB SHADOW E&M-EST. PATIENT-LVL V: CPT | Mod: PBBFAC,,, | Performed by: NURSE PRACTITIONER

## 2025-03-12 PROCEDURE — 3078F DIAST BP <80 MM HG: CPT | Mod: CPTII,S$GLB,, | Performed by: NURSE PRACTITIONER

## 2025-03-12 PROCEDURE — 1160F RVW MEDS BY RX/DR IN RCRD: CPT | Mod: CPTII,S$GLB,, | Performed by: NURSE PRACTITIONER

## 2025-03-12 PROCEDURE — 94761 N-INVAS EAR/PLS OXIMETRY MLT: CPT

## 2025-03-12 DEVICE — H/W INTERNALBRACE LGMNT AUGMNT REPR KIT
Type: IMPLANTABLE DEVICE | Site: THUMB | Status: FUNCTIONAL
Brand: ARTHREX®

## 2025-03-12 RX ORDER — SODIUM CHLORIDE 0.9 % (FLUSH) 0.9 %
10 SYRINGE (ML) INJECTION
Status: DISCONTINUED | OUTPATIENT
Start: 2025-03-12 | End: 2025-03-12 | Stop reason: HOSPADM

## 2025-03-12 RX ORDER — CEFAZOLIN SODIUM 1 G/3ML
INJECTION, POWDER, FOR SOLUTION INTRAMUSCULAR; INTRAVENOUS
Status: DISCONTINUED | OUTPATIENT
Start: 2025-03-12 | End: 2025-03-12

## 2025-03-12 RX ORDER — ROPIVACAINE HYDROCHLORIDE 5 MG/ML
INJECTION, SOLUTION EPIDURAL; INFILTRATION; PERINEURAL
Status: COMPLETED | OUTPATIENT
Start: 2025-03-12 | End: 2025-03-12

## 2025-03-12 RX ORDER — LIDOCAINE HYDROCHLORIDE 20 MG/ML
INJECTION, SOLUTION EPIDURAL; INFILTRATION; INTRACAUDAL; PERINEURAL
Status: DISCONTINUED | OUTPATIENT
Start: 2025-03-12 | End: 2025-03-12

## 2025-03-12 RX ORDER — LIDOCAINE HYDROCHLORIDE AND EPINEPHRINE 10; 10 UG/ML; MG/ML
INJECTION, SOLUTION INFILTRATION; PERINEURAL
Status: DISCONTINUED | OUTPATIENT
Start: 2025-03-12 | End: 2025-03-12 | Stop reason: HOSPADM

## 2025-03-12 RX ORDER — OXYCODONE HYDROCHLORIDE 5 MG/1
5 TABLET ORAL
Status: DISCONTINUED | OUTPATIENT
Start: 2025-03-12 | End: 2025-03-12 | Stop reason: HOSPADM

## 2025-03-12 RX ORDER — MIDAZOLAM HYDROCHLORIDE 1 MG/ML
INJECTION INTRAMUSCULAR; INTRAVENOUS
Status: DISCONTINUED | OUTPATIENT
Start: 2025-03-12 | End: 2025-03-12

## 2025-03-12 RX ORDER — ONDANSETRON 4 MG/1
4 TABLET, ORALLY DISINTEGRATING ORAL EVERY 8 HOURS PRN
Qty: 20 TABLET | Refills: 1 | Status: SHIPPED | OUTPATIENT
Start: 2025-03-12

## 2025-03-12 RX ORDER — CLINDAMYCIN PHOSPHATE 150 MG/ML
900 INJECTION, SOLUTION INTRAVENOUS
Status: DISCONTINUED | OUTPATIENT
Start: 2025-03-12 | End: 2025-03-12

## 2025-03-12 RX ORDER — PROCHLORPERAZINE EDISYLATE 5 MG/ML
5 INJECTION INTRAMUSCULAR; INTRAVENOUS EVERY 30 MIN PRN
Status: DISCONTINUED | OUTPATIENT
Start: 2025-03-12 | End: 2025-03-12 | Stop reason: HOSPADM

## 2025-03-12 RX ORDER — HYDROCODONE BITARTRATE AND ACETAMINOPHEN 5; 325 MG/1; MG/1
1 TABLET ORAL EVERY 6 HOURS PRN
Qty: 20 TABLET | Refills: 0 | Status: SHIPPED | OUTPATIENT
Start: 2025-03-12

## 2025-03-12 RX ORDER — FENTANYL CITRATE 50 UG/ML
INJECTION, SOLUTION INTRAMUSCULAR; INTRAVENOUS
Status: DISCONTINUED | OUTPATIENT
Start: 2025-03-12 | End: 2025-03-12

## 2025-03-12 RX ORDER — HYDROMORPHONE HYDROCHLORIDE 1 MG/ML
0.2 INJECTION, SOLUTION INTRAMUSCULAR; INTRAVENOUS; SUBCUTANEOUS EVERY 5 MIN PRN
Status: DISCONTINUED | OUTPATIENT
Start: 2025-03-12 | End: 2025-03-12 | Stop reason: HOSPADM

## 2025-03-12 RX ORDER — PROPOFOL 10 MG/ML
VIAL (ML) INTRAVENOUS CONTINUOUS PRN
Status: DISCONTINUED | OUTPATIENT
Start: 2025-03-12 | End: 2025-03-12

## 2025-03-12 RX ORDER — CEPHALEXIN 500 MG/1
500 CAPSULE ORAL EVERY 12 HOURS
Qty: 14 CAPSULE | Refills: 0 | Status: SHIPPED | OUTPATIENT
Start: 2025-03-12 | End: 2025-03-19

## 2025-03-12 RX ORDER — SODIUM CHLORIDE 9 MG/ML
INJECTION, SOLUTION INTRAVENOUS CONTINUOUS
Status: DISCONTINUED | OUTPATIENT
Start: 2025-03-12 | End: 2025-03-12 | Stop reason: HOSPADM

## 2025-03-12 RX ADMIN — LIDOCAINE HYDROCHLORIDE 60 MG: 20 INJECTION, SOLUTION EPIDURAL; INFILTRATION; INTRACAUDAL; PERINEURAL at 10:03

## 2025-03-12 RX ADMIN — MIDAZOLAM HYDROCHLORIDE 2 MG: 1 INJECTION, SOLUTION INTRAMUSCULAR; INTRAVENOUS at 10:03

## 2025-03-12 RX ADMIN — FENTANYL CITRATE 50 MCG: 50 INJECTION, SOLUTION INTRAMUSCULAR; INTRAVENOUS at 10:03

## 2025-03-12 RX ADMIN — CEFAZOLIN 2 G: 330 INJECTION, POWDER, FOR SOLUTION INTRAMUSCULAR; INTRAVENOUS at 10:03

## 2025-03-12 RX ADMIN — SODIUM CHLORIDE: 0.9 INJECTION, SOLUTION INTRAVENOUS at 10:03

## 2025-03-12 RX ADMIN — ROPIVACAINE HYDROCHLORIDE 20 ML: 5 INJECTION EPIDURAL; INFILTRATION; PERINEURAL at 10:03

## 2025-03-12 RX ADMIN — PROPOFOL 125 MCG/KG/MIN: 10 INJECTION, EMULSION INTRAVENOUS at 10:03

## 2025-03-12 RX ADMIN — ROPIVACAINE HYDROCHLORIDE 10 ML: 5 INJECTION, SOLUTION EPIDURAL; INFILTRATION; PERINEURAL at 10:03

## 2025-03-12 NOTE — TRANSFER OF CARE
Anesthesia Transfer of Care Note    Patient: Gordon Griffin    Procedure(s) Performed: Procedure(s) (LRB):  Repair left ulnar collateral ligament (Left)    Patient location: PACU    Anesthesia Type: general    Transport from OR: Transported from OR on 2-3 L/min O2 by NC with adequate spontaneous ventilation    Post pain: adequate analgesia    Post assessment: no apparent anesthetic complications and tolerated procedure well    Post vital signs: stable    Level of consciousness: responds to stimulation and sedated    Nausea/Vomiting: no nausea/vomiting    Complications: none    Transfer of care protocol was followed    Last vitals: Visit Vitals  /60 (BP Location: Right arm, Patient Position: Lying)   Pulse 74   Temp 36.5 °C (97.7 °F) (Temporal)   Resp 12   SpO2 98%   Breastfeeding No

## 2025-03-12 NOTE — ANESTHESIA PREPROCEDURE EVALUATION
2025  Gordon Griffin is a 44 y.o., adult here for a repair of L ulnar collateral ligament.       Pre-op Assessment    I have reviewed the Patient Summary Reports.    I have reviewed the NPO Status.   I have reviewed the Medications.     Review of Systems  Anesthesia Hx:  No problems with previous Anesthesia               Denies Personal Hx of Anesthesia complications.                    Social:  Non-Smoker, No Alcohol Use       Musculoskeletal:  Arthritis   CVA as a , very mild weakness and fine motor loss on LUE       Spine Disorders: cervical            Neurological:   CVA Neuromuscular Disease,  Headaches                                 Psych:  Psychiatric History                  Physical Exam  General: Well nourished, Cooperative, Alert and Oriented    Airway:  Mallampati: II   Mouth Opening: Normal  TM Distance: Normal  Tongue: Normal  Neck ROM: Normal ROM    Dental:  Periodontal disease  States his teeth chip very easily  Chest/Lungs:  Clear to auscultation, Normal Respiratory Rate    Heart:  Rate: Normal  Rhythm: Regular Rhythm        Anesthesia Plan  Type of Anesthesia, risks & benefits discussed:    Anesthesia Type: Gen Natural Airway, Regional  Intra-op Monitoring Plan: Standard ASA Monitors  Post Op Pain Control Plan: multimodal analgesia  Induction:  IV  Informed Consent: Informed consent signed with the Patient and all parties understand the risks and agree with anesthesia plan.  All questions answered.   ASA Score: 2  Anesthesia Plan Notes: Explained to him that airway manipulation is unlikely but with his fragile and damaged teeth, it is possible that the remaining teeth could get further damaged. He is aware and ok with this risk    Ready For Surgery From Anesthesia Perspective.     .

## 2025-03-12 NOTE — ANESTHESIA PROCEDURE NOTES
Peripheral Block    Patient location during procedure: pre-op    Reason for block: primary anesthetic    Diagnosis: L thumb   Start time: 3/12/2025 10:15 AM  Timeout: 3/12/2025 10:14 AM   End time: 3/12/2025 10:20 AM    Staffing  Authorizing Provider: Chelsea Mandujano MD  Performing Provider: Chelsea Mandujano MD    Staffing  Performed by: Chelsea Mandujano MD  Authorized by: Chelsea Mandujano MD    Preanesthetic Checklist  Completed: patient identified, IV checked, site marked, risks and benefits discussed, surgical consent, monitors and equipment checked, pre-op evaluation and timeout performed  Peripheral Block  Patient position: supine  Prep: ChloraPrep  Patient monitoring: heart rate, cardiac monitor, continuous pulse ox, continuous capnometry and frequent blood pressure checks  Block type: supraclavicular  Laterality: left  Injection technique: single shot  Needle  Needle type: Stimuplex   Needle gauge: 20 G  Needle length: 4 in  Needle localization: anatomical landmarks and ultrasound guidance   -ultrasound image captured on disc.  Assessment  Injection assessment: negative aspiration, negative parasthesia and local visualized surrounding nerve  Paresthesia pain: none  Heart rate change: no  Slow fractionated injection: yes    Medications:    Medications: ropivacaine (NAROPIN) injection 0.5% - Perineural   20 mL - 3/12/2025 10:18:00 AM    Additional Notes  VSS.  [facility] RN monitoring vitals throughout procedure.  Patient tolerated procedure well. 5mcg/ml of epi added to solution

## 2025-03-12 NOTE — BRIEF OP NOTE
[facility]  Brief Operative Note    Surgery Date: 3/12/2025     Surgeons and Role:     * Lillian Honeycutt MD - Primary    Assisting Surgeon: None    Pre-op Diagnosis:  Rupture of ulnar collateral ligament of left thumb, subsequent encounter [S63.642D]    Post-op Diagnosis:  Post-Op Diagnosis Codes:     * Rupture of ulnar collateral ligament of left thumb, subsequent encounter [S63.642D]    Procedure(s) (LRB):  Repair left ulnar collateral ligament (Left)    Anesthesia: Monitor Anesthesia Care    Operative Findings: partial tear UCL L Th    Estimated Blood Loss: 5 mL         Specimens:   Specimen (24h ago, onward)      None              Discharge Note    OUTCOME: Patient tolerated treatment/procedure well without complication and is now ready for discharge.    DISPOSITION: Home or Self Care    FINAL DIAGNOSIS:  Left Th UCL tear    FOLLOWUP: In clinic and with CHT.     DISCHARGE INSTRUCTIONS:    Discharge Procedure Orders   Diet Adult Regular     Notify your health care provider if you experience any of the following:  temperature >100.4     Notify your health care provider if you experience any of the following:  persistent nausea and vomiting or diarrhea     Notify your health care provider if you experience any of the following:  severe uncontrolled pain     Leave dressing on - Keep it clean, dry, and intact until clinic visit   Order Comments: Leave splint in place. DO NOT GET IT WET.   Will be removed by therapy.     Activity as tolerated   Order Comments: 1 handed until seen by therapy.   When you regain control of your shoulder and elbow, take sling off and throw it away.

## 2025-03-12 NOTE — OP NOTE
Plastic, Reconstructive, and Hand Surgery  Operative Note     Patient:  Gordon Griffin   MRN:  [id number]   YOB: 1980     Date of Surgery: 3/12/2025     Preoperative Diagnosis:  Ulnar collateral ligament tear left thumb       Postoperative Diagnosis:  Same     Procedure Performed:  Repair ulnar collateral ligament left thumb with internal brace augmentation     Attending Surgeon: Lillian Honeycutt MD     Surgical Team: Surgeons and Role:     * Lillian Honeycutt MD - Primary     Anesthesia: Monitor Anesthesia Care and left supraclavicular block     Key Findings:  Partial-thickness tear of the ulnar collateral ligament of the left thumb, repaired with internal brace augmentation     Estimated Blood Loss:  5 mL     Drains:  none     Implants:   Implant Name Type Inv. Item Serial No.  Lot No. LRB No. Used Action   KIT INTERNALBRACE HAND/WRIST - [id number]  [employer]  [employer] [id number] Left 1 Implanted         Complications:  none     Specimens:  none     Tourniquet:  Arm at 250 mmHg for 31 minutes     Indications for Procedure:  Gordon Griffin is a 44 y.o. adult who presented to my clinic 2 and half weeks ago with left thumb pain.  This was ongoing for several weeks.  She was following through a doorway and caught herself on the door frame with her left thumb.  Exam was concerning for left thumb ulnar collateral ligament tear which was confirmed via MRI.. I recommended repair risks reconstruction with internal brace augmentation. Risks and rationale for this procedure were explained to her at length and she agreed to proceed.     Procedure in Detail:  The patient was seen in the preoperative holding area and marked in the upright position.  After informed consent was obtained, the patient was brought back to the operating room and placed into the supine position.  SCDs were placed to the bilateral lower extremities and appropriate padding of pressure points were  ensured.  A left supraclavicular block was performed by the incision with the preoperative holding area.  After adequate MAC anesthesia was obtained, patient was then placed in the supine position with the left arm out, prepped and draped in usual sterile fashion.  A time out procedure was then performed, where the patient was properly identified, and there was verification of the procedure to be performed as well as administration of appropriate pre-operative antibiotics.       Attention was turned to the left hand.  There was slight laxity with radial deviation of the MP joint of the left thumb.  Provisional fluoroscopy did not demonstrate any fractures or foreign body around the MP joint.  Lazy-S incision was marked out over the ulnar aspect of the MP joint.  The areas infiltrated with 1% lidocaine with epinephrine solution.  Left upper centered was then examined with an Esmarch and an upper arm tourniquet was inflated to a pressure of 250 mm of mercury.  Total tourniquet time was 31 minutes.  Fifteen blade scalpel used to incise with a previous marking.  Dissection was carried down through subcutaneous tissue.  Care was taken to identify and preserve branches of the radial sensory nerve.  The extensor mechanism was identified.  The fascia of the adductor aponeurosis was divided sharply and dissection was further carried down towards the joint.  The joint was opened and the ulnar collateral ligament was identified.  This appeared to be partially torn from its distal insertion of the proximal phalanx base.  There was early scar tissue noted as well.  This was carefully debrided sharply using the scalpel and tenotomy scissors.  This was formally released to allow for improved mobility.  There was no Stener lesion noted.  The proper collateral ligament was then more easily mobilized for appropriate repair.  The joint was then prepared for bone anchor placement.  Guidewires were placed under fluoroscopic guidance and  the volar aspect of the P1 base as well as the dorsal aspect of the metacarpal head.  Once their positions were verified fluoroscopically, the cannulated drill was used to prepare the bones for the anchors.  Next, an Arthrex 3.5 SwiveLock anchor was placed into the proximal phalanx base that was loaded with SutureTape as well as 4-0 FiberWire.  FiberWire was then used to primarily repair and anchored the collateral ligament back to his native position.  This was done with the MP joint kept in 30° of flexion and neutral alignment.  The metacarpal was then drilled in similar fashion in preparation for bone anchor.  Next, the suture tape was then draped over the MP joint with the thumb in 30° of flexion again in neutral alignment.  The SutureTape was brought back over the metacarpal hole and the anchor placed in allowed to auto tension.  Once this was done the joint had significantly improved stability with no laxity noted with stress radial deviation.  The MP joint was taken through flexion and extension passively and there was no catching noted.  At this point the joint was repaired using a FiberWire suture in figure-of-eight fashion.  The tourniquet was deflated.  Meticulous hemostasis was achieved using bipolar cautery and direct pressure.  The wound was irrigated copiously until the effluent was clear.  Hemostasis was excellent.  The adductor aponeurosis was then reapproximated using 4-0 PDS suture in running continuous fashion.  The skin edges then reapproximated in 2 layers using 4-0 Monocryl suture in deep dermal fashion followed by 4-0 Monocryl suture in a running subcuticular fashion.     Needle, instrument, and sponge count was correct at this time.  Sterile dressings were applied, consisting of paper tape, 4 x 4, fluffs, soft roll, and a volar resting splint with thumb spica component.  Patient tolerated the procedure in its entirety without complication and successfully aroused from anesthesia.  The  patient was transferred to the recovery room in stable condition and is to be discharged home.  I reviewed the intra-op findings with her mother.  All questions were answered.     Post-operative Plan:  Stable, to PACU.    Discharge to home when criteria met.    Follow up:  3-5 days with hand therapy, 2 weeks with me  Restrictions:  1 hand until seen by therapy     Attestation of Presence:  I was present for the entirety of the procedure.     Lillian Honeycutt MD  03/12/2025 11:56 AM

## 2025-03-12 NOTE — ANESTHESIA PROCEDURE NOTES
Peripheral Block    Patient location during procedure: pre-op    Reason for block: primary anesthetic    Diagnosis: tourniquet pain   Start time: 3/12/2025 10:20 AM  Timeout: 3/12/2025 10:14 AM   End time: 3/12/2025 10:22 AM    Staffing  Authorizing Provider: Chelsea Mandujano MD  Performing Provider: Chelsea Mandujano MD    Staffing  Performed by: Chelsea Mandujano MD  Authorized by: Chelsea Mandujano MD    Preanesthetic Checklist  Completed: patient identified, IV checked, site marked, risks and benefits discussed, surgical consent, monitors and equipment checked, pre-op evaluation and timeout performed  Peripheral Block  Patient position: sitting  Prep: ChloraPrep  Patient monitoring: heart rate, cardiac monitor, continuous pulse ox, continuous capnometry and frequent blood pressure checks  Block type: intercostobrachial  Laterality: left  Injection technique: single shot  Needle  Needle type: Stimuplex   Needle gauge: 20 G  Needle length: 4 in  Needle localization: anatomical landmarks     Assessment  Injection assessment: negative aspiration and negative parasthesia  Paresthesia pain: none  Heart rate change: no  Slow fractionated injection: yes  Pain Tolerance: comfortable throughout block and no complaints  Medications:    Medications: ropivacaine (NAROPIN) injection 0.5% - Perineural   10 mL - 3/12/2025 10:21:00 AM

## 2025-03-12 NOTE — INTERVAL H&P NOTE
The patient has been examined and the H&P has been reviewed:    I concur with the findings and no changes have occurred since H&P was written.    Surgery risks, benefits and alternative options discussed and understood by patient/family.          There are no hospital problems to display for this patient.    Site marked.  Consent signed and in chart.     To OR for repair / recon [facility].     Lillian Honeycutt MD  Plastic, Reconstructive, and Hand Surgery  03/12/2025 10:11 AM

## 2025-03-12 NOTE — PLAN OF CARE
Pt in preop bay 33, VSS, and IV inserted. Pt denies any open wounds on body or the use of any weight loss injections. Pt needs procedural consents signed, site marked, otherwise ready to roll.
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
## 2025-03-13 NOTE — ANESTHESIA POSTPROCEDURE EVALUATION
Anesthesia Post Evaluation    Patient: Gordon Griffin    Procedure(s) Performed: Procedure(s) (LRB):  Repair left ulnar collateral ligament (Left)    Final Anesthesia Type: regional      Patient location during evaluation: PACU  Patient participation: Yes- Able to Participate  Level of consciousness: awake and alert  Post-procedure vital signs: reviewed and stable  Pain management: adequate  Airway patency: patent  ZABRINA mitigation strategies: Multimodal analgesia  PONV status at discharge: No PONV  Anesthetic complications: no      Cardiovascular status: blood pressure returned to baseline, hemodynamically stable and stable  Respiratory status: unassisted  Hydration status: euvolemic  Follow-up not needed.              Vitals Value Taken Time   /67 03/12/25 12:47   Temp 36.5 °C (97.7 °F) 03/12/25 11:55   Pulse 73 03/12/25 12:47   Resp 20 03/12/25 12:47   SpO2 99 % 03/12/25 12:47   Vitals shown include unfiled device data.      Event Time   Out of Recovery 12:24:00         Pain/Vicki Score: Vicki Score: 10 (3/12/2025 12:45 PM)

## 2025-03-17 ENCOUNTER — CLINICAL SUPPORT (OUTPATIENT)
Dept: REHABILITATION | Facility: HOSPITAL | Age: 45
End: 2025-03-17
Payer: MEDICARE

## 2025-03-17 DIAGNOSIS — M25.60 DECREASED RANGE OF MOTION: Primary | ICD-10-CM

## 2025-03-17 DIAGNOSIS — S63.642D RUPTURE OF ULNAR COLLATERAL LIGAMENT OF LEFT THUMB, SUBSEQUENT ENCOUNTER: ICD-10-CM

## 2025-03-17 PROCEDURE — L3921 HFO W/JOINT(S) CF: HCPCS

## 2025-03-17 PROCEDURE — 97760 ORTHOTIC MGMT&TRAING 1ST ENC: CPT

## 2025-03-17 PROCEDURE — 97530 THERAPEUTIC ACTIVITIES: CPT

## 2025-03-17 NOTE — PATIENT INSTRUCTIONS
"[facility] - OCCUPATIONAL THERAPY  HOME EXERCISE PROGRAM         Complete the following exercises with 10 repetitions each, 3x/day.     AROM: Thumb IP Flexion / Extension  Brace thumb below tip joint. Bend joint as far as possible then straighten.        AROM: Opposition   Touch tip of thumb to nail tip of each finger in turn, making an "O" shape.      AROM: MP Extension   With palm on table, lift thumb up. Relax and lower thumb.    AROM: Composite Movement Circumduction  Make clockwise circles with thumb. Reverse and make counterclockwise circles with thumb.         Therapist: Delvis Ray      "
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
## 2025-03-19 ENCOUNTER — OFFICE VISIT (OUTPATIENT)
Dept: SPORTS MEDICINE | Facility: CLINIC | Age: 45
End: 2025-03-19
Payer: MEDICARE

## 2025-03-19 VITALS
HEIGHT: 72 IN | BODY MASS INDEX: 18.96 KG/M2 | SYSTOLIC BLOOD PRESSURE: 113 MMHG | DIASTOLIC BLOOD PRESSURE: 78 MMHG | WEIGHT: 140 LBS | HEART RATE: 80 BPM

## 2025-03-19 DIAGNOSIS — M17.11 PRIMARY OSTEOARTHRITIS OF RIGHT KNEE: Primary | ICD-10-CM

## 2025-03-19 PROCEDURE — 99999 PR PBB SHADOW E&M-EST. PATIENT-LVL V: CPT | Mod: PBBFAC,,, | Performed by: PHYSICIAN ASSISTANT

## 2025-03-19 PROCEDURE — 99499 UNLISTED E&M SERVICE: CPT | Mod: 25,S$GLB,, | Performed by: PHYSICIAN ASSISTANT

## 2025-03-19 PROCEDURE — 20610 DRAIN/INJ JOINT/BURSA W/O US: CPT | Mod: RT,S$GLB,, | Performed by: PHYSICIAN ASSISTANT

## 2025-03-19 NOTE — PROGRESS NOTES
Patient is here for follow up of right knee arthritis. Pt is requesting Orthovisc injection #1.  PMFH reviewed per encounter record. Has failed other conservative modalities including NSAIDS, activity modification, weight loss.     He has received good relief with these injections in the past.      The prior shots were tolerated well.     PHYSICAL EXAMINATION:      General: The patient is alert and oriented x 3. Mood is pleasant.   Observation of ears, eyes and nose reveals no gross abnormalities. No   labored breathing observed.      No signs of infection or adverse reaction to the right knee.    Orthovisc Injection Procedure #1    A time out was performed, including verification of patient ID, procedure, site and side, availability of information and equipment, review of safety issues, and agreement with consent, the procedure site was marked.    The patient was prepped aseptically with povidone-iodine swabsticks. A diagnostic and therapeutic injection of 2cc Orthovisc was given under sterile technique using a 22g x 1.5 needle from the superolateral aspect of the right knee Joint in the supine position.   Gordon Griffin had no adverse reactions to the medication. Pain decreased. adult was instructed to apply ice to the joint for 20 minutes and avoid strenuous activities for 24-36 hours following the injection. adult was warned of possible blood pressure changes during that time. Following that time, adult can resume activities as prior to the injection.    adult was reminded to call the clinic immediately for any adverse side effects as explained in clinic today.    Exp:  6/6/2026  Lot:  [id number]

## 2025-03-19 NOTE — PROCEDURES
Large Joint Aspiration/Injection: R knee    Date/Time: 3/19/2025 9:00 AM    Performed by: Bartolo El PA-C  Authorized by: Bartolo El PA-C    Consent Done?:  Yes (Verbal)  Indications:  Pain and arthritis  Site marked: the procedure site was marked    Timeout: prior to procedure the correct patient, procedure, and site was verified    Prep: patient was prepped and draped in usual sterile fashion    Local anesthesia used?: No      Details:  Needle Size:  22 G  Ultrasonic Guidance for needle placement?: No    Approach:  Anterolateral  Location:  Knee  Site:  R knee  Medications:  30 mg sodium hyaluronate (orthovisc) 30 mg/2 mL  Patient tolerance:  Patient tolerated the procedure well with no immediate complications

## 2025-03-21 ENCOUNTER — TELEPHONE (OUTPATIENT)
Dept: PLASTIC SURGERY | Facility: HOSPITAL | Age: 45
End: 2025-03-21
Payer: MEDICARE

## 2025-03-21 NOTE — TELEPHONE ENCOUNTER
Contacted pt regarding psych letter to submit to insurance. Pt reports that there is a lot on her plate right now. She is not ready to proceed with insurance submission. Pt reports she will reach out to us via the portal when she is ready.

## 2025-03-24 ENCOUNTER — CLINICAL SUPPORT (OUTPATIENT)
Dept: REHABILITATION | Facility: HOSPITAL | Age: 45
End: 2025-03-24
Payer: MEDICARE

## 2025-03-24 DIAGNOSIS — M25.60 DECREASED RANGE OF MOTION: Primary | ICD-10-CM

## 2025-03-24 PROCEDURE — 97110 THERAPEUTIC EXERCISES: CPT

## 2025-03-24 PROCEDURE — 97140 MANUAL THERAPY 1/> REGIONS: CPT

## 2025-03-24 NOTE — PROGRESS NOTES
Outpatient Rehab    Occupational Therapy Visit    Patient Name: Dylon Griffin  MRN: [id number]  YOB: 1980  Encounter Date: 3/24/2025    Therapy Diagnosis:   Encounter Diagnosis   Name Primary?    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  Pain reported as 7/10.      Objective           Treatment:  Therapeutic Exercise  TE 1: 10 minutes on Flitto elliptical for CV endurance, level 6.0  TE 2: thumb radiall abduction x 10 reps (2 sets)  TE 3: IP jt blocking x 10 reps (2 sets)  Manual Therapy  MT 1: gentle scar massage  Modalities  Moist Heat (min): Pt received moist heat X 10 minutes      Time Entry(in minutes):  Hot/Cold Pack Time Entry: 8    Assessment & Plan   Assessment: sensitivity along distal scar. Good ROM per protocol  Evaluation/Treatment Tolerance: Patient tolerated treatment well    Patient will continue to benefit from skilled outpatient occupational therapy to address the deficits listed in the problem list box on initial evaluation, provide pt/family education and to maximize pt's level of independence in the home and community environment.     Patient's spiritual, cultural, and educational needs considered and patient agreeable to plan of care and goals.           Plan: cont per original POC    Goals:   Active       Functional outcome       Patient will show a significant change in FOT patient-reported outcome tool by 10-15 points to demonstrate  subjective improvement (Progressing)       Start:  03/17/25    Expected End:  05/30/25               Pain       Patient will report pain of 1-2/10 demonstrating a reduction of overall pain (Progressing)       Start:  03/17/25    Expected End:  05/30/25               Range of Motion       Patient will improve AROM of (L) MP thumb flexion by 10-15 degrees  (Progressing)       Start:  03/17/25    Expected End:  05/30/25            Patient will improve (L) IP thumb AROM flexion by 10-15 degrees   (Progressing)       Start:  03/17/25    Expected End:  05/30/25               Range of Motion       Patient will improve thumb radial and palmar thumb abduction by 5-10 degrees  (Progressing)       Start:  03/17/25    Expected End:  05/30/25                Merle Morin OT

## 2025-03-25 ENCOUNTER — OFFICE VISIT (OUTPATIENT)
Dept: PLASTIC SURGERY | Facility: CLINIC | Age: 45
End: 2025-03-25
Payer: MEDICARE

## 2025-03-25 ENCOUNTER — PATIENT MESSAGE (OUTPATIENT)
Dept: PAIN MEDICINE | Facility: CLINIC | Age: 45
End: 2025-03-25
Payer: MEDICARE

## 2025-03-25 VITALS
WEIGHT: 149.06 LBS | BODY MASS INDEX: 20.19 KG/M2 | DIASTOLIC BLOOD PRESSURE: 70 MMHG | SYSTOLIC BLOOD PRESSURE: 110 MMHG | HEIGHT: 72 IN | OXYGEN SATURATION: 97 % | HEART RATE: 79 BPM

## 2025-03-25 DIAGNOSIS — S63.642D RUPTURE OF ULNAR COLLATERAL LIGAMENT OF LEFT THUMB, SUBSEQUENT ENCOUNTER: Primary | ICD-10-CM

## 2025-03-25 PROCEDURE — 99999 PR PBB SHADOW E&M-EST. PATIENT-LVL V: CPT | Mod: PBBFAC,,, | Performed by: SURGERY

## 2025-03-25 NOTE — PROGRESS NOTES
Plastic & Reconstructive Surgery  Progress Note     S:  2 wks s/p repair UCL L Th     Overall doing well.  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  Working well with therapy  Feels like pain is improving.   No other issues.      O: VS@  Gen:  NAD, A&O x3  L Th: incision c/d/I, no erythema / exudate. Min TTP. Good ROM to thumb; good stoppage point. Cap refill 2-3 sec. 2pd wnl     A/P: 44 y.o. adult 2 wks s/p repair UCL L Th     Overall doing well.  Incision healing nicely. Ok to remove steris and trim suture.   Cont CHT  Adjust brace as needed.  OK for RFA to back from Hand Surgery perspective.   All questions answered. Patient verbalizes understanding and agreement with treatment plan.       Follow up:  4 wks  Restriction: per therapy     I spent 15 minutes on this patient encounter which included review of medical records.  Over half the time was spent with the patient face to face discussing the treatment plan, counseling and coordinating care.     Lillian Honeycutt MD  03/25/2025 8:38 AM     This document was transcribed using voice recognition software without a human . It may contain typographical, grammatical, and/or syntax errors.

## 2025-03-26 ENCOUNTER — OFFICE VISIT (OUTPATIENT)
Dept: SPORTS MEDICINE | Facility: CLINIC | Age: 45
End: 2025-03-26
Payer: MEDICARE

## 2025-03-26 ENCOUNTER — CLINICAL SUPPORT (OUTPATIENT)
Dept: REHABILITATION | Facility: HOSPITAL | Age: 45
End: 2025-03-26
Payer: MEDICARE

## 2025-03-26 VITALS
WEIGHT: 146 LBS | DIASTOLIC BLOOD PRESSURE: 69 MMHG | SYSTOLIC BLOOD PRESSURE: 101 MMHG | HEART RATE: 93 BPM | HEIGHT: 72 IN | BODY MASS INDEX: 19.77 KG/M2

## 2025-03-26 DIAGNOSIS — M25.60 DECREASED RANGE OF MOTION: Primary | ICD-10-CM

## 2025-03-26 DIAGNOSIS — M17.11 PRIMARY OSTEOARTHRITIS OF RIGHT KNEE: Primary | ICD-10-CM

## 2025-03-26 PROCEDURE — 99999 PR PBB SHADOW E&M-EST. PATIENT-LVL V: CPT | Mod: PBBFAC,,, | Performed by: PHYSICIAN ASSISTANT

## 2025-03-26 PROCEDURE — 99499 UNLISTED E&M SERVICE: CPT | Mod: 25,S$GLB,, | Performed by: PHYSICIAN ASSISTANT

## 2025-03-26 PROCEDURE — 97140 MANUAL THERAPY 1/> REGIONS: CPT

## 2025-03-26 PROCEDURE — 97110 THERAPEUTIC EXERCISES: CPT

## 2025-03-26 PROCEDURE — 20610 DRAIN/INJ JOINT/BURSA W/O US: CPT | Mod: RT,S$GLB,, | Performed by: PHYSICIAN ASSISTANT

## 2025-03-26 NOTE — PROGRESS NOTES
Outpatient Rehab    Occupational Therapy Visit    Patient Name: Dylon Griffin  MRN: [id number]  YOB: 1980  Encounter Date: 3/26/2025    Therapy Diagnosis:   Encounter Diagnosis   Name Primary?    Decreased range of motion Yes     Physician: Lillian Honeycutt MD    Physician Orders: Eval and Treat  Medical Diagnosis: Sprain of metacarpophalangeal joint of left thumb, subsequent encounter    Visit # / Visits Authorized: 2 / 12  Insurance Authorization Period: 3/18/2025 to 12/31/2025  Date of Evaluation: 3/17/2025  Plan of Care Certification: 3/17/2025  to 6/13/2025      Time In: 0825   Time Out: 0900  Total Time: 35   Total Billable Time:      FOTO:  Intake Score:  %  Survey Score 1:  %  Survey Score 2:  %         Subjective   my back is really bothering me today. I just took off my brace in the waiting room,.  Pain reported as 7/10.      Objective           Treatment:  Therapeutic Exercise  TE 2: thumb radiall/palmar abduction x 10 reps (2 sets)  TE 4: isospheres (2 min)  Manual Therapy  MT 1: gentle scar massage using tissue tool and vibration for scar management and desensitization    Time Entry(in minutes):  Hot/Cold Pack Time Entry: 8  Manual Therapy Time Entry: 10    Assessment & Plan   Assessment: sensitivity along distal scar. able to amilcar scar tissue massage.  Good ROM per protocol  Evaluation/Treatment Tolerance: Patient tolerated treatment well    Patient will continue to benefit from skilled outpatient occupational therapy to address the deficits listed in the problem list box on initial evaluation, provide pt/family education and to maximize pt's level of independence in the home and community environment.     Patient's spiritual, cultural, and educational needs considered and patient agreeable to plan of care and goals.           Plan: cont per original POC    Goals:   Active       Functional outcome       Patient will show a significant change in FOT patient-reported outcome tool by 10-15  points to demonstrate subjective improvement (Progressing)       Start:  03/17/25    Expected End:  05/30/25               Pain       Patient will report pain of 1-2/10 demonstrating a reduction of overall pain (Progressing)       Start:  03/17/25    Expected End:  05/30/25               Range of Motion       Patient will improve AROM of (L) MP thumb flexion by 10-15 degrees  (Progressing)       Start:  03/17/25    Expected End:  05/30/25            Patient will improve (L) IP thumb AROM flexion by 10-15 degrees   (Progressing)       Start:  03/17/25    Expected End:  05/30/25               Range of Motion       Patient will improve thumb radial and palmar thumb abduction by 5-10 degrees  (Progressing)       Start:  03/17/25    Expected End:  05/30/25                Merle Morin OT

## 2025-03-26 NOTE — PROCEDURES
Large Joint Aspiration/Injection: R knee    Date/Time: 3/26/2025 9:45 AM    Performed by: Deng Harris PA-C  Authorized by: Deng Harris PA-C    Consent Done?:  Yes (Verbal)  Indications:  Arthritis  Timeout: prior to procedure the correct patient, procedure, and site was verified      Local anesthesia used?: Yes    Local anesthetic:  Co-phenylcaine spray    Details:  Needle Size:  22 G  Ultrasonic Guidance for needle placement?: No    Approach:  Superior  Location:  Knee  Site:  R knee  Medications:  30 mg sodium hyaluronate (orthovisc) 30 mg/2 mL  Patient tolerance:  Patient tolerated the procedure well with no immediate complications

## 2025-03-26 NOTE — PROGRESS NOTES
Patient is here for follow up of right knee arthritis. Pt is requesting Orthovisc injection #2.  PMFH reviewed per encounter record. Has failed other conservative modalities including NSAIDS, activity modification, weight loss.     He has received good relief with these injections in the past.      The prior shots were tolerated well.     PHYSICAL EXAMINATION:      General: The patient is alert and oriented x 3. Mood is pleasant.   Observation of ears, eyes and nose reveals no gross abnormalities. No   labored breathing observed.      No signs of infection or adverse reaction to the right knee.    Orthovisc Injection Procedure #2    A time out was performed, including verification of patient ID, procedure, site and side, availability of information and equipment, review of safety issues, and agreement with consent, the procedure site was marked.    The patient was prepped aseptically with povidone-iodine swabsticks. A diagnostic and therapeutic injection of 2cc Orthovisc was given under sterile technique using a 22g x 1.5 needle from the superolateral aspect of the right knee Joint in the supine position.   Gordon Griffin had no adverse reactions to the medication. Pain decreased. adult was instructed to apply ice to the joint for 20 minutes and avoid strenuous activities for 24-36 hours following the injection. adult was warned of possible blood pressure changes during that time. Following that time, adult can resume activities as prior to the injection.    adult was reminded to call the clinic immediately for any adverse side effects as explained in clinic today.    Exp:  5/28/2026  Lot:  [id number]

## 2025-03-28 ENCOUNTER — OFFICE VISIT (OUTPATIENT)
Dept: OPTOMETRY | Facility: CLINIC | Age: 45
End: 2025-03-28
Payer: MEDICARE

## 2025-03-28 ENCOUNTER — OFFICE VISIT (OUTPATIENT)
Dept: OPTOMETRY | Facility: CLINIC | Age: 45
End: 2025-03-28
Payer: COMMERCIAL

## 2025-03-28 DIAGNOSIS — Z46.0 FITTING AND ADJUSTMENT OF SPECTACLES AND CONTACT LENSES: Primary | ICD-10-CM

## 2025-03-28 DIAGNOSIS — Z46.0 FITTING AND ADJUSTMENT OF SPECTACLES AND CONTACT LENSES: ICD-10-CM

## 2025-03-28 DIAGNOSIS — Z97.3 WEARS CONTACT LENSES: ICD-10-CM

## 2025-03-28 DIAGNOSIS — H52.203 ASTIGMATISM OF BOTH EYES, UNSPECIFIED TYPE: Primary | ICD-10-CM

## 2025-03-28 DIAGNOSIS — H04.123 DRY EYE SYNDROME, BILATERAL: ICD-10-CM

## 2025-03-28 PROCEDURE — 99999 PR PBB SHADOW E&M-EST. PATIENT-LVL II: CPT | Mod: PBBFAC,,, | Performed by: OPTOMETRIST

## 2025-03-28 PROCEDURE — 99999 PR PBB SHADOW E&M-EST. PATIENT-LVL IV: CPT | Mod: PBBFAC,,, | Performed by: OPTOMETRIST

## 2025-03-28 NOTE — PROGRESS NOTES
"HPI     Concerns About Ocular Health     Additional comments: Patient Gordon "Dylon" PITO Griffin is a 44 year old   adult.           Comments    Pt here for new patient eye exam + 1st time CL fitting. 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 Pt expresses concern due to right arm movement disorder. Pt states   that [facility] is good in oval glasses. Pt states that she is a migraine   sufferer, needs Transitions lenses to help prevent symptoms. Pt states   floaters in [facility] due to migraines and has occasional 6/10 eye pain   ("stingers"). Pt denies any flashes in [facility].    Pt had left hand sx 2 weeks ago due to a fall.    SCARLET: over 1 year ago  PD: 63.0 mm    Meds: Saline PRN OU    POHx:  1. hx of metal FB in eyes - 10 years ago  2. Hx of workplace radiation and hazardous exposure    FOHx: (+)possible cataracts - MGM            Last edited by Juany Ascencio on 3/28/2025  9:03 AM.            Assessment /Plan     For exam results, see Encounter Report.    Astigmatism of both eyes, unspecified type   Rx specs    Dry eye syndrome, bilateral   Start iVIZIA BID OU   May need Rx in the future    Fitting and adjustment of spectacles and contact lenses  Wears contact lenses   Dispensed trials of Biofinity toric OU   Remove nightly, replace monthly   Ok to order if happy with vision and comfort OU    Healthy optic nerve and macula appearance today    RTC 1 year annual/ DFE/ CL fit                   "

## 2025-03-31 ENCOUNTER — CLINICAL SUPPORT (OUTPATIENT)
Dept: REHABILITATION | Facility: HOSPITAL | Age: 45
End: 2025-03-31
Payer: MEDICARE

## 2025-03-31 DIAGNOSIS — M25.60 DECREASED RANGE OF MOTION: Primary | ICD-10-CM

## 2025-03-31 PROCEDURE — 97110 THERAPEUTIC EXERCISES: CPT

## 2025-03-31 PROCEDURE — 97140 MANUAL THERAPY 1/> REGIONS: CPT

## 2025-03-31 PROCEDURE — 97022 WHIRLPOOL THERAPY: CPT

## 2025-03-31 NOTE — PROGRESS NOTES
Outpatient Rehab    Occupational Therapy Visit    Patient Name: Dylon Griffin  MRN: [id number]  YOB: 1980  Encounter Date: 3/31/2025    Therapy Diagnosis:   Encounter Diagnosis   Name Primary?    Decreased range of motion Yes     Physician: Lillian Honeycutt MD    Physician Orders: Eval and Treat  Medical Diagnosis: Sprain of metacarpophalangeal joint of left thumb, subsequent encounter    Visit # / Visits Authorized: 3 / 12  Insurance Authorization Period: 3/18/2025 to 12/31/2025       Time In: 1000   Time Out: 1100  Total Time: 60   Total Billable Time:      FOTO:  Intake Score:  %  Survey Score 1:  %  Survey Score 2:  %         Subjective   getting better.    note formally rated    Objective           Treatment:  Therapeutic Exercise  TE 2: thumb radiall/palmar abduction x 10 reps (3 sets)  TE 3: IP jt blocking x 10 reps (2 sets)  TE 4: isospheres (2 min)  Manual Therapy  MT 1: gentle scar massage using tissue tool and vibration for scar management and desensitization  Modalities  Fluidotherapy: Fluidotherapy: To involved hand for 10 min, continuous air, 115 deg, air speed 50 to decrease pain, edema & scar tissue, sensory re- education, and increased tissue extensibility prior to therex    Time Entry(in minutes):  Manual Therapy Time Entry: 10  Therapeutic Exercise Time Entry: 15    Assessment & Plan   Assessment: decreased sensitivity along distal scar. Good ROM per protocol  Evaluation/Treatment Tolerance: Patient tolerated treatment well    Patient will continue to benefit from skilled outpatient occupational therapy to address the deficits listed in the problem list box on initial evaluation, provide pt/family education and to maximize pt's level of independence in the home and community environment.     Patient's spiritual, cultural, and educational needs considered and patient agreeable to plan of care and goals.           Plan: cont per original POC    Goals:   Active       Functional  outcome       Patient will show a significant change in FOT patient-reported outcome tool by 10-15 points to demonstrate subjective improvement (Progressing)       Start:  03/17/25    Expected End:  05/30/25               Pain       Patient will report pain of 1-2/10 demonstrating a reduction of overall pain (Progressing)       Start:  03/17/25    Expected End:  05/30/25               Range of Motion       Patient will improve AROM of (L) MP thumb flexion by 10-15 degrees  (Progressing)       Start:  03/17/25    Expected End:  05/30/25            Patient will improve (L) IP thumb AROM flexion by 10-15 degrees   (Progressing)       Start:  03/17/25    Expected End:  05/30/25               Range of Motion       Patient will improve thumb radial and palmar thumb abduction by 5-10 degrees  (Progressing)       Start:  03/17/25    Expected End:  05/30/25                Merle Morin OT

## 2025-04-02 ENCOUNTER — CLINICAL SUPPORT (OUTPATIENT)
Dept: REHABILITATION | Facility: HOSPITAL | Age: 45
End: 2025-04-02
Payer: MEDICARE

## 2025-04-02 ENCOUNTER — OFFICE VISIT (OUTPATIENT)
Dept: SPORTS MEDICINE | Facility: CLINIC | Age: 45
End: 2025-04-02
Payer: MEDICARE

## 2025-04-02 VITALS
WEIGHT: 146 LBS | SYSTOLIC BLOOD PRESSURE: 104 MMHG | HEART RATE: 79 BPM | HEIGHT: 72 IN | BODY MASS INDEX: 19.77 KG/M2 | DIASTOLIC BLOOD PRESSURE: 71 MMHG

## 2025-04-02 DIAGNOSIS — M25.60 DECREASED RANGE OF MOTION: Primary | ICD-10-CM

## 2025-04-02 DIAGNOSIS — M17.11 PRIMARY OSTEOARTHRITIS OF RIGHT KNEE: Primary | ICD-10-CM

## 2025-04-02 PROCEDURE — 99999 PR PBB SHADOW E&M-EST. PATIENT-LVL IV: CPT | Mod: PBBFAC,,, | Performed by: PHYSICIAN ASSISTANT

## 2025-04-02 PROCEDURE — 97022 WHIRLPOOL THERAPY: CPT

## 2025-04-02 PROCEDURE — 97110 THERAPEUTIC EXERCISES: CPT

## 2025-04-02 PROCEDURE — 97140 MANUAL THERAPY 1/> REGIONS: CPT

## 2025-04-02 NOTE — PROGRESS NOTES
Outpatient Rehab    Occupational Therapy Visit    Patient Name: Dylon Griffin  MRN: [id number]  YOB: 1980  Encounter Date: 4/2/2025    Therapy Diagnosis:   Encounter Diagnosis   Name Primary?    Decreased range of motion Yes     Physician: Lillian Honeycutt MD    Physician Orders: Eval and Treat  Medical Diagnosis: Sprain of metacarpophalangeal joint of left thumb, subsequent encounter    Visit # / Visits Authorized: 4 / 12  Insurance Authorization Period: 3/18/2025 to 12/31/2025  nsurance Authorization Period: 3/7/2025 to 3/7/2026  Plan of Care Certification:  3/17/2025 to 6/13/2025     Time In: 0830   Time Out: 0925  Total Time: 55   Total Billable Time:      FOTO:  Intake Score:  %  Survey Score 1:  %  Survey Score 2:  %         Subjective   hurting today.  Pain reported as 6/10.      Objective           Treatment:  Therapeutic Exercise  TE 2: thumb radiall/palmar abduction x 10 reps (3 sets)  TE 3: IP jt blocking x 10 reps (2 sets)  TE 4: isospheres (2 min)  TE 5: in hand manipulation with large poms (2 min)  TE 6: thumb composite flexion x 10 reps  Modalities  Fluidotherapy: Fluidotherapy: To involved hand for 10 min, continuous air, 115 deg, air speed 50 to decrease pain, edema & scar tissue, sensory re- education, and increased tissue extensibility prior to therex    Time Entry(in minutes):  Whirlpool Time Entry: 10    Assessment & Plan   Assessment: pt given scar pad for nighttime use. COnt to demo good ROM  Evaluation/Treatment Tolerance: Patient tolerated treatment well    Patient will continue to benefit from skilled outpatient occupational therapy to address the deficits listed in the problem list box on initial evaluation, provide pt/family education and to maximize pt's level of independence in the home and community environment.     Patient's spiritual, cultural, and educational needs considered and patient agreeable to plan of care and goals.     Education  Education was done with  Patient.           Scar band-aid  for nighttime use        Plan: cont per original POC    Goals:   Active       Functional outcome       Patient will show a significant change in FOT patient-reported outcome tool by 10-15 points to demonstrate subjective improvement (Progressing)       Start:  03/17/25    Expected End:  05/30/25               Pain       Patient will report pain of 1-2/10 demonstrating a reduction of overall pain (Progressing)       Start:  03/17/25    Expected End:  05/30/25               Range of Motion       Patient will improve AROM of (L) MP thumb flexion by 10-15 degrees  (Progressing)       Start:  03/17/25    Expected End:  05/30/25            Patient will improve (L) IP thumb AROM flexion by 10-15 degrees   (Progressing)       Start:  03/17/25    Expected End:  05/30/25               Range of Motion       Patient will improve thumb radial and palmar thumb abduction by 5-10 degrees  (Progressing)       Start:  03/17/25    Expected End:  05/30/25                Merle Morin, OT

## 2025-04-02 NOTE — PROGRESS NOTES
Patient is here for follow up of right knee arthritis. Pt is requesting Orthovisc injection #3.  PMFH reviewed per encounter record. Has failed other conservative modalities including NSAIDS, activity modification, weight loss.     He has received good relief with these injections in the past.      The prior shots were tolerated well.     PHYSICAL EXAMINATION:      General: The patient is alert and oriented x 3. Mood is pleasant.   Observation of ears, eyes and nose reveals no gross abnormalities. No   labored breathing observed.      No signs of infection or adverse reaction to the right knee.    Orthovisc Injection Procedure #3    A time out was performed, including verification of patient ID, procedure, site and side, availability of information and equipment, review of safety issues, and agreement with consent, the procedure site was marked.    The patient was prepped aseptically with povidone-iodine swabsticks. A diagnostic and therapeutic injection of 2cc Orthovisc was given under sterile technique using a 22g x 1.5 needle from the superolateral aspect of the right knee Joint in the supine position.   Gordon Griffin had no adverse reactions to the medication. Pain decreased. adult was instructed to apply ice to the joint for 20 minutes and avoid strenuous activities for 24-36 hours following the injection. adult was warned of possible blood pressure changes during that time. Following that time, adult can resume activities as prior to the injection.    adult was reminded to call the clinic immediately for any adverse side effects as explained in clinic today.    Exp:  5/28/2026  Lot:  [id number]

## 2025-04-02 NOTE — PROCEDURES
Large Joint Aspiration/Injection: R knee    Date/Time: 4/2/2025 9:45 AM    Performed by: Deng Harris PA-C  Authorized by: Deng Harris PA-C    Consent Done?:  Yes (Verbal)  Indications:  Arthritis  Site marked: the procedure site was marked    Timeout: prior to procedure the correct patient, procedure, and site was verified      Local anesthesia used?: Yes    Local anesthetic:  Co-phenylcaine spray    Details:  Needle Size:  22 G  Ultrasonic Guidance for needle placement?: No    Approach:  Superior  Location:  Knee  Site:  R knee  Medications:  30 mg sodium hyaluronate (orthovisc) 30 mg/2 mL  Patient tolerance:  Patient tolerated the procedure well with no immediate complications

## 2025-04-03 ENCOUNTER — TELEPHONE (OUTPATIENT)
Dept: ORTHOPEDICS | Facility: CLINIC | Age: 45
End: 2025-04-03
Payer: MEDICARE

## 2025-04-03 NOTE — TELEPHONE ENCOUNTER
LVM for patient to call back for appointment      Jose Garcia MS, OTC  Orthopedic Clinical / OR Assistant to Dr. Scott Dougherty          ----- Message from Med Assistant Marcelino sent at 4/2/2025 10:20 AM CDT -----  Regarding: Foot Ankle Schedule  Hey, Could y'all setup an appointment for this patient to see Dr Devine? Thanks

## 2025-04-04 NOTE — PROGRESS NOTES
Outpatient Rehab    Occupational Therapy Visit    Patient Name: Dylon Griffin  MRN: [id number]  YOB: 1980  Encounter Date: 4/7/2025    Therapy Diagnosis:   Encounter Diagnosis   Name Primary?    Decreased range of motion Yes     Physician: Lillian Honeycutt MD    Physician Orders: Eval and Treat  Medical Diagnosis: Sprain of metacarpophalangeal joint of left thumb, subsequent encounter    Visit # / Visits Authorized: 5 / 12  Insurance Authorization Period: 3/18/2025 to 12/31/2025  Date of Evaluation: 3/17/2025  Plan of Care Certification: 3/17/2025  to 6/13/2025   Sx: 3/12/25: UCL repair      Time In: 1050   Time Out: 1140  Total Time: 50   Total Billable Time:      FOTO:  Intake Score: 32%  Survey Score 1: 54%  Survey Score 2:  %         Subjective      Pain reported as 7/10.      Objective           Treatment:  Therapeutic Exercise  TE 1: thumb lifts x 10  TE 2: thumb radiall/palmar abduction x 10 reps (3 sets)  TE 3: IP jt blocking x 10 reps (2 sets)  TE 6: thumb composite flexion x 10 reps  Therapeutic Activity  TA 1: alphabet ball x 1 set  TA 2: isospheres x 3 min  TA 3: in hand manip with med poms x 2 sets  TA 4: thumb wheel IF x 10 c /cc  Manual Therapy  MT 1: gentle scar massage using tissue tool and vibration for scar management and desensitization  Modalities  Fluidotherapy: Fluidotherapy: To involved hand for 10 min, continuous air, 115 deg, air speed 50 to decrease pain, edema & scar tissue, sensory re- education, and increased tissue extensibility prior to therex    Time Entry(in minutes):  Whirlpool Time Entry: 10  Manual Therapy Time Entry: 8  Therapeutic Activity Time Entry: 17  Therapeutic Exercise Time Entry: 15    Assessment & Plan   Assessment:         Patient will continue to benefit from skilled outpatient occupational therapy to address the deficits listed in the problem list box on initial evaluation, provide pt/family education and to maximize pt's level of independence in  the home and community environment.     Patient's spiritual, cultural, and educational needs considered and patient agreeable to plan of care and goals.           Plan: cont per original POC    Goals:   Active       Functional outcome       Patient will show a significant change in FOT patient-reported outcome tool by 10-15 points to demonstrate subjective improvement (Progressing)       Start:  03/17/25    Expected End:  05/30/25               Pain       Patient will report pain of 1-2/10 demonstrating a reduction of overall pain (Progressing)       Start:  03/17/25    Expected End:  05/30/25               Range of Motion       Patient will improve AROM of (L) MP thumb flexion by 10-15 degrees  (Progressing)       Start:  03/17/25    Expected End:  05/30/25            Patient will improve (L) IP thumb AROM flexion by 10-15 degrees   (Progressing)       Start:  03/17/25    Expected End:  05/30/25               Range of Motion       Patient will improve thumb radial and palmar thumb abduction by 5-10 degrees  (Progressing)       Start:  03/17/25    Expected End:  05/30/25            Client Care conference completed with evaluating therapist in regards to this patients POC as evidenced by co signature of supervising therapist.       GARY Montgomery/CARLOS

## 2025-04-07 ENCOUNTER — CLINICAL SUPPORT (OUTPATIENT)
Dept: REHABILITATION | Facility: HOSPITAL | Age: 45
End: 2025-04-07
Payer: MEDICARE

## 2025-04-07 DIAGNOSIS — M25.60 DECREASED RANGE OF MOTION: Primary | ICD-10-CM

## 2025-04-07 PROCEDURE — 97140 MANUAL THERAPY 1/> REGIONS: CPT | Mod: CO

## 2025-04-07 PROCEDURE — 97022 WHIRLPOOL THERAPY: CPT | Mod: CO

## 2025-04-07 PROCEDURE — 97530 THERAPEUTIC ACTIVITIES: CPT | Mod: CO

## 2025-04-07 PROCEDURE — 97110 THERAPEUTIC EXERCISES: CPT | Mod: CO

## 2025-04-09 ENCOUNTER — PATIENT MESSAGE (OUTPATIENT)
Dept: PAIN MEDICINE | Facility: CLINIC | Age: 45
End: 2025-04-09
Payer: MEDICARE

## 2025-04-09 ENCOUNTER — PATIENT MESSAGE (OUTPATIENT)
Facility: CLINIC | Age: 45
End: 2025-04-09
Payer: MEDICARE

## 2025-04-09 ENCOUNTER — PATIENT MESSAGE (OUTPATIENT)
Dept: REHABILITATION | Facility: HOSPITAL | Age: 45
End: 2025-04-09
Payer: MEDICARE

## 2025-04-09 DIAGNOSIS — M47.816 LUMBAR SPONDYLOSIS: Primary | ICD-10-CM

## 2025-04-09 NOTE — PROGRESS NOTES
Outpatient Rehab    Occupational Therapy Visit    Patient Name: Dylon Griffin  MRN: [id number]  YOB: 1980  Encounter Date: 4/10/2025    Therapy Diagnosis:   Encounter Diagnosis   Name Primary?    Decreased range of motion Yes     Physician: Lillian Honeycutt MD    Physician Orders: Eval and Treat  Medical Diagnosis: Sprain of metacarpophalangeal joint of left thumb, subsequent encounter    Visit # / Visits Authorized: 6 / 12  Insurance Authorization Period: 3/18/2025 to 12/31/2025  Date of Evaluation: 3/17/2025  Plan of Care Certification: 3/17/2025  to 6/13/2025      Time In: 1034   Time Out: 1136  Total Time: 62   Total Billable Time:      FOTO:  Intake Score:  %  Survey Score 1:  %  Survey Score 2:  %         Subjective   had to lift something heavy at work.    not rated    Objective           Treatment:  Therapeutic Exercise  TE 1: thumb lifts x 10  TE 2: thumb radiall/palmar abduction x 10 reps (3 sets)  TE 3: IP jt blocking x 10 reps (2 sets)  Therapeutic Activity  TA 1: alphabet ball x 1 set  TA 2: isospheres x 3 min  TA 3: in hand manip with coins x 2 sets  TA 4: thumb wheel IF x 10 c /cc  Manual Therapy  MT 1: gentle scar massage using tissue tool and vibration for scar management and desensitization  Modalities  Fluidotherapy: Fluidotherapy: To involved hand for 10 min, continuous air, 115 deg, air speed 50 to decrease pain, edema & scar tissue, sensory re- education, and increased tissue extensibility prior to therex    Time Entry(in minutes):  Whirlpool Time Entry: 10  Manual Therapy Time Entry: 10  Therapeutic Activity Time Entry: 27  Therapeutic Exercise Time Entry: 15    Assessment & Plan   Assessment: ROM cont to progress. continues to need ed on sx precautions and importance of compliance  Evaluation/Treatment Tolerance: Patient tolerated treatment well    Patient will continue to benefit from skilled outpatient occupational therapy to address the deficits listed in the problem list  box on initial evaluation, provide pt/family education and to maximize pt's level of independence in the home and community environment.     Patient's spiritual, cultural, and educational needs considered and patient agreeable to plan of care and goals.           Plan: cont per original POC    Goals:   Active       Functional outcome       Patient will show a significant change in FOT patient-reported outcome tool by 10-15 points to demonstrate subjective improvement (Progressing)       Start:  03/17/25    Expected End:  05/30/25               Pain       Patient will report pain of 1-2/10 demonstrating a reduction of overall pain (Progressing)       Start:  03/17/25    Expected End:  05/30/25               Range of Motion       Patient will improve AROM of (L) MP thumb flexion by 10-15 degrees  (Progressing)       Start:  03/17/25    Expected End:  05/30/25            Patient will improve (L) IP thumb AROM flexion by 10-15 degrees   (Progressing)       Start:  03/17/25    Expected End:  05/30/25               Range of Motion       Patient will improve thumb radial and palmar thumb abduction by 5-10 degrees  (Progressing)       Start:  03/17/25    Expected End:  05/30/25                MARIA ELENA Montgomery

## 2025-04-10 ENCOUNTER — CLINICAL SUPPORT (OUTPATIENT)
Dept: REHABILITATION | Facility: HOSPITAL | Age: 45
End: 2025-04-10
Payer: MEDICARE

## 2025-04-10 DIAGNOSIS — M25.60 DECREASED RANGE OF MOTION: Primary | ICD-10-CM

## 2025-04-10 DIAGNOSIS — M47.816 LUMBAR SPONDYLOSIS: Primary | ICD-10-CM

## 2025-04-10 PROCEDURE — 97140 MANUAL THERAPY 1/> REGIONS: CPT | Mod: CO

## 2025-04-10 PROCEDURE — 97022 WHIRLPOOL THERAPY: CPT | Mod: CO

## 2025-04-10 PROCEDURE — 97530 THERAPEUTIC ACTIVITIES: CPT | Mod: CO

## 2025-04-10 PROCEDURE — 97110 THERAPEUTIC EXERCISES: CPT | Mod: CO

## 2025-04-11 ENCOUNTER — TELEPHONE (OUTPATIENT)
Facility: CLINIC | Age: 45
End: 2025-04-11
Payer: MEDICARE

## 2025-04-11 ENCOUNTER — PATIENT MESSAGE (OUTPATIENT)
Dept: NEUROLOGY | Facility: CLINIC | Age: 45
End: 2025-04-11
Payer: MEDICARE

## 2025-04-11 NOTE — TELEPHONE ENCOUNTER
----- Message from Darcie sent at 4/11/2025 10:07 AM CDT -----  Type:  Needs Medical AdviceWho Called: ptWould the patient rather a call back or a response via [facility]? callBest Call Back Number:  [phone] Information: pt asking for call from office

## 2025-04-14 ENCOUNTER — CLINICAL SUPPORT (OUTPATIENT)
Dept: REHABILITATION | Facility: HOSPITAL | Age: 45
End: 2025-04-14
Payer: MEDICARE

## 2025-04-14 DIAGNOSIS — M25.60 DECREASED RANGE OF MOTION: Primary | ICD-10-CM

## 2025-04-14 PROCEDURE — 97022 WHIRLPOOL THERAPY: CPT

## 2025-04-14 PROCEDURE — 97110 THERAPEUTIC EXERCISES: CPT

## 2025-04-14 PROCEDURE — 97140 MANUAL THERAPY 1/> REGIONS: CPT

## 2025-04-14 NOTE — PROGRESS NOTES
Outpatient Rehab    Occupational Therapy Visit    Patient Name: Dylon Griffin  MRN: [id number]  YOB: 1980  Encounter Date: 4/14/2025    Therapy Diagnosis:   Encounter Diagnosis   Name Primary?    Decreased range of motion Yes     Physician: Lillian Honeycutt MD    Physician Orders: Eval and Treat  Medical Diagnosis: Sprain of metacarpophalangeal joint of left thumb, subsequent encounter    Visit # / Visits Authorized: 7 / 12  Insurance Authorization Period: 3/18/2025 to 12/31/2025  Date of Evaluation: 3/17/2025   Plan of Care Certification: 3/17/2025  to 6/13/2025      Time In: 1005   Time Out:    Total Time:     Total Billable Time:      FOTO:  Intake Score:  %  Survey Score 1:  %  Survey Score 2:  %         Subjective   slipped in the shower so i have to brace myself.  Pain reported as 4/10.      Objective      Digit 1 - Thumb Active Range of Motion  Left Thumb   Flexion (deg) Extension (deg) Pain   CMC         MCP 42       IP 35         Left Thumb   Range (deg) Pain   Palmar ABduction 46     Radial ABduction 45     ADduction         Left Thumb Opposition Active Range of Motion: 7                        Treatment:  Therapeutic Exercise  TE 1: composite flexion thumb  TE 2: AbC tennis ball (1 set)  TE 3: opp with large poms (4 sets)  TE 4: thumb 3 ways x 10 reps (2 sets)  Manual Therapy  MT 1: STM using tissue tool scar to improve blood flow, reduce pain, and improve tissue extensibility  Modalities  Fluidotherapy: Fluidotherapy: To involved hand for 10 min, continuous air, 115 deg, air speed 50 to decrease pain, edema & scar tissue, sensory re- education, and increased tissue extensibility prior to therex    Time Entry(in minutes):       Assessment & Plan   Assessment: ROM cont to progress. continues to need ed on sx precautions and importance of compliance       Patient will continue to benefit from skilled outpatient occupational therapy to address the deficits listed in the problem list box on  initial evaluation, provide pt/family education and to maximize pt's level of independence in the home and community environment.     Patient's spiritual, cultural, and educational needs considered and patient agreeable to plan of care and goals.           Plan: cont per original POC    Goals:   Active       Functional outcome       Patient will show a significant change in FOT patient-reported outcome tool by 10-15 points to demonstrate subjective improvement (Progressing)       Start:  03/17/25    Expected End:  05/30/25               Pain       Patient will report pain of 1-2/10 demonstrating a reduction of overall pain (Progressing)       Start:  03/17/25    Expected End:  05/30/25               Range of Motion       Patient will improve AROM of (L) MP thumb flexion by 10-15 degrees  (Progressing)       Start:  03/17/25    Expected End:  05/30/25            Patient will improve (L) IP thumb AROM flexion by 10-15 degrees   (Progressing)       Start:  03/17/25    Expected End:  05/30/25               Range of Motion       Patient will improve thumb radial and palmar thumb abduction by 5-10 degrees  (Progressing)       Start:  03/17/25    Expected End:  05/30/25                Merle Morin OT

## 2025-04-15 ENCOUNTER — PATIENT MESSAGE (OUTPATIENT)
Dept: PAIN MEDICINE | Facility: OTHER | Age: 45
End: 2025-04-15
Payer: MEDICARE

## 2025-04-17 ENCOUNTER — CLINICAL SUPPORT (OUTPATIENT)
Dept: REHABILITATION | Facility: HOSPITAL | Age: 45
End: 2025-04-17
Payer: MEDICARE

## 2025-04-17 DIAGNOSIS — M25.60 DECREASED RANGE OF MOTION: Primary | ICD-10-CM

## 2025-04-17 PROCEDURE — 97110 THERAPEUTIC EXERCISES: CPT

## 2025-04-17 PROCEDURE — 97022 WHIRLPOOL THERAPY: CPT

## 2025-04-17 PROCEDURE — 97140 MANUAL THERAPY 1/> REGIONS: CPT

## 2025-04-17 NOTE — PROGRESS NOTES
Outpatient Rehab    Occupational Therapy Visit    Patient Name: Dylon Griffin  MRN: [id number]  YOB: 1980  Encounter Date: 4/17/2025    Therapy Diagnosis:   Encounter Diagnosis   Name Primary?    Decreased range of motion Yes       Physician: Lillian Honeycutt MD    Physician Orders: Eval and Treat  Medical Diagnosis: Sprain of metacarpophalangeal joint of left thumb, subsequent encounter    Visit # / Visits Authorized: 11 / 12  Insurance Authorization Period: 3/18/2025 to 12/31/2025  Date of Evaluation: 3/17/2025   Plan of Care Certification: 3/17/2025  to 6/13/2025      Time In: 1004   Time Out:    Total Time:     Total Billable Time:      FOTO:  Intake Score:  %  Survey Score 1:  %  Survey Score 2:  %         Subjective   worked all night. hand is a little sore.         Objective           Treatment:  Therapeutic Exercise  TE 2: AbC tennis ball (1 set)  TE 3: opp with small poms (4 sets)  TE 5: isospheres (2 min)  TE 6: holding unweighted medium dowel wrist ROM 3 ways x 10 reps (2 sets)  TE 7: thumb wheeel (LF/SF)    Time Entry(in minutes):       Assessment & Plan   Assessment: cont to have scar tissue tightness. however improved ROM in composite thumb flexion       Patient will continue to benefit from skilled outpatient occupational therapy to address the deficits listed in the problem list box on initial evaluation, provide pt/family education and to maximize pt's level of independence in the home and community environment.     Patient's spiritual, cultural, and educational needs considered and patient agreeable to plan of care and goals.           Plan:      Goals:   Active       Functional outcome       Patient will show a significant change in FOT patient-reported outcome tool by 10-15 points to demonstrate subjective improvement (Progressing)       Start:  03/17/25    Expected End:  05/30/25               Pain       Patient will report pain of 1-2/10 demonstrating a reduction of overall  pain (Progressing)       Start:  03/17/25    Expected End:  05/30/25               Range of Motion       Patient will improve AROM of (L) MP thumb flexion by 10-15 degrees  (Progressing)       Start:  03/17/25    Expected End:  05/30/25            Patient will improve (L) IP thumb AROM flexion by 10-15 degrees   (Progressing)       Start:  03/17/25    Expected End:  05/30/25               Range of Motion       Patient will improve thumb radial and palmar thumb abduction by 5-10 degrees  (Progressing)       Start:  03/17/25    Expected End:  05/30/25                Merle Morin OT

## 2025-04-21 ENCOUNTER — CLINICAL SUPPORT (OUTPATIENT)
Dept: REHABILITATION | Facility: HOSPITAL | Age: 45
End: 2025-04-21
Payer: MEDICARE

## 2025-04-21 DIAGNOSIS — M25.60 DECREASED RANGE OF MOTION: Primary | ICD-10-CM

## 2025-04-21 PROCEDURE — 97022 WHIRLPOOL THERAPY: CPT | Mod: CO

## 2025-04-21 PROCEDURE — 97110 THERAPEUTIC EXERCISES: CPT | Mod: CO

## 2025-04-21 NOTE — PROGRESS NOTES
Outpatient Rehab    Occupational Therapy Visit    Patient Name: Dylon Griffin  MRN: [id number]  YOB: 1980  Encounter Date: 4/21/2025    Therapy Diagnosis:   Encounter Diagnosis   Name Primary?    Decreased range of motion Yes     Physician: Lillian Honeycutt MD    Physician Orders: Eval and Treat  Medical Diagnosis: Sprain of metacarpophalangeal joint of left thumb, subsequent encounter    Visit # / Visits Authorized: 9 / 12  Insurance Authorization Period: 3/18/2025 to 12/31/2025  Date of Evaluation: 3/17/2025  Plan of Care Certification: 3/17/2025  to 6/13/2025      Time In: 1058   Time Out: 1151  Total Time: 53   Total Billable Time:      FOTO:  Intake Score:  %  Survey Score 1:  %  Survey Score 2:  %         Subjective   continued overuse of hand at work without brace.         Objective           Treatment:  Therapeutic Exercise  TE 2: AbC tennis ball (1 set)  TE 3: opp with small poms (4 sets)  TE 5: isospheres (2 min)  TE 6: holding unweighted medium dowel wrist ROM 3 ways x 10 reps (2 sets)  TE 7: thumb wheeel (LF/SF)  Manual Therapy  MT 1: STM using tissue tool scar to improve blood flow, reduce pain, and improve tissue extensibility  Modalities  Fluidotherapy: Fluidotherapy: To involved hand for 10 min, continuous air, 115 deg, air speed 50 to decrease pain, edema & scar tissue, sensory re- education, and increased tissue extensibility prior to therex    Time Entry(in minutes):  Whirlpool Time Entry: 10  Manual Therapy Time Entry: 8  Therapeutic Exercise Time Entry: 35    Assessment & Plan   Assessment: demos good ROM. continues to c/o pain and report over use of hand for work. will cont to provide education on sx precautions and contraindications per protocol  Evaluation/Treatment Tolerance: Patient tolerated treatment well    Patient will continue to benefit from skilled outpatient occupational therapy to address the deficits listed in the problem list box on initial evaluation, provide  pt/family education and to maximize pt's level of independence in the home and community environment.     Patient's spiritual, cultural, and educational needs considered and patient agreeable to plan of care and goals.           Plan: cont per original POC    Goals:   Active       Functional outcome       Patient will show a significant change in FOT patient-reported outcome tool by 10-15 points to demonstrate subjective improvement (Progressing)       Start:  03/17/25    Expected End:  05/30/25               Pain       Patient will report pain of 1-2/10 demonstrating a reduction of overall pain (Progressing)       Start:  03/17/25    Expected End:  05/30/25               Range of Motion       Patient will improve AROM of (L) MP thumb flexion by 10-15 degrees  (Progressing)       Start:  03/17/25    Expected End:  05/30/25            Patient will improve (L) IP thumb AROM flexion by 10-15 degrees   (Progressing)       Start:  03/17/25    Expected End:  05/30/25               Range of Motion       Patient will improve thumb radial and palmar thumb abduction by 5-10 degrees  (Progressing)       Start:  03/17/25    Expected End:  05/30/25                GARY Montgomery/CARLOS

## 2025-04-22 ENCOUNTER — OFFICE VISIT (OUTPATIENT)
Dept: PLASTIC SURGERY | Facility: CLINIC | Age: 45
End: 2025-04-22
Payer: MEDICARE

## 2025-04-22 ENCOUNTER — PATIENT MESSAGE (OUTPATIENT)
Dept: PLASTIC SURGERY | Facility: CLINIC | Age: 45
End: 2025-04-22

## 2025-04-22 DIAGNOSIS — S63.642D RUPTURE OF ULNAR COLLATERAL LIGAMENT OF LEFT THUMB, SUBSEQUENT ENCOUNTER: Primary | ICD-10-CM

## 2025-04-22 PROCEDURE — 99999 PR PBB SHADOW E&M-EST. PATIENT-LVL IV: CPT | Mod: PBBFAC,,, | Performed by: SURGERY

## 2025-04-24 ENCOUNTER — CLINICAL SUPPORT (OUTPATIENT)
Dept: REHABILITATION | Facility: HOSPITAL | Age: 45
End: 2025-04-24
Payer: MEDICARE

## 2025-04-24 DIAGNOSIS — M25.60 DECREASED RANGE OF MOTION: Primary | ICD-10-CM

## 2025-04-24 PROCEDURE — 97110 THERAPEUTIC EXERCISES: CPT | Mod: CO

## 2025-04-24 PROCEDURE — 97022 WHIRLPOOL THERAPY: CPT | Mod: CO

## 2025-04-24 NOTE — PATIENT INSTRUCTIONS
[facility]  HOME EXERCISE PROGRAM   GARY Montgomery/CARLOS

## 2025-04-24 NOTE — PROGRESS NOTES
Outpatient Rehab    Occupational Therapy Visit    Patient Name: Dylon Griffin  MRN: [id number]  YOB: 1980  Encounter Date: 4/24/2025    Therapy Diagnosis:   Encounter Diagnosis   Name Primary?    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  Pain reported as 0/10.      Objective           Treatment:  Therapeutic Exercise  TE 1: wrist pre with 1# 3 ways 2 x 10  TE 2: PROM opposition  TE 3: opp with small poms (1 set)  TE 5: isospheres (2 min)  Therapeutic Activity  TA 1: issued yellow putty  and key pinches  TA 2: octy x 3 min  TA 3: opp with small poms (1 set)  Manual Therapy  MT 1: STM using tissue tool scar to improve blood flow, reduce pain, and improve tissue extensibility  Modalities  Fluidotherapy: Fluidotherapy: To involved hand for 10 min, continuous air, 115 deg, air speed 50 to decrease pain, edema & scar tissue, sensory re- education, and increased tissue extensibility prior to therex    Time Entry(in minutes):       Assessment & Plan   Assessment: added putty to HEP for strengthening, tolerated well with no pain  Evaluation/Treatment Tolerance: Patient tolerated treatment well    Patient will continue to benefit from skilled outpatient occupational therapy to address the deficits listed in the problem list box on initial evaluation, provide pt/family education and to maximize pt's level of independence in the home and  community environment.     Patient's spiritual, cultural, and educational needs considered and patient agreeable to plan of care and goals.           Plan: cont per original POC    Goals:   Active       Functional outcome       Patient will show a significant change in FOT patient-reported outcome tool by 10-15 points to demonstrate subjective improvement (Progressing)       Start:  03/17/25    Expected End:  05/30/25               Pain       Patient will report pain of 1-2/10 demonstrating a reduction of overall pain (Progressing)       Start:  03/17/25    Expected End:  05/30/25               Range of Motion       Patient will improve AROM of (L) MP thumb flexion by 10-15 degrees  (Progressing)       Start:  03/17/25    Expected End:  05/30/25            Patient will improve (L) IP thumb AROM flexion by 10-15 degrees   (Progressing)       Start:  03/17/25    Expected End:  05/30/25               Range of Motion       Patient will improve thumb radial and palmar thumb abduction by 5-10 degrees  (Progressing)       Start:  03/17/25    Expected End:  05/30/25                MARIA ELENA Montgomery

## 2025-04-28 ENCOUNTER — TELEPHONE (OUTPATIENT)
Dept: SURGERY | Facility: CLINIC | Age: 45
End: 2025-04-28
Payer: MEDICARE

## 2025-04-28 ENCOUNTER — CLINICAL SUPPORT (OUTPATIENT)
Dept: REHABILITATION | Facility: HOSPITAL | Age: 45
End: 2025-04-28
Payer: MEDICARE

## 2025-04-28 DIAGNOSIS — M25.60 DECREASED RANGE OF MOTION: Primary | ICD-10-CM

## 2025-04-28 PROCEDURE — 97022 WHIRLPOOL THERAPY: CPT | Mod: CO

## 2025-04-28 PROCEDURE — 97530 THERAPEUTIC ACTIVITIES: CPT | Mod: CO

## 2025-04-28 PROCEDURE — 97110 THERAPEUTIC EXERCISES: CPT | Mod: CO

## 2025-04-28 NOTE — PROGRESS NOTES
Outpatient Rehab    Occupational Therapy Visit    Patient Name: Dylon Griffin  MRN: [id number]  YOB: 1980  Encounter Date: 4/28/2025    Therapy Diagnosis:   Encounter Diagnosis   Name Primary?    Decreased range of motion Yes     Physician: Lillian Honeycutt MD    Physician Orders: Eval and Treat  Medical Diagnosis: Sprain of metacarpophalangeal joint of left thumb, subsequent encounter    Visit # / Visits Authorized: 11 / 12  Insurance Authorization Period: 3/18/2025 to 12/31/2025  Date of Evaluation: 3/17/2025  Plan of Care Certification: 3/17/2025  to 6/13/2025      Time In: 1035   Time Out: 1131  Total Time: 56   Total Billable Time:      FOTO:  Intake Score:  %  Survey Score 1:  %  Survey Score 2:  %         Subjective   difficutly pressing his flashlight.  Pain reported as 0/10.      Objective      Digit 1 - Thumb Active Range of Motion  Left Thumb   Flexion (deg) Extension (deg) Pain   CMC         MCP 44       IP 51         Left Thumb   Range (deg) Pain   Palmar ABduction 46     Radial ABduction 45     ADduction         Left Thumb Opposition Active Range of Motion: 8                        Right  Strength  Right Hand Dynamometer Position: 2  Elbow Position Forearm Position Trial 1 (lbs) Trial 2  (lbs) Trial 3  (lbs) Average  (lbs) Pain   Flexed Neutral     69           Left  Strength  Left Hand Dynamometer Position: 2  Elbow Position Forearm Position Trial 1 (lbs) Trial 2 (lbs) Trial 3 (lbs) Average (lbs) Pain   Flexed Neutral     49           Right Pinch Strength   Trial 1 (lbs) Trial 2 (lbs) Trial 3 (lbs) Average (lbs) Pain   Lateral (Key Pinch)     20       Three Point (Three Jaw Juve) 18 13 11 14     Two Point (Tip to Tip)                 Left Pinch Strength   Trial 1 (lbs) Trial 2 (lbs) Trial 3 (lbs) Average (lbs) Pain   Lateral (Key Pinch)     13       Three Point (Three Jaw Juve) 12 9 10 10.33     Two Point (Tip to Tip)                           Treatment:  Therapeutic  Exercise  TE 1: wrist pre with 2# 3 ways 3 x 10  TE 2: PROM opposition  TE 3: thumb sizer x 15  TE 4: radial add/abd with yellow rb x 20 ea  TE 5: hand gripper light x 25  Therapeutic Activity  TA 1: updated measures  TA 2: thumb wheel on RF and SF x 10 ea way c/cc  TA 4: thumb rings x 3 min  Modalities  Fluidotherapy: Fluidotherapy: To involved hand for 10 min, continuous air, 115 deg, air speed 50 to decrease pain, edema & scar tissue, sensory re- education, and increased tissue extensibility prior to therex    Time Entry(in minutes):  Whirlpool Time Entry: 10  Therapeutic Activity Time Entry: 20  Therapeutic Exercise Time Entry: 26    Assessment & Plan   Assessment: progressing well with strengthening  Evaluation/Treatment Tolerance: Patient tolerated treatment well    Patient will continue to benefit from skilled outpatient occupational therapy to address the deficits listed in the problem list box on initial evaluation, provide pt/family education and to maximize pt's level of independence in the home and community environment.     Patient's spiritual, cultural, and educational needs considered and patient agreeable to plan of care and goals.           Plan: cont per original POC    Goals:   Active       Functional outcome       Patient will show a significant change in FOT patient-reported outcome tool by 10-15 points to demonstrate subjective improvement (Progressing)       Start:  03/17/25    Expected End:  05/30/25               Pain       Patient will report pain of 1-2/10 demonstrating a reduction of overall pain (Progressing)       Start:  03/17/25    Expected End:  05/30/25               Range of Motion       Patient will improve AROM of (L) MP thumb flexion by 10-15 degrees  (Progressing)       Start:  03/17/25    Expected End:  05/30/25            Patient will improve (L) IP thumb AROM flexion by 10-15 degrees   (Progressing)       Start:  03/17/25    Expected End:  05/30/25               Range of  Motion       Patient will improve thumb radial and palmar thumb abduction by 5-10 degrees  (Progressing)       Start:  03/17/25    Expected End:  05/30/25                GARY Montgomery/CARLOS

## 2025-04-28 NOTE — PRE-PROCEDURE INSTRUCTIONS
PreOp Instructions given:     -- Medication information (what to hold and what to take)   -- NPO guidelines as follows: (or as per your Surgeon)  Stop ALL solid food, gum, candy 8 hours before arrival time.  Stop all CLOUDY liquids: coffee with creamer, cloudy juices, 8 hours prior to arrival time.  The patient should be ENCOURAGED to drink carbohydrate-rich clear liquids (sports drinks, clear juices) until 2 hours prior to arrival time.  Stop clear liquids 2 hours prior to arrival time.  CLEAR liquids include water, black coffee NO creamer, clear oral rehydration drinks, clear sports drinks and clear fruit juices (no orange juice, no pulpy juices, no apple cider).   IF IN DOUBT, drink water instead.   NOTHING TO DRINK 2 hours before to surgery/procedure  time. If you are told to take medication on the morning of surgery, it may be taken with a sip of water.   -- *Arrival place and directions given*.  Time to be given the day before procedure by the Surgeon's Office   -- Bathe with antibacterial soap (dial or Hibiclens as instructed)  -- Don't wear any jewelry or valuables and not metals on skin or hair AM of surgery   -- No makeup or moisturizer to face   -- No perfume/cologne, powder, lotions, aftershave or deodorant     Pt verbalized understanding.            *If going to , see below:      Directions and Instructions for [facility] [place]   At [facility], we have an outstanding team of physicians, anesthesiologists, CRNAs, Registered Nurses, Surgical Technologists, and other ancillary team members all focused on your surgical and procedural care.   Before Your Procedure:   The physician's office will call you with a specific arrival time and directions a day or two before your scheduled procedure. You may also receive these instructions through your [facility] portal.   Day of Procedure:   Please be sure to arrive at the arrival time given or you may risk your surgery being delayed  or canceled. The arrival time is earlier than your scheduled surgery or procedure time. In the winter months please dress warm and bring blankets for you or your child as the waiting room may be cold. If you have difficulty locating the facility, please give us a call at [phone].   Directions:   The [facility] is located on the 1st floor of the hospital building near the [place] entrance.   Parking:   You will park in the South Parking Garage (note location on map). [facility] opens at 5:00 a.m. and has a drop off area by the entrance.  parking is available starting at 7:00 a.m. Please see below for further  parking instructions.   Directions from the parking garage elevators   Blue [facility] Elevators: From the parking garage, take the blue [employer] [place] elevators (located in the center of the parking garage) to the 1st floor of the garage. You will then take a right once off the elevators then another right to the outside of the parking garage. You will be across from the [facility]. You will walk down the sidewalk, pass the  curve at the [place] entrance and continue to follow the sidewalk. You will pass the radiation oncology entrance on your right. Continue to follow the sidewalk to the [facility] glass door entrance.   Hospital Entrance (Inside Route): If a mostly inside route is preferred: Take the inside elevator bank (located at the far north end of the garage) from the parking garage to the 1st floor. On the 1st floor walk past [employer]. Keep walking down the center of the hallway towards the hospital elevators. Once you reach the red brick chucho, take a left and go past the hospital elevators. Take another left and follow the blue and white [place] signs around the hallway to the end. Go outside of the door. You will see the [facility] entrance to your right.   Drop Off:   There is a drop off area  at the doors of the [facility] for your convenience. If utilized for pediatric patients, an adult must accompany the patient into the surgery center while another adult serrano the vehicle.   [place] (at 7:00 a.m.):   Upon check-in, please let the  know that you are utilizing [employer] parking which is free. The . will then call  for your car to be picked up. Your keys and phone number will be collected and given to [employer] services. You will then be given a ticket. Upon discharge,  will be notified to bring your vehicle back when you are ready.           Directions to [facility] [place]:  [phone]     From 1st floor garage elevators: go past hospitals [employer] shop. Look for Black piano on side of coffee shop. Take Atrium (gold) elevator by piano up to 2nd floor. When you exit elevator follow long hallway (you should see a sign hanging from the ceiling that says Day of Surgery Family Waiting Room. When hallway ends you will be entering the day of surgery waiting area. Check in at the desk for your procedure.      From [facility] entrance: Make a right after you enter the door to the hospital. On your Left, take [place] elevator to 2nd floor. Check in at desk      From Lab desk on 2nd floor: Exit lab area toward hospital atrium. You should see a sign that says Day of Surgery Family Waiting Area. Make a Left and follow long hallway (you should see a sign hanging from the ceiling that says Day of Surgery Family Waiting Room. When hallway ends you will be entering the day of surgery waiting area. Check in at the desk for your procedure.

## 2025-04-28 NOTE — TELEPHONE ENCOUNTER
Spoke with pt regarding 0500 arrival time and location for surgery. Pt verbally confirmed time and location for surgery. Advised that following the surgery to avoid any donut pillows and to use a flat regular pillow or blankets as needed for comfort. Advised that he will not require packing of gauze following his surgery but can do warm soaks as needed. Pt verbalized understanding. Denies further questions at this time.

## 2025-05-01 ENCOUNTER — HOSPITAL ENCOUNTER (OUTPATIENT)
Facility: HOSPITAL | Age: 45
Discharge: HOME | End: 2025-05-01
Attending: SURGERY | Admitting: SURGERY
Payer: MEDICARE

## 2025-05-01 ENCOUNTER — ANESTHESIA EVENT (OUTPATIENT)
Dept: SURGERY | Facility: HOSPITAL | Age: 45
End: 2025-05-01
Payer: MEDICARE

## 2025-05-01 ENCOUNTER — ANESTHESIA (OUTPATIENT)
Dept: SURGERY | Facility: HOSPITAL | Age: 45
End: 2025-05-01
Payer: MEDICARE

## 2025-05-01 VITALS
DIASTOLIC BLOOD PRESSURE: 74 MMHG | OXYGEN SATURATION: 98 % | HEART RATE: 65 BPM | BODY MASS INDEX: 20.32 KG/M2 | RESPIRATION RATE: 17 BRPM | SYSTOLIC BLOOD PRESSURE: 142 MMHG | TEMPERATURE: 97 F | WEIGHT: 150 LBS | HEIGHT: 72 IN

## 2025-05-01 DIAGNOSIS — K64.9 HEMORRHOIDS, UNSPECIFIED HEMORRHOID TYPE: Primary | ICD-10-CM

## 2025-05-01 DIAGNOSIS — K64.8 HEMORRHOID PROLAPSE: ICD-10-CM

## 2025-05-01 DIAGNOSIS — K64.4 RESIDUAL HEMORRHOIDAL SKIN TAGS: ICD-10-CM

## 2025-05-01 PROCEDURE — 36000706: Performed by: SURGERY

## 2025-05-01 PROCEDURE — 46260 REMOVE IN/EX HEM GROUPS 2+: CPT | Mod: ,,, | Performed by: SURGERY

## 2025-05-01 PROCEDURE — 37000008 HC ANESTHESIA 1ST 15 MINUTES: Performed by: SURGERY

## 2025-05-01 PROCEDURE — 71000016 HC POSTOP RECOV ADDL HR: Performed by: SURGERY

## 2025-05-01 PROCEDURE — 36000707: Performed by: SURGERY

## 2025-05-01 PROCEDURE — 88304 TISSUE EXAM BY PATHOLOGIST: CPT | Mod: TC | Performed by: SURGERY

## 2025-05-01 PROCEDURE — 71000015 HC POSTOP RECOV 1ST HR: Performed by: SURGERY

## 2025-05-01 PROCEDURE — 25000003 PHARM REV CODE 250

## 2025-05-01 PROCEDURE — 71000044 HC DOSC ROUTINE RECOVERY FIRST HOUR: Performed by: SURGERY

## 2025-05-01 PROCEDURE — 63600175 PHARM REV CODE 636 W HCPCS: Performed by: SURGERY

## 2025-05-01 PROCEDURE — 37000009 HC ANESTHESIA EA ADD 15 MINS: Performed by: SURGERY

## 2025-05-01 PROCEDURE — 63600175 PHARM REV CODE 636 W HCPCS: Performed by: ANESTHESIOLOGY

## 2025-05-01 PROCEDURE — 63600175 PHARM REV CODE 636 W HCPCS

## 2025-05-01 PROCEDURE — 27201423 OPTIME MED/SURG SUP & DEVICES STERILE SUPPLY: Performed by: SURGERY

## 2025-05-01 PROCEDURE — 88304 TISSUE EXAM BY PATHOLOGIST: CPT | Mod: 26,,, | Performed by: PATHOLOGY

## 2025-05-01 RX ORDER — HYDROMORPHONE HYDROCHLORIDE 1 MG/ML
0.2 INJECTION, SOLUTION INTRAMUSCULAR; INTRAVENOUS; SUBCUTANEOUS EVERY 5 MIN PRN
Status: DISCONTINUED | OUTPATIENT
Start: 2025-05-01 | End: 2025-05-01 | Stop reason: HOSPADM

## 2025-05-01 RX ORDER — MIDAZOLAM HYDROCHLORIDE 1 MG/ML
INJECTION INTRAMUSCULAR; INTRAVENOUS
Status: DISCONTINUED | OUTPATIENT
Start: 2025-05-01 | End: 2025-05-01

## 2025-05-01 RX ORDER — OXYCODONE HYDROCHLORIDE 5 MG/1
5 TABLET ORAL EVERY 4 HOURS PRN
Qty: 30 TABLET | Refills: 0 | Status: SHIPPED | OUTPATIENT
Start: 2025-05-01

## 2025-05-01 RX ORDER — ONDANSETRON HYDROCHLORIDE 2 MG/ML
INJECTION, SOLUTION INTRAVENOUS
Status: DISCONTINUED | OUTPATIENT
Start: 2025-05-01 | End: 2025-05-01

## 2025-05-01 RX ORDER — ONDANSETRON HYDROCHLORIDE 2 MG/ML
4 INJECTION, SOLUTION INTRAVENOUS DAILY PRN
Status: DISCONTINUED | OUTPATIENT
Start: 2025-05-01 | End: 2025-05-01 | Stop reason: HOSPADM

## 2025-05-01 RX ORDER — BUPIVACAINE HYDROCHLORIDE 2.5 MG/ML
INJECTION, SOLUTION EPIDURAL; INFILTRATION; INTRACAUDAL; PERINEURAL
Status: DISCONTINUED | OUTPATIENT
Start: 2025-05-01 | End: 2025-05-01 | Stop reason: HOSPADM

## 2025-05-01 RX ORDER — PROPOFOL 10 MG/ML
VIAL (ML) INTRAVENOUS
Status: DISCONTINUED | OUTPATIENT
Start: 2025-05-01 | End: 2025-05-01

## 2025-05-01 RX ORDER — POLYETHYLENE GLYCOL 3350 17 G/17G
17 POWDER, FOR SOLUTION ORAL DAILY
Start: 2025-05-01 | End: 2025-05-08

## 2025-05-01 RX ORDER — ONDANSETRON HYDROCHLORIDE 2 MG/ML
4 INJECTION, SOLUTION INTRAVENOUS ONCE AS NEEDED
Status: DISCONTINUED | OUTPATIENT
Start: 2025-05-01 | End: 2025-05-01 | Stop reason: HOSPADM

## 2025-05-01 RX ORDER — DEXAMETHASONE SODIUM PHOSPHATE 4 MG/ML
INJECTION, SOLUTION INTRA-ARTICULAR; INTRALESIONAL; INTRAMUSCULAR; INTRAVENOUS; SOFT TISSUE
Status: DISCONTINUED | OUTPATIENT
Start: 2025-05-01 | End: 2025-05-01

## 2025-05-01 RX ORDER — FENTANYL CITRATE 50 UG/ML
INJECTION, SOLUTION INTRAMUSCULAR; INTRAVENOUS
Status: DISCONTINUED | OUTPATIENT
Start: 2025-05-01 | End: 2025-05-01

## 2025-05-01 RX ADMIN — HYDROMORPHONE HYDROCHLORIDE 0.2 MG: 1 INJECTION, SOLUTION INTRAMUSCULAR; INTRAVENOUS; SUBCUTANEOUS at 08:05

## 2025-05-01 RX ADMIN — MIDAZOLAM HYDROCHLORIDE 2 MG: 2 INJECTION, SOLUTION INTRAMUSCULAR; INTRAVENOUS at 06:05

## 2025-05-01 RX ADMIN — HYDROMORPHONE HYDROCHLORIDE 0.2 MG: 1 INJECTION, SOLUTION INTRAMUSCULAR; INTRAVENOUS; SUBCUTANEOUS at 07:05

## 2025-05-01 RX ADMIN — DEXAMETHASONE SODIUM PHOSPHATE 4 MG: 4 INJECTION, SOLUTION INTRAMUSCULAR; INTRAVENOUS at 07:05

## 2025-05-01 RX ADMIN — FENTANYL CITRATE 50 MCG: 50 INJECTION, SOLUTION INTRAMUSCULAR; INTRAVENOUS at 07:05

## 2025-05-01 RX ADMIN — HYDROMORPHONE HYDROCHLORIDE 0.2 MG: 1 INJECTION, SOLUTION INTRAMUSCULAR; INTRAVENOUS; SUBCUTANEOUS at 09:05

## 2025-05-01 RX ADMIN — SODIUM CHLORIDE: 0.9 INJECTION, SOLUTION INTRAVENOUS at 06:05

## 2025-05-01 RX ADMIN — ONDANSETRON 4 MG: 2 INJECTION INTRAMUSCULAR; INTRAVENOUS at 07:05

## 2025-05-01 RX ADMIN — PROPOFOL 150 MG: 10 INJECTION, EMULSION INTRAVENOUS at 07:05

## 2025-05-01 NOTE — OP NOTE
Date of surgery: 05/01/2025    Operative Report  Pre-operative diagnosis: hemorrhoids  Post-operative diagnosis: Same as above    Procedure:  Internal and external 2 column hemorrhoidectomy    Findings:  Internal and external hemorrhoids with prolapse         Surgeon: Katherine White MD   Assistant:  Meagan Cleveland MD    Indication: Ms. Griffin is a 44 year old woman with a history of prolapsing hemorrhoids that have failed medical management  who is scheduled to undergo excisional hemorrhoidectomy. The benefits, risks, and alternatives were discussed with the patient, they were given the opportunity to ask questions and they elected to proceed with operative intervention after signing written consent.    Procedure:  After pre-operative assessment and review of informed consent, the patient was taken to the operating room and received general anesthesia. Pre-operative antibiotics were administered if indicated and the patient was placed in lithotomy position. The perineum was prepped and draped in the usual sterile fashion and a timeout was performed according to [facility] Quality and Safety guidelines.      A perianal block was performed with 0.25% marcaine.  Anoscopy was performed with a [place] Wolff retractor.  The hemorrhoid in the left lateral position was grasped at the anal verge.  It was dissected off of the anal sphincter with electrocautery.  The hemorrhoid was then ligated and amputated with the ligasure.  The hemorrhoid bed was cauterized for hemostasis.  This was repeated for the right anterolateral column.  Both hemorrhoids were removed and sent to pathology.     There was an internal hemorrhoid between the two resected columns with prolapse.  This was suture ligated and pexied with a vicryl suture.  Anoscopy was again performed and hemostasis was appropriate.       Prior to closure and after final closure instrument, needle, and sponge counts were correct.    The procedure was completed without  complication and was well-tolerated. The patient was then brought to the post-anesthesia care unit in stable condition. I was present for the entire operation.    Complications: None  Estimated blood loss: 10 mL  Disposition: PACU    Katherine White MD, FACS, FASCRS  Staff Surgeon   Colon & Rectal Surgery
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
## 2025-05-01 NOTE — ANESTHESIA PROCEDURE NOTES
Intubation    Date/Time: 5/1/2025 7:15 AM    Performed by: Teresita Ty CRNA  Authorized by: Reji Mooney MD    Intubation:     Induction:  Intravenous    Intubated:  Postinduction    Mask Ventilation:  Not attempted    Attempts:  1    Attempted By:  CRNA    Difficult Airway Encountered?: No      Airway Device:  Oral endotracheal tube and supraglottic airway/LMA    Airway Device Size:  4.0    Tube secured:  21    Secured at:  The lips    Placement Verified By:  Capnometry    Complicating Factors:  None    Findings Post-Intubation:  BS equal bilateral and atraumatic/condition of teeth unchanged

## 2025-05-01 NOTE — ANESTHESIA PREPROCEDURE EVALUATION
05/01/2025  Gordon Griffin is a 44 y.o., adult.      Pre-op Assessment          Review of Systems  Anesthesia Hx:  No problems with previous Anesthesia                Cardiovascular:      Denies Hypertension.    Denies CAD.                                          Pulmonary:     Denies Asthma.     Denies Sleep Apnea.                Renal/:   Denies Chronic Renal Disease.                Hepatic/GI:   Denies PUD.   Denies GERD. Denies Liver Disease.               Musculoskeletal:         Myoclonus                  Neurological:   CVA   Headaches Denies Seizures.                                Endocrine:  Denies Diabetes. Denies Hypothyroidism.              Physical Exam  General: Alert    Airway:  Mallampati: I   Mouth Opening: Normal  TM Distance: Normal  Tongue: Normal  Neck ROM: Normal ROM    Dental:  Intact        Anesthesia Plan  Type of Anesthesia, risks & benefits discussed:    Anesthesia Type: Gen Natural Airway, MAC, Gen Supraglottic Airway, Gen ETT  Intra-op Monitoring Plan: Standard ASA Monitors  Post Op Pain Control Plan: multimodal analgesia and IV/PO Opioids PRN  Induction:  IV  Airway Plan: Direct  Informed Consent: Informed consent signed with the Patient and all parties understand the risks and agree with anesthesia plan.  All questions answered.   ASA Score: 2    Ready For Surgery From Anesthesia Perspective.     .        used

## 2025-05-01 NOTE — BRIEF OP NOTE
[facility] - Surgery ([facility])  Brief Operative Note    Surgery Date: 5/1/2025     Surgeons and Role:     * Katherine White MD - Primary     * Meagan Cleveland MD - Fellow    Assisting Surgeon: None    Pre-op Diagnosis:  Hemorrhoid prolapse [K64.8]    Post-op Diagnosis:  Post-Op Diagnosis Codes:     * Hemorrhoid prolapse [K64.8]    Procedure(s) (LRB):  HEMORRHOIDECTOMY (N/A)    Anesthesia: Choice    Operative Findings: prominent left lat and right ant, right post pexy    Estimated Blood Loss: * No values recorded between 5/1/2025  6:58 AM and 5/1/2025  7:39 AM *         Specimens:   Specimen (24h ago, onward)       Start     Ordered    05/01/25 0729  Specimen to Pathology Gastrointestinal tract  RELEASE UPON ORDERING        References:    Click here for ordering Quick Tip   Question:  Release to patient  Answer:  Immediate    05/01/25 0729                    ID Type Source Tests Collected by Time Destination   1 :  Tissue Hemorrhoids SPECIMEN TO PATHOLOGY Katherine White MD 5/1/2025 0776            Discharge Note    OUTCOME: Patient tolerated treatment/procedure well without complication and is now ready for discharge.    DISPOSITION: Home or Self Care    FINAL DIAGNOSIS: hemorrhoids    FOLLOWUP: In clinic    DISCHARGE INSTRUCTIONS: provided     Medication List        START taking these medications      oxyCODONE 5 MG immediate release tablet  Commonly known as: ROXICODONE  Take 1 tablet (5 mg total) by mouth every 4 (four) hours as needed for Pain.     polyethylene glycol 17 gram Pwpk  Commonly known as: GLYCOLAX  Take 17 g by mouth once daily. for 7 days            CONTINUE taking these medications      AIMOVIG AUTOINJECTOR SUBQ     ARIPiprazole 5 MG Tab  Commonly known as: ABILIFY     aspirin 81 MG Chew     azelastine 137 mcg (0.1 %) nasal spray  Commonly known as: ASTELIN     baclofen 20 MG tablet  Commonly known as: LIORESAL     BENEFIBER CLEAR SF (DEXTRIN) 3 gram/3.5 gram Pwpk  Generic drug: wheat dextrin  Take 1  packet by mouth once daily.     benztropine 0.5 MG tablet  Commonly known as: COGENTIN     BOTOX 200 unit Solr  Generic drug: onabotulinumtoxina     busPIRone 10 MG tablet  Commonly known as: BUSPAR  Tale 1 tablet Orally Twice a day 30 days     butalbital-acetaminophen-caffeine -40 mg -40 mg per tablet  Commonly known as: FIORICET, ESGIC     butorphanol 10 mg/mL nasal spray  Commonly known as: STADOL  2 sprays by Nasal route every 4 (four) hours as needed for pain     cyclobenzaprine 10 MG tablet  Commonly known as: FLEXERIL     diazePAM 2 MG tablet  Commonly known as: VALIUM     EScitalopram oxalate 20 MG tablet  Commonly known as: LEXAPRO  Take 1 tablet (20 mg total) by mouth once daily.     estradiol valerate 20 mg/mL injection  Commonly known as: DELESTROGEN     evolocumab 140 mg/mL Pnij  Commonly known as: REPATHA SURECLICK  Inject 1 mL (140 mg total) into the skin every 14 (fourteen) days.     ezetimibe 10 mg tablet  Commonly known as: ZETIA  Take 1 tablet (10 mg total) by mouth once daily.     fluticasone propionate 50 mcg/actuation nasal spray  Commonly known as: FLONASE     gabapentin 300 MG capsule  Commonly known as: NEURONTIN  Take 1 capsule (300 mg total) by mouth 3 (three) times daily.     HYDROcodone-acetaminophen 5-325 mg per tablet  Commonly known as: NORCO  Take 1 tablet by mouth every 6 (six) hours as needed for Pain.     * hydrocortisone 2.5 % rectal cream  Commonly known as: ANUSOL-HC  Place rectally 2 (two) times daily.     * hydrocortisone 2.5 % rectal cream  Commonly known as: ANUSOL-HC  Place rectally 2 (two) times daily.     hydrOXYzine pamoate 50 MG Cap  Commonly known as: VISTARIL     ketorolac 10 mg tablet  Commonly known as: TORADOL     levETIRAcetam 1000 MG tablet  Commonly known as: KEPPRA     methocarbamoL 750 MG Tab  Commonly known as: ROBAXIN     NARCAN 4 mg/actuation Spry  Generic drug: naloxone  SPRAY 0.1ML IN 1 NOSTRIL MAY REPEAT DOSE EVERY 2-3 MINUTES AS NEEDED  ALTERNATING NOSTRILS EACH DOSE     nitroGLYCERIN 0.4 MG SL tablet  Commonly known as: NITROSTAT  Place 1 tablet (0.4 mg total) under the tongue every 5 (five) minutes as needed for Chest pain. Repeat twice as needed for a maximum total dose of 3 tablets. If still having chest pain, go to the emergency room.     NURTEC 75 mg odt  Generic drug: rimegepant  Take 1 tablet (75 mg total) by mouth as needed for Migraine.     * ondansetron 8 MG Tbdl  Commonly known as: ZOFRAN-ODT     * ondansetron 4 MG Tbdl  Commonly known as: ZOFRAN-ODT  Dissolve 1 tablet (4 mg total) on the tongue every 8 (eight) hours as needed.     oxybutynin 10 MG 24 hr tablet  Commonly known as: DITROPAN-XL  Take 1 tablet (10 mg total) by mouth once daily.     pantoprazole 20 MG tablet  Commonly known as: PROTONIX     prazosin 2 MG Cap  Commonly known as: MINIPRESS  Tale 1 capsule Orally twice daily 30 days     prochlorperazine 10 MG tablet  Commonly known as: COMPAZINE     propranoloL 60 MG 24 hr capsule  Commonly known as: INDERAL LA     rosuvastatin 40 MG Tab  Commonly known as: CRESTOR  Take 1 tablet (40 mg total) by mouth once daily.     traZODone 100 MG tablet  Commonly known as: DESYREL  Take 2 tablet at bedtime as needed Orally 30 days     verapamiL 240 MG C24p  Commonly known as: VERELAN           * This list has 4 medication(s) that are the same as other medications prescribed for you. Read the directions carefully, and ask your doctor or other care provider to review them with you.                   Where to Get Your Medications        These medications [facility] sent [facility]  [address] [facility] [address]      Hours: Always Open Phone: [phone]   oxyCODONE 5 MG immediate release tablet       Information about where to get these medications is not yet available    Ask your nurse or doctor about these medications  polyethylene glycol 17 gram Pwpk

## 2025-05-01 NOTE — ANESTHESIA POSTPROCEDURE EVALUATION
Anesthesia Post Evaluation    Patient: Gordon Griffin    Procedure(s) Performed: Procedure(s) (LRB):  HEMORRHOIDECTOMY (N/A)    Final Anesthesia Type: general      Patient location during evaluation: PACU  Patient participation: Yes- Able to Participate  Level of consciousness: awake  Post-procedure vital signs: reviewed and stable  Pain management: adequate  Airway patency: patent    PONV status at discharge: No PONV  Anesthetic complications: no      Cardiovascular status: blood pressure returned to baseline  Respiratory status: unassisted  Hydration status: euvolemic  Follow-up not needed.              Vitals Value Taken Time   /74 05/01/25 09:30   Temp 36.3 °C (97.3 °F) 05/01/25 07:42   Pulse 65 05/01/25 09:30   Resp 17 05/01/25 09:30   SpO2 98 % 05/01/25 09:30         No case tracking events are documented in the log.      Pain/Vicki Score: Pain Rating Prior to Med Admin: 5 (5/1/2025  9:02 AM)  Vicki Score: 10 (5/1/2025  8:15 AM)

## 2025-05-01 NOTE — PLAN OF CARE
Pt voided upon his arrival to postop; MD made aware; okay to discharge pt. Discharge instructions given and explained to patient and family with verbalization of understanding all instructions. Prescription given and explained next time and doses of each medication. Patients v/s stable, denies n/v and tolerating po, rates pain level tolerable, IV removed, and family at bedside for patient discharge home.

## 2025-05-01 NOTE — TRANSFER OF CARE
"Anesthesia Transfer of Care Note    Patient: Gordon Griffin    Procedure(s) Performed: Procedure(s) (LRB):  HEMORRHOIDECTOMY (N/A)    Patient location: [facility]    Anesthesia Type: general    Transport from OR: Transported from OR on 6-10 L/min O2 by face mask with adequate spontaneous ventilation    Post pain: adequate analgesia    Post assessment: no apparent anesthetic complications    Post vital signs: stable    Level of consciousness: sedated    Nausea/Vomiting: no nausea/vomiting    Complications: none    Transfer of care protocol was followed      Last vitals: Visit Vitals  BP (!) 145/81   Pulse 60   Temp 36.7 °C (98.1 °F)   Resp 16   Ht 6' 1" (1.854 m)   Wt 68 kg (150 lb)   SpO2 98%   Breastfeeding No   BMI 19.79 kg/m²     "

## 2025-05-01 NOTE — DISCHARGE INSTRUCTIONS
Anal Surgery Post Op Instructions:    You had a hemorrhoidectomy performed today.     Wound care:  Expect some drainage over the next few weeks as the wound heals.  Please wear a pad to help with drainage.   You may have stitches in place that will absorb.  They may break down early and cause an open wound - this is very common and it will continue to heal fine.  There may be foam packing in your anus - once this comes out with a bowel movement or passing gas, flush down the toilet or throw away and do not replace.  Showering is okay starting tomorrow. You can take warm water soaks in your own bathtub (aka sitz baths) to relax tight pelvic muscles and cleanse the area gently after bowel movements. Using a gently bidet, squirt bottle, or shower head can help cleanse the area.  Do not put creams or ointments over the area as it heals.    Medications:  You should take acetaminophen (tylenol) and ibuprofen (motrin) around the clock for the first 24hrs for continued baseline pain control then wean off, but it is safe to continue taking then around the clock for over a week if needed.  For example, take 1000mg tylenol every 6 hours and 600mg motrin every six hours. You can leap-frog these mediations to have continued pain control: tylenol at 9am, motrin at 12pm, tylenol at 3pm, motrin at 6pm, ect. A narcotic pain medication has been prescribed for severe pain.  Please only take this pain medication as needed since this can cause painful constipation and painful bowel movements.    You can resume your home medications. Do not take norco while on this pain regimen (it has tylenol).    It is important to take miralax 1 capful at night to reduce discomfort with bowel movements and to prevent constipation associated with narcotic pain medications.  You can add docusate (colace) 200mg twice a day as well. Prune juice or milk of magnesium are also good medications to try if you continue to have constipation. Senna (senakot) can  also be added, 2 tabs once a day.    Restrictions:  No swimming or hot tubs until healed.  You have no activity restrictions. Return to work/school when pain is controlled without narcotics and you feel well to do your job/pay attention in class.  No dietary restrictions. Keep well hydrated to have soft bowel movements.    Follow up:  Return to clinic in about 4 weeks for follow up and wound check. Call [phone] for worsening pain, inability to urinate, or fever > 101.  Call or schedule follow up in about 7 days via [employer] if you are not otherwise contacted or see an appointment in the portal.

## 2025-05-02 LAB
ESTROGEN SERPL-MCNC: NORMAL PG/ML
INSULIN SERPL-ACNC: NORMAL U[IU]/ML
LAB AP CLINICAL INFORMATION: NORMAL
LAB AP GROSS DESCRIPTION: NORMAL
LAB AP PERFORMING LOCATION(S): NORMAL
LAB AP REPORT FOOTNOTES: NORMAL

## 2025-05-16 ENCOUNTER — HOSPITAL ENCOUNTER (OUTPATIENT)
Facility: OTHER | Age: 45
Discharge: HOME | End: 2025-05-16
Attending: ANESTHESIOLOGY | Admitting: ANESTHESIOLOGY
Payer: MEDICARE

## 2025-05-16 VITALS
WEIGHT: 150 LBS | HEART RATE: 85 BPM | TEMPERATURE: 98 F | OXYGEN SATURATION: 99 % | BODY MASS INDEX: 20.32 KG/M2 | RESPIRATION RATE: 18 BRPM | SYSTOLIC BLOOD PRESSURE: 122 MMHG | DIASTOLIC BLOOD PRESSURE: 81 MMHG | HEIGHT: 72 IN

## 2025-05-16 DIAGNOSIS — M47.816 LUMBAR SPONDYLOSIS: Primary | ICD-10-CM

## 2025-05-16 DIAGNOSIS — G89.29 CHRONIC PAIN: ICD-10-CM

## 2025-05-16 PROCEDURE — 64635 DESTROY LUMB/SAC FACET JNT: CPT | Mod: 50,,, | Performed by: ANESTHESIOLOGY

## 2025-05-16 PROCEDURE — 99153 MOD SED SAME PHYS/QHP EA: CPT | Performed by: ANESTHESIOLOGY

## 2025-05-16 PROCEDURE — 63600175 PHARM REV CODE 636 W HCPCS: Performed by: ANESTHESIOLOGY

## 2025-05-16 PROCEDURE — 99152 MOD SED SAME PHYS/QHP 5/>YRS: CPT | Performed by: ANESTHESIOLOGY

## 2025-05-16 PROCEDURE — 64635 DESTROY LUMB/SAC FACET JNT: CPT | Mod: 50 | Performed by: ANESTHESIOLOGY

## 2025-05-16 PROCEDURE — 64636 DESTROY L/S FACET JNT ADDL: CPT | Mod: 50 | Performed by: ANESTHESIOLOGY

## 2025-05-16 PROCEDURE — 64636 DESTROY L/S FACET JNT ADDL: CPT | Mod: 50,,, | Performed by: ANESTHESIOLOGY

## 2025-05-16 RX ORDER — MIDAZOLAM HYDROCHLORIDE 1 MG/ML
INJECTION INTRAMUSCULAR; INTRAVENOUS
Status: DISCONTINUED | OUTPATIENT
Start: 2025-05-16 | End: 2025-05-16 | Stop reason: HOSPADM

## 2025-05-16 RX ORDER — LIDOCAINE HYDROCHLORIDE 20 MG/ML
INJECTION, SOLUTION INFILTRATION; PERINEURAL
Status: DISCONTINUED | OUTPATIENT
Start: 2025-05-16 | End: 2025-05-16 | Stop reason: HOSPADM

## 2025-05-16 RX ORDER — SODIUM CHLORIDE 9 MG/ML
INJECTION, SOLUTION INTRAVENOUS CONTINUOUS
Status: DISCONTINUED | OUTPATIENT
Start: 2025-05-16 | End: 2025-05-16 | Stop reason: HOSPADM

## 2025-05-16 RX ORDER — FENTANYL CITRATE 50 UG/ML
INJECTION, SOLUTION INTRAMUSCULAR; INTRAVENOUS
Status: DISCONTINUED | OUTPATIENT
Start: 2025-05-16 | End: 2025-05-16 | Stop reason: HOSPADM

## 2025-05-16 RX ORDER — DEXAMETHASONE SODIUM PHOSPHATE 10 MG/ML
INJECTION, SOLUTION INTRA-ARTICULAR; INTRALESIONAL; INTRAMUSCULAR; INTRAVENOUS; SOFT TISSUE
Status: DISCONTINUED | OUTPATIENT
Start: 2025-05-16 | End: 2025-05-16 | Stop reason: HOSPADM

## 2025-05-16 RX ORDER — BUPIVACAINE HYDROCHLORIDE 2.5 MG/ML
INJECTION, SOLUTION EPIDURAL; INFILTRATION; INTRACAUDAL; PERINEURAL
Status: DISCONTINUED | OUTPATIENT
Start: 2025-05-16 | End: 2025-05-16 | Stop reason: HOSPADM
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
## 2025-05-16 NOTE — OP NOTE
Therapeutic Lumbar Medial Branch Radiofrequency Ablation under Fluoroscopy     The procedure, risks, benefits, and options were discussed with the patient. There are no contraindications to the procedure. The patent expressed understanding and agreed to the procedure. Informed written consent was obtained prior to the start of the procedure and can be found in the patient's chart.        PATIENT NAME: Gordon Griffin   MRN: [id number]     DATE OF PROCEDURE: 05/16/2025     PROCEDURE:  Bilateral L3, L4, and L5 Lumbar Radiofrequency Ablation under Fluoroscopy    PRE-OP DIAGNOSIS: Lumbar spondylosis [M47.816] Lumbar spondylosis [M47.816]    POST-OP DIAGNOSIS: Same    PHYSICIAN: Larry Brasher MD    ASSISTANTS: Matti Ulloa MD  [facility]      MEDICATIONS INJECTED:  Preservative-free Decadron 10mg with 9cc of Bupivicaine 0.25%    LOCAL ANESTHETIC INJECTED:   Xylocaine 2%    SEDATION: Versed 4mg and Fentanyl 150                                                                                                                                                                                     Conscious sedation ordered by M.D. Patient re-evaluation prior to administration of conscious sedation. No changes noted in patient's status from initial evaluation. The patient's vital signs were monitored by RN and patient remained hemodynamically stable throughout the procedure.    Event Time In   Sedation Start 0829   Sedation End 0856       ESTIMATED BLOOD LOSS:  None    COMPLICATIONS:  None     INTERVAL HISTORY: Patient has clinical and imaging findings suggestive of recurrent facet mediated pain. Patient has undergone a previous RFA at specified levels with at least 50% relief for at least 6 months. Successful diagnostic medial branch blocks have been completed within the past 2 years.    TECHNIQUE: Time-out was performed to identify the patient and procedure to be performed. With the patient laying in a prone position,  the surgical area was prepped and draped in the usual sterile fashion using ChloraPrep and fenestrated drape. The levels were determined under fluoroscopic guidance. Skin anesthesia was achieved by injecting Lidocaine 2% over the injection sites. A 18 gauge 10mm curved active tip needle was introduced to the anatomic local of the medial branch at each of the above levels using AP, lateral and/or contralateral oblique fluoroscopic imaging. Then sensory and motor testing was performed to confirm that the needle tips were in the correct location. After negative aspiration for blood or CSF was confirmed, 1 mL of the lidocaine 2% listed above was injected slowly at each site. This was followed by thermal lesioning at 80 degrees celsius for 90 seconds. That was followed by slowly injecting 1 mL of the medication mixture listed above at each site. The needles were removed and bleeding was nil. A sterile dressing was applied. No specimens collected. The patient tolerated the procedure well and did not have any procedure related motor deficit at the conclusion of the procedure.      The patient was monitored after the procedure in the recovery area. They were given post-procedure and discharge instructions to follow at home. The patient was discharged in a stable condition.    Silvino Machado DO     I reviewed and edited the fellow's note. I conducted my own interview and physical examination. I agree with the findings. I was present and supervising all critical portions of the procedure.    Larry Brasher MD

## 2025-05-16 NOTE — DISCHARGE INSTRUCTIONS

## 2025-05-19 ENCOUNTER — CLINICAL SUPPORT (OUTPATIENT)
Dept: REHABILITATION | Facility: HOSPITAL | Age: 45
End: 2025-05-19
Payer: MEDICARE

## 2025-05-19 DIAGNOSIS — M25.60 DECREASED RANGE OF MOTION: Primary | ICD-10-CM

## 2025-05-19 PROCEDURE — 97022 WHIRLPOOL THERAPY: CPT

## 2025-05-19 PROCEDURE — 97110 THERAPEUTIC EXERCISES: CPT

## 2025-05-19 NOTE — PATIENT INSTRUCTIONS
[facility], OCCUPATIONAL THERAPY  HOME EXERCISE PROGRAM   Merle Morin, OT

## 2025-05-26 ENCOUNTER — CLINICAL SUPPORT (OUTPATIENT)
Dept: REHABILITATION | Facility: HOSPITAL | Age: 45
End: 2025-05-26
Payer: MEDICARE

## 2025-05-26 DIAGNOSIS — M25.60 DECREASED RANGE OF MOTION: Primary | ICD-10-CM

## 2025-05-26 PROCEDURE — 97022 WHIRLPOOL THERAPY: CPT

## 2025-05-26 PROCEDURE — 97110 THERAPEUTIC EXERCISES: CPT

## 2025-05-26 NOTE — PROGRESS NOTES
Outpatient Rehab    Occupational Therapy Visit    Patient Name: Dylon Griffin  MRN: [id number]  YOB: 1980  Encounter Date: 5/26/2025    Therapy Diagnosis:   Encounter Diagnosis   Name Primary?    Decreased range of motion Yes     Physician: Lillian Honeycutt MD    Physician Orders: Eval and Treat  Medical Diagnosis: Sprain of metacarpophalangeal joint of left thumb, subsequent encounter    Visit # / Visits Authorized: 13 / 22  Insurance Authorization Period: 3/18/2025 to 12/31/2025  Date of Evaluation: 3/17/2025  Plan of Care Certification:       Time In: 0855   Time Out: 0955  Total Time (in minutes): 60   Total Billable Time (in minutes):      FOTO:  Intake Score:  %  Survey Score 2:  %  Survey Score 3:  %    Precautions:       Subjective   took a break in therapy due to recent surgery. full function with L hand.         Objective        Left  Strength  Left Hand Dynamometer Position: 2  Elbow Position Forearm Position Trial 1 (lbs) Trial 2 (lbs) Trial 3 (lbs) Average (lbs) Pain   Flexed Neutral 56 67 66 63         Left Pinch Strength   Trial 1 (lbs) Trial 2 (lbs) Trial 3 (lbs) Average (lbs) Pain   Lateral (Key Pinch) 11 14 13 12.67     Three Point (Three Jaw Juve) 13 12 11 12     Two Point (Tip to Tip)                            Treatment:  Therapeutic Exercise  TE 1: wrist holding green weighted ball x 10 reps (2 sets)  TE 2: 3pt pinch lateral pinch red chip clip 3 sets  TE 3: thumb RB x 10 reps each way  TE 4: updated measurements  TE 7: thumb wheel cw/ccw x 10 reps each digit  TE 10: progressed to pink putty for increased strengthening  Modalities  Fluidotherapy: Fluidotherapy: To involved hand for 10 min, continuous air, 115 deg, air speed 50 to decrease pain, edema & scar tissue, sensory re- education, and increased tissue extensibility prior to therex    Time Entry(in minutes):       Assessment & Plan   Assessment: prepare for DC once understanding with HEP  Evaluation/Treatment  Tolerance: Patient tolerated treatment well    The patient will continue to benefit from skilled outpatient occupational therapy in order to address the deficits listed in the problem list on the initial evaluation, provide patient and family education, and maximize the patients level of independence in the home and community environments.     The patient's spiritual, cultural, and educational needs were considered, and the patient is agreeable to the plan of care and goals.           Plan:      Goals:   Resolved       Functional outcome       Patient will show a significant change in FOT patient-reported outcome tool by 10-15 points to demonstrate subjective improvement (Met)       Start:  03/17/25    Expected End:  05/30/25    Resolved:  05/31/25            Pain       Patient will report pain of 1-2/10 demonstrating a reduction of overall pain (Met)       Start:  03/17/25    Expected End:  05/30/25    Resolved:  05/28/25            Range of Motion       Patient will improve AROM of (L) MP thumb flexion by 10-15 degrees  (Met)       Start:  03/17/25    Expected End:  05/30/25    Resolved:  05/31/25         Patient will improve (L) IP thumb AROM flexion by 10-15 degrees   (Met)       Start:  03/17/25    Expected End:  05/30/25    Resolved:  05/31/25            Range of Motion       Patient will improve thumb radial and palmar thumb abduction by 5-10 degrees  (Met)       Start:  03/17/25    Expected End:  05/30/25    Resolved:  05/28/25             Merle Morin OT

## 2025-05-28 NOTE — PROGRESS NOTES
Outpatient Rehab    Occupational Therapy Progress Note    Patient Name: Dylon Griffin  MRN: [id number]  YOB: 1980  Encounter Date: 5/19/2025    Therapy Diagnosis:   Encounter Diagnosis   Name Primary?    Decreased range of motion Yes     Physician: Lillian Honeycutt MD    Physician Orders: Eval and Treat  Medical Diagnosis: Sprain of metacarpophalangeal joint of left thumb, subsequent encounter    Visit # / Visits Authorized: 13 / 22  Insurance Authorization Period: 3/18/2025 to 12/31/2025  Date of Evaluation: 3/17/2025  Plan of Care Certification:       Time In: 0900   Time Out: 0955  Total Time (in minutes): 55   Total Billable Time (in minutes):      FOTO:  Intake Score:  %  Survey Score 2:  %  Survey Score 3:  %    Precautions:       Subjective   took a break in therapy due to recent surgery..         Objective       Digit 1 - Thumb Active Range of Motion  Left Thumb   Flexion (deg) Extension (deg) Pain   CMC         MCP 45       IP 49         Left Thumb   Range (deg) Pain   Palmar ABduction 50     Radial ABduction 43     ADduction                             Left  Strength  Left Hand Dynamometer Position: 2  Elbow Position Forearm Position Trial 1 (lbs) Trial 2 (lbs) Trial 3 (lbs) Average (lbs) Pain   Flexed Neutral 53 49 50 50.67         Left Pinch Strength   Trial 1 (lbs) Trial 2 (lbs) Trial 3 (lbs) Average (lbs) Pain   Lateral (Key Pinch) 13           Three Point (Three Jaw Juve) 11 11 10 10.67     Two Point (Tip to Tip)                           Treatment:  Therapeutic Exercise  TE 1: wrist holding green weighted ball x 10 reps  TE 7: thumb wheel cw/ccw x 10 reps each digit  TE 8: thumb ROM 3 ways with yellow RB x 10 reps  TE 9: updated objective measurements, please see above  TE 10: progressed to pink putty for increased strengthening  Modalities  Fluidotherapy: Fluidotherapy: To involved hand for 10 min, continuous air, 115 deg, air speed 50 to decrease pain, edema & scar tissue,  sensory re- education, and increased tissue extensibility prior to therex    Time Entry(in minutes):       Assessment & Plan   Assessment: Good ROM in thumb. Would benefit from progression with therapy to assist in meeting strength goals       The patient will continue to benefit from skilled outpatient occupational therapy in order to address the deficits listed in the problem list on the initial evaluation, provide patient and family education, and maximize the patients level of independence in the home and community environments.     The patient's spiritual, cultural, and educational needs were considered, and the patient is agreeable to the plan of care and goals.           Plan: cont per original POC, prepare for DC    Goals:   Active       Functional outcome       Patient will show a significant change in FOT patient-reported outcome tool by 10-15 points to demonstrate subjective improvement (Progressing)       Start:  03/17/25    Expected End:  05/30/25               Range of Motion       Patient will improve AROM of (L) MP thumb flexion by 10-15 degrees  (Progressing)       Start:  03/17/25    Expected End:  05/30/25            Patient will improve (L) IP thumb AROM flexion by 10-15 degrees   (Progressing)       Start:  03/17/25    Expected End:  05/30/25              Resolved       Pain       Patient will report pain of 1-2/10 demonstrating a reduction of overall pain (Met)       Start:  03/17/25    Expected End:  05/30/25    Resolved:  05/28/25            Range of Motion       Patient will improve thumb radial and palmar thumb abduction by 5-10 degrees  (Met)       Start:  03/17/25    Expected End:  05/30/25    Resolved:  05/28/25             Merle Morin OT     never

## 2025-06-02 ENCOUNTER — OFFICE VISIT (OUTPATIENT)
Facility: CLINIC | Age: 45
End: 2025-06-02
Payer: MEDICARE

## 2025-06-02 VITALS
DIASTOLIC BLOOD PRESSURE: 75 MMHG | HEART RATE: 73 BPM | SYSTOLIC BLOOD PRESSURE: 117 MMHG | BODY MASS INDEX: 20.32 KG/M2 | WEIGHT: 150 LBS | HEIGHT: 72 IN

## 2025-06-02 DIAGNOSIS — G24.9 DYSTONIA: ICD-10-CM

## 2025-06-02 DIAGNOSIS — M62.838 MUSCLE SPASMS OF BOTH LOWER EXTREMITIES: ICD-10-CM

## 2025-06-02 DIAGNOSIS — M54.12 CERVICAL RADICULOPATHY: ICD-10-CM

## 2025-06-02 DIAGNOSIS — G25.9 FUNCTIONAL MOVEMENT DISORDER: Primary | ICD-10-CM

## 2025-06-02 DIAGNOSIS — M54.16 LUMBAR RADICULOPATHY: ICD-10-CM

## 2025-06-02 PROCEDURE — 3078F DIAST BP <80 MM HG: CPT | Mod: CPTII,S$GLB,, | Performed by: STUDENT IN AN ORGANIZED HEALTH CARE EDUCATION/TRAINING PROGRAM

## 2025-06-02 PROCEDURE — 3074F SYST BP LT 130 MM HG: CPT | Mod: CPTII,S$GLB,, | Performed by: STUDENT IN AN ORGANIZED HEALTH CARE EDUCATION/TRAINING PROGRAM

## 2025-06-02 PROCEDURE — 99999 PR PBB SHADOW E&M-EST. PATIENT-LVL III: CPT | Mod: PBBFAC,,, | Performed by: STUDENT IN AN ORGANIZED HEALTH CARE EDUCATION/TRAINING PROGRAM

## 2025-06-02 PROCEDURE — G2211 COMPLEX E/M VISIT ADD ON: HCPCS | Mod: S$GLB,,, | Performed by: STUDENT IN AN ORGANIZED HEALTH CARE EDUCATION/TRAINING PROGRAM

## 2025-06-02 PROCEDURE — 99214 OFFICE O/P EST MOD 30 MIN: CPT | Mod: S$GLB,,, | Performed by: STUDENT IN AN ORGANIZED HEALTH CARE EDUCATION/TRAINING PROGRAM

## 2025-06-02 PROCEDURE — 3008F BODY MASS INDEX DOCD: CPT | Mod: CPTII,S$GLB,, | Performed by: STUDENT IN AN ORGANIZED HEALTH CARE EDUCATION/TRAINING PROGRAM

## 2025-06-03 ENCOUNTER — OFFICE VISIT (OUTPATIENT)
Dept: PLASTIC SURGERY | Facility: CLINIC | Age: 45
End: 2025-06-03
Payer: MEDICARE

## 2025-06-03 ENCOUNTER — TELEPHONE (OUTPATIENT)
Dept: SURGERY | Facility: CLINIC | Age: 45
End: 2025-06-03
Payer: MEDICARE

## 2025-06-03 DIAGNOSIS — S63.642D RUPTURE OF ULNAR COLLATERAL LIGAMENT OF LEFT THUMB, SUBSEQUENT ENCOUNTER: Primary | ICD-10-CM

## 2025-06-03 PROCEDURE — 99999 PR PBB SHADOW E&M-EST. PATIENT-LVL IV: CPT | Mod: PBBFAC,,, | Performed by: SURGERY

## 2025-06-03 NOTE — ED NOTES
AVS reviewed with pt, home medication education complete, verbalized understanding. Pt discharged in stable condition with all belongings.    03-Jun-2025 14:07

## 2025-06-04 ENCOUNTER — PATIENT MESSAGE (OUTPATIENT)
Facility: CLINIC | Age: 45
End: 2025-06-04
Payer: MEDICARE

## 2025-06-06 ENCOUNTER — TELEPHONE (OUTPATIENT)
Dept: INTERNAL MEDICINE | Facility: CLINIC | Age: 45
End: 2025-06-06
Payer: MEDICARE

## 2025-06-09 ENCOUNTER — OFFICE VISIT (OUTPATIENT)
Dept: INTERNAL MEDICINE | Facility: CLINIC | Age: 45
End: 2025-06-09
Payer: MEDICARE

## 2025-06-09 VITALS
HEART RATE: 85 BPM | OXYGEN SATURATION: 97 % | HEIGHT: 72 IN | DIASTOLIC BLOOD PRESSURE: 80 MMHG | WEIGHT: 150 LBS | BODY MASS INDEX: 20.32 KG/M2 | SYSTOLIC BLOOD PRESSURE: 116 MMHG

## 2025-06-09 DIAGNOSIS — F44.4 FUNCTIONAL NEUROLOGICAL SYMPTOM DISORDER WITH ABNORMAL MOVEMENT: ICD-10-CM

## 2025-06-09 DIAGNOSIS — I25.10 CORONARY ARTERY DISEASE INVOLVING NATIVE CORONARY ARTERY OF NATIVE HEART WITHOUT ANGINA PECTORIS: Primary | ICD-10-CM

## 2025-06-09 DIAGNOSIS — M54.16 LUMBAR RADICULOPATHY: ICD-10-CM

## 2025-06-09 DIAGNOSIS — G25.3 MYOCLONUS: ICD-10-CM

## 2025-06-09 DIAGNOSIS — G89.4 CHRONIC PAIN SYNDROME: ICD-10-CM

## 2025-06-09 DIAGNOSIS — G89.4 CHRONIC PAIN DISORDER: ICD-10-CM

## 2025-06-09 PROCEDURE — 1159F MED LIST DOCD IN RCRD: CPT | Mod: CPTII,S$GLB,, | Performed by: INTERNAL MEDICINE

## 2025-06-09 PROCEDURE — 3008F BODY MASS INDEX DOCD: CPT | Mod: CPTII,S$GLB,, | Performed by: INTERNAL MEDICINE

## 2025-06-09 PROCEDURE — 99999 PR PBB SHADOW E&M-EST. PATIENT-LVL V: CPT | Mod: PBBFAC,,, | Performed by: INTERNAL MEDICINE

## 2025-06-09 PROCEDURE — 3079F DIAST BP 80-89 MM HG: CPT | Mod: CPTII,S$GLB,, | Performed by: INTERNAL MEDICINE

## 2025-06-09 PROCEDURE — 1160F RVW MEDS BY RX/DR IN RCRD: CPT | Mod: CPTII,S$GLB,, | Performed by: INTERNAL MEDICINE

## 2025-06-09 PROCEDURE — 99214 OFFICE O/P EST MOD 30 MIN: CPT | Mod: S$GLB,,, | Performed by: INTERNAL MEDICINE

## 2025-06-09 PROCEDURE — 3074F SYST BP LT 130 MM HG: CPT | Mod: CPTII,S$GLB,, | Performed by: INTERNAL MEDICINE

## 2025-06-09 PROCEDURE — G2211 COMPLEX E/M VISIT ADD ON: HCPCS | Mod: S$GLB,,, | Performed by: INTERNAL MEDICINE

## 2025-06-09 RX ORDER — NALOXONE HYDROCHLORIDE 4 MG/.1ML
SPRAY NASAL
Qty: 1 EACH | Refills: 3 | Status: SHIPPED | OUTPATIENT
Start: 2025-06-09
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
## 2025-06-11 ENCOUNTER — PATIENT MESSAGE (OUTPATIENT)
Dept: SURGERY | Facility: CLINIC | Age: 45
End: 2025-06-11
Payer: MEDICARE

## 2025-06-12 ENCOUNTER — OFFICE VISIT (OUTPATIENT)
Dept: PAIN MEDICINE | Facility: CLINIC | Age: 45
End: 2025-06-12
Payer: MEDICARE

## 2025-06-12 DIAGNOSIS — M47.816 LUMBAR SPONDYLOSIS: ICD-10-CM

## 2025-06-12 DIAGNOSIS — M53.3 SACROILIAC JOINT PAIN: Primary | ICD-10-CM

## 2025-06-12 DIAGNOSIS — M51.360 DEGENERATION OF INTERVERTEBRAL DISC OF LUMBAR REGION WITH DISCOGENIC BACK PAIN: ICD-10-CM

## 2025-06-12 DIAGNOSIS — M51.369 ANNULAR TEAR OF LUMBAR DISC: ICD-10-CM

## 2025-06-12 PROCEDURE — 1160F RVW MEDS BY RX/DR IN RCRD: CPT | Mod: CPTII,95,, | Performed by: NURSE PRACTITIONER

## 2025-06-12 PROCEDURE — 1159F MED LIST DOCD IN RCRD: CPT | Mod: CPTII,95,, | Performed by: NURSE PRACTITIONER

## 2025-06-12 PROCEDURE — 98005 SYNCH AUDIO-VIDEO EST LOW 20: CPT | Mod: 95,,, | Performed by: NURSE PRACTITIONER
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
## 2025-06-17 ENCOUNTER — RESULTS FOLLOW-UP (OUTPATIENT)
Dept: CARDIOLOGY | Facility: CLINIC | Age: 45
End: 2025-06-17

## 2025-06-17 ENCOUNTER — LAB VISIT (OUTPATIENT)
Dept: LAB | Facility: HOSPITAL | Age: 45
End: 2025-06-17
Payer: MEDICARE

## 2025-06-17 ENCOUNTER — TELEPHONE (OUTPATIENT)
Dept: SURGERY | Facility: CLINIC | Age: 45
End: 2025-06-17
Payer: MEDICARE

## 2025-06-17 DIAGNOSIS — E78.00 PURE HYPERCHOLESTEROLEMIA: ICD-10-CM

## 2025-06-17 LAB
CHOLEST SERPL-MCNC: 242 MG/DL (ref 120–199)
CHOLEST/HDLC SERPL: 6.4 {RATIO} (ref 2–5)
HDLC SERPL-MCNC: 38 MG/DL (ref 40–75)
HDLC SERPL: 15.7 % (ref 20–50)
LDLC SERPL CALC-MCNC: 179.6 MG/DL (ref 63–159)
NONHDLC SERPL-MCNC: 204 MG/DL
TRIGL SERPL-MCNC: 122 MG/DL (ref 30–150)

## 2025-06-17 PROCEDURE — 36415 COLL VENOUS BLD VENIPUNCTURE: CPT

## 2025-06-17 PROCEDURE — 80061 LIPID PANEL: CPT

## 2025-06-18 ENCOUNTER — OFFICE VISIT (OUTPATIENT)
Facility: CLINIC | Age: 45
End: 2025-06-18
Payer: MEDICARE

## 2025-06-18 VITALS
HEIGHT: 72 IN | DIASTOLIC BLOOD PRESSURE: 76 MMHG | WEIGHT: 150 LBS | SYSTOLIC BLOOD PRESSURE: 110 MMHG | HEART RATE: 76 BPM | BODY MASS INDEX: 20.32 KG/M2

## 2025-06-18 DIAGNOSIS — K64.8 HEMORRHOID PROLAPSE: Primary | ICD-10-CM

## 2025-06-18 PROCEDURE — 99999 PR PBB SHADOW E&M-EST. PATIENT-LVL IV: CPT | Mod: PBBFAC,,, | Performed by: SURGERY

## 2025-06-18 PROCEDURE — 3078F DIAST BP <80 MM HG: CPT | Mod: CPTII,S$GLB,, | Performed by: SURGERY

## 2025-06-18 PROCEDURE — 1159F MED LIST DOCD IN RCRD: CPT | Mod: CPTII,S$GLB,, | Performed by: SURGERY

## 2025-06-18 PROCEDURE — 99024 POSTOP FOLLOW-UP VISIT: CPT | Mod: S$GLB,,, | Performed by: SURGERY

## 2025-06-18 PROCEDURE — 3074F SYST BP LT 130 MM HG: CPT | Mod: CPTII,S$GLB,, | Performed by: SURGERY

## 2025-06-18 NOTE — PROGRESS NOTES
Colon & Rectal Surgery Clinic Follow Up    HPI:   Gordon Griffin is a 44 y.o. adult who presents for follow up after excisional hemorrhoidectomy on 5/1    Interval history:   Had an episode of pain last week.  Reports poor diet at work and constipation.  This has improved with fiber and miralax.  Had a small amount of blood per rectum with that episode.      Objective:   Vitals:    06/18/25 0810   BP: 110/76   Pulse: 76        Physical Exam   Gen: well developed adult, NAD   HEENT: normocephalic, atraumatic, PERRL, EOMI   CV: RRR, no murmurs  Resp: nonlabored, CTAB   Abd: soft, NTND   MSK: no gross deformities, no cyanosis or edema   Anorectal: residual perianal skin tags, hemorrhoidectomy sites healing well     Final Diagnosis   HEMORRHOIDS, EXCISION:   Anorectal squamocolumnar mucosa with polypoid configuration and massively dilated submucosal vessels with stromal fibrosis, consistent with hemorrhoid.  Negative for dysplasia or malignancy.       Assessment and Plan:   Gordon Griffin  is a 44 y.o. adult who presents for follow up after excisional hemorrhoidectomy    - pathology reviewed and consistent with diagnosis  - patient is healing well on exam  - discussed consistent use of fiber and miralax to reduce episodes of constipation   - return precautions discussed       Katherine White MD, FACS, FASCRS  Staff Surgeon   Colon & Rectal Surgery

## 2025-06-20 NOTE — PROGRESS NOTES
Plastic & Reconstructive Surgery  Progress Note     S:  Pt last seen 6 wks ago.  She is now 12 wks s/p repair UCL L Th     Overall doing well.  Min pain - only if she bumps it.   Completed therapy.  No major limitations.   No other issues     O: There were no vitals taken for this visit.   Gen:  NAD, A&O x3  L Th: incision well healed with minimal scarring. No evidence of infection. No TTP. Good ROM, K9; great stoppage point. Cap refill 2-3 sec. 2pd wnl     A/P: 44 y.o. adult 12 wks s/p repair UCL L Th     Overall doing well.  She has made great progress and has no limitations.   She is back at work without issue.   Overall she is pleased. I am happy to see her back if any new issues arise.   All questions answered. Patient verbalizes understanding and agreement with treatment plan.       Follow up:  prn  Restriction: none     I spent 15 minutes on this patient encounter which included review of medical records.  Over half the time was spent with the patient face to face discussing the treatment plan, counseling and coordinating care.     Lillian Honeycutt MD  06/20/2025 8:38 AM     This document was transcribed using voice recognition software without a human . It may contain typographical, grammatical, and/or syntax errors.

## 2025-06-22 ENCOUNTER — PATIENT MESSAGE (OUTPATIENT)
Dept: SPORTS MEDICINE | Facility: CLINIC | Age: 45
End: 2025-06-22
Payer: MEDICARE

## 2025-06-25 ENCOUNTER — PATIENT MESSAGE (OUTPATIENT)
Dept: PAIN MEDICINE | Facility: OTHER | Age: 45
End: 2025-06-25
Payer: MEDICARE

## 2025-06-27 ENCOUNTER — HOSPITAL ENCOUNTER (OUTPATIENT)
Facility: OTHER | Age: 45
Discharge: HOME | End: 2025-06-27
Attending: ANESTHESIOLOGY | Admitting: ANESTHESIOLOGY
Payer: MEDICARE

## 2025-06-27 VITALS
HEART RATE: 75 BPM | RESPIRATION RATE: 18 BRPM | HEIGHT: 72 IN | WEIGHT: 150 LBS | DIASTOLIC BLOOD PRESSURE: 77 MMHG | SYSTOLIC BLOOD PRESSURE: 129 MMHG | OXYGEN SATURATION: 98 % | BODY MASS INDEX: 20.32 KG/M2 | TEMPERATURE: 98 F

## 2025-06-27 DIAGNOSIS — M46.1 SACROILIITIS: Primary | ICD-10-CM

## 2025-06-27 DIAGNOSIS — G89.29 CHRONIC PAIN: ICD-10-CM

## 2025-06-27 PROCEDURE — 27096 INJECT SACROILIAC JOINT: CPT | Mod: 50,,, | Performed by: ANESTHESIOLOGY

## 2025-06-27 PROCEDURE — 63600175 PHARM REV CODE 636 W HCPCS: Performed by: ANESTHESIOLOGY

## 2025-06-27 PROCEDURE — 25500020 PHARM REV CODE 255: Performed by: ANESTHESIOLOGY

## 2025-06-27 PROCEDURE — 27096 INJECT SACROILIAC JOINT: CPT | Mod: 50 | Performed by: ANESTHESIOLOGY

## 2025-06-27 RX ORDER — FENTANYL CITRATE 50 UG/ML
INJECTION, SOLUTION INTRAMUSCULAR; INTRAVENOUS
Status: DISCONTINUED | OUTPATIENT
Start: 2025-06-27 | End: 2025-06-27 | Stop reason: HOSPADM

## 2025-06-27 RX ORDER — MIDAZOLAM HYDROCHLORIDE 1 MG/ML
INJECTION INTRAMUSCULAR; INTRAVENOUS
Status: DISCONTINUED | OUTPATIENT
Start: 2025-06-27 | End: 2025-06-27 | Stop reason: HOSPADM

## 2025-06-27 RX ORDER — SODIUM CHLORIDE 9 MG/ML
INJECTION, SOLUTION INTRAVENOUS CONTINUOUS
Status: DISCONTINUED | OUTPATIENT
Start: 2025-06-27 | End: 2025-06-27 | Stop reason: HOSPADM

## 2025-06-27 RX ORDER — LIDOCAINE HYDROCHLORIDE 20 MG/ML
INJECTION, SOLUTION INFILTRATION; PERINEURAL
Status: DISCONTINUED | OUTPATIENT
Start: 2025-06-27 | End: 2025-06-27 | Stop reason: HOSPADM

## 2025-06-27 RX ORDER — BUPIVACAINE HYDROCHLORIDE 2.5 MG/ML
INJECTION, SOLUTION EPIDURAL; INFILTRATION; INTRACAUDAL; PERINEURAL
Status: DISCONTINUED | OUTPATIENT
Start: 2025-06-27 | End: 2025-06-27 | Stop reason: HOSPADM

## 2025-06-27 RX ORDER — TRIAMCINOLONE ACETONIDE 40 MG/ML
INJECTION, SUSPENSION INTRA-ARTICULAR; INTRAMUSCULAR
Status: DISCONTINUED | OUTPATIENT
Start: 2025-06-27 | End: 2025-06-27 | Stop reason: HOSPADM

## 2025-06-27 NOTE — OP NOTE
Sacroiliac Joint Injection under Fluoroscopic Guidance    The procedure, risks, benefits, and options were discussed with the patient. There are no contraindications to the procedure. The patent expressed understanding and agreed to the procedure. Informed written consent was obtained prior to the start of the procedure and can be found in the patient's chart.    PATIENT NAME: Gordon Griffin   MRN: [id number]     DATE OF PROCEDURE: 06/27/2025    PROCEDURE: Bilateral Sacroiliac Joint Injection under Fluoroscopic Guidance    PRE-OP DIAGNOSIS: Sacroiliac joint pain [M53.3]    POST-OP DIAGNOSIS: Same    PHYSICIAN: Larry Brasher MD    ASSISTANTS: GUSTAVO Fairchild PMR Pain Fellow       MEDICATIONS INJECTED: Preservative-free Kenalog 40mg with 7cc of Bupivacine 0.25%     LOCAL ANESTHETIC INJECTED: Xylocaine 2%     SEDATION: Versed 2mg and Fentanyl 200 mcg                                                                                                                                                                                     Conscious sedation ordered by M.D. Patient re-evaluation prior to administration of conscious sedation. No changes noted in patient's status from initial evaluation. The patient's vital signs were monitored by RN and patient remained hemodynamically stable throughout the procedure.    Event Time In   Sedation Start 0858   Sedation End 0908       ESTIMATED BLOOD LOSS: None    COMPLICATIONS: None    TECHNIQUE: Time-out was performed to identify the patient and procedure to be performed. With the patient laying in a prone position, the surgical area was prepped and draped in the usual sterile fashion using ChloraPrep and a fenestrated drape. The sacroiliac joint was determined under fluoroscopy guidance. Skin anesthesia was achieved by injecting Lidocaine 2% over the injection site. The sacroiliac joint was  then approached with a 25 gauge, 3.5 inch spinal quinke needle that was introduced under  fluoroscopic guidance in the AP and Lateral views. Once the needle tip was in the area of the joint, and there was no blood, contrast dye Omnipaque (300mg/mL) was injected to confirm placement and there was no vascular runoff. Fluoroscopic imaging in the AP and lateral views revealed a clear outline of the joint space. 4 mL of the medication mixture listed above was injected slowly intraarticular and hanh-articular. Displacement of the radio opaque contrast after injection of the medication confirmed that the medication went into the area of the joint. The needles were removed and bleeding was nil. The same procedure was repeated on the contralateral side. A sterile dressing was applied. No specimens collected. The patient tolerated the procedure well.       The patient was monitored after the procedure in the recovery area. They were given post-procedure and discharge instructions to follow at home. The patient was discharged in a stable condition.    Matti Ulloa MD     I reviewed and edited the fellow's note. I conducted my own interview and physical examination. I agree with the findings. I was present and supervising all critical portions of the procedure.    Larry Brasher MD

## 2025-06-27 NOTE — DISCHARGE SUMMARY
Discharge Note  Short Stay      SUMMARY     Admit Date: 2025    Attending Physician: Larry Brasher MD    Discharge Physician: Larry Brasher MD      Discharge Date: 2025 9:00 AM    Procedure(s) (LRB):  INJECTION,SACROILIAC JOINT BILATERAL (Bilateral)    Final Diagnosis: Sacroiliac joint pain [M53.3]    Disposition: Home or self care    Patient Instructions:   Current Discharge Medication List        CONTINUE these medications which have NOT CHANGED    Details   ARIPiprazole (ABILIFY) 5 MG Tab Take 5 mg by mouth once daily.      aspirin 81 MG Chew Take 81 mg by mouth once daily.      azelastine (ASTELIN) 137 mcg (0.1 %) nasal spray USE 1 TO 2 SPRAYS IN EACH NOSTRIL TWICE DAILY FOR CONGESTION      baclofen (LIORESAL) 20 MG tablet Take 1 tablet by mouth 3 (three) times daily as needed.       benztropine (COGENTIN) 0.5 MG tablet Take 0.5 mg by mouth once daily.      busPIRone (BUSPAR) 10 MG tablet Tale 1 tablet Orally Twice a day 30 days  Qty: 60 tablet, Refills: 0      butalbital-acetaminophen-caffeine -40 mg (FIORICET, ESGIC) -40 mg per tablet Take 1 tablet by mouth every 4 (four) hours as needed.      butorphanol (STADOL) 10 mg/mL nasal spray 2 sprays by Nasal route every 4 (four) hours as needed for pain  Qty: 100 mL, Refills: 5    Comments: Quantity prescribed more than 7 day supply? Press F2 and select one:[id number]        cyclobenzaprine (FLEXERIL) 10 MG tablet TK 1 T PO Q 8 H PRF PAIN      diazePAM (VALIUM) 2 MG tablet Take 2 mg by mouth 2 (two) times daily as needed.      erenumab-aooe (AIMOVIG AUTOINJECTOR SUBQ) Inject into the skin.      EScitalopram oxalate (LEXAPRO) 20 MG tablet Take 1 tablet (20 mg total) by mouth once daily.  Qty: 30 tablet, Refills: 0      estradiol valerate (DELESTROGEN) 20 mg/mL injection SMARTSI.3 Milliliter(s) IM Once a Week      evolocumab (REPATHA SURECLICK) 140 mg/mL PnIj Inject 1 mL (140 mg total) into the skin every 14 (fourteen) days.  Qty: 6 mL,  Refills: 3    Associated Diagnoses: Coronary artery disease involving native coronary artery of native heart without angina pectoris; Pure hypercholesterolemia; Lipoprotein deficiency      ezetimibe (ZETIA) 10 mg tablet Take 1 tablet (10 mg total) by mouth once daily.  Qty: 90 tablet, Refills: 3      fluticasone (FLONASE) 50 mcg/actuation nasal spray SPRAY TWICE IEN QD  Refills: 5      gabapentin (NEURONTIN) 300 MG capsule Take 1 capsule (300 mg total) by mouth 3 (three) times daily.  Qty: 90 capsule, Refills: 3    Associated Diagnoses: Chronic pain disorder; Cervical radiculopathy; Lumbar radiculopathy      HYDROcodone-acetaminophen (NORCO) 5-325 mg per tablet Take 1 tablet by mouth every 6 (six) hours as needed for Pain.  Qty: 20 tablet, Refills: 0    Comments: Quantity prescribed more than 7 day supply? No  Associated Diagnoses: Rupture of ulnar collateral ligament of left thumb, subsequent encounter      !! hydrocortisone (ANUSOL-HC) 2.5 % rectal cream Place rectally 2 (two) times daily.  Qty: 28 g, Refills: 1    Associated Diagnoses: Hemorrhoid prolapse      !! hydrocortisone (ANUSOL-HC) 2.5 % rectal cream Place rectally 2 (two) times daily.  Qty: 28 g, Refills: 1    Associated Diagnoses: Hemorrhoid prolapse      hydrOXYzine pamoate (VISTARIL) 50 MG Cap Take  mg by mouth nightly as needed.      ketorolac (TORADOL) 10 mg tablet Pt takes PRN      levETIRAcetam (KEPPRA) 1000 MG tablet Take 1,000 mg by mouth 2 (two) times daily.      methocarbamoL (ROBAXIN) 750 MG Tab Take 750 mg by mouth 3 (three) times daily.      NARCAN 4 mg/actuation Spry SPRAY 0.1ML IN 1 NOSTRIL MAY REPEAT DOSE EVERY 2-3 MINUTES AS NEEDED ALTERNATING NOSTRILS EACH DOSE  Qty: 1 each, Refills: 3    Associated Diagnoses: Chronic pain disorder; Lumbar radiculopathy      nitroGLYCERIN (NITROSTAT) 0.4 MG SL tablet Place 1 tablet (0.4 mg total) under the tongue every 5 (five) minutes as needed for Chest pain. Repeat twice as needed for a  maximum total dose of 3 tablets. If still having chest pain, go to the emergency room.  Qty: 25 tablet, Refills: 4      NURTEC 75 mg odt Take 1 tablet (75 mg total) by mouth as needed for Migraine.  Qty: 15 tablet, Refills: 2    Associated Diagnoses: Chronic migraine with aura without status migrainosus, not intractable      onabotulinumtoxina (BOTOX) 200 unit SolR Inject 200 Units into the muscle.      !! ondansetron (ZOFRAN-ODT) 4 MG TbDL Dissolve 1 tablet (4 mg total) on the tongue every 8 (eight) hours as needed.  Qty: 20 tablet, Refills: 1      !! ondansetron (ZOFRAN-ODT) 8 MG TbDL Take 8 mg by mouth 2 (two) times daily.      oxybutynin (DITROPAN-XL) 10 MG 24 hr tablet Take 1 tablet (10 mg total) by mouth once daily.  Qty: 90 tablet, Refills: 3    Associated Diagnoses: OAB (overactive bladder)      oxyCODONE (ROXICODONE) 5 MG immediate release tablet Take 1 tablet (5 mg total) by mouth every 4 (four) hours as needed for Pain.  Qty: 30 tablet, Refills: 0    Comments: Quantity prescribed more than 7 day supply? No  Associated Diagnoses: Hemorrhoids, unspecified hemorrhoid type      pantoprazole (PROTONIX) 20 MG tablet Take 20 mg by mouth once daily.      prazosin (MINIPRESS) 2 MG Cap Tale 1 capsule Orally twice daily 30 days  Qty: 60 capsule, Refills: 0    Comments: .      prochlorperazine (COMPAZINE) 10 MG tablet Take 10 mg by mouth 3 (three) times daily. Pt takes PRN      propranoloL (INDERAL LA) 60 MG 24 hr capsule Take 60 mg by mouth every evening.      rosuvastatin (CRESTOR) 40 MG Tab Take 1 tablet (40 mg total) by mouth once daily.  Qty: 90 tablet, Refills: 3      traZODone (DESYREL) 100 MG tablet Take 2 tablet at bedtime as needed Orally 30 days  Qty: 60 tablet, Refills: 0      verapamiL (VERELAN) 240 MG C24P Take 240 mg by mouth Daily.      wheat dextrin (BENEFIBER CLEAR SF, DEXTRIN,) 3 gram/3.5 gram PwPk Take 1 packet by mouth once daily.  Qty: 28 packet, Refills: 6       !! - Potential duplicate  medications found. Please discuss with provider.              Discharge Diagnosis: Sacroiliac joint pain [M53.3]  Condition on Discharge: Stable with no complications to procedure   Diet on Discharge: Same as before.  Activity: as per instruction sheet.  Discharge to: Home with a responsible adult.  Follow up: 2-4 weeks       Please call my office or pager at [phone] if experienced any weakness or loss of sensation, fever > 101.5, pain uncontrolled with oral medications, persistent nausea/vomiting/or diarrhea, redness or drainage from the incisions, or any other worrisome concerns. If physician on call was not reached or could not communicate with our office for any reason please go to the nearest emergency department     Matti Ulloa M.D.  PGY-5  Interventional Pain Management Fellow  [facility]  Pager: [phone]

## 2025-06-27 NOTE — DISCHARGE INSTRUCTIONS

## 2025-07-01 ENCOUNTER — PATIENT MESSAGE (OUTPATIENT)
Dept: PODIATRY | Facility: CLINIC | Age: 45
End: 2025-07-01
Payer: MEDICARE

## 2025-07-02 ENCOUNTER — TELEPHONE (OUTPATIENT)
Dept: PODIATRY | Facility: CLINIC | Age: 45
End: 2025-07-02
Payer: MEDICARE

## 2025-07-11 ENCOUNTER — OFFICE VISIT (OUTPATIENT)
Dept: PAIN MEDICINE | Facility: CLINIC | Age: 45
End: 2025-07-11
Payer: MEDICARE

## 2025-07-11 VITALS
OXYGEN SATURATION: 99 % | BODY MASS INDEX: 20.39 KG/M2 | TEMPERATURE: 98 F | SYSTOLIC BLOOD PRESSURE: 116 MMHG | DIASTOLIC BLOOD PRESSURE: 79 MMHG | HEART RATE: 79 BPM | RESPIRATION RATE: 15 BRPM | WEIGHT: 150.56 LBS | HEIGHT: 72 IN

## 2025-07-11 DIAGNOSIS — M53.3 SACROILIAC JOINT PAIN: ICD-10-CM

## 2025-07-11 DIAGNOSIS — M54.16 LUMBAR RADICULOPATHY: ICD-10-CM

## 2025-07-11 DIAGNOSIS — M50.30 DDD (DEGENERATIVE DISC DISEASE), CERVICAL: ICD-10-CM

## 2025-07-11 DIAGNOSIS — M51.369 ANNULAR TEAR OF LUMBAR DISC: ICD-10-CM

## 2025-07-11 DIAGNOSIS — M47.812 CERVICAL SPONDYLOSIS: ICD-10-CM

## 2025-07-11 DIAGNOSIS — M47.816 LUMBAR SPONDYLOSIS: ICD-10-CM

## 2025-07-11 DIAGNOSIS — M54.12 CERVICAL RADICULOPATHY: ICD-10-CM

## 2025-07-11 DIAGNOSIS — M51.360 DEGENERATION OF INTERVERTEBRAL DISC OF LUMBAR REGION WITH DISCOGENIC BACK PAIN: ICD-10-CM

## 2025-07-11 DIAGNOSIS — G89.4 CHRONIC PAIN DISORDER: Primary | ICD-10-CM

## 2025-07-11 PROCEDURE — 99999 PR PBB SHADOW E&M-EST. PATIENT-LVL V: CPT | Mod: PBBFAC,,, | Performed by: NURSE PRACTITIONER

## 2025-07-11 RX ORDER — GABAPENTIN 300 MG/1
300 CAPSULE ORAL 3 TIMES DAILY
Qty: 90 CAPSULE | Refills: 3 | Status: SHIPPED | OUTPATIENT
Start: 2025-07-11
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
## 2025-07-15 DIAGNOSIS — I25.10 CORONARY ARTERY DISEASE INVOLVING NATIVE CORONARY ARTERY OF NATIVE HEART WITHOUT ANGINA PECTORIS: ICD-10-CM

## 2025-07-15 DIAGNOSIS — E78.6 LIPOPROTEIN DEFICIENCY: ICD-10-CM

## 2025-07-15 DIAGNOSIS — E78.00 PURE HYPERCHOLESTEROLEMIA: ICD-10-CM

## 2025-08-22 ENCOUNTER — PATIENT MESSAGE (OUTPATIENT)
Dept: SPORTS MEDICINE | Facility: CLINIC | Age: 45
End: 2025-08-22
Payer: MEDICARE

## (undated) DEVICE — FORCEP STRAIGHT DISP

## (undated) DEVICE — ELECTRODE REM PLYHSV RETURN 9

## (undated) DEVICE — SOL IRR SOD CHL .9% POUR

## (undated) DEVICE — DISSECTOR LIGASURE EXACT 21CM

## (undated) DEVICE — GOWN ECLIPSE REINF LVL4 TWL XL

## (undated) DEVICE — GAUZE FLUFF XXLG 36X36 2 PLY

## (undated) DEVICE — PLASTER SPLNT FST SET 4X15 BLU

## (undated) DEVICE — SYR 10CC LUER LOCK

## (undated) DEVICE — DRESSING LEUKOPLAST FLEX 1X3IN

## (undated) DEVICE — DRAPE UND BUTT W/POUCH 40X44IN

## (undated) DEVICE — DRAPE C-ARM MINI DISP

## (undated) DEVICE — SOL POVIDONE SCRUB IODINE 4 OZ

## (undated) DEVICE — BANDAGE MATRIX HK LOOP 4IN 5YD

## (undated) DEVICE — SUT MCRYL PLUS 4-0 PS2 27IN

## (undated) DEVICE — GLOVE BIOGEL ECLIPSE SZ 7

## (undated) DEVICE — TOWEL OR DISP STRL BLUE 4/PK

## (undated) DEVICE — BRIEF MESH LARGE

## (undated) DEVICE — PANTIES FEMININE NAPKIN LG/XLG

## (undated) DEVICE — SUT VICRYL 2-0 SH VCP317H

## (undated) DEVICE — SPONGE COTTON TRAY 4X4IN

## (undated) DEVICE — SUSPENSORY X-LARGE

## (undated) DEVICE — CLIPPER BLADE MOD 4406 (CAREF)

## (undated) DEVICE — ADHESIVE MASTISOL VIAL 48/BX

## (undated) DEVICE — DRAPE STERI-DRAPE 1000 17X11IN

## (undated) DEVICE — DRAPE LAP T SHT W/ INSTR PAD

## (undated) DEVICE — SOL POVIDONE PREP IODINE 4 OZ

## (undated) DEVICE — CORD FOR BIPOLAR FORCEPS 12

## (undated) DEVICE — DRESSING N ADH OIL EMUL 3X3

## (undated) DEVICE — Device

## (undated) DEVICE — SUT 2/0 30IN SILK BLK BRAI

## (undated) DEVICE — GLOVE SENSICARE PI SURG 6

## (undated) DEVICE — LEGGING CLEAR POLY 2/PACK

## (undated) DEVICE — TAPE SILK 3IN

## (undated) DEVICE — SUT K835H SILK 1-0

## (undated) DEVICE — BLADE SURG #15 CARBON STEEL

## (undated) DEVICE — PENCIL ROCKER SWITCH 10FT CORD

## (undated) DEVICE — ELECTRODE MEGADYNE RETURN DUAL

## (undated) DEVICE — PAD CAST SPECIALIST STRL 4

## (undated) DEVICE — UNDERPAD ULTRASORB 300LB 30X36

## (undated) DEVICE — TRAY MINOR GEN SURG OMC

## (undated) DEVICE — GLOVE SENSICARE PI GRN 6.5

## (undated) DEVICE — TIP YANKAUERS BULB NO VENT

## (undated) DEVICE — BANDAGE MATRIX HK LOOP 2IN 5YD

## (undated) DEVICE — JELLY SURGILUBE LUBE TUBE 2OZ

## (undated) DEVICE — COVER CAMERA OPERATING ROOM

## (undated) DEVICE — ADHESIVE DERMABOND ADVANCED

## (undated) DEVICE — DRAPE THREE-QTR REINF 53X77IN

## (undated) DEVICE — PAD CAST 2 IN X 4YDS STERILE

## (undated) DEVICE — TOURNIQUET SB QC DP 18X4IN

## (undated) DEVICE — SPONGE BULKEE II ABSRB 6X6.75

## (undated) DEVICE — GLOVE BIOGEL PI MICRO INDIC 7

## (undated) DEVICE — SUT 0 18IN SILK BLK BRAIDE